# Patient Record
Sex: MALE | Race: BLACK OR AFRICAN AMERICAN | Employment: UNEMPLOYED | URBAN - METROPOLITAN AREA
[De-identification: names, ages, dates, MRNs, and addresses within clinical notes are randomized per-mention and may not be internally consistent; named-entity substitution may affect disease eponyms.]

---

## 2021-07-26 ENCOUNTER — APPOINTMENT (OUTPATIENT)
Dept: GENERAL RADIOLOGY | Age: 51
End: 2021-07-26
Attending: EMERGENCY MEDICINE
Payer: MEDICARE

## 2021-07-26 ENCOUNTER — HOSPITAL ENCOUNTER (EMERGENCY)
Age: 51
Discharge: HOME | End: 2021-07-26
Attending: EMERGENCY MEDICINE | Admitting: EMERGENCY MEDICINE
Payer: MEDICARE

## 2021-07-26 VITALS
OXYGEN SATURATION: 100 % | SYSTOLIC BLOOD PRESSURE: 153 MMHG | DIASTOLIC BLOOD PRESSURE: 96 MMHG | HEART RATE: 82 BPM | RESPIRATION RATE: 20 BRPM | TEMPERATURE: 97.8 F

## 2021-07-26 DIAGNOSIS — G35 MULTIPLE SCLEROSIS (HCC): Primary | ICD-10-CM

## 2021-07-26 DIAGNOSIS — D50.9 IRON DEFICIENCY ANEMIA, UNSPECIFIED IRON DEFICIENCY ANEMIA TYPE: ICD-10-CM

## 2021-07-26 LAB
ALBUMIN SERPL-MCNC: 3.5 G/DL (ref 3.5–5)
ALBUMIN/GLOB SERPL: 1.1 {RATIO} (ref 1.1–2.2)
ALP SERPL-CCNC: 99 U/L (ref 45–117)
ALT SERPL-CCNC: 37 U/L (ref 12–78)
ANION GAP SERPL CALC-SCNC: 4 MMOL/L (ref 5–15)
AST SERPL-CCNC: 17 U/L (ref 15–37)
ATRIAL RATE: 101 BPM
BASOPHILS # BLD: 0.1 K/UL (ref 0–0.1)
BASOPHILS NFR BLD: 1 % (ref 0–1)
BILIRUB SERPL-MCNC: 0.2 MG/DL (ref 0.2–1)
BUN SERPL-MCNC: 8 MG/DL (ref 6–20)
BUN/CREAT SERPL: 13 (ref 12–20)
CALCIUM SERPL-MCNC: 9.4 MG/DL (ref 8.5–10.1)
CALCULATED P AXIS, ECG09: 75 DEGREES
CALCULATED R AXIS, ECG10: 41 DEGREES
CALCULATED T AXIS, ECG11: 34 DEGREES
CHLORIDE SERPL-SCNC: 109 MMOL/L (ref 97–108)
CO2 SERPL-SCNC: 28 MMOL/L (ref 21–32)
COMMENT, HOLDF: NORMAL
CREAT SERPL-MCNC: 0.62 MG/DL (ref 0.7–1.3)
DIAGNOSIS, 93000: NORMAL
DIFFERENTIAL METHOD BLD: ABNORMAL
EOSINOPHIL # BLD: 0.1 K/UL (ref 0–0.4)
EOSINOPHIL NFR BLD: 1 % (ref 0–7)
ERYTHROCYTE [DISTWIDTH] IN BLOOD BY AUTOMATED COUNT: 20.8 % (ref 11.5–14.5)
GLOBULIN SER CALC-MCNC: 3.2 G/DL (ref 2–4)
GLUCOSE SERPL-MCNC: 165 MG/DL (ref 65–100)
HCT VFR BLD AUTO: 30.3 % (ref 36.6–50.3)
HGB BLD-MCNC: 8.5 G/DL (ref 12.1–17)
IMM GRANULOCYTES # BLD AUTO: 0.1 K/UL (ref 0–0.04)
IMM GRANULOCYTES NFR BLD AUTO: 1 % (ref 0–0.5)
LYMPHOCYTES # BLD: 2.1 K/UL (ref 0.8–3.5)
LYMPHOCYTES NFR BLD: 16 % (ref 12–49)
MCH RBC QN AUTO: 19.1 PG (ref 26–34)
MCHC RBC AUTO-ENTMCNC: 28.1 G/DL (ref 30–36.5)
MCV RBC AUTO: 68.2 FL (ref 80–99)
MONOCYTES # BLD: 1.2 K/UL (ref 0–1)
MONOCYTES NFR BLD: 9 % (ref 5–13)
NEUTS SEG # BLD: 9.3 K/UL (ref 1.8–8)
NEUTS SEG NFR BLD: 72 % (ref 32–75)
NRBC # BLD: 0.04 K/UL (ref 0–0.01)
NRBC BLD-RTO: 0.3 PER 100 WBC
P-R INTERVAL, ECG05: 130 MS
PLATELET # BLD AUTO: 283 K/UL (ref 150–400)
PMV BLD AUTO: ABNORMAL FL (ref 8.9–12.9)
POTASSIUM SERPL-SCNC: 3.5 MMOL/L (ref 3.5–5.1)
PROT SERPL-MCNC: 6.7 G/DL (ref 6.4–8.2)
Q-T INTERVAL, ECG07: 344 MS
QRS DURATION, ECG06: 86 MS
QTC CALCULATION (BEZET), ECG08: 446 MS
RBC # BLD AUTO: 4.44 M/UL (ref 4.1–5.7)
RBC MORPH BLD: ABNORMAL
SAMPLES BEING HELD,HOLD: NORMAL
SODIUM SERPL-SCNC: 141 MMOL/L (ref 136–145)
TROPONIN I SERPL-MCNC: <0.05 NG/ML
VENTRICULAR RATE, ECG03: 101 BPM
WBC # BLD AUTO: 12.9 K/UL (ref 4.1–11.1)

## 2021-07-26 PROCEDURE — 36415 COLL VENOUS BLD VENIPUNCTURE: CPT

## 2021-07-26 PROCEDURE — 84484 ASSAY OF TROPONIN QUANT: CPT

## 2021-07-26 PROCEDURE — 93005 ELECTROCARDIOGRAM TRACING: CPT

## 2021-07-26 PROCEDURE — 85025 COMPLETE CBC W/AUTO DIFF WBC: CPT

## 2021-07-26 PROCEDURE — 71045 X-RAY EXAM CHEST 1 VIEW: CPT

## 2021-07-26 PROCEDURE — 99284 EMERGENCY DEPT VISIT MOD MDM: CPT

## 2021-07-26 PROCEDURE — 80053 COMPREHEN METABOLIC PANEL: CPT

## 2021-07-26 RX ORDER — LANOLIN ALCOHOL/MO/W.PET/CERES
325 CREAM (GRAM) TOPICAL
Qty: 30 TABLET | Refills: 0 | Status: SHIPPED | OUTPATIENT
Start: 2021-07-26 | End: 2021-08-25

## 2021-07-26 NOTE — DISCHARGE INSTRUCTIONS
Please establish care with [facility] neurology for your Multiple Sclerosis. Start taking iron pills for your anemia. Thank you.

## 2021-07-29 NOTE — ED PROVIDER NOTES
Pt is a 50 yo male with hx of DM, HTN, myasthena gravis, polymyositis, primary hypersomnolence disorder. Pt reports there is a lot of stuff going on with him, and he just wants to make sure he will be okay. Reports he does not currently see neurology for his MS. Previously saw [facility] neurology and [facility]. Takes daily prednisone since 2001 for MS (unable to tell me who prescribes or refills monthly scipt) and says 'I heard it is not good for you'. Pt says for months he has had intermittent weakness in extremities, no new symptoms today. Says he was admitted at [facility] and was discharged 3 days ago 'too early'. New symptom today is the polyp that he was diagnosed with 3 days ago via colonoscopy. Pt ambulated to room with steady gait. Tells me that he has not established care with PCP 'because that will take months'. Has not made appt with neurology. Past Medical History:   Diagnosis Date    Diabetes ([facility]     Hypertension     Myasthenia gravis     Polymyositis ([facility]     Primary hypersomnolence disorder        Past Surgical History:   Procedure Laterality Date    SHOULDER SURG PROC UNLISTED           No family history on file.     Social History     Socioeconomic History    Marital status:      Spouse name: Not on file    Number of children: Not on file    Years of education: Not on file    Highest education level: Not on file   Occupational History    Not on file   Tobacco Use    Smoking status: Never Smoker   Substance and Sexual Activity    Alcohol use: No    Drug use: No    Sexual activity: Not on file   Other Topics Concern    Not on file   Social History Narrative    Not on file     Social Determinants of Health     Financial Resource Strain:     Difficulty of Paying Living Expenses:    Food Insecurity:     Worried About Running Out of Food in the Last Year:     [address] in the Last Year:    Transportation Needs:     Lack of Transportation (Medical):    [facility] Lack of Transportation (Non-Medical):    Physical Activity:     Days of Exercise per Week:     Minutes of Exercise per Session:    Stress:     Feeling of Stress :    Social Connections:     Frequency of Communication with Friends and Family:     Frequency of Social Gatherings with Friends and Family:     Attends [employer] Services:     Active Member of Clubs or Organizations:     Attends Club or Organization Meetings:     Marital Status:    Intimate Partner Violence:     Fear of Current or Ex-Partner:     Emotionally Abused:     Physically Abused:     Sexually Abused: ALLERGIES: Beef containing products, Pork derived (porcine), Shellfish containing products, and Sulfa (sulfonamide antibiotics)    Review of Systems   Constitutional: Negative for chills and fever. HENT: Negative for drooling and nosebleeds. Eyes: Negative for pain and itching. Respiratory: Negative for choking and stridor. Cardiovascular: Negative for leg swelling. Gastrointestinal: Negative for abdominal pain and rectal pain. Endocrine: Negative for heat intolerance and polyphagia. Genitourinary: Negative for enuresis and genital sores. Musculoskeletal: Negative for arthralgias and joint swelling. Skin: Negative for color change. Allergic/Immunologic: Negative for immunocompromised state. Neurological: Positive for weakness. Negative for tremors and speech difficulty. Hematological: Negative for adenopathy. Psychiatric/Behavioral: Negative for dysphoric mood and sleep disturbance. Vitals:    07/26/21 1830 07/26/21 1845 07/26/21 1900 07/26/21 1931   BP: (!) 137/99 (!) 153/98 (!) 153/96    Pulse:   82 82   Resp:       Temp:   97.8 °F (36.6 °C)    SpO2: 94% 99% 100%             Physical Exam  Vitals and nursing note reviewed. Constitutional:       General: He is not in acute distress. Appearance: He is well-developed. He is not ill-appearing, toxic-appearing or diaphoretic.    HENT:      Head: Normocephalic and atraumatic. Nose: Nose normal.   Eyes:      Conjunctiva/sclera: Conjunctivae normal.   Cardiovascular:      Rate and Rhythm: Normal rate and regular rhythm. Heart sounds: Normal heart sounds. Pulmonary:      Effort: Pulmonary effort is normal. No respiratory distress. Breath sounds: Normal breath sounds. Abdominal:      General: There is no distension. Palpations: Abdomen is soft. Musculoskeletal:         General: No deformity. Normal range of motion. Cervical back: Normal range of motion and neck supple. Skin:     General: Skin is warm and dry. Neurological:      Mental Status: He is alert and oriented to person, place, and time. Cranial Nerves: No cranial nerve deficit. Sensory: No sensory deficit. Motor: No weakness. Coordination: Coordination normal.      Gait: Gait normal.   Psychiatric:         Behavior: Behavior normal.          MDM  Number of Diagnoses or Management Options  Iron deficiency anemia, unspecified iron deficiency anemia type  Multiple sclerosis ([facility]  Diagnosis management comments: Pt with no acute symptoms. Needs to get plugged in with PCP and neurology since pt would like to switch from [facility] to [facility]. Amount and/or Complexity of Data Reviewed  Decide to obtain previous medical records or to obtain history from someone other than the patient: yes      ED Course as of Jul 29 1724   Mon Jul 26, 2021   1929 Pt reporting chronic symptoms. Discharged from [facility] 3 days ago but did not feel well and wanted to make sure 'he was going to be ok'. Previously seen by [facility] neurology, then [facility] neurolog[facility]. Would like to transition to [facility] neurology. Remains on prednisone since 2001 for MS. Referral for neuro provided. Pt reports recent diagnosis of iron def anemia. Will prescribe iron pills. Stable for dc.      [AL]      ED Course User Index  [AL] Vasile Starks MD       Procedures    Patient's results have been reviewed with them. Patient and/or family have verbally conveyed their understanding and agreement of the patient's signs, symptoms, diagnosis, treatment and prognosis and additionally agree to follow up as recommended or return to the Emergency Room should their condition change prior to follow-up. Discharge instructions have also been provided to the patient with some educational information regarding their diagnosis as well a list of reasons why they would want to return to the ER prior to their follow-up appointment should their condition change.

## 2021-08-12 ENCOUNTER — OFFICE VISIT (OUTPATIENT)
Dept: PRIMARY CARE CLINIC | Age: 51
End: 2021-08-12
Payer: MEDICARE

## 2021-08-12 VITALS
RESPIRATION RATE: 16 BRPM | SYSTOLIC BLOOD PRESSURE: 133 MMHG | OXYGEN SATURATION: 99 % | BODY MASS INDEX: 24.49 KG/M2 | TEMPERATURE: 98.4 F | HEART RATE: 99 BPM | DIASTOLIC BLOOD PRESSURE: 87 MMHG | WEIGHT: 161.6 LBS | HEIGHT: 68 IN

## 2021-08-12 DIAGNOSIS — M87.052 AVASCULAR NECROSIS OF BONE OF LEFT HIP (HCC): ICD-10-CM

## 2021-08-12 DIAGNOSIS — R53.83 FATIGUE, UNSPECIFIED TYPE: ICD-10-CM

## 2021-08-12 DIAGNOSIS — G70.00 MYASTHENIA GRAVIS (HCC): ICD-10-CM

## 2021-08-12 DIAGNOSIS — S93.699A: ICD-10-CM

## 2021-08-12 DIAGNOSIS — D64.9 ANEMIA, UNSPECIFIED TYPE: ICD-10-CM

## 2021-08-12 DIAGNOSIS — K63.5 INTESTINAL POLYP: ICD-10-CM

## 2021-08-12 DIAGNOSIS — Z79.52 LONG TERM (CURRENT) USE OF SYSTEMIC STEROIDS: ICD-10-CM

## 2021-08-12 DIAGNOSIS — I10 ESSENTIAL HYPERTENSION: ICD-10-CM

## 2021-08-12 DIAGNOSIS — E55.9 VITAMIN D DEFICIENCY: ICD-10-CM

## 2021-08-12 DIAGNOSIS — I48.92 ATRIAL FLUTTER, UNSPECIFIED TYPE (HCC): ICD-10-CM

## 2021-08-12 DIAGNOSIS — M33.20 POLYMYOSITIS (HCC): Primary | ICD-10-CM

## 2021-08-12 DIAGNOSIS — E11.9 DM TYPE 2, GOAL HBA1C < 7% (HCC): ICD-10-CM

## 2021-08-12 DIAGNOSIS — N40.0 BENIGN PROSTATIC HYPERPLASIA, UNSPECIFIED WHETHER LOWER URINARY TRACT SYMPTOMS PRESENT: ICD-10-CM

## 2021-08-12 DIAGNOSIS — G89.4 CHRONIC PAIN SYNDROME: ICD-10-CM

## 2021-08-12 DIAGNOSIS — G47.10 HYPERSOMNOLENCE: ICD-10-CM

## 2021-08-12 PROCEDURE — G8510 SCR DEP NEG, NO PLAN REQD: HCPCS | Performed by: INTERNAL MEDICINE

## 2021-08-12 PROCEDURE — 3046F HEMOGLOBIN A1C LEVEL >9.0%: CPT | Performed by: INTERNAL MEDICINE

## 2021-08-12 PROCEDURE — G8427 DOCREV CUR MEDS BY ELIG CLIN: HCPCS | Performed by: INTERNAL MEDICINE

## 2021-08-12 PROCEDURE — 99205 OFFICE O/P NEW HI 60 MIN: CPT | Performed by: INTERNAL MEDICINE

## 2021-08-12 PROCEDURE — 2022F DILAT RTA XM EVC RTNOPTHY: CPT | Performed by: INTERNAL MEDICINE

## 2021-08-12 PROCEDURE — G8420 CALC BMI NORM PARAMETERS: HCPCS | Performed by: INTERNAL MEDICINE

## 2021-08-12 PROCEDURE — 3017F COLORECTAL CA SCREEN DOC REV: CPT | Performed by: INTERNAL MEDICINE

## 2021-08-12 RX ORDER — LISINOPRIL 10 MG/1
20 TABLET ORAL DAILY
COMMUNITY
Start: 2021-07-09 | End: 2022-01-01

## 2021-08-12 RX ORDER — LOPERAMIDE HYDROCHLORIDE 2 MG/1
CAPSULE ORAL AS NEEDED
COMMUNITY

## 2021-08-12 NOTE — PROGRESS NOTES
Vito Keene is a 49 y.o.  male and presents with     Chief Complaint   Patient presents with   [facility] Follow Up    Establish Care    Diabetes    Hypertension     Pt is here to establish care. Pt has h/o polymositis, myasthenia, DM, HTN , chronic pain. Pt used to live in [facility] and was going to [facility]. Pt will be moving to [place] in December  Pt  Was seeing cardiology, Neurology, GI , pain management  Used to be on percocet. Pt was admitted to [facility] recently and had EGD and colonscopy. Pt had pain in his shoulders. Pt was told he had polyp . He had pain in his abdomen. Was getting weaker and his rt arm gave out. Pt had bloody dark  Stools. EGD was fine  Polyp pathology is pending. Sister has lupus, brother has hemophilia  Pt is supposed to get CT abdomen ordered by GI at [facility]. Patient reports that he has avascular necrosis of the head of the femur on the left side. He has been on prednisone for a long time. He reported that his polymyositis is worse than myasthenia. He reported that he has reached a plateau with [facility] with regard to managing his myositis. We have never been able to cut down on the dose of the steroid. He reports that surprisingly his bone density in the past was okay. Patient reports that myositis would cause him a lot of pain and he had to take Percocet.   He was seen pain management in the past.  He saw cardiology at [facility] and was placed on blood thinner for atrial flutter      Past Medical History:   Diagnosis Date    Diabetes ([facility]     Hypertension     Myasthenia gravis     Polymyositis ([facility]     Polymyositis ([facility] 2001    Primary hypersomnolence disorder      Past Surgical History:   Procedure Laterality Date    HX ENDOSCOPY      [place] SHOULDER SURG PROC UNLISTED       Current Outpatient Medications   Medication Sig    ferrous sulfate (Iron, Ferrous Sulfate,) 325 mg (65 mg iron) tablet Take 1 Tablet by mouth Daily (before breakfast) for 30 days.    insulin lispro (HUMALOG) 100 unit/mL injection by SubCUTAneous route. Sliding Scale  4 times a day    CALCIUM PO Take  by mouth daily.  Acetylcarnitine 500 mg cap Take  by mouth daily.  insulin glargine (LANTUS) 100 unit/mL injection 26 Units by SubCUTAneous route daily.  predniSONE (DELTASONE) 20 mg tablet Take 25 mg by mouth daily (with breakfast).  OTHER,NON-FORMULARY, DMAE 351mg Daily    calcitRIOL (ROCALTROL) 0.5 mcg capsule Take 0.5 mcg by mouth daily.  albuterol (PROVENTIL, VENTOLIN) 90 mcg/Actuation inhaler Take 1-2 Puffs by inhalation every four (4) hours as needed for Wheezing.  esomeprazole (NEXIUM) 40 mg capsule Take 80 mg by mouth daily.  folic acid (FOLVITE) 1 mg tablet Take 1 mg by mouth daily.  lisinopriL (PRINIVIL, ZESTRIL) 10 mg tablet TAKE 1 TABLET BY MOUTH EVERY DAY    apixaban (Eliquis) 5 mg tablet Eliquis 5 mg tablet    loperamide (IMODIUM) 2 mg capsule loperamide 2 mg capsule    pyridostigmine (MESTINON) 60 mg tablet Take 60 mg by mouth two (2) times a day. (Patient not taking: Reported on 8/12/2021)     No current facility-administered medications for this visit. Health Maintenance   Topic Date Due    Hepatitis C Screening  Never done    COVID-19 Vaccine (1) Never done    DTaP/Tdap/Td series (1 - Tdap) Never done    Lipid Screen  Never done    Colorectal Cancer Screening Combo  Never done    Shingrix Vaccine Age 50> (1 of 2) Never done    Medicare Yearly Exam  07/26/2021    Flu Vaccine (1) 09/01/2021    Pneumococcal 0-64 years  Aged Out       There is no immunization history on file for this patient. No LMP for male patient. Allergies and Intolerances:    Allergies   Allergen Reactions    Beef Containing Products Other (comments)    Pork Derived (Porcine) Other (comments)    Shellfish Containing Products Swelling    Sulfa (Sulfonamide Antibiotics) Unknown (comments)       Family History:   Family History   Family history unknown: Yes       Social History:   He  reports that he has never smoked. He has never used smokeless tobacco.  He  reports no history of alcohol use. Review of Systems:   General: negative for - chills, fatigue, fever, weight change  Psych: negative for - anxiety, depression, irritability or mood swings  ENT: negative for - headaches, hearing change, nasal congestion, oral lesions, sneezing or sore throat  Heme/ Lymph: negative for - bleeding problems, bruising, pallor or swollen lymph nodes  Endo: negative for - hot flashes, polydipsia/polyuria or temperature intolerance  Resp: negative for - cough, shortness of breath or wheezing  CV: negative for - chest pain, edema or palpitations  GI: negative for - abdominal pain, change in bowel habits, constipation, diarrhea or nausea/vomiting  : negative for - dysuria, hematuria, incontinence, pelvic pain or vulvar/vaginal symptoms  MSK: negative for - joint pain, joint swelling or muscle pain  Neuro: negative for - confusion, headaches, seizures or weakness  Derm: negative for - dry skin, hair changes, rash or skin lesion changes          Physical:   Vitals:   Vitals:    08/12/21 1108 08/12/21 1118   BP: (!) 150/89 133/87   Pulse: 99    Resp: 16    Temp: 98.4 °F (36.9 °C)    TempSrc: Temporal    SpO2: 99%    Weight: 161 lb 9.6 oz (73.3 kg)    Height: 5' 8\" (1.727 m)            Exam:   HEENT- atraumatic,normocephalic, awake, oriented, well nourished  Neck - supple,no enlarged lymph nodes, no JVD, no thyromegaly  Chest- CTA, no rhonchi, no crackles  Heart- , no murmurs / gallop/rub, irregular  Abdomen- soft,BS+,NT, no hepatosplenomegaly  Ext - no c/c/edema   Neuro- no focal deficits. Power 5/5 all extremities  Skin - warm,dry, no obvious rashes.           Review of Data:   LABS:   Lab Results   Component Value Date/Time    WBC 12.9 (H) 07/26/2021 12:56 PM    HGB 8.5 (L) 07/26/2021 12:56 PM    HCT 30.3 (L) 07/26/2021 12:56 PM    PLATELET 543 50/49/3789 12:56 PM     Lab Results   Component Value Date/Time    Sodium 141 07/26/2021 12:56 PM    Potassium 3.5 07/26/2021 12:56 PM    Chloride 109 (H) 07/26/2021 12:56 PM    CO2 28 07/26/2021 12:56 PM    Glucose 165 (H) 07/26/2021 12:56 PM    BUN 8 07/26/2021 12:56 PM    Creatinine 0.62 (L) 07/26/2021 12:56 PM     No results found for: CHOL, CHOLX, CHLST, CHOLV, HDL, HDLP, LDL, LDLC, DLDLP, TGLX, TRIGL, TRIGP  No components found for: GPT        Impression / Plan:        ICD-10-CM ICD-9-CM    1. Polymyositis ([facility])  M33.20 710.4 REFERRAL TO RHEUMATOLOGY      [facility]   2. Myasthenia gravis ([facility]  G70.00 358.00 REFERRAL TO NEUROLOGY      [facility] 3RD GENERATION   3. DM type 2, goal HbA1c < 7% ([facility])  E11.9 250.00 HEMOGLOBIN A1C WITH EAG      LIPID PANEL      METABOLIC PANEL, COMPREHENSIVE      HEMOGLOBIN A1C WITH EAG      MICROALBUMIN, UR, RAND W/ MICROALB/CREAT RATIO   4. Anemia, unspecified type  D64.9 285.9    5. Intestinal polyp  K63.5 211.3    6. Hypersomnolence  G47.10 780.54    7. Essential hypertension  I10 401.9    8. Benign prostatic hyperplasia, unspecified whether lower urinary tract symptoms present  N40.0 600.00    9. Rupture of plantar fascia, unspecified laterality, initial encounter  S93.699A 845.19    10. Atrial flutter, unspecified type ([facility]  I48.92 427.32 REFERRAL TO CARDIOLOGY   11. Vitamin D deficiency  E55.9 268.9 VITAMIN D, 25 HYDROXY   12. Avascular necrosis of bone of left hip ([facility])  M87.052 733.42 XR HIP LT W OR WO PELV 2-3 VWS      DEXA BONE DENSITY STUDY AXIAL   13. Long term (current) use of systemic steroids  Z79.52 V58.65 DEXA BONE DENSITY STUDY AXIAL   14. Chronic pain syndrome  G89.4 338.4 REFERRAL TO PAIN MANAGEMENT   15. Fatigue, unspecified type  R53.83 780.79 [facility] 3RD GENERATION     Very complex patient with multiple comorbidities. Asked patient to follow-up with GI related to rectal bleeding, anemia and polyp in the colon. Supposed to get CT abdomen.       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 Will await rheumatology opinion. Explained to patient risk benefits of the medications. Advised patient to stop meds if having any side effects. Pt verbalized understanding of the instructions. I have discussed the diagnosis with the patient and the intended plan as seen in the above orders. The patient has received an after-visit summary and questions were answered concerning future plans. I have discussed medication side effects and warnings with the patient as well. I have reviewed the plan of care with the patient, accepted their input and they are in agreement with the treatment goals. Reviewed plan of care. Patient has provided input and agrees with goals. Follow-up and Dispositions    · Return in about 1 month (around 9/12/2021).          Lisa Chapman MD

## 2021-08-12 NOTE — PROGRESS NOTES
Identified pt with two pt identifiers(name and ). Chief Complaint   Patient presents with   [facility] Follow Up    Establish Care        3 most recent PHQ Screens 2021   Little interest or pleasure in doing things Not at all   Feeling down, depressed, irritable, or hopeless Not at all   Total Score PHQ 2 0        Vitals:    21 1108 21 1118   BP: (!) 150/89 133/87   Pulse: 99    Resp: 16    Temp: 98.4 °F (36.9 °C)    TempSrc: Temporal    SpO2: 99%    Weight: 161 lb 9.6 oz (73.3 kg)    Height: 5' 8\" (1.727 m)    PainSc:   6    PainLoc: Generalized        Health Maintenance Due   Topic    Hepatitis C Screening     COVID-19 Vaccine (1)    DTaP/Tdap/Td series (1 - Tdap)    Lipid Screen     Colorectal Cancer Screening Combo     Shingrix Vaccine Age 50> (1 of 2)    Medicare Yearly Exam        1. Have you been to the ER, urgent care clinic since your last visit? Hospitalized since your last visit? No    2. Have you seen or consulted any other health care providers outside of the [address] [place] [address] since your last visit? Include any pap smears or colon screening.  No

## 2021-08-20 ENCOUNTER — TELEPHONE (OUTPATIENT)
Dept: PRIMARY CARE CLINIC | Age: 51
End: 2021-08-20

## 2021-08-20 DIAGNOSIS — M33.20 POLYMYOSITIS (HCC): Primary | ICD-10-CM

## 2021-08-20 NOTE — TELEPHONE ENCOUNTER
----- Message from Konstantin Rubio sent at 8/20/2021  1:24 PM EDT -----  Regarding: Dr. Agrawal/Telephone  Contact: [phone]  Referral    Caller (first and last name if not the patient or from practice): Pt.       Caller's relationship to patient (if not from a practice): N/a. Name of caller (if calling from a practice): N/a. Name of practice: [facility]. Specialist's title, first, and last name: Rheumatologist.      Office Phone Number: [phone]      Fax number: N/a. Date and time of appointment: N/a needs referral first.       Reason for appointment: MS.       Details to clarify the request:  N/a.       Konstantin Rubio

## 2021-08-23 DIAGNOSIS — M33.20 POLYMYOSITIS (HCC): Primary | ICD-10-CM

## 2021-08-23 NOTE — TELEPHONE ENCOUNTER
Patient seen on 8/12/21 can you place new referral for [facility] Rheumatologist clinic

## 2022-01-01 ENCOUNTER — APPOINTMENT (OUTPATIENT)
Dept: GENERAL RADIOLOGY | Age: 52
DRG: 329 | End: 2022-01-01
Attending: COLON & RECTAL SURGERY
Payer: MEDICARE

## 2022-01-01 ENCOUNTER — TELEPHONE (OUTPATIENT)
Dept: SURGERY | Age: 52
End: 2022-01-01

## 2022-01-01 ENCOUNTER — APPOINTMENT (OUTPATIENT)
Dept: CT IMAGING | Age: 52
DRG: 329 | End: 2022-01-01
Attending: COLON & RECTAL SURGERY
Payer: MEDICARE

## 2022-01-01 ENCOUNTER — PATIENT OUTREACH (OUTPATIENT)
Dept: CASE MANAGEMENT | Age: 52
End: 2022-01-01

## 2022-01-01 ENCOUNTER — HOSPITAL ENCOUNTER (OUTPATIENT)
Dept: PREADMISSION TESTING | Age: 52
Discharge: HOME | End: 2022-03-15

## 2022-01-01 ENCOUNTER — APPOINTMENT (OUTPATIENT)
Dept: MRI IMAGING | Age: 52
DRG: 329 | End: 2022-01-01
Attending: PSYCHIATRY & NEUROLOGY
Payer: MEDICARE

## 2022-01-01 ENCOUNTER — ANESTHESIA EVENT (OUTPATIENT)
Dept: SURGERY | Age: 52
DRG: 230 | End: 2022-01-01
Payer: MEDICAID

## 2022-01-01 ENCOUNTER — HOSPITAL ENCOUNTER (OUTPATIENT)
Dept: PREADMISSION TESTING | Age: 52
Discharge: HOME | End: 2022-02-21
Payer: MEDICARE

## 2022-01-01 ENCOUNTER — HOSPITAL ENCOUNTER (INPATIENT)
Age: 52
LOS: 19 days | DRG: 230 | End: 2022-09-14
Attending: COLON & RECTAL SURGERY | Admitting: COLON & RECTAL SURGERY
Payer: MEDICAID

## 2022-01-01 ENCOUNTER — HOSPITAL ENCOUNTER (OUTPATIENT)
Dept: PREADMISSION TESTING | Age: 52
Discharge: HOME | End: 2022-08-10
Payer: MEDICARE

## 2022-01-01 ENCOUNTER — APPOINTMENT (OUTPATIENT)
Dept: NON INVASIVE DIAGNOSTICS | Age: 52
DRG: 329 | End: 2022-01-01
Attending: PSYCHIATRY & NEUROLOGY
Payer: MEDICARE

## 2022-01-01 ENCOUNTER — APPOINTMENT (OUTPATIENT)
Dept: GENERAL RADIOLOGY | Age: 52
End: 2022-01-01
Attending: STUDENT IN AN ORGANIZED HEALTH CARE EDUCATION/TRAINING PROGRAM
Payer: MEDICARE

## 2022-01-01 ENCOUNTER — OFFICE VISIT (OUTPATIENT)
Dept: SURGERY | Age: 52
End: 2022-01-01
Payer: MEDICARE

## 2022-01-01 ENCOUNTER — ANESTHESIA (OUTPATIENT)
Dept: SURGERY | Age: 52
DRG: 230 | End: 2022-01-01
Payer: MEDICAID

## 2022-01-01 ENCOUNTER — APPOINTMENT (OUTPATIENT)
Dept: CT IMAGING | Age: 52
DRG: 230 | End: 2022-01-01
Attending: COLON & RECTAL SURGERY
Payer: MEDICAID

## 2022-01-01 ENCOUNTER — ANESTHESIA EVENT (OUTPATIENT)
Dept: SURGERY | Age: 52
DRG: 329 | End: 2022-01-01
Payer: MEDICARE

## 2022-01-01 ENCOUNTER — HOSPITAL ENCOUNTER (INPATIENT)
Age: 52
LOS: 71 days | Discharge: NURSING, REHAB OR LONG-TERM CARE | DRG: 329 | End: 2022-06-25
Attending: COLON & RECTAL SURGERY | Admitting: COLON & RECTAL SURGERY
Payer: MEDICARE

## 2022-01-01 ENCOUNTER — HOSPITAL ENCOUNTER (OUTPATIENT)
Dept: PREADMISSION TESTING | Age: 52
Discharge: HOME | End: 2022-04-05

## 2022-01-01 ENCOUNTER — ANESTHESIA EVENT (OUTPATIENT)
Dept: NON INVASIVE DIAGNOSTICS | Age: 52
DRG: 329 | End: 2022-01-01
Payer: MEDICARE

## 2022-01-01 ENCOUNTER — ANESTHESIA (OUTPATIENT)
Dept: SURGERY | Age: 52
DRG: 329 | End: 2022-01-01
Payer: MEDICARE

## 2022-01-01 ENCOUNTER — APPOINTMENT (OUTPATIENT)
Dept: MRI IMAGING | Age: 52
DRG: 329 | End: 2022-01-01
Attending: COLON & RECTAL SURGERY
Payer: MEDICARE

## 2022-01-01 ENCOUNTER — APPOINTMENT (OUTPATIENT)
Dept: GENERAL RADIOLOGY | Age: 52
DRG: 329 | End: 2022-01-01
Attending: ORTHOPAEDIC SURGERY
Payer: MEDICARE

## 2022-01-01 ENCOUNTER — ANESTHESIA (OUTPATIENT)
Dept: NON INVASIVE DIAGNOSTICS | Age: 52
DRG: 329 | End: 2022-01-01
Payer: MEDICARE

## 2022-01-01 ENCOUNTER — HOSPITAL ENCOUNTER (EMERGENCY)
Age: 52
Discharge: HOME | End: 2022-01-04
Attending: EMERGENCY MEDICINE | Admitting: EMERGENCY MEDICINE
Payer: MEDICARE

## 2022-01-01 ENCOUNTER — APPOINTMENT (OUTPATIENT)
Dept: NON INVASIVE DIAGNOSTICS | Age: 52
DRG: 329 | End: 2022-01-01
Attending: INTERNAL MEDICINE
Payer: MEDICARE

## 2022-01-01 ENCOUNTER — HOSPITAL ENCOUNTER (OUTPATIENT)
Dept: PREADMISSION TESTING | Age: 52
Discharge: HOME | End: 2022-04-08
Payer: MEDICARE

## 2022-01-01 VITALS
TEMPERATURE: 98.6 F | HEART RATE: 87 BPM | DIASTOLIC BLOOD PRESSURE: 95 MMHG | BODY MASS INDEX: 23.34 KG/M2 | WEIGHT: 154 LBS | HEIGHT: 68 IN | OXYGEN SATURATION: 99 % | RESPIRATION RATE: 18 BRPM | SYSTOLIC BLOOD PRESSURE: 147 MMHG

## 2022-01-01 VITALS
TEMPERATURE: 98 F | WEIGHT: 148 LBS | SYSTOLIC BLOOD PRESSURE: 161 MMHG | RESPIRATION RATE: 18 BRPM | BODY MASS INDEX: 23.23 KG/M2 | HEART RATE: 100 BPM | HEIGHT: 67 IN | OXYGEN SATURATION: 95 % | DIASTOLIC BLOOD PRESSURE: 87 MMHG

## 2022-01-01 VITALS
OXYGEN SATURATION: 100 % | WEIGHT: 132.72 LBS | DIASTOLIC BLOOD PRESSURE: 66 MMHG | HEART RATE: 89 BPM | SYSTOLIC BLOOD PRESSURE: 112 MMHG | TEMPERATURE: 98.5 F | RESPIRATION RATE: 18 BRPM | BODY MASS INDEX: 20.83 KG/M2 | HEIGHT: 67 IN

## 2022-01-01 VITALS
DIASTOLIC BLOOD PRESSURE: 106 MMHG | TEMPERATURE: 96.7 F | RESPIRATION RATE: 16 BRPM | OXYGEN SATURATION: 100 % | SYSTOLIC BLOOD PRESSURE: 164 MMHG | HEART RATE: 78 BPM

## 2022-01-01 VITALS
OXYGEN SATURATION: 97 % | WEIGHT: 162.48 LBS | BODY MASS INDEX: 25.5 KG/M2 | DIASTOLIC BLOOD PRESSURE: 98 MMHG | SYSTOLIC BLOOD PRESSURE: 158 MMHG | RESPIRATION RATE: 18 BRPM | HEIGHT: 67 IN | TEMPERATURE: 98.5 F | HEART RATE: 86 BPM

## 2022-01-01 VITALS
DIASTOLIC BLOOD PRESSURE: 78 MMHG | HEIGHT: 68 IN | TEMPERATURE: 98.6 F | HEART RATE: 95 BPM | WEIGHT: 171.08 LBS | SYSTOLIC BLOOD PRESSURE: 101 MMHG | BODY MASS INDEX: 25.93 KG/M2 | RESPIRATION RATE: 16 BRPM | OXYGEN SATURATION: 96 %

## 2022-01-01 VITALS
OXYGEN SATURATION: 98 % | DIASTOLIC BLOOD PRESSURE: 88 MMHG | BODY MASS INDEX: 23.19 KG/M2 | TEMPERATURE: 98.4 F | HEIGHT: 68 IN | WEIGHT: 153 LBS | SYSTOLIC BLOOD PRESSURE: 132 MMHG | HEART RATE: 101 BPM

## 2022-01-01 VITALS
DIASTOLIC BLOOD PRESSURE: 86 MMHG | OXYGEN SATURATION: 98 % | WEIGHT: 150.2 LBS | RESPIRATION RATE: 18 BRPM | HEART RATE: 85 BPM | SYSTOLIC BLOOD PRESSURE: 140 MMHG | BODY MASS INDEX: 22.76 KG/M2 | TEMPERATURE: 98.2 F | HEIGHT: 68 IN

## 2022-01-01 VITALS
DIASTOLIC BLOOD PRESSURE: 91 MMHG | OXYGEN SATURATION: 100 % | SYSTOLIC BLOOD PRESSURE: 125 MMHG | RESPIRATION RATE: 16 BRPM | HEART RATE: 107 BPM | WEIGHT: 157.19 LBS | HEIGHT: 68 IN | TEMPERATURE: 98.6 F | BODY MASS INDEX: 23.82 KG/M2

## 2022-01-01 DIAGNOSIS — Z20.822 COUGH WITH EXPOSURE TO COVID-19 VIRUS: ICD-10-CM

## 2022-01-01 DIAGNOSIS — D12.2 ADENOMATOUS POLYP OF ASCENDING COLON: Primary | ICD-10-CM

## 2022-01-01 DIAGNOSIS — J18.9 PNEUMONIA OF BOTH LOWER LOBES DUE TO INFECTIOUS ORGANISM: Primary | ICD-10-CM

## 2022-01-01 DIAGNOSIS — R05.8 COUGH WITH EXPOSURE TO COVID-19 VIRUS: ICD-10-CM

## 2022-01-01 DIAGNOSIS — Z93.2 ILEOSTOMY PRESENT (HCC): ICD-10-CM

## 2022-01-01 LAB
ABO + RH BLD: NORMAL
ACHR AB SER-SCNC: <0.03 NMOL/L (ref 0–0.24)
ACHR BLOCK AB SER-ACNC: 14 % (ref 0–25)
ACHR MOD AB/ACHR TOTAL SFR SER: NORMAL %
ALBUMIN SERPL-MCNC: 1.8 G/DL (ref 3.5–5)
ALBUMIN SERPL-MCNC: 1.9 G/DL (ref 3.5–5)
ALBUMIN SERPL-MCNC: 2 G/DL (ref 3.5–5)
ALBUMIN SERPL-MCNC: 2 G/DL (ref 3.5–5)
ALBUMIN SERPL-MCNC: 2.1 G/DL (ref 3.5–5)
ALBUMIN SERPL-MCNC: 2.2 G/DL (ref 3.5–5)
ALBUMIN SERPL-MCNC: 2.3 G/DL (ref 3.5–5)
ALBUMIN SERPL-MCNC: 2.4 G/DL (ref 3.5–5)
ALBUMIN SERPL-MCNC: 2.6 G/DL (ref 3.5–5)
ALBUMIN SERPL-MCNC: 2.7 G/DL (ref 3.5–5)
ALBUMIN SERPL-MCNC: 2.8 G/DL (ref 3.5–5)
ALBUMIN SERPL-MCNC: 3 G/DL (ref 3.5–5)
ALBUMIN SERPL-MCNC: 3.1 G/DL (ref 3.5–5)
ALBUMIN SERPL-MCNC: 3.1 G/DL (ref 3.5–5)
ALBUMIN SERPL-MCNC: 3.2 G/DL (ref 3.5–5)
ALBUMIN SERPL-MCNC: 3.3 G/DL (ref 3.5–5)
ALBUMIN SERPL-MCNC: 3.3 G/DL (ref 3.5–5)
ALBUMIN SERPL-MCNC: 3.4 G/DL (ref 3.5–5)
ALBUMIN SERPL-MCNC: 3.4 G/DL (ref 3.5–5)
ALBUMIN SERPL-MCNC: 3.5 G/DL (ref 3.5–5)
ALBUMIN/GLOB SERPL: 0.6 {RATIO} (ref 1.1–2.2)
ALBUMIN/GLOB SERPL: 0.7 {RATIO} (ref 1.1–2.2)
ALBUMIN/GLOB SERPL: 0.8 {RATIO} (ref 1.1–2.2)
ALBUMIN/GLOB SERPL: 0.9 {RATIO} (ref 1.1–2.2)
ALBUMIN/GLOB SERPL: 1 {RATIO} (ref 1.1–2.2)
ALBUMIN/GLOB SERPL: 1.1 {RATIO} (ref 1.1–2.2)
ALBUMIN/GLOB SERPL: 1.2 {RATIO} (ref 1.1–2.2)
ALBUMIN/GLOB SERPL: 1.3 {RATIO} (ref 1.1–2.2)
ALDOLASE SERPL-CCNC: 2.2 U/L (ref 3.3–10.3)
ALP SERPL-CCNC: 105 U/L (ref 45–117)
ALP SERPL-CCNC: 155 U/L (ref 45–117)
ALP SERPL-CCNC: 46 U/L (ref 45–117)
ALP SERPL-CCNC: 51 U/L (ref 45–117)
ALP SERPL-CCNC: 55 U/L (ref 45–117)
ALP SERPL-CCNC: 64 U/L (ref 45–117)
ALP SERPL-CCNC: 65 U/L (ref 45–117)
ALP SERPL-CCNC: 67 U/L (ref 45–117)
ALP SERPL-CCNC: 69 U/L (ref 45–117)
ALP SERPL-CCNC: 70 U/L (ref 45–117)
ALP SERPL-CCNC: 70 U/L (ref 45–117)
ALP SERPL-CCNC: 71 U/L (ref 45–117)
ALP SERPL-CCNC: 72 U/L (ref 45–117)
ALP SERPL-CCNC: 76 U/L (ref 45–117)
ALP SERPL-CCNC: 78 U/L (ref 45–117)
ALP SERPL-CCNC: 78 U/L (ref 45–117)
ALP SERPL-CCNC: 79 U/L (ref 45–117)
ALP SERPL-CCNC: 82 U/L (ref 45–117)
ALP SERPL-CCNC: 84 U/L (ref 45–117)
ALP SERPL-CCNC: 88 U/L (ref 45–117)
ALT SERPL-CCNC: 10 U/L (ref 12–78)
ALT SERPL-CCNC: 11 U/L (ref 12–78)
ALT SERPL-CCNC: 15 U/L (ref 12–78)
ALT SERPL-CCNC: 16 U/L (ref 12–78)
ALT SERPL-CCNC: 16 U/L (ref 12–78)
ALT SERPL-CCNC: 19 U/L (ref 12–78)
ALT SERPL-CCNC: 20 U/L (ref 12–78)
ALT SERPL-CCNC: 20 U/L (ref 12–78)
ALT SERPL-CCNC: 21 U/L (ref 12–78)
ALT SERPL-CCNC: 21 U/L (ref 12–78)
ALT SERPL-CCNC: 22 U/L (ref 12–78)
ALT SERPL-CCNC: 23 U/L (ref 12–78)
ALT SERPL-CCNC: 23 U/L (ref 12–78)
ALT SERPL-CCNC: 25 U/L (ref 12–78)
ALT SERPL-CCNC: 30 U/L (ref 12–78)
ALT SERPL-CCNC: 40 U/L (ref 12–78)
ALT SERPL-CCNC: 6 U/L (ref 12–78)
ALT SERPL-CCNC: 7 U/L (ref 12–78)
ALT SERPL-CCNC: 7 U/L (ref 12–78)
ALT SERPL-CCNC: 9 U/L (ref 12–78)
ALT SERPL-CCNC: 9 U/L (ref 12–78)
ALT SERPL-CCNC: <6 U/L (ref 12–78)
ANION GAP SERPL CALC-SCNC: 10 MMOL/L (ref 5–15)
ANION GAP SERPL CALC-SCNC: 10 MMOL/L (ref 5–15)
ANION GAP SERPL CALC-SCNC: 11 MMOL/L (ref 5–15)
ANION GAP SERPL CALC-SCNC: 12 MMOL/L (ref 5–15)
ANION GAP SERPL CALC-SCNC: 13 MMOL/L (ref 5–15)
ANION GAP SERPL CALC-SCNC: 2 MMOL/L (ref 5–15)
ANION GAP SERPL CALC-SCNC: 2 MMOL/L (ref 5–15)
ANION GAP SERPL CALC-SCNC: 3 MMOL/L (ref 5–15)
ANION GAP SERPL CALC-SCNC: 3 MMOL/L (ref 5–15)
ANION GAP SERPL CALC-SCNC: 4 MMOL/L (ref 5–15)
ANION GAP SERPL CALC-SCNC: 5 MMOL/L (ref 5–15)
ANION GAP SERPL CALC-SCNC: 6 MMOL/L (ref 5–15)
ANION GAP SERPL CALC-SCNC: 7 MMOL/L (ref 5–15)
ANION GAP SERPL CALC-SCNC: 8 MMOL/L (ref 5–15)
ANION GAP SERPL CALC-SCNC: 9 MMOL/L (ref 5–15)
APTT PPP: 35.2 SEC (ref 21.2–34.1)
ARTERIAL PATENCY WRIST A: YES
AST SERPL W P-5'-P-CCNC: 10 U/L (ref 15–37)
AST SERPL W P-5'-P-CCNC: 11 U/L (ref 15–37)
AST SERPL W P-5'-P-CCNC: 11 U/L (ref 15–37)
AST SERPL W P-5'-P-CCNC: 12 U/L (ref 15–37)
AST SERPL W P-5'-P-CCNC: 13 U/L (ref 15–37)
AST SERPL W P-5'-P-CCNC: 14 U/L (ref 15–37)
AST SERPL W P-5'-P-CCNC: 15 U/L (ref 15–37)
AST SERPL W P-5'-P-CCNC: 16 U/L (ref 15–37)
AST SERPL W P-5'-P-CCNC: 17 U/L (ref 15–37)
AST SERPL W P-5'-P-CCNC: 18 U/L (ref 15–37)
AST SERPL W P-5'-P-CCNC: 18 U/L (ref 15–37)
AST SERPL W P-5'-P-CCNC: 19 U/L (ref 15–37)
AST SERPL W P-5'-P-CCNC: 20 U/L (ref 15–37)
AST SERPL W P-5'-P-CCNC: 21 U/L (ref 15–37)
AST SERPL W P-5'-P-CCNC: 21 U/L (ref 15–37)
AST SERPL W P-5'-P-CCNC: 24 U/L (ref 15–37)
AST SERPL W P-5'-P-CCNC: 36 U/L (ref 15–37)
AST SERPL W P-5'-P-CCNC: 4 U/L (ref 15–37)
AST SERPL W P-5'-P-CCNC: 7 U/L (ref 15–37)
AST SERPL W P-5'-P-CCNC: 7 U/L (ref 15–37)
AST SERPL W P-5'-P-CCNC: 8 U/L (ref 15–37)
AST SERPL W P-5'-P-CCNC: 9 U/L (ref 15–37)
ATRIAL RATE: 101 BPM
ATRIAL RATE: 103 BPM
ATRIAL RATE: 121 BPM
ATRIAL RATE: 135 BPM
ATRIAL RATE: 144 BPM
ATRIAL RATE: 174 BPM
ATRIAL RATE: 174 BPM
ATRIAL RATE: 187 BPM
ATRIAL RATE: 192 BPM
ATRIAL RATE: 197 BPM
ATRIAL RATE: 197 BPM
ATRIAL RATE: 74 BPM
ATRIAL RATE: 84 BPM
ATRIAL RATE: 89 BPM
ATRIAL RATE: 90 BPM
ATRIAL RATE: 98 BPM
BACTERIA SPEC CULT: NORMAL
BASE DEFICIT BLDA-SCNC: 19.9 MMOL/L
BASOPHILS # BLD: 0 K/UL (ref 0–0.1)
BASOPHILS # BLD: 0.1 K/UL (ref 0–0.1)
BASOPHILS NFR BLD: 0 % (ref 0–1)
BDY SITE: ABNORMAL
BILIRUB SERPL-MCNC: 0.2 MG/DL (ref 0.2–1)
BILIRUB SERPL-MCNC: 0.3 MG/DL (ref 0.2–1)
BILIRUB SERPL-MCNC: 0.4 MG/DL (ref 0.2–1)
BILIRUB SERPL-MCNC: 0.5 MG/DL (ref 0.2–1)
BILIRUB SERPL-MCNC: 0.6 MG/DL (ref 0.2–1)
BILIRUB SERPL-MCNC: 0.6 MG/DL (ref 0.2–1)
BILIRUB SERPL-MCNC: 0.7 MG/DL (ref 0.2–1)
BLD PROD TYP BPU: NORMAL
BLOOD GROUP ANTIBODIES SERPL: NEGATIVE
BODY TEMPERATURE: 98.6
BPU ID: NORMAL
BUN SERPL-MCNC: 10 MG/DL (ref 6–20)
BUN SERPL-MCNC: 10 MG/DL (ref 6–20)
BUN SERPL-MCNC: 11 MG/DL (ref 6–20)
BUN SERPL-MCNC: 11 MG/DL (ref 6–20)
BUN SERPL-MCNC: 12 MG/DL (ref 6–20)
BUN SERPL-MCNC: 14 MG/DL (ref 6–20)
BUN SERPL-MCNC: 15 MG/DL (ref 6–20)
BUN SERPL-MCNC: 16 MG/DL (ref 6–20)
BUN SERPL-MCNC: 17 MG/DL (ref 6–20)
BUN SERPL-MCNC: 18 MG/DL (ref 6–20)
BUN SERPL-MCNC: 24 MG/DL (ref 6–20)
BUN SERPL-MCNC: 3 MG/DL (ref 6–20)
BUN SERPL-MCNC: 3 MG/DL (ref 6–20)
BUN SERPL-MCNC: 31 MG/DL (ref 6–20)
BUN SERPL-MCNC: 4 MG/DL (ref 6–20)
BUN SERPL-MCNC: 46 MG/DL (ref 6–20)
BUN SERPL-MCNC: 5 MG/DL (ref 6–20)
BUN SERPL-MCNC: 5 MG/DL (ref 6–20)
BUN SERPL-MCNC: 59 MG/DL (ref 6–20)
BUN SERPL-MCNC: 6 MG/DL (ref 6–20)
BUN SERPL-MCNC: 7 MG/DL (ref 6–20)
BUN SERPL-MCNC: 8 MG/DL (ref 6–20)
BUN SERPL-MCNC: 9 MG/DL (ref 6–20)
BUN SERPL-MCNC: 9 MG/DL (ref 6–20)
BUN/CREAT SERPL: 10 (ref 12–20)
BUN/CREAT SERPL: 10 (ref 12–20)
BUN/CREAT SERPL: 12 (ref 12–20)
BUN/CREAT SERPL: 13 (ref 12–20)
BUN/CREAT SERPL: 14 (ref 12–20)
BUN/CREAT SERPL: 14 (ref 12–20)
BUN/CREAT SERPL: 15 (ref 12–20)
BUN/CREAT SERPL: 16 (ref 12–20)
BUN/CREAT SERPL: 17 (ref 12–20)
BUN/CREAT SERPL: 17 (ref 12–20)
BUN/CREAT SERPL: 18 (ref 12–20)
BUN/CREAT SERPL: 19 (ref 12–20)
BUN/CREAT SERPL: 19 (ref 12–20)
BUN/CREAT SERPL: 20 (ref 12–20)
BUN/CREAT SERPL: 22 (ref 12–20)
BUN/CREAT SERPL: 22 (ref 12–20)
BUN/CREAT SERPL: 23 (ref 12–20)
BUN/CREAT SERPL: 25 (ref 12–20)
BUN/CREAT SERPL: 27 (ref 12–20)
BUN/CREAT SERPL: 29 (ref 12–20)
BUN/CREAT SERPL: 36 (ref 12–20)
BUN/CREAT SERPL: 38 (ref 12–20)
BUN/CREAT SERPL: 38 (ref 12–20)
BUN/CREAT SERPL: 39 (ref 12–20)
BUN/CREAT SERPL: 41 (ref 12–20)
BUN/CREAT SERPL: 44 (ref 12–20)
BUN/CREAT SERPL: 46 (ref 12–20)
BUN/CREAT SERPL: 49 (ref 12–20)
BUN/CREAT SERPL: 52 (ref 12–20)
BUN/CREAT SERPL: 8 (ref 12–20)
BUN/CREAT SERPL: 8 (ref 12–20)
BUN/CREAT SERPL: 9 (ref 12–20)
BUN/CREAT SERPL: 9 (ref 12–20)
CA-I BLD-MCNC: 7.1 MG/DL (ref 8.5–10.1)
CA-I BLD-MCNC: 7.5 MG/DL (ref 8.5–10.1)
CA-I BLD-MCNC: 7.6 MG/DL (ref 8.5–10.1)
CA-I BLD-MCNC: 7.6 MG/DL (ref 8.5–10.1)
CA-I BLD-MCNC: 7.7 MG/DL (ref 8.5–10.1)
CA-I BLD-MCNC: 7.8 MG/DL (ref 8.5–10.1)
CA-I BLD-MCNC: 7.9 MG/DL (ref 8.5–10.1)
CA-I BLD-MCNC: 8 MG/DL (ref 8.5–10.1)
CA-I BLD-MCNC: 8 MG/DL (ref 8.5–10.1)
CA-I BLD-MCNC: 8.2 MG/DL (ref 8.5–10.1)
CA-I BLD-MCNC: 8.2 MG/DL (ref 8.5–10.1)
CA-I BLD-MCNC: 8.3 MG/DL (ref 8.5–10.1)
CA-I BLD-MCNC: 8.4 MG/DL (ref 8.5–10.1)
CA-I BLD-MCNC: 8.5 MG/DL (ref 8.5–10.1)
CA-I BLD-MCNC: 8.5 MG/DL (ref 8.5–10.1)
CA-I BLD-MCNC: 8.6 MG/DL (ref 8.5–10.1)
CA-I BLD-MCNC: 8.7 MG/DL (ref 8.5–10.1)
CA-I BLD-MCNC: 8.8 MG/DL (ref 8.5–10.1)
CA-I BLD-MCNC: 9 MG/DL (ref 8.5–10.1)
CA-I BLD-MCNC: 9.1 MG/DL (ref 8.5–10.1)
CA-I BLD-MCNC: 9.2 MG/DL (ref 8.5–10.1)
CA-I BLD-MCNC: 9.2 MG/DL (ref 8.5–10.1)
CALCULATED P AXIS, ECG09: 45 DEGREES
CALCULATED P AXIS, ECG09: 47 DEGREES
CALCULATED P AXIS, ECG09: 56 DEGREES
CALCULATED P AXIS, ECG09: 58 DEGREES
CALCULATED P AXIS, ECG09: 59 DEGREES
CALCULATED P AXIS, ECG09: 60 DEGREES
CALCULATED P AXIS, ECG09: 68 DEGREES
CALCULATED P AXIS, ECG09: 72 DEGREES
CALCULATED R AXIS, ECG10: -10 DEGREES
CALCULATED R AXIS, ECG10: -12 DEGREES
CALCULATED R AXIS, ECG10: -15 DEGREES
CALCULATED R AXIS, ECG10: -24 DEGREES
CALCULATED R AXIS, ECG10: -3 DEGREES
CALCULATED R AXIS, ECG10: -4 DEGREES
CALCULATED R AXIS, ECG10: -9 DEGREES
CALCULATED R AXIS, ECG10: 0 DEGREES
CALCULATED R AXIS, ECG10: 12 DEGREES
CALCULATED R AXIS, ECG10: 18 DEGREES
CALCULATED R AXIS, ECG10: 20 DEGREES
CALCULATED R AXIS, ECG10: 24 DEGREES
CALCULATED R AXIS, ECG10: 3 DEGREES
CALCULATED R AXIS, ECG10: 47 DEGREES
CALCULATED R AXIS, ECG10: 7 DEGREES
CALCULATED R AXIS, ECG10: 7 DEGREES
CALCULATED T AXIS, ECG11: -143 DEGREES
CALCULATED T AXIS, ECG11: -145 DEGREES
CALCULATED T AXIS, ECG11: -156 DEGREES
CALCULATED T AXIS, ECG11: -166 DEGREES
CALCULATED T AXIS, ECG11: -173 DEGREES
CALCULATED T AXIS, ECG11: -4 DEGREES
CALCULATED T AXIS, ECG11: -55 DEGREES
CALCULATED T AXIS, ECG11: 128 DEGREES
CALCULATED T AXIS, ECG11: 130 DEGREES
CALCULATED T AXIS, ECG11: 152 DEGREES
CALCULATED T AXIS, ECG11: 154 DEGREES
CALCULATED T AXIS, ECG11: 35 DEGREES
CALCULATED T AXIS, ECG11: 70 DEGREES
CALCULATED T AXIS, ECG11: 75 DEGREES
CALCULATED T AXIS, ECG11: 93 DEGREES
CALCULATED T AXIS, ECG11: 96 DEGREES
CARDIOLIPIN IGG SER IA-ACNC: <9 GPL U/ML (ref 0–14)
CARDIOLIPIN IGM SER IA-ACNC: <9 MPL U/ML (ref 0–12)
CHLORIDE SERPL-SCNC: 103 MMOL/L (ref 97–108)
CHLORIDE SERPL-SCNC: 104 MMOL/L (ref 97–108)
CHLORIDE SERPL-SCNC: 104 MMOL/L (ref 97–108)
CHLORIDE SERPL-SCNC: 105 MMOL/L (ref 97–108)
CHLORIDE SERPL-SCNC: 106 MMOL/L (ref 97–108)
CHLORIDE SERPL-SCNC: 107 MMOL/L (ref 97–108)
CHLORIDE SERPL-SCNC: 108 MMOL/L (ref 97–108)
CHLORIDE SERPL-SCNC: 109 MMOL/L (ref 97–108)
CHLORIDE SERPL-SCNC: 110 MMOL/L (ref 97–108)
CHLORIDE SERPL-SCNC: 110 MMOL/L (ref 97–108)
CHLORIDE SERPL-SCNC: 112 MMOL/L (ref 97–108)
CHLORIDE SERPL-SCNC: 113 MMOL/L (ref 97–108)
CHLORIDE SERPL-SCNC: 113 MMOL/L (ref 97–108)
CHLORIDE SERPL-SCNC: 114 MMOL/L (ref 97–108)
CHLORIDE SERPL-SCNC: 114 MMOL/L (ref 97–108)
CHOLEST SERPL-MCNC: 120 MG/DL
CK SERPL-CCNC: 32 U/L (ref 39–308)
CK SERPL-CCNC: 32 U/L (ref 39–308)
CO2 SERPL-SCNC: 19 MMOL/L (ref 21–32)
CO2 SERPL-SCNC: 20 MMOL/L (ref 21–32)
CO2 SERPL-SCNC: 21 MMOL/L (ref 21–32)
CO2 SERPL-SCNC: 22 MMOL/L (ref 21–32)
CO2 SERPL-SCNC: 23 MMOL/L (ref 21–32)
CO2 SERPL-SCNC: 24 MMOL/L (ref 21–32)
CO2 SERPL-SCNC: 25 MMOL/L (ref 21–32)
CO2 SERPL-SCNC: 26 MMOL/L (ref 21–32)
CO2 SERPL-SCNC: 27 MMOL/L (ref 21–32)
CO2 SERPL-SCNC: 28 MMOL/L (ref 21–32)
CO2 SERPL-SCNC: 29 MMOL/L (ref 21–32)
CO2 SERPL-SCNC: 30 MMOL/L (ref 21–32)
CO2 SERPL-SCNC: 32 MMOL/L (ref 21–32)
CO2 SERPL-SCNC: 32 MMOL/L (ref 21–32)
COHGB MFR BLD: 0.2 % (ref 1–2)
CREAT SERPL-MCNC: 0.21 MG/DL (ref 0.7–1.3)
CREAT SERPL-MCNC: 0.29 MG/DL (ref 0.7–1.3)
CREAT SERPL-MCNC: 0.3 MG/DL (ref 0.7–1.3)
CREAT SERPL-MCNC: 0.32 MG/DL (ref 0.7–1.3)
CREAT SERPL-MCNC: 0.33 MG/DL (ref 0.7–1.3)
CREAT SERPL-MCNC: 0.33 MG/DL (ref 0.7–1.3)
CREAT SERPL-MCNC: 0.34 MG/DL (ref 0.7–1.3)
CREAT SERPL-MCNC: 0.35 MG/DL (ref 0.7–1.3)
CREAT SERPL-MCNC: 0.36 MG/DL (ref 0.7–1.3)
CREAT SERPL-MCNC: 0.39 MG/DL (ref 0.7–1.3)
CREAT SERPL-MCNC: 0.43 MG/DL (ref 0.7–1.3)
CREAT SERPL-MCNC: 0.43 MG/DL (ref 0.7–1.3)
CREAT SERPL-MCNC: 0.44 MG/DL (ref 0.7–1.3)
CREAT SERPL-MCNC: 0.46 MG/DL (ref 0.7–1.3)
CREAT SERPL-MCNC: 0.48 MG/DL (ref 0.7–1.3)
CREAT SERPL-MCNC: 0.49 MG/DL (ref 0.7–1.3)
CREAT SERPL-MCNC: 0.51 MG/DL (ref 0.7–1.3)
CREAT SERPL-MCNC: 0.53 MG/DL (ref 0.7–1.3)
CREAT SERPL-MCNC: 0.53 MG/DL (ref 0.7–1.3)
CREAT SERPL-MCNC: 0.54 MG/DL (ref 0.7–1.3)
CREAT SERPL-MCNC: 0.55 MG/DL (ref 0.7–1.3)
CREAT SERPL-MCNC: 0.57 MG/DL (ref 0.7–1.3)
CREAT SERPL-MCNC: 0.59 MG/DL (ref 0.7–1.3)
CREAT SERPL-MCNC: 0.6 MG/DL (ref 0.7–1.3)
CREAT SERPL-MCNC: 0.66 MG/DL (ref 0.7–1.3)
CREAT SERPL-MCNC: 0.68 MG/DL (ref 0.7–1.3)
CREAT SERPL-MCNC: 0.94 MG/DL (ref 0.7–1.3)
CREAT SERPL-MCNC: 1.44 MG/DL (ref 0.7–1.3)
CROSSMATCH RESULT,%XM: NORMAL
CRP SERPL-MCNC: 0.72 MG/DL (ref 0–0.6)
DIAGNOSIS, 93000: NORMAL
DIFFERENTIAL METHOD BLD: ABNORMAL
DSDNA AB SER-ACNC: <1 IU/ML (ref 0–9)
ECHO AO DESC DIAM: 2 CM
ECHO AO ROOT DIAM: 3.1 CM
ECHO AO ROOT DIAM: 3.3 CM
ECHO AO ROOT INDEX: 1.77 CM/M2
ECHO AV AREA PEAK VELOCITY: 2.7 CM2
ECHO AV PEAK GRADIENT: 7 MMHG
ECHO AV PEAK GRADIENT: 8 MMHG
ECHO AV PEAK VELOCITY: 1.3 M/S
ECHO AV PEAK VELOCITY: 1.4 M/S
ECHO AV VELOCITY RATIO: 0.77
ECHO AV VELOCITY RATIO: 0.79
ECHO EST RA PRESSURE: 3 MMHG
ECHO EST RA PRESSURE: 3 MMHG
ECHO LA AREA 4C: 27.2 CM2
ECHO LA DIAMETER INDEX: 2 CM/M2
ECHO LA DIAMETER: 3.5 CM
ECHO LA DIAMETER: 4.1 CM
ECHO LA MAJOR AXIS: 6.3 CM
ECHO LA TO AORTIC ROOT RATIO: 1.13
ECHO LA TO AORTIC ROOT RATIO: 1.24
ECHO LV E' LATERAL VELOCITY: 13 CM/S
ECHO LV E' LATERAL VELOCITY: 15 CM/S
ECHO LV E' SEPTAL VELOCITY: 7 CM/S
ECHO LV E' SEPTAL VELOCITY: 8 CM/S
ECHO LV EJECTION FRACTION BIPLANE: 55 % (ref 55–100)
ECHO LV EJECTION FRACTION BIPLANE: 57 % (ref 55–100)
ECHO LV FRACTIONAL SHORTENING: 27 % (ref 28–44)
ECHO LV FRACTIONAL SHORTENING: 30 % (ref 28–44)
ECHO LV INTERNAL DIMENSION DIASTOLE INDEX: 2.69 CM/M2
ECHO LV INTERNAL DIMENSION DIASTOLIC: 4.5 CM (ref 4.2–5.9)
ECHO LV INTERNAL DIMENSION DIASTOLIC: 4.7 CM (ref 4.2–5.9)
ECHO LV INTERNAL DIMENSION SYSTOLIC INDEX: 1.89 CM/M2
ECHO LV INTERNAL DIMENSION SYSTOLIC: 3.3 CM
ECHO LV INTERNAL DIMENSION SYSTOLIC: 3.3 CM
ECHO LV IVSD: 1.2 CM (ref 0.6–1)
ECHO LV IVSD: 1.4 CM (ref 0.6–1)
ECHO LV MASS 2D: 142.7 G (ref 88–224)
ECHO LV MASS 2D: 210.2 G (ref 88–224)
ECHO LV MASS INDEX 2D: 81.5 G/M2 (ref 49–115)
ECHO LV POSTERIOR WALL DIASTOLIC: 0.6 CM (ref 0.6–1)
ECHO LV POSTERIOR WALL DIASTOLIC: 1.1 CM (ref 0.6–1)
ECHO LV RELATIVE WALL THICKNESS RATIO: 0.26
ECHO LV RELATIVE WALL THICKNESS RATIO: 0.49
ECHO LVOT AREA: 3.5 CM2
ECHO LVOT DIAM: 2.1 CM
ECHO LVOT PEAK GRADIENT: 4 MMHG
ECHO LVOT PEAK GRADIENT: 4 MMHG
ECHO LVOT PEAK VELOCITY: 1 M/S
ECHO LVOT PEAK VELOCITY: 1.1 M/S
ECHO MV A VELOCITY: 0.37 M/S
ECHO MV A VELOCITY: 0.44 M/S
ECHO MV E DECELERATION TIME (DT): 187 MS
ECHO MV E DECELERATION TIME (DT): 303 MS
ECHO MV E VELOCITY: 0.72 M/S
ECHO MV E VELOCITY: 0.78 M/S
ECHO MV E/A RATIO: 1.77
ECHO MV E/A RATIO: 1.95
ECHO MV E/E' LATERAL: 5.2
ECHO MV E/E' LATERAL: 5.54
ECHO MV E/E' RATIO (AVERAGED): 7.27
ECHO MV E/E' RATIO (AVERAGED): 8.17
ECHO MV E/E' SEPTAL: 11.14
ECHO MV E/E' SEPTAL: 9
ECHO MV MAX VELOCITY: 0.8 M/S
ECHO MV MEAN GRADIENT: 1 MMHG
ECHO MV MEAN VELOCITY: 0.4 M/S
ECHO MV PEAK GRADIENT: 2 MMHG
ECHO MV REGURGITANT PEAK GRADIENT: 25 MMHG
ECHO MV REGURGITANT PEAK VELOCITY: 2.5 M/S
ECHO MV VTI: 19.8 CM
ECHO PULMONARY ARTERY END DIASTOLIC PRESSURE: 3 MMHG
ECHO PV MAX VELOCITY: 0.9 M/S
ECHO PV PEAK GRADIENT: 3 MMHG
ECHO PV REGURGITANT MAX VELOCITY: 0.9 M/S
ECHO PVEIN A DURATION: 49 MS
ECHO PVEIN A VELOCITY: 0.2 M/S
ECHO RIGHT VENTRICULAR SYSTOLIC PRESSURE (RVSP): 12 MMHG
ECHO RIGHT VENTRICULAR SYSTOLIC PRESSURE (RVSP): 33 MMHG
ECHO RV INTERNAL DIMENSION: 2.8 CM
ECHO RV TAPSE: 1.7 CM (ref 1.7–?)
ECHO TV REGURGITANT MAX VELOCITY: 1.51 M/S
ECHO TV REGURGITANT MAX VELOCITY: 2.73 M/S
ECHO TV REGURGITANT PEAK GRADIENT: 30 MMHG
ECHO TV REGURGITANT PEAK GRADIENT: 9 MMHG
ENA JO1 AB SER-ACNC: <0.2 AI (ref 0–0.9)
EOSINOPHIL # BLD: 0 K/UL (ref 0–0.4)
EOSINOPHIL # BLD: 0.1 K/UL (ref 0–0.4)
EOSINOPHIL # BLD: 0.2 K/UL (ref 0–0.4)
EOSINOPHIL NFR BLD: 0 % (ref 0–7)
EOSINOPHIL NFR BLD: 1 % (ref 0–7)
EOSINOPHIL NFR BLD: 2 % (ref 0–7)
ERYTHROCYTE [DISTWIDTH] IN BLOOD BY AUTOMATED COUNT: 14 % (ref 11.5–14.5)
ERYTHROCYTE [DISTWIDTH] IN BLOOD BY AUTOMATED COUNT: 14.1 % (ref 11.5–14.5)
ERYTHROCYTE [DISTWIDTH] IN BLOOD BY AUTOMATED COUNT: 14.1 % (ref 11.5–14.5)
ERYTHROCYTE [DISTWIDTH] IN BLOOD BY AUTOMATED COUNT: 14.2 % (ref 11.5–14.5)
ERYTHROCYTE [DISTWIDTH] IN BLOOD BY AUTOMATED COUNT: 14.4 % (ref 11.5–14.5)
ERYTHROCYTE [DISTWIDTH] IN BLOOD BY AUTOMATED COUNT: 14.4 % (ref 11.5–14.5)
ERYTHROCYTE [DISTWIDTH] IN BLOOD BY AUTOMATED COUNT: 14.5 % (ref 11.5–14.5)
ERYTHROCYTE [DISTWIDTH] IN BLOOD BY AUTOMATED COUNT: 14.6 % (ref 11.5–14.5)
ERYTHROCYTE [DISTWIDTH] IN BLOOD BY AUTOMATED COUNT: 14.7 % (ref 11.5–14.5)
ERYTHROCYTE [DISTWIDTH] IN BLOOD BY AUTOMATED COUNT: 14.9 % (ref 11.5–14.5)
ERYTHROCYTE [DISTWIDTH] IN BLOOD BY AUTOMATED COUNT: 15 % (ref 11.5–14.5)
ERYTHROCYTE [DISTWIDTH] IN BLOOD BY AUTOMATED COUNT: 15.1 % (ref 11.5–14.5)
ERYTHROCYTE [DISTWIDTH] IN BLOOD BY AUTOMATED COUNT: 15.4 % (ref 11.5–14.5)
ERYTHROCYTE [DISTWIDTH] IN BLOOD BY AUTOMATED COUNT: 15.5 % (ref 11.5–14.5)
ERYTHROCYTE [DISTWIDTH] IN BLOOD BY AUTOMATED COUNT: 16.4 % (ref 11.5–14.5)
ERYTHROCYTE [DISTWIDTH] IN BLOOD BY AUTOMATED COUNT: 17.2 % (ref 11.5–14.5)
ERYTHROCYTE [DISTWIDTH] IN BLOOD BY AUTOMATED COUNT: 17.3 % (ref 11.5–14.5)
ERYTHROCYTE [DISTWIDTH] IN BLOOD BY AUTOMATED COUNT: 18.2 % (ref 11.5–14.5)
ERYTHROCYTE [DISTWIDTH] IN BLOOD BY AUTOMATED COUNT: 18.3 % (ref 11.5–14.5)
ERYTHROCYTE [DISTWIDTH] IN BLOOD BY AUTOMATED COUNT: 18.4 % (ref 11.5–14.5)
ERYTHROCYTE [DISTWIDTH] IN BLOOD BY AUTOMATED COUNT: 18.4 % (ref 11.5–14.5)
ERYTHROCYTE [DISTWIDTH] IN BLOOD BY AUTOMATED COUNT: 18.6 % (ref 11.5–14.5)
ERYTHROCYTE [DISTWIDTH] IN BLOOD BY AUTOMATED COUNT: 19 % (ref 11.5–14.5)
ERYTHROCYTE [DISTWIDTH] IN BLOOD BY AUTOMATED COUNT: 19.9 % (ref 11.5–14.5)
ERYTHROCYTE [DISTWIDTH] IN BLOOD BY AUTOMATED COUNT: 19.9 % (ref 11.5–14.5)
ERYTHROCYTE [DISTWIDTH] IN BLOOD BY AUTOMATED COUNT: 20 % (ref 11.5–14.5)
ERYTHROCYTE [DISTWIDTH] IN BLOOD BY AUTOMATED COUNT: 20.1 % (ref 11.5–14.5)
ERYTHROCYTE [DISTWIDTH] IN BLOOD BY AUTOMATED COUNT: 20.2 % (ref 11.5–14.5)
ERYTHROCYTE [DISTWIDTH] IN BLOOD BY AUTOMATED COUNT: 20.2 % (ref 11.5–14.5)
ERYTHROCYTE [DISTWIDTH] IN BLOOD BY AUTOMATED COUNT: 20.8 % (ref 11.5–14.5)
ERYTHROCYTE [DISTWIDTH] IN BLOOD BY AUTOMATED COUNT: 21.2 % (ref 11.5–14.5)
ERYTHROCYTE [DISTWIDTH] IN BLOOD BY AUTOMATED COUNT: 21.9 % (ref 11.5–14.5)
ERYTHROCYTE [SEDIMENTATION RATE] IN BLOOD: 29 MM/HR (ref 0–20)
EST. AVERAGE GLUCOSE BLD GHB EST-MCNC: 166 MG/DL
FIO2 ON VENT: 100 %
FLUAV AG NPH QL IA: NEGATIVE
FLUAV RNA SPEC QL NAA+PROBE: NOT DETECTED
FLUBV AG NOSE QL IA: NEGATIVE
FLUBV RNA SPEC QL NAA+PROBE: NOT DETECTED
GLOBULIN SER CALC-MCNC: 1.8 G/DL (ref 2–4)
GLOBULIN SER CALC-MCNC: 1.9 G/DL (ref 2–4)
GLOBULIN SER CALC-MCNC: 2.5 G/DL (ref 2–4)
GLOBULIN SER CALC-MCNC: 2.6 G/DL (ref 2–4)
GLOBULIN SER CALC-MCNC: 2.6 G/DL (ref 2–4)
GLOBULIN SER CALC-MCNC: 2.8 G/DL (ref 2–4)
GLOBULIN SER CALC-MCNC: 2.9 G/DL (ref 2–4)
GLOBULIN SER CALC-MCNC: 3 G/DL (ref 2–4)
GLOBULIN SER CALC-MCNC: 3.1 G/DL (ref 2–4)
GLOBULIN SER CALC-MCNC: 3.1 G/DL (ref 2–4)
GLOBULIN SER CALC-MCNC: 3.2 G/DL (ref 2–4)
GLOBULIN SER CALC-MCNC: 3.2 G/DL (ref 2–4)
GLOBULIN SER CALC-MCNC: 3.3 G/DL (ref 2–4)
GLOBULIN SER CALC-MCNC: 3.4 G/DL (ref 2–4)
GLOBULIN SER CALC-MCNC: 3.5 G/DL (ref 2–4)
GLUCOSE BLD STRIP.AUTO-MCNC: 100 MG/DL (ref 65–100)
GLUCOSE BLD STRIP.AUTO-MCNC: 100 MG/DL (ref 65–100)
GLUCOSE BLD STRIP.AUTO-MCNC: 101 MG/DL (ref 65–100)
GLUCOSE BLD STRIP.AUTO-MCNC: 101 MG/DL (ref 65–117)
GLUCOSE BLD STRIP.AUTO-MCNC: 102 MG/DL (ref 65–117)
GLUCOSE BLD STRIP.AUTO-MCNC: 103 MG/DL (ref 65–117)
GLUCOSE BLD STRIP.AUTO-MCNC: 103 MG/DL (ref 65–117)
GLUCOSE BLD STRIP.AUTO-MCNC: 104 MG/DL (ref 65–100)
GLUCOSE BLD STRIP.AUTO-MCNC: 104 MG/DL (ref 65–117)
GLUCOSE BLD STRIP.AUTO-MCNC: 105 MG/DL (ref 65–100)
GLUCOSE BLD STRIP.AUTO-MCNC: 105 MG/DL (ref 65–100)
GLUCOSE BLD STRIP.AUTO-MCNC: 105 MG/DL (ref 65–117)
GLUCOSE BLD STRIP.AUTO-MCNC: 106 MG/DL (ref 65–117)
GLUCOSE BLD STRIP.AUTO-MCNC: 107 MG/DL (ref 65–117)
GLUCOSE BLD STRIP.AUTO-MCNC: 108 MG/DL (ref 65–117)
GLUCOSE BLD STRIP.AUTO-MCNC: 109 MG/DL (ref 65–117)
GLUCOSE BLD STRIP.AUTO-MCNC: 110 MG/DL (ref 65–117)
GLUCOSE BLD STRIP.AUTO-MCNC: 110 MG/DL (ref 65–117)
GLUCOSE BLD STRIP.AUTO-MCNC: 111 MG/DL (ref 65–117)
GLUCOSE BLD STRIP.AUTO-MCNC: 112 MG/DL (ref 65–117)
GLUCOSE BLD STRIP.AUTO-MCNC: 113 MG/DL (ref 65–100)
GLUCOSE BLD STRIP.AUTO-MCNC: 115 MG/DL (ref 65–117)
GLUCOSE BLD STRIP.AUTO-MCNC: 116 MG/DL (ref 65–117)
GLUCOSE BLD STRIP.AUTO-MCNC: 116 MG/DL (ref 65–117)
GLUCOSE BLD STRIP.AUTO-MCNC: 117 MG/DL (ref 65–117)
GLUCOSE BLD STRIP.AUTO-MCNC: 117 MG/DL (ref 65–117)
GLUCOSE BLD STRIP.AUTO-MCNC: 118 MG/DL (ref 65–100)
GLUCOSE BLD STRIP.AUTO-MCNC: 119 MG/DL (ref 65–100)
GLUCOSE BLD STRIP.AUTO-MCNC: 119 MG/DL (ref 65–100)
GLUCOSE BLD STRIP.AUTO-MCNC: 119 MG/DL (ref 65–117)
GLUCOSE BLD STRIP.AUTO-MCNC: 120 MG/DL (ref 65–117)
GLUCOSE BLD STRIP.AUTO-MCNC: 121 MG/DL (ref 65–117)
GLUCOSE BLD STRIP.AUTO-MCNC: 121 MG/DL (ref 65–117)
GLUCOSE BLD STRIP.AUTO-MCNC: 122 MG/DL (ref 65–100)
GLUCOSE BLD STRIP.AUTO-MCNC: 122 MG/DL (ref 65–100)
GLUCOSE BLD STRIP.AUTO-MCNC: 122 MG/DL (ref 65–117)
GLUCOSE BLD STRIP.AUTO-MCNC: 123 MG/DL (ref 65–117)
GLUCOSE BLD STRIP.AUTO-MCNC: 123 MG/DL (ref 65–117)
GLUCOSE BLD STRIP.AUTO-MCNC: 124 MG/DL (ref 65–100)
GLUCOSE BLD STRIP.AUTO-MCNC: 124 MG/DL (ref 65–117)
GLUCOSE BLD STRIP.AUTO-MCNC: 125 MG/DL (ref 65–117)
GLUCOSE BLD STRIP.AUTO-MCNC: 126 MG/DL (ref 65–100)
GLUCOSE BLD STRIP.AUTO-MCNC: 126 MG/DL (ref 65–100)
GLUCOSE BLD STRIP.AUTO-MCNC: 126 MG/DL (ref 65–117)
GLUCOSE BLD STRIP.AUTO-MCNC: 127 MG/DL (ref 65–100)
GLUCOSE BLD STRIP.AUTO-MCNC: 127 MG/DL (ref 65–117)
GLUCOSE BLD STRIP.AUTO-MCNC: 128 MG/DL (ref 65–117)
GLUCOSE BLD STRIP.AUTO-MCNC: 129 MG/DL (ref 65–100)
GLUCOSE BLD STRIP.AUTO-MCNC: 129 MG/DL (ref 65–100)
GLUCOSE BLD STRIP.AUTO-MCNC: 129 MG/DL (ref 65–117)
GLUCOSE BLD STRIP.AUTO-MCNC: 129 MG/DL (ref 65–117)
GLUCOSE BLD STRIP.AUTO-MCNC: 130 MG/DL (ref 65–117)
GLUCOSE BLD STRIP.AUTO-MCNC: 131 MG/DL (ref 65–117)
GLUCOSE BLD STRIP.AUTO-MCNC: 132 MG/DL (ref 65–117)
GLUCOSE BLD STRIP.AUTO-MCNC: 133 MG/DL (ref 65–117)
GLUCOSE BLD STRIP.AUTO-MCNC: 134 MG/DL (ref 65–100)
GLUCOSE BLD STRIP.AUTO-MCNC: 134 MG/DL (ref 65–117)
GLUCOSE BLD STRIP.AUTO-MCNC: 134 MG/DL (ref 65–117)
GLUCOSE BLD STRIP.AUTO-MCNC: 135 MG/DL (ref 65–117)
GLUCOSE BLD STRIP.AUTO-MCNC: 136 MG/DL (ref 65–117)
GLUCOSE BLD STRIP.AUTO-MCNC: 137 MG/DL (ref 65–117)
GLUCOSE BLD STRIP.AUTO-MCNC: 137 MG/DL (ref 65–117)
GLUCOSE BLD STRIP.AUTO-MCNC: 138 MG/DL (ref 65–100)
GLUCOSE BLD STRIP.AUTO-MCNC: 138 MG/DL (ref 65–100)
GLUCOSE BLD STRIP.AUTO-MCNC: 139 MG/DL (ref 65–100)
GLUCOSE BLD STRIP.AUTO-MCNC: 139 MG/DL (ref 65–117)
GLUCOSE BLD STRIP.AUTO-MCNC: 139 MG/DL (ref 65–117)
GLUCOSE BLD STRIP.AUTO-MCNC: 140 MG/DL (ref 65–117)
GLUCOSE BLD STRIP.AUTO-MCNC: 140 MG/DL (ref 65–117)
GLUCOSE BLD STRIP.AUTO-MCNC: 141 MG/DL (ref 65–100)
GLUCOSE BLD STRIP.AUTO-MCNC: 141 MG/DL (ref 65–117)
GLUCOSE BLD STRIP.AUTO-MCNC: 142 MG/DL (ref 65–117)
GLUCOSE BLD STRIP.AUTO-MCNC: 143 MG/DL (ref 65–117)
GLUCOSE BLD STRIP.AUTO-MCNC: 143 MG/DL (ref 65–117)
GLUCOSE BLD STRIP.AUTO-MCNC: 144 MG/DL (ref 65–100)
GLUCOSE BLD STRIP.AUTO-MCNC: 145 MG/DL (ref 65–117)
GLUCOSE BLD STRIP.AUTO-MCNC: 146 MG/DL (ref 65–100)
GLUCOSE BLD STRIP.AUTO-MCNC: 146 MG/DL (ref 65–117)
GLUCOSE BLD STRIP.AUTO-MCNC: 147 MG/DL (ref 65–100)
GLUCOSE BLD STRIP.AUTO-MCNC: 148 MG/DL (ref 65–117)
GLUCOSE BLD STRIP.AUTO-MCNC: 148 MG/DL (ref 65–117)
GLUCOSE BLD STRIP.AUTO-MCNC: 149 MG/DL (ref 65–117)
GLUCOSE BLD STRIP.AUTO-MCNC: 149 MG/DL (ref 65–117)
GLUCOSE BLD STRIP.AUTO-MCNC: 150 MG/DL (ref 65–100)
GLUCOSE BLD STRIP.AUTO-MCNC: 150 MG/DL (ref 65–117)
GLUCOSE BLD STRIP.AUTO-MCNC: 151 MG/DL (ref 65–117)
GLUCOSE BLD STRIP.AUTO-MCNC: 153 MG/DL (ref 65–100)
GLUCOSE BLD STRIP.AUTO-MCNC: 153 MG/DL (ref 65–117)
GLUCOSE BLD STRIP.AUTO-MCNC: 154 MG/DL (ref 65–117)
GLUCOSE BLD STRIP.AUTO-MCNC: 155 MG/DL (ref 65–100)
GLUCOSE BLD STRIP.AUTO-MCNC: 155 MG/DL (ref 65–117)
GLUCOSE BLD STRIP.AUTO-MCNC: 156 MG/DL (ref 65–117)
GLUCOSE BLD STRIP.AUTO-MCNC: 156 MG/DL (ref 65–117)
GLUCOSE BLD STRIP.AUTO-MCNC: 157 MG/DL (ref 65–117)
GLUCOSE BLD STRIP.AUTO-MCNC: 158 MG/DL (ref 65–117)
GLUCOSE BLD STRIP.AUTO-MCNC: 159 MG/DL (ref 65–117)
GLUCOSE BLD STRIP.AUTO-MCNC: 160 MG/DL (ref 65–117)
GLUCOSE BLD STRIP.AUTO-MCNC: 161 MG/DL (ref 65–117)
GLUCOSE BLD STRIP.AUTO-MCNC: 161 MG/DL (ref 65–117)
GLUCOSE BLD STRIP.AUTO-MCNC: 162 MG/DL (ref 65–117)
GLUCOSE BLD STRIP.AUTO-MCNC: 163 MG/DL (ref 65–100)
GLUCOSE BLD STRIP.AUTO-MCNC: 163 MG/DL (ref 65–117)
GLUCOSE BLD STRIP.AUTO-MCNC: 164 MG/DL (ref 65–117)
GLUCOSE BLD STRIP.AUTO-MCNC: 166 MG/DL (ref 65–100)
GLUCOSE BLD STRIP.AUTO-MCNC: 166 MG/DL (ref 65–117)
GLUCOSE BLD STRIP.AUTO-MCNC: 168 MG/DL (ref 65–100)
GLUCOSE BLD STRIP.AUTO-MCNC: 168 MG/DL (ref 65–117)
GLUCOSE BLD STRIP.AUTO-MCNC: 169 MG/DL (ref 65–100)
GLUCOSE BLD STRIP.AUTO-MCNC: 169 MG/DL (ref 65–117)
GLUCOSE BLD STRIP.AUTO-MCNC: 170 MG/DL (ref 65–117)
GLUCOSE BLD STRIP.AUTO-MCNC: 171 MG/DL (ref 65–117)
GLUCOSE BLD STRIP.AUTO-MCNC: 173 MG/DL (ref 65–100)
GLUCOSE BLD STRIP.AUTO-MCNC: 174 MG/DL (ref 65–117)
GLUCOSE BLD STRIP.AUTO-MCNC: 175 MG/DL (ref 65–117)
GLUCOSE BLD STRIP.AUTO-MCNC: 177 MG/DL (ref 65–117)
GLUCOSE BLD STRIP.AUTO-MCNC: 178 MG/DL (ref 65–117)
GLUCOSE BLD STRIP.AUTO-MCNC: 179 MG/DL (ref 65–117)
GLUCOSE BLD STRIP.AUTO-MCNC: 179 MG/DL (ref 65–117)
GLUCOSE BLD STRIP.AUTO-MCNC: 180 MG/DL (ref 65–100)
GLUCOSE BLD STRIP.AUTO-MCNC: 180 MG/DL (ref 65–100)
GLUCOSE BLD STRIP.AUTO-MCNC: 180 MG/DL (ref 65–117)
GLUCOSE BLD STRIP.AUTO-MCNC: 181 MG/DL (ref 65–117)
GLUCOSE BLD STRIP.AUTO-MCNC: 182 MG/DL (ref 65–117)
GLUCOSE BLD STRIP.AUTO-MCNC: 182 MG/DL (ref 65–117)
GLUCOSE BLD STRIP.AUTO-MCNC: 183 MG/DL (ref 65–117)
GLUCOSE BLD STRIP.AUTO-MCNC: 184 MG/DL (ref 65–100)
GLUCOSE BLD STRIP.AUTO-MCNC: 184 MG/DL (ref 65–117)
GLUCOSE BLD STRIP.AUTO-MCNC: 184 MG/DL (ref 65–117)
GLUCOSE BLD STRIP.AUTO-MCNC: 187 MG/DL (ref 65–117)
GLUCOSE BLD STRIP.AUTO-MCNC: 187 MG/DL (ref 65–117)
GLUCOSE BLD STRIP.AUTO-MCNC: 188 MG/DL (ref 65–100)
GLUCOSE BLD STRIP.AUTO-MCNC: 188 MG/DL (ref 65–117)
GLUCOSE BLD STRIP.AUTO-MCNC: 189 MG/DL (ref 65–117)
GLUCOSE BLD STRIP.AUTO-MCNC: 190 MG/DL (ref 65–117)
GLUCOSE BLD STRIP.AUTO-MCNC: 191 MG/DL (ref 65–117)
GLUCOSE BLD STRIP.AUTO-MCNC: 191 MG/DL (ref 65–117)
GLUCOSE BLD STRIP.AUTO-MCNC: 192 MG/DL (ref 65–117)
GLUCOSE BLD STRIP.AUTO-MCNC: 192 MG/DL (ref 65–117)
GLUCOSE BLD STRIP.AUTO-MCNC: 194 MG/DL (ref 65–117)
GLUCOSE BLD STRIP.AUTO-MCNC: 194 MG/DL (ref 65–117)
GLUCOSE BLD STRIP.AUTO-MCNC: 195 MG/DL (ref 65–117)
GLUCOSE BLD STRIP.AUTO-MCNC: 195 MG/DL (ref 65–117)
GLUCOSE BLD STRIP.AUTO-MCNC: 196 MG/DL (ref 65–117)
GLUCOSE BLD STRIP.AUTO-MCNC: 197 MG/DL (ref 65–117)
GLUCOSE BLD STRIP.AUTO-MCNC: 199 MG/DL (ref 65–117)
GLUCOSE BLD STRIP.AUTO-MCNC: 199 MG/DL (ref 65–117)
GLUCOSE BLD STRIP.AUTO-MCNC: 200 MG/DL (ref 65–117)
GLUCOSE BLD STRIP.AUTO-MCNC: 202 MG/DL (ref 65–117)
GLUCOSE BLD STRIP.AUTO-MCNC: 203 MG/DL (ref 65–117)
GLUCOSE BLD STRIP.AUTO-MCNC: 204 MG/DL (ref 65–117)
GLUCOSE BLD STRIP.AUTO-MCNC: 205 MG/DL (ref 65–117)
GLUCOSE BLD STRIP.AUTO-MCNC: 208 MG/DL (ref 65–117)
GLUCOSE BLD STRIP.AUTO-MCNC: 209 MG/DL (ref 65–117)
GLUCOSE BLD STRIP.AUTO-MCNC: 210 MG/DL (ref 65–117)
GLUCOSE BLD STRIP.AUTO-MCNC: 211 MG/DL (ref 65–117)
GLUCOSE BLD STRIP.AUTO-MCNC: 212 MG/DL (ref 65–117)
GLUCOSE BLD STRIP.AUTO-MCNC: 213 MG/DL (ref 65–117)
GLUCOSE BLD STRIP.AUTO-MCNC: 214 MG/DL (ref 65–100)
GLUCOSE BLD STRIP.AUTO-MCNC: 214 MG/DL (ref 65–117)
GLUCOSE BLD STRIP.AUTO-MCNC: 216 MG/DL (ref 65–117)
GLUCOSE BLD STRIP.AUTO-MCNC: 217 MG/DL (ref 65–117)
GLUCOSE BLD STRIP.AUTO-MCNC: 218 MG/DL (ref 65–117)
GLUCOSE BLD STRIP.AUTO-MCNC: 219 MG/DL (ref 65–117)
GLUCOSE BLD STRIP.AUTO-MCNC: 219 MG/DL (ref 65–117)
GLUCOSE BLD STRIP.AUTO-MCNC: 224 MG/DL (ref 65–117)
GLUCOSE BLD STRIP.AUTO-MCNC: 224 MG/DL (ref 65–117)
GLUCOSE BLD STRIP.AUTO-MCNC: 225 MG/DL (ref 65–100)
GLUCOSE BLD STRIP.AUTO-MCNC: 225 MG/DL (ref 65–117)
GLUCOSE BLD STRIP.AUTO-MCNC: 226 MG/DL (ref 65–117)
GLUCOSE BLD STRIP.AUTO-MCNC: 226 MG/DL (ref 65–117)
GLUCOSE BLD STRIP.AUTO-MCNC: 228 MG/DL (ref 65–117)
GLUCOSE BLD STRIP.AUTO-MCNC: 229 MG/DL (ref 65–117)
GLUCOSE BLD STRIP.AUTO-MCNC: 229 MG/DL (ref 65–117)
GLUCOSE BLD STRIP.AUTO-MCNC: 230 MG/DL (ref 65–117)
GLUCOSE BLD STRIP.AUTO-MCNC: 231 MG/DL (ref 65–117)
GLUCOSE BLD STRIP.AUTO-MCNC: 231 MG/DL (ref 65–117)
GLUCOSE BLD STRIP.AUTO-MCNC: 232 MG/DL (ref 65–117)
GLUCOSE BLD STRIP.AUTO-MCNC: 232 MG/DL (ref 65–117)
GLUCOSE BLD STRIP.AUTO-MCNC: 233 MG/DL (ref 65–117)
GLUCOSE BLD STRIP.AUTO-MCNC: 234 MG/DL (ref 65–117)
GLUCOSE BLD STRIP.AUTO-MCNC: 234 MG/DL (ref 65–117)
GLUCOSE BLD STRIP.AUTO-MCNC: 237 MG/DL (ref 65–117)
GLUCOSE BLD STRIP.AUTO-MCNC: 239 MG/DL (ref 65–117)
GLUCOSE BLD STRIP.AUTO-MCNC: 241 MG/DL (ref 65–117)
GLUCOSE BLD STRIP.AUTO-MCNC: 241 MG/DL (ref 65–117)
GLUCOSE BLD STRIP.AUTO-MCNC: 242 MG/DL (ref 65–117)
GLUCOSE BLD STRIP.AUTO-MCNC: 243 MG/DL (ref 65–117)
GLUCOSE BLD STRIP.AUTO-MCNC: 245 MG/DL (ref 65–117)
GLUCOSE BLD STRIP.AUTO-MCNC: 247 MG/DL (ref 65–117)
GLUCOSE BLD STRIP.AUTO-MCNC: 247 MG/DL (ref 65–117)
GLUCOSE BLD STRIP.AUTO-MCNC: 248 MG/DL (ref 65–117)
GLUCOSE BLD STRIP.AUTO-MCNC: 249 MG/DL (ref 65–117)
GLUCOSE BLD STRIP.AUTO-MCNC: 249 MG/DL (ref 65–117)
GLUCOSE BLD STRIP.AUTO-MCNC: 252 MG/DL (ref 65–117)
GLUCOSE BLD STRIP.AUTO-MCNC: 255 MG/DL (ref 65–117)
GLUCOSE BLD STRIP.AUTO-MCNC: 260 MG/DL (ref 65–117)
GLUCOSE BLD STRIP.AUTO-MCNC: 261 MG/DL (ref 65–117)
GLUCOSE BLD STRIP.AUTO-MCNC: 263 MG/DL (ref 65–100)
GLUCOSE BLD STRIP.AUTO-MCNC: 264 MG/DL (ref 65–117)
GLUCOSE BLD STRIP.AUTO-MCNC: 265 MG/DL (ref 65–117)
GLUCOSE BLD STRIP.AUTO-MCNC: 266 MG/DL (ref 65–117)
GLUCOSE BLD STRIP.AUTO-MCNC: 269 MG/DL (ref 65–117)
GLUCOSE BLD STRIP.AUTO-MCNC: 270 MG/DL (ref 65–117)
GLUCOSE BLD STRIP.AUTO-MCNC: 271 MG/DL (ref 65–117)
GLUCOSE BLD STRIP.AUTO-MCNC: 272 MG/DL (ref 65–117)
GLUCOSE BLD STRIP.AUTO-MCNC: 274 MG/DL (ref 65–117)
GLUCOSE BLD STRIP.AUTO-MCNC: 275 MG/DL (ref 65–117)
GLUCOSE BLD STRIP.AUTO-MCNC: 275 MG/DL (ref 65–117)
GLUCOSE BLD STRIP.AUTO-MCNC: 277 MG/DL (ref 65–117)
GLUCOSE BLD STRIP.AUTO-MCNC: 278 MG/DL (ref 65–117)
GLUCOSE BLD STRIP.AUTO-MCNC: 278 MG/DL (ref 65–117)
GLUCOSE BLD STRIP.AUTO-MCNC: 279 MG/DL (ref 65–117)
GLUCOSE BLD STRIP.AUTO-MCNC: 280 MG/DL (ref 65–117)
GLUCOSE BLD STRIP.AUTO-MCNC: 280 MG/DL (ref 65–117)
GLUCOSE BLD STRIP.AUTO-MCNC: 282 MG/DL (ref 65–117)
GLUCOSE BLD STRIP.AUTO-MCNC: 283 MG/DL (ref 65–117)
GLUCOSE BLD STRIP.AUTO-MCNC: 284 MG/DL (ref 65–117)
GLUCOSE BLD STRIP.AUTO-MCNC: 287 MG/DL (ref 65–117)
GLUCOSE BLD STRIP.AUTO-MCNC: 287 MG/DL (ref 65–117)
GLUCOSE BLD STRIP.AUTO-MCNC: 288 MG/DL (ref 65–117)
GLUCOSE BLD STRIP.AUTO-MCNC: 290 MG/DL (ref 65–117)
GLUCOSE BLD STRIP.AUTO-MCNC: 291 MG/DL (ref 65–117)
GLUCOSE BLD STRIP.AUTO-MCNC: 293 MG/DL (ref 65–117)
GLUCOSE BLD STRIP.AUTO-MCNC: 294 MG/DL (ref 65–117)
GLUCOSE BLD STRIP.AUTO-MCNC: 296 MG/DL (ref 65–117)
GLUCOSE BLD STRIP.AUTO-MCNC: 299 MG/DL (ref 65–117)
GLUCOSE BLD STRIP.AUTO-MCNC: 299 MG/DL (ref 65–117)
GLUCOSE BLD STRIP.AUTO-MCNC: 301 MG/DL (ref 65–117)
GLUCOSE BLD STRIP.AUTO-MCNC: 306 MG/DL (ref 65–117)
GLUCOSE BLD STRIP.AUTO-MCNC: 306 MG/DL (ref 65–117)
GLUCOSE BLD STRIP.AUTO-MCNC: 309 MG/DL (ref 65–117)
GLUCOSE BLD STRIP.AUTO-MCNC: 311 MG/DL (ref 65–117)
GLUCOSE BLD STRIP.AUTO-MCNC: 313 MG/DL (ref 65–117)
GLUCOSE BLD STRIP.AUTO-MCNC: 315 MG/DL (ref 65–117)
GLUCOSE BLD STRIP.AUTO-MCNC: 317 MG/DL (ref 65–117)
GLUCOSE BLD STRIP.AUTO-MCNC: 317 MG/DL (ref 65–117)
GLUCOSE BLD STRIP.AUTO-MCNC: 318 MG/DL (ref 65–117)
GLUCOSE BLD STRIP.AUTO-MCNC: 320 MG/DL (ref 65–117)
GLUCOSE BLD STRIP.AUTO-MCNC: 325 MG/DL (ref 65–117)
GLUCOSE BLD STRIP.AUTO-MCNC: 333 MG/DL (ref 65–117)
GLUCOSE BLD STRIP.AUTO-MCNC: 336 MG/DL (ref 65–117)
GLUCOSE BLD STRIP.AUTO-MCNC: 341 MG/DL (ref 65–117)
GLUCOSE BLD STRIP.AUTO-MCNC: 352 MG/DL (ref 65–117)
GLUCOSE BLD STRIP.AUTO-MCNC: 354 MG/DL (ref 65–117)
GLUCOSE BLD STRIP.AUTO-MCNC: 375 MG/DL (ref 65–117)
GLUCOSE BLD STRIP.AUTO-MCNC: 389 MG/DL (ref 65–117)
GLUCOSE BLD STRIP.AUTO-MCNC: 419 MG/DL (ref 65–117)
GLUCOSE BLD STRIP.AUTO-MCNC: 440 MG/DL (ref 65–117)
GLUCOSE BLD STRIP.AUTO-MCNC: 448 MG/DL (ref 65–117)
GLUCOSE BLD STRIP.AUTO-MCNC: 450 MG/DL (ref 65–117)
GLUCOSE BLD STRIP.AUTO-MCNC: 57 MG/DL (ref 65–117)
GLUCOSE BLD STRIP.AUTO-MCNC: 63 MG/DL (ref 65–117)
GLUCOSE BLD STRIP.AUTO-MCNC: 64 MG/DL (ref 65–100)
GLUCOSE BLD STRIP.AUTO-MCNC: 69 MG/DL (ref 65–117)
GLUCOSE BLD STRIP.AUTO-MCNC: 71 MG/DL (ref 65–100)
GLUCOSE BLD STRIP.AUTO-MCNC: 71 MG/DL (ref 65–117)
GLUCOSE BLD STRIP.AUTO-MCNC: 73 MG/DL (ref 65–100)
GLUCOSE BLD STRIP.AUTO-MCNC: 73 MG/DL (ref 65–117)
GLUCOSE BLD STRIP.AUTO-MCNC: 74 MG/DL (ref 65–100)
GLUCOSE BLD STRIP.AUTO-MCNC: 74 MG/DL (ref 65–117)
GLUCOSE BLD STRIP.AUTO-MCNC: 74 MG/DL (ref 65–117)
GLUCOSE BLD STRIP.AUTO-MCNC: 76 MG/DL (ref 65–100)
GLUCOSE BLD STRIP.AUTO-MCNC: 76 MG/DL (ref 65–117)
GLUCOSE BLD STRIP.AUTO-MCNC: 77 MG/DL (ref 65–117)
GLUCOSE BLD STRIP.AUTO-MCNC: 77 MG/DL (ref 65–117)
GLUCOSE BLD STRIP.AUTO-MCNC: 78 MG/DL (ref 65–117)
GLUCOSE BLD STRIP.AUTO-MCNC: 81 MG/DL (ref 65–117)
GLUCOSE BLD STRIP.AUTO-MCNC: 81 MG/DL (ref 65–117)
GLUCOSE BLD STRIP.AUTO-MCNC: 82 MG/DL (ref 65–100)
GLUCOSE BLD STRIP.AUTO-MCNC: 82 MG/DL (ref 65–117)
GLUCOSE BLD STRIP.AUTO-MCNC: 83 MG/DL (ref 65–100)
GLUCOSE BLD STRIP.AUTO-MCNC: 83 MG/DL (ref 65–100)
GLUCOSE BLD STRIP.AUTO-MCNC: 83 MG/DL (ref 65–117)
GLUCOSE BLD STRIP.AUTO-MCNC: 86 MG/DL (ref 65–100)
GLUCOSE BLD STRIP.AUTO-MCNC: 86 MG/DL (ref 65–100)
GLUCOSE BLD STRIP.AUTO-MCNC: 86 MG/DL (ref 65–117)
GLUCOSE BLD STRIP.AUTO-MCNC: 87 MG/DL (ref 65–100)
GLUCOSE BLD STRIP.AUTO-MCNC: 87 MG/DL (ref 65–117)
GLUCOSE BLD STRIP.AUTO-MCNC: 88 MG/DL (ref 65–117)
GLUCOSE BLD STRIP.AUTO-MCNC: 89 MG/DL (ref 65–100)
GLUCOSE BLD STRIP.AUTO-MCNC: 89 MG/DL (ref 65–100)
GLUCOSE BLD STRIP.AUTO-MCNC: 89 MG/DL (ref 65–117)
GLUCOSE BLD STRIP.AUTO-MCNC: 90 MG/DL (ref 65–100)
GLUCOSE BLD STRIP.AUTO-MCNC: 90 MG/DL (ref 65–117)
GLUCOSE BLD STRIP.AUTO-MCNC: 90 MG/DL (ref 65–117)
GLUCOSE BLD STRIP.AUTO-MCNC: 91 MG/DL (ref 65–100)
GLUCOSE BLD STRIP.AUTO-MCNC: 92 MG/DL (ref 65–100)
GLUCOSE BLD STRIP.AUTO-MCNC: 92 MG/DL (ref 65–100)
GLUCOSE BLD STRIP.AUTO-MCNC: 92 MG/DL (ref 65–117)
GLUCOSE BLD STRIP.AUTO-MCNC: 92 MG/DL (ref 65–117)
GLUCOSE BLD STRIP.AUTO-MCNC: 93 MG/DL (ref 65–117)
GLUCOSE BLD STRIP.AUTO-MCNC: 94 MG/DL (ref 65–100)
GLUCOSE BLD STRIP.AUTO-MCNC: 96 MG/DL (ref 65–100)
GLUCOSE BLD STRIP.AUTO-MCNC: 96 MG/DL (ref 65–100)
GLUCOSE BLD STRIP.AUTO-MCNC: 96 MG/DL (ref 65–117)
GLUCOSE BLD STRIP.AUTO-MCNC: 97 MG/DL (ref 65–100)
GLUCOSE BLD STRIP.AUTO-MCNC: 97 MG/DL (ref 65–117)
GLUCOSE BLD STRIP.AUTO-MCNC: 98 MG/DL (ref 65–100)
GLUCOSE BLD STRIP.AUTO-MCNC: 98 MG/DL (ref 65–117)
GLUCOSE BLD STRIP.AUTO-MCNC: 99 MG/DL (ref 65–100)
GLUCOSE BLD STRIP.AUTO-MCNC: 99 MG/DL (ref 65–100)
GLUCOSE SERPL-MCNC: 101 MG/DL (ref 65–100)
GLUCOSE SERPL-MCNC: 103 MG/DL (ref 65–100)
GLUCOSE SERPL-MCNC: 107 MG/DL (ref 65–100)
GLUCOSE SERPL-MCNC: 112 MG/DL (ref 65–100)
GLUCOSE SERPL-MCNC: 115 MG/DL (ref 65–100)
GLUCOSE SERPL-MCNC: 118 MG/DL (ref 65–100)
GLUCOSE SERPL-MCNC: 118 MG/DL (ref 65–100)
GLUCOSE SERPL-MCNC: 120 MG/DL (ref 65–100)
GLUCOSE SERPL-MCNC: 127 MG/DL (ref 65–100)
GLUCOSE SERPL-MCNC: 131 MG/DL (ref 65–100)
GLUCOSE SERPL-MCNC: 132 MG/DL (ref 65–100)
GLUCOSE SERPL-MCNC: 134 MG/DL (ref 65–100)
GLUCOSE SERPL-MCNC: 137 MG/DL (ref 65–100)
GLUCOSE SERPL-MCNC: 138 MG/DL (ref 65–100)
GLUCOSE SERPL-MCNC: 139 MG/DL (ref 65–100)
GLUCOSE SERPL-MCNC: 141 MG/DL (ref 65–100)
GLUCOSE SERPL-MCNC: 148 MG/DL (ref 65–100)
GLUCOSE SERPL-MCNC: 149 MG/DL (ref 65–100)
GLUCOSE SERPL-MCNC: 154 MG/DL (ref 65–100)
GLUCOSE SERPL-MCNC: 163 MG/DL (ref 65–100)
GLUCOSE SERPL-MCNC: 166 MG/DL (ref 65–100)
GLUCOSE SERPL-MCNC: 171 MG/DL (ref 65–100)
GLUCOSE SERPL-MCNC: 184 MG/DL (ref 65–100)
GLUCOSE SERPL-MCNC: 242 MG/DL (ref 65–100)
GLUCOSE SERPL-MCNC: 289 MG/DL (ref 65–100)
GLUCOSE SERPL-MCNC: 293 MG/DL (ref 65–100)
GLUCOSE SERPL-MCNC: 61 MG/DL (ref 65–100)
GLUCOSE SERPL-MCNC: 67 MG/DL (ref 65–100)
GLUCOSE SERPL-MCNC: 69 MG/DL (ref 65–100)
GLUCOSE SERPL-MCNC: 73 MG/DL (ref 65–100)
GLUCOSE SERPL-MCNC: 81 MG/DL (ref 65–100)
GLUCOSE SERPL-MCNC: 86 MG/DL (ref 65–100)
GLUCOSE SERPL-MCNC: 91 MG/DL (ref 65–100)
GLUCOSE SERPL-MCNC: 92 MG/DL (ref 65–100)
GLUCOSE SERPL-MCNC: 95 MG/DL (ref 65–100)
HAV IGM SER QL: NONREACTIVE
HBA1C MFR BLD: 7.4 % (ref 4–5.6)
HBV CORE IGM SER QL: NONREACTIVE
HBV SURFACE AG SER QL: <0.1 INDEX
HBV SURFACE AG SER QL: NEGATIVE
HCO3 BLDA-SCNC: 14 MMOL/L (ref 22–26)
HCT VFR BLD AUTO: 20.7 % (ref 36.6–50.3)
HCT VFR BLD AUTO: 20.9 % (ref 36.6–50.3)
HCT VFR BLD AUTO: 21.4 % (ref 36.6–50.3)
HCT VFR BLD AUTO: 21.9 % (ref 36.6–50.3)
HCT VFR BLD AUTO: 23.8 % (ref 36.6–50.3)
HCT VFR BLD AUTO: 24.4 % (ref 36.6–50.3)
HCT VFR BLD AUTO: 24.8 % (ref 36.6–50.3)
HCT VFR BLD AUTO: 24.9 % (ref 36.6–50.3)
HCT VFR BLD AUTO: 26.8 % (ref 36.6–50.3)
HCT VFR BLD AUTO: 27.5 % (ref 36.6–50.3)
HCT VFR BLD AUTO: 27.6 % (ref 36.6–50.3)
HCT VFR BLD AUTO: 28.2 % (ref 36.6–50.3)
HCT VFR BLD AUTO: 28.8 % (ref 36.6–50.3)
HCT VFR BLD AUTO: 28.9 % (ref 36.6–50.3)
HCT VFR BLD AUTO: 29.3 % (ref 36.6–50.3)
HCT VFR BLD AUTO: 29.6 % (ref 36.6–50.3)
HCT VFR BLD AUTO: 30 % (ref 36.6–50.3)
HCT VFR BLD AUTO: 30.2 % (ref 36.6–50.3)
HCT VFR BLD AUTO: 30.4 % (ref 36.6–50.3)
HCT VFR BLD AUTO: 31.4 % (ref 36.6–50.3)
HCT VFR BLD AUTO: 31.4 % (ref 36.6–50.3)
HCT VFR BLD AUTO: 31.8 % (ref 36.6–50.3)
HCT VFR BLD AUTO: 32.3 % (ref 36.6–50.3)
HCT VFR BLD AUTO: 32.8 % (ref 36.6–50.3)
HCT VFR BLD AUTO: 32.9 % (ref 36.6–50.3)
HCT VFR BLD AUTO: 33.5 % (ref 36.6–50.3)
HCT VFR BLD AUTO: 33.7 % (ref 36.6–50.3)
HCT VFR BLD AUTO: 33.7 % (ref 36.6–50.3)
HCT VFR BLD AUTO: 33.8 % (ref 36.6–50.3)
HCT VFR BLD AUTO: 33.8 % (ref 36.6–50.3)
HCT VFR BLD AUTO: 34.8 % (ref 36.6–50.3)
HCT VFR BLD AUTO: 34.8 % (ref 36.6–50.3)
HCT VFR BLD AUTO: 35 % (ref 36.6–50.3)
HCT VFR BLD AUTO: 35.9 % (ref 36.6–50.3)
HCT VFR BLD AUTO: 36.7 % (ref 36.6–50.3)
HCT VFR BLD AUTO: 36.8 % (ref 36.6–50.3)
HCT VFR BLD AUTO: 36.9 % (ref 36.6–50.3)
HCT VFR BLD AUTO: 38.7 % (ref 36.6–50.3)
HCT VFR BLD AUTO: 38.8 % (ref 36.6–50.3)
HCT VFR BLD AUTO: 42.2 % (ref 36.6–50.3)
HCT VFR BLD AUTO: 44.9 % (ref 36.6–50.3)
HCT VFR BLD AUTO: 46 % (ref 36.6–50.3)
HCT VFR BLD AUTO: 46.3 % (ref 36.6–50.3)
HCV AB SER IA-ACNC: 0.07 INDEX
HCV AB SERPL QL IA: NONREACTIVE
HDLC SERPL-MCNC: 64 MG/DL
HDLC SERPL: 1.9 {RATIO} (ref 0–5)
HGB BLD-MCNC: 10.1 G/DL (ref 12.1–17)
HGB BLD-MCNC: 10.2 G/DL (ref 12.1–17)
HGB BLD-MCNC: 10.2 G/DL (ref 12.1–17)
HGB BLD-MCNC: 10.3 G/DL (ref 12.1–17)
HGB BLD-MCNC: 10.4 G/DL (ref 12.1–17)
HGB BLD-MCNC: 10.5 G/DL (ref 12.1–17)
HGB BLD-MCNC: 11 G/DL (ref 12.1–17)
HGB BLD-MCNC: 11 G/DL (ref 12.1–17)
HGB BLD-MCNC: 11.2 G/DL (ref 12.1–17)
HGB BLD-MCNC: 11.2 G/DL (ref 12.1–17)
HGB BLD-MCNC: 11.4 G/DL (ref 12.1–17)
HGB BLD-MCNC: 11.7 G/DL (ref 12.1–17)
HGB BLD-MCNC: 11.8 G/DL (ref 12.1–17)
HGB BLD-MCNC: 12 G/DL (ref 12.1–17)
HGB BLD-MCNC: 13.6 G/DL (ref 12.1–17)
HGB BLD-MCNC: 14.1 G/DL (ref 12.1–17)
HGB BLD-MCNC: 14.5 G/DL (ref 12.1–17)
HGB BLD-MCNC: 6.3 G/DL (ref 12.1–17)
HGB BLD-MCNC: 6.6 G/DL (ref 12.1–17)
HGB BLD-MCNC: 6.8 G/DL (ref 12.1–17)
HGB BLD-MCNC: 7 G/DL (ref 12.1–17)
HGB BLD-MCNC: 7.2 G/DL (ref 12.1–17)
HGB BLD-MCNC: 7.8 G/DL (ref 12.1–17)
HGB BLD-MCNC: 7.9 G/DL (ref 12.1–17)
HGB BLD-MCNC: 8.2 G/DL (ref 12.1–17)
HGB BLD-MCNC: 8.3 G/DL (ref 12.1–17)
HGB BLD-MCNC: 8.6 G/DL (ref 12.1–17)
HGB BLD-MCNC: 8.7 G/DL (ref 12.1–17)
HGB BLD-MCNC: 8.9 G/DL (ref 12.1–17)
HGB BLD-MCNC: 8.9 G/DL (ref 12.1–17)
HGB BLD-MCNC: 9.2 G/DL (ref 12.1–17)
HGB BLD-MCNC: 9.3 G/DL (ref 12.1–17)
HGB BLD-MCNC: 9.4 G/DL (ref 12.1–17)
HGB BLD-MCNC: 9.5 G/DL (ref 12.1–17)
HGB BLD-MCNC: 9.6 G/DL (ref 12.1–17)
HGB BLD-MCNC: 9.6 G/DL (ref 12.1–17)
HGB BLD-MCNC: 9.7 G/DL (ref 12.1–17)
IMM GRANULOCYTES # BLD AUTO: 0 K/UL
IMM GRANULOCYTES # BLD AUTO: 0 K/UL (ref 0–0.04)
IMM GRANULOCYTES # BLD AUTO: 0.1 K/UL (ref 0–0.04)
IMM GRANULOCYTES # BLD AUTO: 0.2 K/UL (ref 0–0.04)
IMM GRANULOCYTES NFR BLD AUTO: 0 %
IMM GRANULOCYTES NFR BLD AUTO: 0 % (ref 0–0.5)
IMM GRANULOCYTES NFR BLD AUTO: 1 % (ref 0–0.5)
IMM GRANULOCYTES NFR BLD AUTO: 2 % (ref 0–0.5)
INR PPP: 1.3 (ref 0.9–1.1)
INTERPRETATION, 117893: NORMAL
LDLC SERPL CALC-MCNC: 40 MG/DL (ref 0–100)
LEFT CCA DIST DIAS: 31.3 CM/S
LEFT CCA DIST SYS: 84.8 CM/S
LEFT CCA PROX DIAS: 30.1 CM/S
LEFT CCA PROX SYS: 135 CM/S
LEFT ECA DIAS: 10 CM/S
LEFT ECA SYS: 58.3 CM/S
LEFT ICA DIST DIAS: 61.9 CM/S
LEFT ICA DIST SYS: 133 CM/S
LEFT ICA MID DIAS: 64.5 CM/S
LEFT ICA MID SYS: 156 CM/S
LEFT ICA PROX DIAS: 31.5 CM/S
LEFT ICA PROX SYS: 77.3 CM/S
LEFT ICA/CCA SYS: 0.91
LEFT VERTEBRAL DIAS: 18.8 CM/S
LEFT VERTEBRAL SYS: 50.6 CM/S
LIPID PROFILE,FLP: NORMAL
LYMPHOCYTES # BLD: 0.5 K/UL (ref 0.8–3.5)
LYMPHOCYTES # BLD: 0.6 K/UL (ref 0.8–3.5)
LYMPHOCYTES # BLD: 0.6 K/UL (ref 0.8–3.5)
LYMPHOCYTES # BLD: 0.7 K/UL (ref 0.8–3.5)
LYMPHOCYTES # BLD: 1 K/UL (ref 0.8–3.5)
LYMPHOCYTES # BLD: 1.1 K/UL (ref 0.8–3.5)
LYMPHOCYTES # BLD: 1.3 K/UL (ref 0.8–3.5)
LYMPHOCYTES # BLD: 1.4 K/UL (ref 0.8–3.5)
LYMPHOCYTES # BLD: 1.4 K/UL (ref 0.8–3.5)
LYMPHOCYTES # BLD: 1.5 K/UL (ref 0.8–3.5)
LYMPHOCYTES # BLD: 1.5 K/UL (ref 0.8–3.5)
LYMPHOCYTES # BLD: 1.7 K/UL (ref 0.8–3.5)
LYMPHOCYTES # BLD: 1.8 K/UL (ref 0.8–3.5)
LYMPHOCYTES # BLD: 1.9 K/UL (ref 0.8–3.5)
LYMPHOCYTES # BLD: 2 K/UL (ref 0.8–3.5)
LYMPHOCYTES # BLD: 2.2 K/UL (ref 0.8–3.5)
LYMPHOCYTES # BLD: 2.2 K/UL (ref 0.8–3.5)
LYMPHOCYTES # BLD: 2.5 K/UL (ref 0.8–3.5)
LYMPHOCYTES # BLD: 2.6 K/UL (ref 0.8–3.5)
LYMPHOCYTES # BLD: 2.6 K/UL (ref 0.8–3.5)
LYMPHOCYTES # BLD: 2.7 K/UL (ref 0.8–3.5)
LYMPHOCYTES # BLD: 2.8 K/UL (ref 0.8–3.5)
LYMPHOCYTES NFR BLD: 10 % (ref 12–49)
LYMPHOCYTES NFR BLD: 11 % (ref 12–49)
LYMPHOCYTES NFR BLD: 14 % (ref 12–49)
LYMPHOCYTES NFR BLD: 14 % (ref 12–49)
LYMPHOCYTES NFR BLD: 16 % (ref 12–49)
LYMPHOCYTES NFR BLD: 17 % (ref 12–49)
LYMPHOCYTES NFR BLD: 18 % (ref 12–49)
LYMPHOCYTES NFR BLD: 18 % (ref 12–49)
LYMPHOCYTES NFR BLD: 19 % (ref 12–49)
LYMPHOCYTES NFR BLD: 20 % (ref 12–49)
LYMPHOCYTES NFR BLD: 20 % (ref 12–49)
LYMPHOCYTES NFR BLD: 23 % (ref 12–49)
LYMPHOCYTES NFR BLD: 24 % (ref 12–49)
LYMPHOCYTES NFR BLD: 25 % (ref 12–49)
LYMPHOCYTES NFR BLD: 26 % (ref 12–49)
LYMPHOCYTES NFR BLD: 3 % (ref 12–49)
LYMPHOCYTES NFR BLD: 4 % (ref 12–49)
LYMPHOCYTES NFR BLD: 4 % (ref 12–49)
LYMPHOCYTES NFR BLD: 5 % (ref 12–49)
LYMPHOCYTES NFR BLD: 6 % (ref 12–49)
LYMPHOCYTES NFR BLD: 6 % (ref 12–49)
LYMPHOCYTES NFR BLD: 7 % (ref 12–49)
LYMPHOCYTES NFR BLD: 8 % (ref 12–49)
LYMPHOCYTES NFR BLD: 9 % (ref 12–49)
Lab: NORMAL
M TB IFN-G BLD-IMP: NEGATIVE
MAGNESIUM SERPL-MCNC: 1.7 MG/DL (ref 1.6–2.4)
MAGNESIUM SERPL-MCNC: 1.7 MG/DL (ref 1.6–2.4)
MAGNESIUM SERPL-MCNC: 1.9 MG/DL (ref 1.6–2.4)
MAGNESIUM SERPL-MCNC: 2 MG/DL (ref 1.6–2.4)
MAGNESIUM SERPL-MCNC: 2 MG/DL (ref 1.6–2.4)
MAGNESIUM SERPL-MCNC: NORMAL MG/DL (ref 1.6–2.4)
MCH RBC QN AUTO: 21.8 PG (ref 26–34)
MCH RBC QN AUTO: 21.8 PG (ref 26–34)
MCH RBC QN AUTO: 21.9 PG (ref 26–34)
MCH RBC QN AUTO: 22.3 PG (ref 26–34)
MCH RBC QN AUTO: 22.4 PG (ref 26–34)
MCH RBC QN AUTO: 22.6 PG (ref 26–34)
MCH RBC QN AUTO: 22.6 PG (ref 26–34)
MCH RBC QN AUTO: 22.8 PG (ref 26–34)
MCH RBC QN AUTO: 23.1 PG (ref 26–34)
MCH RBC QN AUTO: 23.4 PG (ref 26–34)
MCH RBC QN AUTO: 23.6 PG (ref 26–34)
MCH RBC QN AUTO: 23.7 PG (ref 26–34)
MCH RBC QN AUTO: 23.7 PG (ref 26–34)
MCH RBC QN AUTO: 23.8 PG (ref 26–34)
MCH RBC QN AUTO: 23.8 PG (ref 26–34)
MCH RBC QN AUTO: 24 PG (ref 26–34)
MCH RBC QN AUTO: 24.1 PG (ref 26–34)
MCH RBC QN AUTO: 24.2 PG (ref 26–34)
MCH RBC QN AUTO: 24.3 PG (ref 26–34)
MCH RBC QN AUTO: 24.3 PG (ref 26–34)
MCH RBC QN AUTO: 24.4 PG (ref 26–34)
MCH RBC QN AUTO: 24.4 PG (ref 26–34)
MCH RBC QN AUTO: 24.5 PG (ref 26–34)
MCH RBC QN AUTO: 24.6 PG (ref 26–34)
MCH RBC QN AUTO: 24.7 PG (ref 26–34)
MCH RBC QN AUTO: 24.8 PG (ref 26–34)
MCH RBC QN AUTO: 24.9 PG (ref 26–34)
MCH RBC QN AUTO: 25.1 PG (ref 26–34)
MCHC RBC AUTO-ENTMCNC: 28.4 G/DL (ref 30–36.5)
MCHC RBC AUTO-ENTMCNC: 29.4 G/DL (ref 30–36.5)
MCHC RBC AUTO-ENTMCNC: 29.4 G/DL (ref 30–36.5)
MCHC RBC AUTO-ENTMCNC: 29.6 G/DL (ref 30–36.5)
MCHC RBC AUTO-ENTMCNC: 30.1 G/DL (ref 30–36.5)
MCHC RBC AUTO-ENTMCNC: 30.2 G/DL (ref 30–36.5)
MCHC RBC AUTO-ENTMCNC: 30.3 G/DL (ref 30–36.5)
MCHC RBC AUTO-ENTMCNC: 30.3 G/DL (ref 30–36.5)
MCHC RBC AUTO-ENTMCNC: 30.4 G/DL (ref 30–36.5)
MCHC RBC AUTO-ENTMCNC: 30.4 G/DL (ref 30–36.5)
MCHC RBC AUTO-ENTMCNC: 30.5 G/DL (ref 30–36.5)
MCHC RBC AUTO-ENTMCNC: 30.6 G/DL (ref 30–36.5)
MCHC RBC AUTO-ENTMCNC: 30.8 G/DL (ref 30–36.5)
MCHC RBC AUTO-ENTMCNC: 30.9 G/DL (ref 30–36.5)
MCHC RBC AUTO-ENTMCNC: 31 G/DL (ref 30–36.5)
MCHC RBC AUTO-ENTMCNC: 31.1 G/DL (ref 30–36.5)
MCHC RBC AUTO-ENTMCNC: 31.3 G/DL (ref 30–36.5)
MCHC RBC AUTO-ENTMCNC: 31.4 G/DL (ref 30–36.5)
MCHC RBC AUTO-ENTMCNC: 31.5 G/DL (ref 30–36.5)
MCHC RBC AUTO-ENTMCNC: 31.6 G/DL (ref 30–36.5)
MCHC RBC AUTO-ENTMCNC: 31.9 G/DL (ref 30–36.5)
MCHC RBC AUTO-ENTMCNC: 31.9 G/DL (ref 30–36.5)
MCHC RBC AUTO-ENTMCNC: 32 G/DL (ref 30–36.5)
MCHC RBC AUTO-ENTMCNC: 32 G/DL (ref 30–36.5)
MCHC RBC AUTO-ENTMCNC: 32.5 G/DL (ref 30–36.5)
MCHC RBC AUTO-ENTMCNC: 32.9 G/DL (ref 30–36.5)
MCV RBC AUTO: 73.7 FL (ref 80–99)
MCV RBC AUTO: 74 FL (ref 80–99)
MCV RBC AUTO: 74 FL (ref 80–99)
MCV RBC AUTO: 74.1 FL (ref 80–99)
MCV RBC AUTO: 74.5 FL (ref 80–99)
MCV RBC AUTO: 74.8 FL (ref 80–99)
MCV RBC AUTO: 75 FL (ref 80–99)
MCV RBC AUTO: 75.1 FL (ref 80–99)
MCV RBC AUTO: 75.4 FL (ref 80–99)
MCV RBC AUTO: 75.4 FL (ref 80–99)
MCV RBC AUTO: 75.6 FL (ref 80–99)
MCV RBC AUTO: 75.8 FL (ref 80–99)
MCV RBC AUTO: 76.1 FL (ref 80–99)
MCV RBC AUTO: 76.2 FL (ref 80–99)
MCV RBC AUTO: 76.3 FL (ref 80–99)
MCV RBC AUTO: 76.4 FL (ref 80–99)
MCV RBC AUTO: 76.5 FL (ref 80–99)
MCV RBC AUTO: 76.6 FL (ref 80–99)
MCV RBC AUTO: 76.7 FL (ref 80–99)
MCV RBC AUTO: 76.7 FL (ref 80–99)
MCV RBC AUTO: 76.8 FL (ref 80–99)
MCV RBC AUTO: 76.9 FL (ref 80–99)
MCV RBC AUTO: 77.8 FL (ref 80–99)
MCV RBC AUTO: 78.5 FL (ref 80–99)
MCV RBC AUTO: 78.7 FL (ref 80–99)
MCV RBC AUTO: 79 FL (ref 80–99)
MCV RBC AUTO: 79.1 FL (ref 80–99)
MCV RBC AUTO: 79.1 FL (ref 80–99)
MCV RBC AUTO: 79.2 FL (ref 80–99)
MCV RBC AUTO: 79.4 FL (ref 80–99)
MCV RBC AUTO: 79.6 FL (ref 80–99)
MCV RBC AUTO: 79.9 FL (ref 80–99)
MCV RBC AUTO: 80.2 FL (ref 80–99)
MCV RBC AUTO: 80.3 FL (ref 80–99)
MCV RBC AUTO: 80.4 FL (ref 80–99)
MCV RBC AUTO: 80.4 FL (ref 80–99)
MCV RBC AUTO: 80.5 FL (ref 80–99)
MCV RBC AUTO: 80.6 FL (ref 80–99)
METHGB MFR BLD: 0.3 % (ref 0–1.4)
MGP REFLEX INFO, MGPRT: NORMAL
MONOCYTES # BLD: 0.3 K/UL (ref 0–1)
MONOCYTES # BLD: 0.4 K/UL (ref 0–1)
MONOCYTES # BLD: 0.4 K/UL (ref 0–1)
MONOCYTES # BLD: 0.5 K/UL (ref 0–1)
MONOCYTES # BLD: 0.5 K/UL (ref 0–1)
MONOCYTES # BLD: 0.7 K/UL (ref 0–1)
MONOCYTES # BLD: 0.8 K/UL (ref 0–1)
MONOCYTES # BLD: 0.8 K/UL (ref 0–1)
MONOCYTES # BLD: 0.9 K/UL (ref 0–1)
MONOCYTES # BLD: 1 K/UL (ref 0–1)
MONOCYTES # BLD: 1.1 K/UL (ref 0–1)
MONOCYTES # BLD: 1.2 K/UL (ref 0–1)
MONOCYTES # BLD: 1.3 K/UL (ref 0–1)
MONOCYTES # BLD: 1.4 K/UL (ref 0–1)
MONOCYTES # BLD: 1.5 K/UL (ref 0–1)
MONOCYTES # BLD: 1.5 K/UL (ref 0–1)
MONOCYTES # BLD: 1.6 K/UL (ref 0–1)
MONOCYTES # BLD: 1.6 K/UL (ref 0–1)
MONOCYTES # BLD: 1.7 K/UL (ref 0–1)
MONOCYTES # BLD: 1.7 K/UL (ref 0–1)
MONOCYTES # BLD: 1.8 K/UL (ref 0–1)
MONOCYTES # BLD: 1.8 K/UL (ref 0–1)
MONOCYTES # BLD: 2.1 K/UL (ref 0–1)
MONOCYTES # BLD: 2.2 K/UL (ref 0–1)
MONOCYTES NFR BLD: 10 % (ref 5–13)
MONOCYTES NFR BLD: 11 % (ref 5–13)
MONOCYTES NFR BLD: 12 % (ref 5–13)
MONOCYTES NFR BLD: 13 % (ref 5–13)
MONOCYTES NFR BLD: 14 % (ref 5–13)
MONOCYTES NFR BLD: 2 % (ref 5–13)
MONOCYTES NFR BLD: 3 % (ref 5–13)
MONOCYTES NFR BLD: 4 % (ref 5–13)
MONOCYTES NFR BLD: 4 % (ref 5–13)
MONOCYTES NFR BLD: 5 % (ref 5–13)
MONOCYTES NFR BLD: 6 % (ref 5–13)
MONOCYTES NFR BLD: 8 % (ref 5–13)
MONOCYTES NFR BLD: 8 % (ref 5–13)
MONOCYTES NFR BLD: 9 % (ref 5–13)
MRSA DNA SPEC QL NAA+PROBE: NOT DETECTED
MYOGLOBIN SERPL-MCNC: 47 NG/ML (ref 16–116)
MYOGLOBIN SERPL-MCNC: 48 NG/ML (ref 16–116)
NEUTS SEG # BLD: 10.2 K/UL (ref 1.8–8)
NEUTS SEG # BLD: 10.3 K/UL (ref 1.8–8)
NEUTS SEG # BLD: 10.4 K/UL (ref 1.8–8)
NEUTS SEG # BLD: 10.7 K/UL (ref 1.8–8)
NEUTS SEG # BLD: 11.1 K/UL (ref 1.8–8)
NEUTS SEG # BLD: 11.8 K/UL (ref 1.8–8)
NEUTS SEG # BLD: 11.9 K/UL (ref 1.8–8)
NEUTS SEG # BLD: 12 K/UL (ref 1.8–8)
NEUTS SEG # BLD: 12.1 K/UL (ref 1.8–8)
NEUTS SEG # BLD: 12.3 K/UL (ref 1.8–8)
NEUTS SEG # BLD: 12.4 K/UL (ref 1.8–8)
NEUTS SEG # BLD: 12.5 K/UL (ref 1.8–8)
NEUTS SEG # BLD: 13.2 K/UL (ref 1.8–8)
NEUTS SEG # BLD: 13.4 K/UL (ref 1.8–8)
NEUTS SEG # BLD: 13.4 K/UL (ref 1.8–8)
NEUTS SEG # BLD: 13.6 K/UL (ref 1.8–8)
NEUTS SEG # BLD: 14.2 K/UL (ref 1.8–8)
NEUTS SEG # BLD: 16.9 K/UL (ref 1.8–8)
NEUTS SEG # BLD: 4.3 K/UL (ref 1.8–8)
NEUTS SEG # BLD: 4.7 K/UL (ref 1.8–8)
NEUTS SEG # BLD: 5 K/UL (ref 1.8–8)
NEUTS SEG # BLD: 6.5 K/UL (ref 1.8–8)
NEUTS SEG # BLD: 6.5 K/UL (ref 1.8–8)
NEUTS SEG # BLD: 6.7 K/UL (ref 1.8–8)
NEUTS SEG # BLD: 6.8 K/UL (ref 1.8–8)
NEUTS SEG # BLD: 6.8 K/UL (ref 1.8–8)
NEUTS SEG # BLD: 7.1 K/UL (ref 1.8–8)
NEUTS SEG # BLD: 7.2 K/UL (ref 1.8–8)
NEUTS SEG # BLD: 7.5 K/UL (ref 1.8–8)
NEUTS SEG # BLD: 7.9 K/UL (ref 1.8–8)
NEUTS SEG # BLD: 8.3 K/UL (ref 1.8–8)
NEUTS SEG # BLD: 8.7 K/UL (ref 1.8–8)
NEUTS SEG # BLD: 8.8 K/UL (ref 1.8–8)
NEUTS SEG # BLD: 9.5 K/UL (ref 1.8–8)
NEUTS SEG # BLD: 9.9 K/UL (ref 1.8–8)
NEUTS SEG NFR BLD: 61 % (ref 32–75)
NEUTS SEG NFR BLD: 61 % (ref 32–75)
NEUTS SEG NFR BLD: 62 % (ref 32–75)
NEUTS SEG NFR BLD: 63 % (ref 32–75)
NEUTS SEG NFR BLD: 64 % (ref 32–75)
NEUTS SEG NFR BLD: 67 % (ref 32–75)
NEUTS SEG NFR BLD: 69 % (ref 32–75)
NEUTS SEG NFR BLD: 70 % (ref 32–75)
NEUTS SEG NFR BLD: 70 % (ref 32–75)
NEUTS SEG NFR BLD: 73 % (ref 32–75)
NEUTS SEG NFR BLD: 73 % (ref 32–75)
NEUTS SEG NFR BLD: 75 % (ref 32–75)
NEUTS SEG NFR BLD: 75 % (ref 32–75)
NEUTS SEG NFR BLD: 76 % (ref 32–75)
NEUTS SEG NFR BLD: 77 % (ref 32–75)
NEUTS SEG NFR BLD: 77 % (ref 32–75)
NEUTS SEG NFR BLD: 78 % (ref 32–75)
NEUTS SEG NFR BLD: 79 % (ref 32–75)
NEUTS SEG NFR BLD: 80 % (ref 32–75)
NEUTS SEG NFR BLD: 81 % (ref 32–75)
NEUTS SEG NFR BLD: 81 % (ref 32–75)
NEUTS SEG NFR BLD: 83 % (ref 32–75)
NEUTS SEG NFR BLD: 85 % (ref 32–75)
NEUTS SEG NFR BLD: 89 % (ref 32–75)
NEUTS SEG NFR BLD: 90 % (ref 32–75)
NEUTS SEG NFR BLD: 93 % (ref 32–75)
NRBC # BLD: 0 K/UL (ref 0–0.01)
NRBC # BLD: 0.02 K/UL (ref 0–0.01)
NRBC # BLD: 0.03 K/UL (ref 0–0.01)
NRBC # BLD: 0.04 K/UL (ref 0–0.01)
NRBC # BLD: 0.04 K/UL (ref 0–0.01)
NRBC # BLD: 0.06 K/UL (ref 0–0.01)
NRBC # BLD: 0.07 K/UL (ref 0–0.01)
NRBC # BLD: 0.15 K/UL (ref 0–0.01)
NRBC BLD-RTO: 0 PER 100 WBC
NRBC BLD-RTO: 0.1 PER 100 WBC
NRBC BLD-RTO: 0.2 PER 100 WBC
NRBC BLD-RTO: 0.3 PER 100 WBC
NRBC BLD-RTO: 0.3 PER 100 WBC
NRBC BLD-RTO: 0.4 PER 100 WBC
NRBC BLD-RTO: 0.5 PER 100 WBC
NRBC BLD-RTO: 0.9 PER 100 WBC
OXYHGB MFR BLD: 51.1 % (ref 95–99)
P-R INTERVAL, ECG05: 122 MS
P-R INTERVAL, ECG05: 134 MS
P-R INTERVAL, ECG05: 136 MS
P-R INTERVAL, ECG05: 138 MS
P-R INTERVAL, ECG05: 148 MS
P-R INTERVAL, ECG05: 148 MS
P-R INTERVAL, ECG05: 152 MS
PCO2 BLDA: 86 MMHG (ref 35–45)
PERFORMED BY, TECHID: ABNORMAL
PERFORMED BY, TECHID: NORMAL
PH BLDA: 6.84 [PH] (ref 7.35–7.45)
PHOSPHATE SERPL-MCNC: 1.5 MG/DL (ref 2.6–4.7)
PHOSPHATE SERPL-MCNC: 1.9 MG/DL (ref 2.6–4.7)
PHOSPHATE SERPL-MCNC: 2.7 MG/DL (ref 2.6–4.7)
PLATELET # BLD AUTO: 137 K/UL (ref 150–400)
PLATELET # BLD AUTO: 171 K/UL (ref 150–400)
PLATELET # BLD AUTO: 173 K/UL (ref 150–400)
PLATELET # BLD AUTO: 174 K/UL (ref 150–400)
PLATELET # BLD AUTO: 176 K/UL (ref 150–400)
PLATELET # BLD AUTO: 181 K/UL (ref 150–400)
PLATELET # BLD AUTO: 187 K/UL (ref 150–400)
PLATELET # BLD AUTO: 208 K/UL (ref 150–400)
PLATELET # BLD AUTO: 211 K/UL (ref 150–400)
PLATELET # BLD AUTO: 211 K/UL (ref 150–400)
PLATELET # BLD AUTO: 215 K/UL (ref 150–400)
PLATELET # BLD AUTO: 215 K/UL (ref 150–400)
PLATELET # BLD AUTO: 217 K/UL (ref 150–400)
PLATELET # BLD AUTO: 230 K/UL (ref 150–400)
PLATELET # BLD AUTO: 231 K/UL (ref 150–400)
PLATELET # BLD AUTO: 231 K/UL (ref 150–400)
PLATELET # BLD AUTO: 240 K/UL (ref 150–400)
PLATELET # BLD AUTO: 240 K/UL (ref 150–400)
PLATELET # BLD AUTO: 242 K/UL (ref 150–400)
PLATELET # BLD AUTO: 247 K/UL (ref 150–400)
PLATELET # BLD AUTO: 248 K/UL (ref 150–400)
PLATELET # BLD AUTO: 258 K/UL (ref 150–400)
PLATELET # BLD AUTO: 260 K/UL (ref 150–400)
PLATELET # BLD AUTO: 264 K/UL (ref 150–400)
PLATELET # BLD AUTO: 279 K/UL (ref 150–400)
PLATELET # BLD AUTO: 288 K/UL (ref 150–400)
PLATELET # BLD AUTO: 290 K/UL (ref 150–400)
PLATELET # BLD AUTO: 292 K/UL (ref 150–400)
PLATELET # BLD AUTO: 295 K/UL (ref 150–400)
PLATELET # BLD AUTO: 299 K/UL (ref 150–400)
PLATELET # BLD AUTO: 307 K/UL (ref 150–400)
PLATELET # BLD AUTO: 309 K/UL (ref 150–400)
PLATELET # BLD AUTO: 310 K/UL (ref 150–400)
PLATELET # BLD AUTO: 320 K/UL (ref 150–400)
PLATELET # BLD AUTO: 322 K/UL (ref 150–400)
PLATELET # BLD AUTO: 344 K/UL (ref 150–400)
PLATELET # BLD AUTO: 355 K/UL (ref 150–400)
PLATELET # BLD AUTO: 355 K/UL (ref 150–400)
PLATELET # BLD AUTO: 359 K/UL (ref 150–400)
PLATELET # BLD AUTO: 408 K/UL (ref 150–400)
PLATELET # BLD AUTO: 443 K/UL (ref 150–400)
PLATELET # BLD AUTO: 450 K/UL (ref 150–400)
PLATELET # BLD AUTO: 483 K/UL (ref 150–400)
PMV BLD AUTO: 10 FL (ref 8.9–12.9)
PMV BLD AUTO: 10 FL (ref 8.9–12.9)
PMV BLD AUTO: 10.2 FL (ref 8.9–12.9)
PMV BLD AUTO: 10.4 FL (ref 8.9–12.9)
PMV BLD AUTO: 10.5 FL (ref 8.9–12.9)
PMV BLD AUTO: 10.7 FL (ref 8.9–12.9)
PMV BLD AUTO: 10.7 FL (ref 8.9–12.9)
PMV BLD AUTO: 10.8 FL (ref 8.9–12.9)
PMV BLD AUTO: 11 FL (ref 8.9–12.9)
PMV BLD AUTO: 11.1 FL (ref 8.9–12.9)
PMV BLD AUTO: 11.3 FL (ref 8.9–12.9)
PMV BLD AUTO: 11.3 FL (ref 8.9–12.9)
PMV BLD AUTO: 11.6 FL (ref 8.9–12.9)
PMV BLD AUTO: 11.6 FL (ref 8.9–12.9)
PMV BLD AUTO: 11.7 FL (ref 8.9–12.9)
PMV BLD AUTO: 11.7 FL (ref 8.9–12.9)
PMV BLD AUTO: 11.8 FL (ref 8.9–12.9)
PMV BLD AUTO: 11.9 FL (ref 8.9–12.9)
PMV BLD AUTO: 11.9 FL (ref 8.9–12.9)
PMV BLD AUTO: 12.1 FL (ref 8.9–12.9)
PMV BLD AUTO: 12.1 FL (ref 8.9–12.9)
PMV BLD AUTO: 12.3 FL (ref 8.9–12.9)
PMV BLD AUTO: 12.3 FL (ref 8.9–12.9)
PMV BLD AUTO: 12.5 FL (ref 8.9–12.9)
PMV BLD AUTO: 12.5 FL (ref 8.9–12.9)
PMV BLD AUTO: 12.6 FL (ref 8.9–12.9)
PMV BLD AUTO: 12.6 FL (ref 8.9–12.9)
PMV BLD AUTO: 13 FL (ref 8.9–12.9)
PMV BLD AUTO: 13 FL (ref 8.9–12.9)
PMV BLD AUTO: 13.8 FL (ref 8.9–12.9)
PO2 BLDA: 54 MMHG (ref 80–100)
POTASSIUM SERPL-SCNC: 2.8 MMOL/L (ref 3.5–5.1)
POTASSIUM SERPL-SCNC: 3 MMOL/L (ref 3.5–5.1)
POTASSIUM SERPL-SCNC: 3.1 MMOL/L (ref 3.5–5.1)
POTASSIUM SERPL-SCNC: 3.1 MMOL/L (ref 3.5–5.1)
POTASSIUM SERPL-SCNC: 3.2 MMOL/L (ref 3.5–5.1)
POTASSIUM SERPL-SCNC: 3.3 MMOL/L (ref 3.5–5.1)
POTASSIUM SERPL-SCNC: 3.4 MMOL/L (ref 3.5–5.1)
POTASSIUM SERPL-SCNC: 3.5 MMOL/L (ref 3.5–5.1)
POTASSIUM SERPL-SCNC: 3.6 MMOL/L (ref 3.5–5.1)
POTASSIUM SERPL-SCNC: 3.7 MMOL/L (ref 3.5–5.1)
POTASSIUM SERPL-SCNC: 3.8 MMOL/L (ref 3.5–5.1)
POTASSIUM SERPL-SCNC: 3.8 MMOL/L (ref 3.5–5.1)
POTASSIUM SERPL-SCNC: 3.9 MMOL/L (ref 3.5–5.1)
POTASSIUM SERPL-SCNC: 4 MMOL/L (ref 3.5–5.1)
POTASSIUM SERPL-SCNC: 4.1 MMOL/L (ref 3.5–5.1)
POTASSIUM SERPL-SCNC: 4.2 MMOL/L (ref 3.5–5.1)
POTASSIUM SERPL-SCNC: 4.2 MMOL/L (ref 3.5–5.1)
POTASSIUM SERPL-SCNC: 4.3 MMOL/L (ref 3.5–5.1)
POTASSIUM SERPL-SCNC: 4.3 MMOL/L (ref 3.5–5.1)
POTASSIUM SERPL-SCNC: 4.5 MMOL/L (ref 3.5–5.1)
POTASSIUM SERPL-SCNC: 4.6 MMOL/L (ref 3.5–5.1)
POTASSIUM SERPL-SCNC: 5.1 MMOL/L (ref 3.5–5.1)
POTASSIUM SERPL-SCNC: 5.1 MMOL/L (ref 3.5–5.1)
POTASSIUM SERPL-SCNC: 5.4 MMOL/L (ref 3.5–5.1)
POTASSIUM SERPL-SCNC: ABNORMAL MMOL/L (ref 3.5–5.1)
PROT SERPL-MCNC: 3.8 G/DL (ref 6.4–8.2)
PROT SERPL-MCNC: 3.8 G/DL (ref 6.4–8.2)
PROT SERPL-MCNC: 4.5 G/DL (ref 6.4–8.2)
PROT SERPL-MCNC: 4.6 G/DL (ref 6.4–8.2)
PROT SERPL-MCNC: 4.8 G/DL (ref 6.4–8.2)
PROT SERPL-MCNC: 5 G/DL (ref 6.4–8.2)
PROT SERPL-MCNC: 5.1 G/DL (ref 6.4–8.2)
PROT SERPL-MCNC: 5.2 G/DL (ref 6.4–8.2)
PROT SERPL-MCNC: 5.4 G/DL (ref 6.4–8.2)
PROT SERPL-MCNC: 5.4 G/DL (ref 6.4–8.2)
PROT SERPL-MCNC: 5.5 G/DL (ref 6.4–8.2)
PROT SERPL-MCNC: 5.5 G/DL (ref 6.4–8.2)
PROT SERPL-MCNC: 5.6 G/DL (ref 6.4–8.2)
PROT SERPL-MCNC: 5.7 G/DL (ref 6.4–8.2)
PROT SERPL-MCNC: 5.7 G/DL (ref 6.4–8.2)
PROT SERPL-MCNC: 5.8 G/DL (ref 6.4–8.2)
PROT SERPL-MCNC: 5.9 G/DL (ref 6.4–8.2)
PROT SERPL-MCNC: 5.9 G/DL (ref 6.4–8.2)
PROT SERPL-MCNC: 6 G/DL (ref 6.4–8.2)
PROT SERPL-MCNC: 6.1 G/DL (ref 6.4–8.2)
PROT SERPL-MCNC: 6.2 G/DL (ref 6.4–8.2)
PROT SERPL-MCNC: 6.2 G/DL (ref 6.4–8.2)
PROT SERPL-MCNC: 6.3 G/DL (ref 6.4–8.2)
PROT SERPL-MCNC: 6.6 G/DL (ref 6.4–8.2)
PROT SERPL-MCNC: 6.6 G/DL (ref 6.4–8.2)
PROT SERPL-MCNC: 6.8 G/DL (ref 6.4–8.2)
PROTHROMBIN TIME: 16.4 SEC (ref 11.9–14.6)
Q-T INTERVAL, ECG07: 226 MS
Q-T INTERVAL, ECG07: 238 MS
Q-T INTERVAL, ECG07: 246 MS
Q-T INTERVAL, ECG07: 256 MS
Q-T INTERVAL, ECG07: 282 MS
Q-T INTERVAL, ECG07: 290 MS
Q-T INTERVAL, ECG07: 294 MS
Q-T INTERVAL, ECG07: 306 MS
Q-T INTERVAL, ECG07: 312 MS
Q-T INTERVAL, ECG07: 352 MS
Q-T INTERVAL, ECG07: 354 MS
Q-T INTERVAL, ECG07: 362 MS
Q-T INTERVAL, ECG07: 366 MS
Q-T INTERVAL, ECG07: 376 MS
Q-T INTERVAL, ECG07: 410 MS
Q-T INTERVAL, ECG07: 422 MS
QRS DURATION, ECG06: 104 MS
QRS DURATION, ECG06: 76 MS
QRS DURATION, ECG06: 76 MS
QRS DURATION, ECG06: 80 MS
QRS DURATION, ECG06: 82 MS
QRS DURATION, ECG06: 82 MS
QRS DURATION, ECG06: 84 MS
QRS DURATION, ECG06: 84 MS
QRS DURATION, ECG06: 86 MS
QRS DURATION, ECG06: 92 MS
QRS DURATION, ECG06: 94 MS
QRS DURATION, ECG06: 96 MS
QTC CALCULATION (BEZET), ECG08: 409 MS
QTC CALCULATION (BEZET), ECG08: 425 MS
QTC CALCULATION (BEZET), ECG08: 434 MS
QTC CALCULATION (BEZET), ECG08: 436 MS
QTC CALCULATION (BEZET), ECG08: 440 MS
QTC CALCULATION (BEZET), ECG08: 443 MS
QTC CALCULATION (BEZET), ECG08: 455 MS
QTC CALCULATION (BEZET), ECG08: 455 MS
QTC CALCULATION (BEZET), ECG08: 459 MS
QTC CALCULATION (BEZET), ECG08: 459 MS
QTC CALCULATION (BEZET), ECG08: 461 MS
QTC CALCULATION (BEZET), ECG08: 467 MS
QTC CALCULATION (BEZET), ECG08: 468 MS
QTC CALCULATION (BEZET), ECG08: 468 MS
QTC CALCULATION (BEZET), ECG08: 495 MS
QTC CALCULATION (BEZET), ECG08: 498 MS
QUANTIFERON CRITERIA, QFI1T: NORMAL
QUANTIFERON INCUBATION, QF1T: NORMAL
QUANTIFERON MITOGEN VALUE: 5.6 IU/ML
QUANTIFERON NIL VALUE: 0 IU/ML
QUANTIFERON TB1 AG: 0 IU/ML
QUANTIFERON TB2 AG: 0 IU/ML
RBC # BLD AUTO: 2.78 M/UL (ref 4.1–5.7)
RBC # BLD AUTO: 2.81 M/UL (ref 4.1–5.7)
RBC # BLD AUTO: 2.82 M/UL (ref 4.1–5.7)
RBC # BLD AUTO: 2.89 M/UL (ref 4.1–5.7)
RBC # BLD AUTO: 3.18 M/UL (ref 4.1–5.7)
RBC # BLD AUTO: 3.25 M/UL (ref 4.1–5.7)
RBC # BLD AUTO: 3.25 M/UL (ref 4.1–5.7)
RBC # BLD AUTO: 3.32 M/UL (ref 4.1–5.7)
RBC # BLD AUTO: 3.52 M/UL (ref 4.1–5.7)
RBC # BLD AUTO: 3.6 M/UL (ref 4.1–5.7)
RBC # BLD AUTO: 3.66 M/UL (ref 4.1–5.7)
RBC # BLD AUTO: 3.73 M/UL (ref 4.1–5.7)
RBC # BLD AUTO: 3.81 M/UL (ref 4.1–5.7)
RBC # BLD AUTO: 3.82 M/UL (ref 4.1–5.7)
RBC # BLD AUTO: 3.85 M/UL (ref 4.1–5.7)
RBC # BLD AUTO: 3.9 M/UL (ref 4.1–5.7)
RBC # BLD AUTO: 3.93 M/UL (ref 4.1–5.7)
RBC # BLD AUTO: 3.94 M/UL (ref 4.1–5.7)
RBC # BLD AUTO: 3.99 M/UL (ref 4.1–5.7)
RBC # BLD AUTO: 4.03 M/UL (ref 4.1–5.7)
RBC # BLD AUTO: 4.07 M/UL (ref 4.1–5.7)
RBC # BLD AUTO: 4.09 M/UL (ref 4.1–5.7)
RBC # BLD AUTO: 4.11 M/UL (ref 4.1–5.7)
RBC # BLD AUTO: 4.15 M/UL (ref 4.1–5.7)
RBC # BLD AUTO: 4.16 M/UL (ref 4.1–5.7)
RBC # BLD AUTO: 4.18 M/UL (ref 4.1–5.7)
RBC # BLD AUTO: 4.19 M/UL (ref 4.1–5.7)
RBC # BLD AUTO: 4.23 M/UL (ref 4.1–5.7)
RBC # BLD AUTO: 4.24 M/UL (ref 4.1–5.7)
RBC # BLD AUTO: 4.27 M/UL (ref 4.1–5.7)
RBC # BLD AUTO: 4.54 M/UL (ref 4.1–5.7)
RBC # BLD AUTO: 4.55 M/UL (ref 4.1–5.7)
RBC # BLD AUTO: 4.57 M/UL (ref 4.1–5.7)
RBC # BLD AUTO: 4.7 M/UL (ref 4.1–5.7)
RBC # BLD AUTO: 4.72 M/UL (ref 4.1–5.7)
RBC # BLD AUTO: 4.73 M/UL (ref 4.1–5.7)
RBC # BLD AUTO: 4.8 M/UL (ref 4.1–5.7)
RBC # BLD AUTO: 4.83 M/UL (ref 4.1–5.7)
RBC # BLD AUTO: 5.25 M/UL (ref 4.1–5.7)
RBC # BLD AUTO: 5.5 M/UL (ref 4.1–5.7)
RBC # BLD AUTO: 5.64 M/UL (ref 4.1–5.7)
RBC # BLD AUTO: 5.76 M/UL (ref 4.1–5.7)
RBC # BLD AUTO: 5.86 M/UL (ref 4.1–5.7)
RBC MORPH BLD: ABNORMAL
RBC MORPH BLD: ABNORMAL
REFERENCE LAB,REFLB: NORMAL
RIGHT CCA DIST DIAS: 20.5 CM/S
RIGHT CCA DIST SYS: 89.9 CM/S
RIGHT CCA PROX DIAS: 27 CM/S
RIGHT CCA PROX SYS: 135 CM/S
RIGHT ECA DIAS: 9.55 CM/S
RIGHT ECA SYS: 58.8 CM/S
RIGHT ICA DIST DIAS: 65.8 CM/S
RIGHT ICA DIST SYS: 133 CM/S
RIGHT ICA MID DIAS: 84.5 CM/S
RIGHT ICA MID SYS: 145 CM/S
RIGHT ICA PROX DIAS: 23.3 CM/S
RIGHT ICA PROX SYS: 72.5 CM/S
RIGHT ICA/CCA SYS: 0.81
RIGHT VERTEBRAL DIAS: 27 CM/S
RIGHT VERTEBRAL SYS: 66.6 CM/S
SAO2 % BLD: 51 % (ref 95–99)
SAO2% DEVICE SAO2% SENSOR NAME: ABNORMAL
SARS-COV-2, COV2: NORMAL
SARS-COV-2, COV2: NOT DETECTED
SARS-COV-2, XPLCVT: DETECTED
SCREEN APTT: 36.4 SEC (ref 0–51.9)
SCREEN DRVVT: 36.8 SEC (ref 0–47)
SERVICE CMNT-IMP: ABNORMAL
SODIUM SERPL-SCNC: 133 MMOL/L (ref 136–145)
SODIUM SERPL-SCNC: 135 MMOL/L (ref 136–145)
SODIUM SERPL-SCNC: 136 MMOL/L (ref 136–145)
SODIUM SERPL-SCNC: 137 MMOL/L (ref 136–145)
SODIUM SERPL-SCNC: 138 MMOL/L (ref 136–145)
SODIUM SERPL-SCNC: 139 MMOL/L (ref 136–145)
SODIUM SERPL-SCNC: 140 MMOL/L (ref 136–145)
SODIUM SERPL-SCNC: 141 MMOL/L (ref 136–145)
SODIUM SERPL-SCNC: 142 MMOL/L (ref 136–145)
SODIUM SERPL-SCNC: 143 MMOL/L (ref 136–145)
SODIUM SERPL-SCNC: 147 MMOL/L (ref 136–145)
SOURCE, COVRS: ABNORMAL
SP1: NORMAL
SP2: NORMAL
SP3: NORMAL
SPECIAL REQUESTS,SREQ: NORMAL
SPECIMEN EXP DATE BLD: NORMAL
SPECIMEN SITE: ABNORMAL
STATUS OF UNIT,%ST: NORMAL
STRIA MUS AB TITR SER IF: NORMAL {TITER}
TEST DESCRIPTION:,ATST: NORMAL
THERAPEUTIC RANGE,PTTT: ABNORMAL SEC (ref 82–109)
TRANSFUSION STATUS PATIENT QL: NORMAL
TRIGL SERPL-MCNC: 80 MG/DL (ref ?–150)
TROPONIN-HIGH SENSITIVITY: 15 NG/L (ref 0–76)
TROPONIN-HIGH SENSITIVITY: 6 NG/L (ref 0–76)
TSH RECEP AB SER-ACNC: <1.1 IU/L (ref 0–1.75)
TSH SERPL DL<=0.05 MIU/L-ACNC: 0.12 UIU/ML (ref 0.36–3.74)
TSH SERPL DL<=0.05 MIU/L-ACNC: 0.36 UIU/ML (ref 0.36–3.74)
TSH SERPL DL<=0.05 MIU/L-ACNC: 0.45 UIU/ML (ref 0.36–3.74)
UNIT DIVISION, %UDIV: 0
VAS LEFT SUBCLAVIAN PROX EDV: 0 CM/S
VAS LEFT SUBCLAVIAN PROX PSV: 67.3 CM/S
VAS RIGHT SUBCLAVIAN PROX EDV: 0 CM/S
VAS RIGHT SUBCLAVIAN PROX PSV: 75.9 CM/S
VENTRICULAR RATE, ECG03: 101 BPM
VENTRICULAR RATE, ECG03: 103 BPM
VENTRICULAR RATE, ECG03: 121 BPM
VENTRICULAR RATE, ECG03: 135 BPM
VENTRICULAR RATE, ECG03: 136 BPM
VENTRICULAR RATE, ECG03: 166 BPM
VENTRICULAR RATE, ECG03: 171 BPM
VENTRICULAR RATE, ECG03: 190 BPM
VENTRICULAR RATE, ECG03: 192 BPM
VENTRICULAR RATE, ECG03: 195 BPM
VENTRICULAR RATE, ECG03: 197 BPM
VENTRICULAR RATE, ECG03: 74 BPM
VENTRICULAR RATE, ECG03: 84 BPM
VENTRICULAR RATE, ECG03: 89 BPM
VENTRICULAR RATE, ECG03: 90 BPM
VENTRICULAR RATE, ECG03: 98 BPM
VLDLC SERPL CALC-MCNC: 16 MG/DL
WBC # BLD AUTO: 10.2 K/UL (ref 4.1–11.1)
WBC # BLD AUTO: 10.2 K/UL (ref 4.1–11.1)
WBC # BLD AUTO: 10.4 K/UL (ref 4.1–11.1)
WBC # BLD AUTO: 10.7 K/UL (ref 4.1–11.1)
WBC # BLD AUTO: 10.8 K/UL (ref 4.1–11.1)
WBC # BLD AUTO: 10.9 K/UL (ref 4.1–11.1)
WBC # BLD AUTO: 11 K/UL (ref 4.1–11.1)
WBC # BLD AUTO: 11.2 K/UL (ref 4.1–11.1)
WBC # BLD AUTO: 11.2 K/UL (ref 4.1–11.1)
WBC # BLD AUTO: 11.3 K/UL (ref 4.1–11.1)
WBC # BLD AUTO: 11.3 K/UL (ref 4.1–11.1)
WBC # BLD AUTO: 12 K/UL (ref 4.1–11.1)
WBC # BLD AUTO: 12.2 K/UL (ref 4.1–11.1)
WBC # BLD AUTO: 12.4 K/UL (ref 4.1–11.1)
WBC # BLD AUTO: 13 K/UL (ref 4.1–11.1)
WBC # BLD AUTO: 13.1 K/UL (ref 4.1–11.1)
WBC # BLD AUTO: 13.2 K/UL (ref 4.1–11.1)
WBC # BLD AUTO: 13.2 K/UL (ref 4.1–11.1)
WBC # BLD AUTO: 13.5 K/UL (ref 4.1–11.1)
WBC # BLD AUTO: 13.7 K/UL (ref 4.1–11.1)
WBC # BLD AUTO: 13.8 K/UL (ref 4.1–11.1)
WBC # BLD AUTO: 13.8 K/UL (ref 4.1–11.1)
WBC # BLD AUTO: 14.3 K/UL (ref 4.1–11.1)
WBC # BLD AUTO: 14.3 K/UL (ref 4.1–11.1)
WBC # BLD AUTO: 14.8 K/UL (ref 4.1–11.1)
WBC # BLD AUTO: 14.8 K/UL (ref 4.1–11.1)
WBC # BLD AUTO: 14.9 K/UL (ref 4.1–11.1)
WBC # BLD AUTO: 15 K/UL (ref 4.1–11.1)
WBC # BLD AUTO: 15.8 K/UL (ref 4.1–11.1)
WBC # BLD AUTO: 16 K/UL (ref 4.1–11.1)
WBC # BLD AUTO: 16.4 K/UL (ref 4.1–11.1)
WBC # BLD AUTO: 16.5 K/UL (ref 4.1–11.1)
WBC # BLD AUTO: 16.9 K/UL (ref 4.1–11.1)
WBC # BLD AUTO: 17.3 K/UL (ref 4.1–11.1)
WBC # BLD AUTO: 20.2 K/UL (ref 4.1–11.1)
WBC # BLD AUTO: 6.1 K/UL (ref 4.1–11.1)
WBC # BLD AUTO: 7.1 K/UL (ref 4.1–11.1)
WBC # BLD AUTO: 7.1 K/UL (ref 4.1–11.1)
WBC # BLD AUTO: 9.8 K/UL (ref 4.1–11.1)
WBC # BLD AUTO: 9.8 K/UL (ref 4.1–11.1)
WBC # BLD AUTO: 9.9 K/UL (ref 4.1–11.1)

## 2022-01-01 PROCEDURE — 74011250636 HC RX REV CODE- 250/636: Performed by: COLON & RECTAL SURGERY

## 2022-01-01 PROCEDURE — 74011000258 HC RX REV CODE- 258: Performed by: COLON & RECTAL SURGERY

## 2022-01-01 PROCEDURE — 77030031139 HC SUT VCRL2 J&J -A: Performed by: COLON & RECTAL SURGERY

## 2022-01-01 PROCEDURE — 36415 COLL VENOUS BLD VENIPUNCTURE: CPT

## 2022-01-01 PROCEDURE — 74011250637 HC RX REV CODE- 250/637: Performed by: INTERNAL MEDICINE

## 2022-01-01 PROCEDURE — 94762 N-INVAS EAR/PLS OXIMTRY CONT: CPT

## 2022-01-01 PROCEDURE — 76210000016 HC OR PH I REC 1 TO 1.5 HR: Performed by: COLON & RECTAL SURGERY

## 2022-01-01 PROCEDURE — 65270000029 HC RM PRIVATE

## 2022-01-01 PROCEDURE — 74011636637 HC RX REV CODE- 636/637: Performed by: COLON & RECTAL SURGERY

## 2022-01-01 PROCEDURE — 74011250637 HC RX REV CODE- 250/637: Performed by: COLON & RECTAL SURGERY

## 2022-01-01 PROCEDURE — 93010 ELECTROCARDIOGRAM REPORT: CPT | Performed by: INTERNAL MEDICINE

## 2022-01-01 PROCEDURE — 86140 C-REACTIVE PROTEIN: CPT

## 2022-01-01 PROCEDURE — 74011000250 HC RX REV CODE- 250: Performed by: NURSE ANESTHETIST, CERTIFIED REGISTERED

## 2022-01-01 PROCEDURE — 85730 THROMBOPLASTIN TIME PARTIAL: CPT

## 2022-01-01 PROCEDURE — 97110 THERAPEUTIC EXERCISES: CPT

## 2022-01-01 PROCEDURE — 77030026102 HC DEV TISS ENSEAL G2 J&J -F: Performed by: COLON & RECTAL SURGERY

## 2022-01-01 PROCEDURE — 74176 CT ABD & PELVIS W/O CONTRAST: CPT

## 2022-01-01 PROCEDURE — 85025 COMPLETE CBC W/AUTO DIFF WBC: CPT

## 2022-01-01 PROCEDURE — 86480 TB TEST CELL IMMUN MEASURE: CPT

## 2022-01-01 PROCEDURE — 82962 GLUCOSE BLOOD TEST: CPT

## 2022-01-01 PROCEDURE — 80053 COMPREHEN METABOLIC PANEL: CPT

## 2022-01-01 PROCEDURE — 97530 THERAPEUTIC ACTIVITIES: CPT

## 2022-01-01 PROCEDURE — 74011000250 HC RX REV CODE- 250: Performed by: COLON & RECTAL SURGERY

## 2022-01-01 PROCEDURE — 85610 PROTHROMBIN TIME: CPT

## 2022-01-01 PROCEDURE — 83735 ASSAY OF MAGNESIUM: CPT

## 2022-01-01 PROCEDURE — 05HA33Z INSERTION OF INFUSION DEVICE INTO LEFT BRACHIAL VEIN, PERCUTANEOUS APPROACH: ICD-10-PCS | Performed by: COLON & RECTAL SURGERY

## 2022-01-01 PROCEDURE — 86900 BLOOD TYPING SEROLOGIC ABO: CPT

## 2022-01-01 PROCEDURE — 99024 POSTOP FOLLOW-UP VISIT: CPT | Performed by: COLON & RECTAL SURGERY

## 2022-01-01 PROCEDURE — 74011250636 HC RX REV CODE- 250/636: Performed by: SURGERY

## 2022-01-01 PROCEDURE — 36573 INSJ PICC RS&I 5 YR+: CPT | Performed by: COLON & RECTAL SURGERY

## 2022-01-01 PROCEDURE — 80048 BASIC METABOLIC PNL TOTAL CA: CPT

## 2022-01-01 PROCEDURE — 76210000006 HC OR PH I REC 0.5 TO 1 HR: Performed by: COLON & RECTAL SURGERY

## 2022-01-01 PROCEDURE — 77030002966 HC SUT PDS J&J -A: Performed by: COLON & RECTAL SURGERY

## 2022-01-01 PROCEDURE — 74011250637 HC RX REV CODE- 250/637: Performed by: PSYCHIATRY & NEUROLOGY

## 2022-01-01 PROCEDURE — 97116 GAIT TRAINING THERAPY: CPT

## 2022-01-01 PROCEDURE — 74011250637 HC RX REV CODE- 250/637: Performed by: HOSPITALIST

## 2022-01-01 PROCEDURE — 99204 OFFICE O/P NEW MOD 45 MIN: CPT | Performed by: COLON & RECTAL SURGERY

## 2022-01-01 PROCEDURE — 74011250636 HC RX REV CODE- 250/636: Performed by: INTERNAL MEDICINE

## 2022-01-01 PROCEDURE — 65270000032 HC RM SEMIPRIVATE

## 2022-01-01 PROCEDURE — 74011250636 HC RX REV CODE- 250/636: Performed by: NURSE ANESTHETIST, CERTIFIED REGISTERED

## 2022-01-01 PROCEDURE — 2709999900 HC NON-CHARGEABLE SUPPLY: Performed by: COLON & RECTAL SURGERY

## 2022-01-01 PROCEDURE — 83874 ASSAY OF MYOGLOBIN: CPT

## 2022-01-01 PROCEDURE — 77030011808 HC STPLR ENDOSCOPIC J&J -D: Performed by: COLON & RECTAL SURGERY

## 2022-01-01 PROCEDURE — 85027 COMPLETE CBC AUTOMATED: CPT

## 2022-01-01 PROCEDURE — 74011000250 HC RX REV CODE- 250: Performed by: INTERNAL MEDICINE

## 2022-01-01 PROCEDURE — 86038 ANTINUCLEAR ANTIBODIES: CPT

## 2022-01-01 PROCEDURE — 74011636637 HC RX REV CODE- 636/637: Performed by: SPECIALIST

## 2022-01-01 PROCEDURE — 74011250636 HC RX REV CODE- 250/636: Performed by: ANESTHESIOLOGY

## 2022-01-01 PROCEDURE — 51798 US URINE CAPACITY MEASURE: CPT

## 2022-01-01 PROCEDURE — 77030036731 HC STPLR ENDOSC J&J -F: Performed by: COLON & RECTAL SURGERY

## 2022-01-01 PROCEDURE — U0005 INFEC AGEN DETEC AMPLI PROBE: HCPCS

## 2022-01-01 PROCEDURE — 84443 ASSAY THYROID STIM HORMONE: CPT

## 2022-01-01 PROCEDURE — 74011000636 HC RX REV CODE- 636: Performed by: COLON & RECTAL SURGERY

## 2022-01-01 PROCEDURE — 93005 ELECTROCARDIOGRAM TRACING: CPT

## 2022-01-01 PROCEDURE — 74011250637 HC RX REV CODE- 250/637: Performed by: SURGERY

## 2022-01-01 PROCEDURE — 93312 ECHO TRANSESOPHAGEAL: CPT

## 2022-01-01 PROCEDURE — 65610000006 HC RM INTENSIVE CARE

## 2022-01-01 PROCEDURE — 97605 NEG PRS WND THER DME<=50SQCM: CPT

## 2022-01-01 PROCEDURE — 87641 MR-STAPH DNA AMP PROBE: CPT

## 2022-01-01 PROCEDURE — 77030002933 HC SUT MCRYL J&J -A: Performed by: COLON & RECTAL SURGERY

## 2022-01-01 PROCEDURE — 74011000250 HC RX REV CODE- 250: Performed by: SURGERY

## 2022-01-01 PROCEDURE — 99024 POSTOP FOLLOW-UP VISIT: CPT | Performed by: SURGERY

## 2022-01-01 PROCEDURE — 85652 RBC SED RATE AUTOMATED: CPT

## 2022-01-01 PROCEDURE — 74018 RADEX ABDOMEN 1 VIEW: CPT

## 2022-01-01 PROCEDURE — 85613 RUSSELL VIPER VENOM DILUTED: CPT

## 2022-01-01 PROCEDURE — 99214 OFFICE O/P EST MOD 30 MIN: CPT | Performed by: COLON & RECTAL SURGERY

## 2022-01-01 PROCEDURE — G8420 CALC BMI NORM PARAMETERS: HCPCS | Performed by: COLON & RECTAL SURGERY

## 2022-01-01 PROCEDURE — 86147 CARDIOLIPIN ANTIBODY EA IG: CPT

## 2022-01-01 PROCEDURE — G8427 DOCREV CUR MEDS BY ELIG CLIN: HCPCS | Performed by: COLON & RECTAL SURGERY

## 2022-01-01 PROCEDURE — 97535 SELF CARE MNGMENT TRAINING: CPT

## 2022-01-01 PROCEDURE — 3E1M38Z IRRIGATION OF PERITONEAL CAVITY USING IRRIGATING SUBSTANCE, PERCUTANEOUS APPROACH: ICD-10-PCS | Performed by: COLON & RECTAL SURGERY

## 2022-01-01 PROCEDURE — 70551 MRI BRAIN STEM W/O DYE: CPT

## 2022-01-01 PROCEDURE — 88307 TISSUE EXAM BY PATHOLOGIST: CPT

## 2022-01-01 PROCEDURE — 36430 TRANSFUSION BLD/BLD COMPNT: CPT

## 2022-01-01 PROCEDURE — 74011000250 HC RX REV CODE- 250: Performed by: ANESTHESIOLOGY

## 2022-01-01 PROCEDURE — 74011000258 HC RX REV CODE- 258: Performed by: NURSE ANESTHETIST, CERTIFIED REGISTERED

## 2022-01-01 PROCEDURE — 0D1B4Z4 BYPASS ILEUM TO CUTANEOUS, PERCUTANEOUS ENDOSCOPIC APPROACH: ICD-10-PCS | Performed by: COLON & RECTAL SURGERY

## 2022-01-01 PROCEDURE — 83520 IMMUNOASSAY QUANT NOS NONAB: CPT

## 2022-01-01 PROCEDURE — 77030008606 HC TRCR ENDOSC KII AMR -B: Performed by: COLON & RECTAL SURGERY

## 2022-01-01 PROCEDURE — 95816 EEG AWAKE AND DROWSY: CPT | Performed by: PSYCHIATRY & NEUROLOGY

## 2022-01-01 PROCEDURE — 84484 ASSAY OF TROPONIN QUANT: CPT

## 2022-01-01 PROCEDURE — 93880 EXTRACRANIAL BILAT STUDY: CPT

## 2022-01-01 PROCEDURE — 76060000034 HC ANESTHESIA 1.5 TO 2 HR: Performed by: COLON & RECTAL SURGERY

## 2022-01-01 PROCEDURE — 44310 ILEOSTOMY/JEJUNOSTOMY: CPT | Performed by: COLON & RECTAL SURGERY

## 2022-01-01 PROCEDURE — 74011250636 HC RX REV CODE- 250/636: Performed by: REGISTERED NURSE

## 2022-01-01 PROCEDURE — 77030003029 HC SUT VCRL J&J -B: Performed by: COLON & RECTAL SURGERY

## 2022-01-01 PROCEDURE — 74011000258 HC RX REV CODE- 258: Performed by: INTERNAL MEDICINE

## 2022-01-01 PROCEDURE — 93306 TTE W/DOPPLER COMPLETE: CPT

## 2022-01-01 PROCEDURE — 0DBH0ZZ EXCISION OF CECUM, OPEN APPROACH: ICD-10-PCS | Performed by: COLON & RECTAL SURGERY

## 2022-01-01 PROCEDURE — 77030012407 HC DRN WND BARD -B: Performed by: COLON & RECTAL SURGERY

## 2022-01-01 PROCEDURE — P9016 RBC LEUKOCYTES REDUCED: HCPCS

## 2022-01-01 PROCEDURE — 36600 WITHDRAWAL OF ARTERIAL BLOOD: CPT

## 2022-01-01 PROCEDURE — 83036 HEMOGLOBIN GLYCOSYLATED A1C: CPT

## 2022-01-01 PROCEDURE — 77030040503 HC DRN WND PENRS MDII -A: Performed by: COLON & RECTAL SURGERY

## 2022-01-01 PROCEDURE — 84100 ASSAY OF PHOSPHORUS: CPT

## 2022-01-01 PROCEDURE — 77030011264 HC ELECTRD BLD EXT COVD -A: Performed by: COLON & RECTAL SURGERY

## 2022-01-01 PROCEDURE — 87086 URINE CULTURE/COLONY COUNT: CPT

## 2022-01-01 PROCEDURE — 73502 X-RAY EXAM HIP UNI 2-3 VIEWS: CPT

## 2022-01-01 PROCEDURE — 77030016151 HC PROTCTR LNS DFOG COVD -B: Performed by: COLON & RECTAL SURGERY

## 2022-01-01 PROCEDURE — 74011000250 HC RX REV CODE- 250: Performed by: STUDENT IN AN ORGANIZED HEALTH CARE EDUCATION/TRAINING PROGRAM

## 2022-01-01 PROCEDURE — 86923 COMPATIBILITY TEST ELECTRIC: CPT

## 2022-01-01 PROCEDURE — 82085 ASSAY OF ALDOLASE: CPT

## 2022-01-01 PROCEDURE — 77030012406 HC DRN WND PENRS BARD -A: Performed by: COLON & RECTAL SURGERY

## 2022-01-01 PROCEDURE — 74011000250 HC RX REV CODE- 250: Performed by: HOSPITALIST

## 2022-01-01 PROCEDURE — 99232 SBSQ HOSP IP/OBS MODERATE 35: CPT | Performed by: SURGERY

## 2022-01-01 PROCEDURE — 0D1B0ZM BYPASS ILEUM TO DESCENDING COLON, OPEN APPROACH: ICD-10-PCS | Performed by: COLON & RECTAL SURGERY

## 2022-01-01 PROCEDURE — G8754 DIAS BP LESS 90: HCPCS | Performed by: COLON & RECTAL SURGERY

## 2022-01-01 PROCEDURE — 0BH17EZ INSERTION OF ENDOTRACHEAL AIRWAY INTO TRACHEA, VIA NATURAL OR ARTIFICIAL OPENING: ICD-10-PCS | Performed by: INTERNAL MEDICINE

## 2022-01-01 PROCEDURE — 86235 NUCLEAR ANTIGEN ANTIBODY: CPT

## 2022-01-01 PROCEDURE — 82550 ASSAY OF CK (CPK): CPT

## 2022-01-01 PROCEDURE — 77030002996 HC SUT SLK J&J -A: Performed by: COLON & RECTAL SURGERY

## 2022-01-01 PROCEDURE — 77030012058: Performed by: COLON & RECTAL SURGERY

## 2022-01-01 PROCEDURE — 74011250636 HC RX REV CODE- 250/636: Performed by: HOSPITALIST

## 2022-01-01 PROCEDURE — 87804 INFLUENZA ASSAY W/OPTIC: CPT

## 2022-01-01 PROCEDURE — 44160 REMOVAL OF COLON: CPT | Performed by: COLON & RECTAL SURGERY

## 2022-01-01 PROCEDURE — 74011250636 HC RX REV CODE- 250/636

## 2022-01-01 PROCEDURE — 0DN80ZZ RELEASE SMALL INTESTINE, OPEN APPROACH: ICD-10-PCS | Performed by: COLON & RECTAL SURGERY

## 2022-01-01 PROCEDURE — G8510 SCR DEP NEG, NO PLAN REQD: HCPCS | Performed by: COLON & RECTAL SURGERY

## 2022-01-01 PROCEDURE — G8753 SYS BP > OR = 140: HCPCS | Performed by: COLON & RECTAL SURGERY

## 2022-01-01 PROCEDURE — P9045 ALBUMIN (HUMAN), 5%, 250 ML: HCPCS | Performed by: NURSE ANESTHETIST, CERTIFIED REGISTERED

## 2022-01-01 PROCEDURE — 44205 LAP COLECTOMY PART W/ILEUM: CPT | Performed by: COLON & RECTAL SURGERY

## 2022-01-01 PROCEDURE — 83516 IMMUNOASSAY NONANTIBODY: CPT

## 2022-01-01 PROCEDURE — 77030040361 HC SLV COMPR DVT MDII -B: Performed by: COLON & RECTAL SURGERY

## 2022-01-01 PROCEDURE — 97161 PT EVAL LOW COMPLEX 20 MIN: CPT

## 2022-01-01 PROCEDURE — 88304 TISSUE EXAM BY PATHOLOGIST: CPT

## 2022-01-01 PROCEDURE — 77030002916 HC SUT ETHLN J&J -A: Performed by: COLON & RECTAL SURGERY

## 2022-01-01 PROCEDURE — 77030018813 HC SCIS LAPSCP EPIX DISP AMR -B: Performed by: COLON & RECTAL SURGERY

## 2022-01-01 PROCEDURE — 77030009978 HC RELD STPLR TCR J&J -B: Performed by: COLON & RECTAL SURGERY

## 2022-01-01 PROCEDURE — 80061 LIPID PANEL: CPT

## 2022-01-01 PROCEDURE — 77030018706 HC CORD MPLR COVD -A: Performed by: COLON & RECTAL SURGERY

## 2022-01-01 PROCEDURE — 86160 COMPLEMENT ANTIGEN: CPT

## 2022-01-01 PROCEDURE — 0DBB0ZZ EXCISION OF ILEUM, OPEN APPROACH: ICD-10-PCS | Performed by: COLON & RECTAL SURGERY

## 2022-01-01 PROCEDURE — 88309 TISSUE EXAM BY PATHOLOGIST: CPT

## 2022-01-01 PROCEDURE — 80074 ACUTE HEPATITIS PANEL: CPT

## 2022-01-01 PROCEDURE — 74011000250 HC RX REV CODE- 250: Performed by: REGISTERED NURSE

## 2022-01-01 PROCEDURE — 77030016154: Performed by: COLON & RECTAL SURGERY

## 2022-01-01 PROCEDURE — 86225 DNA ANTIBODY NATIVE: CPT

## 2022-01-01 PROCEDURE — 82803 BLOOD GASES ANY COMBINATION: CPT

## 2022-01-01 PROCEDURE — 76210000017 HC OR PH I REC 1.5 TO 2 HR: Performed by: COLON & RECTAL SURGERY

## 2022-01-01 PROCEDURE — 77030009979 HC RELD STPLR TCR J&J -C: Performed by: COLON & RECTAL SURGERY

## 2022-01-01 PROCEDURE — 76060000035 HC ANESTHESIA 2 TO 2.5 HR: Performed by: COLON & RECTAL SURGERY

## 2022-01-01 PROCEDURE — 94640 AIRWAY INHALATION TREATMENT: CPT

## 2022-01-01 PROCEDURE — 83519 RIA NONANTIBODY: CPT

## 2022-01-01 PROCEDURE — 77030008462 HC STPLR SKN PROX J&J -A: Performed by: COLON & RECTAL SURGERY

## 2022-01-01 PROCEDURE — 5A12012 PERFORMANCE OF CARDIAC OUTPUT, SINGLE, MANUAL: ICD-10-PCS | Performed by: INTERNAL MEDICINE

## 2022-01-01 PROCEDURE — 77030013567 HC DRN WND RESERV BARD -A: Performed by: COLON & RECTAL SURGERY

## 2022-01-01 PROCEDURE — 97162 PT EVAL MOD COMPLEX 30 MIN: CPT

## 2022-01-01 PROCEDURE — 77030008756 HC TU IRR SUC STRY -B: Performed by: COLON & RECTAL SURGERY

## 2022-01-01 PROCEDURE — 77030010507 HC ADH SKN DERMBND J&J -B: Performed by: COLON & RECTAL SURGERY

## 2022-01-01 PROCEDURE — 30243N1 TRANSFUSION OF NONAUTOLOGOUS RED BLOOD CELLS INTO CENTRAL VEIN, PERCUTANEOUS APPROACH: ICD-10-PCS | Performed by: COLON & RECTAL SURGERY

## 2022-01-01 PROCEDURE — 76060000036 HC ANESTHESIA 2.5 TO 3 HR: Performed by: COLON & RECTAL SURGERY

## 2022-01-01 PROCEDURE — 77010033678 HC OXYGEN DAILY

## 2022-01-01 PROCEDURE — 44626 REPAIR BOWEL OPENING: CPT | Performed by: COLON & RECTAL SURGERY

## 2022-01-01 PROCEDURE — 3017F COLORECTAL CA SCREEN DOC REV: CPT | Performed by: COLON & RECTAL SURGERY

## 2022-01-01 PROCEDURE — 76010000172 HC OR TIME 2.5 TO 3 HR INTENSV-TIER 1: Performed by: COLON & RECTAL SURGERY

## 2022-01-01 PROCEDURE — 77030019895 HC PCKNG STRP IODO -A: Performed by: COLON & RECTAL SURGERY

## 2022-01-01 PROCEDURE — 77030012799 HC TRCR GELPRT BLN AMR -B: Performed by: COLON & RECTAL SURGERY

## 2022-01-01 PROCEDURE — 76010000153 HC OR TIME 1.5 TO 2 HR: Performed by: COLON & RECTAL SURGERY

## 2022-01-01 PROCEDURE — 97165 OT EVAL LOW COMPLEX 30 MIN: CPT

## 2022-01-01 PROCEDURE — 76010000132 HC OR TIME 2.5 TO 3 HR: Performed by: COLON & RECTAL SURGERY

## 2022-01-01 PROCEDURE — 77030018842 HC SOL IRR SOD CL 9% BAXT -A: Performed by: COLON & RECTAL SURGERY

## 2022-01-01 PROCEDURE — 36410 VNPNXR 3YR/> PHY/QHP DX/THER: CPT

## 2022-01-01 PROCEDURE — G9711 PT HX TOT COL OR COLON CA: HCPCS | Performed by: COLON & RECTAL SURGERY

## 2022-01-01 PROCEDURE — 76010000131 HC OR TIME 2 TO 2.5 HR: Performed by: COLON & RECTAL SURGERY

## 2022-01-01 PROCEDURE — 77030005513 HC CATH URETH FOL11 MDII -B: Performed by: COLON & RECTAL SURGERY

## 2022-01-01 PROCEDURE — 71045 X-RAY EXAM CHEST 1 VIEW: CPT

## 2022-01-01 PROCEDURE — G8752 SYS BP LESS 140: HCPCS | Performed by: COLON & RECTAL SURGERY

## 2022-01-01 PROCEDURE — 87636 SARSCOV2 & INF A&B AMP PRB: CPT

## 2022-01-01 PROCEDURE — 4A00X4Z MEASUREMENT OF CENTRAL NERVOUS ELECTRICAL ACTIVITY, EXTERNAL APPROACH: ICD-10-PCS | Performed by: INTERNAL MEDICINE

## 2022-01-01 PROCEDURE — 99281 EMR DPT VST MAYX REQ PHY/QHP: CPT

## 2022-01-01 RX ORDER — HYDROMORPHONE HYDROCHLORIDE 1 MG/ML
2 INJECTION, SOLUTION INTRAMUSCULAR; INTRAVENOUS; SUBCUTANEOUS
Status: DISPENSED | OUTPATIENT
Start: 2022-01-01 | End: 2022-01-01

## 2022-01-01 RX ORDER — FENTANYL CITRATE 50 UG/ML
50 INJECTION, SOLUTION INTRAMUSCULAR; INTRAVENOUS
Status: DISCONTINUED | OUTPATIENT
Start: 2022-01-01 | End: 2022-01-01 | Stop reason: HOSPADM

## 2022-01-01 RX ORDER — INSULIN LISPRO 100 [IU]/ML
12 INJECTION, SOLUTION INTRAVENOUS; SUBCUTANEOUS ONCE
Status: COMPLETED | OUTPATIENT
Start: 2022-01-01 | End: 2022-01-01

## 2022-01-01 RX ORDER — DEXTROSE, SODIUM CHLORIDE, AND POTASSIUM CHLORIDE 5; .45; .15 G/100ML; G/100ML; G/100ML
25 INJECTION INTRAVENOUS CONTINUOUS
Status: DISCONTINUED | OUTPATIENT
Start: 2022-01-01 | End: 2022-01-01

## 2022-01-01 RX ORDER — ACETAMINOPHEN 325 MG/1
650 TABLET ORAL
Status: DISCONTINUED | OUTPATIENT
Start: 2022-01-01 | End: 2022-01-01 | Stop reason: HOSPADM

## 2022-01-01 RX ORDER — HYDROMORPHONE HYDROCHLORIDE 1 MG/ML
1 INJECTION, SOLUTION INTRAMUSCULAR; INTRAVENOUS; SUBCUTANEOUS
Status: DISPENSED | OUTPATIENT
Start: 2022-01-01 | End: 2022-01-01

## 2022-01-01 RX ORDER — METOPROLOL TARTRATE 5 MG/5ML
2.5 INJECTION INTRAVENOUS ONCE
Status: COMPLETED | OUTPATIENT
Start: 2022-01-01 | End: 2022-01-01

## 2022-01-01 RX ORDER — DIPHENHYDRAMINE HYDROCHLORIDE 50 MG/ML
25 INJECTION, SOLUTION INTRAMUSCULAR; INTRAVENOUS
Status: DISCONTINUED | OUTPATIENT
Start: 2022-01-01 | End: 2022-01-01 | Stop reason: HOSPADM

## 2022-01-01 RX ORDER — SODIUM CHLORIDE 9 MG/ML
100 INJECTION, SOLUTION INTRAVENOUS CONTINUOUS
Status: DISCONTINUED | OUTPATIENT
Start: 2022-01-01 | End: 2022-01-01

## 2022-01-01 RX ORDER — SODIUM CHLORIDE 0.9 % (FLUSH) 0.9 %
5-40 SYRINGE (ML) INJECTION AS NEEDED
Status: DISCONTINUED | OUTPATIENT
Start: 2022-01-01 | End: 2022-01-01 | Stop reason: HOSPADM

## 2022-01-01 RX ORDER — METOPROLOL TARTRATE 5 MG/5ML
5 INJECTION INTRAVENOUS ONCE
Status: COMPLETED | OUTPATIENT
Start: 2022-01-01 | End: 2022-01-01

## 2022-01-01 RX ORDER — METRONIDAZOLE 500 MG/100ML
INJECTION, SOLUTION INTRAVENOUS AS NEEDED
Status: DISCONTINUED | OUTPATIENT
Start: 2022-01-01 | End: 2022-01-01 | Stop reason: HOSPADM

## 2022-01-01 RX ORDER — VERAPAMIL HYDROCHLORIDE 40 MG/1
40 TABLET ORAL 2 TIMES DAILY
COMMUNITY

## 2022-01-01 RX ORDER — DEXAMETHASONE SODIUM PHOSPHATE 4 MG/ML
INJECTION, SOLUTION INTRA-ARTICULAR; INTRALESIONAL; INTRAMUSCULAR; INTRAVENOUS; SOFT TISSUE AS NEEDED
Status: DISCONTINUED | OUTPATIENT
Start: 2022-01-01 | End: 2022-01-01 | Stop reason: HOSPADM

## 2022-01-01 RX ORDER — INSULIN LISPRO 100 [IU]/ML
INJECTION, SOLUTION INTRAVENOUS; SUBCUTANEOUS
Status: DISCONTINUED | OUTPATIENT
Start: 2022-01-01 | End: 2022-01-01

## 2022-01-01 RX ORDER — DOXYCYCLINE HYCLATE 100 MG
100 TABLET ORAL 2 TIMES DAILY
Qty: 10 TABLET | Refills: 0 | Status: SHIPPED | OUTPATIENT
Start: 2022-01-01 | End: 2022-01-01

## 2022-01-01 RX ORDER — ONDANSETRON 2 MG/ML
4 INJECTION INTRAMUSCULAR; INTRAVENOUS AS NEEDED
Status: DISCONTINUED | OUTPATIENT
Start: 2022-01-01 | End: 2022-01-01 | Stop reason: SDUPTHER

## 2022-01-01 RX ORDER — SODIUM CHLORIDE 0.9 % (FLUSH) 0.9 %
5-40 SYRINGE (ML) INJECTION EVERY 8 HOURS
Status: DISCONTINUED | OUTPATIENT
Start: 2022-01-01 | End: 2022-01-01 | Stop reason: HOSPADM

## 2022-01-01 RX ORDER — METOPROLOL TARTRATE 5 MG/5ML
5 INJECTION INTRAVENOUS
Status: DISCONTINUED | OUTPATIENT
Start: 2022-01-01 | End: 2022-01-01 | Stop reason: HOSPADM

## 2022-01-01 RX ORDER — PREDNISONE 5 MG/1
15 TABLET ORAL
Status: DISCONTINUED | OUTPATIENT
Start: 2022-01-01 | End: 2022-01-01 | Stop reason: HOSPADM

## 2022-01-01 RX ORDER — DIPHENHYDRAMINE HCL 25 MG
50 CAPSULE ORAL ONCE
Status: ACTIVE | OUTPATIENT
Start: 2022-01-01 | End: 2022-01-01

## 2022-01-01 RX ORDER — POTASSIUM CHLORIDE 7.45 MG/ML
10 INJECTION INTRAVENOUS
Status: COMPLETED | OUTPATIENT
Start: 2022-01-01 | End: 2022-01-01

## 2022-01-01 RX ORDER — ALBUMIN HUMAN 50 G/1000ML
SOLUTION INTRAVENOUS AS NEEDED
Status: DISCONTINUED | OUTPATIENT
Start: 2022-01-01 | End: 2022-01-01 | Stop reason: HOSPADM

## 2022-01-01 RX ORDER — HYDROMORPHONE HYDROCHLORIDE 1 MG/ML
2 INJECTION, SOLUTION INTRAMUSCULAR; INTRAVENOUS; SUBCUTANEOUS
Status: DISCONTINUED | OUTPATIENT
Start: 2022-01-01 | End: 2022-01-01

## 2022-01-01 RX ORDER — NORETHINDRONE AND ETHINYL ESTRADIOL 0.5-0.035
5 KIT ORAL AS NEEDED
Status: DISCONTINUED | OUTPATIENT
Start: 2022-01-01 | End: 2022-01-01 | Stop reason: HOSPADM

## 2022-01-01 RX ORDER — DIGOXIN 0.25 MG/ML
250 INJECTION INTRAMUSCULAR; INTRAVENOUS
Status: DISCONTINUED | OUTPATIENT
Start: 2022-01-01 | End: 2022-01-01

## 2022-01-01 RX ORDER — ADENOSINE 3 MG/ML
INJECTION, SOLUTION INTRAVENOUS
Status: DISCONTINUED
Start: 2022-01-01 | End: 2022-01-01

## 2022-01-01 RX ORDER — HYDROMORPHONE HYDROCHLORIDE 1 MG/ML
0.5 INJECTION, SOLUTION INTRAMUSCULAR; INTRAVENOUS; SUBCUTANEOUS
Status: DISCONTINUED | OUTPATIENT
Start: 2022-01-01 | End: 2022-01-01

## 2022-01-01 RX ORDER — DEXMEDETOMIDINE HYDROCHLORIDE 100 UG/ML
INJECTION, SOLUTION INTRAVENOUS AS NEEDED
Status: DISCONTINUED | OUTPATIENT
Start: 2022-01-01 | End: 2022-01-01 | Stop reason: HOSPADM

## 2022-01-01 RX ORDER — SODIUM CHLORIDE 9 MG/ML
250 INJECTION, SOLUTION INTRAVENOUS AS NEEDED
Status: DISCONTINUED | OUTPATIENT
Start: 2022-01-01 | End: 2022-01-01 | Stop reason: HOSPADM

## 2022-01-01 RX ORDER — METOPROLOL TARTRATE 5 MG/5ML
2.5 INJECTION INTRAVENOUS
Status: DISCONTINUED | OUTPATIENT
Start: 2022-01-01 | End: 2022-01-01 | Stop reason: HOSPADM

## 2022-01-01 RX ORDER — FENTANYL CITRATE 50 UG/ML
50 INJECTION, SOLUTION INTRAMUSCULAR; INTRAVENOUS AS NEEDED
Status: DISCONTINUED | OUTPATIENT
Start: 2022-01-01 | End: 2022-01-01 | Stop reason: HOSPADM

## 2022-01-01 RX ORDER — METRONIDAZOLE 500 MG/100ML
500 INJECTION, SOLUTION INTRAVENOUS ONCE
Status: COMPLETED | OUTPATIENT
Start: 2022-01-01 | End: 2022-01-01

## 2022-01-01 RX ORDER — METOCLOPRAMIDE HYDROCHLORIDE 5 MG/ML
5 INJECTION INTRAMUSCULAR; INTRAVENOUS EVERY 6 HOURS
Status: DISCONTINUED | OUTPATIENT
Start: 2022-01-01 | End: 2022-01-01

## 2022-01-01 RX ORDER — SODIUM CHLORIDE 0.9 % (FLUSH) 0.9 %
5-40 SYRINGE (ML) INJECTION AS NEEDED
Status: DISCONTINUED | OUTPATIENT
Start: 2022-01-01 | End: 2022-01-01

## 2022-01-01 RX ORDER — ROCURONIUM BROMIDE 10 MG/ML
INJECTION, SOLUTION INTRAVENOUS AS NEEDED
Status: DISCONTINUED | OUTPATIENT
Start: 2022-01-01 | End: 2022-01-01 | Stop reason: HOSPADM

## 2022-01-01 RX ORDER — FLUCONAZOLE 100 MG/1
200 TABLET ORAL
Status: DISCONTINUED | OUTPATIENT
Start: 2022-01-01 | End: 2022-01-01 | Stop reason: HOSPADM

## 2022-01-01 RX ORDER — LABETALOL HCL 20 MG/4 ML
10 SYRINGE (ML) INTRAVENOUS
Status: DISCONTINUED | OUTPATIENT
Start: 2022-01-01 | End: 2022-01-01 | Stop reason: HOSPADM

## 2022-01-01 RX ORDER — ADENOSINE 3 MG/ML
12 INJECTION, SOLUTION INTRAVENOUS ONCE
Status: DISCONTINUED | OUTPATIENT
Start: 2022-01-01 | End: 2022-01-01

## 2022-01-01 RX ORDER — DEXTROSE MONOHYDRATE 100 MG/ML
0-250 INJECTION, SOLUTION INTRAVENOUS AS NEEDED
Status: DISCONTINUED | OUTPATIENT
Start: 2022-01-01 | End: 2022-01-01 | Stop reason: HOSPADM

## 2022-01-01 RX ORDER — POLYETHYLENE GLYCOL 3350, SODIUM SULFATE ANHYDROUS, SODIUM BICARBONATE, SODIUM CHLORIDE, POTASSIUM CHLORIDE 236; 22.74; 6.74; 5.86; 2.97 G/4L; G/4L; G/4L; G/4L; G/4L
POWDER, FOR SOLUTION ORAL
Qty: 1 EACH | Refills: 0 | Status: SHIPPED | OUTPATIENT
Start: 2022-01-01 | End: 2022-01-01 | Stop reason: ALTCHOICE

## 2022-01-01 RX ORDER — AMIODARONE HYDROCHLORIDE 200 MG/1
400 TABLET ORAL DAILY
Status: DISCONTINUED | OUTPATIENT
Start: 2022-01-01 | End: 2022-01-01

## 2022-01-01 RX ORDER — SODIUM CHLORIDE, SODIUM LACTATE, POTASSIUM CHLORIDE, CALCIUM CHLORIDE 600; 310; 30; 20 MG/100ML; MG/100ML; MG/100ML; MG/100ML
20 INJECTION, SOLUTION INTRAVENOUS CONTINUOUS
Status: DISCONTINUED | OUTPATIENT
Start: 2022-01-01 | End: 2022-01-01

## 2022-01-01 RX ORDER — AMIODARONE HYDROCHLORIDE 200 MG/1
200 TABLET ORAL DAILY
Status: DISCONTINUED | OUTPATIENT
Start: 2022-01-01 | End: 2022-01-01 | Stop reason: HOSPADM

## 2022-01-01 RX ORDER — DIPHENHYDRAMINE HYDROCHLORIDE 50 MG/ML
25 INJECTION, SOLUTION INTRAMUSCULAR; INTRAVENOUS ONCE
Status: DISCONTINUED | OUTPATIENT
Start: 2022-01-01 | End: 2022-01-01

## 2022-01-01 RX ORDER — ONDANSETRON 2 MG/ML
4 INJECTION INTRAMUSCULAR; INTRAVENOUS AS NEEDED
Status: DISCONTINUED | OUTPATIENT
Start: 2022-01-01 | End: 2022-01-01 | Stop reason: HOSPADM

## 2022-01-01 RX ORDER — FENTANYL CITRATE 50 UG/ML
INJECTION, SOLUTION INTRAMUSCULAR; INTRAVENOUS AS NEEDED
Status: DISCONTINUED | OUTPATIENT
Start: 2022-01-01 | End: 2022-01-01 | Stop reason: HOSPADM

## 2022-01-01 RX ORDER — METOPROLOL TARTRATE 5 MG/5ML
INJECTION INTRAVENOUS AS NEEDED
Status: DISCONTINUED | OUTPATIENT
Start: 2022-01-01 | End: 2022-01-01 | Stop reason: HOSPADM

## 2022-01-01 RX ORDER — DOCUSATE SODIUM 100 MG/1
100 CAPSULE, LIQUID FILLED ORAL 2 TIMES DAILY
Status: DISCONTINUED | OUTPATIENT
Start: 2022-01-01 | End: 2022-01-01

## 2022-01-01 RX ORDER — SODIUM CHLORIDE 9 MG/ML
150 INJECTION, SOLUTION INTRAVENOUS CONTINUOUS
Status: DISCONTINUED | OUTPATIENT
Start: 2022-01-01 | End: 2022-01-01

## 2022-01-01 RX ORDER — ONDANSETRON 2 MG/ML
4 INJECTION INTRAMUSCULAR; INTRAVENOUS
Status: DISCONTINUED | OUTPATIENT
Start: 2022-01-01 | End: 2022-01-01 | Stop reason: HOSPADM

## 2022-01-01 RX ORDER — FENTANYL CITRATE 50 UG/ML
25 INJECTION, SOLUTION INTRAMUSCULAR; INTRAVENOUS
Status: DISCONTINUED | OUTPATIENT
Start: 2022-01-01 | End: 2022-01-01 | Stop reason: HOSPADM

## 2022-01-01 RX ORDER — OXYCODONE HYDROCHLORIDE 10 MG/1
TABLET ORAL
Status: ON HOLD | COMMUNITY
End: 2022-01-01

## 2022-01-01 RX ORDER — METOPROLOL TARTRATE 50 MG/1
50 TABLET ORAL EVERY 12 HOURS
Status: DISCONTINUED | OUTPATIENT
Start: 2022-01-01 | End: 2022-01-01

## 2022-01-01 RX ORDER — HYDROCODONE BITARTRATE AND ACETAMINOPHEN 5; 325 MG/1; MG/1
1 TABLET ORAL AS NEEDED
Status: DISCONTINUED | OUTPATIENT
Start: 2022-01-01 | End: 2022-01-01 | Stop reason: HOSPADM

## 2022-01-01 RX ORDER — LIDOCAINE HYDROCHLORIDE 20 MG/ML
INJECTION, SOLUTION EPIDURAL; INFILTRATION; INTRACAUDAL; PERINEURAL AS NEEDED
Status: DISCONTINUED | OUTPATIENT
Start: 2022-01-01 | End: 2022-01-01 | Stop reason: HOSPADM

## 2022-01-01 RX ORDER — POTASSIUM CHLORIDE 750 MG/1
40 TABLET, FILM COATED, EXTENDED RELEASE ORAL
Status: COMPLETED | OUTPATIENT
Start: 2022-01-01 | End: 2022-01-01

## 2022-01-01 RX ORDER — BUPIVACAINE HYDROCHLORIDE 2.5 MG/ML
INJECTION, SOLUTION EPIDURAL; INFILTRATION; INTRACAUDAL AS NEEDED
Status: DISCONTINUED | OUTPATIENT
Start: 2022-01-01 | End: 2022-01-01 | Stop reason: HOSPADM

## 2022-01-01 RX ORDER — METRONIDAZOLE 500 MG/100ML
500 INJECTION, SOLUTION INTRAVENOUS ONCE
Status: DISCONTINUED | OUTPATIENT
Start: 2022-01-01 | End: 2022-01-01 | Stop reason: SDUPTHER

## 2022-01-01 RX ORDER — OXYCODONE AND ACETAMINOPHEN 10; 325 MG/1; MG/1
1 TABLET ORAL
Status: DISCONTINUED | OUTPATIENT
Start: 2022-01-01 | End: 2022-01-01

## 2022-01-01 RX ORDER — HYDROMORPHONE HYDROCHLORIDE 1 MG/ML
1 INJECTION, SOLUTION INTRAMUSCULAR; INTRAVENOUS; SUBCUTANEOUS
Status: DISCONTINUED | OUTPATIENT
Start: 2022-01-01 | End: 2022-01-01

## 2022-01-01 RX ORDER — GLYCOPYRROLATE 0.2 MG/ML
INJECTION INTRAMUSCULAR; INTRAVENOUS AS NEEDED
Status: DISCONTINUED | OUTPATIENT
Start: 2022-01-01 | End: 2022-01-01 | Stop reason: HOSPADM

## 2022-01-01 RX ORDER — KETOROLAC TROMETHAMINE 30 MG/ML
30 INJECTION, SOLUTION INTRAMUSCULAR; INTRAVENOUS
Status: COMPLETED | OUTPATIENT
Start: 2022-01-01 | End: 2022-01-01

## 2022-01-01 RX ORDER — LISINOPRIL 20 MG/1
20 TABLET ORAL DAILY
Status: DISCONTINUED | OUTPATIENT
Start: 2022-01-01 | End: 2022-01-01 | Stop reason: HOSPADM

## 2022-01-01 RX ORDER — AMIODARONE HYDROCHLORIDE 150 MG/3ML
150 INJECTION, SOLUTION INTRAVENOUS ONCE
Status: DISCONTINUED | OUTPATIENT
Start: 2022-01-01 | End: 2022-01-01 | Stop reason: RX

## 2022-01-01 RX ORDER — DEXTROSE MONOHYDRATE 100 MG/ML
250 INJECTION, SOLUTION INTRAVENOUS ONCE
Status: DISPENSED | OUTPATIENT
Start: 2022-01-01 | End: 2022-01-01

## 2022-01-01 RX ORDER — OXYCODONE HYDROCHLORIDE 10 MG/1
10 TABLET ORAL
Status: DISCONTINUED | OUTPATIENT
Start: 2022-01-01 | End: 2022-01-01 | Stop reason: HOSPADM

## 2022-01-01 RX ORDER — KETOROLAC TROMETHAMINE 30 MG/ML
30 INJECTION, SOLUTION INTRAMUSCULAR; INTRAVENOUS
Status: DISPENSED | OUTPATIENT
Start: 2022-01-01 | End: 2022-01-01

## 2022-01-01 RX ORDER — HYDROMORPHONE HYDROCHLORIDE 2 MG/ML
1.5 INJECTION, SOLUTION INTRAMUSCULAR; INTRAVENOUS; SUBCUTANEOUS
Status: DISCONTINUED | OUTPATIENT
Start: 2022-01-01 | End: 2022-01-01

## 2022-01-01 RX ORDER — POTASSIUM CHLORIDE 1.5 G/1.77G
40 POWDER, FOR SOLUTION ORAL ONCE
Status: COMPLETED | OUTPATIENT
Start: 2022-01-01 | End: 2022-01-01

## 2022-01-01 RX ORDER — ONDANSETRON 4 MG/1
4 TABLET, ORALLY DISINTEGRATING ORAL
Status: DISCONTINUED | OUTPATIENT
Start: 2022-01-01 | End: 2022-01-01

## 2022-01-01 RX ORDER — NYSTATIN 100000 [USP'U]/ML
500000 SUSPENSION ORAL 4 TIMES DAILY
Status: DISCONTINUED | OUTPATIENT
Start: 2022-01-01 | End: 2022-01-01

## 2022-01-01 RX ORDER — SODIUM CHLORIDE 0.9 % (FLUSH) 0.9 %
5-40 SYRINGE (ML) INJECTION AS NEEDED
Status: CANCELLED | OUTPATIENT
Start: 2022-01-01

## 2022-01-01 RX ORDER — VERAPAMIL HYDROCHLORIDE 80 MG/1
40 TABLET ORAL 2 TIMES DAILY
Status: DISCONTINUED | OUTPATIENT
Start: 2022-01-01 | End: 2022-01-01

## 2022-01-01 RX ORDER — DEXTROSE, SODIUM CHLORIDE, AND POTASSIUM CHLORIDE 5; .45; .15 G/100ML; G/100ML; G/100ML
25 INJECTION INTRAVENOUS CONTINUOUS
Status: DISCONTINUED | OUTPATIENT
Start: 2022-01-01 | End: 2022-01-01 | Stop reason: HOSPADM

## 2022-01-01 RX ORDER — HYDROMORPHONE HYDROCHLORIDE 1 MG/ML
INJECTION, SOLUTION INTRAMUSCULAR; INTRAVENOUS; SUBCUTANEOUS AS NEEDED
Status: DISCONTINUED | OUTPATIENT
Start: 2022-01-01 | End: 2022-01-01 | Stop reason: HOSPADM

## 2022-01-01 RX ORDER — PROMETHAZINE HYDROCHLORIDE 12.5 MG/1
TABLET ORAL
COMMUNITY

## 2022-01-01 RX ORDER — ASCORBIC ACID 500 MG
500 TABLET ORAL DAILY
Status: ON HOLD | COMMUNITY
End: 2022-01-01

## 2022-01-01 RX ORDER — LANOLIN ALCOHOL/MO/W.PET/CERES
325 CREAM (GRAM) TOPICAL 2 TIMES DAILY
COMMUNITY

## 2022-01-01 RX ORDER — PROPOFOL 10 MG/ML
INJECTION, EMULSION INTRAVENOUS AS NEEDED
Status: DISCONTINUED | OUTPATIENT
Start: 2022-01-01 | End: 2022-01-01 | Stop reason: HOSPADM

## 2022-01-01 RX ORDER — MAGNESIUM SULFATE 100 %
4 CRYSTALS MISCELLANEOUS AS NEEDED
Status: DISCONTINUED | OUTPATIENT
Start: 2022-01-01 | End: 2022-01-01 | Stop reason: HOSPADM

## 2022-01-01 RX ORDER — HYDRALAZINE HYDROCHLORIDE 20 MG/ML
10 INJECTION INTRAMUSCULAR; INTRAVENOUS
Status: COMPLETED | OUTPATIENT
Start: 2022-01-01 | End: 2022-01-01

## 2022-01-01 RX ORDER — POLYETHYLENE GLYCOL 3350 17 G/17G
17 POWDER, FOR SOLUTION ORAL DAILY
Status: DISCONTINUED | OUTPATIENT
Start: 2022-01-01 | End: 2022-01-01

## 2022-01-01 RX ORDER — FLUCONAZOLE 100 MG/1
200 TABLET ORAL
Status: COMPLETED | OUTPATIENT
Start: 2022-01-01 | End: 2022-01-01

## 2022-01-01 RX ORDER — LABETALOL HCL 20 MG/4 ML
5 SYRINGE (ML) INTRAVENOUS
Status: DISCONTINUED | OUTPATIENT
Start: 2022-01-01 | End: 2022-01-01 | Stop reason: HOSPADM

## 2022-01-01 RX ORDER — HYDROMORPHONE HYDROCHLORIDE 1 MG/ML
1 INJECTION, SOLUTION INTRAMUSCULAR; INTRAVENOUS; SUBCUTANEOUS
Status: ACTIVE | OUTPATIENT
Start: 2022-01-01 | End: 2022-01-01

## 2022-01-01 RX ORDER — SODIUM CHLORIDE, SODIUM LACTATE, POTASSIUM CHLORIDE, CALCIUM CHLORIDE 600; 310; 30; 20 MG/100ML; MG/100ML; MG/100ML; MG/100ML
INJECTION, SOLUTION INTRAVENOUS
Status: DISCONTINUED | OUTPATIENT
Start: 2022-01-01 | End: 2022-01-01 | Stop reason: HOSPADM

## 2022-01-01 RX ORDER — HYDROMORPHONE HYDROCHLORIDE 2 MG/1
4 TABLET ORAL
Status: DISCONTINUED | OUTPATIENT
Start: 2022-01-01 | End: 2022-01-01

## 2022-01-01 RX ORDER — CLOPIDOGREL BISULFATE 75 MG/1
75 TABLET ORAL DAILY
Status: DISCONTINUED | OUTPATIENT
Start: 2022-01-01 | End: 2022-01-01

## 2022-01-01 RX ORDER — LIDOCAINE HYDROCHLORIDE 10 MG/ML
0.1 INJECTION, SOLUTION EPIDURAL; INFILTRATION; INTRACAUDAL; PERINEURAL AS NEEDED
Status: DISCONTINUED | OUTPATIENT
Start: 2022-01-01 | End: 2022-01-01 | Stop reason: HOSPADM

## 2022-01-01 RX ORDER — CEFAZOLIN SODIUM 1 G/3ML
INJECTION, POWDER, FOR SOLUTION INTRAMUSCULAR; INTRAVENOUS AS NEEDED
Status: DISCONTINUED | OUTPATIENT
Start: 2022-01-01 | End: 2022-01-01 | Stop reason: HOSPADM

## 2022-01-01 RX ORDER — MAGNESIUM SULFATE 1 G/100ML
1 INJECTION INTRAVENOUS ONCE
Status: COMPLETED | OUTPATIENT
Start: 2022-01-01 | End: 2022-01-01

## 2022-01-01 RX ORDER — ADENOSINE 3 MG/ML
6 INJECTION, SOLUTION INTRAVENOUS ONCE
Status: DISCONTINUED | OUTPATIENT
Start: 2022-01-01 | End: 2022-01-01

## 2022-01-01 RX ORDER — ONDANSETRON 2 MG/ML
INJECTION INTRAMUSCULAR; INTRAVENOUS AS NEEDED
Status: DISCONTINUED | OUTPATIENT
Start: 2022-01-01 | End: 2022-01-01 | Stop reason: HOSPADM

## 2022-01-01 RX ORDER — GUAIFENESIN 100 MG/5ML
81 LIQUID (ML) ORAL DAILY
Status: DISCONTINUED | OUTPATIENT
Start: 2022-01-01 | End: 2022-01-01

## 2022-01-01 RX ORDER — MORPHINE SULFATE 2 MG/ML
2 INJECTION, SOLUTION INTRAMUSCULAR; INTRAVENOUS
Status: DISCONTINUED | OUTPATIENT
Start: 2022-01-01 | End: 2022-01-01 | Stop reason: HOSPADM

## 2022-01-01 RX ORDER — POTASSIUM CHLORIDE 20 MEQ/1
40 TABLET, EXTENDED RELEASE ORAL 2 TIMES DAILY
Status: DISCONTINUED | OUTPATIENT
Start: 2022-01-01 | End: 2022-01-01

## 2022-01-01 RX ORDER — SODIUM CHLORIDE 0.9 % (FLUSH) 0.9 %
5-40 SYRINGE (ML) INJECTION EVERY 8 HOURS
Status: DISCONTINUED | OUTPATIENT
Start: 2022-01-01 | End: 2022-01-01

## 2022-01-01 RX ORDER — DIPHENHYDRAMINE HYDROCHLORIDE 50 MG/ML
25 INJECTION, SOLUTION INTRAMUSCULAR; INTRAVENOUS ONCE
Status: COMPLETED | OUTPATIENT
Start: 2022-01-01 | End: 2022-01-01

## 2022-01-01 RX ORDER — MIDAZOLAM HYDROCHLORIDE 1 MG/ML
INJECTION, SOLUTION INTRAMUSCULAR; INTRAVENOUS AS NEEDED
Status: DISCONTINUED | OUTPATIENT
Start: 2022-01-01 | End: 2022-01-01 | Stop reason: HOSPADM

## 2022-01-01 RX ORDER — OMEPRAZOLE 40 MG/1
40 CAPSULE, DELAYED RELEASE ORAL DAILY
Status: ON HOLD | COMMUNITY
End: 2022-01-01

## 2022-01-01 RX ORDER — HYDROMORPHONE HYDROCHLORIDE 1 MG/ML
0.4 INJECTION, SOLUTION INTRAMUSCULAR; INTRAVENOUS; SUBCUTANEOUS
Status: DISCONTINUED | OUTPATIENT
Start: 2022-01-01 | End: 2022-01-01 | Stop reason: HOSPADM

## 2022-01-01 RX ORDER — METOPROLOL SUCCINATE 50 MG/1
50 TABLET, EXTENDED RELEASE ORAL 2 TIMES DAILY
Status: DISCONTINUED | OUTPATIENT
Start: 2022-01-01 | End: 2022-01-01

## 2022-01-01 RX ORDER — POTASSIUM CHLORIDE 7.45 MG/ML
10 INJECTION INTRAVENOUS
Status: DISCONTINUED | OUTPATIENT
Start: 2022-01-01 | End: 2022-01-01

## 2022-01-01 RX ORDER — SUCCINYLCHOLINE CHLORIDE 20 MG/ML
INJECTION INTRAMUSCULAR; INTRAVENOUS AS NEEDED
Status: DISCONTINUED | OUTPATIENT
Start: 2022-01-01 | End: 2022-01-01 | Stop reason: HOSPADM

## 2022-01-01 RX ORDER — LIDOCAINE HYDROCHLORIDE 10 MG/ML
0.1 INJECTION, SOLUTION EPIDURAL; INFILTRATION; INTRACAUDAL; PERINEURAL AS NEEDED
Status: CANCELLED | OUTPATIENT
Start: 2022-01-01

## 2022-01-01 RX ORDER — METOPROLOL TARTRATE 5 MG/5ML
5 INJECTION INTRAVENOUS
Status: COMPLETED | OUTPATIENT
Start: 2022-01-01 | End: 2022-01-01

## 2022-01-01 RX ORDER — HYDROGEN PEROXIDE 3 %
20 SOLUTION, NON-ORAL MISCELLANEOUS
Status: DISCONTINUED | OUTPATIENT
Start: 2022-01-01 | End: 2022-01-01

## 2022-01-01 RX ORDER — POTASSIUM CHLORIDE 750 MG/1
10 TABLET, FILM COATED, EXTENDED RELEASE ORAL 2 TIMES DAILY
Status: DISCONTINUED | OUTPATIENT
Start: 2022-01-01 | End: 2022-01-01 | Stop reason: HOSPADM

## 2022-01-01 RX ORDER — FLUCONAZOLE 200 MG/1
200 TABLET ORAL
COMMUNITY

## 2022-01-01 RX ORDER — ATORVASTATIN CALCIUM 20 MG/1
20 TABLET, FILM COATED ORAL
Status: DISCONTINUED | OUTPATIENT
Start: 2022-01-01 | End: 2022-01-01 | Stop reason: HOSPADM

## 2022-01-01 RX ORDER — NEOSTIGMINE METHYLSULFATE 5 MG/5 ML
SYRINGE (ML) INTRAVENOUS AS NEEDED
Status: DISCONTINUED | OUTPATIENT
Start: 2022-01-01 | End: 2022-01-01 | Stop reason: HOSPADM

## 2022-01-01 RX ORDER — MIDAZOLAM HYDROCHLORIDE 1 MG/ML
0.5 INJECTION, SOLUTION INTRAMUSCULAR; INTRAVENOUS
Status: DISCONTINUED | OUTPATIENT
Start: 2022-01-01 | End: 2022-01-01 | Stop reason: HOSPADM

## 2022-01-01 RX ORDER — LIDOCAINE HYDROCHLORIDE 10 MG/ML
20 INJECTION INFILTRATION; PERINEURAL ONCE
Status: COMPLETED | OUTPATIENT
Start: 2022-01-01 | End: 2022-01-01

## 2022-01-01 RX ORDER — HYDRALAZINE HYDROCHLORIDE 20 MG/ML
INJECTION INTRAMUSCULAR; INTRAVENOUS AS NEEDED
Status: DISCONTINUED | OUTPATIENT
Start: 2022-01-01 | End: 2022-01-01 | Stop reason: HOSPADM

## 2022-01-01 RX ORDER — METOPROLOL TARTRATE 25 MG/1
25 TABLET, FILM COATED ORAL ONCE
Status: COMPLETED | OUTPATIENT
Start: 2022-01-01 | End: 2022-01-01

## 2022-01-01 RX ORDER — HYDROMORPHONE HYDROCHLORIDE 2 MG/ML
2 INJECTION, SOLUTION INTRAMUSCULAR; INTRAVENOUS; SUBCUTANEOUS
Status: DISCONTINUED | OUTPATIENT
Start: 2022-01-01 | End: 2022-01-01 | Stop reason: HOSPADM

## 2022-01-01 RX ORDER — PANTOPRAZOLE SODIUM 40 MG/1
40 TABLET, DELAYED RELEASE ORAL
Status: DISCONTINUED | OUTPATIENT
Start: 2022-01-01 | End: 2022-01-01

## 2022-01-01 RX ORDER — METRONIDAZOLE 500 MG/100ML
500 INJECTION, SOLUTION INTRAVENOUS EVERY 8 HOURS
Status: DISCONTINUED | OUTPATIENT
Start: 2022-01-01 | End: 2022-01-01

## 2022-01-01 RX ORDER — ONDANSETRON 2 MG/ML
4 INJECTION INTRAMUSCULAR; INTRAVENOUS
Status: DISCONTINUED | OUTPATIENT
Start: 2022-01-01 | End: 2022-01-01

## 2022-01-01 RX ORDER — KETOROLAC TROMETHAMINE 30 MG/ML
30 INJECTION, SOLUTION INTRAMUSCULAR; INTRAVENOUS
Status: DISCONTINUED | OUTPATIENT
Start: 2022-01-01 | End: 2022-01-01

## 2022-01-01 RX ORDER — BUPIVACAINE HYDROCHLORIDE 5 MG/ML
INJECTION, SOLUTION EPIDURAL; INTRACAUDAL AS NEEDED
Status: DISCONTINUED | OUTPATIENT
Start: 2022-01-01 | End: 2022-01-01 | Stop reason: HOSPADM

## 2022-01-01 RX ORDER — SAME BUTANEDISULFONATE/BETAINE 400-600 MG
250 POWDER IN PACKET (EA) ORAL 2 TIMES DAILY
Status: ON HOLD | COMMUNITY
End: 2022-01-01

## 2022-01-01 RX ORDER — HYDROMORPHONE HYDROCHLORIDE 2 MG/ML
1.5 INJECTION, SOLUTION INTRAMUSCULAR; INTRAVENOUS; SUBCUTANEOUS
Status: DISPENSED | OUTPATIENT
Start: 2022-01-01 | End: 2022-01-01

## 2022-01-01 RX ORDER — KETOROLAC TROMETHAMINE 30 MG/ML
INJECTION, SOLUTION INTRAMUSCULAR; INTRAVENOUS AS NEEDED
Status: DISCONTINUED | OUTPATIENT
Start: 2022-01-01 | End: 2022-01-01 | Stop reason: HOSPADM

## 2022-01-01 RX ORDER — MORPHINE SULFATE 15 MG/1
15 TABLET ORAL
Status: DISCONTINUED | OUTPATIENT
Start: 2022-01-01 | End: 2022-01-01 | Stop reason: HOSPADM

## 2022-01-01 RX ORDER — HYDROMORPHONE HYDROCHLORIDE 1 MG/ML
1 INJECTION, SOLUTION INTRAMUSCULAR; INTRAVENOUS; SUBCUTANEOUS ONCE
Status: COMPLETED | OUTPATIENT
Start: 2022-01-01 | End: 2022-01-01

## 2022-01-01 RX ORDER — SODIUM CHLORIDE 9 MG/ML
75 INJECTION, SOLUTION INTRAVENOUS CONTINUOUS
Status: DISCONTINUED | OUTPATIENT
Start: 2022-01-01 | End: 2022-01-01 | Stop reason: HOSPADM

## 2022-01-01 RX ORDER — HYDROMORPHONE HYDROCHLORIDE 1 MG/ML
0.5 INJECTION, SOLUTION INTRAMUSCULAR; INTRAVENOUS; SUBCUTANEOUS
Status: DISCONTINUED | OUTPATIENT
Start: 2022-01-01 | End: 2022-01-01 | Stop reason: HOSPADM

## 2022-01-01 RX ORDER — ASPIRIN 325 MG
50000 TABLET, DELAYED RELEASE (ENTERIC COATED) ORAL
Status: ON HOLD | COMMUNITY
End: 2022-01-01

## 2022-01-01 RX ORDER — LISINOPRIL 20 MG/1
20 TABLET ORAL DAILY
COMMUNITY

## 2022-01-01 RX ORDER — VERAPAMIL HYDROCHLORIDE 100 MG/1
200 CAPSULE, EXTENDED RELEASE ORAL
Status: DISCONTINUED | OUTPATIENT
Start: 2022-01-01 | End: 2022-01-01 | Stop reason: HOSPADM

## 2022-01-01 RX ORDER — GUAIFENESIN 100 MG/5ML
81 LIQUID (ML) ORAL DAILY
Status: DISCONTINUED | OUTPATIENT
Start: 2022-01-01 | End: 2022-01-01 | Stop reason: HOSPADM

## 2022-01-01 RX ORDER — MORPHINE SULFATE 15 MG/1
15 TABLET ORAL
Status: DISPENSED | OUTPATIENT
Start: 2022-01-01 | End: 2022-01-01

## 2022-01-01 RX ORDER — INSULIN LISPRO 100 [IU]/ML
INJECTION, SOLUTION INTRAVENOUS; SUBCUTANEOUS
Status: DISCONTINUED | OUTPATIENT
Start: 2022-01-01 | End: 2022-01-01 | Stop reason: HOSPADM

## 2022-01-01 RX ORDER — MAG HYDROX/ALUMINUM HYD/SIMETH 200-200-20
30 SUSPENSION, ORAL (FINAL DOSE FORM) ORAL
Status: DISCONTINUED | OUTPATIENT
Start: 2022-01-01 | End: 2022-01-01

## 2022-01-01 RX ORDER — HYDROMORPHONE HYDROCHLORIDE 1 MG/ML
1 INJECTION, SOLUTION INTRAMUSCULAR; INTRAVENOUS; SUBCUTANEOUS
Status: DISCONTINUED | OUTPATIENT
Start: 2022-01-01 | End: 2022-01-01 | Stop reason: HOSPADM

## 2022-01-01 RX ORDER — ADENOSINE 3 MG/ML
INJECTION, SOLUTION INTRAVENOUS
Status: COMPLETED
Start: 2022-01-01 | End: 2022-01-01

## 2022-01-01 RX ORDER — FONDAPARINUX SODIUM 2.5 MG/.5ML
2.5 INJECTION SUBCUTANEOUS EVERY 24 HOURS
Status: DISCONTINUED | OUTPATIENT
Start: 2022-01-01 | End: 2022-01-01

## 2022-01-01 RX ORDER — METOPROLOL TARTRATE 25 MG/1
25 TABLET, FILM COATED ORAL EVERY 12 HOURS
Status: DISCONTINUED | OUTPATIENT
Start: 2022-01-01 | End: 2022-01-01

## 2022-01-01 RX ORDER — DIPHENHYDRAMINE HYDROCHLORIDE 50 MG/ML
12.5 INJECTION, SOLUTION INTRAMUSCULAR; INTRAVENOUS AS NEEDED
Status: DISCONTINUED | OUTPATIENT
Start: 2022-01-01 | End: 2022-01-01 | Stop reason: HOSPADM

## 2022-01-01 RX ORDER — MORPHINE SULFATE 15 MG/1
15 TABLET ORAL
COMMUNITY

## 2022-01-01 RX ORDER — PROMETHAZINE HYDROCHLORIDE 25 MG/1
25 TABLET ORAL
Status: DISCONTINUED | OUTPATIENT
Start: 2022-01-01 | End: 2022-01-01 | Stop reason: HOSPADM

## 2022-01-01 RX ORDER — SODIUM CHLORIDE, SODIUM LACTATE, POTASSIUM CHLORIDE, CALCIUM CHLORIDE 600; 310; 30; 20 MG/100ML; MG/100ML; MG/100ML; MG/100ML
20 INJECTION, SOLUTION INTRAVENOUS CONTINUOUS
Status: DISCONTINUED | OUTPATIENT
Start: 2022-01-01 | End: 2022-01-01 | Stop reason: SDUPTHER

## 2022-01-01 RX ORDER — POTASSIUM CHLORIDE 7.45 MG/ML
10 INJECTION INTRAVENOUS
Status: ACTIVE | OUTPATIENT
Start: 2022-01-01 | End: 2022-01-01

## 2022-01-01 RX ORDER — MIDAZOLAM HYDROCHLORIDE 1 MG/ML
1 INJECTION, SOLUTION INTRAMUSCULAR; INTRAVENOUS AS NEEDED
Status: DISCONTINUED | OUTPATIENT
Start: 2022-01-01 | End: 2022-01-01 | Stop reason: HOSPADM

## 2022-01-01 RX ORDER — METOPROLOL TARTRATE 5 MG/5ML
INJECTION INTRAVENOUS
Status: DISPENSED
Start: 2022-01-01 | End: 2022-01-01

## 2022-01-01 RX ORDER — ADENOSINE 3 MG/ML
12 INJECTION, SOLUTION INTRAVENOUS ONCE
Status: COMPLETED | OUTPATIENT
Start: 2022-01-01 | End: 2022-01-01

## 2022-01-01 RX ORDER — OXYCODONE AND ACETAMINOPHEN 5; 325 MG/1; MG/1
1 TABLET ORAL AS NEEDED
Status: DISCONTINUED | OUTPATIENT
Start: 2022-01-01 | End: 2022-01-01 | Stop reason: HOSPADM

## 2022-01-01 RX ORDER — HYDROCORTISONE SODIUM SUCCINATE 100 MG/2ML
INJECTION, POWDER, FOR SOLUTION INTRAMUSCULAR; INTRAVENOUS AS NEEDED
Status: DISCONTINUED | OUTPATIENT
Start: 2022-01-01 | End: 2022-01-01 | Stop reason: HOSPADM

## 2022-01-01 RX ORDER — HYDROMORPHONE HYDROCHLORIDE 1 MG/ML
2 INJECTION, SOLUTION INTRAMUSCULAR; INTRAVENOUS; SUBCUTANEOUS
Status: DISCONTINUED | OUTPATIENT
Start: 2022-01-01 | End: 2022-01-01 | Stop reason: SDUPTHER

## 2022-01-01 RX ORDER — GUAIFENESIN 100 MG/5ML
81 LIQUID (ML) ORAL DAILY
COMMUNITY

## 2022-01-01 RX ORDER — ONDANSETRON 4 MG/1
4 TABLET, FILM COATED ORAL
COMMUNITY

## 2022-01-01 RX ORDER — ALBUTEROL SULFATE 90 UG/1
1-2 AEROSOL, METERED RESPIRATORY (INHALATION)
Refills: 0 | Status: DISCONTINUED | OUTPATIENT
Start: 2022-01-01 | End: 2022-01-01 | Stop reason: HOSPADM

## 2022-01-01 RX ORDER — SODIUM CHLORIDE 9 MG/ML
50 INJECTION, SOLUTION INTRAVENOUS CONTINUOUS
Status: DISCONTINUED | OUTPATIENT
Start: 2022-01-01 | End: 2022-01-01

## 2022-01-01 RX ORDER — SODIUM CHLORIDE 9 MG/ML
INJECTION, SOLUTION INTRAVENOUS
Status: DISCONTINUED | OUTPATIENT
Start: 2022-01-01 | End: 2022-01-01 | Stop reason: HOSPADM

## 2022-01-01 RX ORDER — HYDROMORPHONE HYDROCHLORIDE 1 MG/ML
0.5 INJECTION, SOLUTION INTRAMUSCULAR; INTRAVENOUS; SUBCUTANEOUS ONCE
Status: COMPLETED | OUTPATIENT
Start: 2022-01-01 | End: 2022-01-01

## 2022-01-01 RX ORDER — METRONIDAZOLE 500 MG/100ML
500 INJECTION, SOLUTION INTRAVENOUS EVERY 8 HOURS
Status: DISCONTINUED | OUTPATIENT
Start: 2022-01-01 | End: 2022-01-01 | Stop reason: ALTCHOICE

## 2022-01-01 RX ORDER — ESMOLOL HYDROCHLORIDE 10 MG/ML
INJECTION INTRAVENOUS AS NEEDED
Status: DISCONTINUED | OUTPATIENT
Start: 2022-01-01 | End: 2022-01-01 | Stop reason: HOSPADM

## 2022-01-01 RX ORDER — MAGNESIUM SULFATE 100 %
4 CRYSTALS MISCELLANEOUS AS NEEDED
Status: DISCONTINUED | OUTPATIENT
Start: 2022-01-01 | End: 2022-01-01

## 2022-01-01 RX ORDER — BARIUM SULFATE 20 MG/ML
450 SUSPENSION ORAL ONCE
Status: COMPLETED | OUTPATIENT
Start: 2022-01-01 | End: 2022-01-01

## 2022-01-01 RX ORDER — HYDRALAZINE HYDROCHLORIDE 20 MG/ML
10 INJECTION INTRAMUSCULAR; INTRAVENOUS
Status: DISCONTINUED | OUTPATIENT
Start: 2022-01-01 | End: 2022-01-01 | Stop reason: HOSPADM

## 2022-01-01 RX ORDER — MORPHINE SULFATE 30 MG/1
30 TABLET, FILM COATED, EXTENDED RELEASE ORAL EVERY 12 HOURS
Status: DISCONTINUED | OUTPATIENT
Start: 2022-01-01 | End: 2022-01-01 | Stop reason: HOSPADM

## 2022-01-01 RX ORDER — SODIUM CHLORIDE 0.9 % (FLUSH) 0.9 %
5-40 SYRINGE (ML) INJECTION EVERY 8 HOURS
Status: CANCELLED | OUTPATIENT
Start: 2022-01-01

## 2022-01-01 RX ORDER — PROCHLORPERAZINE EDISYLATE 5 MG/ML
5 INJECTION INTRAMUSCULAR; INTRAVENOUS
Status: DISCONTINUED | OUTPATIENT
Start: 2022-01-01 | End: 2022-01-01 | Stop reason: HOSPADM

## 2022-01-01 RX ORDER — VERAPAMIL HYDROCHLORIDE 80 MG/1
80 TABLET ORAL 3 TIMES DAILY
Status: DISCONTINUED | OUTPATIENT
Start: 2022-01-01 | End: 2022-01-01

## 2022-01-01 RX ORDER — HYDROMORPHONE HYDROCHLORIDE 1 MG/ML
0.5 INJECTION, SOLUTION INTRAMUSCULAR; INTRAVENOUS; SUBCUTANEOUS
Status: ACTIVE | OUTPATIENT
Start: 2022-01-01 | End: 2022-01-01

## 2022-01-01 RX ORDER — PREDNISONE 20 MG/1
20 TABLET ORAL
Status: DISCONTINUED | OUTPATIENT
Start: 2022-01-01 | End: 2022-01-01

## 2022-01-01 RX ORDER — OXYCODONE HYDROCHLORIDE 5 MG/1
5 TABLET ORAL
Status: DISCONTINUED | OUTPATIENT
Start: 2022-01-01 | End: 2022-01-01 | Stop reason: HOSPADM

## 2022-01-01 RX ADMIN — METOPROLOL TARTRATE 2.5 MG: 1 INJECTION, SOLUTION INTRAVENOUS at 19:55

## 2022-01-01 RX ADMIN — SODIUM CHLORIDE, POTASSIUM CHLORIDE, SODIUM LACTATE AND CALCIUM CHLORIDE: 600; 310; 30; 20 INJECTION, SOLUTION INTRAVENOUS at 18:53

## 2022-01-01 RX ADMIN — HYDROMORPHONE HYDROCHLORIDE 1 MG: 1 INJECTION, SOLUTION INTRAMUSCULAR; INTRAVENOUS; SUBCUTANEOUS at 20:57

## 2022-01-01 RX ADMIN — HYDROMORPHONE HYDROCHLORIDE 1 MG: 1 INJECTION, SOLUTION INTRAMUSCULAR; INTRAVENOUS; SUBCUTANEOUS at 22:45

## 2022-01-01 RX ADMIN — POTASSIUM CHLORIDE 10 MEQ: 750 TABLET, FILM COATED, EXTENDED RELEASE ORAL at 09:03

## 2022-01-01 RX ADMIN — APIXABAN 5 MG: 5 TABLET, FILM COATED ORAL at 08:47

## 2022-01-01 RX ADMIN — DOCUSATE SODIUM 100 MG: 100 CAPSULE, LIQUID FILLED ORAL at 08:57

## 2022-01-01 RX ADMIN — INSULIN LISPRO 2 UNITS: 100 INJECTION, SOLUTION INTRAVENOUS; SUBCUTANEOUS at 21:29

## 2022-01-01 RX ADMIN — ONDANSETRON 4 MG: 2 INJECTION INTRAMUSCULAR; INTRAVENOUS at 08:40

## 2022-01-01 RX ADMIN — HYDROMORPHONE HYDROCHLORIDE 1 MG: 1 INJECTION, SOLUTION INTRAMUSCULAR; INTRAVENOUS; SUBCUTANEOUS at 23:40

## 2022-01-01 RX ADMIN — HYDROMORPHONE HYDROCHLORIDE 1 MG: 1 INJECTION, SOLUTION INTRAMUSCULAR; INTRAVENOUS; SUBCUTANEOUS at 10:39

## 2022-01-01 RX ADMIN — HYDROMORPHONE HYDROCHLORIDE 1 MG: 1 INJECTION, SOLUTION INTRAMUSCULAR; INTRAVENOUS; SUBCUTANEOUS at 17:19

## 2022-01-01 RX ADMIN — WATER 2 G: 1 INJECTION INTRAMUSCULAR; INTRAVENOUS; SUBCUTANEOUS at 17:47

## 2022-01-01 RX ADMIN — NYSTATIN 500000 UNITS: 100000 SUSPENSION ORAL at 17:00

## 2022-01-01 RX ADMIN — PANTOPRAZOLE SODIUM 40 MG: 40 TABLET, DELAYED RELEASE ORAL at 08:06

## 2022-01-01 RX ADMIN — DIPHENHYDRAMINE HYDROCHLORIDE 25 MG: 50 INJECTION INTRAMUSCULAR; INTRAVENOUS at 00:15

## 2022-01-01 RX ADMIN — PREDNISONE 15 MG: 5 TABLET ORAL at 08:00

## 2022-01-01 RX ADMIN — ONDANSETRON 4 MG: 2 INJECTION INTRAMUSCULAR; INTRAVENOUS at 17:00

## 2022-01-01 RX ADMIN — ROCURONIUM BROMIDE 20 MG: 10 INJECTION, SOLUTION INTRAVENOUS at 18:25

## 2022-01-01 RX ADMIN — HYDROMORPHONE HYDROCHLORIDE 2 MG: 1 INJECTION, SOLUTION INTRAMUSCULAR; INTRAVENOUS; SUBCUTANEOUS at 00:12

## 2022-01-01 RX ADMIN — ONDANSETRON 4 MG: 2 INJECTION INTRAMUSCULAR; INTRAVENOUS at 17:28

## 2022-01-01 RX ADMIN — ONDANSETRON 4 MG: 2 INJECTION INTRAMUSCULAR; INTRAVENOUS at 05:51

## 2022-01-01 RX ADMIN — HYDROMORPHONE HYDROCHLORIDE 1 MG: 1 INJECTION, SOLUTION INTRAMUSCULAR; INTRAVENOUS; SUBCUTANEOUS at 04:00

## 2022-01-01 RX ADMIN — METOPROLOL SUCCINATE 50 MG: 50 TABLET, EXTENDED RELEASE ORAL at 21:46

## 2022-01-01 RX ADMIN — APIXABAN 5 MG: 5 TABLET, FILM COATED ORAL at 09:13

## 2022-01-01 RX ADMIN — NYSTATIN 500000 UNITS: 100000 SUSPENSION ORAL at 12:19

## 2022-01-01 RX ADMIN — HYDROMORPHONE HYDROCHLORIDE 1 MG: 1 INJECTION, SOLUTION INTRAMUSCULAR; INTRAVENOUS; SUBCUTANEOUS at 16:57

## 2022-01-01 RX ADMIN — METRONIDAZOLE 500 MG: 500 INJECTION, SOLUTION INTRAVENOUS at 22:00

## 2022-01-01 RX ADMIN — SODIUM CHLORIDE, POTASSIUM CHLORIDE, SODIUM LACTATE AND CALCIUM CHLORIDE: 600; 310; 30; 20 INJECTION, SOLUTION INTRAVENOUS at 09:35

## 2022-01-01 RX ADMIN — NYSTATIN 500000 UNITS: 100000 SUSPENSION ORAL at 18:11

## 2022-01-01 RX ADMIN — HYDROMORPHONE HYDROCHLORIDE 1 MG: 1 INJECTION, SOLUTION INTRAMUSCULAR; INTRAVENOUS; SUBCUTANEOUS at 03:24

## 2022-01-01 RX ADMIN — SODIUM CHLORIDE, PRESERVATIVE FREE 20 MG: 5 INJECTION INTRAVENOUS at 20:34

## 2022-01-01 RX ADMIN — HYDROMORPHONE HYDROCHLORIDE 2 MG: 2 INJECTION INTRAMUSCULAR; INTRAVENOUS; SUBCUTANEOUS at 07:06

## 2022-01-01 RX ADMIN — HYDROMORPHONE HYDROCHLORIDE 1 MG: 1 INJECTION, SOLUTION INTRAMUSCULAR; INTRAVENOUS; SUBCUTANEOUS at 03:44

## 2022-01-01 RX ADMIN — POTASSIUM CHLORIDE, DEXTROSE MONOHYDRATE AND SODIUM CHLORIDE 25 ML/HR: 150; 5; 450 INJECTION, SOLUTION INTRAVENOUS at 21:13

## 2022-01-01 RX ADMIN — AMIODARONE HYDROCHLORIDE 200 MG: 200 TABLET ORAL at 09:54

## 2022-01-01 RX ADMIN — INSULIN LISPRO 2 UNITS: 100 INJECTION, SOLUTION INTRAVENOUS; SUBCUTANEOUS at 07:03

## 2022-01-01 RX ADMIN — INSULIN LISPRO 6 UNITS: 100 INJECTION, SOLUTION INTRAVENOUS; SUBCUTANEOUS at 08:31

## 2022-01-01 RX ADMIN — SODIUM CHLORIDE, PRESERVATIVE FREE 20 MG: 5 INJECTION INTRAVENOUS at 21:24

## 2022-01-01 RX ADMIN — HYDROMORPHONE HYDROCHLORIDE 1 MG: 1 INJECTION, SOLUTION INTRAMUSCULAR; INTRAVENOUS; SUBCUTANEOUS at 00:57

## 2022-01-01 RX ADMIN — VERAPAMIL HYDROCHLORIDE 200 MG: 100 CAPSULE, EXTENDED RELEASE ORAL at 21:35

## 2022-01-01 RX ADMIN — METRONIDAZOLE 500 MG: 500 INJECTION, SOLUTION INTRAVENOUS at 17:12

## 2022-01-01 RX ADMIN — ONDANSETRON 4 MG: 2 INJECTION INTRAMUSCULAR; INTRAVENOUS at 04:51

## 2022-01-01 RX ADMIN — LISINOPRIL 20 MG: 20 TABLET ORAL at 11:00

## 2022-01-01 RX ADMIN — APIXABAN 5 MG: 5 TABLET, FILM COATED ORAL at 21:35

## 2022-01-01 RX ADMIN — PREDNISONE 15 MG: 5 TABLET ORAL at 08:01

## 2022-01-01 RX ADMIN — PANTOPRAZOLE SODIUM 40 MG: 40 TABLET, DELAYED RELEASE ORAL at 16:59

## 2022-01-01 RX ADMIN — INSULIN LISPRO 6 UNITS: 100 INJECTION, SOLUTION INTRAVENOUS; SUBCUTANEOUS at 21:25

## 2022-01-01 RX ADMIN — INSULIN LISPRO 6 UNITS: 100 INJECTION, SOLUTION INTRAVENOUS; SUBCUTANEOUS at 16:30

## 2022-01-01 RX ADMIN — HYDROMORPHONE HYDROCHLORIDE 1 MG: 1 INJECTION, SOLUTION INTRAMUSCULAR; INTRAVENOUS; SUBCUTANEOUS at 16:29

## 2022-01-01 RX ADMIN — MORPHINE SULFATE 2 MG: 2 INJECTION, SOLUTION INTRAMUSCULAR; INTRAVENOUS at 13:23

## 2022-01-01 RX ADMIN — SODIUM CHLORIDE, PRESERVATIVE FREE 20 MG: 5 INJECTION INTRAVENOUS at 09:35

## 2022-01-01 RX ADMIN — KETOROLAC TROMETHAMINE 30 MG: 30 INJECTION, SOLUTION INTRAMUSCULAR; INTRAVENOUS at 05:17

## 2022-01-01 RX ADMIN — INSULIN LISPRO 6 UNITS: 100 INJECTION, SOLUTION INTRAVENOUS; SUBCUTANEOUS at 17:57

## 2022-01-01 RX ADMIN — MORPHINE SULFATE 15 MG: 15 TABLET ORAL at 11:07

## 2022-01-01 RX ADMIN — ONDANSETRON 4 MG: 2 INJECTION INTRAMUSCULAR; INTRAVENOUS at 14:04

## 2022-01-01 RX ADMIN — VERAPAMIL HYDROCHLORIDE 200 MG: 100 CAPSULE, EXTENDED RELEASE ORAL at 21:23

## 2022-01-01 RX ADMIN — LISINOPRIL 20 MG: 20 TABLET ORAL at 09:07

## 2022-01-01 RX ADMIN — HYDROMORPHONE HYDROCHLORIDE 1 MG: 1 INJECTION, SOLUTION INTRAMUSCULAR; INTRAVENOUS; SUBCUTANEOUS at 11:48

## 2022-01-01 RX ADMIN — HYDROMORPHONE HYDROCHLORIDE 1 MG: 1 INJECTION, SOLUTION INTRAMUSCULAR; INTRAVENOUS; SUBCUTANEOUS at 01:10

## 2022-01-01 RX ADMIN — HYDROMORPHONE HYDROCHLORIDE 1 MG: 1 INJECTION, SOLUTION INTRAMUSCULAR; INTRAVENOUS; SUBCUTANEOUS at 01:40

## 2022-01-01 RX ADMIN — POTASSIUM CHLORIDE 10 MEQ: 750 TABLET, FILM COATED, EXTENDED RELEASE ORAL at 09:43

## 2022-01-01 RX ADMIN — ONDANSETRON 4 MG: 2 INJECTION INTRAMUSCULAR; INTRAVENOUS at 00:29

## 2022-01-01 RX ADMIN — ONDANSETRON 4 MG: 2 INJECTION INTRAMUSCULAR; INTRAVENOUS at 18:26

## 2022-01-01 RX ADMIN — ONDANSETRON 4 MG: 2 INJECTION INTRAMUSCULAR; INTRAVENOUS at 01:08

## 2022-01-01 RX ADMIN — METRONIDAZOLE 500 MG: 500 INJECTION, SOLUTION INTRAVENOUS at 19:46

## 2022-01-01 RX ADMIN — INSULIN LISPRO 4 UNITS: 100 INJECTION, SOLUTION INTRAVENOUS; SUBCUTANEOUS at 16:48

## 2022-01-01 RX ADMIN — ONDANSETRON 4 MG: 2 INJECTION INTRAMUSCULAR; INTRAVENOUS at 08:36

## 2022-01-01 RX ADMIN — PIPERACILLIN AND TAZOBACTAM 3.38 G: 3; .375 INJECTION, POWDER, LYOPHILIZED, FOR SOLUTION INTRAVENOUS at 21:21

## 2022-01-01 RX ADMIN — HYDROMORPHONE HYDROCHLORIDE 1 MG: 1 INJECTION, SOLUTION INTRAMUSCULAR; INTRAVENOUS; SUBCUTANEOUS at 15:00

## 2022-01-01 RX ADMIN — ASPIRIN 81 MG CHEWABLE TABLET 81 MG: 81 TABLET CHEWABLE at 09:15

## 2022-01-01 RX ADMIN — INSULIN LISPRO 2 UNITS: 100 INJECTION, SOLUTION INTRAVENOUS; SUBCUTANEOUS at 08:38

## 2022-01-01 RX ADMIN — CLOPIDOGREL BISULFATE 75 MG: 75 TABLET ORAL at 08:53

## 2022-01-01 RX ADMIN — INSULIN LISPRO 2 UNITS: 100 INJECTION, SOLUTION INTRAVENOUS; SUBCUTANEOUS at 08:40

## 2022-01-01 RX ADMIN — APIXABAN 5 MG: 5 TABLET, FILM COATED ORAL at 22:29

## 2022-01-01 RX ADMIN — ONDANSETRON 4 MG: 2 INJECTION INTRAMUSCULAR; INTRAVENOUS at 06:30

## 2022-01-01 RX ADMIN — METOPROLOL TARTRATE 50 MG: 50 TABLET, FILM COATED ORAL at 20:28

## 2022-01-01 RX ADMIN — ONDANSETRON 4 MG: 2 INJECTION INTRAMUSCULAR; INTRAVENOUS at 08:14

## 2022-01-01 RX ADMIN — CLOPIDOGREL BISULFATE 75 MG: 75 TABLET ORAL at 08:11

## 2022-01-01 RX ADMIN — INSULIN LISPRO 4 UNITS: 100 INJECTION, SOLUTION INTRAVENOUS; SUBCUTANEOUS at 13:27

## 2022-01-01 RX ADMIN — PREDNISONE 20 MG: 20 TABLET ORAL at 10:32

## 2022-01-01 RX ADMIN — SODIUM CHLORIDE, PRESERVATIVE FREE 10 ML: 5 INJECTION INTRAVENOUS at 14:52

## 2022-01-01 RX ADMIN — HYDROMORPHONE HYDROCHLORIDE 1 MG: 1 INJECTION, SOLUTION INTRAMUSCULAR; INTRAVENOUS; SUBCUTANEOUS at 04:30

## 2022-01-01 RX ADMIN — HYDROMORPHONE HYDROCHLORIDE 1 MG: 1 INJECTION, SOLUTION INTRAMUSCULAR; INTRAVENOUS; SUBCUTANEOUS at 18:31

## 2022-01-01 RX ADMIN — ONDANSETRON 4 MG: 2 INJECTION INTRAMUSCULAR; INTRAVENOUS at 04:57

## 2022-01-01 RX ADMIN — ONDANSETRON 4 MG: 2 INJECTION INTRAMUSCULAR; INTRAVENOUS at 17:49

## 2022-01-01 RX ADMIN — PREDNISONE 15 MG: 5 TABLET ORAL at 08:36

## 2022-01-01 RX ADMIN — HYDROMORPHONE HYDROCHLORIDE 2 MG: 1 INJECTION, SOLUTION INTRAMUSCULAR; INTRAVENOUS; SUBCUTANEOUS at 16:14

## 2022-01-01 RX ADMIN — HYDROMORPHONE HYDROCHLORIDE 2 MG: 1 INJECTION, SOLUTION INTRAMUSCULAR; INTRAVENOUS; SUBCUTANEOUS at 12:09

## 2022-01-01 RX ADMIN — LISINOPRIL 20 MG: 20 TABLET ORAL at 09:15

## 2022-01-01 RX ADMIN — HYDROMORPHONE HYDROCHLORIDE 1 MG: 1 INJECTION, SOLUTION INTRAMUSCULAR; INTRAVENOUS; SUBCUTANEOUS at 12:48

## 2022-01-01 RX ADMIN — METRONIDAZOLE 500 MG: 500 INJECTION, SOLUTION INTRAVENOUS at 23:26

## 2022-01-01 RX ADMIN — POTASSIUM CHLORIDE, DEXTROSE MONOHYDRATE AND SODIUM CHLORIDE 75 ML/HR: 150; 5; 450 INJECTION, SOLUTION INTRAVENOUS at 00:11

## 2022-01-01 RX ADMIN — HYDROMORPHONE HYDROCHLORIDE 2 MG: 1 INJECTION, SOLUTION INTRAMUSCULAR; INTRAVENOUS; SUBCUTANEOUS at 17:03

## 2022-01-01 RX ADMIN — SODIUM CHLORIDE, PRESERVATIVE FREE 20 MG: 5 INJECTION INTRAVENOUS at 09:05

## 2022-01-01 RX ADMIN — PIPERACILLIN AND TAZOBACTAM 3.38 G: 3; .375 INJECTION, POWDER, LYOPHILIZED, FOR SOLUTION INTRAVENOUS at 00:48

## 2022-01-01 RX ADMIN — HYDROMORPHONE HYDROCHLORIDE 1 MG: 1 INJECTION, SOLUTION INTRAMUSCULAR; INTRAVENOUS; SUBCUTANEOUS at 20:06

## 2022-01-01 RX ADMIN — INSULIN LISPRO 2 UNITS: 100 INJECTION, SOLUTION INTRAVENOUS; SUBCUTANEOUS at 18:01

## 2022-01-01 RX ADMIN — CEFAZOLIN 2 G: 1 INJECTION, POWDER, FOR SOLUTION INTRAMUSCULAR; INTRAVENOUS at 03:03

## 2022-01-01 RX ADMIN — SODIUM CHLORIDE, PRESERVATIVE FREE 20 MG: 5 INJECTION INTRAVENOUS at 09:46

## 2022-01-01 RX ADMIN — METOCLOPRAMIDE HYDROCHLORIDE 5 MG: 5 INJECTION INTRAMUSCULAR; INTRAVENOUS at 00:31

## 2022-01-01 RX ADMIN — HYDROMORPHONE HYDROCHLORIDE 1 MG: 1 INJECTION, SOLUTION INTRAMUSCULAR; INTRAVENOUS; SUBCUTANEOUS at 05:52

## 2022-01-01 RX ADMIN — HYDROMORPHONE HYDROCHLORIDE 1 MG: 1 INJECTION, SOLUTION INTRAMUSCULAR; INTRAVENOUS; SUBCUTANEOUS at 05:23

## 2022-01-01 RX ADMIN — AMIODARONE HYDROCHLORIDE 400 MG: 200 TABLET ORAL at 09:08

## 2022-01-01 RX ADMIN — CEFAZOLIN 2 G: 1 INJECTION, POWDER, FOR SOLUTION INTRAMUSCULAR; INTRAVENOUS at 19:38

## 2022-01-01 RX ADMIN — PREDNISONE 15 MG: 5 TABLET ORAL at 09:13

## 2022-01-01 RX ADMIN — PANTOPRAZOLE SODIUM 40 MG: 40 TABLET, DELAYED RELEASE ORAL at 17:25

## 2022-01-01 RX ADMIN — ONDANSETRON 4 MG: 2 INJECTION INTRAMUSCULAR; INTRAVENOUS at 19:02

## 2022-01-01 RX ADMIN — SODIUM CHLORIDE, PRESERVATIVE FREE 20 MG: 5 INJECTION INTRAVENOUS at 21:51

## 2022-01-01 RX ADMIN — ONDANSETRON 4 MG: 2 INJECTION INTRAMUSCULAR; INTRAVENOUS at 03:53

## 2022-01-01 RX ADMIN — HYDROMORPHONE HYDROCHLORIDE 1 MG: 1 INJECTION, SOLUTION INTRAMUSCULAR; INTRAVENOUS; SUBCUTANEOUS at 07:30

## 2022-01-01 RX ADMIN — WATER 2 G: 1 INJECTION INTRAMUSCULAR; INTRAVENOUS; SUBCUTANEOUS at 21:40

## 2022-01-01 RX ADMIN — INSULIN LISPRO 2 UNITS: 100 INJECTION, SOLUTION INTRAVENOUS; SUBCUTANEOUS at 16:30

## 2022-01-01 RX ADMIN — SODIUM CHLORIDE 125 ML/HR: 9 INJECTION, SOLUTION INTRAVENOUS at 05:39

## 2022-01-01 RX ADMIN — HYDROMORPHONE HYDROCHLORIDE 1 MG: 1 INJECTION, SOLUTION INTRAMUSCULAR; INTRAVENOUS; SUBCUTANEOUS at 20:27

## 2022-01-01 RX ADMIN — APIXABAN 5 MG: 5 TABLET, FILM COATED ORAL at 09:35

## 2022-01-01 RX ADMIN — PROMETHAZINE HYDROCHLORIDE 25 MG: 25 TABLET ORAL at 16:59

## 2022-01-01 RX ADMIN — APIXABAN 5 MG: 5 TABLET, FILM COATED ORAL at 21:32

## 2022-01-01 RX ADMIN — HYDROMORPHONE HYDROCHLORIDE 1 MG: 1 INJECTION, SOLUTION INTRAMUSCULAR; INTRAVENOUS; SUBCUTANEOUS at 06:46

## 2022-01-01 RX ADMIN — APIXABAN 5 MG: 5 TABLET, FILM COATED ORAL at 22:36

## 2022-01-01 RX ADMIN — VERAPAMIL HYDROCHLORIDE 200 MG: 100 CAPSULE, EXTENDED RELEASE ORAL at 23:32

## 2022-01-01 RX ADMIN — SODIUM CHLORIDE, PRESERVATIVE FREE 20 MG: 5 INJECTION INTRAVENOUS at 08:39

## 2022-01-01 RX ADMIN — POTASSIUM CHLORIDE, DEXTROSE MONOHYDRATE AND SODIUM CHLORIDE 25 ML/HR: 150; 5; 450 INJECTION, SOLUTION INTRAVENOUS at 08:25

## 2022-01-01 RX ADMIN — INSULIN LISPRO 4 UNITS: 100 INJECTION, SOLUTION INTRAVENOUS; SUBCUTANEOUS at 00:13

## 2022-01-01 RX ADMIN — METRONIDAZOLE 500 MG: 500 INJECTION, SOLUTION INTRAVENOUS at 11:45

## 2022-01-01 RX ADMIN — PREDNISONE 20 MG: 20 TABLET ORAL at 08:55

## 2022-01-01 RX ADMIN — KETOROLAC TROMETHAMINE 30 MG: 30 INJECTION, SOLUTION INTRAMUSCULAR; INTRAVENOUS at 00:13

## 2022-01-01 RX ADMIN — POTASSIUM CHLORIDE, DEXTROSE MONOHYDRATE AND SODIUM CHLORIDE 125 ML/HR: 150; 5; 450 INJECTION, SOLUTION INTRAVENOUS at 09:46

## 2022-01-01 RX ADMIN — APIXABAN 5 MG: 5 TABLET, FILM COATED ORAL at 22:34

## 2022-01-01 RX ADMIN — PREDNISONE 20 MG: 20 TABLET ORAL at 09:47

## 2022-01-01 RX ADMIN — BARIUM SULFATE 450 ML: 20 SUSPENSION ORAL at 10:36

## 2022-01-01 RX ADMIN — SODIUM CHLORIDE 50 ML/HR: 9 INJECTION, SOLUTION INTRAVENOUS at 15:57

## 2022-01-01 RX ADMIN — METOCLOPRAMIDE HYDROCHLORIDE 5 MG: 5 INJECTION INTRAMUSCULAR; INTRAVENOUS at 00:07

## 2022-01-01 RX ADMIN — ONDANSETRON 4 MG: 2 INJECTION INTRAMUSCULAR; INTRAVENOUS at 00:02

## 2022-01-01 RX ADMIN — POTASSIUM CHLORIDE 40 MEQ: 1500 TABLET, EXTENDED RELEASE ORAL at 08:01

## 2022-01-01 RX ADMIN — METRONIDAZOLE 500 MG: 500 INJECTION, SOLUTION INTRAVENOUS at 09:11

## 2022-01-01 RX ADMIN — METRONIDAZOLE 500 MG: 500 INJECTION, SOLUTION INTRAVENOUS at 00:10

## 2022-01-01 RX ADMIN — ONDANSETRON 4 MG: 2 INJECTION INTRAMUSCULAR; INTRAVENOUS at 14:52

## 2022-01-01 RX ADMIN — PIPERACILLIN AND TAZOBACTAM 3.38 G: 3; .375 INJECTION, POWDER, LYOPHILIZED, FOR SOLUTION INTRAVENOUS at 12:25

## 2022-01-01 RX ADMIN — METRONIDAZOLE 500 MG: 500 INJECTION, SOLUTION INTRAVENOUS at 16:52

## 2022-01-01 RX ADMIN — METRONIDAZOLE 500 MG: 500 INJECTION, SOLUTION INTRAVENOUS at 09:10

## 2022-01-01 RX ADMIN — HYDROMORPHONE HYDROCHLORIDE 1 MG: 1 INJECTION, SOLUTION INTRAMUSCULAR; INTRAVENOUS; SUBCUTANEOUS at 14:36

## 2022-01-01 RX ADMIN — CLOPIDOGREL BISULFATE 75 MG: 75 TABLET ORAL at 08:12

## 2022-01-01 RX ADMIN — HYDROMORPHONE HYDROCHLORIDE 2 MG: 1 INJECTION, SOLUTION INTRAMUSCULAR; INTRAVENOUS; SUBCUTANEOUS at 00:45

## 2022-01-01 RX ADMIN — CLOPIDOGREL BISULFATE 75 MG: 75 TABLET ORAL at 08:49

## 2022-01-01 RX ADMIN — PROPOFOL 50 MG: 10 INJECTION, EMULSION INTRAVENOUS at 17:50

## 2022-01-01 RX ADMIN — PREDNISONE 20 MG: 20 TABLET ORAL at 08:46

## 2022-01-01 RX ADMIN — INSULIN LISPRO 2 UNITS: 100 INJECTION, SOLUTION INTRAVENOUS; SUBCUTANEOUS at 10:32

## 2022-01-01 RX ADMIN — CLOPIDOGREL BISULFATE 75 MG: 75 TABLET ORAL at 09:45

## 2022-01-01 RX ADMIN — ONDANSETRON 4 MG: 2 INJECTION INTRAMUSCULAR; INTRAVENOUS at 13:42

## 2022-01-01 RX ADMIN — HYDRALAZINE HYDROCHLORIDE 10 MG: 20 INJECTION, SOLUTION INTRAMUSCULAR; INTRAVENOUS at 08:57

## 2022-01-01 RX ADMIN — ONDANSETRON 4 MG: 2 INJECTION INTRAMUSCULAR; INTRAVENOUS at 01:06

## 2022-01-01 RX ADMIN — PHENYLEPHRINE HYDROCHLORIDE 200 MCG: 10 INJECTION INTRAVENOUS at 11:25

## 2022-01-01 RX ADMIN — APIXABAN 5 MG: 5 TABLET, FILM COATED ORAL at 21:22

## 2022-01-01 RX ADMIN — HYDROMORPHONE HYDROCHLORIDE 1 MG: 1 INJECTION, SOLUTION INTRAMUSCULAR; INTRAVENOUS; SUBCUTANEOUS at 05:02

## 2022-01-01 RX ADMIN — VERAPAMIL HYDROCHLORIDE 80 MG: 80 TABLET ORAL at 16:37

## 2022-01-01 RX ADMIN — FLUCONAZOLE 200 MG: 100 TABLET ORAL at 17:25

## 2022-01-01 RX ADMIN — DOCUSATE SODIUM 100 MG: 100 CAPSULE, LIQUID FILLED ORAL at 22:23

## 2022-01-01 RX ADMIN — POTASSIUM CHLORIDE 40 MEQ: 1500 TABLET, EXTENDED RELEASE ORAL at 11:22

## 2022-01-01 RX ADMIN — ONDANSETRON 4 MG: 2 INJECTION INTRAMUSCULAR; INTRAVENOUS at 00:09

## 2022-01-01 RX ADMIN — PREDNISONE 20 MG: 20 TABLET ORAL at 08:57

## 2022-01-01 RX ADMIN — LISINOPRIL 20 MG: 20 TABLET ORAL at 08:39

## 2022-01-01 RX ADMIN — PREDNISONE 20 MG: 20 TABLET ORAL at 08:24

## 2022-01-01 RX ADMIN — PIPERACILLIN AND TAZOBACTAM 3.38 G: 3; .375 INJECTION, POWDER, LYOPHILIZED, FOR SOLUTION INTRAVENOUS at 19:05

## 2022-01-01 RX ADMIN — ONDANSETRON 4 MG: 2 INJECTION INTRAMUSCULAR; INTRAVENOUS at 08:19

## 2022-01-01 RX ADMIN — ONDANSETRON 4 MG: 2 INJECTION INTRAMUSCULAR; INTRAVENOUS at 10:45

## 2022-01-01 RX ADMIN — Medication 4 TABLET: at 18:06

## 2022-01-01 RX ADMIN — METRONIDAZOLE 500 MG: 500 INJECTION, SOLUTION INTRAVENOUS at 03:20

## 2022-01-01 RX ADMIN — METRONIDAZOLE 500 MG: 500 INJECTION, SOLUTION INTRAVENOUS at 18:25

## 2022-01-01 RX ADMIN — WATER 2 G: 1 INJECTION INTRAMUSCULAR; INTRAVENOUS; SUBCUTANEOUS at 21:24

## 2022-01-01 RX ADMIN — HYDROMORPHONE HYDROCHLORIDE 1.5 MG: 2 INJECTION, SOLUTION INTRAMUSCULAR; INTRAVENOUS; SUBCUTANEOUS at 19:26

## 2022-01-01 RX ADMIN — HYDROMORPHONE HYDROCHLORIDE 1.5 MG: 2 INJECTION, SOLUTION INTRAMUSCULAR; INTRAVENOUS; SUBCUTANEOUS at 01:07

## 2022-01-01 RX ADMIN — SODIUM CHLORIDE, PRESERVATIVE FREE 20 MG: 5 INJECTION INTRAVENOUS at 08:28

## 2022-01-01 RX ADMIN — WATER 2 G: 1 INJECTION INTRAMUSCULAR; INTRAVENOUS; SUBCUTANEOUS at 01:40

## 2022-01-01 RX ADMIN — METRONIDAZOLE 500 MG: 500 INJECTION, SOLUTION INTRAVENOUS at 04:30

## 2022-01-01 RX ADMIN — AMIODARONE HYDROCHLORIDE 200 MG: 200 TABLET ORAL at 09:16

## 2022-01-01 RX ADMIN — HYDROMORPHONE HYDROCHLORIDE 1 MG: 1 INJECTION, SOLUTION INTRAMUSCULAR; INTRAVENOUS; SUBCUTANEOUS at 05:41

## 2022-01-01 RX ADMIN — ATORVASTATIN CALCIUM 20 MG: 20 TABLET, FILM COATED ORAL at 21:14

## 2022-01-01 RX ADMIN — ONDANSETRON 4 MG: 2 INJECTION INTRAMUSCULAR; INTRAVENOUS at 01:25

## 2022-01-01 RX ADMIN — APIXABAN 5 MG: 5 TABLET, FILM COATED ORAL at 08:12

## 2022-01-01 RX ADMIN — INSULIN LISPRO 2 UNITS: 100 INJECTION, SOLUTION INTRAVENOUS; SUBCUTANEOUS at 20:44

## 2022-01-01 RX ADMIN — PANTOPRAZOLE SODIUM 40 MG: 40 TABLET, DELAYED RELEASE ORAL at 17:09

## 2022-01-01 RX ADMIN — INSULIN LISPRO 4 UNITS: 100 INJECTION, SOLUTION INTRAVENOUS; SUBCUTANEOUS at 17:43

## 2022-01-01 RX ADMIN — AMIODARONE HYDROCHLORIDE 200 MG: 200 TABLET ORAL at 09:47

## 2022-01-01 RX ADMIN — OXYCODONE HYDROCHLORIDE 10 MG: 10 TABLET ORAL at 03:25

## 2022-01-01 RX ADMIN — POTASSIUM CHLORIDE, DEXTROSE MONOHYDRATE AND SODIUM CHLORIDE 125 ML/HR: 150; 5; 450 INJECTION, SOLUTION INTRAVENOUS at 19:26

## 2022-01-01 RX ADMIN — MIDAZOLAM HYDROCHLORIDE 2 MG: 2 INJECTION, SOLUTION INTRAMUSCULAR; INTRAVENOUS at 09:30

## 2022-01-01 RX ADMIN — SODIUM CHLORIDE 125 ML/HR: 9 INJECTION, SOLUTION INTRAVENOUS at 16:51

## 2022-01-01 RX ADMIN — WATER 2 G: 1 INJECTION INTRAMUSCULAR; INTRAVENOUS; SUBCUTANEOUS at 01:01

## 2022-01-01 RX ADMIN — PREDNISONE 15 MG: 5 TABLET ORAL at 08:10

## 2022-01-01 RX ADMIN — POTASSIUM CHLORIDE 10 MEQ: 7.46 INJECTION, SOLUTION INTRAVENOUS at 13:03

## 2022-01-01 RX ADMIN — WATER 2 G: 1 INJECTION INTRAMUSCULAR; INTRAVENOUS; SUBCUTANEOUS at 06:09

## 2022-01-01 RX ADMIN — APIXABAN 5 MG: 5 TABLET, FILM COATED ORAL at 20:50

## 2022-01-01 RX ADMIN — ONDANSETRON 4 MG: 2 INJECTION INTRAMUSCULAR; INTRAVENOUS at 02:38

## 2022-01-01 RX ADMIN — ONDANSETRON 4 MG: 2 INJECTION INTRAMUSCULAR; INTRAVENOUS at 08:47

## 2022-01-01 RX ADMIN — I.V. FAT EMULSION 500 ML: 20 EMULSION INTRAVENOUS at 23:36

## 2022-01-01 RX ADMIN — PREDNISONE 20 MG: 20 TABLET ORAL at 08:09

## 2022-01-01 RX ADMIN — SODIUM CHLORIDE: 9 INJECTION, SOLUTION INTRAVENOUS at 11:45

## 2022-01-01 RX ADMIN — SODIUM CHLORIDE, PRESERVATIVE FREE 20 MG: 5 INJECTION INTRAVENOUS at 08:56

## 2022-01-01 RX ADMIN — ASPIRIN 81 MG CHEWABLE TABLET 81 MG: 81 TABLET CHEWABLE at 08:15

## 2022-01-01 RX ADMIN — AMIODARONE HYDROCHLORIDE 200 MG: 200 TABLET ORAL at 08:23

## 2022-01-01 RX ADMIN — SODIUM CHLORIDE, PRESERVATIVE FREE 20 MG: 5 INJECTION INTRAVENOUS at 08:15

## 2022-01-01 RX ADMIN — POTASSIUM CHLORIDE, DEXTROSE MONOHYDRATE AND SODIUM CHLORIDE 75 ML/HR: 150; 5; 450 INJECTION, SOLUTION INTRAVENOUS at 06:49

## 2022-01-01 RX ADMIN — METRONIDAZOLE 500 MG: 500 INJECTION, SOLUTION INTRAVENOUS at 13:31

## 2022-01-01 RX ADMIN — HYDROMORPHONE HYDROCHLORIDE 1 MG: 1 INJECTION, SOLUTION INTRAMUSCULAR; INTRAVENOUS; SUBCUTANEOUS at 17:08

## 2022-01-01 RX ADMIN — APIXABAN 5 MG: 5 TABLET, FILM COATED ORAL at 21:15

## 2022-01-01 RX ADMIN — POTASSIUM CHLORIDE, DEXTROSE MONOHYDRATE AND SODIUM CHLORIDE 100 ML/HR: 150; 5; 450 INJECTION, SOLUTION INTRAVENOUS at 21:54

## 2022-01-01 RX ADMIN — PREDNISONE 20 MG: 20 TABLET ORAL at 08:30

## 2022-01-01 RX ADMIN — LISINOPRIL 20 MG: 20 TABLET ORAL at 09:03

## 2022-01-01 RX ADMIN — CEFAZOLIN 2 G: 1 INJECTION, POWDER, FOR SOLUTION INTRAMUSCULAR; INTRAVENOUS at 12:15

## 2022-01-01 RX ADMIN — METRONIDAZOLE 500 MG: 500 INJECTION, SOLUTION INTRAVENOUS at 17:29

## 2022-01-01 RX ADMIN — ONDANSETRON 4 MG: 2 INJECTION INTRAMUSCULAR; INTRAVENOUS at 15:37

## 2022-01-01 RX ADMIN — ONDANSETRON 4 MG: 2 INJECTION INTRAMUSCULAR; INTRAVENOUS at 03:30

## 2022-01-01 RX ADMIN — INSULIN LISPRO 2 UNITS: 100 INJECTION, SOLUTION INTRAVENOUS; SUBCUTANEOUS at 22:13

## 2022-01-01 RX ADMIN — ONDANSETRON 4 MG: 2 INJECTION INTRAMUSCULAR; INTRAVENOUS at 03:08

## 2022-01-01 RX ADMIN — METOPROLOL SUCCINATE 50 MG: 50 TABLET, EXTENDED RELEASE ORAL at 09:07

## 2022-01-01 RX ADMIN — WATER 2 G: 1 INJECTION INTRAMUSCULAR; INTRAVENOUS; SUBCUTANEOUS at 14:49

## 2022-01-01 RX ADMIN — ASPIRIN 81 MG CHEWABLE TABLET 81 MG: 81 TABLET CHEWABLE at 08:49

## 2022-01-01 RX ADMIN — INSULIN LISPRO 4 UNITS: 100 INJECTION, SOLUTION INTRAVENOUS; SUBCUTANEOUS at 15:25

## 2022-01-01 RX ADMIN — HYDROMORPHONE HYDROCHLORIDE 1 MG: 1 INJECTION, SOLUTION INTRAMUSCULAR; INTRAVENOUS; SUBCUTANEOUS at 08:59

## 2022-01-01 RX ADMIN — POTASSIUM CHLORIDE, DEXTROSE MONOHYDRATE AND SODIUM CHLORIDE 25 ML/HR: 150; 5; 450 INJECTION, SOLUTION INTRAVENOUS at 18:31

## 2022-01-01 RX ADMIN — HYDROMORPHONE HYDROCHLORIDE 1 MG: 1 INJECTION, SOLUTION INTRAMUSCULAR; INTRAVENOUS; SUBCUTANEOUS at 08:10

## 2022-01-01 RX ADMIN — LISINOPRIL 20 MG: 20 TABLET ORAL at 08:10

## 2022-01-01 RX ADMIN — METRONIDAZOLE 500 MG: 500 INJECTION, SOLUTION INTRAVENOUS at 11:37

## 2022-01-01 RX ADMIN — AMIODARONE HYDROCHLORIDE 200 MG: 200 TABLET ORAL at 08:51

## 2022-01-01 RX ADMIN — VERAPAMIL HYDROCHLORIDE 80 MG: 80 TABLET ORAL at 21:38

## 2022-01-01 RX ADMIN — METRONIDAZOLE 500 MG: 500 INJECTION, SOLUTION INTRAVENOUS at 15:38

## 2022-01-01 RX ADMIN — OXYCODONE HYDROCHLORIDE AND ACETAMINOPHEN 1 TABLET: 10; 325 TABLET ORAL at 03:26

## 2022-01-01 RX ADMIN — ONDANSETRON 4 MG: 2 INJECTION INTRAMUSCULAR; INTRAVENOUS at 06:02

## 2022-01-01 RX ADMIN — HYDROMORPHONE HYDROCHLORIDE 1 MG: 1 INJECTION, SOLUTION INTRAMUSCULAR; INTRAVENOUS; SUBCUTANEOUS at 14:03

## 2022-01-01 RX ADMIN — PIPERACILLIN AND TAZOBACTAM 3.38 G: 3; .375 INJECTION, POWDER, LYOPHILIZED, FOR SOLUTION INTRAVENOUS at 18:12

## 2022-01-01 RX ADMIN — HYDROMORPHONE HYDROCHLORIDE 0.5 MG: 1 INJECTION, SOLUTION INTRAMUSCULAR; INTRAVENOUS; SUBCUTANEOUS at 18:44

## 2022-01-01 RX ADMIN — HYDROMORPHONE HYDROCHLORIDE 1.5 MG: 2 INJECTION INTRAMUSCULAR; INTRAVENOUS; SUBCUTANEOUS at 14:42

## 2022-01-01 RX ADMIN — POTASSIUM CHLORIDE 10 MEQ: 750 TABLET, FILM COATED, EXTENDED RELEASE ORAL at 08:09

## 2022-01-01 RX ADMIN — APIXABAN 5 MG: 5 TABLET, FILM COATED ORAL at 23:26

## 2022-01-01 RX ADMIN — POTASSIUM CHLORIDE, DEXTROSE MONOHYDRATE AND SODIUM CHLORIDE 75 ML/HR: 150; 5; 450 INJECTION, SOLUTION INTRAVENOUS at 22:04

## 2022-01-01 RX ADMIN — INSULIN LISPRO 2 UNITS: 100 INJECTION, SOLUTION INTRAVENOUS; SUBCUTANEOUS at 11:40

## 2022-01-01 RX ADMIN — TRACE ELEMENTS INJECTION 4: 7.4; .75; 98; 151 INJECTION, SOLUTION INTRAVENOUS at 22:21

## 2022-01-01 RX ADMIN — SODIUM CHLORIDE, PRESERVATIVE FREE 20 MG: 5 INJECTION INTRAVENOUS at 23:26

## 2022-01-01 RX ADMIN — INSULIN LISPRO 2 UNITS: 100 INJECTION, SOLUTION INTRAVENOUS; SUBCUTANEOUS at 21:17

## 2022-01-01 RX ADMIN — PREDNISONE 15 MG: 5 TABLET ORAL at 09:11

## 2022-01-01 RX ADMIN — PREDNISONE 15 MG: 5 TABLET ORAL at 08:05

## 2022-01-01 RX ADMIN — METOPROLOL SUCCINATE 50 MG: 50 TABLET, EXTENDED RELEASE ORAL at 21:47

## 2022-01-01 RX ADMIN — ONDANSETRON 4 MG: 2 INJECTION INTRAMUSCULAR; INTRAVENOUS at 03:11

## 2022-01-01 RX ADMIN — MEROPENEM 1 G: 1 INJECTION INTRAVENOUS at 23:32

## 2022-01-01 RX ADMIN — ASPIRIN 81 MG CHEWABLE TABLET 81 MG: 81 TABLET CHEWABLE at 08:35

## 2022-01-01 RX ADMIN — LISINOPRIL 20 MG: 20 TABLET ORAL at 08:46

## 2022-01-01 RX ADMIN — APIXABAN 5 MG: 5 TABLET, FILM COATED ORAL at 20:15

## 2022-01-01 RX ADMIN — HYDROMORPHONE HYDROCHLORIDE 1 MG: 1 INJECTION, SOLUTION INTRAMUSCULAR; INTRAVENOUS; SUBCUTANEOUS at 16:31

## 2022-01-01 RX ADMIN — KETOROLAC TROMETHAMINE 30 MG: 30 INJECTION, SOLUTION INTRAMUSCULAR; INTRAVENOUS at 05:56

## 2022-01-01 RX ADMIN — INSULIN LISPRO 2 UNITS: 100 INJECTION, SOLUTION INTRAVENOUS; SUBCUTANEOUS at 17:13

## 2022-01-01 RX ADMIN — ONDANSETRON 4 MG: 2 INJECTION INTRAMUSCULAR; INTRAVENOUS at 20:19

## 2022-01-01 RX ADMIN — INSULIN LISPRO 2 UNITS: 100 INJECTION, SOLUTION INTRAVENOUS; SUBCUTANEOUS at 23:12

## 2022-01-01 RX ADMIN — POTASSIUM CHLORIDE, DEXTROSE MONOHYDRATE AND SODIUM CHLORIDE 25 ML/HR: 150; 5; 450 INJECTION, SOLUTION INTRAVENOUS at 18:26

## 2022-01-01 RX ADMIN — ONDANSETRON 4 MG: 2 INJECTION INTRAMUSCULAR; INTRAVENOUS at 05:23

## 2022-01-01 RX ADMIN — PANTOPRAZOLE SODIUM 40 MG: 40 TABLET, DELAYED RELEASE ORAL at 09:15

## 2022-01-01 RX ADMIN — METRONIDAZOLE 500 MG: 500 INJECTION, SOLUTION INTRAVENOUS at 01:01

## 2022-01-01 RX ADMIN — PREDNISONE 20 MG: 20 TABLET ORAL at 09:16

## 2022-01-01 RX ADMIN — HYDROMORPHONE HYDROCHLORIDE 2 MG: 2 INJECTION INTRAMUSCULAR; INTRAVENOUS; SUBCUTANEOUS at 03:54

## 2022-01-01 RX ADMIN — SODIUM CHLORIDE 75 ML/HR: 9 INJECTION, SOLUTION INTRAVENOUS at 07:02

## 2022-01-01 RX ADMIN — POTASSIUM CHLORIDE 10 MEQ: 750 TABLET, FILM COATED, EXTENDED RELEASE ORAL at 08:32

## 2022-01-01 RX ADMIN — DOCUSATE SODIUM 100 MG: 100 CAPSULE, LIQUID FILLED ORAL at 08:44

## 2022-01-01 RX ADMIN — METOCLOPRAMIDE HYDROCHLORIDE 5 MG: 5 INJECTION INTRAMUSCULAR; INTRAVENOUS at 06:24

## 2022-01-01 RX ADMIN — PHENYLEPHRINE HYDROCHLORIDE 50 MCG: 10 INJECTION INTRAVENOUS at 17:45

## 2022-01-01 RX ADMIN — SODIUM CHLORIDE, PRESERVATIVE FREE 20 MG: 5 INJECTION INTRAVENOUS at 09:15

## 2022-01-01 RX ADMIN — MORPHINE SULFATE 30 MG: 30 TABLET, FILM COATED, EXTENDED RELEASE ORAL at 09:13

## 2022-01-01 RX ADMIN — HYDROMORPHONE HYDROCHLORIDE 1 MG: 1 INJECTION, SOLUTION INTRAMUSCULAR; INTRAVENOUS; SUBCUTANEOUS at 10:17

## 2022-01-01 RX ADMIN — INSULIN LISPRO 2 UNITS: 100 INJECTION, SOLUTION INTRAVENOUS; SUBCUTANEOUS at 08:26

## 2022-01-01 RX ADMIN — LISINOPRIL 20 MG: 20 TABLET ORAL at 09:09

## 2022-01-01 RX ADMIN — LIDOCAINE HYDROCHLORIDE 80 MG: 20 INJECTION, SOLUTION EPIDURAL; INFILTRATION; INTRACAUDAL; PERINEURAL at 11:17

## 2022-01-01 RX ADMIN — HYDROMORPHONE HYDROCHLORIDE 1 MG: 1 INJECTION, SOLUTION INTRAMUSCULAR; INTRAVENOUS; SUBCUTANEOUS at 21:32

## 2022-01-01 RX ADMIN — PREDNISONE 15 MG: 5 TABLET ORAL at 08:52

## 2022-01-01 RX ADMIN — ONDANSETRON 4 MG: 2 INJECTION INTRAMUSCULAR; INTRAVENOUS at 22:39

## 2022-01-01 RX ADMIN — ONDANSETRON 4 MG: 2 INJECTION INTRAMUSCULAR; INTRAVENOUS at 00:13

## 2022-01-01 RX ADMIN — OXYCODONE HYDROCHLORIDE AND ACETAMINOPHEN 1 TABLET: 10; 325 TABLET ORAL at 09:13

## 2022-01-01 RX ADMIN — HYDROMORPHONE HYDROCHLORIDE 1 MG: 1 INJECTION, SOLUTION INTRAMUSCULAR; INTRAVENOUS; SUBCUTANEOUS at 10:35

## 2022-01-01 RX ADMIN — WATER 2 G: 1 INJECTION INTRAMUSCULAR; INTRAVENOUS; SUBCUTANEOUS at 07:16

## 2022-01-01 RX ADMIN — LISINOPRIL 20 MG: 20 TABLET ORAL at 09:05

## 2022-01-01 RX ADMIN — ONDANSETRON 4 MG: 2 INJECTION INTRAMUSCULAR; INTRAVENOUS at 00:11

## 2022-01-01 RX ADMIN — METRONIDAZOLE 500 MG: 500 INJECTION, SOLUTION INTRAVENOUS at 18:50

## 2022-01-01 RX ADMIN — SODIUM CHLORIDE 100 ML/HR: 9 INJECTION, SOLUTION INTRAVENOUS at 21:54

## 2022-01-01 RX ADMIN — SODIUM CHLORIDE, PRESERVATIVE FREE 20 MG: 5 INJECTION INTRAVENOUS at 10:32

## 2022-01-01 RX ADMIN — METRONIDAZOLE 500 MG: 500 INJECTION, SOLUTION INTRAVENOUS at 09:01

## 2022-01-01 RX ADMIN — PANTOPRAZOLE SODIUM 40 MG: 40 TABLET, DELAYED RELEASE ORAL at 17:12

## 2022-01-01 RX ADMIN — ONDANSETRON 4 MG: 2 INJECTION INTRAMUSCULAR; INTRAVENOUS at 18:27

## 2022-01-01 RX ADMIN — METRONIDAZOLE 500 MG: 500 INJECTION, SOLUTION INTRAVENOUS at 21:50

## 2022-01-01 RX ADMIN — HYDROMORPHONE HYDROCHLORIDE 1 MG: 1 INJECTION, SOLUTION INTRAMUSCULAR; INTRAVENOUS; SUBCUTANEOUS at 21:25

## 2022-01-01 RX ADMIN — SODIUM CHLORIDE 125 ML/HR: 9 INJECTION, SOLUTION INTRAVENOUS at 04:34

## 2022-01-01 RX ADMIN — INSULIN LISPRO 2 UNITS: 100 INJECTION, SOLUTION INTRAVENOUS; SUBCUTANEOUS at 07:58

## 2022-01-01 RX ADMIN — CEFAZOLIN 2 G: 1 INJECTION, POWDER, FOR SOLUTION INTRAMUSCULAR; INTRAVENOUS at 20:12

## 2022-01-01 RX ADMIN — PREDNISONE 20 MG: 20 TABLET ORAL at 08:45

## 2022-01-01 RX ADMIN — PREDNISONE 20 MG: 20 TABLET ORAL at 08:49

## 2022-01-01 RX ADMIN — HYDROMORPHONE HYDROCHLORIDE 1 MG: 1 INJECTION, SOLUTION INTRAMUSCULAR; INTRAVENOUS; SUBCUTANEOUS at 12:58

## 2022-01-01 RX ADMIN — SODIUM CHLORIDE, PRESERVATIVE FREE 20 MG: 5 INJECTION INTRAVENOUS at 08:32

## 2022-01-01 RX ADMIN — HYDROMORPHONE HYDROCHLORIDE 1 MG: 1 INJECTION, SOLUTION INTRAMUSCULAR; INTRAVENOUS; SUBCUTANEOUS at 12:15

## 2022-01-01 RX ADMIN — ONDANSETRON 4 MG: 2 INJECTION INTRAMUSCULAR; INTRAVENOUS at 02:53

## 2022-01-01 RX ADMIN — HYDROMORPHONE HYDROCHLORIDE 1 MG: 1 INJECTION, SOLUTION INTRAMUSCULAR; INTRAVENOUS; SUBCUTANEOUS at 02:31

## 2022-01-01 RX ADMIN — PIPERACILLIN AND TAZOBACTAM 3.38 G: 3; .375 INJECTION, POWDER, LYOPHILIZED, FOR SOLUTION INTRAVENOUS at 03:32

## 2022-01-01 RX ADMIN — ONDANSETRON 4 MG: 2 INJECTION INTRAMUSCULAR; INTRAVENOUS at 16:19

## 2022-01-01 RX ADMIN — SODIUM CHLORIDE, PRESERVATIVE FREE 20 MG: 5 INJECTION INTRAVENOUS at 09:38

## 2022-01-01 RX ADMIN — AMIODARONE HYDROCHLORIDE 200 MG: 200 TABLET ORAL at 08:09

## 2022-01-01 RX ADMIN — PIPERACILLIN AND TAZOBACTAM 3.38 G: 3; .375 INJECTION, POWDER, LYOPHILIZED, FOR SOLUTION INTRAVENOUS at 21:24

## 2022-01-01 RX ADMIN — PIPERACILLIN AND TAZOBACTAM 3.38 G: 3; .375 INJECTION, POWDER, LYOPHILIZED, FOR SOLUTION INTRAVENOUS at 09:44

## 2022-01-01 RX ADMIN — SODIUM CHLORIDE 125 ML/HR: 9 INJECTION, SOLUTION INTRAVENOUS at 23:20

## 2022-01-01 RX ADMIN — HYDROMORPHONE HYDROCHLORIDE 1 MG: 1 INJECTION, SOLUTION INTRAMUSCULAR; INTRAVENOUS; SUBCUTANEOUS at 17:28

## 2022-01-01 RX ADMIN — ROCURONIUM BROMIDE 30 MG: 10 INJECTION, SOLUTION INTRAVENOUS at 17:34

## 2022-01-01 RX ADMIN — OXYCODONE HYDROCHLORIDE AND ACETAMINOPHEN 1 TABLET: 10; 325 TABLET ORAL at 09:06

## 2022-01-01 RX ADMIN — SODIUM CHLORIDE 125 ML/HR: 9 INJECTION, SOLUTION INTRAVENOUS at 09:10

## 2022-01-01 RX ADMIN — ONDANSETRON 4 MG: 2 INJECTION INTRAMUSCULAR; INTRAVENOUS at 16:39

## 2022-01-01 RX ADMIN — LISINOPRIL 20 MG: 20 TABLET ORAL at 09:35

## 2022-01-01 RX ADMIN — PIPERACILLIN AND TAZOBACTAM 3.38 G: 3; .375 INJECTION, POWDER, LYOPHILIZED, FOR SOLUTION INTRAVENOUS at 16:14

## 2022-01-01 RX ADMIN — DOCUSATE SODIUM 100 MG: 100 CAPSULE, LIQUID FILLED ORAL at 09:09

## 2022-01-01 RX ADMIN — APIXABAN 5 MG: 5 TABLET, FILM COATED ORAL at 08:53

## 2022-01-01 RX ADMIN — ONDANSETRON 4 MG: 2 INJECTION INTRAMUSCULAR; INTRAVENOUS at 08:49

## 2022-01-01 RX ADMIN — CLOPIDOGREL BISULFATE 75 MG: 75 TABLET ORAL at 09:15

## 2022-01-01 RX ADMIN — ONDANSETRON 4 MG: 2 INJECTION INTRAMUSCULAR; INTRAVENOUS at 03:54

## 2022-01-01 RX ADMIN — APIXABAN 5 MG: 5 TABLET, FILM COATED ORAL at 08:46

## 2022-01-01 RX ADMIN — HYDROMORPHONE HYDROCHLORIDE 1 MG: 1 INJECTION, SOLUTION INTRAMUSCULAR; INTRAVENOUS; SUBCUTANEOUS at 00:07

## 2022-01-01 RX ADMIN — OXYCODONE HYDROCHLORIDE AND ACETAMINOPHEN 1 TABLET: 10; 325 TABLET ORAL at 12:30

## 2022-01-01 RX ADMIN — INSULIN LISPRO 6 UNITS: 100 INJECTION, SOLUTION INTRAVENOUS; SUBCUTANEOUS at 12:00

## 2022-01-01 RX ADMIN — INSULIN LISPRO 4 UNITS: 100 INJECTION, SOLUTION INTRAVENOUS; SUBCUTANEOUS at 16:30

## 2022-01-01 RX ADMIN — HYDROMORPHONE HYDROCHLORIDE 1 MG: 1 INJECTION, SOLUTION INTRAMUSCULAR; INTRAVENOUS; SUBCUTANEOUS at 12:27

## 2022-01-01 RX ADMIN — DEXMEDETOMIDINE HYDROCHLORIDE 10 MCG: 100 INJECTION, SOLUTION INTRAVENOUS at 11:50

## 2022-01-01 RX ADMIN — AMIODARONE HYDROCHLORIDE 200 MG: 200 TABLET ORAL at 08:39

## 2022-01-01 RX ADMIN — PREDNISONE 15 MG: 5 TABLET ORAL at 09:08

## 2022-01-01 RX ADMIN — PANTOPRAZOLE SODIUM 40 MG: 40 TABLET, DELAYED RELEASE ORAL at 15:22

## 2022-01-01 RX ADMIN — OXYCODONE HYDROCHLORIDE AND ACETAMINOPHEN 1 TABLET: 10; 325 TABLET ORAL at 15:10

## 2022-01-01 RX ADMIN — AMIODARONE HYDROCHLORIDE 400 MG: 200 TABLET ORAL at 08:01

## 2022-01-01 RX ADMIN — ASPIRIN 81 MG CHEWABLE TABLET 81 MG: 81 TABLET CHEWABLE at 09:22

## 2022-01-01 RX ADMIN — SODIUM CHLORIDE 20 MCG/MIN: 9 INJECTION, SOLUTION INTRAVENOUS at 18:36

## 2022-01-01 RX ADMIN — APIXABAN 5 MG: 5 TABLET, FILM COATED ORAL at 08:09

## 2022-01-01 RX ADMIN — NYSTATIN 500000 UNITS: 100000 SUSPENSION ORAL at 08:49

## 2022-01-01 RX ADMIN — METRONIDAZOLE 500 MG: 500 INJECTION, SOLUTION INTRAVENOUS at 23:46

## 2022-01-01 RX ADMIN — ONDANSETRON 4 MG: 2 INJECTION INTRAMUSCULAR; INTRAVENOUS at 12:16

## 2022-01-01 RX ADMIN — ONDANSETRON 4 MG: 2 INJECTION INTRAMUSCULAR; INTRAVENOUS at 01:11

## 2022-01-01 RX ADMIN — HYDROMORPHONE HYDROCHLORIDE 1 MG: 1 INJECTION, SOLUTION INTRAMUSCULAR; INTRAVENOUS; SUBCUTANEOUS at 18:50

## 2022-01-01 RX ADMIN — ASPIRIN 81 MG CHEWABLE TABLET 81 MG: 81 TABLET CHEWABLE at 08:55

## 2022-01-01 RX ADMIN — METOPROLOL SUCCINATE 50 MG: 50 TABLET, EXTENDED RELEASE ORAL at 21:00

## 2022-01-01 RX ADMIN — HYDROMORPHONE HYDROCHLORIDE 1 MG: 1 INJECTION, SOLUTION INTRAMUSCULAR; INTRAVENOUS; SUBCUTANEOUS at 08:51

## 2022-01-01 RX ADMIN — LISINOPRIL 20 MG: 20 TABLET ORAL at 10:32

## 2022-01-01 RX ADMIN — HYDROMORPHONE HYDROCHLORIDE 1 MG: 1 INJECTION, SOLUTION INTRAMUSCULAR; INTRAVENOUS; SUBCUTANEOUS at 11:40

## 2022-01-01 RX ADMIN — VERAPAMIL HYDROCHLORIDE 200 MG: 100 CAPSULE, EXTENDED RELEASE ORAL at 21:07

## 2022-01-01 RX ADMIN — AMIODARONE HYDROCHLORIDE 200 MG: 200 TABLET ORAL at 09:01

## 2022-01-01 RX ADMIN — ONDANSETRON 4 MG: 2 INJECTION INTRAMUSCULAR; INTRAVENOUS at 09:21

## 2022-01-01 RX ADMIN — VERAPAMIL HYDROCHLORIDE 200 MG: 100 CAPSULE, EXTENDED RELEASE ORAL at 21:18

## 2022-01-01 RX ADMIN — HYDROMORPHONE HYDROCHLORIDE 1.5 MG: 2 INJECTION INTRAMUSCULAR; INTRAVENOUS; SUBCUTANEOUS at 12:13

## 2022-01-01 RX ADMIN — HYDROMORPHONE HYDROCHLORIDE 1 MG: 1 INJECTION, SOLUTION INTRAMUSCULAR; INTRAVENOUS; SUBCUTANEOUS at 14:04

## 2022-01-01 RX ADMIN — INSULIN LISPRO 6 UNITS: 100 INJECTION, SOLUTION INTRAVENOUS; SUBCUTANEOUS at 11:30

## 2022-01-01 RX ADMIN — CEFAZOLIN 2 G: 1 INJECTION, POWDER, FOR SOLUTION INTRAMUSCULAR; INTRAVENOUS at 21:47

## 2022-01-01 RX ADMIN — VERAPAMIL HYDROCHLORIDE 200 MG: 100 CAPSULE, EXTENDED RELEASE ORAL at 21:24

## 2022-01-01 RX ADMIN — DIPHENHYDRAMINE HYDROCHLORIDE 25 MG: 50 INJECTION INTRAMUSCULAR; INTRAVENOUS at 08:53

## 2022-01-01 RX ADMIN — SODIUM CHLORIDE 150 ML/HR: 9 INJECTION, SOLUTION INTRAVENOUS at 20:32

## 2022-01-01 RX ADMIN — POTASSIUM CHLORIDE 10 MEQ: 750 TABLET, FILM COATED, EXTENDED RELEASE ORAL at 20:31

## 2022-01-01 RX ADMIN — LISINOPRIL 20 MG: 20 TABLET ORAL at 09:00

## 2022-01-01 RX ADMIN — INSULIN LISPRO 10 UNITS: 100 INJECTION, SOLUTION INTRAVENOUS; SUBCUTANEOUS at 16:40

## 2022-01-01 RX ADMIN — METRONIDAZOLE 500 MG: 500 INJECTION, SOLUTION INTRAVENOUS at 12:32

## 2022-01-01 RX ADMIN — ASPIRIN 81 MG CHEWABLE TABLET 81 MG: 81 TABLET CHEWABLE at 08:12

## 2022-01-01 RX ADMIN — INSULIN LISPRO 2 UNITS: 100 INJECTION, SOLUTION INTRAVENOUS; SUBCUTANEOUS at 22:38

## 2022-01-01 RX ADMIN — POTASSIUM CHLORIDE, DEXTROSE MONOHYDRATE AND SODIUM CHLORIDE 100 ML/HR: 150; 5; 450 INJECTION, SOLUTION INTRAVENOUS at 09:09

## 2022-01-01 RX ADMIN — CEFAZOLIN 2 G: 1 INJECTION, POWDER, FOR SOLUTION INTRAMUSCULAR; INTRAVENOUS at 12:29

## 2022-01-01 RX ADMIN — METHYLPREDNISOLONE SODIUM SUCCINATE 20 MG: 40 INJECTION, POWDER, FOR SOLUTION INTRAMUSCULAR; INTRAVENOUS at 08:44

## 2022-01-01 RX ADMIN — HYDROMORPHONE HYDROCHLORIDE 1 MG: 1 INJECTION, SOLUTION INTRAMUSCULAR; INTRAVENOUS; SUBCUTANEOUS at 08:11

## 2022-01-01 RX ADMIN — HYDROMORPHONE HYDROCHLORIDE 1 MG: 1 INJECTION, SOLUTION INTRAMUSCULAR; INTRAVENOUS; SUBCUTANEOUS at 01:30

## 2022-01-01 RX ADMIN — OXYCODONE HYDROCHLORIDE 10 MG: 10 TABLET ORAL at 23:50

## 2022-01-01 RX ADMIN — MORPHINE SULFATE 15 MG: 15 TABLET ORAL at 16:34

## 2022-01-01 RX ADMIN — SODIUM CHLORIDE 500 ML: 9 INJECTION, SOLUTION INTRAVENOUS at 01:50

## 2022-01-01 RX ADMIN — HYDROMORPHONE HYDROCHLORIDE 2 MG: 1 INJECTION, SOLUTION INTRAMUSCULAR; INTRAVENOUS; SUBCUTANEOUS at 21:46

## 2022-01-01 RX ADMIN — GLUCAGON HYDROCHLORIDE 1 MG: KIT at 05:59

## 2022-01-01 RX ADMIN — PANTOPRAZOLE SODIUM 40 MG: 40 TABLET, DELAYED RELEASE ORAL at 07:00

## 2022-01-01 RX ADMIN — APIXABAN 5 MG: 5 TABLET, FILM COATED ORAL at 09:11

## 2022-01-01 RX ADMIN — PANTOPRAZOLE SODIUM 40 MG: 40 TABLET, DELAYED RELEASE ORAL at 09:43

## 2022-01-01 RX ADMIN — HYDROMORPHONE HYDROCHLORIDE 1.5 MG: 2 INJECTION INTRAMUSCULAR; INTRAVENOUS; SUBCUTANEOUS at 01:58

## 2022-01-01 RX ADMIN — ASPIRIN 81 MG CHEWABLE TABLET 81 MG: 81 TABLET CHEWABLE at 08:54

## 2022-01-01 RX ADMIN — WATER 2 G: 1 INJECTION INTRAMUSCULAR; INTRAVENOUS; SUBCUTANEOUS at 22:13

## 2022-01-01 RX ADMIN — PREDNISONE 15 MG: 5 TABLET ORAL at 09:23

## 2022-01-01 RX ADMIN — AMIODARONE HYDROCHLORIDE 200 MG: 200 TABLET ORAL at 09:03

## 2022-01-01 RX ADMIN — ONDANSETRON 4 MG: 2 INJECTION INTRAMUSCULAR; INTRAVENOUS at 20:31

## 2022-01-01 RX ADMIN — WATER 2 G: 1 INJECTION INTRAMUSCULAR; INTRAVENOUS; SUBCUTANEOUS at 09:17

## 2022-01-01 RX ADMIN — NYSTATIN 500000 UNITS: 100000 SUSPENSION ORAL at 08:50

## 2022-01-01 RX ADMIN — AMIODARONE HYDROCHLORIDE 200 MG: 200 TABLET ORAL at 08:47

## 2022-01-01 RX ADMIN — ONDANSETRON 4 MG: 2 INJECTION INTRAMUSCULAR; INTRAVENOUS at 17:37

## 2022-01-01 RX ADMIN — WATER 2 G: 1 INJECTION INTRAMUSCULAR; INTRAVENOUS; SUBCUTANEOUS at 18:25

## 2022-01-01 RX ADMIN — PREDNISONE 15 MG: 5 TABLET ORAL at 09:09

## 2022-01-01 RX ADMIN — ONDANSETRON 4 MG: 2 INJECTION INTRAMUSCULAR; INTRAVENOUS at 21:52

## 2022-01-01 RX ADMIN — WATER 2 G: 1 INJECTION INTRAMUSCULAR; INTRAVENOUS; SUBCUTANEOUS at 10:14

## 2022-01-01 RX ADMIN — ALUMINUM HYDROXIDE, MAGNESIUM HYDROXIDE, AND SIMETHICONE 30 ML: 200; 200; 20 SUSPENSION ORAL at 22:17

## 2022-01-01 RX ADMIN — FENTANYL CITRATE 100 MCG: 50 INJECTION, SOLUTION INTRAMUSCULAR; INTRAVENOUS at 11:17

## 2022-01-01 RX ADMIN — HYDROMORPHONE HYDROCHLORIDE 1 MG: 1 INJECTION, SOLUTION INTRAMUSCULAR; INTRAVENOUS; SUBCUTANEOUS at 03:54

## 2022-01-01 RX ADMIN — HYDROMORPHONE HYDROCHLORIDE 1.5 MG: 2 INJECTION, SOLUTION INTRAMUSCULAR; INTRAVENOUS; SUBCUTANEOUS at 23:46

## 2022-01-01 RX ADMIN — VERAPAMIL HYDROCHLORIDE 40 MG: 80 TABLET ORAL at 08:36

## 2022-01-01 RX ADMIN — ONDANSETRON 4 MG: 2 INJECTION INTRAMUSCULAR; INTRAVENOUS at 18:17

## 2022-01-01 RX ADMIN — INSULIN LISPRO 4 UNITS: 100 INJECTION, SOLUTION INTRAVENOUS; SUBCUTANEOUS at 17:12

## 2022-01-01 RX ADMIN — PANTOPRAZOLE SODIUM 40 MG: 40 TABLET, DELAYED RELEASE ORAL at 22:26

## 2022-01-01 RX ADMIN — NYSTATIN 500000 UNITS: 100000 SUSPENSION ORAL at 22:26

## 2022-01-01 RX ADMIN — HYDROMORPHONE HYDROCHLORIDE 1 MG: 1 INJECTION, SOLUTION INTRAMUSCULAR; INTRAVENOUS; SUBCUTANEOUS at 04:57

## 2022-01-01 RX ADMIN — KETOROLAC TROMETHAMINE 30 MG: 30 INJECTION, SOLUTION INTRAMUSCULAR; INTRAVENOUS at 10:57

## 2022-01-01 RX ADMIN — NYSTATIN 500000 UNITS: 100000 SUSPENSION ORAL at 09:19

## 2022-01-01 RX ADMIN — METRONIDAZOLE 500 MG: 500 INJECTION, SOLUTION INTRAVENOUS at 18:12

## 2022-01-01 RX ADMIN — SODIUM CHLORIDE, PRESERVATIVE FREE 20 MG: 5 INJECTION INTRAVENOUS at 22:30

## 2022-01-01 RX ADMIN — ONDANSETRON 4 MG: 2 INJECTION INTRAMUSCULAR; INTRAVENOUS at 23:17

## 2022-01-01 RX ADMIN — MEROPENEM 1 G: 1 INJECTION INTRAVENOUS at 08:53

## 2022-01-01 RX ADMIN — HYDROMORPHONE HYDROCHLORIDE 1 MG: 1 INJECTION, SOLUTION INTRAMUSCULAR; INTRAVENOUS; SUBCUTANEOUS at 13:11

## 2022-01-01 RX ADMIN — FENTANYL CITRATE 50 MCG: 50 INJECTION, SOLUTION INTRAMUSCULAR; INTRAVENOUS at 14:10

## 2022-01-01 RX ADMIN — APIXABAN 5 MG: 5 TABLET, FILM COATED ORAL at 21:26

## 2022-01-01 RX ADMIN — HYDROMORPHONE HYDROCHLORIDE 1 MG: 1 INJECTION, SOLUTION INTRAMUSCULAR; INTRAVENOUS; SUBCUTANEOUS at 10:48

## 2022-01-01 RX ADMIN — HYDROMORPHONE HYDROCHLORIDE 2 MG: 1 INJECTION, SOLUTION INTRAMUSCULAR; INTRAVENOUS; SUBCUTANEOUS at 08:45

## 2022-01-01 RX ADMIN — HYDROMORPHONE HYDROCHLORIDE 1 MG: 1 INJECTION, SOLUTION INTRAMUSCULAR; INTRAVENOUS; SUBCUTANEOUS at 23:32

## 2022-01-01 RX ADMIN — APIXABAN 5 MG: 5 TABLET, FILM COATED ORAL at 09:38

## 2022-01-01 RX ADMIN — VERAPAMIL HYDROCHLORIDE 200 MG: 100 CAPSULE, EXTENDED RELEASE ORAL at 22:18

## 2022-01-01 RX ADMIN — ONDANSETRON 4 MG: 2 INJECTION INTRAMUSCULAR; INTRAVENOUS at 09:14

## 2022-01-01 RX ADMIN — LISINOPRIL 20 MG: 20 TABLET ORAL at 08:43

## 2022-01-01 RX ADMIN — PREDNISONE 20 MG: 20 TABLET ORAL at 10:27

## 2022-01-01 RX ADMIN — WATER 2 G: 1 INJECTION INTRAMUSCULAR; INTRAVENOUS; SUBCUTANEOUS at 05:12

## 2022-01-01 RX ADMIN — VERAPAMIL HYDROCHLORIDE 200 MG: 100 CAPSULE, EXTENDED RELEASE ORAL at 22:54

## 2022-01-01 RX ADMIN — INSULIN LISPRO 4 UNITS: 100 INJECTION, SOLUTION INTRAVENOUS; SUBCUTANEOUS at 11:30

## 2022-01-01 RX ADMIN — METRONIDAZOLE 500 MG: 500 INJECTION, SOLUTION INTRAVENOUS at 00:02

## 2022-01-01 RX ADMIN — ASPIRIN 81 MG CHEWABLE TABLET 81 MG: 81 TABLET CHEWABLE at 08:58

## 2022-01-01 RX ADMIN — GLYCOPYRROLATE 0.4 MG: 0.2 INJECTION INTRAMUSCULAR; INTRAVENOUS at 13:20

## 2022-01-01 RX ADMIN — AMIODARONE HYDROCHLORIDE 200 MG: 200 TABLET ORAL at 09:19

## 2022-01-01 RX ADMIN — METOPROLOL TARTRATE 25 MG: 25 TABLET, FILM COATED ORAL at 20:09

## 2022-01-01 RX ADMIN — WATER 2 G: 1 INJECTION INTRAMUSCULAR; INTRAVENOUS; SUBCUTANEOUS at 05:03

## 2022-01-01 RX ADMIN — METRONIDAZOLE 500 MG: 500 INJECTION, SOLUTION INTRAVENOUS at 04:42

## 2022-01-01 RX ADMIN — ONDANSETRON 4 MG: 2 INJECTION INTRAMUSCULAR; INTRAVENOUS at 00:49

## 2022-01-01 RX ADMIN — ONDANSETRON 4 MG: 2 INJECTION INTRAMUSCULAR; INTRAVENOUS at 21:41

## 2022-01-01 RX ADMIN — VERAPAMIL HYDROCHLORIDE 200 MG: 100 CAPSULE, EXTENDED RELEASE ORAL at 22:07

## 2022-01-01 RX ADMIN — HYDROMORPHONE HYDROCHLORIDE 2 MG: 1 INJECTION, SOLUTION INTRAMUSCULAR; INTRAVENOUS; SUBCUTANEOUS at 12:06

## 2022-01-01 RX ADMIN — AMIODARONE HYDROCHLORIDE 200 MG: 200 TABLET ORAL at 10:23

## 2022-01-01 RX ADMIN — HYDROMORPHONE HYDROCHLORIDE 1 MG: 1 INJECTION, SOLUTION INTRAMUSCULAR; INTRAVENOUS; SUBCUTANEOUS at 00:09

## 2022-01-01 RX ADMIN — HYDROMORPHONE HYDROCHLORIDE 1 MG: 1 INJECTION, SOLUTION INTRAMUSCULAR; INTRAVENOUS; SUBCUTANEOUS at 13:04

## 2022-01-01 RX ADMIN — HYDROMORPHONE HYDROCHLORIDE 1 MG: 1 INJECTION, SOLUTION INTRAMUSCULAR; INTRAVENOUS; SUBCUTANEOUS at 15:27

## 2022-01-01 RX ADMIN — INSULIN LISPRO 2 UNITS: 100 INJECTION, SOLUTION INTRAVENOUS; SUBCUTANEOUS at 21:03

## 2022-01-01 RX ADMIN — HYDROMORPHONE HYDROCHLORIDE 1.5 MG: 2 INJECTION, SOLUTION INTRAMUSCULAR; INTRAVENOUS; SUBCUTANEOUS at 20:26

## 2022-01-01 RX ADMIN — ONDANSETRON 4 MG: 2 INJECTION INTRAMUSCULAR; INTRAVENOUS at 17:07

## 2022-01-01 RX ADMIN — APIXABAN 5 MG: 5 TABLET, FILM COATED ORAL at 09:19

## 2022-01-01 RX ADMIN — ONDANSETRON 4 MG: 2 INJECTION INTRAMUSCULAR; INTRAVENOUS at 05:27

## 2022-01-01 RX ADMIN — HYDROMORPHONE HYDROCHLORIDE 1 MG: 1 INJECTION, SOLUTION INTRAMUSCULAR; INTRAVENOUS; SUBCUTANEOUS at 08:12

## 2022-01-01 RX ADMIN — SODIUM CHLORIDE, PRESERVATIVE FREE 20 MG: 5 INJECTION INTRAVENOUS at 08:12

## 2022-01-01 RX ADMIN — PIPERACILLIN AND TAZOBACTAM 3.38 G: 3; .375 INJECTION, POWDER, LYOPHILIZED, FOR SOLUTION INTRAVENOUS at 21:53

## 2022-01-01 RX ADMIN — WATER 2 G: 1 INJECTION INTRAMUSCULAR; INTRAVENOUS; SUBCUTANEOUS at 17:12

## 2022-01-01 RX ADMIN — HYDROMORPHONE HYDROCHLORIDE 1 MG: 1 INJECTION, SOLUTION INTRAMUSCULAR; INTRAVENOUS; SUBCUTANEOUS at 06:44

## 2022-01-01 RX ADMIN — PIPERACILLIN AND TAZOBACTAM 3.38 G: 3; .375 INJECTION, POWDER, LYOPHILIZED, FOR SOLUTION INTRAVENOUS at 12:39

## 2022-01-01 RX ADMIN — LISINOPRIL 20 MG: 20 TABLET ORAL at 08:57

## 2022-01-01 RX ADMIN — SODIUM CHLORIDE 1000 ML: 9 INJECTION, SOLUTION INTRAVENOUS at 08:30

## 2022-01-01 RX ADMIN — PANTOPRAZOLE SODIUM 40 MG: 40 TABLET, DELAYED RELEASE ORAL at 08:56

## 2022-01-01 RX ADMIN — OXYCODONE HYDROCHLORIDE AND ACETAMINOPHEN 1 TABLET: 10; 325 TABLET ORAL at 12:24

## 2022-01-01 RX ADMIN — HYDROMORPHONE HYDROCHLORIDE 1 MG: 1 INJECTION, SOLUTION INTRAMUSCULAR; INTRAVENOUS; SUBCUTANEOUS at 04:19

## 2022-01-01 RX ADMIN — METRONIDAZOLE 500 MG: 500 INJECTION, SOLUTION INTRAVENOUS at 04:46

## 2022-01-01 RX ADMIN — INSULIN LISPRO 4 UNITS: 100 INJECTION, SOLUTION INTRAVENOUS; SUBCUTANEOUS at 12:25

## 2022-01-01 RX ADMIN — METRONIDAZOLE 500 MG: 500 INJECTION, SOLUTION INTRAVENOUS at 04:48

## 2022-01-01 RX ADMIN — PREDNISONE 20 MG: 20 TABLET ORAL at 08:41

## 2022-01-01 RX ADMIN — ATORVASTATIN CALCIUM 20 MG: 20 TABLET, FILM COATED ORAL at 22:00

## 2022-01-01 RX ADMIN — HYDROMORPHONE HYDROCHLORIDE 1 MG: 1 INJECTION, SOLUTION INTRAMUSCULAR; INTRAVENOUS; SUBCUTANEOUS at 20:34

## 2022-01-01 RX ADMIN — LISINOPRIL 20 MG: 20 TABLET ORAL at 08:49

## 2022-01-01 RX ADMIN — CEFAZOLIN 2 G: 1 INJECTION, POWDER, FOR SOLUTION INTRAMUSCULAR; INTRAVENOUS at 12:06

## 2022-01-01 RX ADMIN — HYDROMORPHONE HYDROCHLORIDE 1 MG: 1 INJECTION, SOLUTION INTRAMUSCULAR; INTRAVENOUS; SUBCUTANEOUS at 07:27

## 2022-01-01 RX ADMIN — APIXABAN 5 MG: 5 TABLET, FILM COATED ORAL at 20:58

## 2022-01-01 RX ADMIN — ONDANSETRON 4 MG: 2 INJECTION INTRAMUSCULAR; INTRAVENOUS at 10:00

## 2022-01-01 RX ADMIN — HYDROMORPHONE HYDROCHLORIDE 1 MG: 1 INJECTION, SOLUTION INTRAMUSCULAR; INTRAVENOUS; SUBCUTANEOUS at 22:18

## 2022-01-01 RX ADMIN — ONDANSETRON 4 MG: 2 INJECTION INTRAMUSCULAR; INTRAVENOUS at 22:29

## 2022-01-01 RX ADMIN — ONDANSETRON 4 MG: 2 INJECTION INTRAMUSCULAR; INTRAVENOUS at 23:23

## 2022-01-01 RX ADMIN — DOCUSATE SODIUM 100 MG: 100 CAPSULE, LIQUID FILLED ORAL at 21:48

## 2022-01-01 RX ADMIN — INSULIN LISPRO 4 UNITS: 100 INJECTION, SOLUTION INTRAVENOUS; SUBCUTANEOUS at 15:54

## 2022-01-01 RX ADMIN — VERAPAMIL HYDROCHLORIDE 200 MG: 100 CAPSULE, EXTENDED RELEASE ORAL at 21:00

## 2022-01-01 RX ADMIN — CLOPIDOGREL BISULFATE 75 MG: 75 TABLET ORAL at 10:44

## 2022-01-01 RX ADMIN — POTASSIUM CHLORIDE, DEXTROSE MONOHYDRATE AND SODIUM CHLORIDE 75 ML/HR: 150; 5; 450 INJECTION, SOLUTION INTRAVENOUS at 15:21

## 2022-01-01 RX ADMIN — SODIUM CHLORIDE, PRESERVATIVE FREE 20 MG: 5 INJECTION INTRAVENOUS at 10:13

## 2022-01-01 RX ADMIN — ONDANSETRON 4 MG: 2 INJECTION INTRAMUSCULAR; INTRAVENOUS at 20:56

## 2022-01-01 RX ADMIN — PROPOFOL 130 MG: 10 INJECTION, EMULSION INTRAVENOUS at 17:08

## 2022-01-01 RX ADMIN — SODIUM CHLORIDE 100 ML/HR: 9 INJECTION, SOLUTION INTRAVENOUS at 10:02

## 2022-01-01 RX ADMIN — INSULIN LISPRO 4 UNITS: 100 INJECTION, SOLUTION INTRAVENOUS; SUBCUTANEOUS at 16:31

## 2022-01-01 RX ADMIN — POTASSIUM CHLORIDE, DEXTROSE MONOHYDRATE AND SODIUM CHLORIDE 25 ML/HR: 150; 5; 450 INJECTION, SOLUTION INTRAVENOUS at 14:10

## 2022-01-01 RX ADMIN — HYDROMORPHONE HYDROCHLORIDE 1 MG: 1 INJECTION, SOLUTION INTRAMUSCULAR; INTRAVENOUS; SUBCUTANEOUS at 18:57

## 2022-01-01 RX ADMIN — AMIODARONE HYDROCHLORIDE 200 MG: 200 TABLET ORAL at 08:15

## 2022-01-01 RX ADMIN — ASPIRIN 81 MG CHEWABLE TABLET 81 MG: 81 TABLET CHEWABLE at 08:38

## 2022-01-01 RX ADMIN — ONDANSETRON 4 MG: 2 INJECTION INTRAMUSCULAR; INTRAVENOUS at 00:08

## 2022-01-01 RX ADMIN — SODIUM CHLORIDE, PRESERVATIVE FREE 20 MG: 5 INJECTION INTRAVENOUS at 22:06

## 2022-01-01 RX ADMIN — ONDANSETRON 4 MG: 2 INJECTION INTRAMUSCULAR; INTRAVENOUS at 07:25

## 2022-01-01 RX ADMIN — HYDROMORPHONE HYDROCHLORIDE 1.5 MG: 2 INJECTION INTRAMUSCULAR; INTRAVENOUS; SUBCUTANEOUS at 03:38

## 2022-01-01 RX ADMIN — PIPERACILLIN AND TAZOBACTAM 3.38 G: 3; .375 INJECTION, POWDER, LYOPHILIZED, FOR SOLUTION INTRAVENOUS at 23:15

## 2022-01-01 RX ADMIN — METOPROLOL TARTRATE 50 MG: 50 TABLET, FILM COATED ORAL at 09:17

## 2022-01-01 RX ADMIN — LISINOPRIL 20 MG: 20 TABLET ORAL at 08:48

## 2022-01-01 RX ADMIN — ONDANSETRON 4 MG: 2 INJECTION INTRAMUSCULAR; INTRAVENOUS at 04:54

## 2022-01-01 RX ADMIN — ONDANSETRON 4 MG: 2 INJECTION INTRAMUSCULAR; INTRAVENOUS at 10:09

## 2022-01-01 RX ADMIN — APIXABAN 5 MG: 5 TABLET, FILM COATED ORAL at 08:31

## 2022-01-01 RX ADMIN — METRONIDAZOLE 500 MG: 500 INJECTION, SOLUTION INTRAVENOUS at 21:53

## 2022-01-01 RX ADMIN — HYDROMORPHONE HYDROCHLORIDE 2 MG: 1 INJECTION, SOLUTION INTRAMUSCULAR; INTRAVENOUS; SUBCUTANEOUS at 09:23

## 2022-01-01 RX ADMIN — NYSTATIN 500000 UNITS: 100000 SUSPENSION ORAL at 21:12

## 2022-01-01 RX ADMIN — HYDROMORPHONE HYDROCHLORIDE 1 MG: 1 INJECTION, SOLUTION INTRAMUSCULAR; INTRAVENOUS; SUBCUTANEOUS at 01:24

## 2022-01-01 RX ADMIN — PREDNISONE 15 MG: 5 TABLET ORAL at 09:24

## 2022-01-01 RX ADMIN — OXYCODONE HYDROCHLORIDE 10 MG: 10 TABLET ORAL at 10:44

## 2022-01-01 RX ADMIN — INSULIN LISPRO 2 UNITS: 100 INJECTION, SOLUTION INTRAVENOUS; SUBCUTANEOUS at 12:19

## 2022-01-01 RX ADMIN — NYSTATIN 500000 UNITS: 100000 SUSPENSION ORAL at 08:43

## 2022-01-01 RX ADMIN — ADENOSINE 12 MG: 3 INJECTION, SOLUTION INTRAVENOUS at 03:12

## 2022-01-01 RX ADMIN — PREDNISONE 20 MG: 20 TABLET ORAL at 08:33

## 2022-01-01 RX ADMIN — MORPHINE SULFATE 30 MG: 30 TABLET, FILM COATED, EXTENDED RELEASE ORAL at 21:14

## 2022-01-01 RX ADMIN — HYDROMORPHONE HYDROCHLORIDE 2 MG: 1 INJECTION, SOLUTION INTRAMUSCULAR; INTRAVENOUS; SUBCUTANEOUS at 08:35

## 2022-01-01 RX ADMIN — INSULIN LISPRO 8 UNITS: 100 INJECTION, SOLUTION INTRAVENOUS; SUBCUTANEOUS at 16:41

## 2022-01-01 RX ADMIN — POTASSIUM CHLORIDE 40 MEQ: 1500 TABLET, EXTENDED RELEASE ORAL at 08:51

## 2022-01-01 RX ADMIN — ONDANSETRON 4 MG: 2 INJECTION INTRAMUSCULAR; INTRAVENOUS at 03:55

## 2022-01-01 RX ADMIN — SODIUM CHLORIDE, POTASSIUM CHLORIDE, SODIUM LACTATE AND CALCIUM CHLORIDE: 600; 310; 30; 20 INJECTION, SOLUTION INTRAVENOUS at 10:31

## 2022-01-01 RX ADMIN — OXYCODONE HYDROCHLORIDE 10 MG: 10 TABLET ORAL at 15:38

## 2022-01-01 RX ADMIN — VERAPAMIL HYDROCHLORIDE 200 MG: 100 CAPSULE, EXTENDED RELEASE ORAL at 21:28

## 2022-01-01 RX ADMIN — INSULIN LISPRO 2 UNITS: 100 INJECTION, SOLUTION INTRAVENOUS; SUBCUTANEOUS at 21:10

## 2022-01-01 RX ADMIN — INSULIN LISPRO 15 UNITS: 100 INJECTION, SOLUTION INTRAVENOUS; SUBCUTANEOUS at 16:44

## 2022-01-01 RX ADMIN — METRONIDAZOLE 500 MG: 500 INJECTION, SOLUTION INTRAVENOUS at 20:19

## 2022-01-01 RX ADMIN — SODIUM CHLORIDE, PRESERVATIVE FREE 20 MG: 5 INJECTION INTRAVENOUS at 20:35

## 2022-01-01 RX ADMIN — HYDROMORPHONE HYDROCHLORIDE 2 MG: 1 INJECTION, SOLUTION INTRAMUSCULAR; INTRAVENOUS; SUBCUTANEOUS at 01:08

## 2022-01-01 RX ADMIN — PANTOPRAZOLE SODIUM 40 MG: 40 TABLET, DELAYED RELEASE ORAL at 06:37

## 2022-01-01 RX ADMIN — ONDANSETRON 4 MG: 2 INJECTION INTRAMUSCULAR; INTRAVENOUS at 08:26

## 2022-01-01 RX ADMIN — AMIODARONE HYDROCHLORIDE 200 MG: 200 TABLET ORAL at 08:45

## 2022-01-01 RX ADMIN — SODIUM CHLORIDE 100 ML/HR: 9 INJECTION, SOLUTION INTRAVENOUS at 07:33

## 2022-01-01 RX ADMIN — SODIUM CHLORIDE 100 ML/HR: 9 INJECTION, SOLUTION INTRAVENOUS at 20:31

## 2022-01-01 RX ADMIN — APIXABAN 5 MG: 5 TABLET, FILM COATED ORAL at 08:15

## 2022-01-01 RX ADMIN — ONDANSETRON 4 MG: 2 INJECTION INTRAMUSCULAR; INTRAVENOUS at 19:49

## 2022-01-01 RX ADMIN — HYDROMORPHONE HYDROCHLORIDE 1 MG: 1 INJECTION, SOLUTION INTRAMUSCULAR; INTRAVENOUS; SUBCUTANEOUS at 12:30

## 2022-01-01 RX ADMIN — VERAPAMIL HYDROCHLORIDE 200 MG: 100 CAPSULE, EXTENDED RELEASE ORAL at 22:13

## 2022-01-01 RX ADMIN — HYDROMORPHONE HYDROCHLORIDE 1 MG: 1 INJECTION, SOLUTION INTRAMUSCULAR; INTRAVENOUS; SUBCUTANEOUS at 02:58

## 2022-01-01 RX ADMIN — ONDANSETRON 4 MG: 4 TABLET, ORALLY DISINTEGRATING ORAL at 17:47

## 2022-01-01 RX ADMIN — HYDROMORPHONE HYDROCHLORIDE 1 MG: 1 INJECTION, SOLUTION INTRAMUSCULAR; INTRAVENOUS; SUBCUTANEOUS at 08:29

## 2022-01-01 RX ADMIN — HYDROMORPHONE HYDROCHLORIDE 2 MG: 1 INJECTION, SOLUTION INTRAMUSCULAR; INTRAVENOUS; SUBCUTANEOUS at 11:58

## 2022-01-01 RX ADMIN — HYDROMORPHONE HYDROCHLORIDE 1 MG: 1 INJECTION, SOLUTION INTRAMUSCULAR; INTRAVENOUS; SUBCUTANEOUS at 16:48

## 2022-01-01 RX ADMIN — INSULIN LISPRO 8 UNITS: 100 INJECTION, SOLUTION INTRAVENOUS; SUBCUTANEOUS at 21:00

## 2022-01-01 RX ADMIN — POTASSIUM CHLORIDE 10 MEQ: 750 TABLET, FILM COATED, EXTENDED RELEASE ORAL at 08:47

## 2022-01-01 RX ADMIN — POTASSIUM CHLORIDE 10 MEQ: 750 TABLET, FILM COATED, EXTENDED RELEASE ORAL at 08:24

## 2022-01-01 RX ADMIN — ASPIRIN 81 MG CHEWABLE TABLET 81 MG: 81 TABLET CHEWABLE at 09:24

## 2022-01-01 RX ADMIN — WATER 2 G: 1 INJECTION INTRAMUSCULAR; INTRAVENOUS; SUBCUTANEOUS at 03:15

## 2022-01-01 RX ADMIN — INSULIN LISPRO 6 UNITS: 100 INJECTION, SOLUTION INTRAVENOUS; SUBCUTANEOUS at 17:34

## 2022-01-01 RX ADMIN — ONDANSETRON 4 MG: 2 INJECTION INTRAMUSCULAR; INTRAVENOUS at 15:05

## 2022-01-01 RX ADMIN — POTASSIUM CHLORIDE, DEXTROSE MONOHYDRATE AND SODIUM CHLORIDE 25 ML/HR: 150; 5; 450 INJECTION, SOLUTION INTRAVENOUS at 01:56

## 2022-01-01 RX ADMIN — ONDANSETRON 4 MG: 2 INJECTION INTRAMUSCULAR; INTRAVENOUS at 03:44

## 2022-01-01 RX ADMIN — SODIUM CHLORIDE 125 ML/HR: 9 INJECTION, SOLUTION INTRAVENOUS at 23:43

## 2022-01-01 RX ADMIN — HYDROMORPHONE HYDROCHLORIDE 2 MG: 1 INJECTION, SOLUTION INTRAMUSCULAR; INTRAVENOUS; SUBCUTANEOUS at 09:17

## 2022-01-01 RX ADMIN — HYDROMORPHONE HYDROCHLORIDE 2 MG: 1 INJECTION, SOLUTION INTRAMUSCULAR; INTRAVENOUS; SUBCUTANEOUS at 17:25

## 2022-01-01 RX ADMIN — ACETAMINOPHEN 650 MG: 325 TABLET ORAL at 18:38

## 2022-01-01 RX ADMIN — AMIODARONE HYDROCHLORIDE 200 MG: 200 TABLET ORAL at 08:58

## 2022-01-01 RX ADMIN — HYDROMORPHONE HYDROCHLORIDE 1 MG: 1 INJECTION, SOLUTION INTRAMUSCULAR; INTRAVENOUS; SUBCUTANEOUS at 09:12

## 2022-01-01 RX ADMIN — ONDANSETRON 4 MG: 2 INJECTION INTRAMUSCULAR; INTRAVENOUS at 01:58

## 2022-01-01 RX ADMIN — HYDROMORPHONE HYDROCHLORIDE 1 MG: 1 INJECTION, SOLUTION INTRAMUSCULAR; INTRAVENOUS; SUBCUTANEOUS at 05:31

## 2022-01-01 RX ADMIN — ONDANSETRON 4 MG: 2 INJECTION INTRAMUSCULAR; INTRAVENOUS at 08:35

## 2022-01-01 RX ADMIN — AMIODARONE HYDROCHLORIDE 200 MG: 200 TABLET ORAL at 10:36

## 2022-01-01 RX ADMIN — POTASSIUM CHLORIDE, DEXTROSE MONOHYDRATE AND SODIUM CHLORIDE 75 ML/HR: 150; 5; 450 INJECTION, SOLUTION INTRAVENOUS at 06:32

## 2022-01-01 RX ADMIN — HYDROMORPHONE HYDROCHLORIDE 1 MG: 1 INJECTION, SOLUTION INTRAMUSCULAR; INTRAVENOUS; SUBCUTANEOUS at 15:10

## 2022-01-01 RX ADMIN — SODIUM CHLORIDE 125 ML/HR: 9 INJECTION, SOLUTION INTRAVENOUS at 03:00

## 2022-01-01 RX ADMIN — HYDROMORPHONE HYDROCHLORIDE 2 MG: 1 INJECTION, SOLUTION INTRAMUSCULAR; INTRAVENOUS; SUBCUTANEOUS at 23:57

## 2022-01-01 RX ADMIN — LISINOPRIL 20 MG: 20 TABLET ORAL at 09:22

## 2022-01-01 RX ADMIN — HYDROMORPHONE HYDROCHLORIDE 0.5 MG: 1 INJECTION, SOLUTION INTRAMUSCULAR; INTRAVENOUS; SUBCUTANEOUS at 19:55

## 2022-01-01 RX ADMIN — HYDROMORPHONE HYDROCHLORIDE 1 MG: 1 INJECTION, SOLUTION INTRAMUSCULAR; INTRAVENOUS; SUBCUTANEOUS at 08:35

## 2022-01-01 RX ADMIN — PREDNISONE 15 MG: 5 TABLET ORAL at 08:17

## 2022-01-01 RX ADMIN — APIXABAN 5 MG: 5 TABLET, FILM COATED ORAL at 10:32

## 2022-01-01 RX ADMIN — HYDROMORPHONE HYDROCHLORIDE 1 MG: 1 INJECTION, SOLUTION INTRAMUSCULAR; INTRAVENOUS; SUBCUTANEOUS at 21:45

## 2022-01-01 RX ADMIN — WATER 2 G: 1 INJECTION INTRAMUSCULAR; INTRAVENOUS; SUBCUTANEOUS at 05:43

## 2022-01-01 RX ADMIN — PANTOPRAZOLE SODIUM 40 MG: 40 TABLET, DELAYED RELEASE ORAL at 08:24

## 2022-01-01 RX ADMIN — HYDROMORPHONE HYDROCHLORIDE 1 MG: 1 INJECTION, SOLUTION INTRAMUSCULAR; INTRAVENOUS; SUBCUTANEOUS at 18:16

## 2022-01-01 RX ADMIN — PROMETHAZINE HYDROCHLORIDE 25 MG: 25 TABLET ORAL at 16:17

## 2022-01-01 RX ADMIN — WATER 2 G: 1 INJECTION INTRAMUSCULAR; INTRAVENOUS; SUBCUTANEOUS at 21:46

## 2022-01-01 RX ADMIN — HYDROMORPHONE HYDROCHLORIDE 2 MG: 1 INJECTION, SOLUTION INTRAMUSCULAR; INTRAVENOUS; SUBCUTANEOUS at 05:40

## 2022-01-01 RX ADMIN — ONDANSETRON 4 MG: 2 INJECTION INTRAMUSCULAR; INTRAVENOUS at 08:28

## 2022-01-01 RX ADMIN — ONDANSETRON 4 MG: 4 TABLET, ORALLY DISINTEGRATING ORAL at 08:16

## 2022-01-01 RX ADMIN — HYDROMORPHONE HYDROCHLORIDE 2 MG: 1 INJECTION, SOLUTION INTRAMUSCULAR; INTRAVENOUS; SUBCUTANEOUS at 15:28

## 2022-01-01 RX ADMIN — AMIODARONE HYDROCHLORIDE 200 MG: 200 TABLET ORAL at 08:28

## 2022-01-01 RX ADMIN — PROPOFOL 200 MG: 10 INJECTION, EMULSION INTRAVENOUS at 09:42

## 2022-01-01 RX ADMIN — LIDOCAINE HYDROCHLORIDE 100 MG: 20 INJECTION, SOLUTION EPIDURAL; INFILTRATION; INTRACAUDAL; PERINEURAL at 10:36

## 2022-01-01 RX ADMIN — HYDROMORPHONE HYDROCHLORIDE 2 MG: 1 INJECTION, SOLUTION INTRAMUSCULAR; INTRAVENOUS; SUBCUTANEOUS at 13:02

## 2022-01-01 RX ADMIN — MORPHINE SULFATE 15 MG: 15 TABLET ORAL at 23:05

## 2022-01-01 RX ADMIN — INSULIN LISPRO 4 UNITS: 100 INJECTION, SOLUTION INTRAVENOUS; SUBCUTANEOUS at 21:41

## 2022-01-01 RX ADMIN — APIXABAN 5 MG: 5 TABLET, FILM COATED ORAL at 08:41

## 2022-01-01 RX ADMIN — PANTOPRAZOLE SODIUM 40 MG: 40 TABLET, DELAYED RELEASE ORAL at 06:47

## 2022-01-01 RX ADMIN — ONDANSETRON 4 MG: 2 INJECTION INTRAMUSCULAR; INTRAVENOUS at 22:23

## 2022-01-01 RX ADMIN — AMIODARONE HYDROCHLORIDE 400 MG: 200 TABLET ORAL at 08:36

## 2022-01-01 RX ADMIN — LISINOPRIL 20 MG: 20 TABLET ORAL at 09:24

## 2022-01-01 RX ADMIN — AMIODARONE HYDROCHLORIDE 200 MG: 200 TABLET ORAL at 09:59

## 2022-01-01 RX ADMIN — CEFAZOLIN 2 G: 1 INJECTION, POWDER, FOR SOLUTION INTRAMUSCULAR; INTRAVENOUS at 04:12

## 2022-01-01 RX ADMIN — PROMETHAZINE HYDROCHLORIDE 25 MG: 25 TABLET ORAL at 23:34

## 2022-01-01 RX ADMIN — ONDANSETRON 4 MG: 2 INJECTION INTRAMUSCULAR; INTRAVENOUS at 07:55

## 2022-01-01 RX ADMIN — ATORVASTATIN CALCIUM 20 MG: 20 TABLET, FILM COATED ORAL at 20:34

## 2022-01-01 RX ADMIN — NYSTATIN 500000 UNITS: 100000 SUSPENSION ORAL at 08:17

## 2022-01-01 RX ADMIN — WATER 2 G: 1 INJECTION INTRAMUSCULAR; INTRAVENOUS; SUBCUTANEOUS at 02:17

## 2022-01-01 RX ADMIN — INSULIN LISPRO 4 UNITS: 100 INJECTION, SOLUTION INTRAVENOUS; SUBCUTANEOUS at 17:51

## 2022-01-01 RX ADMIN — WATER 2 G: 1 INJECTION INTRAMUSCULAR; INTRAVENOUS; SUBCUTANEOUS at 10:51

## 2022-01-01 RX ADMIN — HYDROMORPHONE HYDROCHLORIDE 1 MG: 1 INJECTION, SOLUTION INTRAMUSCULAR; INTRAVENOUS; SUBCUTANEOUS at 22:06

## 2022-01-01 RX ADMIN — WATER 2 G: 1 INJECTION INTRAMUSCULAR; INTRAVENOUS; SUBCUTANEOUS at 21:22

## 2022-01-01 RX ADMIN — HYDROMORPHONE HYDROCHLORIDE 1 MG: 1 INJECTION, SOLUTION INTRAMUSCULAR; INTRAVENOUS; SUBCUTANEOUS at 01:09

## 2022-01-01 RX ADMIN — ONDANSETRON 4 MG: 2 INJECTION INTRAMUSCULAR; INTRAVENOUS at 18:46

## 2022-01-01 RX ADMIN — INSULIN LISPRO 8 UNITS: 100 INJECTION, SOLUTION INTRAVENOUS; SUBCUTANEOUS at 17:48

## 2022-01-01 RX ADMIN — ADENOSINE 12 MG: 3 INJECTION, SOLUTION INTRAVENOUS at 03:10

## 2022-01-01 RX ADMIN — AMIODARONE HYDROCHLORIDE 200 MG: 200 TABLET ORAL at 10:32

## 2022-01-01 RX ADMIN — FLUCONAZOLE 200 MG: 100 TABLET ORAL at 19:48

## 2022-01-01 RX ADMIN — METRONIDAZOLE 500 MG: 500 INJECTION, SOLUTION INTRAVENOUS at 21:40

## 2022-01-01 RX ADMIN — METRONIDAZOLE 500 MG: 500 INJECTION, SOLUTION INTRAVENOUS at 20:30

## 2022-01-01 RX ADMIN — INSULIN LISPRO 2 UNITS: 100 INJECTION, SOLUTION INTRAVENOUS; SUBCUTANEOUS at 07:30

## 2022-01-01 RX ADMIN — Medication 20 MG: at 19:44

## 2022-01-01 RX ADMIN — HYDROMORPHONE HYDROCHLORIDE 1 MG: 1 INJECTION, SOLUTION INTRAMUSCULAR; INTRAVENOUS; SUBCUTANEOUS at 22:34

## 2022-01-01 RX ADMIN — CEFAZOLIN 2 G: 1 INJECTION, POWDER, FOR SOLUTION INTRAMUSCULAR; INTRAVENOUS at 03:55

## 2022-01-01 RX ADMIN — ASPIRIN 81 MG CHEWABLE TABLET 81 MG: 81 TABLET CHEWABLE at 08:36

## 2022-01-01 RX ADMIN — METRONIDAZOLE 500 MG: 500 INJECTION, SOLUTION INTRAVENOUS at 21:48

## 2022-01-01 RX ADMIN — HYDROMORPHONE HYDROCHLORIDE 1.5 MG: 2 INJECTION INTRAMUSCULAR; INTRAVENOUS; SUBCUTANEOUS at 18:00

## 2022-01-01 RX ADMIN — OXYCODONE HYDROCHLORIDE 10 MG: 10 TABLET ORAL at 21:31

## 2022-01-01 RX ADMIN — WATER 2 G: 1 INJECTION INTRAMUSCULAR; INTRAVENOUS; SUBCUTANEOUS at 16:01

## 2022-01-01 RX ADMIN — ONDANSETRON 4 MG: 2 INJECTION INTRAMUSCULAR; INTRAVENOUS at 16:55

## 2022-01-01 RX ADMIN — DOCUSATE SODIUM 100 MG: 100 CAPSULE, LIQUID FILLED ORAL at 19:44

## 2022-01-01 RX ADMIN — WATER 2 G: 1 INJECTION INTRAMUSCULAR; INTRAVENOUS; SUBCUTANEOUS at 05:01

## 2022-01-01 RX ADMIN — SODIUM CHLORIDE 75 ML/HR: 9 INJECTION, SOLUTION INTRAVENOUS at 16:18

## 2022-01-01 RX ADMIN — MORPHINE SULFATE 15 MG: 15 TABLET ORAL at 15:05

## 2022-01-01 RX ADMIN — LISINOPRIL 20 MG: 20 TABLET ORAL at 08:32

## 2022-01-01 RX ADMIN — HYDROMORPHONE HYDROCHLORIDE 1 MG: 1 INJECTION, SOLUTION INTRAMUSCULAR; INTRAVENOUS; SUBCUTANEOUS at 09:47

## 2022-01-01 RX ADMIN — HYDROMORPHONE HYDROCHLORIDE 1 MG: 1 INJECTION, SOLUTION INTRAMUSCULAR; INTRAVENOUS; SUBCUTANEOUS at 00:39

## 2022-01-01 RX ADMIN — POTASSIUM CHLORIDE, DEXTROSE MONOHYDRATE AND SODIUM CHLORIDE 75 ML/HR: 150; 5; 450 INJECTION, SOLUTION INTRAVENOUS at 21:48

## 2022-01-01 RX ADMIN — SODIUM CHLORIDE, PRESERVATIVE FREE 20 MG: 5 INJECTION INTRAVENOUS at 22:33

## 2022-01-01 RX ADMIN — CEFAZOLIN 2 G: 1 INJECTION, POWDER, FOR SOLUTION INTRAMUSCULAR; INTRAVENOUS at 03:20

## 2022-01-01 RX ADMIN — HYDROMORPHONE HYDROCHLORIDE 1 MG: 1 INJECTION, SOLUTION INTRAMUSCULAR; INTRAVENOUS; SUBCUTANEOUS at 18:27

## 2022-01-01 RX ADMIN — ONDANSETRON 4 MG: 2 INJECTION INTRAMUSCULAR; INTRAVENOUS at 16:26

## 2022-01-01 RX ADMIN — METRONIDAZOLE 500 MG: 500 INJECTION, SOLUTION INTRAVENOUS at 12:39

## 2022-01-01 RX ADMIN — ONDANSETRON 4 MG: 2 INJECTION INTRAMUSCULAR; INTRAVENOUS at 16:52

## 2022-01-01 RX ADMIN — METRONIDAZOLE 500 MG: 500 INJECTION, SOLUTION INTRAVENOUS at 03:44

## 2022-01-01 RX ADMIN — ONDANSETRON 4 MG: 2 INJECTION INTRAMUSCULAR; INTRAVENOUS at 14:02

## 2022-01-01 RX ADMIN — METHYLPREDNISOLONE SODIUM SUCCINATE 20 MG: 40 INJECTION, POWDER, FOR SOLUTION INTRAMUSCULAR; INTRAVENOUS at 09:56

## 2022-01-01 RX ADMIN — HYDROMORPHONE HYDROCHLORIDE 1 MG: 1 INJECTION, SOLUTION INTRAMUSCULAR; INTRAVENOUS; SUBCUTANEOUS at 08:27

## 2022-01-01 RX ADMIN — ONDANSETRON 4 MG: 2 INJECTION INTRAMUSCULAR; INTRAVENOUS at 08:55

## 2022-01-01 RX ADMIN — ONDANSETRON 4 MG: 2 INJECTION INTRAMUSCULAR; INTRAVENOUS at 01:33

## 2022-01-01 RX ADMIN — ASPIRIN 81 MG CHEWABLE TABLET 81 MG: 81 TABLET CHEWABLE at 08:39

## 2022-01-01 RX ADMIN — KETOROLAC TROMETHAMINE 30 MG: 30 INJECTION, SOLUTION INTRAMUSCULAR; INTRAVENOUS at 12:20

## 2022-01-01 RX ADMIN — METRONIDAZOLE 500 MG: 500 INJECTION, SOLUTION INTRAVENOUS at 09:25

## 2022-01-01 RX ADMIN — CEFAZOLIN 2 G: 1 INJECTION, POWDER, FOR SOLUTION INTRAMUSCULAR; INTRAVENOUS at 13:01

## 2022-01-01 RX ADMIN — METOPROLOL SUCCINATE 50 MG: 50 TABLET, EXTENDED RELEASE ORAL at 09:11

## 2022-01-01 RX ADMIN — METRONIDAZOLE 500 MG: 500 INJECTION, SOLUTION INTRAVENOUS at 12:21

## 2022-01-01 RX ADMIN — INSULIN LISPRO 4 UNITS: 100 INJECTION, SOLUTION INTRAVENOUS; SUBCUTANEOUS at 17:15

## 2022-01-01 RX ADMIN — SODIUM CHLORIDE 125 ML/HR: 9 INJECTION, SOLUTION INTRAVENOUS at 11:00

## 2022-01-01 RX ADMIN — FENTANYL CITRATE 100 MCG: 50 INJECTION, SOLUTION INTRAMUSCULAR; INTRAVENOUS at 10:31

## 2022-01-01 RX ADMIN — FENTANYL CITRATE 50 MCG: 50 INJECTION, SOLUTION INTRAMUSCULAR; INTRAVENOUS at 09:55

## 2022-01-01 RX ADMIN — HYDROMORPHONE HYDROCHLORIDE 1 MG: 1 INJECTION, SOLUTION INTRAMUSCULAR; INTRAVENOUS; SUBCUTANEOUS at 17:01

## 2022-01-01 RX ADMIN — APIXABAN 5 MG: 5 TABLET, FILM COATED ORAL at 09:03

## 2022-01-01 RX ADMIN — HYDROMORPHONE HYDROCHLORIDE 1 MG: 1 INJECTION, SOLUTION INTRAMUSCULAR; INTRAVENOUS; SUBCUTANEOUS at 17:49

## 2022-01-01 RX ADMIN — DEXAMETHASONE SODIUM PHOSPHATE 4 MG: 4 INJECTION, SOLUTION INTRA-ARTICULAR; INTRALESIONAL; INTRAMUSCULAR; INTRAVENOUS; SOFT TISSUE at 10:00

## 2022-01-01 RX ADMIN — HYDROMORPHONE HYDROCHLORIDE 1 MG: 1 INJECTION, SOLUTION INTRAMUSCULAR; INTRAVENOUS; SUBCUTANEOUS at 02:02

## 2022-01-01 RX ADMIN — ONDANSETRON 4 MG: 2 INJECTION INTRAMUSCULAR; INTRAVENOUS at 21:25

## 2022-01-01 RX ADMIN — INSULIN LISPRO 4 UNITS: 100 INJECTION, SOLUTION INTRAVENOUS; SUBCUTANEOUS at 17:07

## 2022-01-01 RX ADMIN — INSULIN LISPRO 2 UNITS: 100 INJECTION, SOLUTION INTRAVENOUS; SUBCUTANEOUS at 22:26

## 2022-01-01 RX ADMIN — LISINOPRIL 20 MG: 20 TABLET ORAL at 08:24

## 2022-01-01 RX ADMIN — SODIUM CHLORIDE 125 ML/HR: 9 INJECTION, SOLUTION INTRAVENOUS at 19:22

## 2022-01-01 RX ADMIN — APIXABAN 5 MG: 5 TABLET, FILM COATED ORAL at 21:51

## 2022-01-01 RX ADMIN — VERAPAMIL HYDROCHLORIDE 200 MG: 100 CAPSULE, EXTENDED RELEASE ORAL at 20:33

## 2022-01-01 RX ADMIN — PREDNISONE 15 MG: 5 TABLET ORAL at 08:22

## 2022-01-01 RX ADMIN — Medication 3 MG: at 13:20

## 2022-01-01 RX ADMIN — SODIUM CHLORIDE, PRESERVATIVE FREE 20 MG: 5 INJECTION INTRAVENOUS at 08:10

## 2022-01-01 RX ADMIN — LISINOPRIL 20 MG: 20 TABLET ORAL at 08:30

## 2022-01-01 RX ADMIN — ONDANSETRON 4 MG: 2 INJECTION INTRAMUSCULAR; INTRAVENOUS at 18:00

## 2022-01-01 RX ADMIN — SODIUM CHLORIDE, PRESERVATIVE FREE 20 MG: 5 INJECTION INTRAVENOUS at 21:44

## 2022-01-01 RX ADMIN — ASPIRIN 81 MG CHEWABLE TABLET 81 MG: 81 TABLET CHEWABLE at 08:30

## 2022-01-01 RX ADMIN — DEXAMETHASONE SODIUM PHOSPHATE 4 MG: 4 INJECTION, SOLUTION INTRA-ARTICULAR; INTRALESIONAL; INTRAMUSCULAR; INTRAVENOUS; SOFT TISSUE at 18:05

## 2022-01-01 RX ADMIN — ASPIRIN 81 MG CHEWABLE TABLET 81 MG: 81 TABLET CHEWABLE at 08:45

## 2022-01-01 RX ADMIN — HYDROMORPHONE HYDROCHLORIDE 1 MG: 1 INJECTION, SOLUTION INTRAMUSCULAR; INTRAVENOUS; SUBCUTANEOUS at 04:35

## 2022-01-01 RX ADMIN — INSULIN LISPRO 6 UNITS: 100 INJECTION, SOLUTION INTRAVENOUS; SUBCUTANEOUS at 22:06

## 2022-01-01 RX ADMIN — INSULIN LISPRO 6 UNITS: 100 INJECTION, SOLUTION INTRAVENOUS; SUBCUTANEOUS at 21:07

## 2022-01-01 RX ADMIN — MORPHINE SULFATE 15 MG: 15 TABLET ORAL at 16:12

## 2022-01-01 RX ADMIN — PHENYLEPHRINE HYDROCHLORIDE 50 MCG: 10 INJECTION INTRAVENOUS at 17:42

## 2022-01-01 RX ADMIN — HYDROMORPHONE HYDROCHLORIDE 1 MG: 1 INJECTION, SOLUTION INTRAMUSCULAR; INTRAVENOUS; SUBCUTANEOUS at 16:17

## 2022-01-01 RX ADMIN — HYDROMORPHONE HYDROCHLORIDE 2 MG: 1 INJECTION, SOLUTION INTRAMUSCULAR; INTRAVENOUS; SUBCUTANEOUS at 17:49

## 2022-01-01 RX ADMIN — ADENOSINE 6 MG: 3 INJECTION INTRAVENOUS at 03:04

## 2022-01-01 RX ADMIN — INSULIN LISPRO 8 UNITS: 100 INJECTION, SOLUTION INTRAVENOUS; SUBCUTANEOUS at 21:23

## 2022-01-01 RX ADMIN — PIPERACILLIN AND TAZOBACTAM 3.38 G: 3; .375 INJECTION, POWDER, LYOPHILIZED, FOR SOLUTION INTRAVENOUS at 04:03

## 2022-01-01 RX ADMIN — ONDANSETRON 4 MG: 2 INJECTION INTRAMUSCULAR; INTRAVENOUS at 07:30

## 2022-01-01 RX ADMIN — AMIODARONE HYDROCHLORIDE 200 MG: 200 TABLET ORAL at 11:00

## 2022-01-01 RX ADMIN — ONDANSETRON 4 MG: 2 INJECTION INTRAMUSCULAR; INTRAVENOUS at 08:51

## 2022-01-01 RX ADMIN — HYDROMORPHONE HYDROCHLORIDE 1 MG: 1 INJECTION, SOLUTION INTRAMUSCULAR; INTRAVENOUS; SUBCUTANEOUS at 04:06

## 2022-01-01 RX ADMIN — HYDROMORPHONE HYDROCHLORIDE 1 MG: 1 INJECTION, SOLUTION INTRAMUSCULAR; INTRAVENOUS; SUBCUTANEOUS at 04:02

## 2022-01-01 RX ADMIN — METOCLOPRAMIDE HYDROCHLORIDE 5 MG: 5 INJECTION INTRAMUSCULAR; INTRAVENOUS at 14:14

## 2022-01-01 RX ADMIN — METOPROLOL SUCCINATE 50 MG: 50 TABLET, EXTENDED RELEASE ORAL at 09:08

## 2022-01-01 RX ADMIN — ALBUTEROL SULFATE 2 PUFF: 108 INHALANT RESPIRATORY (INHALATION) at 11:35

## 2022-01-01 RX ADMIN — METOPROLOL TARTRATE 25 MG: 25 TABLET, FILM COATED ORAL at 05:32

## 2022-01-01 RX ADMIN — MORPHINE SULFATE 15 MG: 15 TABLET ORAL at 21:16

## 2022-01-01 RX ADMIN — HYDROMORPHONE HYDROCHLORIDE 0.5 MG: 1 INJECTION, SOLUTION INTRAMUSCULAR; INTRAVENOUS; SUBCUTANEOUS at 10:51

## 2022-01-01 RX ADMIN — ONDANSETRON 4 MG: 2 INJECTION INTRAMUSCULAR; INTRAVENOUS at 12:12

## 2022-01-01 RX ADMIN — HYDROMORPHONE HYDROCHLORIDE 1 MG: 1 INJECTION, SOLUTION INTRAMUSCULAR; INTRAVENOUS; SUBCUTANEOUS at 03:48

## 2022-01-01 RX ADMIN — INSULIN LISPRO 6 UNITS: 100 INJECTION, SOLUTION INTRAVENOUS; SUBCUTANEOUS at 22:18

## 2022-01-01 RX ADMIN — NYSTATIN 500000 UNITS: 100000 SUSPENSION ORAL at 17:55

## 2022-01-01 RX ADMIN — SODIUM CHLORIDE, PRESERVATIVE FREE 20 MG: 5 INJECTION INTRAVENOUS at 21:22

## 2022-01-01 RX ADMIN — CEFAZOLIN 2 G: 1 INJECTION, POWDER, FOR SOLUTION INTRAMUSCULAR; INTRAVENOUS at 03:48

## 2022-01-01 RX ADMIN — MORPHINE SULFATE 15 MG: 15 TABLET ORAL at 19:02

## 2022-01-01 RX ADMIN — HYDROMORPHONE HYDROCHLORIDE 1 MG: 1 INJECTION, SOLUTION INTRAMUSCULAR; INTRAVENOUS; SUBCUTANEOUS at 09:44

## 2022-01-01 RX ADMIN — OXYCODONE HYDROCHLORIDE 10 MG: 10 TABLET ORAL at 21:24

## 2022-01-01 RX ADMIN — PIPERACILLIN AND TAZOBACTAM 3.38 G: 3; .375 INJECTION, POWDER, LYOPHILIZED, FOR SOLUTION INTRAVENOUS at 13:21

## 2022-01-01 RX ADMIN — VERAPAMIL HYDROCHLORIDE 200 MG: 100 CAPSULE, EXTENDED RELEASE ORAL at 21:54

## 2022-01-01 RX ADMIN — SODIUM CHLORIDE, PRESERVATIVE FREE 20 MG: 5 INJECTION INTRAVENOUS at 21:41

## 2022-01-01 RX ADMIN — LISINOPRIL 20 MG: 20 TABLET ORAL at 09:51

## 2022-01-01 RX ADMIN — ALUMINUM HYDROXIDE, MAGNESIUM HYDROXIDE, AND SIMETHICONE 30 ML: 200; 200; 20 SUSPENSION ORAL at 21:59

## 2022-01-01 RX ADMIN — MORPHINE SULFATE 30 MG: 30 TABLET, FILM COATED, EXTENDED RELEASE ORAL at 10:30

## 2022-01-01 RX ADMIN — APIXABAN 5 MG: 5 TABLET, FILM COATED ORAL at 08:30

## 2022-01-01 RX ADMIN — HYDROMORPHONE HYDROCHLORIDE 1 MG: 1 INJECTION, SOLUTION INTRAMUSCULAR; INTRAVENOUS; SUBCUTANEOUS at 16:37

## 2022-01-01 RX ADMIN — WATER 2 G: 1 INJECTION INTRAMUSCULAR; INTRAVENOUS; SUBCUTANEOUS at 18:00

## 2022-01-01 RX ADMIN — APIXABAN 5 MG: 5 TABLET, FILM COATED ORAL at 08:48

## 2022-01-01 RX ADMIN — HYDROMORPHONE HYDROCHLORIDE 1.5 MG: 2 INJECTION, SOLUTION INTRAMUSCULAR; INTRAVENOUS; SUBCUTANEOUS at 11:28

## 2022-01-01 RX ADMIN — ONDANSETRON 4 MG: 2 INJECTION INTRAMUSCULAR; INTRAVENOUS at 11:48

## 2022-01-01 RX ADMIN — HYDROMORPHONE HYDROCHLORIDE 2 MG: 1 INJECTION, SOLUTION INTRAMUSCULAR; INTRAVENOUS; SUBCUTANEOUS at 13:19

## 2022-01-01 RX ADMIN — SODIUM CHLORIDE, PRESERVATIVE FREE 20 MG: 5 INJECTION INTRAVENOUS at 21:33

## 2022-01-01 RX ADMIN — SODIUM CHLORIDE 125 ML/HR: 9 INJECTION, SOLUTION INTRAVENOUS at 07:39

## 2022-01-01 RX ADMIN — METOCLOPRAMIDE HYDROCHLORIDE 5 MG: 5 INJECTION INTRAMUSCULAR; INTRAVENOUS at 22:37

## 2022-01-01 RX ADMIN — SODIUM CHLORIDE, POTASSIUM CHLORIDE, SODIUM LACTATE AND CALCIUM CHLORIDE: 600; 310; 30; 20 INJECTION, SOLUTION INTRAVENOUS at 11:15

## 2022-01-01 RX ADMIN — HYDROCORTISONE SODIUM SUCCINATE 100 MG: 100 INJECTION, POWDER, FOR SOLUTION INTRAMUSCULAR; INTRAVENOUS at 10:04

## 2022-01-01 RX ADMIN — SODIUM CHLORIDE, PRESERVATIVE FREE 20 MG: 5 INJECTION INTRAVENOUS at 21:35

## 2022-01-01 RX ADMIN — SUCCINYLCHOLINE CHLORIDE 120 MG: 20 INJECTION, SOLUTION INTRAMUSCULAR; INTRAVENOUS at 10:36

## 2022-01-01 RX ADMIN — HYDROMORPHONE HYDROCHLORIDE 2 MG: 1 INJECTION, SOLUTION INTRAMUSCULAR; INTRAVENOUS; SUBCUTANEOUS at 07:39

## 2022-01-01 RX ADMIN — METHYLPREDNISOLONE SODIUM SUCCINATE 20 MG: 40 INJECTION, POWDER, FOR SOLUTION INTRAMUSCULAR; INTRAVENOUS at 08:30

## 2022-01-01 RX ADMIN — OXYCODONE HYDROCHLORIDE 10 MG: 10 TABLET ORAL at 21:46

## 2022-01-01 RX ADMIN — ONDANSETRON 4 MG: 2 INJECTION INTRAMUSCULAR; INTRAVENOUS at 12:32

## 2022-01-01 RX ADMIN — APIXABAN 5 MG: 5 TABLET, FILM COATED ORAL at 21:08

## 2022-01-01 RX ADMIN — MORPHINE SULFATE 2 MG: 2 INJECTION, SOLUTION INTRAMUSCULAR; INTRAVENOUS at 13:05

## 2022-01-01 RX ADMIN — SODIUM CHLORIDE, PRESERVATIVE FREE 20 MG: 5 INJECTION INTRAVENOUS at 08:30

## 2022-01-01 RX ADMIN — INSULIN LISPRO 4 UNITS: 100 INJECTION, SOLUTION INTRAVENOUS; SUBCUTANEOUS at 16:52

## 2022-01-01 RX ADMIN — PIPERACILLIN AND TAZOBACTAM 3.38 G: 3; .375 INJECTION, POWDER, LYOPHILIZED, FOR SOLUTION INTRAVENOUS at 12:52

## 2022-01-01 RX ADMIN — INSULIN LISPRO 2 UNITS: 100 INJECTION, SOLUTION INTRAVENOUS; SUBCUTANEOUS at 22:07

## 2022-01-01 RX ADMIN — HYDROMORPHONE HYDROCHLORIDE 1.5 MG: 2 INJECTION, SOLUTION INTRAMUSCULAR; INTRAVENOUS; SUBCUTANEOUS at 01:20

## 2022-01-01 RX ADMIN — METOPROLOL SUCCINATE 50 MG: 50 TABLET, EXTENDED RELEASE ORAL at 08:26

## 2022-01-01 RX ADMIN — INSULIN LISPRO 6 UNITS: 100 INJECTION, SOLUTION INTRAVENOUS; SUBCUTANEOUS at 17:16

## 2022-01-01 RX ADMIN — INSULIN LISPRO 2 UNITS: 100 INJECTION, SOLUTION INTRAVENOUS; SUBCUTANEOUS at 12:18

## 2022-01-01 RX ADMIN — ONDANSETRON 4 MG: 2 INJECTION INTRAMUSCULAR; INTRAVENOUS at 22:28

## 2022-01-01 RX ADMIN — HYDROMORPHONE HYDROCHLORIDE 2 MG: 2 INJECTION INTRAMUSCULAR; INTRAVENOUS; SUBCUTANEOUS at 10:45

## 2022-01-01 RX ADMIN — NYSTATIN 500000 UNITS: 100000 SUSPENSION ORAL at 17:04

## 2022-01-01 RX ADMIN — WATER 2 G: 1 INJECTION INTRAMUSCULAR; INTRAVENOUS; SUBCUTANEOUS at 21:15

## 2022-01-01 RX ADMIN — ONDANSETRON 4 MG: 2 INJECTION INTRAMUSCULAR; INTRAVENOUS at 12:30

## 2022-01-01 RX ADMIN — VERAPAMIL HYDROCHLORIDE 200 MG: 100 CAPSULE, EXTENDED RELEASE ORAL at 20:31

## 2022-01-01 RX ADMIN — HYDROMORPHONE HYDROCHLORIDE 1 MG: 1 INJECTION, SOLUTION INTRAMUSCULAR; INTRAVENOUS; SUBCUTANEOUS at 23:37

## 2022-01-01 RX ADMIN — METRONIDAZOLE 500 MG: 500 INJECTION, SOLUTION INTRAVENOUS at 11:28

## 2022-01-01 RX ADMIN — AMIODARONE HYDROCHLORIDE 200 MG: 200 TABLET ORAL at 09:43

## 2022-01-01 RX ADMIN — SODIUM CHLORIDE 125 ML/HR: 9 INJECTION, SOLUTION INTRAVENOUS at 08:21

## 2022-01-01 RX ADMIN — ROCURONIUM BROMIDE 30 MG: 50 INJECTION, SOLUTION INTRAVENOUS at 09:42

## 2022-01-01 RX ADMIN — INSULIN LISPRO 2 UNITS: 100 INJECTION, SOLUTION INTRAVENOUS; SUBCUTANEOUS at 11:54

## 2022-01-01 RX ADMIN — METRONIDAZOLE 500 MG: 500 INJECTION, SOLUTION INTRAVENOUS at 21:34

## 2022-01-01 RX ADMIN — SODIUM CHLORIDE, PRESERVATIVE FREE 20 MG: 5 INJECTION INTRAVENOUS at 21:15

## 2022-01-01 RX ADMIN — ASPIRIN 81 MG CHEWABLE TABLET 81 MG: 81 TABLET CHEWABLE at 08:10

## 2022-01-01 RX ADMIN — ASPIRIN 81 MG CHEWABLE TABLET 81 MG: 81 TABLET CHEWABLE at 09:47

## 2022-01-01 RX ADMIN — NYSTATIN 500000 UNITS: 100000 SUSPENSION ORAL at 13:20

## 2022-01-01 RX ADMIN — SODIUM CHLORIDE, PRESERVATIVE FREE 20 MG: 5 INJECTION INTRAVENOUS at 08:58

## 2022-01-01 RX ADMIN — HYDROMORPHONE HYDROCHLORIDE 1 MG: 1 INJECTION, SOLUTION INTRAMUSCULAR; INTRAVENOUS; SUBCUTANEOUS at 21:41

## 2022-01-01 RX ADMIN — CEFAZOLIN 2 G: 1 INJECTION, POWDER, FOR SOLUTION INTRAMUSCULAR; INTRAVENOUS at 12:33

## 2022-01-01 RX ADMIN — METRONIDAZOLE 500 MG: 500 INJECTION, SOLUTION INTRAVENOUS at 20:28

## 2022-01-01 RX ADMIN — INSULIN LISPRO 2 UNITS: 100 INJECTION, SOLUTION INTRAVENOUS; SUBCUTANEOUS at 13:30

## 2022-01-01 RX ADMIN — METRONIDAZOLE 500 MG: 500 INJECTION, SOLUTION INTRAVENOUS at 12:31

## 2022-01-01 RX ADMIN — METOCLOPRAMIDE HYDROCHLORIDE 5 MG: 5 INJECTION INTRAMUSCULAR; INTRAVENOUS at 18:21

## 2022-01-01 RX ADMIN — POTASSIUM CHLORIDE, DEXTROSE MONOHYDRATE AND SODIUM CHLORIDE 25 ML/HR: 150; 5; 450 INJECTION, SOLUTION INTRAVENOUS at 04:51

## 2022-01-01 RX ADMIN — INSULIN LISPRO 6 UNITS: 100 INJECTION, SOLUTION INTRAVENOUS; SUBCUTANEOUS at 22:52

## 2022-01-01 RX ADMIN — METRONIDAZOLE 500 MG: 500 INJECTION, SOLUTION INTRAVENOUS at 05:19

## 2022-01-01 RX ADMIN — INSULIN LISPRO 8 UNITS: 100 INJECTION, SOLUTION INTRAVENOUS; SUBCUTANEOUS at 22:01

## 2022-01-01 RX ADMIN — FENTANYL CITRATE 50 MCG: 50 INJECTION, SOLUTION INTRAMUSCULAR; INTRAVENOUS at 11:39

## 2022-01-01 RX ADMIN — SODIUM CHLORIDE 75 ML/HR: 9 INJECTION, SOLUTION INTRAVENOUS at 06:01

## 2022-01-01 RX ADMIN — INSULIN LISPRO 8 UNITS: 100 INJECTION, SOLUTION INTRAVENOUS; SUBCUTANEOUS at 16:38

## 2022-01-01 RX ADMIN — ROCURONIUM BROMIDE 20 MG: 50 INJECTION, SOLUTION INTRAVENOUS at 11:43

## 2022-01-01 RX ADMIN — ONDANSETRON 4 MG: 2 INJECTION INTRAMUSCULAR; INTRAVENOUS at 15:43

## 2022-01-01 RX ADMIN — OXYCODONE HYDROCHLORIDE AND ACETAMINOPHEN 1 TABLET: 10; 325 TABLET ORAL at 23:11

## 2022-01-01 RX ADMIN — METRONIDAZOLE 500 MG: 500 INJECTION, SOLUTION INTRAVENOUS at 09:17

## 2022-01-01 RX ADMIN — CLOPIDOGREL BISULFATE 75 MG: 75 TABLET ORAL at 11:00

## 2022-01-01 RX ADMIN — METRONIDAZOLE 500 MG: 500 INJECTION, SOLUTION INTRAVENOUS at 04:04

## 2022-01-01 RX ADMIN — ONDANSETRON 4 MG: 2 INJECTION INTRAMUSCULAR; INTRAVENOUS at 15:11

## 2022-01-01 RX ADMIN — LISINOPRIL 20 MG: 20 TABLET ORAL at 08:28

## 2022-01-01 RX ADMIN — HYDROMORPHONE HYDROCHLORIDE 1 MG: 1 INJECTION, SOLUTION INTRAMUSCULAR; INTRAVENOUS; SUBCUTANEOUS at 16:52

## 2022-01-01 RX ADMIN — HYDROMORPHONE HYDROCHLORIDE 1 MG: 1 INJECTION, SOLUTION INTRAMUSCULAR; INTRAVENOUS; SUBCUTANEOUS at 11:13

## 2022-01-01 RX ADMIN — INSULIN LISPRO 6 UNITS: 100 INJECTION, SOLUTION INTRAVENOUS; SUBCUTANEOUS at 21:19

## 2022-01-01 RX ADMIN — POTASSIUM CHLORIDE, DEXTROSE MONOHYDRATE AND SODIUM CHLORIDE 125 ML/HR: 150; 5; 450 INJECTION, SOLUTION INTRAVENOUS at 01:50

## 2022-01-01 RX ADMIN — HYDROMORPHONE HYDROCHLORIDE 2 MG: 1 INJECTION, SOLUTION INTRAMUSCULAR; INTRAVENOUS; SUBCUTANEOUS at 02:03

## 2022-01-01 RX ADMIN — HYDRALAZINE HYDROCHLORIDE 10 MG: 20 INJECTION, SOLUTION INTRAMUSCULAR; INTRAVENOUS at 19:31

## 2022-01-01 RX ADMIN — AMIODARONE HYDROCHLORIDE 200 MG: 200 TABLET ORAL at 09:22

## 2022-01-01 RX ADMIN — HYDROMORPHONE HYDROCHLORIDE 1 MG: 1 INJECTION, SOLUTION INTRAMUSCULAR; INTRAVENOUS; SUBCUTANEOUS at 08:02

## 2022-01-01 RX ADMIN — POTASSIUM CHLORIDE, DEXTROSE MONOHYDRATE AND SODIUM CHLORIDE 25 ML/HR: 150; 5; 450 INJECTION, SOLUTION INTRAVENOUS at 08:38

## 2022-01-01 RX ADMIN — KETOROLAC TROMETHAMINE 30 MG: 30 INJECTION, SOLUTION INTRAMUSCULAR; INTRAVENOUS at 00:34

## 2022-01-01 RX ADMIN — ONDANSETRON 4 MG: 2 INJECTION INTRAMUSCULAR; INTRAVENOUS at 23:43

## 2022-01-01 RX ADMIN — ONDANSETRON 4 MG: 2 INJECTION INTRAMUSCULAR; INTRAVENOUS at 05:15

## 2022-01-01 RX ADMIN — HYDROMORPHONE HYDROCHLORIDE 1 MG: 1 INJECTION, SOLUTION INTRAMUSCULAR; INTRAVENOUS; SUBCUTANEOUS at 02:50

## 2022-01-01 RX ADMIN — HYDROMORPHONE HYDROCHLORIDE 1 MG: 1 INJECTION, SOLUTION INTRAMUSCULAR; INTRAVENOUS; SUBCUTANEOUS at 15:01

## 2022-01-01 RX ADMIN — SODIUM CHLORIDE, PRESERVATIVE FREE 20 MG: 5 INJECTION INTRAVENOUS at 21:42

## 2022-01-01 RX ADMIN — SODIUM CHLORIDE, PRESERVATIVE FREE 20 MG: 5 INJECTION INTRAVENOUS at 21:53

## 2022-01-01 RX ADMIN — INSULIN LISPRO 4 UNITS: 100 INJECTION, SOLUTION INTRAVENOUS; SUBCUTANEOUS at 08:40

## 2022-01-01 RX ADMIN — ROCURONIUM BROMIDE 10 MG: 50 INJECTION, SOLUTION INTRAVENOUS at 11:17

## 2022-01-01 RX ADMIN — HYDROMORPHONE HYDROCHLORIDE 2 MG: 1 INJECTION, SOLUTION INTRAMUSCULAR; INTRAVENOUS; SUBCUTANEOUS at 21:00

## 2022-01-01 RX ADMIN — METRONIDAZOLE 500 MG: 500 INJECTION, SOLUTION INTRAVENOUS at 12:19

## 2022-01-01 RX ADMIN — ONDANSETRON 4 MG: 2 INJECTION INTRAMUSCULAR; INTRAVENOUS at 19:44

## 2022-01-01 RX ADMIN — OXYCODONE HYDROCHLORIDE AND ACETAMINOPHEN 1 TABLET: 10; 325 TABLET ORAL at 19:30

## 2022-01-01 RX ADMIN — METRONIDAZOLE 500 MG: 500 INJECTION, SOLUTION INTRAVENOUS at 02:17

## 2022-01-01 RX ADMIN — FENTANYL CITRATE 50 MCG: 50 INJECTION, SOLUTION INTRAMUSCULAR; INTRAVENOUS at 10:06

## 2022-01-01 RX ADMIN — METOPROLOL SUCCINATE 50 MG: 50 TABLET, EXTENDED RELEASE ORAL at 08:52

## 2022-01-01 RX ADMIN — VERAPAMIL HYDROCHLORIDE 200 MG: 100 CAPSULE, EXTENDED RELEASE ORAL at 21:15

## 2022-01-01 RX ADMIN — SODIUM CHLORIDE 100 ML/HR: 9 INJECTION, SOLUTION INTRAVENOUS at 03:19

## 2022-01-01 RX ADMIN — METHYLPREDNISOLONE SODIUM SUCCINATE 20 MG: 40 INJECTION, POWDER, FOR SOLUTION INTRAMUSCULAR; INTRAVENOUS at 09:09

## 2022-01-01 RX ADMIN — LISINOPRIL 20 MG: 20 TABLET ORAL at 08:47

## 2022-01-01 RX ADMIN — VERAPAMIL HYDROCHLORIDE 200 MG: 100 CAPSULE, EXTENDED RELEASE ORAL at 22:09

## 2022-01-01 RX ADMIN — ONDANSETRON 4 MG: 2 INJECTION INTRAMUSCULAR; INTRAVENOUS at 12:40

## 2022-01-01 RX ADMIN — ROCURONIUM BROMIDE 20 MG: 50 INJECTION, SOLUTION INTRAVENOUS at 10:09

## 2022-01-01 RX ADMIN — ONDANSETRON 4 MG: 2 INJECTION INTRAMUSCULAR; INTRAVENOUS at 12:18

## 2022-01-01 RX ADMIN — APIXABAN 5 MG: 5 TABLET, FILM COATED ORAL at 10:44

## 2022-01-01 RX ADMIN — PANTOPRAZOLE SODIUM 40 MG: 40 TABLET, DELAYED RELEASE ORAL at 15:25

## 2022-01-01 RX ADMIN — DOCUSATE SODIUM 100 MG: 100 CAPSULE, LIQUID FILLED ORAL at 20:16

## 2022-01-01 RX ADMIN — ONDANSETRON 4 MG: 2 INJECTION INTRAMUSCULAR; INTRAVENOUS at 09:47

## 2022-01-01 RX ADMIN — HYDROMORPHONE HYDROCHLORIDE 1 MG: 1 INJECTION, SOLUTION INTRAMUSCULAR; INTRAVENOUS; SUBCUTANEOUS at 05:14

## 2022-01-01 RX ADMIN — OXYCODONE HYDROCHLORIDE 10 MG: 10 TABLET ORAL at 09:46

## 2022-01-01 RX ADMIN — HYDROMORPHONE HYDROCHLORIDE 1 MG: 1 INJECTION, SOLUTION INTRAMUSCULAR; INTRAVENOUS; SUBCUTANEOUS at 17:04

## 2022-01-01 RX ADMIN — PANTOPRAZOLE SODIUM 40 MG: 40 TABLET, DELAYED RELEASE ORAL at 06:39

## 2022-01-01 RX ADMIN — ASCORBIC ACID, VITAMIN A PALMITATE, CHOLECALCIFEROL, THIAMINE HYDROCHLORIDE, RIBOFLAVIN-5 PHOSPHATE SODIUM, PYRIDOXINE HYDROCHLORIDE, NIACINAMIDE, DEXPANTHENOL, ALPHA-TOCOPHEROL ACETATE, VITAMIN K1, FOLIC ACID, BIOTIN, CYANOCOBALAMIN: 200; 3300; 200; 6; 3.6; 6; 40; 15; 10; 150; 600; 60; 5 INJECTION, SOLUTION INTRAVENOUS at 22:48

## 2022-01-01 RX ADMIN — INSULIN LISPRO 2 UNITS: 100 INJECTION, SOLUTION INTRAVENOUS; SUBCUTANEOUS at 09:46

## 2022-01-01 RX ADMIN — Medication 20 MG: at 21:54

## 2022-01-01 RX ADMIN — MORPHINE SULFATE 15 MG: 15 TABLET ORAL at 16:33

## 2022-01-01 RX ADMIN — WATER 2 G: 1 INJECTION INTRAMUSCULAR; INTRAVENOUS; SUBCUTANEOUS at 09:54

## 2022-01-01 RX ADMIN — PANTOPRAZOLE SODIUM 40 MG: 40 TABLET, DELAYED RELEASE ORAL at 08:38

## 2022-01-01 RX ADMIN — LISINOPRIL 20 MG: 20 TABLET ORAL at 10:36

## 2022-01-01 RX ADMIN — AMIODARONE HYDROCHLORIDE 200 MG: 200 TABLET ORAL at 09:00

## 2022-01-01 RX ADMIN — SODIUM CHLORIDE, PRESERVATIVE FREE 20 MG: 5 INJECTION INTRAVENOUS at 20:50

## 2022-01-01 RX ADMIN — PREDNISONE 20 MG: 20 TABLET ORAL at 09:07

## 2022-01-01 RX ADMIN — PREDNISONE 20 MG: 20 TABLET ORAL at 08:15

## 2022-01-01 RX ADMIN — HYDROMORPHONE HYDROCHLORIDE 1 MG: 1 INJECTION, SOLUTION INTRAMUSCULAR; INTRAVENOUS; SUBCUTANEOUS at 16:26

## 2022-01-01 RX ADMIN — HYDROMORPHONE HYDROCHLORIDE 1 MG: 1 INJECTION, SOLUTION INTRAMUSCULAR; INTRAVENOUS; SUBCUTANEOUS at 01:21

## 2022-01-01 RX ADMIN — DEXMEDETOMIDINE HYDROCHLORIDE 10 MCG: 100 INJECTION, SOLUTION INTRAVENOUS at 11:45

## 2022-01-01 RX ADMIN — HYDROMORPHONE HYDROCHLORIDE 1.5 MG: 2 INJECTION, SOLUTION INTRAMUSCULAR; INTRAVENOUS; SUBCUTANEOUS at 02:38

## 2022-01-01 RX ADMIN — FLUCONAZOLE 200 MG: 100 TABLET ORAL at 18:16

## 2022-01-01 RX ADMIN — SODIUM CHLORIDE, PRESERVATIVE FREE 20 MG: 5 INJECTION INTRAVENOUS at 09:16

## 2022-01-01 RX ADMIN — HYDROMORPHONE HYDROCHLORIDE 1 MG: 1 INJECTION, SOLUTION INTRAMUSCULAR; INTRAVENOUS; SUBCUTANEOUS at 21:46

## 2022-01-01 RX ADMIN — OXYCODONE HYDROCHLORIDE 10 MG: 10 TABLET ORAL at 12:37

## 2022-01-01 RX ADMIN — METOPROLOL SUCCINATE 50 MG: 50 TABLET, EXTENDED RELEASE ORAL at 20:25

## 2022-01-01 RX ADMIN — NYSTATIN 500000 UNITS: 100000 SUSPENSION ORAL at 09:00

## 2022-01-01 RX ADMIN — ONDANSETRON 4 MG: 2 INJECTION INTRAMUSCULAR; INTRAVENOUS at 08:30

## 2022-01-01 RX ADMIN — ATORVASTATIN CALCIUM 20 MG: 20 TABLET, FILM COATED ORAL at 22:21

## 2022-01-01 RX ADMIN — LISINOPRIL 20 MG: 20 TABLET ORAL at 09:38

## 2022-01-01 RX ADMIN — HYDROMORPHONE HYDROCHLORIDE 1 MG: 1 INJECTION, SOLUTION INTRAMUSCULAR; INTRAVENOUS; SUBCUTANEOUS at 08:55

## 2022-01-01 RX ADMIN — HYDROMORPHONE HYDROCHLORIDE 1 MG: 1 INJECTION, SOLUTION INTRAMUSCULAR; INTRAVENOUS; SUBCUTANEOUS at 11:35

## 2022-01-01 RX ADMIN — PROMETHAZINE HYDROCHLORIDE 25 MG: 25 TABLET ORAL at 21:47

## 2022-01-01 RX ADMIN — SODIUM CHLORIDE 125 ML/HR: 9 INJECTION, SOLUTION INTRAVENOUS at 03:44

## 2022-01-01 RX ADMIN — SODIUM CHLORIDE 50 ML/HR: 9 INJECTION, SOLUTION INTRAVENOUS at 21:02

## 2022-01-01 RX ADMIN — DEXTROSE MONOHYDRATE 250 ML: 100 INJECTION, SOLUTION INTRAVENOUS at 12:12

## 2022-01-01 RX ADMIN — HYDROMORPHONE HYDROCHLORIDE 1 MG: 1 INJECTION, SOLUTION INTRAMUSCULAR; INTRAVENOUS; SUBCUTANEOUS at 09:21

## 2022-01-01 RX ADMIN — ASPIRIN 81 MG CHEWABLE TABLET 81 MG: 81 TABLET CHEWABLE at 08:28

## 2022-01-01 RX ADMIN — INSULIN LISPRO 8 UNITS: 100 INJECTION, SOLUTION INTRAVENOUS; SUBCUTANEOUS at 17:07

## 2022-01-01 RX ADMIN — ONDANSETRON 4 MG: 2 INJECTION INTRAMUSCULAR; INTRAVENOUS at 09:51

## 2022-01-01 RX ADMIN — ONDANSETRON 4 MG: 2 INJECTION INTRAMUSCULAR; INTRAVENOUS at 03:09

## 2022-01-01 RX ADMIN — HYDROMORPHONE HYDROCHLORIDE 2 MG: 1 INJECTION, SOLUTION INTRAMUSCULAR; INTRAVENOUS; SUBCUTANEOUS at 00:02

## 2022-01-01 RX ADMIN — APIXABAN 5 MG: 5 TABLET, FILM COATED ORAL at 21:53

## 2022-01-01 RX ADMIN — HYDROMORPHONE HYDROCHLORIDE 2 MG: 1 INJECTION, SOLUTION INTRAMUSCULAR; INTRAVENOUS; SUBCUTANEOUS at 23:47

## 2022-01-01 RX ADMIN — HYDROMORPHONE HYDROCHLORIDE 1 MG: 1 INJECTION, SOLUTION INTRAMUSCULAR; INTRAVENOUS; SUBCUTANEOUS at 08:41

## 2022-01-01 RX ADMIN — KETOROLAC TROMETHAMINE 30 MG: 30 INJECTION, SOLUTION INTRAMUSCULAR; INTRAVENOUS at 17:47

## 2022-01-01 RX ADMIN — SODIUM CHLORIDE, PRESERVATIVE FREE 20 MG: 5 INJECTION INTRAVENOUS at 22:16

## 2022-01-01 RX ADMIN — DEXMEDETOMIDINE HYDROCHLORIDE 10 MCG: 100 INJECTION, SOLUTION INTRAVENOUS at 09:31

## 2022-01-01 RX ADMIN — AMIODARONE HYDROCHLORIDE 200 MG: 200 TABLET ORAL at 09:09

## 2022-01-01 RX ADMIN — ONDANSETRON 4 MG: 2 INJECTION INTRAMUSCULAR; INTRAVENOUS at 01:22

## 2022-01-01 RX ADMIN — METOPROLOL TARTRATE 5 MG: 1 INJECTION, SOLUTION INTRAVENOUS at 09:24

## 2022-01-01 RX ADMIN — METOPROLOL SUCCINATE 50 MG: 50 TABLET, EXTENDED RELEASE ORAL at 08:01

## 2022-01-01 RX ADMIN — HYDROMORPHONE HYDROCHLORIDE 2 MG: 1 INJECTION, SOLUTION INTRAMUSCULAR; INTRAVENOUS; SUBCUTANEOUS at 19:51

## 2022-01-01 RX ADMIN — VERAPAMIL HYDROCHLORIDE 200 MG: 100 CAPSULE, EXTENDED RELEASE ORAL at 21:31

## 2022-01-01 RX ADMIN — HYDROMORPHONE HYDROCHLORIDE 1 MG: 1 INJECTION, SOLUTION INTRAMUSCULAR; INTRAVENOUS; SUBCUTANEOUS at 06:12

## 2022-01-01 RX ADMIN — INSULIN LISPRO 6 UNITS: 100 INJECTION, SOLUTION INTRAVENOUS; SUBCUTANEOUS at 17:21

## 2022-01-01 RX ADMIN — HYDROMORPHONE HYDROCHLORIDE 2 MG: 1 INJECTION, SOLUTION INTRAMUSCULAR; INTRAVENOUS; SUBCUTANEOUS at 15:21

## 2022-01-01 RX ADMIN — ONDANSETRON 4 MG: 2 INJECTION INTRAMUSCULAR; INTRAVENOUS at 03:41

## 2022-01-01 RX ADMIN — HYDROMORPHONE HYDROCHLORIDE 1.5 MG: 2 INJECTION, SOLUTION INTRAMUSCULAR; INTRAVENOUS; SUBCUTANEOUS at 20:22

## 2022-01-01 RX ADMIN — PIPERACILLIN AND TAZOBACTAM 3.38 G: 3; .375 INJECTION, POWDER, LYOPHILIZED, FOR SOLUTION INTRAVENOUS at 05:15

## 2022-01-01 RX ADMIN — PREDNISONE 15 MG: 5 TABLET ORAL at 09:35

## 2022-01-01 RX ADMIN — HYDROMORPHONE HYDROCHLORIDE 2 MG: 1 INJECTION, SOLUTION INTRAMUSCULAR; INTRAVENOUS; SUBCUTANEOUS at 06:01

## 2022-01-01 RX ADMIN — WATER 2 G: 1 INJECTION INTRAMUSCULAR; INTRAVENOUS; SUBCUTANEOUS at 00:44

## 2022-01-01 RX ADMIN — VERAPAMIL HYDROCHLORIDE 200 MG: 100 CAPSULE, EXTENDED RELEASE ORAL at 22:34

## 2022-01-01 RX ADMIN — KETOROLAC TROMETHAMINE 30 MG: 30 INJECTION, SOLUTION INTRAMUSCULAR; INTRAVENOUS at 05:48

## 2022-01-01 RX ADMIN — SODIUM CHLORIDE, PRESERVATIVE FREE 20 MG: 5 INJECTION INTRAVENOUS at 08:31

## 2022-01-01 RX ADMIN — PIPERACILLIN AND TAZOBACTAM 3.38 G: 3; .375 INJECTION, POWDER, LYOPHILIZED, FOR SOLUTION INTRAVENOUS at 09:46

## 2022-01-01 RX ADMIN — METOPROLOL TARTRATE 5 MG: 5 INJECTION, SOLUTION INTRAVENOUS at 10:11

## 2022-01-01 RX ADMIN — PANTOPRAZOLE SODIUM 40 MG: 40 TABLET, DELAYED RELEASE ORAL at 18:13

## 2022-01-01 RX ADMIN — PROMETHAZINE HYDROCHLORIDE 25 MG: 25 TABLET ORAL at 04:12

## 2022-01-01 RX ADMIN — HYDROMORPHONE HYDROCHLORIDE 2 MG: 1 INJECTION, SOLUTION INTRAMUSCULAR; INTRAVENOUS; SUBCUTANEOUS at 03:46

## 2022-01-01 RX ADMIN — PREDNISONE 20 MG: 20 TABLET ORAL at 08:26

## 2022-01-01 RX ADMIN — METRONIDAZOLE 500 MG: 500 INJECTION, SOLUTION INTRAVENOUS at 03:15

## 2022-01-01 RX ADMIN — APIXABAN 5 MG: 5 TABLET, FILM COATED ORAL at 08:11

## 2022-01-01 RX ADMIN — HYDROMORPHONE HYDROCHLORIDE 1 MG: 1 INJECTION, SOLUTION INTRAMUSCULAR; INTRAVENOUS; SUBCUTANEOUS at 08:40

## 2022-01-01 RX ADMIN — HYDROMORPHONE HYDROCHLORIDE 1 MG: 1 INJECTION, SOLUTION INTRAMUSCULAR; INTRAVENOUS; SUBCUTANEOUS at 02:06

## 2022-01-01 RX ADMIN — APIXABAN 5 MG: 5 TABLET, FILM COATED ORAL at 20:10

## 2022-01-01 RX ADMIN — ONDANSETRON 4 MG: 2 INJECTION INTRAMUSCULAR; INTRAVENOUS at 08:02

## 2022-01-01 RX ADMIN — WATER 2 G: 1 INJECTION INTRAMUSCULAR; INTRAVENOUS; SUBCUTANEOUS at 06:29

## 2022-01-01 RX ADMIN — AMIODARONE HYDROCHLORIDE 200 MG: 200 TABLET ORAL at 08:41

## 2022-01-01 RX ADMIN — ONDANSETRON 4 MG: 2 INJECTION INTRAMUSCULAR; INTRAVENOUS at 06:26

## 2022-01-01 RX ADMIN — APIXABAN 5 MG: 5 TABLET, FILM COATED ORAL at 22:03

## 2022-01-01 RX ADMIN — INSULIN LISPRO 2 UNITS: 100 INJECTION, SOLUTION INTRAVENOUS; SUBCUTANEOUS at 21:55

## 2022-01-01 RX ADMIN — ONDANSETRON 4 MG: 2 INJECTION INTRAMUSCULAR; INTRAVENOUS at 21:02

## 2022-01-01 RX ADMIN — ONDANSETRON 4 MG: 2 INJECTION INTRAMUSCULAR; INTRAVENOUS at 07:22

## 2022-01-01 RX ADMIN — POTASSIUM CHLORIDE, DEXTROSE MONOHYDRATE AND SODIUM CHLORIDE 75 ML/HR: 150; 5; 450 INJECTION, SOLUTION INTRAVENOUS at 10:56

## 2022-01-01 RX ADMIN — VERAPAMIL HYDROCHLORIDE 200 MG: 100 CAPSULE, EXTENDED RELEASE ORAL at 22:03

## 2022-01-01 RX ADMIN — ASPIRIN 81 MG CHEWABLE TABLET 81 MG: 81 TABLET CHEWABLE at 08:46

## 2022-01-01 RX ADMIN — OXYCODONE HYDROCHLORIDE AND ACETAMINOPHEN 1 TABLET: 10; 325 TABLET ORAL at 16:44

## 2022-01-01 RX ADMIN — NYSTATIN 500000 UNITS: 100000 SUSPENSION ORAL at 12:15

## 2022-01-01 RX ADMIN — CLOPIDOGREL BISULFATE 75 MG: 75 TABLET ORAL at 08:41

## 2022-01-01 RX ADMIN — INSULIN LISPRO 2 UNITS: 100 INJECTION, SOLUTION INTRAVENOUS; SUBCUTANEOUS at 12:46

## 2022-01-01 RX ADMIN — POTASSIUM CHLORIDE, DEXTROSE MONOHYDRATE AND SODIUM CHLORIDE 75 ML/HR: 150; 5; 450 INJECTION, SOLUTION INTRAVENOUS at 12:55

## 2022-01-01 RX ADMIN — INSULIN LISPRO 4 UNITS: 100 INJECTION, SOLUTION INTRAVENOUS; SUBCUTANEOUS at 08:54

## 2022-01-01 RX ADMIN — PHENYLEPHRINE HYDROCHLORIDE 100 MCG: 10 INJECTION INTRAVENOUS at 18:17

## 2022-01-01 RX ADMIN — ONDANSETRON 4 MG: 2 INJECTION INTRAMUSCULAR; INTRAVENOUS at 01:09

## 2022-01-01 RX ADMIN — INSULIN LISPRO 4 UNITS: 100 INJECTION, SOLUTION INTRAVENOUS; SUBCUTANEOUS at 12:36

## 2022-01-01 RX ADMIN — HYDROMORPHONE HYDROCHLORIDE 1 MG: 1 INJECTION, SOLUTION INTRAMUSCULAR; INTRAVENOUS; SUBCUTANEOUS at 17:15

## 2022-01-01 RX ADMIN — LISINOPRIL 20 MG: 20 TABLET ORAL at 08:26

## 2022-01-01 RX ADMIN — PANTOPRAZOLE SODIUM 40 MG: 40 TABLET, DELAYED RELEASE ORAL at 08:09

## 2022-01-01 RX ADMIN — POTASSIUM CHLORIDE, DEXTROSE MONOHYDRATE AND SODIUM CHLORIDE 125 ML/HR: 150; 5; 450 INJECTION, SOLUTION INTRAVENOUS at 04:49

## 2022-01-01 RX ADMIN — LISINOPRIL 20 MG: 20 TABLET ORAL at 08:45

## 2022-01-01 RX ADMIN — ASPIRIN 81 MG CHEWABLE TABLET 81 MG: 81 TABLET CHEWABLE at 09:35

## 2022-01-01 RX ADMIN — AMIODARONE HYDROCHLORIDE 200 MG: 200 TABLET ORAL at 09:39

## 2022-01-01 RX ADMIN — POTASSIUM CHLORIDE 10 MEQ: 750 TABLET, FILM COATED, EXTENDED RELEASE ORAL at 09:12

## 2022-01-01 RX ADMIN — OXYCODONE 5 MG: 5 TABLET ORAL at 08:38

## 2022-01-01 RX ADMIN — PREDNISONE 15 MG: 5 TABLET ORAL at 09:22

## 2022-01-01 RX ADMIN — VERAPAMIL HYDROCHLORIDE 200 MG: 100 CAPSULE, EXTENDED RELEASE ORAL at 00:08

## 2022-01-01 RX ADMIN — HYDROMORPHONE HYDROCHLORIDE 1 MG: 1 INJECTION, SOLUTION INTRAMUSCULAR; INTRAVENOUS; SUBCUTANEOUS at 10:55

## 2022-01-01 RX ADMIN — METHYLPREDNISOLONE SODIUM SUCCINATE 20 MG: 40 INJECTION, POWDER, FOR SOLUTION INTRAMUSCULAR; INTRAVENOUS at 10:17

## 2022-01-01 RX ADMIN — ONDANSETRON 4 MG: 2 INJECTION INTRAMUSCULAR; INTRAVENOUS at 09:27

## 2022-01-01 RX ADMIN — HYDROMORPHONE HYDROCHLORIDE 1 MG: 1 INJECTION, SOLUTION INTRAMUSCULAR; INTRAVENOUS; SUBCUTANEOUS at 02:17

## 2022-01-01 RX ADMIN — INSULIN LISPRO 2 UNITS: 100 INJECTION, SOLUTION INTRAVENOUS; SUBCUTANEOUS at 22:39

## 2022-01-01 RX ADMIN — ASPIRIN 81 MG CHEWABLE TABLET 81 MG: 81 TABLET CHEWABLE at 08:53

## 2022-01-01 RX ADMIN — PANTOPRAZOLE SODIUM 40 MG: 40 TABLET, DELAYED RELEASE ORAL at 15:38

## 2022-01-01 RX ADMIN — APIXABAN 5 MG: 5 TABLET, FILM COATED ORAL at 20:53

## 2022-01-01 RX ADMIN — POTASSIUM CHLORIDE, DEXTROSE MONOHYDRATE AND SODIUM CHLORIDE 125 ML/HR: 150; 5; 450 INJECTION, SOLUTION INTRAVENOUS at 05:26

## 2022-01-01 RX ADMIN — INSULIN LISPRO 2 UNITS: 100 INJECTION, SOLUTION INTRAVENOUS; SUBCUTANEOUS at 21:15

## 2022-01-01 RX ADMIN — SODIUM CHLORIDE 50 ML/HR: 9 INJECTION, SOLUTION INTRAVENOUS at 03:26

## 2022-01-01 RX ADMIN — HYDROMORPHONE HYDROCHLORIDE 1 MG: 1 INJECTION, SOLUTION INTRAMUSCULAR; INTRAVENOUS; SUBCUTANEOUS at 18:15

## 2022-01-01 RX ADMIN — CEFAZOLIN 2 G: 1 INJECTION, POWDER, FOR SOLUTION INTRAMUSCULAR; INTRAVENOUS at 20:55

## 2022-01-01 RX ADMIN — VERAPAMIL HYDROCHLORIDE 200 MG: 100 CAPSULE, EXTENDED RELEASE ORAL at 21:33

## 2022-01-01 RX ADMIN — ROCURONIUM BROMIDE 40 MG: 10 INJECTION, SOLUTION INTRAVENOUS at 17:08

## 2022-01-01 RX ADMIN — METRONIDAZOLE 500 MG: 500 INJECTION, SOLUTION INTRAVENOUS at 13:04

## 2022-01-01 RX ADMIN — SODIUM CHLORIDE, PRESERVATIVE FREE 20 MG: 5 INJECTION INTRAVENOUS at 08:00

## 2022-01-01 RX ADMIN — TRACE ELEMENTS INJECTION 4: 7.4; .75; 98; 151 INJECTION, SOLUTION INTRAVENOUS at 22:27

## 2022-01-01 RX ADMIN — HYDROMORPHONE HYDROCHLORIDE 1 MG: 1 INJECTION, SOLUTION INTRAMUSCULAR; INTRAVENOUS; SUBCUTANEOUS at 20:30

## 2022-01-01 RX ADMIN — HYDROMORPHONE HYDROCHLORIDE 1 MG: 1 INJECTION, SOLUTION INTRAMUSCULAR; INTRAVENOUS; SUBCUTANEOUS at 04:51

## 2022-01-01 RX ADMIN — ONDANSETRON 4 MG: 2 INJECTION INTRAMUSCULAR; INTRAVENOUS at 20:42

## 2022-01-01 RX ADMIN — HYDROMORPHONE HYDROCHLORIDE 1 MG: 1 INJECTION, SOLUTION INTRAMUSCULAR; INTRAVENOUS; SUBCUTANEOUS at 07:21

## 2022-01-01 RX ADMIN — INSULIN LISPRO 4 UNITS: 100 INJECTION, SOLUTION INTRAVENOUS; SUBCUTANEOUS at 12:37

## 2022-01-01 RX ADMIN — INSULIN LISPRO 4 UNITS: 100 INJECTION, SOLUTION INTRAVENOUS; SUBCUTANEOUS at 17:08

## 2022-01-01 RX ADMIN — PIPERACILLIN AND TAZOBACTAM 3.38 G: 3; .375 INJECTION, POWDER, LYOPHILIZED, FOR SOLUTION INTRAVENOUS at 05:02

## 2022-01-01 RX ADMIN — INSULIN LISPRO 4 UNITS: 100 INJECTION, SOLUTION INTRAVENOUS; SUBCUTANEOUS at 18:20

## 2022-01-01 RX ADMIN — VERAPAMIL HYDROCHLORIDE 40 MG: 80 TABLET ORAL at 22:45

## 2022-01-01 RX ADMIN — APIXABAN 5 MG: 5 TABLET, FILM COATED ORAL at 08:51

## 2022-01-01 RX ADMIN — ONDANSETRON 4 MG: 2 INJECTION INTRAMUSCULAR; INTRAVENOUS at 13:48

## 2022-01-01 RX ADMIN — WATER 2 G: 1 INJECTION INTRAMUSCULAR; INTRAVENOUS; SUBCUTANEOUS at 14:57

## 2022-01-01 RX ADMIN — PREDNISONE 20 MG: 20 TABLET ORAL at 10:44

## 2022-01-01 RX ADMIN — WATER 2 G: 1 INJECTION INTRAMUSCULAR; INTRAVENOUS; SUBCUTANEOUS at 17:28

## 2022-01-01 RX ADMIN — ASCORBIC ACID, VITAMIN A PALMITATE, CHOLECALCIFEROL, THIAMINE HYDROCHLORIDE, RIBOFLAVIN-5 PHOSPHATE SODIUM, PYRIDOXINE HYDROCHLORIDE, NIACINAMIDE, DEXPANTHENOL, ALPHA-TOCOPHEROL ACETATE, VITAMIN K1, FOLIC ACID, BIOTIN, CYANOCOBALAMIN: 200; 3300; 200; 6; 3.6; 6; 40; 15; 10; 150; 600; 60; 5 INJECTION, SOLUTION INTRAVENOUS at 21:50

## 2022-01-01 RX ADMIN — HYDROMORPHONE HYDROCHLORIDE 1 MG: 1 INJECTION, SOLUTION INTRAMUSCULAR; INTRAVENOUS; SUBCUTANEOUS at 14:34

## 2022-01-01 RX ADMIN — HYDROMORPHONE HYDROCHLORIDE 1 MG: 1 INJECTION, SOLUTION INTRAMUSCULAR; INTRAVENOUS; SUBCUTANEOUS at 18:46

## 2022-01-01 RX ADMIN — PREDNISONE 20 MG: 20 TABLET ORAL at 08:53

## 2022-01-01 RX ADMIN — WATER 2 G: 1 INJECTION INTRAMUSCULAR; INTRAVENOUS; SUBCUTANEOUS at 09:00

## 2022-01-01 RX ADMIN — HYDROMORPHONE HYDROCHLORIDE 1 MG: 1 INJECTION, SOLUTION INTRAMUSCULAR; INTRAVENOUS; SUBCUTANEOUS at 21:54

## 2022-01-01 RX ADMIN — SODIUM CHLORIDE, PRESERVATIVE FREE 20 MG: 5 INJECTION INTRAVENOUS at 22:03

## 2022-01-01 RX ADMIN — DOCUSATE SODIUM 100 MG: 100 CAPSULE, LIQUID FILLED ORAL at 09:03

## 2022-01-01 RX ADMIN — CLOPIDOGREL BISULFATE 75 MG: 75 TABLET ORAL at 08:28

## 2022-01-01 RX ADMIN — LISINOPRIL 20 MG: 20 TABLET ORAL at 08:51

## 2022-01-01 RX ADMIN — HYDROMORPHONE HYDROCHLORIDE 1 MG: 1 INJECTION, SOLUTION INTRAMUSCULAR; INTRAVENOUS; SUBCUTANEOUS at 04:32

## 2022-01-01 RX ADMIN — VERAPAMIL HYDROCHLORIDE 200 MG: 100 CAPSULE, EXTENDED RELEASE ORAL at 21:51

## 2022-01-01 RX ADMIN — HYDROMORPHONE HYDROCHLORIDE 1 MG: 1 INJECTION, SOLUTION INTRAMUSCULAR; INTRAVENOUS; SUBCUTANEOUS at 03:11

## 2022-01-01 RX ADMIN — SODIUM CHLORIDE 125 ML/HR: 9 INJECTION, SOLUTION INTRAVENOUS at 22:24

## 2022-01-01 RX ADMIN — ONDANSETRON 4 MG: 2 INJECTION INTRAMUSCULAR; INTRAVENOUS at 16:04

## 2022-01-01 RX ADMIN — METRONIDAZOLE 500 MG: 500 INJECTION, SOLUTION INTRAVENOUS at 21:31

## 2022-01-01 RX ADMIN — HYDROMORPHONE HYDROCHLORIDE 1 MG: 1 INJECTION, SOLUTION INTRAMUSCULAR; INTRAVENOUS; SUBCUTANEOUS at 17:59

## 2022-01-01 RX ADMIN — NYSTATIN 500000 UNITS: 100000 SUSPENSION ORAL at 21:15

## 2022-01-01 RX ADMIN — HYDROMORPHONE HYDROCHLORIDE 1 MG: 1 INJECTION, SOLUTION INTRAMUSCULAR; INTRAVENOUS; SUBCUTANEOUS at 06:26

## 2022-01-01 RX ADMIN — VERAPAMIL HYDROCHLORIDE 200 MG: 100 CAPSULE, EXTENDED RELEASE ORAL at 21:53

## 2022-01-01 RX ADMIN — OXYCODONE HYDROCHLORIDE AND ACETAMINOPHEN 1 TABLET: 10; 325 TABLET ORAL at 08:23

## 2022-01-01 RX ADMIN — INSULIN LISPRO 4 UNITS: 100 INJECTION, SOLUTION INTRAVENOUS; SUBCUTANEOUS at 17:34

## 2022-01-01 RX ADMIN — HYDROMORPHONE HYDROCHLORIDE 1 MG: 1 INJECTION, SOLUTION INTRAMUSCULAR; INTRAVENOUS; SUBCUTANEOUS at 10:20

## 2022-01-01 RX ADMIN — WATER 2 G: 1 INJECTION INTRAMUSCULAR; INTRAVENOUS; SUBCUTANEOUS at 09:04

## 2022-01-01 RX ADMIN — AMIODARONE HYDROCHLORIDE 150 MG: 1.5 INJECTION, SOLUTION INTRAVENOUS at 08:40

## 2022-01-01 RX ADMIN — PHENYLEPHRINE HYDROCHLORIDE 50 MCG: 10 INJECTION INTRAVENOUS at 17:56

## 2022-01-01 RX ADMIN — HYDROMORPHONE HYDROCHLORIDE 1.5 MG: 2 INJECTION, SOLUTION INTRAMUSCULAR; INTRAVENOUS; SUBCUTANEOUS at 15:52

## 2022-01-01 RX ADMIN — ONDANSETRON 4 MG: 2 INJECTION INTRAMUSCULAR; INTRAVENOUS at 00:03

## 2022-01-01 RX ADMIN — WATER 2 G: 1 INJECTION INTRAMUSCULAR; INTRAVENOUS; SUBCUTANEOUS at 18:48

## 2022-01-01 RX ADMIN — ONDANSETRON 4 MG: 2 INJECTION INTRAMUSCULAR; INTRAVENOUS at 01:31

## 2022-01-01 RX ADMIN — ASPIRIN 81 MG CHEWABLE TABLET 81 MG: 81 TABLET CHEWABLE at 09:11

## 2022-01-01 RX ADMIN — ONDANSETRON 4 MG: 2 INJECTION INTRAMUSCULAR; INTRAVENOUS at 18:10

## 2022-01-01 RX ADMIN — INSULIN LISPRO 4 UNITS: 100 INJECTION, SOLUTION INTRAVENOUS; SUBCUTANEOUS at 11:55

## 2022-01-01 RX ADMIN — SODIUM CHLORIDE, PRESERVATIVE FREE 20 MG: 5 INJECTION INTRAVENOUS at 21:27

## 2022-01-01 RX ADMIN — WATER 2 G: 1 INJECTION INTRAMUSCULAR; INTRAVENOUS; SUBCUTANEOUS at 20:35

## 2022-01-01 RX ADMIN — PIPERACILLIN AND TAZOBACTAM 3.38 G: 3; .375 INJECTION, POWDER, LYOPHILIZED, FOR SOLUTION INTRAVENOUS at 02:37

## 2022-01-01 RX ADMIN — ONDANSETRON 4 MG: 2 INJECTION INTRAMUSCULAR; INTRAVENOUS at 14:50

## 2022-01-01 RX ADMIN — WATER 2 G: 1 INJECTION INTRAMUSCULAR; INTRAVENOUS; SUBCUTANEOUS at 02:19

## 2022-01-01 RX ADMIN — NYSTATIN 500000 UNITS: 100000 SUSPENSION ORAL at 08:52

## 2022-01-01 RX ADMIN — ONDANSETRON 4 MG: 2 INJECTION INTRAMUSCULAR; INTRAVENOUS at 23:09

## 2022-01-01 RX ADMIN — KETOROLAC TROMETHAMINE 30 MG: 30 INJECTION, SOLUTION INTRAMUSCULAR; INTRAVENOUS at 16:05

## 2022-01-01 RX ADMIN — HYDROMORPHONE HYDROCHLORIDE 1 MG: 1 INJECTION, SOLUTION INTRAMUSCULAR; INTRAVENOUS; SUBCUTANEOUS at 00:49

## 2022-01-01 RX ADMIN — HYDROMORPHONE HYDROCHLORIDE 1 MG: 1 INJECTION, SOLUTION INTRAMUSCULAR; INTRAVENOUS; SUBCUTANEOUS at 19:40

## 2022-01-01 RX ADMIN — ONDANSETRON 4 MG: 2 INJECTION INTRAMUSCULAR; INTRAVENOUS at 00:34

## 2022-01-01 RX ADMIN — HYDROMORPHONE HYDROCHLORIDE 1 MG: 1 INJECTION, SOLUTION INTRAMUSCULAR; INTRAVENOUS; SUBCUTANEOUS at 09:14

## 2022-01-01 RX ADMIN — POTASSIUM CHLORIDE 40 MEQ: 1.5 POWDER, FOR SOLUTION ORAL at 22:08

## 2022-01-01 RX ADMIN — MORPHINE SULFATE 30 MG: 30 TABLET, FILM COATED, EXTENDED RELEASE ORAL at 20:35

## 2022-01-01 RX ADMIN — AMIODARONE HYDROCHLORIDE 200 MG: 200 TABLET ORAL at 09:38

## 2022-01-01 RX ADMIN — SUCCINYLCHOLINE CHLORIDE 120 MG: 20 INJECTION, SOLUTION INTRAMUSCULAR; INTRAVENOUS at 11:18

## 2022-01-01 RX ADMIN — PIPERACILLIN AND TAZOBACTAM 3.38 G: 3; .375 INJECTION, POWDER, LYOPHILIZED, FOR SOLUTION INTRAVENOUS at 18:46

## 2022-01-01 RX ADMIN — APIXABAN 5 MG: 5 TABLET, FILM COATED ORAL at 21:47

## 2022-01-01 RX ADMIN — HYDROMORPHONE HYDROCHLORIDE 1 MG: 1 INJECTION, SOLUTION INTRAMUSCULAR; INTRAVENOUS; SUBCUTANEOUS at 08:44

## 2022-01-01 RX ADMIN — SODIUM CHLORIDE 100 ML/HR: 9 INJECTION, SOLUTION INTRAVENOUS at 17:20

## 2022-01-01 RX ADMIN — CLOPIDOGREL BISULFATE 75 MG: 75 TABLET ORAL at 09:23

## 2022-01-01 RX ADMIN — LISINOPRIL 20 MG: 20 TABLET ORAL at 09:11

## 2022-01-01 RX ADMIN — WATER 2 G: 1 INJECTION INTRAMUSCULAR; INTRAVENOUS; SUBCUTANEOUS at 02:16

## 2022-01-01 RX ADMIN — WATER 2 G: 1 INJECTION INTRAMUSCULAR; INTRAVENOUS; SUBCUTANEOUS at 14:04

## 2022-01-01 RX ADMIN — PIPERACILLIN AND TAZOBACTAM 3.38 G: 3; .375 INJECTION, POWDER, LYOPHILIZED, FOR SOLUTION INTRAVENOUS at 04:19

## 2022-01-01 RX ADMIN — VERAPAMIL HYDROCHLORIDE 200 MG: 100 CAPSULE, EXTENDED RELEASE ORAL at 21:48

## 2022-01-01 RX ADMIN — ONDANSETRON 4 MG: 2 INJECTION INTRAMUSCULAR; INTRAVENOUS at 14:36

## 2022-01-01 RX ADMIN — HYDROMORPHONE HYDROCHLORIDE 1.5 MG: 2 INJECTION, SOLUTION INTRAMUSCULAR; INTRAVENOUS; SUBCUTANEOUS at 12:33

## 2022-01-01 RX ADMIN — ASPIRIN 81 MG CHEWABLE TABLET 81 MG: 81 TABLET CHEWABLE at 08:41

## 2022-01-01 RX ADMIN — HYDROMORPHONE HYDROCHLORIDE 1 MG: 1 INJECTION, SOLUTION INTRAMUSCULAR; INTRAVENOUS; SUBCUTANEOUS at 15:41

## 2022-01-01 RX ADMIN — PREDNISONE 20 MG: 20 TABLET ORAL at 09:46

## 2022-01-01 RX ADMIN — INSULIN LISPRO 2 UNITS: 100 INJECTION, SOLUTION INTRAVENOUS; SUBCUTANEOUS at 12:28

## 2022-01-01 RX ADMIN — POTASSIUM CHLORIDE, DEXTROSE MONOHYDRATE AND SODIUM CHLORIDE 25 ML/HR: 150; 5; 450 INJECTION, SOLUTION INTRAVENOUS at 19:40

## 2022-01-01 RX ADMIN — INSULIN LISPRO 4 UNITS: 100 INJECTION, SOLUTION INTRAVENOUS; SUBCUTANEOUS at 21:35

## 2022-01-01 RX ADMIN — POTASSIUM CHLORIDE 40 MEQ: 1500 TABLET, EXTENDED RELEASE ORAL at 21:02

## 2022-01-01 RX ADMIN — OXYCODONE HYDROCHLORIDE 10 MG: 10 TABLET ORAL at 08:15

## 2022-01-01 RX ADMIN — HYDROMORPHONE HYDROCHLORIDE 1 MG: 1 INJECTION, SOLUTION INTRAMUSCULAR; INTRAVENOUS; SUBCUTANEOUS at 18:28

## 2022-01-01 RX ADMIN — VERAPAMIL HYDROCHLORIDE 200 MG: 100 CAPSULE, EXTENDED RELEASE ORAL at 21:36

## 2022-01-01 RX ADMIN — CEFAZOLIN 2 G: 1 INJECTION, POWDER, FOR SOLUTION INTRAMUSCULAR; INTRAVENOUS at 20:28

## 2022-01-01 RX ADMIN — KETOROLAC TROMETHAMINE 30 MG: 30 INJECTION, SOLUTION INTRAMUSCULAR; INTRAVENOUS at 09:40

## 2022-01-01 RX ADMIN — APIXABAN 5 MG: 5 TABLET, FILM COATED ORAL at 21:16

## 2022-01-01 RX ADMIN — VERAPAMIL HYDROCHLORIDE 200 MG: 100 CAPSULE, EXTENDED RELEASE ORAL at 21:42

## 2022-01-01 RX ADMIN — POTASSIUM CHLORIDE 10 MEQ: 7.46 INJECTION, SOLUTION INTRAVENOUS at 10:08

## 2022-01-01 RX ADMIN — HYDROMORPHONE HYDROCHLORIDE 1.5 MG: 2 INJECTION, SOLUTION INTRAMUSCULAR; INTRAVENOUS; SUBCUTANEOUS at 11:26

## 2022-01-01 RX ADMIN — METOPROLOL SUCCINATE 50 MG: 50 TABLET, EXTENDED RELEASE ORAL at 21:04

## 2022-01-01 RX ADMIN — KETOROLAC TROMETHAMINE 30 MG: 30 INJECTION, SOLUTION INTRAMUSCULAR; INTRAVENOUS at 16:21

## 2022-01-01 RX ADMIN — ONDANSETRON 4 MG: 2 INJECTION INTRAMUSCULAR; INTRAVENOUS at 09:54

## 2022-01-01 RX ADMIN — METOPROLOL TARTRATE 25 MG: 25 TABLET, FILM COATED ORAL at 08:22

## 2022-01-01 RX ADMIN — CLOPIDOGREL BISULFATE 75 MG: 75 TABLET ORAL at 10:36

## 2022-01-01 RX ADMIN — LISINOPRIL 20 MG: 20 TABLET ORAL at 09:45

## 2022-01-01 RX ADMIN — ONDANSETRON 4 MG: 2 INJECTION INTRAMUSCULAR; INTRAVENOUS at 00:23

## 2022-01-01 RX ADMIN — POTASSIUM CHLORIDE, DEXTROSE MONOHYDRATE AND SODIUM CHLORIDE 75 ML/HR: 150; 5; 450 INJECTION, SOLUTION INTRAVENOUS at 00:00

## 2022-01-01 RX ADMIN — SODIUM CHLORIDE, PRESERVATIVE FREE 20 MG: 5 INJECTION INTRAVENOUS at 20:53

## 2022-01-01 RX ADMIN — APIXABAN 5 MG: 5 TABLET, FILM COATED ORAL at 20:34

## 2022-01-01 RX ADMIN — INSULIN LISPRO 4 UNITS: 100 INJECTION, SOLUTION INTRAVENOUS; SUBCUTANEOUS at 21:16

## 2022-01-01 RX ADMIN — SODIUM CHLORIDE, PRESERVATIVE FREE 20 MG: 5 INJECTION INTRAVENOUS at 11:01

## 2022-01-01 RX ADMIN — POTASSIUM CHLORIDE 10 MEQ: 750 TABLET, FILM COATED, EXTENDED RELEASE ORAL at 09:16

## 2022-01-01 RX ADMIN — SODIUM CHLORIDE, PRESERVATIVE FREE 20 MG: 5 INJECTION INTRAVENOUS at 09:47

## 2022-01-01 RX ADMIN — HYDROMORPHONE HYDROCHLORIDE 1 MG: 1 INJECTION, SOLUTION INTRAMUSCULAR; INTRAVENOUS; SUBCUTANEOUS at 13:37

## 2022-01-01 RX ADMIN — METRONIDAZOLE 500 MG: 500 INJECTION, SOLUTION INTRAVENOUS at 04:47

## 2022-01-01 RX ADMIN — HYDROMORPHONE HYDROCHLORIDE 2 MG: 1 INJECTION, SOLUTION INTRAMUSCULAR; INTRAVENOUS; SUBCUTANEOUS at 21:03

## 2022-01-01 RX ADMIN — HYDROMORPHONE HYDROCHLORIDE 1 MG: 1 INJECTION, SOLUTION INTRAMUSCULAR; INTRAVENOUS; SUBCUTANEOUS at 19:17

## 2022-01-01 RX ADMIN — OXYCODONE HYDROCHLORIDE AND ACETAMINOPHEN 1 TABLET: 10; 325 TABLET ORAL at 10:23

## 2022-01-01 RX ADMIN — PREDNISONE 20 MG: 20 TABLET ORAL at 08:43

## 2022-01-01 RX ADMIN — SODIUM CHLORIDE, PRESERVATIVE FREE 20 MG: 5 INJECTION INTRAVENOUS at 20:38

## 2022-01-01 RX ADMIN — OXYCODONE HYDROCHLORIDE AND ACETAMINOPHEN 1 TABLET: 10; 325 TABLET ORAL at 02:07

## 2022-01-01 RX ADMIN — HYDROMORPHONE HYDROCHLORIDE 1 MG: 1 INJECTION, SOLUTION INTRAMUSCULAR; INTRAVENOUS; SUBCUTANEOUS at 15:38

## 2022-01-01 RX ADMIN — VERAPAMIL HYDROCHLORIDE 40 MG: 80 TABLET ORAL at 03:24

## 2022-01-01 RX ADMIN — DIPHENHYDRAMINE HYDROCHLORIDE 25 MG: 50 INJECTION INTRAMUSCULAR; INTRAVENOUS at 13:59

## 2022-01-01 RX ADMIN — POTASSIUM CHLORIDE, DEXTROSE MONOHYDRATE AND SODIUM CHLORIDE 125 ML/HR: 150; 5; 450 INJECTION, SOLUTION INTRAVENOUS at 01:17

## 2022-01-01 RX ADMIN — CEFAZOLIN 2 G: 1 INJECTION, POWDER, FOR SOLUTION INTRAMUSCULAR; INTRAVENOUS at 02:59

## 2022-01-01 RX ADMIN — HYDROMORPHONE HYDROCHLORIDE 1 MG: 1 INJECTION, SOLUTION INTRAMUSCULAR; INTRAVENOUS; SUBCUTANEOUS at 03:15

## 2022-01-01 RX ADMIN — ONDANSETRON 4 MG: 2 INJECTION INTRAMUSCULAR; INTRAVENOUS at 20:26

## 2022-01-01 RX ADMIN — APIXABAN 5 MG: 5 TABLET, FILM COATED ORAL at 08:28

## 2022-01-01 RX ADMIN — HYDROMORPHONE HYDROCHLORIDE 2 MG: 1 INJECTION, SOLUTION INTRAMUSCULAR; INTRAVENOUS; SUBCUTANEOUS at 19:48

## 2022-01-01 RX ADMIN — PREDNISONE 15 MG: 5 TABLET ORAL at 09:27

## 2022-01-01 RX ADMIN — POTASSIUM CHLORIDE, DEXTROSE MONOHYDRATE AND SODIUM CHLORIDE 75 ML/HR: 150; 5; 450 INJECTION, SOLUTION INTRAVENOUS at 06:24

## 2022-01-01 RX ADMIN — METOPROLOL SUCCINATE 50 MG: 50 TABLET, EXTENDED RELEASE ORAL at 20:24

## 2022-01-01 RX ADMIN — HYDROMORPHONE HYDROCHLORIDE 1 MG: 1 INJECTION, SOLUTION INTRAMUSCULAR; INTRAVENOUS; SUBCUTANEOUS at 18:01

## 2022-01-01 RX ADMIN — HYDROMORPHONE HYDROCHLORIDE 2 MG: 1 INJECTION, SOLUTION INTRAMUSCULAR; INTRAVENOUS; SUBCUTANEOUS at 07:38

## 2022-01-01 RX ADMIN — INSULIN LISPRO 2 UNITS: 100 INJECTION, SOLUTION INTRAVENOUS; SUBCUTANEOUS at 09:04

## 2022-01-01 RX ADMIN — METRONIDAZOLE 500 MG: 500 INJECTION, SOLUTION INTRAVENOUS at 11:17

## 2022-01-01 RX ADMIN — METOPROLOL TARTRATE 25 MG: 25 TABLET, FILM COATED ORAL at 20:12

## 2022-01-01 RX ADMIN — INSULIN LISPRO 2 UNITS: 100 INJECTION, SOLUTION INTRAVENOUS; SUBCUTANEOUS at 12:32

## 2022-01-01 RX ADMIN — ONDANSETRON 4 MG: 2 INJECTION INTRAMUSCULAR; INTRAVENOUS at 15:22

## 2022-01-01 RX ADMIN — DEXTROSE MONOHYDRATE 150 MG: 50 INJECTION, SOLUTION INTRAVENOUS at 10:34

## 2022-01-01 RX ADMIN — SODIUM CHLORIDE 125 ML/HR: 9 INJECTION, SOLUTION INTRAVENOUS at 11:59

## 2022-01-01 RX ADMIN — AMIODARONE HYDROCHLORIDE 200 MG: 200 TABLET ORAL at 10:12

## 2022-01-01 RX ADMIN — WATER 2 G: 1 INJECTION INTRAMUSCULAR; INTRAVENOUS; SUBCUTANEOUS at 14:01

## 2022-01-01 RX ADMIN — POTASSIUM CHLORIDE, DEXTROSE MONOHYDRATE AND SODIUM CHLORIDE 75 ML/HR: 150; 5; 450 INJECTION, SOLUTION INTRAVENOUS at 10:37

## 2022-01-01 RX ADMIN — PROPOFOL 30 MG: 10 INJECTION, EMULSION INTRAVENOUS at 11:30

## 2022-01-01 RX ADMIN — HYDROMORPHONE HYDROCHLORIDE 1 MG: 1 INJECTION, SOLUTION INTRAMUSCULAR; INTRAVENOUS; SUBCUTANEOUS at 13:34

## 2022-01-01 RX ADMIN — HYDROMORPHONE HYDROCHLORIDE 1 MG: 1 INJECTION, SOLUTION INTRAMUSCULAR; INTRAVENOUS; SUBCUTANEOUS at 02:25

## 2022-01-01 RX ADMIN — LISINOPRIL 20 MG: 20 TABLET ORAL at 08:11

## 2022-01-01 RX ADMIN — LEUCINE, PHENYLALANINE, LYSINE, METHIONINE, ISOLEUCINE, VALINE, HISTIDINE, THREONINE, TRYPTOPHAN, ALANINE, GLYCINE, ARGININE, PROLINE, SERINE, TYROSINE, SODIUM ACETATE, DIBASIC POTASSIUM PHOSPHATE, MAGNESIUM CHLORIDE, SODIUM CHLORIDE, CALCIUM CHLORIDE, DEXTROSE
311; 238; 247; 170; 255; 247; 204; 179; 77; 880; 438; 489; 289; 213; 17; 297; 261; 51; 77; 33; 5 INJECTION INTRAVENOUS at 16:39

## 2022-01-01 RX ADMIN — METRONIDAZOLE 500 MG: 500 INJECTION, SOLUTION INTRAVENOUS at 08:37

## 2022-01-01 RX ADMIN — LIDOCAINE HYDROCHLORIDE 100 MG: 20 INJECTION, SOLUTION EPIDURAL; INFILTRATION; INTRACAUDAL; PERINEURAL at 11:25

## 2022-01-01 RX ADMIN — HYDROMORPHONE HYDROCHLORIDE 1 MG: 1 INJECTION, SOLUTION INTRAMUSCULAR; INTRAVENOUS; SUBCUTANEOUS at 14:50

## 2022-01-01 RX ADMIN — WATER 2 G: 1 INJECTION INTRAMUSCULAR; INTRAVENOUS; SUBCUTANEOUS at 14:12

## 2022-01-01 RX ADMIN — HYDROMORPHONE HYDROCHLORIDE 1 MG: 1 INJECTION, SOLUTION INTRAMUSCULAR; INTRAVENOUS; SUBCUTANEOUS at 23:13

## 2022-01-01 RX ADMIN — METOPROLOL TARTRATE 2.5 MG: 1 INJECTION, SOLUTION INTRAVENOUS at 19:50

## 2022-01-01 RX ADMIN — AMIODARONE HYDROCHLORIDE 200 MG: 200 TABLET ORAL at 08:46

## 2022-01-01 RX ADMIN — OXYCODONE HYDROCHLORIDE AND ACETAMINOPHEN 1 TABLET: 10; 325 TABLET ORAL at 16:34

## 2022-01-01 RX ADMIN — ONDANSETRON 4 MG: 2 INJECTION INTRAMUSCULAR; INTRAVENOUS at 04:19

## 2022-01-01 RX ADMIN — ONDANSETRON 4 MG: 2 INJECTION INTRAMUSCULAR; INTRAVENOUS at 04:30

## 2022-01-01 RX ADMIN — METOPROLOL SUCCINATE 50 MG: 50 TABLET, EXTENDED RELEASE ORAL at 20:18

## 2022-01-01 RX ADMIN — HYDROMORPHONE HYDROCHLORIDE 1 MG: 1 INJECTION, SOLUTION INTRAMUSCULAR; INTRAVENOUS; SUBCUTANEOUS at 03:39

## 2022-01-01 RX ADMIN — MAGNESIUM SULFATE HEPTAHYDRATE 1 G: 1 INJECTION, SOLUTION INTRAVENOUS at 00:49

## 2022-01-01 RX ADMIN — WATER 2 G: 1 INJECTION INTRAMUSCULAR; INTRAVENOUS; SUBCUTANEOUS at 22:18

## 2022-01-01 RX ADMIN — HYDROMORPHONE HYDROCHLORIDE 1.5 MG: 2 INJECTION, SOLUTION INTRAMUSCULAR; INTRAVENOUS; SUBCUTANEOUS at 03:48

## 2022-01-01 RX ADMIN — HYDROMORPHONE HYDROCHLORIDE 1 MG: 1 INJECTION, SOLUTION INTRAMUSCULAR; INTRAVENOUS; SUBCUTANEOUS at 02:21

## 2022-01-01 RX ADMIN — HYDRALAZINE HYDROCHLORIDE 10 MG: 20 INJECTION, SOLUTION INTRAMUSCULAR; INTRAVENOUS at 17:18

## 2022-01-01 RX ADMIN — PIPERACILLIN AND TAZOBACTAM 3.38 G: 3; .375 INJECTION, POWDER, LYOPHILIZED, FOR SOLUTION INTRAVENOUS at 20:53

## 2022-01-01 RX ADMIN — HYDROMORPHONE HYDROCHLORIDE 1 MG: 1 INJECTION, SOLUTION INTRAMUSCULAR; INTRAVENOUS; SUBCUTANEOUS at 05:29

## 2022-01-01 RX ADMIN — AMIODARONE HYDROCHLORIDE 200 MG: 200 TABLET ORAL at 09:28

## 2022-01-01 RX ADMIN — METRONIDAZOLE 500 MG: 500 INJECTION, SOLUTION INTRAVENOUS at 20:12

## 2022-01-01 RX ADMIN — PANTOPRAZOLE SODIUM 40 MG: 40 TABLET, DELAYED RELEASE ORAL at 22:12

## 2022-01-01 RX ADMIN — PIPERACILLIN AND TAZOBACTAM 3.38 G: 3; .375 INJECTION, POWDER, LYOPHILIZED, FOR SOLUTION INTRAVENOUS at 01:31

## 2022-01-01 RX ADMIN — PROCHLORPERAZINE EDISYLATE 5 MG: 5 INJECTION INTRAMUSCULAR; INTRAVENOUS at 04:05

## 2022-01-01 RX ADMIN — AMIODARONE HYDROCHLORIDE 400 MG: 200 TABLET ORAL at 08:17

## 2022-01-01 RX ADMIN — DEXTROSE MONOHYDRATE 0.5 MG/MIN: 50 INJECTION, SOLUTION INTRAVENOUS at 18:24

## 2022-01-01 RX ADMIN — HYDROMORPHONE HYDROCHLORIDE 2 MG: 1 INJECTION, SOLUTION INTRAMUSCULAR; INTRAVENOUS; SUBCUTANEOUS at 20:17

## 2022-01-01 RX ADMIN — KETOROLAC TROMETHAMINE 30 MG: 30 INJECTION, SOLUTION INTRAMUSCULAR at 12:13

## 2022-01-01 RX ADMIN — SODIUM CHLORIDE 125 ML/HR: 9 INJECTION, SOLUTION INTRAVENOUS at 04:04

## 2022-01-01 RX ADMIN — METOPROLOL TARTRATE 5 MG: 5 INJECTION INTRAVENOUS at 21:00

## 2022-01-01 RX ADMIN — METOPROLOL TARTRATE 5 MG: 1 INJECTION, SOLUTION INTRAVENOUS at 04:34

## 2022-01-01 RX ADMIN — HYDROMORPHONE HYDROCHLORIDE 1 MG: 1 INJECTION, SOLUTION INTRAMUSCULAR; INTRAVENOUS; SUBCUTANEOUS at 05:05

## 2022-01-01 RX ADMIN — CEFAZOLIN 2 G: 1 INJECTION, POWDER, FOR SOLUTION INTRAMUSCULAR; INTRAVENOUS at 13:57

## 2022-01-01 RX ADMIN — METOPROLOL SUCCINATE 50 MG: 50 TABLET, EXTENDED RELEASE ORAL at 09:09

## 2022-01-01 RX ADMIN — OXYCODONE HYDROCHLORIDE 10 MG: 10 TABLET ORAL at 14:04

## 2022-01-01 RX ADMIN — CEFAZOLIN 2 G: 1 INJECTION, POWDER, FOR SOLUTION INTRAMUSCULAR; INTRAVENOUS at 03:44

## 2022-01-01 RX ADMIN — HYDROMORPHONE HYDROCHLORIDE 1 MG: 1 INJECTION, SOLUTION INTRAMUSCULAR; INTRAVENOUS; SUBCUTANEOUS at 14:10

## 2022-01-01 RX ADMIN — AMIODARONE HYDROCHLORIDE 150 MG: 1.5 INJECTION, SOLUTION INTRAVENOUS at 00:01

## 2022-01-01 RX ADMIN — DEXTROSE MONOHYDRATE 125 ML: 100 INJECTION, SOLUTION INTRAVENOUS at 15:58

## 2022-01-01 RX ADMIN — PREDNISONE 15 MG: 5 TABLET ORAL at 08:39

## 2022-01-01 RX ADMIN — ONDANSETRON 4 MG: 2 INJECTION INTRAMUSCULAR; INTRAVENOUS at 10:48

## 2022-01-01 RX ADMIN — METRONIDAZOLE 500 MG: 500 INJECTION, SOLUTION INTRAVENOUS at 12:20

## 2022-01-01 RX ADMIN — AMIODARONE HYDROCHLORIDE 200 MG: 200 TABLET ORAL at 08:49

## 2022-01-01 RX ADMIN — INSULIN LISPRO 6 UNITS: 100 INJECTION, SOLUTION INTRAVENOUS; SUBCUTANEOUS at 12:41

## 2022-01-01 RX ADMIN — ONDANSETRON 4 MG: 2 INJECTION INTRAMUSCULAR; INTRAVENOUS at 20:53

## 2022-01-01 RX ADMIN — ACETAMINOPHEN 650 MG: 325 TABLET, FILM COATED ORAL at 20:12

## 2022-01-01 RX ADMIN — HYDROMORPHONE HYDROCHLORIDE 2 MG: 1 INJECTION, SOLUTION INTRAMUSCULAR; INTRAVENOUS; SUBCUTANEOUS at 13:01

## 2022-01-01 RX ADMIN — ONDANSETRON 4 MG: 2 INJECTION INTRAMUSCULAR; INTRAVENOUS at 19:23

## 2022-01-01 RX ADMIN — MORPHINE SULFATE 30 MG: 30 TABLET, FILM COATED, EXTENDED RELEASE ORAL at 09:59

## 2022-01-01 RX ADMIN — MORPHINE SULFATE 30 MG: 30 TABLET, FILM COATED, EXTENDED RELEASE ORAL at 22:16

## 2022-01-01 RX ADMIN — AMIODARONE HYDROCHLORIDE 200 MG: 200 TABLET ORAL at 09:11

## 2022-01-01 RX ADMIN — POTASSIUM CHLORIDE 10 MEQ: 750 TABLET, FILM COATED, EXTENDED RELEASE ORAL at 08:51

## 2022-01-01 RX ADMIN — WATER 2 G: 1 INJECTION INTRAMUSCULAR; INTRAVENOUS; SUBCUTANEOUS at 14:32

## 2022-01-01 RX ADMIN — AMIODARONE HYDROCHLORIDE 400 MG: 200 TABLET ORAL at 20:25

## 2022-01-01 RX ADMIN — POTASSIUM CHLORIDE, DEXTROSE MONOHYDRATE AND SODIUM CHLORIDE 125 ML/HR: 150; 5; 450 INJECTION, SOLUTION INTRAVENOUS at 16:40

## 2022-01-01 RX ADMIN — POTASSIUM CHLORIDE 40 MEQ: 750 TABLET, FILM COATED, EXTENDED RELEASE ORAL at 12:20

## 2022-01-01 RX ADMIN — SODIUM CHLORIDE, PRESERVATIVE FREE 20 MG: 5 INJECTION INTRAVENOUS at 10:36

## 2022-01-01 RX ADMIN — DOCUSATE SODIUM 100 MG: 100 CAPSULE, LIQUID FILLED ORAL at 21:15

## 2022-01-01 RX ADMIN — HYDROMORPHONE HYDROCHLORIDE 1 MG: 1 INJECTION, SOLUTION INTRAMUSCULAR; INTRAVENOUS; SUBCUTANEOUS at 19:23

## 2022-01-01 RX ADMIN — AMIODARONE HYDROCHLORIDE 200 MG: 200 TABLET ORAL at 08:00

## 2022-01-01 RX ADMIN — ONDANSETRON 4 MG: 2 INJECTION INTRAMUSCULAR; INTRAVENOUS at 02:32

## 2022-01-01 RX ADMIN — ONDANSETRON 4 MG: 2 INJECTION INTRAMUSCULAR; INTRAVENOUS at 20:05

## 2022-01-01 RX ADMIN — APIXABAN 5 MG: 5 TABLET, FILM COATED ORAL at 09:24

## 2022-01-01 RX ADMIN — HYDROMORPHONE HYDROCHLORIDE 1 MG: 1 INJECTION, SOLUTION INTRAMUSCULAR; INTRAVENOUS; SUBCUTANEOUS at 00:14

## 2022-01-01 RX ADMIN — PANTOPRAZOLE SODIUM 40 MG: 40 TABLET, DELAYED RELEASE ORAL at 05:51

## 2022-01-01 RX ADMIN — HYDROMORPHONE HYDROCHLORIDE 1 MG: 1 INJECTION, SOLUTION INTRAMUSCULAR; INTRAVENOUS; SUBCUTANEOUS at 04:52

## 2022-01-01 RX ADMIN — SUGAMMADEX 400 MG: 100 INJECTION, SOLUTION INTRAVENOUS at 12:00

## 2022-01-01 RX ADMIN — SODIUM CHLORIDE, PRESERVATIVE FREE 20 MG: 5 INJECTION INTRAVENOUS at 21:04

## 2022-01-01 RX ADMIN — TRACE ELEMENTS INJECTION 4: 7.4; .75; 98; 151 INJECTION, SOLUTION INTRAVENOUS at 22:38

## 2022-01-01 RX ADMIN — APIXABAN 5 MG: 5 TABLET, FILM COATED ORAL at 08:24

## 2022-01-01 RX ADMIN — APIXABAN 5 MG: 5 TABLET, FILM COATED ORAL at 08:49

## 2022-01-01 RX ADMIN — PANTOPRAZOLE SODIUM 40 MG: 40 TABLET, DELAYED RELEASE ORAL at 07:06

## 2022-01-01 RX ADMIN — APIXABAN 5 MG: 5 TABLET, FILM COATED ORAL at 21:25

## 2022-01-01 RX ADMIN — POTASSIUM CHLORIDE 10 MEQ: 750 TABLET, FILM COATED, EXTENDED RELEASE ORAL at 09:07

## 2022-01-01 RX ADMIN — FENTANYL CITRATE 50 MCG: 50 INJECTION, SOLUTION INTRAMUSCULAR; INTRAVENOUS at 17:10

## 2022-01-01 RX ADMIN — INSULIN LISPRO 4 UNITS: 100 INJECTION, SOLUTION INTRAVENOUS; SUBCUTANEOUS at 17:24

## 2022-01-01 RX ADMIN — HYDROMORPHONE HYDROCHLORIDE 1 MG: 1 INJECTION, SOLUTION INTRAMUSCULAR; INTRAVENOUS; SUBCUTANEOUS at 12:28

## 2022-01-01 RX ADMIN — PANTOPRAZOLE SODIUM 40 MG: 40 TABLET, DELAYED RELEASE ORAL at 06:49

## 2022-01-01 RX ADMIN — VERAPAMIL HYDROCHLORIDE 200 MG: 100 CAPSULE, EXTENDED RELEASE ORAL at 20:34

## 2022-01-01 RX ADMIN — PROPOFOL 30 MG: 10 INJECTION, EMULSION INTRAVENOUS at 11:28

## 2022-01-01 RX ADMIN — SODIUM CHLORIDE, PRESERVATIVE FREE 20 MG: 5 INJECTION INTRAVENOUS at 10:40

## 2022-01-01 RX ADMIN — WATER 2 G: 1 INJECTION INTRAMUSCULAR; INTRAVENOUS; SUBCUTANEOUS at 22:06

## 2022-01-01 RX ADMIN — HYDROMORPHONE HYDROCHLORIDE 1 MG: 1 INJECTION, SOLUTION INTRAMUSCULAR; INTRAVENOUS; SUBCUTANEOUS at 23:22

## 2022-01-01 RX ADMIN — INSULIN LISPRO 6 UNITS: 100 INJECTION, SOLUTION INTRAVENOUS; SUBCUTANEOUS at 16:31

## 2022-01-01 RX ADMIN — HYDROMORPHONE HYDROCHLORIDE 1 MG: 1 INJECTION, SOLUTION INTRAMUSCULAR; INTRAVENOUS; SUBCUTANEOUS at 20:50

## 2022-01-01 RX ADMIN — HYDROMORPHONE HYDROCHLORIDE 2 MG: 1 INJECTION, SOLUTION INTRAMUSCULAR; INTRAVENOUS; SUBCUTANEOUS at 09:46

## 2022-01-01 RX ADMIN — INSULIN LISPRO 10 UNITS: 100 INJECTION, SOLUTION INTRAVENOUS; SUBCUTANEOUS at 16:30

## 2022-01-01 RX ADMIN — HYDROMORPHONE HYDROCHLORIDE 4 MG: 2 TABLET ORAL at 11:27

## 2022-01-01 RX ADMIN — HYDROMORPHONE HYDROCHLORIDE 1 MG: 1 INJECTION, SOLUTION INTRAMUSCULAR; INTRAVENOUS; SUBCUTANEOUS at 05:04

## 2022-01-01 RX ADMIN — HYDROMORPHONE HYDROCHLORIDE 1 MG: 1 INJECTION, SOLUTION INTRAMUSCULAR; INTRAVENOUS; SUBCUTANEOUS at 08:56

## 2022-01-01 RX ADMIN — SODIUM CHLORIDE 125 ML/HR: 9 INJECTION, SOLUTION INTRAVENOUS at 16:52

## 2022-01-01 RX ADMIN — INSULIN LISPRO 6 UNITS: 100 INJECTION, SOLUTION INTRAVENOUS; SUBCUTANEOUS at 13:42

## 2022-01-01 RX ADMIN — PANTOPRAZOLE SODIUM 40 MG: 40 TABLET, DELAYED RELEASE ORAL at 08:51

## 2022-01-01 RX ADMIN — ONDANSETRON 4 MG: 2 INJECTION INTRAMUSCULAR; INTRAVENOUS at 11:06

## 2022-01-01 RX ADMIN — AMIODARONE HYDROCHLORIDE 200 MG: 200 TABLET ORAL at 08:38

## 2022-01-01 RX ADMIN — LISINOPRIL 20 MG: 20 TABLET ORAL at 09:47

## 2022-01-01 RX ADMIN — PREDNISONE 20 MG: 20 TABLET ORAL at 08:10

## 2022-01-01 RX ADMIN — CEFAZOLIN 2 G: 1 INJECTION, POWDER, FOR SOLUTION INTRAMUSCULAR; INTRAVENOUS at 21:45

## 2022-01-01 RX ADMIN — INSULIN LISPRO 4 UNITS: 100 INJECTION, SOLUTION INTRAVENOUS; SUBCUTANEOUS at 12:52

## 2022-01-01 RX ADMIN — APIXABAN 5 MG: 5 TABLET, FILM COATED ORAL at 21:28

## 2022-01-01 RX ADMIN — INSULIN LISPRO 4 UNITS: 100 INJECTION, SOLUTION INTRAVENOUS; SUBCUTANEOUS at 16:26

## 2022-01-01 RX ADMIN — PANTOPRAZOLE SODIUM 40 MG: 40 TABLET, DELAYED RELEASE ORAL at 09:08

## 2022-01-01 RX ADMIN — INSULIN LISPRO 6 UNITS: 100 INJECTION, SOLUTION INTRAVENOUS; SUBCUTANEOUS at 11:59

## 2022-01-01 RX ADMIN — TRACE ELEMENTS INJECTION 4: 7.4; .75; 98; 151 INJECTION, SOLUTION INTRAVENOUS at 01:01

## 2022-01-01 RX ADMIN — VERAPAMIL HYDROCHLORIDE 200 MG: 100 CAPSULE, EXTENDED RELEASE ORAL at 21:03

## 2022-01-01 RX ADMIN — LISINOPRIL 20 MG: 20 TABLET ORAL at 09:19

## 2022-01-01 RX ADMIN — NYSTATIN 500000 UNITS: 100000 SUSPENSION ORAL at 12:32

## 2022-01-01 RX ADMIN — ONDANSETRON 4 MG: 2 INJECTION INTRAMUSCULAR; INTRAVENOUS at 08:56

## 2022-01-01 RX ADMIN — ONDANSETRON 4 MG: 2 INJECTION INTRAMUSCULAR; INTRAVENOUS at 12:51

## 2022-01-01 RX ADMIN — INSULIN LISPRO 2 UNITS: 100 INJECTION, SOLUTION INTRAVENOUS; SUBCUTANEOUS at 17:47

## 2022-01-01 RX ADMIN — APIXABAN 5 MG: 5 TABLET, FILM COATED ORAL at 09:09

## 2022-01-01 RX ADMIN — APIXABAN 5 MG: 5 TABLET, FILM COATED ORAL at 21:03

## 2022-01-01 RX ADMIN — METRONIDAZOLE 500 MG: 500 INJECTION, SOLUTION INTRAVENOUS at 17:09

## 2022-01-01 RX ADMIN — ONDANSETRON 4 MG: 2 INJECTION INTRAMUSCULAR; INTRAVENOUS at 18:05

## 2022-01-01 RX ADMIN — MORPHINE SULFATE 15 MG: 15 TABLET ORAL at 08:00

## 2022-01-01 RX ADMIN — METRONIDAZOLE 500 MG: 500 INJECTION, SOLUTION INTRAVENOUS at 03:03

## 2022-01-01 RX ADMIN — DEXTROSE MONOHYDRATE 1 MG/MIN: 50 INJECTION, SOLUTION INTRAVENOUS at 11:18

## 2022-01-01 RX ADMIN — PREDNISONE 15 MG: 5 TABLET ORAL at 08:25

## 2022-01-01 RX ADMIN — KETOROLAC TROMETHAMINE 30 MG: 30 INJECTION, SOLUTION INTRAMUSCULAR; INTRAVENOUS at 11:52

## 2022-01-01 RX ADMIN — WATER 2 G: 1 INJECTION INTRAMUSCULAR; INTRAVENOUS; SUBCUTANEOUS at 23:46

## 2022-01-01 RX ADMIN — HYDROMORPHONE HYDROCHLORIDE 2 MG: 1 INJECTION, SOLUTION INTRAMUSCULAR; INTRAVENOUS; SUBCUTANEOUS at 04:03

## 2022-01-01 RX ADMIN — HYDROMORPHONE HYDROCHLORIDE 1 MG: 1 INJECTION, SOLUTION INTRAMUSCULAR; INTRAVENOUS; SUBCUTANEOUS at 14:30

## 2022-01-01 RX ADMIN — VERAPAMIL HYDROCHLORIDE 200 MG: 100 CAPSULE, EXTENDED RELEASE ORAL at 21:45

## 2022-01-01 RX ADMIN — SODIUM CHLORIDE 125 ML/HR: 9 INJECTION, SOLUTION INTRAVENOUS at 04:22

## 2022-01-01 RX ADMIN — INSULIN LISPRO 8 UNITS: 100 INJECTION, SOLUTION INTRAVENOUS; SUBCUTANEOUS at 17:31

## 2022-01-01 RX ADMIN — HYDROMORPHONE HYDROCHLORIDE 2 MG: 1 INJECTION, SOLUTION INTRAMUSCULAR; INTRAVENOUS; SUBCUTANEOUS at 06:14

## 2022-01-01 RX ADMIN — HYDROMORPHONE HYDROCHLORIDE 2 MG: 1 INJECTION, SOLUTION INTRAMUSCULAR; INTRAVENOUS; SUBCUTANEOUS at 20:53

## 2022-01-01 RX ADMIN — WATER 2 G: 1 INJECTION INTRAMUSCULAR; INTRAVENOUS; SUBCUTANEOUS at 17:10

## 2022-01-01 RX ADMIN — PIPERACILLIN AND TAZOBACTAM 3.38 G: 3; .375 INJECTION, POWDER, LYOPHILIZED, FOR SOLUTION INTRAVENOUS at 16:37

## 2022-01-01 RX ADMIN — SODIUM CHLORIDE 125 ML/HR: 9 INJECTION, SOLUTION INTRAVENOUS at 00:12

## 2022-01-01 RX ADMIN — HYDROMORPHONE HYDROCHLORIDE 1 MG: 1 INJECTION, SOLUTION INTRAMUSCULAR; INTRAVENOUS; SUBCUTANEOUS at 17:09

## 2022-01-01 RX ADMIN — ONDANSETRON 4 MG: 2 INJECTION INTRAMUSCULAR; INTRAVENOUS at 05:50

## 2022-01-01 RX ADMIN — SODIUM CHLORIDE 125 ML/HR: 9 INJECTION, SOLUTION INTRAVENOUS at 11:15

## 2022-01-01 RX ADMIN — POTASSIUM CHLORIDE, DEXTROSE MONOHYDRATE AND SODIUM CHLORIDE 125 ML/HR: 150; 5; 450 INJECTION, SOLUTION INTRAVENOUS at 22:32

## 2022-01-01 RX ADMIN — ASCORBIC ACID, VITAMIN A PALMITATE, CHOLECALCIFEROL, THIAMINE HYDROCHLORIDE, RIBOFLAVIN-5 PHOSPHATE SODIUM, PYRIDOXINE HYDROCHLORIDE, NIACINAMIDE, DEXPANTHENOL, ALPHA-TOCOPHEROL ACETATE, VITAMIN K1, FOLIC ACID, BIOTIN, CYANOCOBALAMIN: 200; 3300; 200; 6; 3.6; 6; 40; 15; 10; 150; 600; 60; 5 INJECTION, SOLUTION INTRAVENOUS at 22:06

## 2022-01-01 RX ADMIN — ONDANSETRON 4 MG: 2 INJECTION INTRAMUSCULAR; INTRAVENOUS at 19:34

## 2022-01-01 RX ADMIN — NYSTATIN 500000 UNITS: 100000 SUSPENSION ORAL at 22:13

## 2022-01-01 RX ADMIN — APIXABAN 5 MG: 5 TABLET, FILM COATED ORAL at 09:43

## 2022-01-01 RX ADMIN — HYDROMORPHONE HYDROCHLORIDE 1 MG: 1 INJECTION, SOLUTION INTRAMUSCULAR; INTRAVENOUS; SUBCUTANEOUS at 23:09

## 2022-01-01 RX ADMIN — OXYCODONE HYDROCHLORIDE AND ACETAMINOPHEN 1 TABLET: 10; 325 TABLET ORAL at 21:35

## 2022-01-01 RX ADMIN — ONDANSETRON 4 MG: 2 INJECTION INTRAMUSCULAR; INTRAVENOUS at 21:15

## 2022-01-01 RX ADMIN — HYDROMORPHONE HYDROCHLORIDE 1 MG: 1 INJECTION, SOLUTION INTRAMUSCULAR; INTRAVENOUS; SUBCUTANEOUS at 17:07

## 2022-01-01 RX ADMIN — ATORVASTATIN CALCIUM 20 MG: 20 TABLET, FILM COATED ORAL at 21:33

## 2022-01-01 RX ADMIN — VERAPAMIL HYDROCHLORIDE 200 MG: 100 CAPSULE, EXTENDED RELEASE ORAL at 02:15

## 2022-01-01 RX ADMIN — PROPOFOL 20 MG: 10 INJECTION, EMULSION INTRAVENOUS at 17:47

## 2022-01-01 RX ADMIN — POTASSIUM CHLORIDE 40 MEQ: 1500 TABLET, EXTENDED RELEASE ORAL at 08:50

## 2022-01-01 RX ADMIN — METHYLPREDNISOLONE SODIUM SUCCINATE 20 MG: 40 INJECTION, POWDER, FOR SOLUTION INTRAMUSCULAR; INTRAVENOUS at 09:50

## 2022-01-01 RX ADMIN — AMIODARONE HYDROCHLORIDE 200 MG: 200 TABLET ORAL at 09:15

## 2022-01-01 RX ADMIN — APIXABAN 5 MG: 5 TABLET, FILM COATED ORAL at 22:39

## 2022-01-01 RX ADMIN — ALBUMIN (HUMAN) 12.5 G: 12.5 INJECTION, SOLUTION INTRAVENOUS at 12:08

## 2022-01-01 RX ADMIN — ONDANSETRON 4 MG: 2 INJECTION INTRAMUSCULAR; INTRAVENOUS at 03:45

## 2022-01-01 RX ADMIN — HYDROMORPHONE HYDROCHLORIDE 1 MG: 1 INJECTION, SOLUTION INTRAMUSCULAR; INTRAVENOUS; SUBCUTANEOUS at 15:59

## 2022-01-01 RX ADMIN — HYDROMORPHONE HYDROCHLORIDE 0.4 MG: 1 INJECTION, SOLUTION INTRAMUSCULAR; INTRAVENOUS; SUBCUTANEOUS at 14:00

## 2022-01-01 RX ADMIN — CEFAZOLIN 2 G: 1 INJECTION, POWDER, FOR SOLUTION INTRAMUSCULAR; INTRAVENOUS at 21:10

## 2022-01-01 RX ADMIN — ONDANSETRON 4 MG: 2 INJECTION INTRAMUSCULAR; INTRAVENOUS at 12:24

## 2022-01-01 RX ADMIN — HYDROMORPHONE HYDROCHLORIDE 2 MG: 1 INJECTION, SOLUTION INTRAMUSCULAR; INTRAVENOUS; SUBCUTANEOUS at 20:25

## 2022-01-01 RX ADMIN — INSULIN LISPRO 6 UNITS: 100 INJECTION, SOLUTION INTRAVENOUS; SUBCUTANEOUS at 12:25

## 2022-01-01 RX ADMIN — HYDROMORPHONE HYDROCHLORIDE 1 MG: 1 INJECTION, SOLUTION INTRAMUSCULAR; INTRAVENOUS; SUBCUTANEOUS at 19:44

## 2022-01-01 RX ADMIN — HYDROMORPHONE HYDROCHLORIDE 1 MG: 1 INJECTION, SOLUTION INTRAMUSCULAR; INTRAVENOUS; SUBCUTANEOUS at 13:48

## 2022-01-01 RX ADMIN — CEFAZOLIN 2 G: 1 INJECTION, POWDER, FOR SOLUTION INTRAMUSCULAR; INTRAVENOUS at 12:07

## 2022-01-01 RX ADMIN — HYDROMORPHONE HYDROCHLORIDE 1 MG: 1 INJECTION, SOLUTION INTRAMUSCULAR; INTRAVENOUS; SUBCUTANEOUS at 11:36

## 2022-01-01 RX ADMIN — PREDNISONE 20 MG: 20 TABLET ORAL at 08:28

## 2022-01-01 RX ADMIN — TRACE ELEMENTS INJECTION 4: 7.4; .75; 98; 151 INJECTION, SOLUTION INTRAVENOUS at 23:50

## 2022-01-01 RX ADMIN — MORPHINE SULFATE 30 MG: 30 TABLET, FILM COATED, EXTENDED RELEASE ORAL at 20:36

## 2022-01-01 RX ADMIN — INSULIN LISPRO 6 UNITS: 100 INJECTION, SOLUTION INTRAVENOUS; SUBCUTANEOUS at 16:20

## 2022-01-01 RX ADMIN — PIPERACILLIN AND TAZOBACTAM 3.38 G: 3; .375 INJECTION, POWDER, LYOPHILIZED, FOR SOLUTION INTRAVENOUS at 17:43

## 2022-01-01 RX ADMIN — CEFAZOLIN 2 G: 1 INJECTION, POWDER, FOR SOLUTION INTRAMUSCULAR; INTRAVENOUS at 20:00

## 2022-01-01 RX ADMIN — LIDOCAINE HYDROCHLORIDE 100 MG: 20 INJECTION, SOLUTION EPIDURAL; INFILTRATION; INTRACAUDAL; PERINEURAL at 09:42

## 2022-01-01 RX ADMIN — HYDROMORPHONE HYDROCHLORIDE 1 MG: 1 INJECTION, SOLUTION INTRAMUSCULAR; INTRAVENOUS; SUBCUTANEOUS at 23:55

## 2022-01-01 RX ADMIN — OXYCODONE HYDROCHLORIDE 10 MG: 10 TABLET ORAL at 16:31

## 2022-01-01 RX ADMIN — FENTANYL CITRATE 50 MCG: 50 INJECTION, SOLUTION INTRAMUSCULAR; INTRAVENOUS at 19:22

## 2022-01-01 RX ADMIN — LISINOPRIL 20 MG: 20 TABLET ORAL at 08:18

## 2022-01-01 RX ADMIN — SODIUM CHLORIDE, PRESERVATIVE FREE 20 MG: 5 INJECTION INTRAVENOUS at 08:49

## 2022-01-01 RX ADMIN — ONDANSETRON 4 MG: 2 INJECTION INTRAMUSCULAR; INTRAVENOUS at 23:51

## 2022-01-01 RX ADMIN — PIPERACILLIN AND TAZOBACTAM 3.38 G: 3; .375 INJECTION, POWDER, LYOPHILIZED, FOR SOLUTION INTRAVENOUS at 17:21

## 2022-01-01 RX ADMIN — PIPERACILLIN AND TAZOBACTAM 3.38 G: 3; .375 INJECTION, POWDER, LYOPHILIZED, FOR SOLUTION INTRAVENOUS at 08:46

## 2022-01-01 RX ADMIN — SODIUM CHLORIDE 125 ML/HR: 9 INJECTION, SOLUTION INTRAVENOUS at 12:06

## 2022-01-01 RX ADMIN — METRONIDAZOLE 500 MG: 500 INJECTION, SOLUTION INTRAVENOUS at 16:36

## 2022-01-01 RX ADMIN — CEFAZOLIN 2 G: 1 INJECTION, POWDER, FOR SOLUTION INTRAMUSCULAR; INTRAVENOUS at 04:19

## 2022-01-01 RX ADMIN — INSULIN LISPRO 4 UNITS: 100 INJECTION, SOLUTION INTRAVENOUS; SUBCUTANEOUS at 21:53

## 2022-01-01 RX ADMIN — POTASSIUM CHLORIDE, DEXTROSE MONOHYDRATE AND SODIUM CHLORIDE 75 ML/HR: 150; 5; 450 INJECTION, SOLUTION INTRAVENOUS at 00:48

## 2022-01-01 RX ADMIN — HYDROMORPHONE HYDROCHLORIDE 1 MG: 1 INJECTION, SOLUTION INTRAMUSCULAR; INTRAVENOUS; SUBCUTANEOUS at 07:33

## 2022-01-01 RX ADMIN — AMIODARONE HYDROCHLORIDE 200 MG: 200 TABLET ORAL at 09:45

## 2022-01-01 RX ADMIN — METRONIDAZOLE 500 MG: 500 INJECTION, SOLUTION INTRAVENOUS at 12:06

## 2022-01-01 RX ADMIN — ONDANSETRON 4 MG: 2 INJECTION INTRAMUSCULAR; INTRAVENOUS at 12:35

## 2022-01-01 RX ADMIN — APIXABAN 5 MG: 5 TABLET, FILM COATED ORAL at 08:57

## 2022-01-01 RX ADMIN — SODIUM CHLORIDE 125 ML/HR: 9 INJECTION, SOLUTION INTRAVENOUS at 20:38

## 2022-01-01 RX ADMIN — ONDANSETRON 4 MG: 2 INJECTION INTRAMUSCULAR; INTRAVENOUS at 04:06

## 2022-01-01 RX ADMIN — SODIUM CHLORIDE, PRESERVATIVE FREE 20 MG: 5 INJECTION INTRAVENOUS at 21:40

## 2022-01-01 RX ADMIN — HYDROMORPHONE HYDROCHLORIDE 1 MG: 1 INJECTION, SOLUTION INTRAMUSCULAR; INTRAVENOUS; SUBCUTANEOUS at 03:08

## 2022-01-01 RX ADMIN — ONDANSETRON 4 MG: 2 INJECTION INTRAMUSCULAR; INTRAVENOUS at 05:07

## 2022-01-01 RX ADMIN — PROMETHAZINE HYDROCHLORIDE 25 MG: 25 TABLET ORAL at 15:21

## 2022-01-01 RX ADMIN — HYDROMORPHONE HYDROCHLORIDE 2 MG: 1 INJECTION, SOLUTION INTRAMUSCULAR; INTRAVENOUS; SUBCUTANEOUS at 02:58

## 2022-01-01 RX ADMIN — OXYCODONE HYDROCHLORIDE 10 MG: 10 TABLET ORAL at 16:34

## 2022-01-01 RX ADMIN — HYDROMORPHONE HYDROCHLORIDE 1 MG: 1 INJECTION, SOLUTION INTRAMUSCULAR; INTRAVENOUS; SUBCUTANEOUS at 15:19

## 2022-01-01 RX ADMIN — MORPHINE SULFATE 15 MG: 15 TABLET ORAL at 00:11

## 2022-01-01 RX ADMIN — PANTOPRAZOLE SODIUM 40 MG: 40 TABLET, DELAYED RELEASE ORAL at 08:32

## 2022-01-01 RX ADMIN — PIPERACILLIN AND TAZOBACTAM 3.38 G: 3; .375 INJECTION, POWDER, LYOPHILIZED, FOR SOLUTION INTRAVENOUS at 21:10

## 2022-01-01 RX ADMIN — CEFAZOLIN 1 G: 1 INJECTION, POWDER, FOR SOLUTION INTRAMUSCULAR; INTRAVENOUS at 20:25

## 2022-01-01 RX ADMIN — POTASSIUM CHLORIDE 10 MEQ: 7.46 INJECTION, SOLUTION INTRAVENOUS at 11:43

## 2022-01-01 RX ADMIN — METRONIDAZOLE 500 MG: 500 INJECTION, SOLUTION INTRAVENOUS at 20:35

## 2022-01-01 RX ADMIN — HYDROMORPHONE HYDROCHLORIDE 2 MG: 2 INJECTION INTRAMUSCULAR; INTRAVENOUS; SUBCUTANEOUS at 07:00

## 2022-01-01 RX ADMIN — ONDANSETRON 4 MG: 2 INJECTION INTRAMUSCULAR; INTRAVENOUS at 21:53

## 2022-01-01 RX ADMIN — HYDROMORPHONE HYDROCHLORIDE 1 MG: 1 INJECTION, SOLUTION INTRAMUSCULAR; INTRAVENOUS; SUBCUTANEOUS at 18:12

## 2022-01-01 RX ADMIN — ONDANSETRON 4 MG: 2 INJECTION INTRAMUSCULAR; INTRAVENOUS at 16:38

## 2022-01-01 RX ADMIN — HYDROMORPHONE HYDROCHLORIDE 1 MG: 1 INJECTION, SOLUTION INTRAMUSCULAR; INTRAVENOUS; SUBCUTANEOUS at 01:56

## 2022-01-01 RX ADMIN — HYDROMORPHONE HYDROCHLORIDE 1.5 MG: 2 INJECTION, SOLUTION INTRAMUSCULAR; INTRAVENOUS; SUBCUTANEOUS at 22:13

## 2022-01-01 RX ADMIN — SODIUM CHLORIDE 125 ML/HR: 9 INJECTION, SOLUTION INTRAVENOUS at 17:58

## 2022-01-01 RX ADMIN — INSULIN LISPRO 2 UNITS: 100 INJECTION, SOLUTION INTRAVENOUS; SUBCUTANEOUS at 22:54

## 2022-01-01 RX ADMIN — LISINOPRIL 20 MG: 20 TABLET ORAL at 10:23

## 2022-01-01 RX ADMIN — METRONIDAZOLE 500 MG: 500 INJECTION, SOLUTION INTRAVENOUS at 03:58

## 2022-01-01 RX ADMIN — PIPERACILLIN AND TAZOBACTAM 3.38 G: 3; .375 INJECTION, POWDER, LYOPHILIZED, FOR SOLUTION INTRAVENOUS at 00:18

## 2022-01-01 RX ADMIN — SODIUM CHLORIDE 125 ML/HR: 9 INJECTION, SOLUTION INTRAVENOUS at 13:02

## 2022-01-01 RX ADMIN — SODIUM CHLORIDE, PRESERVATIVE FREE 20 MG: 5 INJECTION INTRAVENOUS at 21:50

## 2022-01-01 RX ADMIN — HYDROMORPHONE HYDROCHLORIDE 1 MG: 1 INJECTION, SOLUTION INTRAMUSCULAR; INTRAVENOUS; SUBCUTANEOUS at 18:44

## 2022-01-01 RX ADMIN — LISINOPRIL 20 MG: 20 TABLET ORAL at 09:28

## 2022-01-01 RX ADMIN — ONDANSETRON 4 MG: 2 INJECTION INTRAMUSCULAR; INTRAVENOUS at 14:32

## 2022-01-01 RX ADMIN — METRONIDAZOLE 500 MG: 500 INJECTION, SOLUTION INTRAVENOUS at 16:13

## 2022-01-01 RX ADMIN — PREDNISONE 15 MG: 5 TABLET ORAL at 08:50

## 2022-01-01 RX ADMIN — INSULIN LISPRO 2 UNITS: 100 INJECTION, SOLUTION INTRAVENOUS; SUBCUTANEOUS at 22:01

## 2022-01-01 RX ADMIN — WATER 2 G: 1 INJECTION INTRAMUSCULAR; INTRAVENOUS; SUBCUTANEOUS at 05:07

## 2022-01-01 RX ADMIN — PROPOFOL 100 MG: 10 INJECTION, EMULSION INTRAVENOUS at 11:17

## 2022-01-01 RX ADMIN — METRONIDAZOLE 500 MG: 500 INJECTION, SOLUTION INTRAVENOUS at 11:56

## 2022-01-01 RX ADMIN — INSULIN LISPRO 2 UNITS: 100 INJECTION, SOLUTION INTRAVENOUS; SUBCUTANEOUS at 16:19

## 2022-01-01 RX ADMIN — AMIODARONE HYDROCHLORIDE 200 MG: 200 TABLET ORAL at 09:05

## 2022-01-01 RX ADMIN — HYDROMORPHONE HYDROCHLORIDE 1.5 MG: 2 INJECTION, SOLUTION INTRAMUSCULAR; INTRAVENOUS; SUBCUTANEOUS at 10:15

## 2022-01-01 RX ADMIN — VERAPAMIL HYDROCHLORIDE 80 MG: 80 TABLET ORAL at 09:54

## 2022-01-01 RX ADMIN — HYDROMORPHONE HYDROCHLORIDE 2 MG: 1 INJECTION, SOLUTION INTRAMUSCULAR; INTRAVENOUS; SUBCUTANEOUS at 14:21

## 2022-01-01 RX ADMIN — AMIODARONE HYDROCHLORIDE 200 MG: 200 TABLET ORAL at 09:04

## 2022-01-01 RX ADMIN — APIXABAN 5 MG: 5 TABLET, FILM COATED ORAL at 20:31

## 2022-01-01 RX ADMIN — HYDROMORPHONE HYDROCHLORIDE 1 MG: 1 INJECTION, SOLUTION INTRAMUSCULAR; INTRAVENOUS; SUBCUTANEOUS at 02:51

## 2022-01-01 RX ADMIN — SODIUM CHLORIDE, PRESERVATIVE FREE 20 MG: 5 INJECTION INTRAVENOUS at 20:51

## 2022-01-01 RX ADMIN — HYDROMORPHONE HYDROCHLORIDE 1 MG: 1 INJECTION, SOLUTION INTRAMUSCULAR; INTRAVENOUS; SUBCUTANEOUS at 03:58

## 2022-01-01 RX ADMIN — SODIUM CHLORIDE, PRESERVATIVE FREE 20 MG: 5 INJECTION INTRAVENOUS at 08:26

## 2022-01-01 RX ADMIN — VERAPAMIL HYDROCHLORIDE 200 MG: 100 CAPSULE, EXTENDED RELEASE ORAL at 21:46

## 2022-01-01 RX ADMIN — ONDANSETRON 4 MG: 2 INJECTION INTRAMUSCULAR; INTRAVENOUS at 17:08

## 2022-01-01 RX ADMIN — METOPROLOL TARTRATE 5 MG: 5 INJECTION INTRAVENOUS at 19:57

## 2022-01-01 RX ADMIN — PHENYLEPHRINE HYDROCHLORIDE 100 MCG: 10 INJECTION INTRAVENOUS at 18:14

## 2022-01-01 RX ADMIN — POTASSIUM CHLORIDE, DEXTROSE MONOHYDRATE AND SODIUM CHLORIDE 25 ML/HR: 150; 5; 450 INJECTION, SOLUTION INTRAVENOUS at 17:02

## 2022-01-01 RX ADMIN — DOCUSATE SODIUM 100 MG: 100 CAPSULE, LIQUID FILLED ORAL at 09:50

## 2022-01-01 RX ADMIN — CLOPIDOGREL BISULFATE 75 MG: 75 TABLET ORAL at 09:24

## 2022-01-01 RX ADMIN — CEFAZOLIN 2 G: 1 INJECTION, POWDER, FOR SOLUTION INTRAMUSCULAR; INTRAVENOUS at 20:09

## 2022-01-01 RX ADMIN — INSULIN LISPRO 2 UNITS: 100 INJECTION, SOLUTION INTRAVENOUS; SUBCUTANEOUS at 17:46

## 2022-01-01 RX ADMIN — POTASSIUM CHLORIDE 10 MEQ: 750 TABLET, FILM COATED, EXTENDED RELEASE ORAL at 21:03

## 2022-01-01 RX ADMIN — ONDANSETRON 4 MG: 2 INJECTION INTRAMUSCULAR; INTRAVENOUS at 03:21

## 2022-01-01 RX ADMIN — HYDROMORPHONE HYDROCHLORIDE 1 MG: 1 INJECTION, SOLUTION INTRAMUSCULAR; INTRAVENOUS; SUBCUTANEOUS at 00:59

## 2022-01-01 RX ADMIN — ONDANSETRON 4 MG: 2 INJECTION INTRAMUSCULAR; INTRAVENOUS at 19:51

## 2022-01-01 RX ADMIN — POTASSIUM CHLORIDE, DEXTROSE MONOHYDRATE AND SODIUM CHLORIDE 25 ML/HR: 150; 5; 450 INJECTION, SOLUTION INTRAVENOUS at 17:48

## 2022-01-01 RX ADMIN — APIXABAN 5 MG: 5 TABLET, FILM COATED ORAL at 21:42

## 2022-01-01 RX ADMIN — HYDROMORPHONE HYDROCHLORIDE 1.5 MG: 2 INJECTION, SOLUTION INTRAMUSCULAR; INTRAVENOUS; SUBCUTANEOUS at 15:43

## 2022-01-01 RX ADMIN — METOPROLOL SUCCINATE 50 MG: 50 TABLET, EXTENDED RELEASE ORAL at 08:44

## 2022-01-01 RX ADMIN — HYDROMORPHONE HYDROCHLORIDE 1 MG: 1 INJECTION, SOLUTION INTRAMUSCULAR; INTRAVENOUS; SUBCUTANEOUS at 21:07

## 2022-01-01 RX ADMIN — PANTOPRAZOLE SODIUM 40 MG: 40 TABLET, DELAYED RELEASE ORAL at 22:00

## 2022-01-01 RX ADMIN — HYDROMORPHONE HYDROCHLORIDE 2 MG: 1 INJECTION, SOLUTION INTRAMUSCULAR; INTRAVENOUS; SUBCUTANEOUS at 12:35

## 2022-01-01 RX ADMIN — HYDROMORPHONE HYDROCHLORIDE 1 MG: 1 INJECTION, SOLUTION INTRAMUSCULAR; INTRAVENOUS; SUBCUTANEOUS at 22:23

## 2022-01-01 RX ADMIN — CEFAZOLIN 2 G: 1 INJECTION, POWDER, FOR SOLUTION INTRAMUSCULAR; INTRAVENOUS at 19:46

## 2022-01-01 RX ADMIN — PROMETHAZINE HYDROCHLORIDE 25 MG: 25 TABLET ORAL at 08:38

## 2022-01-01 RX ADMIN — CEFAZOLIN 2 G: 1 INJECTION, POWDER, FOR SOLUTION INTRAMUSCULAR; INTRAVENOUS at 19:57

## 2022-01-01 RX ADMIN — APIXABAN 5 MG: 5 TABLET, FILM COATED ORAL at 22:28

## 2022-01-01 RX ADMIN — ONDANSETRON 4 MG: 2 INJECTION INTRAMUSCULAR; INTRAVENOUS at 02:58

## 2022-01-01 RX ADMIN — OXYCODONE HYDROCHLORIDE AND ACETAMINOPHEN 1 TABLET: 10; 325 TABLET ORAL at 04:13

## 2022-01-01 RX ADMIN — METRONIDAZOLE 500 MG: 500 INJECTION, SOLUTION INTRAVENOUS at 20:44

## 2022-01-01 RX ADMIN — CLOPIDOGREL BISULFATE 75 MG: 75 TABLET ORAL at 08:26

## 2022-01-01 RX ADMIN — VERAPAMIL HYDROCHLORIDE 200 MG: 100 CAPSULE, EXTENDED RELEASE ORAL at 21:01

## 2022-01-01 RX ADMIN — HYDROMORPHONE HYDROCHLORIDE 1 MG: 1 INJECTION, SOLUTION INTRAMUSCULAR; INTRAVENOUS; SUBCUTANEOUS at 06:24

## 2022-01-01 RX ADMIN — INSULIN LISPRO 2 UNITS: 100 INJECTION, SOLUTION INTRAVENOUS; SUBCUTANEOUS at 12:08

## 2022-01-01 RX ADMIN — POTASSIUM CHLORIDE 10 MEQ: 750 TABLET, FILM COATED, EXTENDED RELEASE ORAL at 21:16

## 2022-01-01 RX ADMIN — HYDROMORPHONE HYDROCHLORIDE 1 MG: 1 INJECTION, SOLUTION INTRAMUSCULAR; INTRAVENOUS; SUBCUTANEOUS at 00:29

## 2022-01-01 RX ADMIN — NYSTATIN 500000 UNITS: 100000 SUSPENSION ORAL at 08:38

## 2022-01-01 RX ADMIN — ATORVASTATIN CALCIUM 20 MG: 20 TABLET, FILM COATED ORAL at 20:35

## 2022-01-01 RX ADMIN — HYDROMORPHONE HYDROCHLORIDE 1.5 MG: 2 INJECTION INTRAMUSCULAR; INTRAVENOUS; SUBCUTANEOUS at 18:50

## 2022-01-01 RX ADMIN — INSULIN LISPRO 4 UNITS: 100 INJECTION, SOLUTION INTRAVENOUS; SUBCUTANEOUS at 21:45

## 2022-01-01 RX ADMIN — SODIUM CHLORIDE, PRESERVATIVE FREE 20 MG: 5 INJECTION INTRAVENOUS at 08:14

## 2022-01-01 RX ADMIN — HYDROMORPHONE HYDROCHLORIDE 1 MG: 1 INJECTION, SOLUTION INTRAMUSCULAR; INTRAVENOUS; SUBCUTANEOUS at 02:32

## 2022-01-01 RX ADMIN — MORPHINE SULFATE 30 MG: 30 TABLET, FILM COATED, EXTENDED RELEASE ORAL at 22:22

## 2022-01-01 RX ADMIN — ONDANSETRON 4 MG: 2 INJECTION INTRAMUSCULAR; INTRAVENOUS at 13:53

## 2022-01-01 RX ADMIN — APIXABAN 5 MG: 5 TABLET, FILM COATED ORAL at 08:38

## 2022-01-01 RX ADMIN — VERAPAMIL HYDROCHLORIDE 200 MG: 100 CAPSULE, EXTENDED RELEASE ORAL at 21:21

## 2022-01-01 RX ADMIN — POTASSIUM CHLORIDE, DEXTROSE MONOHYDRATE AND SODIUM CHLORIDE 75 ML/HR: 150; 5; 450 INJECTION, SOLUTION INTRAVENOUS at 04:06

## 2022-01-01 RX ADMIN — METHYLPREDNISOLONE SODIUM SUCCINATE 20 MG: 40 INJECTION, POWDER, FOR SOLUTION INTRAMUSCULAR; INTRAVENOUS at 08:57

## 2022-01-01 RX ADMIN — HYDROMORPHONE HYDROCHLORIDE 1 MG: 1 INJECTION, SOLUTION INTRAMUSCULAR; INTRAVENOUS; SUBCUTANEOUS at 14:05

## 2022-01-01 RX ADMIN — LIDOCAINE HYDROCHLORIDE 60 MG: 20 INJECTION, SOLUTION EPIDURAL; INFILTRATION; INTRACAUDAL; PERINEURAL at 17:08

## 2022-01-01 RX ADMIN — HYDROMORPHONE HYDROCHLORIDE 2 MG: 1 INJECTION, SOLUTION INTRAMUSCULAR; INTRAVENOUS; SUBCUTANEOUS at 03:20

## 2022-01-01 RX ADMIN — LISINOPRIL 20 MG: 20 TABLET ORAL at 09:16

## 2022-01-01 RX ADMIN — ONDANSETRON 4 MG: 2 INJECTION INTRAMUSCULAR; INTRAVENOUS at 09:35

## 2022-01-01 RX ADMIN — INSULIN LISPRO 10 UNITS: 100 INJECTION, SOLUTION INTRAVENOUS; SUBCUTANEOUS at 13:05

## 2022-01-01 RX ADMIN — AMIODARONE HYDROCHLORIDE 200 MG: 200 TABLET ORAL at 08:31

## 2022-01-01 RX ADMIN — HYDROMORPHONE HYDROCHLORIDE 1 MG: 1 INJECTION, SOLUTION INTRAMUSCULAR; INTRAVENOUS; SUBCUTANEOUS at 07:44

## 2022-01-01 RX ADMIN — OXYCODONE HYDROCHLORIDE AND ACETAMINOPHEN 1 TABLET: 10; 325 TABLET ORAL at 00:47

## 2022-01-01 RX ADMIN — ONDANSETRON 4 MG: 2 INJECTION INTRAMUSCULAR; INTRAVENOUS at 03:58

## 2022-01-01 RX ADMIN — POTASSIUM CHLORIDE 10 MEQ: 750 TABLET, FILM COATED, EXTENDED RELEASE ORAL at 21:19

## 2022-01-01 RX ADMIN — VERAPAMIL HYDROCHLORIDE 200 MG: 100 CAPSULE, EXTENDED RELEASE ORAL at 20:50

## 2022-01-01 RX ADMIN — ASPIRIN 81 MG CHEWABLE TABLET 81 MG: 81 TABLET CHEWABLE at 08:00

## 2022-01-01 RX ADMIN — PREDNISONE 20 MG: 20 TABLET ORAL at 09:05

## 2022-01-01 RX ADMIN — SODIUM CHLORIDE, PRESERVATIVE FREE 20 MG: 5 INJECTION INTRAVENOUS at 21:08

## 2022-01-01 RX ADMIN — SODIUM CHLORIDE, PRESERVATIVE FREE 20 MG: 5 INJECTION INTRAVENOUS at 08:41

## 2022-01-01 RX ADMIN — AMIODARONE HYDROCHLORIDE 200 MG: 200 TABLET ORAL at 12:25

## 2022-01-01 RX ADMIN — HYDROMORPHONE HYDROCHLORIDE 1 MG: 1 INJECTION, SOLUTION INTRAMUSCULAR; INTRAVENOUS; SUBCUTANEOUS at 12:40

## 2022-01-01 RX ADMIN — ONDANSETRON 4 MG: 2 INJECTION INTRAMUSCULAR; INTRAVENOUS at 13:19

## 2022-01-01 RX ADMIN — HYDROMORPHONE HYDROCHLORIDE 2 MG: 1 INJECTION, SOLUTION INTRAMUSCULAR; INTRAVENOUS; SUBCUTANEOUS at 23:33

## 2022-01-01 RX ADMIN — HYDROMORPHONE HYDROCHLORIDE 1 MG: 1 INJECTION, SOLUTION INTRAMUSCULAR; INTRAVENOUS; SUBCUTANEOUS at 22:35

## 2022-01-01 RX ADMIN — PIPERACILLIN AND TAZOBACTAM 3.38 G: 3; .375 INJECTION, POWDER, LYOPHILIZED, FOR SOLUTION INTRAVENOUS at 04:48

## 2022-01-01 RX ADMIN — POTASSIUM CHLORIDE 10 MEQ: 750 TABLET, FILM COATED, EXTENDED RELEASE ORAL at 21:42

## 2022-01-01 RX ADMIN — INSULIN LISPRO 2 UNITS: 100 INJECTION, SOLUTION INTRAVENOUS; SUBCUTANEOUS at 16:17

## 2022-01-01 RX ADMIN — PREDNISONE 20 MG: 20 TABLET ORAL at 08:17

## 2022-01-01 RX ADMIN — ONDANSETRON 4 MG: 2 INJECTION INTRAMUSCULAR; INTRAVENOUS at 02:02

## 2022-01-01 RX ADMIN — PIPERACILLIN AND TAZOBACTAM 3.38 G: 3; .375 INJECTION, POWDER, LYOPHILIZED, FOR SOLUTION INTRAVENOUS at 12:37

## 2022-01-01 RX ADMIN — VERAPAMIL HYDROCHLORIDE 200 MG: 100 CAPSULE, EXTENDED RELEASE ORAL at 00:01

## 2022-01-01 RX ADMIN — HYDROMORPHONE HYDROCHLORIDE 1 MG: 1 INJECTION, SOLUTION INTRAMUSCULAR; INTRAVENOUS; SUBCUTANEOUS at 09:35

## 2022-01-01 RX ADMIN — METOPROLOL SUCCINATE 50 MG: 50 TABLET, EXTENDED RELEASE ORAL at 21:10

## 2022-01-01 RX ADMIN — HYDROMORPHONE HYDROCHLORIDE 1 MG: 1 INJECTION, SOLUTION INTRAMUSCULAR; INTRAVENOUS; SUBCUTANEOUS at 22:28

## 2022-01-01 RX ADMIN — HYDROMORPHONE HYDROCHLORIDE 1 MG: 1 INJECTION, SOLUTION INTRAMUSCULAR; INTRAVENOUS; SUBCUTANEOUS at 16:59

## 2022-01-01 RX ADMIN — ASPIRIN 81 MG CHEWABLE TABLET 81 MG: 81 TABLET CHEWABLE at 10:32

## 2022-01-01 RX ADMIN — HYDROMORPHONE HYDROCHLORIDE 1 MG: 1 INJECTION, SOLUTION INTRAMUSCULAR; INTRAVENOUS; SUBCUTANEOUS at 00:53

## 2022-01-01 RX ADMIN — PANTOPRAZOLE SODIUM 40 MG: 40 TABLET, DELAYED RELEASE ORAL at 16:57

## 2022-01-01 RX ADMIN — LISINOPRIL 20 MG: 20 TABLET ORAL at 09:01

## 2022-01-01 RX ADMIN — APIXABAN 5 MG: 5 TABLET, FILM COATED ORAL at 20:35

## 2022-01-01 RX ADMIN — SODIUM CHLORIDE 75 ML/HR: 9 INJECTION, SOLUTION INTRAVENOUS at 15:23

## 2022-01-01 RX ADMIN — KETOROLAC TROMETHAMINE 30 MG: 30 INJECTION, SOLUTION INTRAMUSCULAR; INTRAVENOUS at 23:18

## 2022-01-01 RX ADMIN — HYDROMORPHONE HYDROCHLORIDE 1.5 MG: 2 INJECTION INTRAMUSCULAR; INTRAVENOUS; SUBCUTANEOUS at 21:52

## 2022-01-01 RX ADMIN — PANTOPRAZOLE SODIUM 40 MG: 40 TABLET, DELAYED RELEASE ORAL at 09:09

## 2022-01-01 RX ADMIN — SODIUM CHLORIDE, PRESERVATIVE FREE 20 MG: 5 INJECTION INTRAVENOUS at 09:22

## 2022-01-01 RX ADMIN — HYDROMORPHONE HYDROCHLORIDE 1 MG: 1 INJECTION, SOLUTION INTRAMUSCULAR; INTRAVENOUS; SUBCUTANEOUS at 21:05

## 2022-01-01 RX ADMIN — INSULIN LISPRO 4 UNITS: 100 INJECTION, SOLUTION INTRAVENOUS; SUBCUTANEOUS at 13:17

## 2022-01-01 RX ADMIN — HYDROMORPHONE HYDROCHLORIDE 1 MG: 1 INJECTION, SOLUTION INTRAMUSCULAR; INTRAVENOUS; SUBCUTANEOUS at 10:28

## 2022-01-01 RX ADMIN — NYSTATIN 500000 UNITS: 100000 SUSPENSION ORAL at 21:01

## 2022-01-01 RX ADMIN — INSULIN LISPRO 6 UNITS: 100 INJECTION, SOLUTION INTRAVENOUS; SUBCUTANEOUS at 21:44

## 2022-01-01 RX ADMIN — PROPOFOL 100 MG: 10 INJECTION, EMULSION INTRAVENOUS at 10:36

## 2022-01-01 RX ADMIN — ASPIRIN 81 MG CHEWABLE TABLET 81 MG: 81 TABLET CHEWABLE at 08:22

## 2022-01-01 RX ADMIN — INSULIN LISPRO 4 UNITS: 100 INJECTION, SOLUTION INTRAVENOUS; SUBCUTANEOUS at 22:09

## 2022-01-01 RX ADMIN — ONDANSETRON 4 MG: 2 INJECTION INTRAMUSCULAR; INTRAVENOUS at 06:44

## 2022-01-01 RX ADMIN — ASPIRIN 81 MG CHEWABLE TABLET 81 MG: 81 TABLET CHEWABLE at 11:00

## 2022-01-01 RX ADMIN — METRONIDAZOLE 500 MG: 500 INJECTION, SOLUTION INTRAVENOUS at 05:10

## 2022-01-01 RX ADMIN — SODIUM CHLORIDE 50 ML/HR: 9 INJECTION, SOLUTION INTRAVENOUS at 12:55

## 2022-01-01 RX ADMIN — ONDANSETRON 4 MG: 2 INJECTION INTRAMUSCULAR; INTRAVENOUS at 08:25

## 2022-01-01 RX ADMIN — HYDROMORPHONE HYDROCHLORIDE 2 MG: 1 INJECTION, SOLUTION INTRAMUSCULAR; INTRAVENOUS; SUBCUTANEOUS at 21:29

## 2022-01-01 RX ADMIN — ALUMINUM HYDROXIDE, MAGNESIUM HYDROXIDE, AND SIMETHICONE 30 ML: 200; 200; 20 SUSPENSION ORAL at 03:10

## 2022-01-01 RX ADMIN — METRONIDAZOLE 500 MG: 500 INJECTION, SOLUTION INTRAVENOUS at 01:40

## 2022-01-01 RX ADMIN — APIXABAN 5 MG: 5 TABLET, FILM COATED ORAL at 08:39

## 2022-01-01 RX ADMIN — METOCLOPRAMIDE HYDROCHLORIDE 5 MG: 5 INJECTION INTRAMUSCULAR; INTRAVENOUS at 11:33

## 2022-01-01 RX ADMIN — OXYCODONE HYDROCHLORIDE 10 MG: 10 TABLET ORAL at 17:06

## 2022-01-01 RX ADMIN — INSULIN LISPRO 4 UNITS: 100 INJECTION, SOLUTION INTRAVENOUS; SUBCUTANEOUS at 17:20

## 2022-01-01 RX ADMIN — OXYCODONE HYDROCHLORIDE AND ACETAMINOPHEN 1 TABLET: 10; 325 TABLET ORAL at 17:49

## 2022-01-01 RX ADMIN — HYDROMORPHONE HYDROCHLORIDE 1 MG: 1 INJECTION, SOLUTION INTRAMUSCULAR; INTRAVENOUS; SUBCUTANEOUS at 00:03

## 2022-01-01 RX ADMIN — AMIODARONE HYDROCHLORIDE 400 MG: 200 TABLET ORAL at 09:07

## 2022-01-01 RX ADMIN — METOCLOPRAMIDE HYDROCHLORIDE 5 MG: 5 INJECTION INTRAMUSCULAR; INTRAVENOUS at 06:19

## 2022-01-01 RX ADMIN — INSULIN LISPRO 12 UNITS: 100 INJECTION, SOLUTION INTRAVENOUS; SUBCUTANEOUS at 17:49

## 2022-01-01 RX ADMIN — SODIUM CHLORIDE 125 ML/HR: 9 INJECTION, SOLUTION INTRAVENOUS at 12:14

## 2022-01-01 RX ADMIN — INSULIN LISPRO 6 UNITS: 100 INJECTION, SOLUTION INTRAVENOUS; SUBCUTANEOUS at 17:45

## 2022-01-01 RX ADMIN — INSULIN LISPRO 2 UNITS: 100 INJECTION, SOLUTION INTRAVENOUS; SUBCUTANEOUS at 11:30

## 2022-01-01 RX ADMIN — METOPROLOL SUCCINATE 50 MG: 50 TABLET, EXTENDED RELEASE ORAL at 08:36

## 2022-01-01 RX ADMIN — HYDROMORPHONE HYDROCHLORIDE 2 MG: 1 INJECTION, SOLUTION INTRAMUSCULAR; INTRAVENOUS; SUBCUTANEOUS at 02:16

## 2022-01-01 RX ADMIN — DOCUSATE SODIUM 100 MG: 100 CAPSULE, LIQUID FILLED ORAL at 22:36

## 2022-01-01 RX ADMIN — HYDROMORPHONE HYDROCHLORIDE 1 MG: 1 INJECTION, SOLUTION INTRAMUSCULAR; INTRAVENOUS; SUBCUTANEOUS at 14:02

## 2022-01-01 RX ADMIN — SODIUM CHLORIDE, PRESERVATIVE FREE 20 MG: 5 INJECTION INTRAVENOUS at 22:20

## 2022-01-01 RX ADMIN — ONDANSETRON 4 MG: 2 INJECTION INTRAMUSCULAR; INTRAVENOUS at 18:12

## 2022-01-01 RX ADMIN — HYDROMORPHONE HYDROCHLORIDE 2 MG: 2 INJECTION INTRAMUSCULAR; INTRAVENOUS; SUBCUTANEOUS at 20:19

## 2022-01-01 RX ADMIN — SODIUM CHLORIDE, PRESERVATIVE FREE 20 MG: 5 INJECTION INTRAVENOUS at 08:47

## 2022-01-01 RX ADMIN — CLOPIDOGREL BISULFATE 75 MG: 75 TABLET ORAL at 09:47

## 2022-01-01 RX ADMIN — METRONIDAZOLE 500 MG: 500 INJECTION, SOLUTION INTRAVENOUS at 04:44

## 2022-01-01 RX ADMIN — BARIUM SULFATE 450 ML: 20 SUSPENSION ORAL at 10:37

## 2022-01-01 RX ADMIN — APIXABAN 5 MG: 5 TABLET, FILM COATED ORAL at 22:23

## 2022-01-01 RX ADMIN — HYDROMORPHONE HYDROCHLORIDE 1 MG: 1 INJECTION, SOLUTION INTRAMUSCULAR; INTRAVENOUS; SUBCUTANEOUS at 15:24

## 2022-01-01 RX ADMIN — INSULIN LISPRO 2 UNITS: 100 INJECTION, SOLUTION INTRAVENOUS; SUBCUTANEOUS at 14:10

## 2022-01-01 RX ADMIN — NYSTATIN 500000 UNITS: 100000 SUSPENSION ORAL at 21:10

## 2022-01-01 RX ADMIN — PROMETHAZINE HYDROCHLORIDE 25 MG: 25 TABLET ORAL at 16:15

## 2022-01-01 RX ADMIN — PREDNISONE 20 MG: 20 TABLET ORAL at 08:48

## 2022-01-01 RX ADMIN — HYDROMORPHONE HYDROCHLORIDE 1 MG: 1 INJECTION, SOLUTION INTRAMUSCULAR; INTRAVENOUS; SUBCUTANEOUS at 18:29

## 2022-01-01 RX ADMIN — SODIUM CHLORIDE, PRESERVATIVE FREE 20 MG: 5 INJECTION INTRAVENOUS at 21:38

## 2022-01-01 RX ADMIN — DOCUSATE SODIUM 100 MG: 100 CAPSULE, LIQUID FILLED ORAL at 22:26

## 2022-01-01 RX ADMIN — KETOROLAC TROMETHAMINE 30 MG: 30 INJECTION, SOLUTION INTRAMUSCULAR; INTRAVENOUS at 02:25

## 2022-01-01 RX ADMIN — AMIODARONE HYDROCHLORIDE 200 MG: 200 TABLET ORAL at 08:55

## 2022-01-01 RX ADMIN — INSULIN LISPRO 8 UNITS: 100 INJECTION, SOLUTION INTRAVENOUS; SUBCUTANEOUS at 16:36

## 2022-01-01 RX ADMIN — VERAPAMIL HYDROCHLORIDE 200 MG: 100 CAPSULE, EXTENDED RELEASE ORAL at 21:43

## 2022-01-01 RX ADMIN — HYDROMORPHONE HYDROCHLORIDE 1.5 MG: 2 INJECTION, SOLUTION INTRAMUSCULAR; INTRAVENOUS; SUBCUTANEOUS at 11:06

## 2022-01-01 RX ADMIN — LISINOPRIL 20 MG: 20 TABLET ORAL at 08:00

## 2022-01-01 RX ADMIN — MORPHINE SULFATE 30 MG: 30 TABLET, FILM COATED, EXTENDED RELEASE ORAL at 08:15

## 2022-01-01 RX ADMIN — ONDANSETRON 4 MG: 2 INJECTION INTRAMUSCULAR; INTRAVENOUS at 14:15

## 2022-01-01 RX ADMIN — INSULIN LISPRO 10 UNITS: 100 INJECTION, SOLUTION INTRAVENOUS; SUBCUTANEOUS at 17:32

## 2022-01-01 RX ADMIN — VERAPAMIL HYDROCHLORIDE 200 MG: 100 CAPSULE, EXTENDED RELEASE ORAL at 21:29

## 2022-01-01 RX ADMIN — SODIUM CHLORIDE, PRESERVATIVE FREE 20 MG: 5 INJECTION INTRAVENOUS at 08:34

## 2022-01-01 RX ADMIN — HYDROMORPHONE HYDROCHLORIDE 1 MG: 1 INJECTION, SOLUTION INTRAMUSCULAR; INTRAVENOUS; SUBCUTANEOUS at 08:24

## 2022-01-01 RX ADMIN — WATER 2 G: 1 INJECTION INTRAMUSCULAR; INTRAVENOUS; SUBCUTANEOUS at 21:14

## 2022-01-01 RX ADMIN — HYDROMORPHONE HYDROCHLORIDE 0.5 MG: 1 INJECTION, SOLUTION INTRAMUSCULAR; INTRAVENOUS; SUBCUTANEOUS at 19:48

## 2022-01-01 RX ADMIN — POTASSIUM CHLORIDE, DEXTROSE MONOHYDRATE AND SODIUM CHLORIDE 125 ML/HR: 150; 5; 450 INJECTION, SOLUTION INTRAVENOUS at 10:36

## 2022-01-01 RX ADMIN — HYDROMORPHONE HYDROCHLORIDE 1 MG: 1 INJECTION, SOLUTION INTRAMUSCULAR; INTRAVENOUS; SUBCUTANEOUS at 01:33

## 2022-01-01 RX ADMIN — ONDANSETRON 4 MG: 2 INJECTION INTRAMUSCULAR; INTRAVENOUS at 23:06

## 2022-01-01 RX ADMIN — ASCORBIC ACID, VITAMIN A PALMITATE, CHOLECALCIFEROL, THIAMINE HYDROCHLORIDE, RIBOFLAVIN-5 PHOSPHATE SODIUM, PYRIDOXINE HYDROCHLORIDE, NIACINAMIDE, DEXPANTHENOL, ALPHA-TOCOPHEROL ACETATE, VITAMIN K1, FOLIC ACID, BIOTIN, CYANOCOBALAMIN: 200; 3300; 200; 6; 3.6; 6; 40; 15; 10; 150; 600; 60; 5 INJECTION, SOLUTION INTRAVENOUS at 06:00

## 2022-01-01 RX ADMIN — PROMETHAZINE HYDROCHLORIDE 25 MG: 25 TABLET ORAL at 14:17

## 2022-01-01 RX ADMIN — HYDROMORPHONE HYDROCHLORIDE 1 MG: 1 INJECTION, SOLUTION INTRAMUSCULAR; INTRAVENOUS; SUBCUTANEOUS at 08:52

## 2022-01-01 RX ADMIN — POTASSIUM CHLORIDE 10 MEQ: 750 TABLET, FILM COATED, EXTENDED RELEASE ORAL at 21:00

## 2022-01-01 RX ADMIN — KETOROLAC TROMETHAMINE 30 MG: 30 INJECTION, SOLUTION INTRAMUSCULAR; INTRAVENOUS at 17:58

## 2022-01-01 RX ADMIN — VASOPRESSIN 5 UNITS: 20 INJECTION INTRAVENOUS at 10:43

## 2022-01-01 RX ADMIN — OXYCODONE HYDROCHLORIDE AND ACETAMINOPHEN 1 TABLET: 10; 325 TABLET ORAL at 21:25

## 2022-01-01 RX ADMIN — WATER 2 G: 1 INJECTION INTRAMUSCULAR; INTRAVENOUS; SUBCUTANEOUS at 15:31

## 2022-01-01 RX ADMIN — ONDANSETRON 4 MG: 2 INJECTION INTRAMUSCULAR; INTRAVENOUS at 04:20

## 2022-01-01 RX ADMIN — SODIUM CHLORIDE 125 ML/HR: 9 INJECTION, SOLUTION INTRAVENOUS at 17:19

## 2022-01-01 RX ADMIN — MORPHINE SULFATE 15 MG: 15 TABLET ORAL at 01:17

## 2022-01-01 RX ADMIN — AMIODARONE HYDROCHLORIDE 400 MG: 200 TABLET ORAL at 08:26

## 2022-01-01 RX ADMIN — PANTOPRAZOLE SODIUM 40 MG: 40 TABLET, DELAYED RELEASE ORAL at 07:14

## 2022-01-01 RX ADMIN — INSULIN LISPRO 20 UNITS: 100 INJECTION, SOLUTION INTRAVENOUS; SUBCUTANEOUS at 17:43

## 2022-01-01 RX ADMIN — ONDANSETRON 4 MG: 2 INJECTION INTRAMUSCULAR; INTRAVENOUS at 13:04

## 2022-01-01 RX ADMIN — ONDANSETRON 4 MG: 2 INJECTION INTRAMUSCULAR; INTRAVENOUS at 16:31

## 2022-01-01 RX ADMIN — PANTOPRAZOLE SODIUM 40 MG: 40 TABLET, DELAYED RELEASE ORAL at 09:07

## 2022-01-01 RX ADMIN — OXYCODONE HYDROCHLORIDE AND ACETAMINOPHEN 1 TABLET: 10; 325 TABLET ORAL at 03:43

## 2022-01-01 RX ADMIN — VERAPAMIL HYDROCHLORIDE 200 MG: 100 CAPSULE, EXTENDED RELEASE ORAL at 22:16

## 2022-01-01 RX ADMIN — HYDROMORPHONE HYDROCHLORIDE 2 MG: 1 INJECTION, SOLUTION INTRAMUSCULAR; INTRAVENOUS; SUBCUTANEOUS at 09:11

## 2022-01-01 RX ADMIN — APIXABAN 5 MG: 5 TABLET, FILM COATED ORAL at 09:45

## 2022-01-01 RX ADMIN — VERAPAMIL HYDROCHLORIDE 200 MG: 100 CAPSULE, EXTENDED RELEASE ORAL at 21:32

## 2022-01-01 RX ADMIN — HYDROMORPHONE HYDROCHLORIDE 1.5 MG: 2 INJECTION, SOLUTION INTRAMUSCULAR; INTRAVENOUS; SUBCUTANEOUS at 07:22

## 2022-01-01 RX ADMIN — HYDROMORPHONE HYDROCHLORIDE 2 MG: 2 INJECTION INTRAMUSCULAR; INTRAVENOUS; SUBCUTANEOUS at 23:33

## 2022-01-01 RX ADMIN — ONDANSETRON 4 MG: 2 INJECTION INTRAMUSCULAR; INTRAVENOUS at 14:05

## 2022-01-01 RX ADMIN — HYDROMORPHONE HYDROCHLORIDE 1 MG: 1 INJECTION, SOLUTION INTRAMUSCULAR; INTRAVENOUS; SUBCUTANEOUS at 15:11

## 2022-01-01 RX ADMIN — ONDANSETRON 4 MG: 2 INJECTION INTRAMUSCULAR; INTRAVENOUS at 22:30

## 2022-01-01 RX ADMIN — ONDANSETRON 4 MG: 2 INJECTION INTRAMUSCULAR; INTRAVENOUS at 05:29

## 2022-01-01 RX ADMIN — METRONIDAZOLE 500 MG: 500 INJECTION, SOLUTION INTRAVENOUS at 21:26

## 2022-01-01 RX ADMIN — AMIODARONE HYDROCHLORIDE 200 MG: 200 TABLET ORAL at 08:12

## 2022-01-01 RX ADMIN — HYDROMORPHONE HYDROCHLORIDE 1 MG: 1 INJECTION, SOLUTION INTRAMUSCULAR; INTRAVENOUS; SUBCUTANEOUS at 11:08

## 2022-01-01 RX ADMIN — AMIODARONE HYDROCHLORIDE 400 MG: 200 TABLET ORAL at 08:55

## 2022-01-01 RX ADMIN — SODIUM CHLORIDE 125 ML/HR: 9 INJECTION, SOLUTION INTRAVENOUS at 04:26

## 2022-01-01 RX ADMIN — HYDROMORPHONE HYDROCHLORIDE 2 MG: 1 INJECTION, SOLUTION INTRAMUSCULAR; INTRAVENOUS; SUBCUTANEOUS at 19:39

## 2022-01-01 RX ADMIN — SODIUM CHLORIDE, PRESERVATIVE FREE 20 MG: 5 INJECTION INTRAVENOUS at 21:00

## 2022-01-01 RX ADMIN — HYDROMORPHONE HYDROCHLORIDE 1 MG: 1 INJECTION, SOLUTION INTRAMUSCULAR; INTRAVENOUS; SUBCUTANEOUS at 00:41

## 2022-01-01 RX ADMIN — POLYETHYLENE GLYCOL 3350 17 G: 17 POWDER, FOR SOLUTION ORAL at 08:24

## 2022-01-01 RX ADMIN — ASPIRIN 81 MG CHEWABLE TABLET 81 MG: 81 TABLET CHEWABLE at 08:47

## 2022-01-01 RX ADMIN — HYDROMORPHONE HYDROCHLORIDE 1 MG: 1 INJECTION, SOLUTION INTRAMUSCULAR; INTRAVENOUS; SUBCUTANEOUS at 18:08

## 2022-01-01 RX ADMIN — METRONIDAZOLE 500 MG: 500 INJECTION, SOLUTION INTRAVENOUS at 23:33

## 2022-01-01 RX ADMIN — NYSTATIN 500000 UNITS: 100000 SUSPENSION ORAL at 21:02

## 2022-01-01 RX ADMIN — LISINOPRIL 20 MG: 20 TABLET ORAL at 08:09

## 2022-01-01 RX ADMIN — POTASSIUM CHLORIDE 10 MEQ: 7.46 INJECTION, SOLUTION INTRAVENOUS at 14:26

## 2022-01-01 RX ADMIN — ONDANSETRON 4 MG: 2 INJECTION INTRAMUSCULAR; INTRAVENOUS at 07:21

## 2022-01-01 RX ADMIN — SODIUM CHLORIDE, PRESERVATIVE FREE 20 MG: 5 INJECTION INTRAVENOUS at 08:44

## 2022-01-01 RX ADMIN — HYDROMORPHONE HYDROCHLORIDE 1 MG: 1 INJECTION, SOLUTION INTRAMUSCULAR; INTRAVENOUS; SUBCUTANEOUS at 04:20

## 2022-01-01 RX ADMIN — INSULIN LISPRO 2 UNITS: 100 INJECTION, SOLUTION INTRAVENOUS; SUBCUTANEOUS at 12:05

## 2022-01-01 RX ADMIN — INSULIN LISPRO 8 UNITS: 100 INJECTION, SOLUTION INTRAVENOUS; SUBCUTANEOUS at 12:23

## 2022-01-01 RX ADMIN — ONDANSETRON 4 MG: 2 INJECTION INTRAMUSCULAR; INTRAVENOUS at 13:11

## 2022-01-01 RX ADMIN — WATER 2 G: 1 INJECTION INTRAMUSCULAR; INTRAVENOUS; SUBCUTANEOUS at 15:49

## 2022-01-01 RX ADMIN — AMIODARONE HYDROCHLORIDE 200 MG: 200 TABLET ORAL at 09:13

## 2022-01-01 RX ADMIN — DEXMEDETOMIDINE HYDROCHLORIDE 10 MCG: 100 INJECTION, SOLUTION INTRAVENOUS at 09:40

## 2022-01-01 RX ADMIN — HYDROMORPHONE HYDROCHLORIDE 1 MG: 1 INJECTION, SOLUTION INTRAMUSCULAR; INTRAVENOUS; SUBCUTANEOUS at 14:37

## 2022-01-01 RX ADMIN — METRONIDAZOLE 500 MG: 500 INJECTION, SOLUTION INTRAVENOUS at 12:02

## 2022-01-01 RX ADMIN — DOCUSATE SODIUM 100 MG: 100 CAPSULE, LIQUID FILLED ORAL at 20:28

## 2022-01-01 RX ADMIN — METOPROLOL TARTRATE 2.5 MG: 1 INJECTION, SOLUTION INTRAVENOUS at 00:49

## 2022-01-01 RX ADMIN — INSULIN LISPRO 4 UNITS: 100 INJECTION, SOLUTION INTRAVENOUS; SUBCUTANEOUS at 12:16

## 2022-01-01 RX ADMIN — ASPIRIN 81 MG CHEWABLE TABLET 81 MG: 81 TABLET CHEWABLE at 09:13

## 2022-01-01 RX ADMIN — METOPROLOL TARTRATE 5 MG: 5 INJECTION INTRAVENOUS at 03:00

## 2022-01-01 RX ADMIN — ONDANSETRON 4 MG: 2 INJECTION INTRAMUSCULAR; INTRAVENOUS at 08:01

## 2022-01-01 RX ADMIN — ATORVASTATIN CALCIUM 20 MG: 20 TABLET, FILM COATED ORAL at 22:16

## 2022-01-01 RX ADMIN — CLOPIDOGREL BISULFATE 75 MG: 75 TABLET ORAL at 08:39

## 2022-01-01 RX ADMIN — ONDANSETRON 4 MG: 2 INJECTION INTRAMUSCULAR; INTRAVENOUS at 16:47

## 2022-01-01 RX ADMIN — PIPERACILLIN AND TAZOBACTAM 3.38 G: 3; .375 INJECTION, POWDER, LYOPHILIZED, FOR SOLUTION INTRAVENOUS at 12:41

## 2022-01-01 RX ADMIN — SODIUM CHLORIDE 50 ML/HR: 9 INJECTION, SOLUTION INTRAVENOUS at 01:04

## 2022-01-01 RX ADMIN — INSULIN LISPRO 4 UNITS: 100 INJECTION, SOLUTION INTRAVENOUS; SUBCUTANEOUS at 07:30

## 2022-01-01 RX ADMIN — LISINOPRIL 20 MG: 20 TABLET ORAL at 09:12

## 2022-01-01 RX ADMIN — APIXABAN 5 MG: 5 TABLET, FILM COATED ORAL at 09:15

## 2022-01-01 RX ADMIN — APIXABAN 5 MG: 5 TABLET, FILM COATED ORAL at 19:44

## 2022-01-01 RX ADMIN — APIXABAN 5 MG: 5 TABLET, FILM COATED ORAL at 09:28

## 2022-01-01 RX ADMIN — CLOPIDOGREL BISULFATE 75 MG: 75 TABLET ORAL at 08:57

## 2022-01-01 RX ADMIN — SODIUM CHLORIDE, PRESERVATIVE FREE 20 MG: 5 INJECTION INTRAVENOUS at 08:53

## 2022-01-01 RX ADMIN — HYDROMORPHONE HYDROCHLORIDE 1.5 MG: 2 INJECTION INTRAMUSCULAR; INTRAVENOUS; SUBCUTANEOUS at 08:08

## 2022-01-01 RX ADMIN — SODIUM CHLORIDE 125 ML/HR: 9 INJECTION, SOLUTION INTRAVENOUS at 00:20

## 2022-01-01 RX ADMIN — VERAPAMIL HYDROCHLORIDE 200 MG: 100 CAPSULE, EXTENDED RELEASE ORAL at 22:38

## 2022-01-01 RX ADMIN — METRONIDAZOLE 500 MG: 500 INJECTION, SOLUTION INTRAVENOUS at 19:49

## 2022-01-01 RX ADMIN — OXYCODONE HYDROCHLORIDE AND ACETAMINOPHEN 1 TABLET: 10; 325 TABLET ORAL at 20:29

## 2022-01-01 RX ADMIN — ONDANSETRON 4 MG: 2 INJECTION INTRAMUSCULAR; INTRAVENOUS at 03:26

## 2022-01-01 RX ADMIN — INSULIN LISPRO 4 UNITS: 100 INJECTION, SOLUTION INTRAVENOUS; SUBCUTANEOUS at 17:55

## 2022-01-01 RX ADMIN — HYDROMORPHONE HYDROCHLORIDE 1 MG: 1 INJECTION, SOLUTION INTRAMUSCULAR; INTRAVENOUS; SUBCUTANEOUS at 09:43

## 2022-01-01 RX ADMIN — HYDROMORPHONE HYDROCHLORIDE 1 MG: 1 INJECTION, SOLUTION INTRAMUSCULAR; INTRAVENOUS; SUBCUTANEOUS at 08:36

## 2022-01-01 RX ADMIN — METRONIDAZOLE 500 MG: 500 INJECTION, SOLUTION INTRAVENOUS at 02:21

## 2022-01-01 RX ADMIN — DEXTROSE MONOHYDRATE 0.5 MG/MIN: 50 INJECTION, SOLUTION INTRAVENOUS at 05:59

## 2022-01-01 RX ADMIN — ONDANSETRON 4 MG: 2 INJECTION INTRAMUSCULAR; INTRAVENOUS at 09:10

## 2022-01-01 RX ADMIN — SODIUM CHLORIDE, PRESERVATIVE FREE 20 MG: 5 INJECTION INTRAVENOUS at 20:37

## 2022-01-01 RX ADMIN — HYDROMORPHONE HYDROCHLORIDE 1 MG: 1 INJECTION, SOLUTION INTRAMUSCULAR; INTRAVENOUS; SUBCUTANEOUS at 18:11

## 2022-01-01 RX ADMIN — APIXABAN 5 MG: 5 TABLET, FILM COATED ORAL at 22:16

## 2022-01-01 RX ADMIN — INSULIN LISPRO 8 UNITS: 100 INJECTION, SOLUTION INTRAVENOUS; SUBCUTANEOUS at 17:33

## 2022-01-01 RX ADMIN — ONDANSETRON 4 MG: 2 INJECTION INTRAMUSCULAR; INTRAVENOUS at 21:17

## 2022-01-01 RX ADMIN — WATER 2 G: 1 INJECTION INTRAMUSCULAR; INTRAVENOUS; SUBCUTANEOUS at 21:31

## 2022-01-01 RX ADMIN — VERAPAMIL HYDROCHLORIDE 200 MG: 100 CAPSULE, EXTENDED RELEASE ORAL at 22:30

## 2022-01-01 RX ADMIN — CEFAZOLIN 2 G: 1 INJECTION, POWDER, FOR SOLUTION INTRAMUSCULAR; INTRAVENOUS at 11:59

## 2022-01-01 RX ADMIN — PROMETHAZINE HYDROCHLORIDE 25 MG: 25 TABLET ORAL at 23:45

## 2022-01-01 RX ADMIN — METRONIDAZOLE 500 MG: 500 INJECTION, SOLUTION INTRAVENOUS at 08:57

## 2022-01-01 RX ADMIN — VERAPAMIL HYDROCHLORIDE 200 MG: 100 CAPSULE, EXTENDED RELEASE ORAL at 22:42

## 2022-01-01 RX ADMIN — DOCUSATE SODIUM 100 MG: 100 CAPSULE, LIQUID FILLED ORAL at 08:29

## 2022-01-01 RX ADMIN — ONDANSETRON 4 MG: 2 INJECTION INTRAMUSCULAR; INTRAVENOUS at 11:36

## 2022-01-01 RX ADMIN — NYSTATIN 500000 UNITS: 100000 SUSPENSION ORAL at 13:59

## 2022-01-01 RX ADMIN — HYDROMORPHONE HYDROCHLORIDE 1 MG: 1 INJECTION, SOLUTION INTRAMUSCULAR; INTRAVENOUS; SUBCUTANEOUS at 05:40

## 2022-01-01 RX ADMIN — ONDANSETRON 4 MG: 2 INJECTION INTRAMUSCULAR; INTRAVENOUS at 18:57

## 2022-01-01 RX ADMIN — AMIODARONE HYDROCHLORIDE 400 MG: 200 TABLET ORAL at 09:11

## 2022-01-01 RX ADMIN — INSULIN LISPRO 8 UNITS: 100 INJECTION, SOLUTION INTRAVENOUS; SUBCUTANEOUS at 17:08

## 2022-01-01 RX ADMIN — HYDROMORPHONE HYDROCHLORIDE 1 MG: 1 INJECTION, SOLUTION INTRAMUSCULAR; INTRAVENOUS; SUBCUTANEOUS at 22:36

## 2022-01-01 RX ADMIN — METRONIDAZOLE 500 MG: 500 INJECTION, SOLUTION INTRAVENOUS at 04:03

## 2022-01-01 RX ADMIN — HYDROMORPHONE HYDROCHLORIDE 1 MG: 1 INJECTION, SOLUTION INTRAMUSCULAR; INTRAVENOUS; SUBCUTANEOUS at 09:46

## 2022-01-01 RX ADMIN — ONDANSETRON 4 MG: 2 INJECTION INTRAMUSCULAR; INTRAVENOUS at 21:38

## 2022-01-01 RX ADMIN — ONDANSETRON 4 MG: 2 INJECTION INTRAMUSCULAR; INTRAVENOUS at 15:09

## 2022-01-01 RX ADMIN — HYDROMORPHONE HYDROCHLORIDE 1 MG: 1 INJECTION, SOLUTION INTRAMUSCULAR; INTRAVENOUS; SUBCUTANEOUS at 07:15

## 2022-01-01 RX ADMIN — METHYLPREDNISOLONE SODIUM SUCCINATE 20 MG: 40 INJECTION, POWDER, FOR SOLUTION INTRAMUSCULAR; INTRAVENOUS at 08:39

## 2022-01-01 RX ADMIN — POTASSIUM CHLORIDE 10 MEQ: 7.46 INJECTION, SOLUTION INTRAVENOUS at 13:45

## 2022-01-01 RX ADMIN — CLOPIDOGREL BISULFATE 75 MG: 75 TABLET ORAL at 09:38

## 2022-01-01 RX ADMIN — CEFAZOLIN SODIUM 2 G: 1 INJECTION, POWDER, FOR SOLUTION INTRAMUSCULAR; INTRAVENOUS at 11:27

## 2022-01-01 RX ADMIN — PANTOPRAZOLE SODIUM 40 MG: 40 TABLET, DELAYED RELEASE ORAL at 16:12

## 2022-01-01 RX ADMIN — SODIUM CHLORIDE, PRESERVATIVE FREE 20 MG: 5 INJECTION INTRAVENOUS at 10:51

## 2022-01-01 RX ADMIN — ONDANSETRON 4 MG: 2 INJECTION INTRAMUSCULAR; INTRAVENOUS at 00:35

## 2022-01-01 RX ADMIN — PHENYLEPHRINE HYDROCHLORIDE 50 MCG: 10 INJECTION INTRAVENOUS at 17:55

## 2022-01-01 RX ADMIN — NYSTATIN 500000 UNITS: 100000 SUSPENSION ORAL at 08:26

## 2022-01-01 RX ADMIN — SUGAMMADEX 200 MG: 100 INJECTION, SOLUTION INTRAVENOUS at 11:34

## 2022-01-01 RX ADMIN — HYDROMORPHONE HYDROCHLORIDE 1 MG: 1 INJECTION, SOLUTION INTRAMUSCULAR; INTRAVENOUS; SUBCUTANEOUS at 06:18

## 2022-01-01 RX ADMIN — WATER 2 G: 1 INJECTION INTRAMUSCULAR; INTRAVENOUS; SUBCUTANEOUS at 21:48

## 2022-01-01 RX ADMIN — APIXABAN 5 MG: 5 TABLET, FILM COATED ORAL at 08:50

## 2022-01-01 RX ADMIN — METRONIDAZOLE 500 MG: 500 INJECTION, SOLUTION INTRAVENOUS at 02:06

## 2022-01-01 RX ADMIN — HYDROMORPHONE HYDROCHLORIDE 1.5 MG: 2 INJECTION, SOLUTION INTRAMUSCULAR; INTRAVENOUS; SUBCUTANEOUS at 04:57

## 2022-01-01 RX ADMIN — POTASSIUM CHLORIDE, DEXTROSE MONOHYDRATE AND SODIUM CHLORIDE 75 ML/HR: 150; 5; 450 INJECTION, SOLUTION INTRAVENOUS at 19:48

## 2022-01-01 RX ADMIN — ONDANSETRON 4 MG: 2 INJECTION INTRAMUSCULAR; INTRAVENOUS at 23:18

## 2022-01-01 RX ADMIN — ONDANSETRON 4 MG: 2 INJECTION INTRAMUSCULAR; INTRAVENOUS at 00:53

## 2022-01-01 RX ADMIN — INSULIN LISPRO 6 UNITS: 100 INJECTION, SOLUTION INTRAVENOUS; SUBCUTANEOUS at 18:25

## 2022-01-01 RX ADMIN — ONDANSETRON 4 MG: 2 INJECTION INTRAMUSCULAR; INTRAVENOUS at 15:59

## 2022-01-01 RX ADMIN — METRONIDAZOLE 500 MG: 500 INJECTION, SOLUTION INTRAVENOUS at 11:54

## 2022-01-01 RX ADMIN — NYSTATIN 500000 UNITS: 100000 SUSPENSION ORAL at 08:37

## 2022-01-01 RX ADMIN — POTASSIUM CHLORIDE, DEXTROSE MONOHYDRATE AND SODIUM CHLORIDE 25 ML/HR: 150; 5; 450 INJECTION, SOLUTION INTRAVENOUS at 04:41

## 2022-01-01 RX ADMIN — ONDANSETRON 4 MG: 2 INJECTION INTRAMUSCULAR; INTRAVENOUS at 08:09

## 2022-01-01 RX ADMIN — AMIODARONE HYDROCHLORIDE 150 MG: 1.5 INJECTION, SOLUTION INTRAVENOUS at 04:27

## 2022-01-01 RX ADMIN — METOPROLOL SUCCINATE 50 MG: 50 TABLET, EXTENDED RELEASE ORAL at 21:11

## 2022-01-01 RX ADMIN — CLOPIDOGREL BISULFATE 75 MG: 75 TABLET ORAL at 08:30

## 2022-01-01 RX ADMIN — ONDANSETRON 4 MG: 2 INJECTION INTRAMUSCULAR; INTRAVENOUS at 22:45

## 2022-01-01 RX ADMIN — INSULIN LISPRO 4 UNITS: 100 INJECTION, SOLUTION INTRAVENOUS; SUBCUTANEOUS at 13:54

## 2022-01-01 RX ADMIN — WATER 2 G: 1 INJECTION INTRAMUSCULAR; INTRAVENOUS; SUBCUTANEOUS at 01:23

## 2022-01-01 RX ADMIN — ONDANSETRON 4 MG: 2 INJECTION INTRAMUSCULAR; INTRAVENOUS at 14:44

## 2022-01-01 RX ADMIN — WATER 2 G: 1 INJECTION INTRAMUSCULAR; INTRAVENOUS; SUBCUTANEOUS at 05:32

## 2022-01-01 RX ADMIN — METOPROLOL SUCCINATE 50 MG: 50 TABLET, EXTENDED RELEASE ORAL at 08:56

## 2022-01-01 RX ADMIN — PROMETHAZINE HYDROCHLORIDE 25 MG: 25 TABLET ORAL at 03:50

## 2022-01-01 RX ADMIN — APIXABAN 5 MG: 5 TABLET, FILM COATED ORAL at 08:26

## 2022-01-01 RX ADMIN — NYSTATIN 500000 UNITS: 100000 SUSPENSION ORAL at 21:04

## 2022-01-01 RX ADMIN — HYDROMORPHONE HYDROCHLORIDE 1.5 MG: 2 INJECTION, SOLUTION INTRAMUSCULAR; INTRAVENOUS; SUBCUTANEOUS at 14:12

## 2022-01-01 RX ADMIN — INSULIN LISPRO 4 UNITS: 100 INJECTION, SOLUTION INTRAVENOUS; SUBCUTANEOUS at 21:44

## 2022-01-01 RX ADMIN — SODIUM CHLORIDE, PRESERVATIVE FREE 20 MG: 5 INJECTION INTRAVENOUS at 09:11

## 2022-01-01 RX ADMIN — AMIODARONE HYDROCHLORIDE 400 MG: 200 TABLET ORAL at 09:09

## 2022-01-01 RX ADMIN — PIPERACILLIN AND TAZOBACTAM 3.38 G: 3; .375 INJECTION, POWDER, LYOPHILIZED, FOR SOLUTION INTRAVENOUS at 04:45

## 2022-01-01 RX ADMIN — LISINOPRIL 20 MG: 20 TABLET ORAL at 08:15

## 2022-01-01 RX ADMIN — KETOROLAC TROMETHAMINE 30 MG: 30 INJECTION, SOLUTION INTRAMUSCULAR; INTRAVENOUS at 04:30

## 2022-01-01 RX ADMIN — METOPROLOL TARTRATE 25 MG: 25 TABLET, FILM COATED ORAL at 09:23

## 2022-01-01 RX ADMIN — LISINOPRIL 20 MG: 20 TABLET ORAL at 10:12

## 2022-01-01 RX ADMIN — WATER 2 G: 1 INJECTION INTRAMUSCULAR; INTRAVENOUS; SUBCUTANEOUS at 16:36

## 2022-01-01 RX ADMIN — METRONIDAZOLE 500 MG: 500 INJECTION, SOLUTION INTRAVENOUS at 00:35

## 2022-01-01 RX ADMIN — WATER 2 G: 1 INJECTION INTRAMUSCULAR; INTRAVENOUS; SUBCUTANEOUS at 05:47

## 2022-01-01 RX ADMIN — WATER 2 G: 1 INJECTION INTRAMUSCULAR; INTRAVENOUS; SUBCUTANEOUS at 18:12

## 2022-01-01 RX ADMIN — HYDROMORPHONE HYDROCHLORIDE 2 MG: 1 INJECTION, SOLUTION INTRAMUSCULAR; INTRAVENOUS; SUBCUTANEOUS at 11:13

## 2022-01-01 RX ADMIN — VERAPAMIL HYDROCHLORIDE 40 MG: 80 TABLET ORAL at 08:38

## 2022-01-01 RX ADMIN — ROCURONIUM BROMIDE 20 MG: 50 INJECTION, SOLUTION INTRAVENOUS at 11:27

## 2022-01-01 RX ADMIN — METOPROLOL SUCCINATE 50 MG: 50 TABLET, EXTENDED RELEASE ORAL at 20:01

## 2022-01-01 RX ADMIN — APIXABAN 5 MG: 5 TABLET, FILM COATED ORAL at 22:06

## 2022-01-01 RX ADMIN — ONDANSETRON 4 MG: 2 INJECTION INTRAMUSCULAR; INTRAVENOUS at 02:26

## 2022-01-01 RX ADMIN — PREDNISONE 20 MG: 20 TABLET ORAL at 09:03

## 2022-01-01 RX ADMIN — VERAPAMIL HYDROCHLORIDE 200 MG: 100 CAPSULE, EXTENDED RELEASE ORAL at 21:09

## 2022-01-01 RX ADMIN — HYDROMORPHONE HYDROCHLORIDE 2 MG: 1 INJECTION, SOLUTION INTRAMUSCULAR; INTRAVENOUS; SUBCUTANEOUS at 13:23

## 2022-01-01 RX ADMIN — HYDROMORPHONE HYDROCHLORIDE 1 MG: 1 INJECTION, SOLUTION INTRAMUSCULAR; INTRAVENOUS; SUBCUTANEOUS at 14:19

## 2022-01-01 RX ADMIN — SODIUM CHLORIDE 125 ML/HR: 9 INJECTION, SOLUTION INTRAVENOUS at 21:08

## 2022-01-01 RX ADMIN — SODIUM CHLORIDE, POTASSIUM CHLORIDE, SODIUM LACTATE AND CALCIUM CHLORIDE 20 ML/HR: 600; 310; 30; 20 INJECTION, SOLUTION INTRAVENOUS at 08:24

## 2022-01-01 RX ADMIN — ONDANSETRON 4 MG: 2 INJECTION INTRAMUSCULAR; INTRAVENOUS at 01:17

## 2022-01-01 RX ADMIN — SOYBEAN OIL 250 ML: 20 INJECTION, SOLUTION INTRAVENOUS at 04:28

## 2022-01-01 RX ADMIN — WATER 2 G: 1 INJECTION INTRAMUSCULAR; INTRAVENOUS; SUBCUTANEOUS at 01:12

## 2022-01-01 RX ADMIN — DOCUSATE SODIUM 100 MG: 100 CAPSULE, LIQUID FILLED ORAL at 21:53

## 2022-01-01 RX ADMIN — INSULIN LISPRO 2 UNITS: 100 INJECTION, SOLUTION INTRAVENOUS; SUBCUTANEOUS at 16:59

## 2022-01-01 RX ADMIN — HYDROMORPHONE HYDROCHLORIDE 1 MG: 1 INJECTION, SOLUTION INTRAMUSCULAR; INTRAVENOUS; SUBCUTANEOUS at 06:30

## 2022-01-01 RX ADMIN — HYDROMORPHONE HYDROCHLORIDE 1 MG: 1 INJECTION, SOLUTION INTRAMUSCULAR; INTRAVENOUS; SUBCUTANEOUS at 06:04

## 2022-01-01 RX ADMIN — HYDROMORPHONE HYDROCHLORIDE 1 MG: 1 INJECTION, SOLUTION INTRAMUSCULAR; INTRAVENOUS; SUBCUTANEOUS at 12:14

## 2022-01-01 RX ADMIN — INSULIN LISPRO 6 UNITS: 100 INJECTION, SOLUTION INTRAVENOUS; SUBCUTANEOUS at 22:16

## 2022-01-01 RX ADMIN — APIXABAN 5 MG: 5 TABLET, FILM COATED ORAL at 22:21

## 2022-01-01 RX ADMIN — PANTOPRAZOLE SODIUM 40 MG: 40 TABLET, DELAYED RELEASE ORAL at 18:06

## 2022-01-01 RX ADMIN — SODIUM CHLORIDE 125 ML/HR: 9 INJECTION, SOLUTION INTRAVENOUS at 08:40

## 2022-01-01 RX ADMIN — METRONIDAZOLE 500 MG: 500 INJECTION, SOLUTION INTRAVENOUS at 04:40

## 2022-01-01 RX ADMIN — AMIODARONE HYDROCHLORIDE 150 MG: 1.5 INJECTION, SOLUTION INTRAVENOUS at 05:47

## 2022-01-01 RX ADMIN — OXYCODONE HYDROCHLORIDE AND ACETAMINOPHEN 1 TABLET: 10; 325 TABLET ORAL at 08:32

## 2022-01-01 RX ADMIN — METRONIDAZOLE 500 MG: 500 INJECTION, SOLUTION INTRAVENOUS at 17:22

## 2022-01-01 RX ADMIN — AMIODARONE HYDROCHLORIDE 0.5 MG/MIN: 50 INJECTION, SOLUTION INTRAVENOUS at 06:27

## 2022-01-01 RX ADMIN — HYDROMORPHONE HYDROCHLORIDE 1 MG: 1 INJECTION, SOLUTION INTRAMUSCULAR; INTRAVENOUS; SUBCUTANEOUS at 19:51

## 2022-01-01 RX ADMIN — ONDANSETRON 4 MG: 2 INJECTION INTRAMUSCULAR; INTRAVENOUS at 19:00

## 2022-01-01 RX ADMIN — METHYLPREDNISOLONE SODIUM SUCCINATE 20 MG: 40 INJECTION, POWDER, FOR SOLUTION INTRAMUSCULAR; INTRAVENOUS at 08:24

## 2022-01-01 RX ADMIN — HYDROMORPHONE HYDROCHLORIDE 2 MG: 1 INJECTION, SOLUTION INTRAMUSCULAR; INTRAVENOUS; SUBCUTANEOUS at 04:12

## 2022-01-01 RX ADMIN — SODIUM CHLORIDE 125 ML/HR: 9 INJECTION, SOLUTION INTRAVENOUS at 18:12

## 2022-01-01 RX ADMIN — FLUCONAZOLE 200 MG: 100 TABLET ORAL at 17:34

## 2022-01-01 RX ADMIN — ONDANSETRON 4 MG: 2 INJECTION INTRAMUSCULAR; INTRAVENOUS at 00:07

## 2022-01-01 RX ADMIN — TRACE ELEMENTS INJECTION 4: 7.4; .75; 98; 151 INJECTION, SOLUTION INTRAVENOUS at 21:54

## 2022-01-01 RX ADMIN — ONDANSETRON 4 MG: 2 INJECTION INTRAMUSCULAR; INTRAVENOUS at 12:13

## 2022-01-01 RX ADMIN — METOCLOPRAMIDE HYDROCHLORIDE 5 MG: 5 INJECTION INTRAMUSCULAR; INTRAVENOUS at 06:05

## 2022-01-01 RX ADMIN — INSULIN LISPRO 4 UNITS: 100 INJECTION, SOLUTION INTRAVENOUS; SUBCUTANEOUS at 12:51

## 2022-01-01 RX ADMIN — ONDANSETRON 4 MG: 2 INJECTION INTRAMUSCULAR; INTRAVENOUS at 23:05

## 2022-01-01 RX ADMIN — NYSTATIN 500000 UNITS: 100000 SUSPENSION ORAL at 22:00

## 2022-01-01 RX ADMIN — INSULIN LISPRO 2 UNITS: 100 INJECTION, SOLUTION INTRAVENOUS; SUBCUTANEOUS at 12:16

## 2022-01-01 RX ADMIN — POLYETHYLENE GLYCOL 3350 17 G: 17 POWDER, FOR SOLUTION ORAL at 21:41

## 2022-01-01 RX ADMIN — ONDANSETRON 4 MG: 2 INJECTION INTRAMUSCULAR; INTRAVENOUS at 13:13

## 2022-01-01 RX ADMIN — HYDROMORPHONE HYDROCHLORIDE 1 MG: 1 INJECTION, SOLUTION INTRAMUSCULAR; INTRAVENOUS; SUBCUTANEOUS at 21:47

## 2022-01-01 RX ADMIN — APIXABAN 5 MG: 5 TABLET, FILM COATED ORAL at 20:22

## 2022-01-01 RX ADMIN — ONDANSETRON 4 MG: 2 INJECTION INTRAMUSCULAR; INTRAVENOUS at 20:17

## 2022-01-01 RX ADMIN — SODIUM CHLORIDE, PRESERVATIVE FREE 20 MG: 5 INJECTION INTRAVENOUS at 08:43

## 2022-01-01 RX ADMIN — KETOROLAC TROMETHAMINE 30 MG: 30 INJECTION, SOLUTION INTRAMUSCULAR at 13:33

## 2022-01-01 RX ADMIN — AMIODARONE HYDROCHLORIDE 200 MG: 200 TABLET ORAL at 08:57

## 2022-01-01 RX ADMIN — CEFAZOLIN 2 G: 1 INJECTION, POWDER, FOR SOLUTION INTRAMUSCULAR; INTRAVENOUS at 03:24

## 2022-01-01 RX ADMIN — HYDROMORPHONE HYDROCHLORIDE 1 MG: 1 INJECTION, SOLUTION INTRAMUSCULAR; INTRAVENOUS; SUBCUTANEOUS at 12:38

## 2022-01-01 RX ADMIN — DOCUSATE SODIUM 100 MG: 100 CAPSULE, LIQUID FILLED ORAL at 09:19

## 2022-01-01 RX ADMIN — HYDROMORPHONE HYDROCHLORIDE 1 MG: 1 INJECTION, SOLUTION INTRAMUSCULAR; INTRAVENOUS; SUBCUTANEOUS at 12:24

## 2022-01-01 RX ADMIN — ONDANSETRON 4 MG: 2 INJECTION INTRAMUSCULAR; INTRAVENOUS at 05:02

## 2022-01-01 RX ADMIN — HYDROMORPHONE HYDROCHLORIDE 2 MG: 1 INJECTION, SOLUTION INTRAMUSCULAR; INTRAVENOUS; SUBCUTANEOUS at 22:29

## 2022-01-01 RX ADMIN — VERAPAMIL HYDROCHLORIDE 200 MG: 100 CAPSULE, EXTENDED RELEASE ORAL at 21:10

## 2022-01-01 RX ADMIN — AMIODARONE HYDROCHLORIDE 200 MG: 200 TABLET ORAL at 08:30

## 2022-01-01 RX ADMIN — APIXABAN 5 MG: 5 TABLET, FILM COATED ORAL at 21:19

## 2022-01-01 RX ADMIN — PANTOPRAZOLE SODIUM 40 MG: 40 TABLET, DELAYED RELEASE ORAL at 08:50

## 2022-01-01 RX ADMIN — VERAPAMIL HYDROCHLORIDE 80 MG: 80 TABLET ORAL at 10:23

## 2022-01-01 RX ADMIN — HYDROMORPHONE HYDROCHLORIDE 1 MG: 1 INJECTION, SOLUTION INTRAMUSCULAR; INTRAVENOUS; SUBCUTANEOUS at 03:26

## 2022-01-01 RX ADMIN — PANTOPRAZOLE SODIUM 40 MG: 40 TABLET, DELAYED RELEASE ORAL at 16:55

## 2022-01-01 RX ADMIN — CEFAZOLIN SODIUM 2 G: 1 INJECTION, POWDER, FOR SOLUTION INTRAMUSCULAR; INTRAVENOUS at 17:18

## 2022-01-01 RX ADMIN — FENTANYL CITRATE 50 MCG: 50 INJECTION, SOLUTION INTRAMUSCULAR; INTRAVENOUS at 09:42

## 2022-01-01 RX ADMIN — HYDROMORPHONE HYDROCHLORIDE 1 MG: 1 INJECTION, SOLUTION INTRAMUSCULAR; INTRAVENOUS; SUBCUTANEOUS at 08:30

## 2022-01-01 RX ADMIN — FENTANYL CITRATE 50 MCG: 50 INJECTION, SOLUTION INTRAMUSCULAR; INTRAVENOUS at 17:15

## 2022-01-01 RX ADMIN — LISINOPRIL 20 MG: 20 TABLET ORAL at 09:43

## 2022-01-01 RX ADMIN — SODIUM CHLORIDE, PRESERVATIVE FREE 20 MG: 5 INJECTION INTRAVENOUS at 20:55

## 2022-01-01 RX ADMIN — ONDANSETRON 4 MG: 2 INJECTION INTRAMUSCULAR; INTRAVENOUS at 14:37

## 2022-01-01 RX ADMIN — HYDROMORPHONE HYDROCHLORIDE 1 MG: 1 INJECTION, SOLUTION INTRAMUSCULAR; INTRAVENOUS; SUBCUTANEOUS at 00:36

## 2022-01-01 RX ADMIN — HYALURONIDASE 150 UNITS: 150 INJECTION SUBCUTANEOUS at 17:45

## 2022-01-01 RX ADMIN — KETOROLAC TROMETHAMINE 30 MG: 30 INJECTION, SOLUTION INTRAMUSCULAR; INTRAVENOUS at 18:11

## 2022-01-01 RX ADMIN — CEFAZOLIN 2 G: 1 INJECTION, POWDER, FOR SOLUTION INTRAMUSCULAR; INTRAVENOUS at 12:32

## 2022-01-01 RX ADMIN — WATER 2 G: 1 INJECTION INTRAMUSCULAR; INTRAVENOUS; SUBCUTANEOUS at 06:06

## 2022-01-01 RX ADMIN — WATER 2 G: 1 INJECTION INTRAMUSCULAR; INTRAVENOUS; SUBCUTANEOUS at 02:06

## 2022-01-01 RX ADMIN — KETOROLAC TROMETHAMINE 30 MG: 30 INJECTION, SOLUTION INTRAMUSCULAR; INTRAVENOUS at 02:47

## 2022-01-01 RX ADMIN — AMIODARONE HYDROCHLORIDE 200 MG: 200 TABLET ORAL at 08:50

## 2022-01-01 RX ADMIN — PREDNISONE 20 MG: 20 TABLET ORAL at 08:51

## 2022-01-01 RX ADMIN — HYDROMORPHONE HYDROCHLORIDE 1 MG: 1 INJECTION, SOLUTION INTRAMUSCULAR; INTRAVENOUS; SUBCUTANEOUS at 21:53

## 2022-01-01 RX ADMIN — METHYLPREDNISOLONE SODIUM SUCCINATE 20 MG: 40 INJECTION, POWDER, FOR SOLUTION INTRAMUSCULAR; INTRAVENOUS at 09:18

## 2022-01-01 RX ADMIN — INSULIN LISPRO 6 UNITS: 100 INJECTION, SOLUTION INTRAVENOUS; SUBCUTANEOUS at 17:30

## 2022-01-01 RX ADMIN — MIDAZOLAM HYDROCHLORIDE 2 MG: 2 INJECTION, SOLUTION INTRAMUSCULAR; INTRAVENOUS at 17:04

## 2022-01-01 RX ADMIN — VERAPAMIL HYDROCHLORIDE 200 MG: 100 CAPSULE, EXTENDED RELEASE ORAL at 22:08

## 2022-01-01 RX ADMIN — WATER 2 G: 1 INJECTION INTRAMUSCULAR; INTRAVENOUS; SUBCUTANEOUS at 05:19

## 2022-01-01 RX ADMIN — APIXABAN 5 MG: 5 TABLET, FILM COATED ORAL at 09:07

## 2022-01-01 RX ADMIN — LIDOCAINE HYDROCHLORIDE 20 ML: 10 INJECTION, SOLUTION INFILTRATION; PERINEURAL at 12:17

## 2022-01-01 RX ADMIN — HYDROMORPHONE HYDROCHLORIDE 1 MG: 1 INJECTION, SOLUTION INTRAMUSCULAR; INTRAVENOUS; SUBCUTANEOUS at 20:53

## 2022-01-01 RX ADMIN — CEFAZOLIN 2 G: 1 INJECTION, POWDER, FOR SOLUTION INTRAMUSCULAR; INTRAVENOUS at 04:04

## 2022-01-01 RX ADMIN — SODIUM CHLORIDE, PRESERVATIVE FREE 20 MG: 5 INJECTION INTRAVENOUS at 10:16

## 2022-01-01 RX ADMIN — NYSTATIN 500000 UNITS: 100000 SUSPENSION ORAL at 12:16

## 2022-01-01 RX ADMIN — CLOPIDOGREL BISULFATE 75 MG: 75 TABLET ORAL at 10:32

## 2022-01-01 RX ADMIN — SODIUM CHLORIDE, PRESERVATIVE FREE 20 MG: 5 INJECTION INTRAVENOUS at 08:45

## 2022-01-01 RX ADMIN — HYDROMORPHONE HYDROCHLORIDE 1 MG: 1 INJECTION, SOLUTION INTRAMUSCULAR; INTRAVENOUS; SUBCUTANEOUS at 16:58

## 2022-01-01 RX ADMIN — HYDROMORPHONE HYDROCHLORIDE 2 MG: 1 INJECTION, SOLUTION INTRAMUSCULAR; INTRAVENOUS; SUBCUTANEOUS at 21:14

## 2022-01-01 RX ADMIN — HYDROMORPHONE HYDROCHLORIDE 2 MG: 1 INJECTION, SOLUTION INTRAMUSCULAR; INTRAVENOUS; SUBCUTANEOUS at 07:03

## 2022-01-01 RX ADMIN — PROPOFOL 100 MG: 10 INJECTION, EMULSION INTRAVENOUS at 11:25

## 2022-01-01 RX ADMIN — METHYLPREDNISOLONE SODIUM SUCCINATE 20 MG: 40 INJECTION, POWDER, FOR SOLUTION INTRAMUSCULAR; INTRAVENOUS at 09:39

## 2022-01-01 RX ADMIN — INSULIN LISPRO 6 UNITS: 100 INJECTION, SOLUTION INTRAVENOUS; SUBCUTANEOUS at 11:48

## 2022-01-01 RX ADMIN — HYDROMORPHONE HYDROCHLORIDE 1 MG: 1 INJECTION, SOLUTION INTRAMUSCULAR; INTRAVENOUS; SUBCUTANEOUS at 08:31

## 2022-01-01 RX ADMIN — PANTOPRAZOLE SODIUM 40 MG: 40 TABLET, DELAYED RELEASE ORAL at 15:30

## 2022-01-01 RX ADMIN — HYDROMORPHONE HYDROCHLORIDE 2 MG: 1 INJECTION, SOLUTION INTRAMUSCULAR; INTRAVENOUS; SUBCUTANEOUS at 04:34

## 2022-01-01 RX ADMIN — PIPERACILLIN AND TAZOBACTAM 3.38 G: 3; .375 INJECTION, POWDER, LYOPHILIZED, FOR SOLUTION INTRAVENOUS at 13:11

## 2022-01-01 RX ADMIN — HYDROMORPHONE HYDROCHLORIDE 1 MG: 1 INJECTION, SOLUTION INTRAMUSCULAR; INTRAVENOUS; SUBCUTANEOUS at 08:48

## 2022-01-01 RX ADMIN — Medication 8 G: at 09:13

## 2022-01-01 RX ADMIN — ONDANSETRON 4 MG: 2 INJECTION INTRAMUSCULAR; INTRAVENOUS at 20:35

## 2022-01-01 RX ADMIN — ONDANSETRON 4 MG: 2 INJECTION INTRAMUSCULAR; INTRAVENOUS at 07:33

## 2022-01-01 RX ADMIN — HYDROMORPHONE HYDROCHLORIDE 2 MG: 1 INJECTION, SOLUTION INTRAMUSCULAR; INTRAVENOUS; SUBCUTANEOUS at 12:33

## 2022-01-01 RX ADMIN — ONDANSETRON 4 MG: 2 INJECTION INTRAMUSCULAR; INTRAVENOUS at 08:31

## 2022-01-01 RX ADMIN — HYDROMORPHONE HYDROCHLORIDE 2 MG: 1 INJECTION, SOLUTION INTRAMUSCULAR; INTRAVENOUS; SUBCUTANEOUS at 23:29

## 2022-01-01 RX ADMIN — ONDANSETRON 4 MG: 2 INJECTION INTRAMUSCULAR; INTRAVENOUS at 11:13

## 2022-01-01 RX ADMIN — PANTOPRAZOLE SODIUM 40 MG: 40 TABLET, DELAYED RELEASE ORAL at 16:40

## 2022-01-01 RX ADMIN — HYDROMORPHONE HYDROCHLORIDE 2 MG: 1 INJECTION, SOLUTION INTRAMUSCULAR; INTRAVENOUS; SUBCUTANEOUS at 18:10

## 2022-01-01 RX ADMIN — HYDROMORPHONE HYDROCHLORIDE 1 MG: 1 INJECTION, SOLUTION INTRAMUSCULAR; INTRAVENOUS; SUBCUTANEOUS at 18:19

## 2022-01-01 RX ADMIN — AMIODARONE HYDROCHLORIDE 200 MG: 200 TABLET ORAL at 09:24

## 2022-01-01 RX ADMIN — DOCUSATE SODIUM 100 MG: 100 CAPSULE, LIQUID FILLED ORAL at 21:26

## 2022-01-01 RX ADMIN — WATER 2 G: 1 INJECTION INTRAMUSCULAR; INTRAVENOUS; SUBCUTANEOUS at 02:23

## 2022-01-01 RX ADMIN — OXYCODONE HYDROCHLORIDE AND ACETAMINOPHEN 1 TABLET: 10; 325 TABLET ORAL at 21:29

## 2022-01-01 RX ADMIN — ONDANSETRON 4 MG: 2 INJECTION INTRAMUSCULAR; INTRAVENOUS at 18:49

## 2022-01-01 RX ADMIN — SODIUM CHLORIDE, PRESERVATIVE FREE 20 MG: 5 INJECTION INTRAVENOUS at 22:08

## 2022-01-01 RX ADMIN — ASPIRIN 81 MG CHEWABLE TABLET 81 MG: 81 TABLET CHEWABLE at 10:44

## 2022-01-01 RX ADMIN — METRONIDAZOLE 500 MG: 500 INJECTION, SOLUTION INTRAVENOUS at 17:10

## 2022-01-01 RX ADMIN — AMIODARONE HYDROCHLORIDE 400 MG: 200 TABLET ORAL at 08:50

## 2022-01-01 RX ADMIN — NYSTATIN 500000 UNITS: 100000 SUSPENSION ORAL at 21:28

## 2022-01-01 RX ADMIN — HYDROMORPHONE HYDROCHLORIDE 1 MG: 1 INJECTION, SOLUTION INTRAMUSCULAR; INTRAVENOUS; SUBCUTANEOUS at 13:45

## 2022-01-01 RX ADMIN — HYDROMORPHONE HYDROCHLORIDE 1 MG: 1 INJECTION, SOLUTION INTRAMUSCULAR; INTRAVENOUS; SUBCUTANEOUS at 01:25

## 2022-01-01 RX ADMIN — SODIUM CHLORIDE 125 ML/HR: 9 INJECTION, SOLUTION INTRAVENOUS at 11:17

## 2022-01-01 RX ADMIN — INSULIN LISPRO 6 UNITS: 100 INJECTION, SOLUTION INTRAVENOUS; SUBCUTANEOUS at 21:04

## 2022-01-01 RX ADMIN — NYSTATIN 500000 UNITS: 100000 SUSPENSION ORAL at 09:15

## 2022-01-01 RX ADMIN — NYSTATIN 500000 UNITS: 100000 SUSPENSION ORAL at 17:54

## 2022-01-01 RX ADMIN — INSULIN LISPRO 2 UNITS: 100 INJECTION, SOLUTION INTRAVENOUS; SUBCUTANEOUS at 12:53

## 2022-01-01 RX ADMIN — VERAPAMIL HYDROCHLORIDE 200 MG: 100 CAPSULE, EXTENDED RELEASE ORAL at 21:16

## 2022-01-01 RX ADMIN — METHYLPREDNISOLONE SODIUM SUCCINATE 20 MG: 40 INJECTION, POWDER, FOR SOLUTION INTRAMUSCULAR; INTRAVENOUS at 08:52

## 2022-01-01 RX ADMIN — VERAPAMIL HYDROCHLORIDE 200 MG: 100 CAPSULE, EXTENDED RELEASE ORAL at 21:30

## 2022-01-01 RX ADMIN — POTASSIUM CHLORIDE 10 MEQ: 750 TABLET, FILM COATED, EXTENDED RELEASE ORAL at 21:29

## 2022-01-01 RX ADMIN — ONDANSETRON 4 MG: 2 INJECTION INTRAMUSCULAR; INTRAVENOUS at 22:06

## 2022-01-01 RX ADMIN — HYDROMORPHONE HYDROCHLORIDE 1 MG: 1 INJECTION, SOLUTION INTRAMUSCULAR; INTRAVENOUS; SUBCUTANEOUS at 21:44

## 2022-01-01 RX ADMIN — Medication 20 MG: at 00:13

## 2022-01-01 RX ADMIN — SODIUM CHLORIDE 50 ML/HR: 9 INJECTION, SOLUTION INTRAVENOUS at 15:00

## 2022-01-01 RX ADMIN — CEFAZOLIN 2 G: 1 INJECTION, POWDER, FOR SOLUTION INTRAMUSCULAR; INTRAVENOUS at 06:06

## 2022-01-01 RX ADMIN — HYDROMORPHONE HYDROCHLORIDE 1 MG: 1 INJECTION, SOLUTION INTRAMUSCULAR; INTRAVENOUS; SUBCUTANEOUS at 17:17

## 2022-01-01 RX ADMIN — PREDNISONE 15 MG: 5 TABLET ORAL at 08:55

## 2022-01-01 RX ADMIN — INSULIN LISPRO 4 UNITS: 100 INJECTION, SOLUTION INTRAVENOUS; SUBCUTANEOUS at 12:48

## 2022-01-01 RX ADMIN — ONDANSETRON 4 MG: 2 INJECTION INTRAMUSCULAR; INTRAVENOUS at 01:24

## 2022-01-01 RX ADMIN — PREDNISONE 15 MG: 5 TABLET ORAL at 08:47

## 2022-01-01 RX ADMIN — INSULIN LISPRO 2 UNITS: 100 INJECTION, SOLUTION INTRAVENOUS; SUBCUTANEOUS at 08:48

## 2022-01-01 RX ADMIN — HYDROMORPHONE HYDROCHLORIDE 1 MG: 1 INJECTION, SOLUTION INTRAMUSCULAR; INTRAVENOUS; SUBCUTANEOUS at 17:44

## 2022-01-01 RX ADMIN — POTASSIUM CHLORIDE 10 MEQ: 750 TABLET, FILM COATED, EXTENDED RELEASE ORAL at 23:25

## 2022-01-01 RX ADMIN — HYDROMORPHONE HYDROCHLORIDE 1 MG: 1 INJECTION, SOLUTION INTRAMUSCULAR; INTRAVENOUS; SUBCUTANEOUS at 23:14

## 2022-01-01 RX ADMIN — MIDAZOLAM HYDROCHLORIDE 2 MG: 2 INJECTION, SOLUTION INTRAMUSCULAR; INTRAVENOUS at 10:31

## 2022-01-01 RX ADMIN — INSULIN LISPRO 2 UNITS: 100 INJECTION, SOLUTION INTRAVENOUS; SUBCUTANEOUS at 22:00

## 2022-01-01 RX ADMIN — HYDROMORPHONE HYDROCHLORIDE 1 MG: 1 INJECTION, SOLUTION INTRAMUSCULAR; INTRAVENOUS; SUBCUTANEOUS at 02:27

## 2022-01-01 RX ADMIN — METOCLOPRAMIDE HYDROCHLORIDE 5 MG: 5 INJECTION INTRAMUSCULAR; INTRAVENOUS at 18:12

## 2022-01-01 RX ADMIN — ASPIRIN 81 MG CHEWABLE TABLET 81 MG: 81 TABLET CHEWABLE at 09:52

## 2022-01-01 RX ADMIN — INSULIN LISPRO 4 UNITS: 100 INJECTION, SOLUTION INTRAVENOUS; SUBCUTANEOUS at 17:13

## 2022-01-01 RX ADMIN — PANTOPRAZOLE SODIUM 40 MG: 40 TABLET, DELAYED RELEASE ORAL at 21:46

## 2022-01-01 RX ADMIN — SODIUM CHLORIDE, PRESERVATIVE FREE 20 MG: 5 INJECTION INTRAVENOUS at 20:21

## 2022-01-01 RX ADMIN — METRONIDAZOLE 500 MG: 500 INJECTION, SOLUTION INTRAVENOUS at 20:09

## 2022-01-01 RX ADMIN — INSULIN LISPRO 2 UNITS: 100 INJECTION, SOLUTION INTRAVENOUS; SUBCUTANEOUS at 21:09

## 2022-01-01 RX ADMIN — AMIODARONE HYDROCHLORIDE 200 MG: 200 TABLET ORAL at 10:44

## 2022-01-01 RX ADMIN — KETOROLAC TROMETHAMINE 30 MG: 30 INJECTION, SOLUTION INTRAMUSCULAR; INTRAVENOUS at 11:33

## 2022-01-01 RX ADMIN — ONDANSETRON 4 MG: 2 INJECTION INTRAMUSCULAR; INTRAVENOUS at 21:47

## 2022-01-01 RX ADMIN — METRONIDAZOLE 500 MG: 500 INJECTION, SOLUTION INTRAVENOUS at 09:54

## 2022-01-01 RX ADMIN — OXYCODONE HYDROCHLORIDE AND ACETAMINOPHEN 1 TABLET: 10; 325 TABLET ORAL at 21:28

## 2022-01-01 RX ADMIN — HYDROMORPHONE HYDROCHLORIDE 1 MG: 1 INJECTION, SOLUTION INTRAMUSCULAR; INTRAVENOUS; SUBCUTANEOUS at 00:52

## 2022-01-01 RX ADMIN — DIATRIZOATE MEGLUMINE AND DIATRIZOATE SODIUM 30 ML: 660; 100 LIQUID ORAL; RECTAL at 13:59

## 2022-01-01 RX ADMIN — PREDNISONE 20 MG: 20 TABLET ORAL at 08:50

## 2022-01-01 RX ADMIN — NYSTATIN 500000 UNITS: 100000 SUSPENSION ORAL at 13:00

## 2022-01-01 RX ADMIN — APIXABAN 5 MG: 5 TABLET, FILM COATED ORAL at 08:55

## 2022-01-01 RX ADMIN — HYDROMORPHONE HYDROCHLORIDE 1 MG: 1 INJECTION, SOLUTION INTRAMUSCULAR; INTRAVENOUS; SUBCUTANEOUS at 17:36

## 2022-01-01 RX ADMIN — PREDNISONE 15 MG: 5 TABLET ORAL at 08:44

## 2022-01-01 RX ADMIN — PIPERACILLIN AND TAZOBACTAM 3.38 G: 3; .375 INJECTION, POWDER, LYOPHILIZED, FOR SOLUTION INTRAVENOUS at 03:14

## 2022-01-01 RX ADMIN — HYDROMORPHONE HYDROCHLORIDE 1 MG: 1 INJECTION, SOLUTION INTRAMUSCULAR; INTRAVENOUS; SUBCUTANEOUS at 22:30

## 2022-01-01 RX ADMIN — METRONIDAZOLE 500 MG: 500 INJECTION, SOLUTION INTRAVENOUS at 03:19

## 2022-01-01 RX ADMIN — OXYCODONE HYDROCHLORIDE AND ACETAMINOPHEN 1 TABLET: 10; 325 TABLET ORAL at 20:31

## 2022-01-01 RX ADMIN — PREDNISONE 20 MG: 20 TABLET ORAL at 10:36

## 2022-01-01 RX ADMIN — HYDROMORPHONE HYDROCHLORIDE 1 MG: 1 INJECTION, SOLUTION INTRAMUSCULAR; INTRAVENOUS; SUBCUTANEOUS at 11:44

## 2022-01-01 RX ADMIN — PREDNISONE 20 MG: 20 TABLET ORAL at 08:12

## 2022-01-01 RX ADMIN — LISINOPRIL 20 MG: 20 TABLET ORAL at 08:55

## 2022-01-01 RX ADMIN — HYDROMORPHONE HYDROCHLORIDE 2 MG: 1 INJECTION, SOLUTION INTRAMUSCULAR; INTRAVENOUS; SUBCUTANEOUS at 03:08

## 2022-01-01 RX ADMIN — ONDANSETRON 4 MG: 2 INJECTION INTRAMUSCULAR; INTRAVENOUS at 20:50

## 2022-01-01 RX ADMIN — HYDROMORPHONE HYDROCHLORIDE 1 MG: 1 INJECTION, SOLUTION INTRAMUSCULAR; INTRAVENOUS; SUBCUTANEOUS at 02:08

## 2022-01-01 RX ADMIN — PREDNISONE 15 MG: 5 TABLET ORAL at 11:00

## 2022-01-01 RX ADMIN — METOPROLOL TARTRATE 50 MG: 50 TABLET, FILM COATED ORAL at 09:13

## 2022-01-01 RX ADMIN — SODIUM CHLORIDE, POTASSIUM CHLORIDE, SODIUM LACTATE AND CALCIUM CHLORIDE: 600; 310; 30; 20 INJECTION, SOLUTION INTRAVENOUS at 16:50

## 2022-01-01 RX ADMIN — VERAPAMIL HYDROCHLORIDE 200 MG: 100 CAPSULE, EXTENDED RELEASE ORAL at 21:26

## 2022-01-01 RX ADMIN — DEXAMETHASONE SODIUM PHOSPHATE 4 MG: 4 INJECTION, SOLUTION INTRA-ARTICULAR; INTRALESIONAL; INTRAMUSCULAR; INTRAVENOUS; SOFT TISSUE at 11:27

## 2022-01-01 RX ADMIN — SODIUM CHLORIDE 125 ML/HR: 9 INJECTION, SOLUTION INTRAVENOUS at 00:03

## 2022-01-01 RX ADMIN — ONDANSETRON 4 MG: 2 INJECTION INTRAMUSCULAR; INTRAVENOUS at 15:39

## 2022-01-01 RX ADMIN — NYSTATIN 500000 UNITS: 100000 SUSPENSION ORAL at 08:01

## 2022-01-01 RX ADMIN — KETOROLAC TROMETHAMINE 30 MG: 30 INJECTION, SOLUTION INTRAMUSCULAR at 06:07

## 2022-01-01 RX ADMIN — CLOPIDOGREL BISULFATE 75 MG: 75 TABLET ORAL at 08:31

## 2022-01-01 RX ADMIN — LISINOPRIL 20 MG: 20 TABLET ORAL at 08:12

## 2022-01-01 RX ADMIN — HYDROMORPHONE HYDROCHLORIDE 1 MG: 1 INJECTION, SOLUTION INTRAMUSCULAR; INTRAVENOUS; SUBCUTANEOUS at 15:25

## 2022-01-01 RX ADMIN — WATER 2 G: 1 INJECTION INTRAMUSCULAR; INTRAVENOUS; SUBCUTANEOUS at 09:10

## 2022-01-01 RX ADMIN — PREDNISONE 15 MG: 5 TABLET ORAL at 08:38

## 2022-01-01 RX ADMIN — CLOPIDOGREL BISULFATE 75 MG: 75 TABLET ORAL at 09:35

## 2022-01-01 RX ADMIN — ONDANSETRON 4 MG: 2 INJECTION INTRAMUSCULAR; INTRAVENOUS at 01:20

## 2022-01-01 RX ADMIN — SODIUM CHLORIDE 125 ML/HR: 9 INJECTION, SOLUTION INTRAVENOUS at 21:36

## 2022-01-01 RX ADMIN — HYDROMORPHONE HYDROCHLORIDE 1 MG: 1 INJECTION, SOLUTION INTRAMUSCULAR; INTRAVENOUS; SUBCUTANEOUS at 18:20

## 2022-01-01 RX ADMIN — HYDROMORPHONE HYDROCHLORIDE 1 MG: 1 INJECTION, SOLUTION INTRAMUSCULAR; INTRAVENOUS; SUBCUTANEOUS at 10:47

## 2022-01-01 RX ADMIN — VERAPAMIL HYDROCHLORIDE 200 MG: 100 CAPSULE, EXTENDED RELEASE ORAL at 22:22

## 2022-01-01 RX ADMIN — METRONIDAZOLE 500 MG: 500 INJECTION, SOLUTION INTRAVENOUS at 01:12

## 2022-01-01 RX ADMIN — ONDANSETRON 4 MG: 2 INJECTION INTRAMUSCULAR; INTRAVENOUS at 07:45

## 2022-01-01 RX ADMIN — HYDROMORPHONE HYDROCHLORIDE 1 MG: 1 INJECTION, SOLUTION INTRAMUSCULAR; INTRAVENOUS; SUBCUTANEOUS at 05:27

## 2022-01-01 RX ADMIN — NYSTATIN 500000 UNITS: 100000 SUSPENSION ORAL at 09:11

## 2022-01-01 RX ADMIN — HYDROMORPHONE HYDROCHLORIDE 1 MG: 1 INJECTION, SOLUTION INTRAMUSCULAR; INTRAVENOUS; SUBCUTANEOUS at 22:46

## 2022-01-01 RX ADMIN — ONDANSETRON 4 MG: 2 INJECTION INTRAMUSCULAR; INTRAVENOUS at 12:33

## 2022-01-01 RX ADMIN — HYDROMORPHONE HYDROCHLORIDE 1 MG: 1 INJECTION, SOLUTION INTRAMUSCULAR; INTRAVENOUS; SUBCUTANEOUS at 00:30

## 2022-01-01 RX ADMIN — METOPROLOL SUCCINATE 50 MG: 50 TABLET, EXTENDED RELEASE ORAL at 08:50

## 2022-01-01 RX ADMIN — HYDROMORPHONE HYDROCHLORIDE 2 MG: 1 INJECTION, SOLUTION INTRAMUSCULAR; INTRAVENOUS; SUBCUTANEOUS at 14:56

## 2022-01-01 RX ADMIN — SODIUM CHLORIDE, PRESERVATIVE FREE 20 MG: 5 INJECTION INTRAVENOUS at 09:13

## 2022-01-01 RX ADMIN — HYDROMORPHONE HYDROCHLORIDE 1 MG: 1 INJECTION, SOLUTION INTRAMUSCULAR; INTRAVENOUS; SUBCUTANEOUS at 20:42

## 2022-01-01 RX ADMIN — AMIODARONE HYDROCHLORIDE 200 MG: 200 TABLET ORAL at 09:07

## 2022-01-01 RX ADMIN — HYDROMORPHONE HYDROCHLORIDE 1 MG: 1 INJECTION, SOLUTION INTRAMUSCULAR; INTRAVENOUS; SUBCUTANEOUS at 08:22

## 2022-01-01 RX ADMIN — PIPERACILLIN AND TAZOBACTAM 3.38 G: 3; .375 INJECTION, POWDER, LYOPHILIZED, FOR SOLUTION INTRAVENOUS at 21:35

## 2022-01-01 RX ADMIN — METOPROLOL SUCCINATE 50 MG: 50 TABLET, EXTENDED RELEASE ORAL at 21:01

## 2022-01-01 RX ADMIN — SODIUM CHLORIDE, PRESERVATIVE FREE 20 MG: 5 INJECTION INTRAVENOUS at 22:18

## 2022-01-01 RX ADMIN — SODIUM CHLORIDE, PRESERVATIVE FREE 20 MG: 5 INJECTION INTRAVENOUS at 22:07

## 2022-01-01 RX ADMIN — HYDROMORPHONE HYDROCHLORIDE 1 MG: 1 INJECTION, SOLUTION INTRAMUSCULAR; INTRAVENOUS; SUBCUTANEOUS at 16:39

## 2022-01-01 RX ADMIN — SODIUM CHLORIDE, PRESERVATIVE FREE 20 MG: 5 INJECTION INTRAVENOUS at 21:43

## 2022-01-01 RX ADMIN — HYDROMORPHONE HYDROCHLORIDE 1.5 MG: 2 INJECTION, SOLUTION INTRAMUSCULAR; INTRAVENOUS; SUBCUTANEOUS at 05:26

## 2022-01-01 RX ADMIN — HYDROMORPHONE HYDROCHLORIDE 1 MG: 1 INJECTION, SOLUTION INTRAMUSCULAR; INTRAVENOUS; SUBCUTANEOUS at 11:51

## 2022-01-01 RX ADMIN — APIXABAN 5 MG: 5 TABLET, FILM COATED ORAL at 09:23

## 2022-01-01 RX ADMIN — PREDNISONE 20 MG: 20 TABLET ORAL at 10:23

## 2022-01-01 RX ADMIN — Medication 20 MG: at 00:59

## 2022-01-01 RX ADMIN — HYDROMORPHONE HYDROCHLORIDE 1 MG: 1 INJECTION, SOLUTION INTRAMUSCULAR; INTRAVENOUS; SUBCUTANEOUS at 20:20

## 2022-01-01 RX ADMIN — ESMOLOL HYDROCHLORIDE 20 MG: 10 INJECTION, SOLUTION INTRAVENOUS at 11:43

## 2022-01-01 RX ADMIN — PIPERACILLIN AND TAZOBACTAM 3.38 G: 3; .375 INJECTION, POWDER, LYOPHILIZED, FOR SOLUTION INTRAVENOUS at 16:33

## 2022-01-01 RX ADMIN — KETOROLAC TROMETHAMINE 30 MG: 30 INJECTION, SOLUTION INTRAMUSCULAR; INTRAVENOUS at 14:45

## 2022-01-01 RX ADMIN — INSULIN LISPRO 2 UNITS: 100 INJECTION, SOLUTION INTRAVENOUS; SUBCUTANEOUS at 11:44

## 2022-01-01 RX ADMIN — NYSTATIN 500000 UNITS: 100000 SUSPENSION ORAL at 12:00

## 2022-01-01 RX ADMIN — HYDROMORPHONE HYDROCHLORIDE 1 MG: 1 INJECTION, SOLUTION INTRAMUSCULAR; INTRAVENOUS; SUBCUTANEOUS at 22:27

## 2022-01-01 RX ADMIN — KETOROLAC TROMETHAMINE 30 MG: 30 INJECTION, SOLUTION INTRAMUSCULAR; INTRAVENOUS at 18:01

## 2022-01-01 RX ADMIN — SODIUM CHLORIDE 125 ML/HR: 9 INJECTION, SOLUTION INTRAVENOUS at 05:55

## 2022-01-01 RX ADMIN — METOCLOPRAMIDE HYDROCHLORIDE 5 MG: 5 INJECTION INTRAMUSCULAR; INTRAVENOUS at 12:00

## 2022-01-01 RX ADMIN — ONDANSETRON 4 MG: 2 INJECTION INTRAMUSCULAR; INTRAVENOUS at 04:32

## 2022-01-01 RX ADMIN — METRONIDAZOLE 500 MG: 500 INJECTION, SOLUTION INTRAVENOUS at 03:25

## 2022-01-01 RX ADMIN — HYDROMORPHONE HYDROCHLORIDE 1 MG: 1 INJECTION, SOLUTION INTRAMUSCULAR; INTRAVENOUS; SUBCUTANEOUS at 11:30

## 2022-01-01 RX ADMIN — HYDROMORPHONE HYDROCHLORIDE 2 MG: 1 INJECTION, SOLUTION INTRAMUSCULAR; INTRAVENOUS; SUBCUTANEOUS at 04:04

## 2022-01-01 RX ADMIN — TRACE ELEMENTS INJECTION 4: 7.4; .75; 98; 151 INJECTION, SOLUTION INTRAVENOUS at 21:29

## 2022-01-01 RX ADMIN — NYSTATIN 500000 UNITS: 100000 SUSPENSION ORAL at 13:23

## 2022-01-01 RX ADMIN — PIPERACILLIN AND TAZOBACTAM 3.38 G: 3; .375 INJECTION, POWDER, LYOPHILIZED, FOR SOLUTION INTRAVENOUS at 10:32

## 2022-01-01 RX ADMIN — HYDROMORPHONE HYDROCHLORIDE 1 MG: 1 INJECTION, SOLUTION INTRAMUSCULAR; INTRAVENOUS; SUBCUTANEOUS at 23:51

## 2022-01-01 RX ADMIN — ONDANSETRON 4 MG: 2 INJECTION INTRAMUSCULAR; INTRAVENOUS at 16:34

## 2022-01-01 RX ADMIN — METRONIDAZOLE 500 MG: 500 INJECTION, SOLUTION INTRAVENOUS at 16:41

## 2022-01-01 RX ADMIN — ASPIRIN 81 MG CHEWABLE TABLET 81 MG: 81 TABLET CHEWABLE at 09:38

## 2022-01-01 RX ADMIN — WATER 2 G: 1 INJECTION INTRAMUSCULAR; INTRAVENOUS; SUBCUTANEOUS at 13:02

## 2022-01-01 RX ADMIN — HYDROMORPHONE HYDROCHLORIDE 1 MG: 1 INJECTION, SOLUTION INTRAMUSCULAR; INTRAVENOUS; SUBCUTANEOUS at 07:40

## 2022-01-01 RX ADMIN — APIXABAN 5 MG: 5 TABLET, FILM COATED ORAL at 08:25

## 2022-01-01 RX ADMIN — POTASSIUM CHLORIDE 40 MEQ: 1.5 FOR SOLUTION ORAL at 18:03

## 2022-01-01 RX ADMIN — DOCUSATE SODIUM 100 MG: 100 CAPSULE, LIQUID FILLED ORAL at 09:39

## 2022-01-01 RX ADMIN — POTASSIUM CHLORIDE 10 MEQ: 750 TABLET, FILM COATED, EXTENDED RELEASE ORAL at 21:35

## 2022-01-01 RX ADMIN — OXYCODONE HYDROCHLORIDE AND ACETAMINOPHEN 1 TABLET: 10; 325 TABLET ORAL at 10:32

## 2022-01-01 RX ADMIN — INSULIN LISPRO 2 UNITS: 100 INJECTION, SOLUTION INTRAVENOUS; SUBCUTANEOUS at 21:27

## 2022-01-01 RX ADMIN — DOCUSATE SODIUM 100 MG: 100 CAPSULE, LIQUID FILLED ORAL at 08:39

## 2022-01-01 RX ADMIN — OXYCODONE 5 MG: 5 TABLET ORAL at 12:32

## 2022-01-01 RX ADMIN — SODIUM CHLORIDE 100 ML/HR: 9 INJECTION, SOLUTION INTRAVENOUS at 00:35

## 2022-01-01 RX ADMIN — ONDANSETRON 4 MG: 2 INJECTION INTRAMUSCULAR; INTRAVENOUS at 10:12

## 2022-01-01 RX ADMIN — METRONIDAZOLE 500 MG: 500 INJECTION, SOLUTION INTRAVENOUS at 15:01

## 2022-01-01 RX ADMIN — METRONIDAZOLE 500 MG: 500 INJECTION, SOLUTION INTRAVENOUS at 03:49

## 2022-01-01 RX ADMIN — PIPERACILLIN AND TAZOBACTAM 3.38 G: 3; .375 INJECTION, POWDER, LYOPHILIZED, FOR SOLUTION INTRAVENOUS at 08:41

## 2022-01-01 RX ADMIN — DOCUSATE SODIUM 100 MG: 100 CAPSULE, LIQUID FILLED ORAL at 12:26

## 2022-01-01 RX ADMIN — AMIODARONE HYDROCHLORIDE 400 MG: 200 TABLET ORAL at 08:51

## 2022-01-01 RX ADMIN — ONDANSETRON 4 MG: 2 INJECTION INTRAMUSCULAR; INTRAVENOUS at 08:52

## 2022-01-01 RX ADMIN — POTASSIUM CHLORIDE, DEXTROSE MONOHYDRATE AND SODIUM CHLORIDE 25 ML/HR: 150; 5; 450 INJECTION, SOLUTION INTRAVENOUS at 02:38

## 2022-01-01 RX ADMIN — HYDROMORPHONE HYDROCHLORIDE 1 MG: 1 INJECTION, SOLUTION INTRAMUSCULAR; INTRAVENOUS; SUBCUTANEOUS at 17:11

## 2022-01-01 RX ADMIN — HYDROMORPHONE HYDROCHLORIDE 1 MG: 1 INJECTION, SOLUTION INTRAMUSCULAR; INTRAVENOUS; SUBCUTANEOUS at 03:40

## 2022-01-01 RX ADMIN — INSULIN LISPRO 6 UNITS: 100 INJECTION, SOLUTION INTRAVENOUS; SUBCUTANEOUS at 18:50

## 2022-01-01 RX ADMIN — Medication 20 MG: at 21:15

## 2022-01-01 RX ADMIN — APIXABAN 5 MG: 5 TABLET, FILM COATED ORAL at 11:00

## 2022-01-01 RX ADMIN — HYDROMORPHONE HYDROCHLORIDE 2 MG: 1 INJECTION, SOLUTION INTRAMUSCULAR; INTRAVENOUS; SUBCUTANEOUS at 01:09

## 2022-01-01 RX ADMIN — POTASSIUM CHLORIDE, DEXTROSE MONOHYDRATE AND SODIUM CHLORIDE 75 ML/HR: 150; 5; 450 INJECTION, SOLUTION INTRAVENOUS at 16:52

## 2022-01-01 RX ADMIN — METOPROLOL SUCCINATE 50 MG: 50 TABLET, EXTENDED RELEASE ORAL at 08:06

## 2022-01-01 RX ADMIN — SODIUM CHLORIDE, POTASSIUM CHLORIDE, SODIUM LACTATE AND CALCIUM CHLORIDE: 600; 310; 30; 20 INJECTION, SOLUTION INTRAVENOUS at 11:16

## 2022-01-01 RX ADMIN — SODIUM CHLORIDE 100 ML/HR: 9 INJECTION, SOLUTION INTRAVENOUS at 21:44

## 2022-01-01 RX ADMIN — METRONIDAZOLE 500 MG: 500 INJECTION, SOLUTION INTRAVENOUS at 11:21

## 2022-01-01 RX ADMIN — ONDANSETRON 4 MG: 2 INJECTION INTRAMUSCULAR; INTRAVENOUS at 15:01

## 2022-01-01 RX ADMIN — HYDROMORPHONE HYDROCHLORIDE 1 MG: 1 INJECTION, SOLUTION INTRAMUSCULAR; INTRAVENOUS; SUBCUTANEOUS at 13:13

## 2022-01-01 RX ADMIN — HYDROMORPHONE HYDROCHLORIDE 1 MG: 1 INJECTION, SOLUTION INTRAMUSCULAR; INTRAVENOUS; SUBCUTANEOUS at 07:50

## 2022-01-01 RX ADMIN — PANTOPRAZOLE SODIUM 40 MG: 40 TABLET, DELAYED RELEASE ORAL at 15:56

## 2022-01-01 RX ADMIN — HYDROMORPHONE HYDROCHLORIDE 2 MG: 1 INJECTION, SOLUTION INTRAMUSCULAR; INTRAVENOUS; SUBCUTANEOUS at 16:11

## 2022-01-01 RX ADMIN — OXYCODONE HYDROCHLORIDE AND ACETAMINOPHEN 1 TABLET: 10; 325 TABLET ORAL at 21:16

## 2022-01-01 RX ADMIN — APIXABAN 5 MG: 5 TABLET, FILM COATED ORAL at 08:00

## 2022-01-01 RX ADMIN — PANTOPRAZOLE SODIUM 40 MG: 40 TABLET, DELAYED RELEASE ORAL at 17:03

## 2022-01-01 RX ADMIN — HYDROMORPHONE HYDROCHLORIDE 1 MG: 1 INJECTION, SOLUTION INTRAMUSCULAR; INTRAVENOUS; SUBCUTANEOUS at 09:06

## 2022-01-01 RX ADMIN — PROMETHAZINE HYDROCHLORIDE 25 MG: 25 TABLET ORAL at 03:34

## 2022-01-01 RX ADMIN — APIXABAN 5 MG: 5 TABLET, FILM COATED ORAL at 08:45

## 2022-01-01 RX ADMIN — ONDANSETRON 4 MG: 2 INJECTION INTRAMUSCULAR; INTRAVENOUS at 02:51

## 2022-01-01 RX ADMIN — METOPROLOL TARTRATE 5 MG: 5 INJECTION INTRAVENOUS at 22:26

## 2022-01-01 RX ADMIN — PREDNISONE 20 MG: 20 TABLET ORAL at 09:15

## 2022-01-01 RX ADMIN — PANTOPRAZOLE SODIUM 40 MG: 40 TABLET, DELAYED RELEASE ORAL at 09:03

## 2022-01-01 RX ADMIN — HYDROMORPHONE HYDROCHLORIDE 1 MG: 1 INJECTION, SOLUTION INTRAMUSCULAR; INTRAVENOUS; SUBCUTANEOUS at 01:58

## 2022-01-01 RX ADMIN — PREDNISONE 15 MG: 5 TABLET ORAL at 08:15

## 2022-01-01 RX ADMIN — HYDROMORPHONE HYDROCHLORIDE 1 MG: 1 INJECTION, SOLUTION INTRAMUSCULAR; INTRAVENOUS; SUBCUTANEOUS at 14:13

## 2022-01-01 RX ADMIN — SODIUM CHLORIDE, POTASSIUM CHLORIDE, SODIUM LACTATE AND CALCIUM CHLORIDE: 600; 310; 30; 20 INJECTION, SOLUTION INTRAVENOUS at 11:11

## 2022-01-01 RX ADMIN — FENTANYL CITRATE 50 MCG: 50 INJECTION, SOLUTION INTRAMUSCULAR; INTRAVENOUS at 19:32

## 2022-01-01 RX ADMIN — OXYCODONE HYDROCHLORIDE 10 MG: 10 TABLET ORAL at 04:42

## 2022-01-01 RX ADMIN — HYDROMORPHONE HYDROCHLORIDE 1 MG: 1 INJECTION, SOLUTION INTRAMUSCULAR; INTRAVENOUS; SUBCUTANEOUS at 00:22

## 2022-01-01 RX ADMIN — LISINOPRIL 20 MG: 20 TABLET ORAL at 08:25

## 2022-01-01 RX ADMIN — PIPERACILLIN AND TAZOBACTAM 3.38 G: 3; .375 INJECTION, POWDER, LYOPHILIZED, FOR SOLUTION INTRAVENOUS at 02:58

## 2022-01-01 RX ADMIN — POTASSIUM CHLORIDE 10 MEQ: 750 TABLET, FILM COATED, EXTENDED RELEASE ORAL at 08:50

## 2022-01-01 RX ADMIN — APIXABAN 5 MG: 5 TABLET, FILM COATED ORAL at 21:46

## 2022-01-01 RX ADMIN — APIXABAN 5 MG: 5 TABLET, FILM COATED ORAL at 09:01

## 2022-01-01 RX ADMIN — LISINOPRIL 20 MG: 20 TABLET ORAL at 08:38

## 2022-01-01 RX ADMIN — WATER 2 G: 1 INJECTION INTRAMUSCULAR; INTRAVENOUS; SUBCUTANEOUS at 13:30

## 2022-01-01 RX ADMIN — HYDROMORPHONE HYDROCHLORIDE 2 MG: 1 INJECTION, SOLUTION INTRAMUSCULAR; INTRAVENOUS; SUBCUTANEOUS at 05:03

## 2022-01-01 RX ADMIN — METRONIDAZOLE 500 MG: 500 INJECTION, SOLUTION INTRAVENOUS at 11:40

## 2022-01-01 RX ADMIN — PIPERACILLIN AND TAZOBACTAM 3.38 G: 3; .375 INJECTION, POWDER, LYOPHILIZED, FOR SOLUTION INTRAVENOUS at 01:23

## 2022-01-01 RX ADMIN — INSULIN LISPRO 2 UNITS: 100 INJECTION, SOLUTION INTRAVENOUS; SUBCUTANEOUS at 17:20

## 2022-01-01 RX ADMIN — HYDROMORPHONE HYDROCHLORIDE 2 MG: 1 INJECTION, SOLUTION INTRAMUSCULAR; INTRAVENOUS; SUBCUTANEOUS at 21:52

## 2022-01-01 RX ADMIN — KETOROLAC TROMETHAMINE 30 MG: 30 INJECTION, SOLUTION INTRAMUSCULAR at 21:52

## 2022-01-01 RX ADMIN — METRONIDAZOLE 500 MG: 500 INJECTION, SOLUTION INTRAVENOUS at 10:00

## 2022-01-01 RX ADMIN — ONDANSETRON 4 MG: 2 INJECTION INTRAMUSCULAR; INTRAVENOUS at 16:12

## 2022-01-01 RX ADMIN — WATER 2 G: 1 INJECTION INTRAMUSCULAR; INTRAVENOUS; SUBCUTANEOUS at 06:00

## 2022-01-01 RX ADMIN — LISINOPRIL 20 MG: 20 TABLET ORAL at 08:35

## 2022-01-01 RX ADMIN — SODIUM CHLORIDE, PRESERVATIVE FREE 20 MG: 5 INJECTION INTRAVENOUS at 08:37

## 2022-01-01 RX ADMIN — HYDROMORPHONE HYDROCHLORIDE 1 MG: 1 INJECTION, SOLUTION INTRAMUSCULAR; INTRAVENOUS; SUBCUTANEOUS at 17:35

## 2022-01-01 RX ADMIN — PREDNISONE 20 MG: 20 TABLET ORAL at 09:45

## 2022-01-01 RX ADMIN — PANTOPRAZOLE SODIUM 40 MG: 40 TABLET, DELAYED RELEASE ORAL at 20:50

## 2022-01-01 RX ADMIN — ONDANSETRON 4 MG: 2 INJECTION INTRAMUSCULAR; INTRAVENOUS at 12:01

## 2022-01-01 RX ADMIN — METRONIDAZOLE 500 MG: 500 INJECTION, SOLUTION INTRAVENOUS at 13:53

## 2022-01-01 RX ADMIN — HYDROMORPHONE HYDROCHLORIDE 1 MG: 1 INJECTION, SOLUTION INTRAMUSCULAR; INTRAVENOUS; SUBCUTANEOUS at 03:53

## 2022-01-01 RX ADMIN — ONDANSETRON 4 MG: 2 INJECTION INTRAMUSCULAR; INTRAVENOUS at 10:55

## 2022-01-01 RX ADMIN — PROCHLORPERAZINE EDISYLATE 5 MG: 5 INJECTION INTRAMUSCULAR; INTRAVENOUS at 20:40

## 2022-01-01 RX ADMIN — INSULIN LISPRO 4 UNITS: 100 INJECTION, SOLUTION INTRAVENOUS; SUBCUTANEOUS at 21:15

## 2022-01-01 RX ADMIN — HYDROMORPHONE HYDROCHLORIDE 2 MG: 1 INJECTION, SOLUTION INTRAMUSCULAR; INTRAVENOUS; SUBCUTANEOUS at 07:14

## 2022-01-01 RX ADMIN — PREDNISONE 20 MG: 20 TABLET ORAL at 08:38

## 2022-01-01 RX ADMIN — MORPHINE SULFATE 15 MG: 15 TABLET ORAL at 03:30

## 2022-01-01 RX ADMIN — SUGAMMADEX 200 MG: 100 INJECTION, SOLUTION INTRAVENOUS at 19:13

## 2022-01-01 RX ADMIN — ONDANSETRON 4 MG: 2 INJECTION INTRAMUSCULAR; INTRAVENOUS at 13:34

## 2022-01-01 RX ADMIN — SODIUM CHLORIDE: 9 INJECTION, SOLUTION INTRAVENOUS at 12:48

## 2022-01-01 RX ADMIN — MORPHINE SULFATE 15 MG: 15 TABLET ORAL at 12:50

## 2022-01-01 RX ADMIN — NYSTATIN 500000 UNITS: 100000 SUSPENSION ORAL at 09:07

## 2022-01-01 RX ADMIN — WATER 2 G: 1 INJECTION INTRAMUSCULAR; INTRAVENOUS; SUBCUTANEOUS at 13:38

## 2022-01-01 RX ADMIN — POTASSIUM CHLORIDE 10 MEQ: 750 TABLET, FILM COATED, EXTENDED RELEASE ORAL at 22:16

## 2022-01-01 RX ADMIN — ONDANSETRON 4 MG: 2 INJECTION INTRAMUSCULAR; INTRAVENOUS at 21:10

## 2022-01-01 RX ADMIN — HYDROMORPHONE HYDROCHLORIDE 2 MG: 1 INJECTION, SOLUTION INTRAMUSCULAR; INTRAVENOUS; SUBCUTANEOUS at 09:10

## 2022-01-01 RX ADMIN — SODIUM CHLORIDE, PRESERVATIVE FREE 20 MG: 5 INJECTION INTRAVENOUS at 20:16

## 2022-01-01 RX ADMIN — METOPROLOL TARTRATE 50 MG: 50 TABLET, FILM COATED ORAL at 22:33

## 2022-01-01 RX ADMIN — POLYETHYLENE GLYCOL 3350 17 G: 17 POWDER, FOR SOLUTION ORAL at 10:10

## 2022-01-01 RX ADMIN — APIXABAN 5 MG: 5 TABLET, FILM COATED ORAL at 09:05

## 2022-01-01 RX ADMIN — HYDROMORPHONE HYDROCHLORIDE 1 MG: 1 INJECTION, SOLUTION INTRAMUSCULAR; INTRAVENOUS; SUBCUTANEOUS at 12:36

## 2022-01-01 RX ADMIN — SODIUM CHLORIDE 100 ML/HR: 9 INJECTION, SOLUTION INTRAVENOUS at 06:57

## 2022-01-01 RX ADMIN — ROCURONIUM BROMIDE 20 MG: 50 INJECTION, SOLUTION INTRAVENOUS at 11:56

## 2022-01-01 RX ADMIN — ONDANSETRON 4 MG: 2 INJECTION INTRAMUSCULAR; INTRAVENOUS at 15:24

## 2022-01-01 RX ADMIN — INSULIN LISPRO 6 UNITS: 100 INJECTION, SOLUTION INTRAVENOUS; SUBCUTANEOUS at 21:35

## 2022-01-01 RX ADMIN — WATER 2 G: 1 INJECTION INTRAMUSCULAR; INTRAVENOUS; SUBCUTANEOUS at 17:22

## 2022-01-01 RX ADMIN — ONDANSETRON 4 MG: 2 INJECTION INTRAMUSCULAR; INTRAVENOUS at 10:33

## 2022-01-01 RX ADMIN — PREDNISONE 15 MG: 5 TABLET ORAL at 09:38

## 2022-01-01 RX ADMIN — HYDROMORPHONE HYDROCHLORIDE 1 MG: 1 INJECTION, SOLUTION INTRAMUSCULAR; INTRAVENOUS; SUBCUTANEOUS at 01:31

## 2022-01-01 RX ADMIN — HYDROMORPHONE HYDROCHLORIDE 1 MG: 1 INJECTION, SOLUTION INTRAMUSCULAR; INTRAVENOUS; SUBCUTANEOUS at 09:15

## 2022-01-01 RX ADMIN — METRONIDAZOLE 500 MG: 500 INJECTION, SOLUTION INTRAVENOUS at 21:46

## 2022-01-01 RX ADMIN — MORPHINE SULFATE 15 MG: 15 TABLET ORAL at 04:04

## 2022-01-01 RX ADMIN — HYDROMORPHONE HYDROCHLORIDE 1 MG: 1 INJECTION, SOLUTION INTRAMUSCULAR; INTRAVENOUS; SUBCUTANEOUS at 12:51

## 2022-01-01 RX ADMIN — ONDANSETRON 4 MG: 2 INJECTION INTRAMUSCULAR; INTRAVENOUS at 01:30

## 2022-01-01 RX ADMIN — ONDANSETRON 4 MG: 2 INJECTION INTRAMUSCULAR; INTRAVENOUS at 18:44

## 2022-01-01 RX ADMIN — SODIUM CHLORIDE, PRESERVATIVE FREE 20 MG: 5 INJECTION INTRAVENOUS at 09:24

## 2022-01-01 RX ADMIN — ASPIRIN 81 MG CHEWABLE TABLET 81 MG: 81 TABLET CHEWABLE at 08:26

## 2022-01-01 RX ADMIN — SODIUM CHLORIDE 125 ML/HR: 9 INJECTION, SOLUTION INTRAVENOUS at 14:22

## 2022-01-01 RX ADMIN — ONDANSETRON 4 MG: 2 INJECTION INTRAMUSCULAR; INTRAVENOUS at 14:40

## 2022-01-01 RX ADMIN — ONDANSETRON 4 MG: 2 INJECTION INTRAMUSCULAR; INTRAVENOUS at 03:38

## 2022-01-01 RX ADMIN — INSULIN LISPRO 8 UNITS: 100 INJECTION, SOLUTION INTRAVENOUS; SUBCUTANEOUS at 17:04

## 2022-01-01 RX ADMIN — METRONIDAZOLE 500 MG: 500 INJECTION, SOLUTION INTRAVENOUS at 02:23

## 2022-01-01 RX ADMIN — METRONIDAZOLE 500 MG: 500 INJECTION, SOLUTION INTRAVENOUS at 02:15

## 2022-01-01 RX ADMIN — POTASSIUM CHLORIDE 10 MEQ: 750 TABLET, FILM COATED, EXTENDED RELEASE ORAL at 22:39

## 2022-01-01 RX ADMIN — PIPERACILLIN AND TAZOBACTAM 3.38 G: 3; .375 INJECTION, POWDER, LYOPHILIZED, FOR SOLUTION INTRAVENOUS at 08:54

## 2022-01-01 RX ADMIN — ONDANSETRON 4 MG: 2 INJECTION INTRAMUSCULAR; INTRAVENOUS at 17:35

## 2022-01-01 RX ADMIN — PIPERACILLIN AND TAZOBACTAM 3.38 G: 3; .375 INJECTION, POWDER, LYOPHILIZED, FOR SOLUTION INTRAVENOUS at 20:02

## 2022-01-01 RX ADMIN — APIXABAN 5 MG: 5 TABLET, FILM COATED ORAL at 21:45

## 2022-01-01 RX ADMIN — APIXABAN 5 MG: 5 TABLET, FILM COATED ORAL at 22:18

## 2022-01-01 RX ADMIN — INSULIN LISPRO 6 UNITS: 100 INJECTION, SOLUTION INTRAVENOUS; SUBCUTANEOUS at 16:46

## 2022-01-01 RX ADMIN — ONDANSETRON 4 MG: 2 INJECTION INTRAMUSCULAR; INTRAVENOUS at 23:59

## 2022-01-01 RX ADMIN — WATER 2 G: 1 INJECTION INTRAMUSCULAR; INTRAVENOUS; SUBCUTANEOUS at 21:49

## 2022-01-01 RX ADMIN — AMIODARONE HYDROCHLORIDE 1 MG/MIN: 50 INJECTION, SOLUTION INTRAVENOUS at 00:01

## 2022-01-01 RX ADMIN — DOCUSATE SODIUM 100 MG: 100 CAPSULE, LIQUID FILLED ORAL at 09:01

## 2022-01-01 RX ADMIN — PROMETHAZINE HYDROCHLORIDE 25 MG: 25 TABLET ORAL at 02:07

## 2022-01-01 RX ADMIN — ONDANSETRON 4 MG: 2 INJECTION INTRAMUSCULAR; INTRAVENOUS at 20:21

## 2022-01-01 RX ADMIN — METRONIDAZOLE 500 MG: 500 INJECTION, SOLUTION INTRAVENOUS at 04:19

## 2022-01-01 RX ADMIN — OXYCODONE HYDROCHLORIDE AND ACETAMINOPHEN 1 TABLET: 10; 325 TABLET ORAL at 08:18

## 2022-01-01 RX ADMIN — HYDROMORPHONE HYDROCHLORIDE 1 MG: 1 INJECTION, SOLUTION INTRAMUSCULAR; INTRAVENOUS; SUBCUTANEOUS at 21:15

## 2022-01-01 RX ADMIN — WATER 2 G: 1 INJECTION INTRAMUSCULAR; INTRAVENOUS; SUBCUTANEOUS at 21:52

## 2022-01-01 RX ADMIN — ONDANSETRON 4 MG: 2 INJECTION INTRAMUSCULAR; INTRAVENOUS at 21:49

## 2022-01-01 RX ADMIN — METRONIDAZOLE 500 MG: 500 INJECTION, SOLUTION INTRAVENOUS at 23:20

## 2022-01-01 RX ADMIN — NYSTATIN 500000 UNITS: 100000 SUSPENSION ORAL at 17:14

## 2022-01-01 RX ADMIN — HYDROMORPHONE HYDROCHLORIDE 1 MG: 1 INJECTION, SOLUTION INTRAMUSCULAR; INTRAVENOUS; SUBCUTANEOUS at 04:03

## 2022-01-01 RX ADMIN — ONDANSETRON 4 MG: 2 INJECTION INTRAMUSCULAR; INTRAVENOUS at 16:29

## 2022-01-01 RX ADMIN — INSULIN LISPRO 6 UNITS: 100 INJECTION, SOLUTION INTRAVENOUS; SUBCUTANEOUS at 16:34

## 2022-01-01 RX ADMIN — LISINOPRIL 20 MG: 20 TABLET ORAL at 10:44

## 2022-01-01 RX ADMIN — CEFAZOLIN 2 G: 1 INJECTION, POWDER, FOR SOLUTION INTRAMUSCULAR; INTRAVENOUS at 12:23

## 2022-01-01 RX ADMIN — SODIUM CHLORIDE, PRESERVATIVE FREE 20 MG: 5 INJECTION INTRAVENOUS at 09:28

## 2022-01-01 RX ADMIN — ONDANSETRON 4 MG: 2 INJECTION INTRAMUSCULAR; INTRAVENOUS at 18:14

## 2022-01-01 RX ADMIN — SODIUM CHLORIDE, PRESERVATIVE FREE 20 MG: 5 INJECTION INTRAVENOUS at 21:23

## 2022-01-01 RX ADMIN — ONDANSETRON 4 MG: 2 INJECTION INTRAMUSCULAR; INTRAVENOUS at 22:36

## 2022-01-01 RX ADMIN — HYDROMORPHONE HYDROCHLORIDE 1.5 MG: 2 INJECTION, SOLUTION INTRAMUSCULAR; INTRAVENOUS; SUBCUTANEOUS at 16:53

## 2022-01-01 RX ADMIN — HYDROMORPHONE HYDROCHLORIDE 1.5 MG: 2 INJECTION INTRAMUSCULAR; INTRAVENOUS; SUBCUTANEOUS at 10:29

## 2022-01-01 RX ADMIN — PHENYLEPHRINE HYDROCHLORIDE 200 MCG: 10 INJECTION INTRAVENOUS at 11:32

## 2022-01-01 RX ADMIN — SODIUM CHLORIDE 125 ML/HR: 9 INJECTION, SOLUTION INTRAVENOUS at 07:21

## 2022-01-01 RX ADMIN — HYDROMORPHONE HYDROCHLORIDE 1 MG: 1 INJECTION, SOLUTION INTRAMUSCULAR; INTRAVENOUS; SUBCUTANEOUS at 01:15

## 2022-01-01 RX ADMIN — ALBUTEROL SULFATE 2 PUFF: 108 INHALANT RESPIRATORY (INHALATION) at 03:50

## 2022-01-01 RX ADMIN — HYDROMORPHONE HYDROCHLORIDE 1 MG: 1 INJECTION, SOLUTION INTRAMUSCULAR; INTRAVENOUS; SUBCUTANEOUS at 17:34

## 2022-01-01 RX ADMIN — FENTANYL CITRATE 25 MCG: 50 INJECTION INTRAMUSCULAR; INTRAVENOUS at 19:44

## 2022-01-01 RX ADMIN — ONDANSETRON 4 MG: 2 INJECTION INTRAMUSCULAR; INTRAVENOUS at 05:21

## 2022-01-01 RX ADMIN — HYDROMORPHONE HYDROCHLORIDE 1 MG: 1 INJECTION, SOLUTION INTRAMUSCULAR; INTRAVENOUS; SUBCUTANEOUS at 21:30

## 2022-01-01 RX ADMIN — HYDROMORPHONE HYDROCHLORIDE 1 MG: 1 INJECTION, SOLUTION INTRAMUSCULAR; INTRAVENOUS; SUBCUTANEOUS at 19:49

## 2022-01-01 RX ADMIN — WATER 2 G: 1 INJECTION INTRAMUSCULAR; INTRAVENOUS; SUBCUTANEOUS at 13:57

## 2022-01-01 RX ADMIN — ONDANSETRON 4 MG: 2 INJECTION INTRAMUSCULAR; INTRAVENOUS at 16:53

## 2022-01-01 RX ADMIN — HYDROMORPHONE HYDROCHLORIDE 1 MG: 1 INJECTION, SOLUTION INTRAMUSCULAR; INTRAVENOUS; SUBCUTANEOUS at 08:38

## 2022-01-01 RX ADMIN — INSULIN LISPRO 6 UNITS: 100 INJECTION, SOLUTION INTRAVENOUS; SUBCUTANEOUS at 12:52

## 2022-01-01 RX ADMIN — HYDROMORPHONE HYDROCHLORIDE 2 MG: 1 INJECTION, SOLUTION INTRAMUSCULAR; INTRAVENOUS; SUBCUTANEOUS at 03:55

## 2022-01-01 RX ADMIN — HYDROMORPHONE HYDROCHLORIDE 1 MG: 1 INJECTION, SOLUTION INTRAMUSCULAR; INTRAVENOUS; SUBCUTANEOUS at 20:55

## 2022-01-01 RX ADMIN — FLUCONAZOLE 200 MG: 100 TABLET ORAL at 17:13

## 2022-01-01 RX ADMIN — ONDANSETRON 4 MG: 2 INJECTION INTRAMUSCULAR; INTRAVENOUS at 21:24

## 2022-01-01 RX ADMIN — APIXABAN 5 MG: 5 TABLET, FILM COATED ORAL at 09:47

## 2022-01-01 RX ADMIN — ONDANSETRON 4 MG: 2 INJECTION INTRAMUSCULAR; INTRAVENOUS at 17:03

## 2022-01-01 RX ADMIN — WATER 2 G: 1 INJECTION INTRAMUSCULAR; INTRAVENOUS; SUBCUTANEOUS at 05:16

## 2022-01-01 RX ADMIN — HYDROMORPHONE HYDROCHLORIDE 2 MG: 2 INJECTION INTRAMUSCULAR; INTRAVENOUS; SUBCUTANEOUS at 10:09

## 2022-01-01 RX ADMIN — VERAPAMIL HYDROCHLORIDE 200 MG: 100 CAPSULE, EXTENDED RELEASE ORAL at 20:35

## 2022-01-01 RX ADMIN — INSULIN LISPRO 2 UNITS: 100 INJECTION, SOLUTION INTRAVENOUS; SUBCUTANEOUS at 21:12

## 2022-01-01 RX ADMIN — HYDROMORPHONE HYDROCHLORIDE 1 MG: 1 INJECTION, SOLUTION INTRAMUSCULAR; INTRAVENOUS; SUBCUTANEOUS at 21:38

## 2022-01-01 RX ADMIN — INSULIN LISPRO 2 UNITS: 100 INJECTION, SOLUTION INTRAVENOUS; SUBCUTANEOUS at 11:43

## 2022-01-01 RX ADMIN — HYDROMORPHONE HYDROCHLORIDE 1 MG: 1 INJECTION, SOLUTION INTRAMUSCULAR; INTRAVENOUS; SUBCUTANEOUS at 08:09

## 2022-01-01 RX ADMIN — ONDANSETRON 4 MG: 2 INJECTION INTRAMUSCULAR; INTRAVENOUS at 10:37

## 2022-01-01 RX ADMIN — METOPROLOL SUCCINATE 50 MG: 50 TABLET, EXTENDED RELEASE ORAL at 08:17

## 2022-01-01 RX ADMIN — PREDNISONE 15 MG: 5 TABLET ORAL at 08:35

## 2022-01-01 RX ADMIN — METOPROLOL SUCCINATE 50 MG: 50 TABLET, EXTENDED RELEASE ORAL at 21:28

## 2022-01-01 RX ADMIN — CEFAZOLIN 2 G: 1 INJECTION, POWDER, FOR SOLUTION INTRAMUSCULAR; INTRAVENOUS at 11:54

## 2022-01-01 RX ADMIN — PREDNISONE 20 MG: 20 TABLET ORAL at 08:31

## 2022-01-01 RX ADMIN — WATER 2 G: 1 INJECTION INTRAMUSCULAR; INTRAVENOUS; SUBCUTANEOUS at 05:10

## 2022-01-01 RX ADMIN — KETOROLAC TROMETHAMINE 30 MG: 30 INJECTION, SOLUTION INTRAMUSCULAR; INTRAVENOUS at 22:58

## 2022-01-01 RX ADMIN — PHENYLEPHRINE HYDROCHLORIDE 200 MCG: 10 INJECTION INTRAVENOUS at 18:29

## 2022-01-01 RX ADMIN — PIPERACILLIN AND TAZOBACTAM 3.38 G: 3; .375 INJECTION, POWDER, LYOPHILIZED, FOR SOLUTION INTRAVENOUS at 05:03

## 2022-01-01 RX ADMIN — AMIODARONE HYDROCHLORIDE 200 MG: 200 TABLET ORAL at 08:32

## 2022-01-01 RX ADMIN — NYSTATIN 500000 UNITS: 100000 SUSPENSION ORAL at 21:24

## 2022-01-01 RX ADMIN — ONDANSETRON 4 MG: 2 INJECTION INTRAMUSCULAR; INTRAVENOUS at 18:24

## 2022-01-01 RX ADMIN — INSULIN LISPRO 6 UNITS: 100 INJECTION, SOLUTION INTRAVENOUS; SUBCUTANEOUS at 17:44

## 2022-01-01 RX ADMIN — HYDROMORPHONE HYDROCHLORIDE 1.5 MG: 2 INJECTION INTRAMUSCULAR; INTRAVENOUS; SUBCUTANEOUS at 16:19

## 2022-01-01 RX ADMIN — APIXABAN 5 MG: 5 TABLET, FILM COATED ORAL at 08:44

## 2022-01-01 RX ADMIN — POTASSIUM CHLORIDE 40 MEQ: 1500 TABLET, EXTENDED RELEASE ORAL at 20:18

## 2022-01-01 RX ADMIN — CLOPIDOGREL BISULFATE 75 MG: 75 TABLET ORAL at 09:11

## 2022-01-01 RX ADMIN — APIXABAN 5 MG: 5 TABLET, FILM COATED ORAL at 22:08

## 2022-01-01 RX ADMIN — CEFAZOLIN 2 G: 1 INJECTION, POWDER, FOR SOLUTION INTRAMUSCULAR; INTRAVENOUS at 11:44

## 2022-01-01 RX ADMIN — INSULIN LISPRO 4 UNITS: 100 INJECTION, SOLUTION INTRAVENOUS; SUBCUTANEOUS at 21:24

## 2022-01-01 RX ADMIN — FONDAPARINUX SODIUM 2.5 MG: 2.5 INJECTION, SOLUTION SUBCUTANEOUS at 13:02

## 2022-01-01 RX ADMIN — PANTOPRAZOLE SODIUM 40 MG: 40 TABLET, DELAYED RELEASE ORAL at 08:36

## 2022-01-01 RX ADMIN — HYDROMORPHONE HYDROCHLORIDE 2 MG: 1 INJECTION, SOLUTION INTRAMUSCULAR; INTRAVENOUS; SUBCUTANEOUS at 18:03

## 2022-01-01 RX ADMIN — INSULIN LISPRO 8 UNITS: 100 INJECTION, SOLUTION INTRAVENOUS; SUBCUTANEOUS at 16:57

## 2022-01-01 RX ADMIN — HYDROMORPHONE HYDROCHLORIDE 1 MG: 1 INJECTION, SOLUTION INTRAMUSCULAR; INTRAVENOUS; SUBCUTANEOUS at 23:04

## 2022-01-01 RX ADMIN — NYSTATIN 500000 UNITS: 100000 SUSPENSION ORAL at 17:09

## 2022-01-01 RX ADMIN — HYDROMORPHONE HYDROCHLORIDE 2 MG: 1 INJECTION, SOLUTION INTRAMUSCULAR; INTRAVENOUS; SUBCUTANEOUS at 09:27

## 2022-01-01 RX ADMIN — HYDROMORPHONE HYDROCHLORIDE 1 MG: 1 INJECTION, SOLUTION INTRAMUSCULAR; INTRAVENOUS; SUBCUTANEOUS at 04:31

## 2022-01-01 RX ADMIN — HYDROMORPHONE HYDROCHLORIDE 1 MG: 1 INJECTION, SOLUTION INTRAMUSCULAR; INTRAVENOUS; SUBCUTANEOUS at 04:49

## 2022-01-01 RX ADMIN — HYDROMORPHONE HYDROCHLORIDE 1.5 MG: 2 INJECTION INTRAMUSCULAR; INTRAVENOUS; SUBCUTANEOUS at 08:25

## 2022-01-01 RX ADMIN — INSULIN LISPRO 10 UNITS: 100 INJECTION, SOLUTION INTRAVENOUS; SUBCUTANEOUS at 09:47

## 2022-01-01 RX ADMIN — AMIODARONE HYDROCHLORIDE 200 MG: 200 TABLET ORAL at 08:48

## 2022-01-01 RX ADMIN — SODIUM CHLORIDE, PRESERVATIVE FREE 20 MG: 5 INJECTION INTRAVENOUS at 23:53

## 2022-01-01 RX ADMIN — METOPROLOL SUCCINATE 50 MG: 50 TABLET, EXTENDED RELEASE ORAL at 20:53

## 2022-01-01 RX ADMIN — ONDANSETRON 4 MG: 2 INJECTION INTRAMUSCULAR; INTRAVENOUS at 08:17

## 2022-01-01 RX ADMIN — METHYLPREDNISOLONE SODIUM SUCCINATE 20 MG: 40 INJECTION, POWDER, FOR SOLUTION INTRAMUSCULAR; INTRAVENOUS at 09:04

## 2022-01-01 RX ADMIN — LISINOPRIL 20 MG: 20 TABLET ORAL at 09:04

## 2022-01-01 RX ADMIN — ONDANSETRON 4 MG: 2 INJECTION INTRAMUSCULAR; INTRAVENOUS at 22:13

## 2022-01-01 RX ADMIN — LISINOPRIL 20 MG: 20 TABLET ORAL at 09:39

## 2022-01-01 RX ADMIN — HYDROMORPHONE HYDROCHLORIDE 1 MG: 1 INJECTION, SOLUTION INTRAMUSCULAR; INTRAVENOUS; SUBCUTANEOUS at 13:47

## 2022-01-01 RX ADMIN — ONDANSETRON 4 MG: 2 INJECTION INTRAMUSCULAR; INTRAVENOUS at 08:12

## 2022-01-01 RX ADMIN — ALBUTEROL SULFATE 2 PUFF: 108 INHALANT RESPIRATORY (INHALATION) at 01:03

## 2022-01-01 RX ADMIN — LABETALOL HYDROCHLORIDE 10 MG: 5 INJECTION INTRAVENOUS at 14:23

## 2022-01-01 RX ADMIN — AMIODARONE HYDROCHLORIDE 400 MG: 200 TABLET ORAL at 08:44

## 2022-01-01 RX ADMIN — SODIUM CHLORIDE, PRESERVATIVE FREE 20 MG: 5 INJECTION INTRAVENOUS at 20:58

## 2022-01-01 RX ADMIN — AMIODARONE HYDROCHLORIDE 200 MG: 200 TABLET ORAL at 08:11

## 2022-01-01 RX ADMIN — POTASSIUM CHLORIDE, DEXTROSE MONOHYDRATE AND SODIUM CHLORIDE 75 ML/HR: 150; 5; 450 INJECTION, SOLUTION INTRAVENOUS at 09:12

## 2022-01-01 RX ADMIN — SODIUM CHLORIDE 125 ML/HR: 9 INJECTION, SOLUTION INTRAVENOUS at 13:51

## 2022-01-01 RX ADMIN — PANTOPRAZOLE SODIUM 40 MG: 40 TABLET, DELAYED RELEASE ORAL at 17:14

## 2022-01-01 RX ADMIN — HYDROMORPHONE HYDROCHLORIDE 1 MG: 1 INJECTION, SOLUTION INTRAMUSCULAR; INTRAVENOUS; SUBCUTANEOUS at 09:51

## 2022-01-01 RX ADMIN — POTASSIUM CHLORIDE 40 MEQ: 1500 TABLET, EXTENDED RELEASE ORAL at 20:24

## 2022-01-01 RX ADMIN — POTASSIUM CHLORIDE 10 MEQ: 7.46 INJECTION, SOLUTION INTRAVENOUS at 12:35

## 2022-01-01 RX ADMIN — SODIUM CHLORIDE, PRESERVATIVE FREE 1 G: 5 INJECTION INTRAVENOUS at 17:00

## 2022-01-01 RX ADMIN — APIXABAN 5 MG: 5 TABLET, FILM COATED ORAL at 08:35

## 2022-01-01 RX ADMIN — ONDANSETRON 4 MG: 2 INJECTION INTRAMUSCULAR; INTRAVENOUS at 03:27

## 2022-01-01 RX ADMIN — HYDROMORPHONE HYDROCHLORIDE 1 MG: 1 INJECTION, SOLUTION INTRAMUSCULAR; INTRAVENOUS; SUBCUTANEOUS at 18:17

## 2022-01-01 RX ADMIN — SODIUM CHLORIDE 125 ML/HR: 9 INJECTION, SOLUTION INTRAVENOUS at 08:24

## 2022-01-01 RX ADMIN — VERAPAMIL HYDROCHLORIDE 200 MG: 100 CAPSULE, EXTENDED RELEASE ORAL at 21:19

## 2022-01-01 RX ADMIN — HYDROMORPHONE HYDROCHLORIDE 2 MG: 1 INJECTION, SOLUTION INTRAMUSCULAR; INTRAVENOUS; SUBCUTANEOUS at 13:45

## 2022-01-01 RX ADMIN — MORPHINE SULFATE 15 MG: 15 TABLET ORAL at 12:18

## 2022-01-01 RX ADMIN — TRACE ELEMENTS INJECTION 4: 7.4; .75; 98; 151 INJECTION, SOLUTION INTRAVENOUS at 22:42

## 2022-01-01 RX ADMIN — SODIUM CHLORIDE, PRESERVATIVE FREE 20 MG: 5 INJECTION INTRAVENOUS at 22:28

## 2022-01-01 RX ADMIN — SODIUM CHLORIDE 125 ML/HR: 9 INJECTION, SOLUTION INTRAVENOUS at 20:58

## 2022-01-01 RX ADMIN — CLOPIDOGREL BISULFATE 75 MG: 75 TABLET ORAL at 08:45

## 2022-01-01 RX ADMIN — KETOROLAC TROMETHAMINE 30 MG: 30 INJECTION, SOLUTION INTRAMUSCULAR; INTRAVENOUS at 00:30

## 2022-01-01 RX ADMIN — VERAPAMIL HYDROCHLORIDE 200 MG: 100 CAPSULE, EXTENDED RELEASE ORAL at 22:27

## 2022-01-01 RX ADMIN — HYDROMORPHONE HYDROCHLORIDE 1 MG: 1 INJECTION, SOLUTION INTRAMUSCULAR; INTRAVENOUS; SUBCUTANEOUS at 02:26

## 2022-01-01 RX ADMIN — ONDANSETRON 4 MG: 2 INJECTION INTRAMUSCULAR; INTRAVENOUS at 15:26

## 2022-01-01 RX ADMIN — PANTOPRAZOLE SODIUM 40 MG: 40 TABLET, DELAYED RELEASE ORAL at 07:04

## 2022-01-01 RX ADMIN — MORPHINE SULFATE 30 MG: 30 TABLET, FILM COATED, EXTENDED RELEASE ORAL at 20:14

## 2022-01-01 RX ADMIN — CEFAZOLIN 2 G: 1 INJECTION, POWDER, FOR SOLUTION INTRAMUSCULAR; INTRAVENOUS at 03:50

## 2022-01-01 RX ADMIN — OXYCODONE HYDROCHLORIDE AND ACETAMINOPHEN 1 TABLET: 10; 325 TABLET ORAL at 12:00

## 2022-01-01 RX ADMIN — VERAPAMIL HYDROCHLORIDE 200 MG: 100 CAPSULE, EXTENDED RELEASE ORAL at 21:14

## 2022-01-01 RX ADMIN — APIXABAN 5 MG: 5 TABLET, FILM COATED ORAL at 21:01

## 2022-01-01 RX ADMIN — APIXABAN 5 MG: 5 TABLET, FILM COATED ORAL at 21:29

## 2022-01-01 RX ADMIN — SODIUM CHLORIDE, PRESERVATIVE FREE 20 MG: 5 INJECTION INTRAVENOUS at 21:46

## 2022-01-01 RX ADMIN — HYDROMORPHONE HYDROCHLORIDE 2 MG: 1 INJECTION, SOLUTION INTRAMUSCULAR; INTRAVENOUS; SUBCUTANEOUS at 14:55

## 2022-01-01 RX ADMIN — AMIODARONE HYDROCHLORIDE 200 MG: 200 TABLET ORAL at 08:53

## 2022-01-01 RX ADMIN — POTASSIUM CHLORIDE, DEXTROSE MONOHYDRATE AND SODIUM CHLORIDE 100 ML/HR: 150; 5; 450 INJECTION, SOLUTION INTRAVENOUS at 10:39

## 2022-01-01 RX ADMIN — PIPERACILLIN AND TAZOBACTAM 3.38 G: 3; .375 INJECTION, POWDER, LYOPHILIZED, FOR SOLUTION INTRAVENOUS at 10:56

## 2022-01-01 RX ADMIN — HYDROMORPHONE HYDROCHLORIDE 1 MG: 1 INJECTION, SOLUTION INTRAMUSCULAR; INTRAVENOUS; SUBCUTANEOUS at 21:51

## 2022-01-01 RX ADMIN — PREDNISONE 20 MG: 20 TABLET ORAL at 09:43

## 2022-01-01 RX ADMIN — OXYCODONE HYDROCHLORIDE 10 MG: 10 TABLET ORAL at 08:55

## 2022-01-01 RX ADMIN — HYDROMORPHONE HYDROCHLORIDE 1 MG: 1 INJECTION, SOLUTION INTRAMUSCULAR; INTRAVENOUS; SUBCUTANEOUS at 15:39

## 2022-01-01 RX ADMIN — PROCHLORPERAZINE EDISYLATE 5 MG: 5 INJECTION INTRAMUSCULAR; INTRAVENOUS at 20:14

## 2022-01-01 RX ADMIN — PANTOPRAZOLE SODIUM 40 MG: 40 TABLET, DELAYED RELEASE ORAL at 06:50

## 2022-01-01 RX ADMIN — ONDANSETRON 4 MG: 2 INJECTION INTRAMUSCULAR; INTRAVENOUS at 10:39

## 2022-01-01 RX ADMIN — ONDANSETRON 4 MG: 2 INJECTION INTRAMUSCULAR; INTRAVENOUS at 05:26

## 2022-01-01 RX ADMIN — MORPHINE SULFATE 15 MG: 15 TABLET ORAL at 05:21

## 2022-01-01 RX ADMIN — NYSTATIN 500000 UNITS: 100000 SUSPENSION ORAL at 21:40

## 2022-01-01 RX ADMIN — HYDROMORPHONE HYDROCHLORIDE 1 MG: 1 INJECTION, SOLUTION INTRAMUSCULAR; INTRAVENOUS; SUBCUTANEOUS at 10:34

## 2022-01-01 RX ADMIN — HYDROMORPHONE HYDROCHLORIDE 1 MG: 1 INJECTION, SOLUTION INTRAMUSCULAR; INTRAVENOUS; SUBCUTANEOUS at 22:29

## 2022-01-01 RX ADMIN — APIXABAN 5 MG: 5 TABLET, FILM COATED ORAL at 09:16

## 2022-01-01 RX ADMIN — LISINOPRIL 20 MG: 20 TABLET ORAL at 08:53

## 2022-01-01 RX ADMIN — CLOPIDOGREL BISULFATE 75 MG: 75 TABLET ORAL at 08:46

## 2022-01-01 RX ADMIN — INSULIN LISPRO 6 UNITS: 100 INJECTION, SOLUTION INTRAVENOUS; SUBCUTANEOUS at 22:34

## 2022-01-01 RX ADMIN — ONDANSETRON 4 MG: 2 INJECTION INTRAMUSCULAR; INTRAVENOUS at 23:58

## 2022-01-01 RX ADMIN — SODIUM CHLORIDE 500 ML: 9 INJECTION, SOLUTION INTRAVENOUS at 14:13

## 2022-01-01 RX ADMIN — HYDROMORPHONE HYDROCHLORIDE 2 MG: 1 INJECTION, SOLUTION INTRAMUSCULAR; INTRAVENOUS; SUBCUTANEOUS at 19:38

## 2022-01-01 RX ADMIN — ONDANSETRON 4 MG: 2 INJECTION INTRAMUSCULAR; INTRAVENOUS at 10:47

## 2022-01-01 RX ADMIN — CEFAZOLIN SODIUM 2 G: 1 INJECTION, POWDER, FOR SOLUTION INTRAMUSCULAR; INTRAVENOUS at 09:55

## 2022-01-01 RX ADMIN — METRONIDAZOLE 500 MG: 500 INJECTION, SOLUTION INTRAVENOUS at 14:13

## 2022-01-01 RX ADMIN — PANTOPRAZOLE SODIUM 40 MG: 40 TABLET, DELAYED RELEASE ORAL at 08:47

## 2022-01-01 RX ADMIN — INSULIN LISPRO 6 UNITS: 100 INJECTION, SOLUTION INTRAVENOUS; SUBCUTANEOUS at 17:15

## 2022-01-01 RX ADMIN — METOPROLOL TARTRATE 5 MG: 1 INJECTION, SOLUTION INTRAVENOUS at 10:06

## 2022-01-01 RX ADMIN — NYSTATIN 500000 UNITS: 100000 SUSPENSION ORAL at 21:31

## 2022-01-01 RX ADMIN — WATER 2 G: 1 INJECTION INTRAMUSCULAR; INTRAVENOUS; SUBCUTANEOUS at 09:05

## 2022-01-01 RX ADMIN — DOCUSATE SODIUM 100 MG: 100 CAPSULE, LIQUID FILLED ORAL at 22:11

## 2022-01-01 RX ADMIN — PROMETHAZINE HYDROCHLORIDE 25 MG: 25 TABLET ORAL at 19:38

## 2022-01-01 RX ADMIN — INSULIN LISPRO 4 UNITS: 100 INJECTION, SOLUTION INTRAVENOUS; SUBCUTANEOUS at 12:54

## 2022-01-01 RX ADMIN — PIPERACILLIN AND TAZOBACTAM 3.38 G: 3; .375 INJECTION, POWDER, LYOPHILIZED, FOR SOLUTION INTRAVENOUS at 22:18

## 2022-01-01 RX ADMIN — ONDANSETRON 4 MG: 2 INJECTION INTRAMUSCULAR; INTRAVENOUS at 02:12

## 2022-01-01 RX ADMIN — PIPERACILLIN AND TAZOBACTAM 3.38 G: 3; .375 INJECTION, POWDER, LYOPHILIZED, FOR SOLUTION INTRAVENOUS at 10:44

## 2022-01-01 RX ADMIN — ONDANSETRON 4 MG: 2 INJECTION INTRAMUSCULAR; INTRAVENOUS at 04:52

## 2022-01-01 RX ADMIN — INSULIN LISPRO 2 UNITS: 100 INJECTION, SOLUTION INTRAVENOUS; SUBCUTANEOUS at 18:11

## 2022-01-01 RX ADMIN — TRACE ELEMENTS INJECTION 4: 7.4; .75; 98; 151 INJECTION, SOLUTION INTRAVENOUS at 04:29

## 2022-01-01 RX ADMIN — ONDANSETRON 4 MG: 2 INJECTION INTRAMUSCULAR; INTRAVENOUS at 07:43

## 2022-01-01 RX ADMIN — PANTOPRAZOLE SODIUM 40 MG: 40 TABLET, DELAYED RELEASE ORAL at 07:52

## 2022-01-01 RX ADMIN — ONDANSETRON 4 MG: 2 INJECTION INTRAMUSCULAR; INTRAVENOUS at 18:08

## 2022-01-01 RX ADMIN — ONDANSETRON 4 MG: 2 INJECTION INTRAMUSCULAR; INTRAVENOUS at 18:16

## 2022-01-01 RX ADMIN — ROCURONIUM BROMIDE 10 MG: 50 INJECTION, SOLUTION INTRAVENOUS at 10:54

## 2022-01-01 RX ADMIN — METRONIDAZOLE 500 MG: 500 INJECTION, SOLUTION INTRAVENOUS at 04:59

## 2022-01-01 RX ADMIN — LISINOPRIL 20 MG: 20 TABLET ORAL at 08:50

## 2022-01-01 RX ADMIN — HYDROMORPHONE HYDROCHLORIDE 1 MG: 1 INJECTION, SOLUTION INTRAMUSCULAR; INTRAVENOUS; SUBCUTANEOUS at 11:17

## 2022-01-01 RX ADMIN — FENTANYL CITRATE 25 MCG: 50 INJECTION INTRAMUSCULAR; INTRAVENOUS at 19:52

## 2022-01-01 RX ADMIN — METHYLPREDNISOLONE SODIUM SUCCINATE 20 MG: 40 INJECTION, POWDER, FOR SOLUTION INTRAMUSCULAR; INTRAVENOUS at 09:14

## 2022-01-01 RX ADMIN — POTASSIUM CHLORIDE, DEXTROSE MONOHYDRATE AND SODIUM CHLORIDE 75 ML/HR: 150; 5; 450 INJECTION, SOLUTION INTRAVENOUS at 16:02

## 2022-01-01 RX ADMIN — METRONIDAZOLE 500 MG: 500 INJECTION, SOLUTION INTRAVENOUS at 11:43

## 2022-01-01 RX ADMIN — ONDANSETRON 4 MG: 2 INJECTION INTRAMUSCULAR; INTRAVENOUS at 21:46

## 2022-01-01 RX ADMIN — ONDANSETRON 4 MG: 2 INJECTION INTRAMUSCULAR; INTRAVENOUS at 11:26

## 2022-01-01 RX ADMIN — HYDROMORPHONE HYDROCHLORIDE 2 MG: 1 INJECTION, SOLUTION INTRAMUSCULAR; INTRAVENOUS; SUBCUTANEOUS at 03:34

## 2022-01-01 RX ADMIN — LISINOPRIL 20 MG: 20 TABLET ORAL at 08:41

## 2022-01-01 RX ADMIN — POTASSIUM CHLORIDE 10 MEQ: 7.46 INJECTION, SOLUTION INTRAVENOUS at 11:19

## 2022-01-01 RX ADMIN — OXYCODONE 5 MG: 5 TABLET ORAL at 17:58

## 2022-01-01 RX ADMIN — PANTOPRAZOLE SODIUM 40 MG: 40 TABLET, DELAYED RELEASE ORAL at 07:05

## 2022-01-01 RX ADMIN — HYDROMORPHONE HYDROCHLORIDE 1 MG: 1 INJECTION, SOLUTION INTRAMUSCULAR; INTRAVENOUS; SUBCUTANEOUS at 01:08

## 2022-01-01 RX ADMIN — METRONIDAZOLE 500 MG: 500 INJECTION, SOLUTION INTRAVENOUS at 17:28

## 2022-01-01 RX ADMIN — ONDANSETRON 4 MG: 2 INJECTION INTRAMUSCULAR; INTRAVENOUS at 05:39

## 2022-01-01 RX ADMIN — ONDANSETRON 4 MG: 2 INJECTION INTRAMUSCULAR; INTRAVENOUS at 15:19

## 2022-01-04 NOTE — ED PROVIDER NOTES
Patient is a 49year old male with PMH of diabetes, HTN, MG, polymyositis who presents to ED c/o concern of COVID-19. Patient c/o fever, chills, bodyaches, fatigue, cough, shortness of breath which started 4 days prior. Patient reports he has taken tylenol without any improvement in symptoms. He is unvaccinated against COVID-19. He denies chest pain, palptiations, abdominal pain, N/V/D, urinary symptoms. Past Medical History:   Diagnosis Date    Diabetes ([facility]     Hypertension     Myasthenia gravis     [facility]     Polymyositis ([facility] 2001    Primary hypersomnolence disorder        Past Surgical History:   Procedure Laterality Date    HX ENDOSCOPY      UT SHOULDER SURG PROC UNLISTED           Family History:   Family history unknown: Yes       Social History     Socioeconomic History    Marital status:      Spouse name: Not on file    Number of children: Not on file    Years of education: Not on file    Highest education level: Not on file   Occupational History    Not on file   Tobacco Use    Smoking status: Never Smoker    Smokeless tobacco: Never Used   Vaping Use    Vaping Use: Never used   Substance and Sexual Activity    Alcohol use: No    Drug use: No    Sexual activity: Yes     Birth control/protection: Condom   Other Topics Concern    Not on file   Social History Narrative    Not on file     Social Determinants of Health     Financial Resource Strain:     Difficulty of Paying Living Expenses: Not on file   Food Insecurity:     Worried About Running Out of Food in the Last Year: Not on file    Ameena of Food in the Last Year: Not on file   Transportation Needs:     Lack of Transportation (Medical): Not on file    Lack of Transportation (Non-Medical):  Not on file   Physical Activity:     Days of Exercise per Week: Not on file    Minutes of Exercise per Session: Not on file   Stress:     Feeling of Stress : Not on file   Social Connections:     Frequency of Communication with Friends and Family: Not on file    Frequency of Social Gatherings with Friends and Family: Not on file    Attends Jain Services: Not on file    Active Member of Clubs or Organizations: Not on file    Attends Club or Organization Meetings: Not on file    Marital Status: Not on file   Intimate Partner Violence:     Fear of Current or Ex-Partner: Not on file    Emotionally Abused: Not on file    Physically Abused: Not on file    Sexually Abused: Not on file   Housing Stability:     Unable to Pay for Housing in the Last Year: Not on file    Number of [place] in the Last Year: Not on file    Unstable Housing in the Last Year: Not on file         ALLERGIES: Beef containing products, Pork derived (porcine), Shellfish containing products, and Sulfa (sulfonamide antibiotics)    Review of Systems   Constitutional: Positive for chills, fatigue and fever. Negative for activity change and appetite change. HENT: Negative for congestion and sore throat. Eyes: Negative for pain and visual disturbance. Respiratory: Positive for cough and shortness of breath. Cardiovascular: Negative for chest pain, palpitations and leg swelling. Gastrointestinal: Negative for abdominal distention, abdominal pain, constipation, diarrhea, nausea and vomiting. Genitourinary: Negative for decreased urine volume, dysuria, flank pain, frequency and urgency. Musculoskeletal: Positive for myalgias. Negative for back pain and neck pain. Skin: Negative for rash and wound. Allergic/Immunologic: Negative for immunocompromised state. Neurological: Negative for dizziness, syncope, weakness, light-headedness, numbness and headaches. Psychiatric/Behavioral: Negative for confusion. All other systems reviewed and are negative.       Vitals:    01/04/22 1220   BP: (!) 164/106   Pulse: 78   Resp: 16   Temp: (!) 96.7 °F (35.9 °C)   SpO2: 100%            Physical Exam  Vitals and nursing note reviewed. Constitutional:       General: He is not in acute distress. Appearance: He is well-developed. He is ill-appearing. He is not toxic-appearing. HENT:      Head: Normocephalic and atraumatic. Nose: Nose normal.      Mouth/Throat:      Mouth: Mucous membranes are moist.   Eyes:      General: Lids are normal.      Extraocular Movements: Extraocular movements intact. Conjunctiva/sclera: Conjunctivae normal.   Cardiovascular:      Rate and Rhythm: Normal rate and regular rhythm. Pulses: Normal pulses. Heart sounds: Normal heart sounds, S1 normal and S2 normal.   Pulmonary:      Effort: No accessory muscle usage. Breath sounds: Normal breath sounds. Comments: Scattered rhonchi and rales noted   Abdominal:      Palpations: Abdomen is soft. Musculoskeletal:         General: Normal range of motion. Cervical back: Normal range of motion and neck supple. Skin:     General: Skin is warm and dry. Capillary Refill: Capillary refill takes less than 2 seconds. Neurological:      General: No focal deficit present. Mental Status: He is alert and oriented to person, place, and time. Mental status is at baseline. Psychiatric:         Attention and Perception: Attention normal.         Mood and Affect: Mood and affect normal.         Speech: Speech normal.         Behavior: Behavior is cooperative. Thought Content: Thought content normal.         Cognition and Memory: Cognition normal.         Judgment: Judgment normal.          MDM  Number of Diagnoses or Management Options  Cough with exposure to COVID-19 virus  Fever, unspecified fever cause  Diagnosis management comments: Patient with cough, shortness of breath, fever, chills, bodyaches. Ddx includes pneumonia,  COVID-19, bronchitis, influenza. VSS. O2 saturation 100% on RA. O2 saturation 98% with ambulation. Given CXR findings - will treat with doxycycline given COVID-19 results still pending.  Influenza negative. Advised additional symptomatic care and f/u with PCP. Return to ER warnings discussed in detail with patient. Amount and/or Complexity of Data Reviewed  Clinical lab tests: ordered  Tests in the radiology section of CPT®: reviewed  Discuss the patient with other providers: yes (Dr Sheldon Medina, ED attending )      ED Course as of 01/04/22 1422   Tue Jan 04, 2022   1422 CXR: IMPRESSION  Bilateral airspace infiltrates suspicious for pneumonia in  appropriate clinical setting.  [KG]      ED Course User Index  [KG] Bud Khan       Procedures

## 2022-01-04 NOTE — DISCHARGE INSTRUCTIONS
Take antibiotics as prescribed. Alternate tylenol and ibuprofen as needed for fever/bodyaches/chills. Take tessalon perles as needed for cough. Use albuterol inhaler as needed for shortness of breath/wheezing/cough. Recommend using pulse oximeter in order to monitor oxygen saturation. If your oxygen saturation goes below 90% at rest, please return to ER.

## 2022-01-05 NOTE — PROGRESS NOTES
Patient contacted regarding COVID-19 diagnosis. Discussed COVID-19 related testing which was available at this time. Test results were positive. Patient informed of results, if available? yes. Ambulatory Care Manager contacted the patient by telephone to perform post discharge assessment. Call within 2 business days of discharge: Yes Verified name and  with patient as identifiers. Provided introduction to self, and explanation of the CTN/ACM role, and reason for call due to risk factors for infection and/or exposure to COVID-19. Symptoms reviewed with patient who verbalized the following symptoms: fever, fatigue, cough, shortness of breath, nausea, diarrhea, cold, clammy and pale skin and low body temperature      Due to no new or worsening symptoms encounter was not routed to provider for escalation. Discussed follow-up appointments. If no appointment was previously scheduled, appointment scheduling offered:  no. [address] follow up appointment(s): No future appointments. Non-[facility] follow up appointment(s): no    Interventions to address risk factors: Obtained and reviewed discharge summary and/or continuity of care documents     Advance Care Planning:   Does patient have an Advance Directive: not on file. Educated patient about risk for severe COVID-19 due to risk factors according to CDC guidelines. ACM reviewed discharge instructions, medical action plan and red flag symptoms with the patient who verbalized understanding. Discussed COVID vaccination status: no. Education provided on COVID-19 vaccination as appropriate. Discussed exposure protocols and quarantine with CDC Guidelines. Patient was given an opportunity to verbalize any questions and concerns and agrees to contact ACM or health care provider for questions related to their healthcare. Reviewed and educated patient on any new and changed medications related to discharge diagnosis     Was patient discharged with a pulse oximeter?  Yes, SaO2 is 99% though pt does c/o SOB with minimal exertion. Discussed and confirmed pulse oximeter discharge instructions and when to notify provider or seek emergency care. Pt states he is weak, tired, cold with low body temperatures and having diarrhea. He said he is drinking plenty of fluids including pedialyte and chicken broth and water. He did increase his Prednisone as directed by [facility] on  for 3 days, though he is back to his maintenance dose. He said he does not have enough prednisone to continue at higher dose. He has not yet started his antibiotic because he has no means to pick it up, he is feeling too poorly to travel and has no friends or family in the area as he just recently moved her 1 month ago. Called his [facility] pharmacy and they are not delivering today. [address] and they are delivering though cannot guarantee today vs tomorrow. Called pt back and he said he will like have Karis giles to him, asked if he needed any of his regular medications (including prednisone) and he said no. [address] and gave them pt's name//phone # & current address ([place] [phone]). ACM provided contact information. Plan for follow-up call in 3-5 days based on severity of symptoms and risk factors.

## 2022-01-05 NOTE — PROGRESS NOTES
I attempted to reach patient concerning lab result. I left message for call back concerning patient's lab result. I have also instructed patient to check for results online at their [employer] account. Pt has been instructed to call at provided number with any questions.

## 2022-01-12 NOTE — PROGRESS NOTES
Patient resolved from [address] Transitions episode on 01/12/22. Discussed COVID-19 related testing which was available at this time. Test results were positive. Patient informed of results, if available? yes     Patient/family has been provided the following resources and education related to COVID-19:                         Signs, symptoms and red flags related to COVID-19            CDC exposure and quarantine guidelines            [facility] exposure contact - [phone]            Contact for their local Department of Health                 Patient currently reports that the following symptoms have improved:  fever, fatigue, pain or aching joints, diarrhea and headache. Pt's doctors and specialists are currently at [facility] & he said he is interested in finding doctors in the [facility] system. Directed him to the [facility] website & instructed him how to use the \"find a doctor\" link. He was apprecative of this information. No further outreach scheduled with this CTN/ACM/LPN/HC/ MA. Episode of Care resolved. Patient has this CTN/ACM/LPN/HC/MA contact information if future needs arise.

## 2022-01-21 PROBLEM — I48.92 ATRIAL FLUTTER (HCC): Status: ACTIVE | Noted: 2019-06-29

## 2022-01-21 PROBLEM — N40.0 ENLARGED PROSTATE: Status: ACTIVE | Noted: 2020-09-01

## 2022-01-21 PROBLEM — M62.81 MUSCLE WEAKNESS (GENERALIZED): Status: ACTIVE | Noted: 2022-01-01

## 2022-02-09 NOTE — PROGRESS NOTES
Chief Complaint   Patient presents with   [facility] Patient     Colon Polyp     Visit Vitals  BP (!) 140/86 (BP 1 Location: Left arm, BP Patient Position: Sitting)   Pulse 85   Temp 98.2 °F (36.8 °C) (Temporal)   Resp 18   Ht 5' 8\" (1.727 m)   Wt 150 lb 3.2 oz (68.1 kg)   SpO2 98%   BMI 22.84 kg/m²     Rechecked patients BP. He states he has not taken his BP medication today. Shi Delvalle is aware.

## 2022-02-16 NOTE — TELEPHONE ENCOUNTER
Spoke with  he states that he has called 's office a few times and has not heard anything back. I called [phone] and spoke with Brant. I explained to her that  would like a letter stating that he has gone over the risk of anesthesia with  since he has Myasthenia gravis. I gave her the fax number and phone number to give our office a call incase  would like to speak with .

## 2022-02-17 NOTE — TELEPHONE ENCOUNTER
Letitia called back and I discussed with her the concerns on  that Denzel Perez has before surgery. She states that she is going to pass on the message to [email or web address]a can call our office back or .

## 2022-02-17 NOTE — TELEPHONE ENCOUNTER
Spoke with Radha Bruner at [facility] who is sending a message to 's nurse to give me a call back regarding SalomónFrancis.

## 2022-02-21 NOTE — PERIOP NOTES
Dr. Shira Winn made aware of pt's history, EKG, lab work ordered, office notes requested. States ok to proceed as planned. BMP ordered.

## 2022-02-21 NOTE — PERIOP NOTES
Left message for Dr. Sujit Whitman office (PCP) for a note requesting pt stop Eliquis. Left message for Dr. Kingston Brumfield office (rhuematology) to return call regarding office note.

## 2022-02-21 NOTE — TELEPHONE ENCOUNTER
Spoke with  he states Davina Mcconnell has not called. He will need to stop the Eliquis 2 days prior to the procedure but he will not be able to do the surgery unless he has a note from PCP. I called and made  aware and he states that he will send another message in My Chart to Davina Mcconnell.

## 2022-02-21 NOTE — TELEPHONE ENCOUNTER
Gardenia Abbasi called regarding Pt's abnormal labs. She says she was told Dr. Bhatri Harrison was in [facility]. She says she saw Ky Sullivan notes about trying to get documentation from doctors offices prior to surgery. I told her Evelyn Loki was at [facility] and she stated she would call him herself to notify him of abnormal labs.

## 2022-02-23 NOTE — TELEPHONE ENCOUNTER
Patient called today. He have some concern about his surgery. He is aware that [facility] is not able to give us documentation. And he was not able to get a medical clearance. Patient want to discuss is there any other option.  Give him a call back at [phone]

## 2022-02-23 NOTE — TELEPHONE ENCOUNTER
Called patient and informed him that per  he needed to be seen by his Neurologist. He started yelling stating \" You have no idea what you're talking about that  is calling to speak with . This office is a joke and he is going to tianna us for not pulling strings to get him into a neurologist.\" I informed him that I have reached out to  and Marquetta Kanner but per  he was not going to create a note clearing him. Also that Marquetta Kanner never called our office back. He once again started screaming in the phone \" that I was a joke and needed to find a new job that he was finished with us and bye bye\" I then stated to have a great day and goodbye so I hung up. He then called back asking to speak with . Radha Null was on the phone with  at that time who said he would not clear him until he was seen in the office.  then spoke with  and explained to him that he had to see the Neurologist.      called back and states that he has an appointment for 03/14/2022. Radha Null said as long as they clear him they can try and schedule him for 03/18/2022.

## 2022-02-23 NOTE — TELEPHONE ENCOUNTER
Spoke with Barbara Almeida and made him aware that we still do not have a clearance letter. He informed me to let  know and that we cannot proceed with the surgery. I called  and informed him he started fussing at me saying that I needed \"to pull some strings and get him a neurologist\". I informed him that I can see where he sees a Neurologist at [facility] and has he called them to make a follow up. He states that he is going to find someone else to do the surgery because we cant and he said goodbye and hung up.

## 2022-03-14 NOTE — TELEPHONE ENCOUNTER
Called and spoke with [facility] switch board. I informed them that  saw the patient today and is scheduled for surgery on Friday and we're curious to know if he was cleared or not. They're sending the Doctor a message now to call our office to let us know what is going on.

## 2022-03-14 NOTE — TELEPHONE ENCOUNTER
Spoke with  he states that he believes  is clear for surgery on Friday. He thinks that we should proceed as if he does have Gravis which is up to the anaesthesiologist on how they proceed. He states that he is going to finish his note today or tomorrow and fax it to our office. With clearance and how to proceed.

## 2022-03-15 NOTE — TELEPHONE ENCOUNTER
Called and Spoke with  he states that he called PAT to reschedule the appointment for Thursday but they informed him that it needed to be checked with  first.     I asked  and informed him that he didn't have to be Covid tested since he had Covid within the last 90 days. Noble Gutierrez stated that was fine. I made  aware and he states that he is going to call PAT back.

## 2022-03-15 NOTE — TELEPHONE ENCOUNTER
Patient called today. He will not be able to go to Pre testing tomorrow on . [phone]. Patient has no transportation. He will take his car to the shop today, hopefully it  will be back tomorrow on 03.17.2022 and he will be able to do pre testing. Patient do not want to cancel his procedure on 03.18.2022. Call patient back at [phone].

## 2022-03-16 NOTE — PERIOP NOTES
0930 Noted in electronic medical record that patient needs to reschedule preadmission visit due to transportation issue, patient called and he stated he does need to reschedule his preadmission testing mirtha't and his surgery date due to transportation. Patient gave permission for Dr. Brittney Celis to be called for neurology clearance note. Patient stated he was instructed to hold his eliquis by Dr. Richi Kauffman for 3 days prior to surgery and that his primary doctor, Dr. Jann Tillman is the prescribing doctor, patient gave permission for her to be called to be notified of holding eliquis instruction by Dr. Richi Kauffman. 3502 Dr. Richi Kauffman called and made aware of above note, stated he will have his office call patient with new surgery date and stated patient will need to hold eliquis for 2 days prior to surgery instead of 3.  1130 's office called, requested for neurology clearance note to faxed to PAT dept as soon as possible. 1135 Dr. Carlotta Sun office called, made aware pt was instructed by Dr. Richi Kauffman to hold his eliquis for 2 days prior to surgery, requested a note with eliquis recommendations to be faxed to PAT dept as soon as possible. 1140 Patient called and made aware of  instruction to hold his eliquis for 2 days prior to surgery as noted above, patient verbalized understanding and stated he has received a call with his new tentative surgery date as 4/15/2022. Patient stated he is not sure if he needs bowel prep prior to surgery. 1150 Dr. Shashank Lock office called, left a message for Belle Lim LPN re: above notes and requested for her to verify if bowel prep is needed and to notify patient with instructions.

## 2022-03-16 NOTE — TELEPHONE ENCOUNTER
informed me that PAT states that the patient cannot make his appointment. I called  we changed his surgery to April 15, 2022. Will check on it to make sure he gets transportation in time. I also informed him to stay on top of his Neurologist because we still haven't gotten his clearance letter. He states he will work on that and wait for me to call him.

## 2022-03-17 NOTE — PERIOP NOTES
[address] called from Dr. Hadley Guevara office , stated Dr. Reza Mackenzie gave ok for patient to hold his eliquis on April 13, 14, 15 and 16 for surgery with Dr. Desmond Barkley and will note to PAT as soon as possible.

## 2022-03-17 NOTE — TELEPHONE ENCOUNTER
Called and spoke with Tito informed him that his surgery has been rescheduled in the system so now he can call PAT to make an appointment so he can let his transportation know.

## 2022-03-22 PROBLEM — D12.2 ADENOMATOUS POLYP OF ASCENDING COLON: Status: ACTIVE | Noted: 2022-01-01

## 2022-03-22 NOTE — PROGRESS NOTES
OFFICE VISIT NOTE    Carmen Tovar is a 49 y.o. male who presents to the office today for:    Chief Complaint   Patient presents with    New Patient     Colon Polyp       The patient is a 61-year-old -American gentleman who comes in today for evaluation of a large cecal polyp which was not endoscopically resectable. His colonoscopy was performed at [facility] and he has been seen at [facility] by colorectal surgery however he wished to have a second opinion and therefore comes in today. He denies any unintentional weight loss or blood in stool. Denies any abdominal pain. He does have a very extensive past medical history including a history of hypertension, atrial flutter, polymyositis, myasthenia gravis, insulin-dependent diabetes mellitus, GERD. He is on steroids for his polymyositis and is on Eliquis for anticoagulation presumably for his atrial flutter.       Past Medical History:   Diagnosis Date    Arrhythmia     atrial flutter    Asthma     Diabetes ([address]     Hypertension     Ill-defined condition     Spinal meningitis    Myasthenia gravis     Polymy[address]itis ([facility] [address] 2001    muscle disorder    Primary hypersomnolence disorder        Past Surgical History:   Procedure Laterality Date    HX COLONOSCOPY  2021    HX ENDOSCOPY      HX OTHER SURGICAL      lung abscess removal    [place] SHOULDER SURG PROC UNLISTED      Left Shoulder       Family History   Problem Relation Age of Onset    Heart Disease Mother     Diabetes Father     Stroke Father        Social History     Socioeconomic History    Marital status:      Spouse name: Not on file    Number of children: Not on file    Years of education: Not on file    Highest education level: Not on file   Occupational History    Not on file   Tobacco Use    Smoking status: Never Smoker    Smokeless tobacco: Never Used   Vaping Use  Vaping Use: Never used   Substance and Sexual Activity    Alcohol use: No    Drug use: No    Sexual activity: Yes     Birth control/protection: Condom   Other Topics Concern    Not on file   Social History Narrative    Not on file     Social Determinants of Health     Financial Resource Strain:     Difficulty of Paying Living Expenses: Not on file   Food Insecurity:     Worried About Running Out of Food in the Last Year: Not on file    Ameena of Food in the Last Year: Not on file   Transportation Needs:     Lack of Transportation (Medical): Not on file    Lack of Transportation (Non-Medical): Not on file   Physical Activity:     Days of Exercise per Week: Not on file    Minutes of Exercise per Session: Not on file   Stress:     Feeling of Stress : Not on file   Social Connections:     Frequency of Communication with Friends and Family: Not on file    Frequency of Social Gatherings with Friends and Family: Not on file    Attends Restorationist Services: Not on file    Active Member of [address] [place] [address] or Organizations: Not on file    Attends Club or Organization Meetings: Not on file    Marital Status: Not on file   Intimate Partner Violence:     Fear of Current or Ex-Partner: Not on file    Emotionally Abused: Not on file    Physically Abused: Not on file    Sexually Abused: Not on file   Housing Stability:     Unable to Pay for Housing in the Last Year: Not on file    Number of [place] in the Last Year: Not on file    Unstable Housing in the Last Year: Not on file       Allergies   Allergen Reactions    Beef Containing Products Other (comments)    Diltiazem Other (comments)     Reaction Type: Allergy; Reaction(s): Pressure in chest    Iodinated Contrast Media Other (comments)     Reaction Type: Allergy; Severity: Severe; Reaction(s): hives,throat swelling    Pork Derived (Porcine) Hives    Shellfish Containing Products Swelling     Other reaction(s): Other  Reaction Type:  Allergy; Reaction(s): Hives,throat swelling    Sulfa (Sulfonamide Antibiotics) Unknown (comments)    Sulfur Unknown (comments)     Reaction Type: Allergy       Current Outpatient Medications   Medication Sig    apixaban (Eliquis) 5 mg tablet Take 5 mg by mouth daily.  loperamide (IMODIUM) 2 mg capsule as needed.  insulin lispro (HUMALOG) 100 unit/mL injection by SubCUTAneous route. Sliding Scale  4 times a day    CALCIUM PO Take  by mouth daily. With vitamin D. Unsure of dose    Acetylcarnitine 500 mg cap Take  by mouth daily.  insulin glargine (LANTUS) 100 unit/mL injection 20 Units by SubCUTAneous route daily.  predniSONE (DELTASONE) 20 mg tablet Take 20 mg by mouth daily (with breakfast).  OTHER,NON-FORMULARY, DMAE 351mg Daily    albuterol (PROVENTIL, VENTOLIN) 90 mcg/Actuation inhaler Take 1-2 Puffs by inhalation every four (4) hours as needed for Wheezing.  esomeprazole (NEXIUM) 40 mg capsule Take 80 mg by mouth daily.  folic acid (FOLVITE) 1 mg tablet Take 1 mg by mouth daily.  PEG 3350-Electrolytes (GO-LYTELY) 236-22.74-6.74 -5.86 gram suspension Use as directed prior to surgery to clean bowel  Indications: emptying of the bowel    lisinopriL (PRINIVIL, ZESTRIL) 20 mg tablet Take 20 mg by mouth daily.  rituximab (RITUXAN IV) by IntraVENous route every 6 months. No current facility-administered medications for this visit. Review of Systems   All other systems reviewed and are negative. BP (!) 140/86 (BP 1 Location: Left arm, BP Patient Position: Sitting)   Pulse 85   Temp 98.2 °F (36.8 °C) (Temporal)   Resp 18   Ht 5' 8\" (1.727 m)   Wt 150 lb 3.2 oz (68.1 kg)   SpO2 98%   BMI 22.84 kg/m²     Physical Exam  Constitutional:       General: He is not in acute distress. Appearance: Normal appearance. He is normal weight. He is not ill-appearing. HENT:      Head: Normocephalic and atraumatic. Cardiovascular:      Rate and Rhythm: Normal rate and regular rhythm. Heart sounds: Normal heart sounds. Pulmonary:      Effort: Pulmonary effort is normal.      Breath sounds: Normal breath sounds. Abdominal:      General: There is no distension. Palpations: Abdomen is soft. There is no mass. Tenderness: There is no abdominal tenderness. Hernia: No hernia is present. Musculoskeletal:      Cervical back: Normal range of motion and neck supple. Skin:     General: Skin is warm and dry. Neurological:      General: No focal deficit present. Mental Status: He is alert and oriented to person, place, and time. Psychiatric:         Mood and Affect: Mood normal.         Thought Content: Thought content normal.         Judgment: Judgment normal.         Problem List Items Addressed This Visit        Digestive    Adenomatous polyp of ascending colon - Primary          Assessment and Plan:  I had extensive discussion with the patient regarding surgical management of his colonic polyp. I recommended a laparoscopic right colectomy. I did tell him given the fact that he is on steroids for his polymyositis his risk of complications is high higher. Discussed the high risk of anastomotic leak as well as wound complications. In addition given the fact he has a history of myasthenia gravis his anesthetic complication risk is higher as well and I told him that I like him to see his neurologist prior to surgery to get clearance and guidance regarding his anesthesia. Also advised him that he should make sure that he maintains good glycemic control coming up to surgery in regards to his diabetes. I reviewed the risk and benefits and the risk of infection, bleeding, wound complications, possible conversion to an open procedure, anastomotic leak, injury to other intra-abdominal organs and structures. He wishes to proceed with surgery and surgery be scheduled in the upcoming month.             Naida Long MD

## 2022-04-08 NOTE — PROGRESS NOTES
Patient instructed to hold Eliquis per cardiologist 2 days prior to surgery, day of surgery and 2 days after surgery. Last dose should be taken Tuesday April 12,2022. Verbalizes understanding.

## 2022-04-14 NOTE — TELEPHONE ENCOUNTER
Mr. Moira Rios called and said that he have surgery scheduled for tomorrow. He said that there's a piece of equipment that's needed for his recovery. He said that he's called the insurance company and need to speak with the nurse to get the ball rolling. Please give Mr. Moira Rios a call [phone]. He said that this is only a temporary number.  Thanks

## 2022-04-14 NOTE — ROUTINE PROCESS
Dr. Jennifer Lovelace with anesthesia notified regarding patient's PMH and neurologist's note with recommendation for surgery-- verbalized understanding and ok to proceed with planned procedure.

## 2022-04-14 NOTE — TELEPHONE ENCOUNTER
Patient would like for us to order a hospital bed for him to get after surgery. I informed him that his insurance may not cover it without an office note stating exactly why he needs this. He states that his insurance stated they would cover it so he would like for me to send the order in.

## 2022-04-14 NOTE — TELEPHONE ENCOUNTER
Spoke with Kalia Cohen he states that  will have to speak with the  after surgery about ordering the hospital bed. I called  and made him aware. He said no problem. I also made sure that he has been on clear liquids and that he is going to start his prep. He states that he is about to start the prep shortly.

## 2022-04-15 PROBLEM — C18.9 COLON CANCER (HCC): Status: ACTIVE | Noted: 2022-01-01

## 2022-04-15 PROBLEM — K63.5 CECAL POLYP: Status: ACTIVE | Noted: 2022-01-01

## 2022-04-15 NOTE — ANESTHESIA POSTPROCEDURE EVALUATION
Procedure(s):  LAPAROSCOPIC ILEOCECECTOMY.     general    Anesthesia Post Evaluation      Multimodal analgesia: multimodal analgesia used between 6 hours prior to anesthesia start to PACU discharge  Patient location during evaluation: PACU  Patient participation: complete - patient participated  Level of consciousness: awake  Pain score: 0  Pain management: adequate  Airway patency: patent  Anesthetic complications: no  Cardiovascular status: acceptable  Respiratory status: acceptable  Hydration status: acceptable  Post anesthesia nausea and vomiting:  controlled  Final Post Anesthesia Temperature Assessment:  Normothermia (36.0-37.5 degrees C)      INITIAL Post-op Vital signs:   Vitals Value Taken Time   /99 04/15/22 1230   Temp 36.3 °C (97.4 °F) 04/15/22 1154   Pulse 82 04/15/22 1230   Resp 16 04/15/22 1230   SpO2 98 % 04/15/22 1230

## 2022-04-15 NOTE — ANESTHESIA PREPROCEDURE EVALUATION
Relevant Problems   RESPIRATORY SYSTEM   (+) Asthma      NEUROLOGY   (+) Migraine      CARDIOVASCULAR   (+) Atrial flutter (HCC)   (+) Hypertension      GASTROINTESTINAL   (+) Gastroesophageal reflux disease      ENDOCRINE   (+) Diabetes mellitus (HCC)      PERSONAL HX & FAMILY HX OF CANCER   (+) Colon cancer (HCC)       Anesthetic History   No history of anesthetic complications            Review of Systems / Medical History  Patient summary reviewed, nursing notes reviewed and pertinent labs reviewed    Pulmonary            Asthma        Neuro/Psych         Headaches     Cardiovascular    Hypertension        Dysrhythmias : atrial flutter        Comments: Last took Eliquis two days ago 4/13/2022 2/21/2022 ECG:    Sinus rhythm with occasional Premature ventricular complexes and Premature   atrial complexes   Nonspecific T wave abnormality   Abnormal ECG   No previous ECGs available   Confirmed by [facility] LAURENCELurayAleksandr ([id number]) on 2/21/2022 2:52:00 PM   GI/Hepatic/Renal     GERD           Endo/Other    Diabetes: type 2, using insulin         Other Findings   Comments: Procedure Information    Case: [id number] Date/Time: 04/15/22 1100  Procedure: LAPAROSCOPIC RIGHT COLECTOMY (Right )  Anesthesia type: General  Diagnosis: Adenomatous polyp of colon, unspecified part of colon (D12.6)  Pre-op diagnosis: Adenomatous polyp of colon, unspecified part of colon (D12.6)  Location: [facility] MAIN OR 02 / [facility] MAIN OR  Surgeons: Kiko Madrid MD              Medical History  Diabetes ([facility] [address]  Hypertension  Myasthenia gravis  Primary hypersomnolence disorder  Polymyositis (HCC)  Asthma  Arrhythmia  Adverse effect of anesthesia  Ill-defined condition         Physical Exam    Airway  Mallampati: II  TM Distance: 4 - 6 cm  Neck ROM: normal range of motion   Mouth opening: Normal     Cardiovascular    Rhythm: regular  Rate: normal         Dental         Pulmonary  Breath sounds clear to auscultation               Abdominal  GI exam deferred       Other Findings   Comments: Results for Dex Blankenship (MRN [id number]) as of 4/15/2022 08:43    4/8/2022 11:10  WBC: 11.0  NRBC: 0.0  RBC: 5.76 (H)  HGB: 14.1  HCT: 46.3  MCV: 80.4  MCH: 24.5 (L)  MCHC: 30.5  RDW: 15.4 (H)  PLATELET: 601  MPV: 91.4    Results for Dex Blankenship (MRN [id number]) as of 4/15/2022 08:43    2/21/2022 13:20  Sodium: 142  Potassium: 3.1 (L)  Chloride: 108  CO2: 32  Anion gap: 2 (L)  Glucose: 103 (H)  BUN: 11  Creatinine: 0.55 (L)  BUN/Creatinine ratio: 20  Calcium: 9.2  GFR est non-AA: >60  GFR est AA: >60      Component 4/8/22 1110  Crossmatch Expiration 04/18/2022,2359   ABO/Rh(D) A Positive   Antibody screen Negative              Anesthetic Plan    ASA: 4  Anesthesia type: general          Induction: Intravenous  Anesthetic plan and risks discussed with: Patient

## 2022-04-15 NOTE — BRIEF OP NOTE
Brief Postoperative Note    Patient: Earnestine Katz  YOB: 1970  MRN: [id number]    Date of Procedure: 4/15/2022     Pre-Op Diagnosis: Adenomatous polyp of colon, unspecified part of colon [D12.6]    Post-Op Diagnosis: Adenomatous polyp of cecum    Procedure(s):  LAPAROSCOPIC ILEOCECECTOMY    Surgeon(s):  Arline Blancas MD    Surgical Assistant: Chetan Traylor    Anesthesia: General     Estimated Blood Loss (mL): 14WO    Complications: None    Specimens:   ID Type Source Tests Collected by Time Destination   1 : Cecum Preservative Cecum  Arline Blancas MD 4/15/2022 1030 Pathology   1 : Urine Culture Urine Byrd Specimen CULTURE, URINE Arline Blancas MD 4/15/2022 1053 Microbiology        Implants: * No implants in log *    Drains: * No LDAs found *    Findings: large polyp in cecum    Electronically Signed by Dennise Lemus MD on 4/15/2022 at 11:39 AM

## 2022-04-15 NOTE — H&P
History and Physical    Subjective:     Sarah Friend is a 49 y.o.  male who presents for surgical management of a large cecal polyp not amenable to endoscopic resection. He had his colonoscopy performed at [facility] and biopsies were all negative for invasive malignancy. I saw him for this back in February at that time we discussed surgery and I recommend a laparoscopic right colectomy. He does have a number of medical issues including myasthenia gravis, hypertension, atrial flutter, insulin-dependent diabetes mellitus, GERD. 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 He has not been able to taper down off of his steroids and is on 20 mg daily. He has been seen by his neurologist for preoperative clearance with his history of myasthenia gravis. Today the patient denies any abdominal pain. He denies any blood in the stool. He denies any unexpected weight loss. .. Past Medical History:   Diagnosis Date    Adverse effect of anesthesia     Arrhythmia     atrial flutter    Asthma     Diabetes ([facility] [address]     Hypertension     Ill-defined condition     Spinal meningitis and avascular necrosis     Myasthenia gravis     Polymyositis ([facility] [address] 2001    muscle disorder    Primary hypersomnolence disorder       Past Surgical History:   Procedure Laterality Date    HX COLONOSCOPY  2021    HX ENDOSCOPY      HX OTHER SURGICAL      lung abscess removal    [place] SHOULDER SURG PROC UNLISTED      Left Shoulder     Family History   Problem Relation Age of Onset    Heart Disease Mother     Diabetes Father     Stroke Father       Social History     Tobacco Use    Smoking status: Never Smoker    Smokeless tobacco: Never Used   Substance Use Topics    Alcohol use: No       Prior to Admission medications    Medication Sig Start Date End Date Taking? Authorizing Provider   omeprazole (PRILOSEC) 40 mg capsule Take 40 mg by mouth daily.    Yes Provider, Historical   PEG 3350-Electrolytes (GO-LYTELY) 236-22.74-6.74 -5.86 gram suspension Use as directed prior to surgery to clean bowel  Indications: emptying of the bowel 3/16/22  Yes Roxanna Cruzr, MD   lisinopriL (PRINIVIL, ZESTRIL) 20 mg tablet Take 20 mg by mouth daily. Yes Provider, Historical   CALCIUM PO Take  by mouth daily. With vitamin D. Unsure of dose   Yes Provider, Historical   Acetylcarnitine 500 mg cap Take  by mouth daily. Yes Provider, Historical   predniSONE (DELTASONE) 20 mg tablet Take 20 mg by mouth daily (with breakfast). Yes Provider, Historical   oxyCODONE IR (ROXICODONE) 10 mg tab immediate release tablet oxycodone 10 mg tablet  Patient not taking: Reported on 4/15/2022    Provider, Historical   rituximab (RITUXAN IV) by IntraVENous route every 6 months. Provider, Historical   apixaban (Eliquis) 5 mg tablet Take 5 mg by mouth daily. Provider, Historical   loperamide (IMODIUM) 2 mg capsule as needed. Patient not taking: Reported on 4/15/2022    Provider, Historical   insulin lispro (HUMALOG) 100 unit/mL injection by SubCUTAneous route. Sliding Scale  4 times a day    Provider, Historical   insulin glargine (LANTUS) 100 unit/mL injection 20 Units by SubCUTAneous route daily. Provider, Historical   OTHER,NON-FORMULARY, DMAE 351mg Daily    Provider, Historical   albuterol (PROVENTIL, VENTOLIN) 90 mcg/Actuation inhaler Take 1-2 Puffs by inhalation every four (4) hours as needed for Wheezing. 4/28/11   Salvador Wadsworth MD   esomeprazole (NEXIUM) 40 mg capsule Take 80 mg by mouth daily. Patient not taking: Reported on 4/15/2022    Tal Christine MD   folic acid (FOLVITE) 1 mg tablet Take 1 mg by mouth daily. Tal Christine MD     Allergies   Allergen Reactions    Beef Containing Products Anaphylaxis and Other (comments)    Diltiazem Other (comments)     Reaction Type: Allergy; Reaction(s): Pressure in chest    Iodinated Contrast Media Other (comments)     Reaction Type: Allergy;  Severity: Severe; Reaction(s): hives,throat swelling    Pork Derived (Porcine) Hives    Shellfish Containing Products Swelling     Other reaction(s): Other  Reaction Type: Allergy; Reaction(s): Hives,throat swelling    Sulfa (Sulfonamide Antibiotics) Unknown (comments)     Spinal meningitis    Sulfur Unknown (comments)     Reaction Type: Allergy  Spinal meningitis        Review of Systems:  A comprehensive review of systems was negative except for that written in the History of Present Illness. Objective:     Patient Vitals for the past 12 hrs:   Temp Pulse Resp BP SpO2   04/15/22 0915 -- 92 18 (!) 155/97 --   04/15/22 0815 -- -- -- (!) 150/103 --   04/15/22 0806 98.2 °F (36.8 °C) 85 18 (!) 156/103 100 %     Physical Exam:      Physical Activity:     Days of Exercise per Week: Not on file    Minutes of Exercise per Session: Not on file     Physical Exam  Constitutional:       General: He is not in acute distress. Appearance: Normal appearance. He is not ill-appearing. HENT:      Head: Normocephalic and atraumatic. Eyes:      Comments: Bilateral ptosis   Cardiovascular:      Rate and Rhythm: Normal rate. Heart sounds: Normal heart sounds. Pulmonary:      Effort: Pulmonary effort is normal.      Breath sounds: Normal breath sounds. Abdominal:      General: There is no distension. Palpations: There is no mass. Tenderness: There is no abdominal tenderness. Hernia: No hernia is present. Musculoskeletal:         General: Normal range of motion. Cervical back: Normal range of motion and neck supple. Skin:     General: Skin is warm and dry. Neurological:      General: No focal deficit present. Mental Status: He is alert. Mental status is at baseline. Psychiatric:         Mood and Affect: Mood normal.         Thought Content:  Thought content normal.         Judgment: Judgment normal.         Data Review:   Recent Results (from the past 24 hour(s))   GLUCOSE, POC    Collection Time: 04/15/22  8:10 AM   Result Value Ref Range    Glucose (POC) 162 (H) 65 - 117 mg/dL    Performed by Skyler Wilder          Assessment:     Active Problems:    Colon cancer ([address] (4/15/2022)        Plan:   I had already reviewed surgery with Mr. Juarez Zheng and recommended a laparoscopic right colectomy for his cecal polyp. I once again reviewed the procedure with him this morning and reviewed the risk and benefits. I reviewed the risk of infection, bleeding, wound complications, injury to other intra-abdominal organs and structures, anastomotic leak. I did tell him that his risk of leak is higher given the fact he is on steroids chronically. He wishes to proceed and surgery scheduled for this morning.   Consent is on the chart

## 2022-04-15 NOTE — PROGRESS NOTES
Reason for Admission:  Adenomatous polyp of colon, unspecified part of colon, Colon cancer ([facility] [address] Cecal polyp                    RUR Score:  5%                  Plan for utilizing home health:   None currently       PCP: First and Last name:  Shine Hart MD     Name of Practice:    Are you a current patient: Yes/No: yes   Approximate date of last visit:    Can you participate in a virtual visit with your PCP:                     Current Advanced Directive/Advance Care Plan: Full Code      Healthcare Decision Maker:   Click here to complete [address] including selection of the Healthcare Decision Maker Relationship (ie \"Primary\")             Primary Decision Maker: [id number] [facility] [phone]                  Transition of Care Plan:                    Spoke with patient at bedside. Lives at address on file. 8 steps to enter, 2 other people live with him. Does not use any DME, states he has used hospital bed in past. Said he would need a hospital bed at discharge and has spoke with his insurance company and said they would cover it. Will need the doctor to write the following for patient to qualify for a hospital bed; The patient requires positioning of the body in ways not feasible with an ordinary bed in order to alleviate pain and need for frequent changes in body position. Patient uses family/friends for transport.     DC plan home self care

## 2022-04-16 NOTE — PROGRESS NOTES
Progress Note    Patient: Candace Hoyos MRN: [id number]  SSN: [id number]    YOB: 1970  Age: 49 y.o. Sex: male      Admit Date: 4/15/2022    LOS: 1 day     Subjective:     Patient is POD#1 s/p laparoscopic ileocecectomy for large cecal polyp. Overnight, patient had episode of SVT and was transferred to the ICU. He received adenosine and an amiodarone bolus and converted back to sinus rhythm. Pain well controlled with current pain medications. Nausea well controlled. Voiding adequately. Admits to some shortness of breath secondary to pain with deep inspiration.     Objective:     Vitals:    04/16/22 0700 04/16/22 0701 04/16/22 0800 04/16/22 1100   BP:   (!) 171/122 (!) 141/95   Pulse:   86 95   Resp:   14 12   Temp: 98.3 °F (36.8 °C)      SpO2:  100% 100% 99%   Weight:            Intake and Output:  Current Shift: 04/16 0701 - 04/16 1900  In: -   Out: 300 [Urine:300]  Last three shifts: 04/14 1901 - 04/16 0700  In: 1420 [I.V.:1420]  Out: 1810 [Urine:1800]    Physical Exam:   General: alert, oriented, in no acute distress  Neck: supple, no masses, no JVD  Cardiovascular: regular rate and rhythm  Pulmonary: unlabored breathing, equal chest rise bilaterally  Abdomen: soft, appropriately tender, non distended, incisions clean, dry, and intact, ecchymosis surrounding periumbilical incision  Extremities: no swelling, pulses equal and palpable in all extremities    Lab/Data Review:  Recent Results (from the past 24 hour(s))   GLUCOSE, POC    Collection Time: 04/15/22 12:09 PM   Result Value Ref Range    Glucose (POC) 192 (H) 65 - 117 mg/dL    Performed by Jonathon Metcalf, POC    Collection Time: 04/15/22  4:13 PM   Result Value Ref Range    Glucose (POC) 131 (H) 65 - 117 mg/dL    Performed by Osmani GR    GLUCOSE, POC    Collection Time: 04/15/22  8:46 PM   Result Value Ref Range    Glucose (POC) 122 (H) 65 - 117 mg/dL    Performed by Becca AYERS    CBC WITH AUTOMATED DIFF    Collection Time: 04/16/22  3:06 AM   Result Value Ref Range    WBC 16.5 (H) 4.1 - 11.1 K/uL    RBC 5.86 (H) 4.10 - 5.70 M/uL    HGB 14.5 12.1 - 17.0 g/dL    HCT 46.0 36.6 - 50.3 %    MCV 78.5 (L) 80.0 - 99.0 FL    MCH 24.7 (L) 26.0 - 34.0 PG    MCHC 31.5 30.0 - 36.5 g/dL    RDW 14.6 (H) 11.5 - 14.5 %    PLATELET 527 544 - 924 K/uL    MPV 12.3 8.9 - 12.9 FL    NRBC 0.0 0.0  WBC    ABSOLUTE NRBC 0.00 0.00 - 0.01 K/uL    NEUTROPHILS 73 32 - 75 %    LYMPHOCYTES 16 12 - 49 %    MONOCYTES 10 5 - 13 %    EOSINOPHILS 0 0 - 7 %    BASOPHILS 0 0 - 1 %    IMMATURE GRANULOCYTES 1 (H) 0 - 0.5 %    ABS. NEUTROPHILS 12.1 (H) 1.8 - 8.0 K/UL    ABS. LYMPHOCYTES 2.6 0.8 - 3.5 K/UL    ABS. MONOCYTES 1.7 (H) 0.0 - 1.0 K/UL    ABS. EOSINOPHILS 0.0 0.0 - 0.4 K/UL    ABS. BASOPHILS 0.0 0.0 - 0.1 K/UL    ABS. IMM. GRANS. 0.1 (H) 0.00 - 0.04 K/UL    DF AUTOMATED     METABOLIC PANEL, COMPREHENSIVE    Collection Time: 04/16/22  3:06 AM   Result Value Ref Range    Sodium 139 136 - 145 mmol/L    Potassium 3.5 3.5 - 5.1 mmol/L    Chloride 108 97 - 108 mmol/L    CO2 22 21 - 32 mmol/L    Anion gap 9 5 - 15 mmol/L    Glucose 103 (H) 65 - 100 mg/dL    BUN 8 6 - 20 mg/dL    Creatinine 0.66 (L) 0.70 - 1.30 mg/dL    BUN/Creatinine ratio 12 12 - 20      GFR est AA >60 >60 ml/min/1.73m2    GFR est non-AA >60 >60 ml/min/1.73m2    Calcium 8.6 8.5 - 10.1 mg/dL    Bilirubin, total 0.7 0.2 - 1.0 mg/dL    AST (SGOT) 24 15 - 37 U/L    ALT (SGPT) 30 12 - 78 U/L    Alk.  phosphatase 78 45 - 117 U/L    Protein, total 6.8 6.4 - 8.2 g/dL    Albumin 3.5 3.5 - 5.0 g/dL    Globulin 3.3 2.0 - 4.0 g/dL    A-G Ratio 1.1 1.1 - 2.2     MAGNESIUM    Collection Time: 04/16/22  3:06 AM   Result Value Ref Range    Magnesium 2.0 1.6 - 2.4 mg/dL   TROPONIN-HIGH SENSITIVITY    Collection Time: 04/16/22  3:06 AM   Result Value Ref Range    Troponin-High Sensitivity 15 0 - 76 ng/L   EKG, 12 LEAD, INITIAL    Collection Time: 04/16/22  4:29 AM   Result Value Ref Range    Ventricular Rate 195 BPM    Atrial Rate 197 BPM    QRS Duration 82 ms    Q-T Interval 246 ms    QTC Calculation (Bezet) 443 ms    Calculated R Axis 12 degrees    Calculated T Axis -166 degrees    Diagnosis       Supraventricular tachycardia  Left ventricular hypertrophy with repolarization abnormality  Abnormal ECG  When compared with ECG of 16-APR-2022 04:28, (Unconfirmed)  Sinus rhythm has replaced Atrial fibrillation  Confirmed by Zainab Barros MD, [facility] ([id number]) on 4/16/2022 11:37:25 AM     GLUCOSE, POC    Collection Time: 04/16/22  7:49 AM   Result Value Ref Range    Glucose (POC) 155 (H) 65 - 117 mg/dL    Performed by Herlinda Render    GLUCOSE, POC    Collection Time: 04/16/22 11:37 AM   Result Value Ref Range    Glucose (POC) 174 (H) 65 - 117 mg/dL    Performed by Herlinda Render           Assessment:     Active Problems:    Cecal polyp (4/15/2022)    SVT  MG, polymyositis, steroid dependence  Hypertension    POD#1 s/p laparoscopic ileocecectomy    Plan:     Diet: NPO with sips of water and ice chips  DVT/GI prophylaxis- unable to order Lovenox/heparin due to porcine allergy, discussed with pharmacy, will place patient on fondaparinux for DVT prophylaxis  Continue post-operative prophylactic antibiotics  Replace potassium  Continue IVF  Ambulate  Incentive spirometry  Pain and nausea control  Hospitalist group and cardiology following, appreciate recommendations and management  Repeat labs in AM  Dispo: home pending return of bowel function    Signed By: Juan Bloom DO     April 16, 2022

## 2022-04-16 NOTE — PROGRESS NOTES
Telemetry called unit at 0240 to report patient reading of [facility] SITE in the 160's\" This nurse to bedside. Telemetry called once again at 0240 to report \"afib in 160's\"     Vital signs taken, RT called for EKG. Dr. Desmond Barkley called and gave orders for EKG, mag, BMP, and consult hospitalist.    Code tele then called via telemetry room at [phone]. EKG read 'S. Dr. Deloris Gill at bedside at [phone]. Adenosine pushed via nursing supervisor at [facility], [id number], and [address]. Patient left unit for transport to ICU 0320. Report given to oncoming nurse.

## 2022-04-16 NOTE — PROGRESS NOTES
Problem: Falls - Risk of  Goal: *Absence of Falls  Description: Document Calvin Castaneda Fall Risk and appropriate interventions in the flowsheet.   Outcome: Progressing Towards Goal  Note: Fall Risk Interventions:  Mobility Interventions: Patient to call before getting OOB         Medication Interventions: Bed/chair exit alarm                   Problem: Patient Education: Go to Patient Education Activity  Goal: Patient/Family Education  Outcome: Progressing Towards Goal     Problem: Patient Education: Go to Patient Education Activity  Goal: Patient/Family Education  Outcome: Progressing Towards Goal

## 2022-04-16 NOTE — PROGRESS NOTES
Hospitalist Progress Note               Daily Progress Note: 4/16/2022      Subjective:   Hospital course to date: Patient is a 81-year-old male with a history of paroxysmal atrial fibrillation/flutter, diabetes, asthma, polymyositis, steroid dependent, myasthenia gravis and hypertension who was admitted to the surgical service electively for resection of a large cecal polyp not amenable to endoscopic resection. Patient underwent surgical resection on 4/15. Overnight patient was noted to have SVT on telemetry. He was given adenosine 6 mg and 12 mg without improvement. Cardiology recommended amiodarone which was started and subsequently went back into sinus rhythm. He is currently awake and alert, complains of pain related to his incision but otherwise doing well.   He is not on any oxygen    Problem List:  Problem List as of 4/16/2022 Date Reviewed: 4/15/2022          Codes Class Noted - Resolved    Colon cancer ([facility] ICD-10-CM: C18.9  ICD-9-CM: 153.9  4/15/2022 - Present        Cecal polyp ICD-10-CM: K63.5  ICD-9-CM: 211.3  4/15/2022 - Present        Adenomatous polyp of ascending colon ICD-10-CM: D12.2  ICD-9-CM: 211.3  3/22/2022 - Present        Muscle weakness (generalized) ICD-10-CM: M62.81  ICD-9-CM: 728.87  1/1/2022 - Present        Enlarged prostate ICD-10-CM: N40.0  ICD-9-CM: 600.00  9/1/2020 - Present        Atrial flutter ([facility] ICD-10-CM: I48.92  ICD-9-CM: 427.32  6/29/2019 - Present        Diabetes mellitus ([facility] ICD-10-CM: E11.9  ICD-9-CM: 250.00  1/20/2016 - Present        Polymyositis ([facility] ICD-10-CM: M33.20  ICD-9-CM: 710.4  5/16/2012 - Present        Allergic rhinitis ICD-10-CM: J30.9  ICD-9-CM: 477.9  4/6/2012 - Present        Migraine ICD-10-CM: H72.200  ICD-9-CM: 346.90  4/6/2012 - Present        Gastroesophageal reflux disease ICD-10-CM: K21.9  ICD-9-CM: 530.81  9/28/2011 - Present        Hypertension ICD-10-CM: I10  ICD-9-CM: 401.9  6/1/2010 - Present        Erectile dysfunction ICD-10-CM: N52.9  ICD-9-CM: 607.84  3/4/2009 - Present        Herniation of intervertebral disc ICD-10-CM: JAS5432  ICD-9-CM: 722.2  3/4/2009 - Present    Overview Signed 1/21/2022 11:56 AM by Miranda Addison of this note might be different from the original.  Leg[employer] Annotated Display: Herniation of intervertebral disc  Comments:  03/04/2009 11:04 - Shekhar Campbell MD, RUDY CEJA  MRI 1/09 L5-S1             Asthma ICD-10-CM: J45.909  ICD-9-CM: 493.90  5/31/2007 - Present        Myasthenia gravis ([facility] ICD-10-CM: G70.00  ICD-9-CM: 358.00  5/31/2007 - Present              Medications reviewed  Current Facility-Administered Medications   Medication Dose Route Frequency    lactated Ringers infusion  20 mL/hr IntraVENous CONTINUOUS    0.9% sodium chloride infusion  125 mL/hr IntraVENous CONTINUOUS    ceFAZolin (ANCEF) 2 g in sterile water (preservative free) 20 mL IV syringe  2 g IntraVENous Q8H    metroNIDAZOLE (FLAGYL) IVPB premix 500 mg  500 mg IntraVENous Q8H    HYDROmorphone (DILAUDID) injection 1 mg  1 mg IntraVENous Q4H PRN    HYDROmorphone (DILAUDID) syringe 0.5 mg  0.5 mg IntraVENous Q4H PRN    ondansetron (ZOFRAN) injection 4 mg  4 mg IntraVENous Q6H PRN    lisinopriL (PRINIVIL, ZESTRIL) tablet 20 mg  20 mg Oral DAILY    predniSONE (DELTASONE) tablet 20 mg  20 mg Oral DAILY WITH BREAKFAST    famotidine (PF) (PEPCID) 20 mg in 0.9% sodium chloride 10 mL injection  20 mg IntraVENous Q12H    insulin lispro (HUMALOG) injection   SubCUTAneous AC&HS    glucose chewable tablet 16 g  4 Tablet Oral PRN    glucagon (GLUCAGEN) injection 1 mg  1 mg IntraMUSCular PRN    dextrose 10% infusion 0-250 mL  0-250 mL IntraVENous PRN    labetaloL (NORMODYNE;TRANDATE) injection 10 mg  10 mg IntraVENous Q6H PRN       Review of Systems:   A comprehensive review of systems was negative except for that written in the HPI.     Objective:   Physical Exam:     Visit Vitals  BP (!) 136/105 (BP 1 Location: Left upper arm, BP Patient Position: At rest)   Pulse 82   Temp 98.3 °F (36.8 °C)   Resp 10   Wt 66.8 kg (147 lb 4.3 oz)   SpO2 99%   BMI 22.78 kg/m²    O2 Flow Rate (L/min): 6 l/min O2 Device: None (Room air)    Temp (24hrs), Av.3 °F (36.8 °C), Min:97.4 °F (36.3 °C), Max:99.2 °F (37.3 °C)    701 - 1900  In: -   Out: 177 [KIVAR:065]   1901 - 700  In: 0706 [I.V.:1420]  Out: 1889 [Urine:1800]    General:   Awake and alert   Lungs:   Clear to auscultation bilaterally. Chest wall:  No tenderness or deformity. Heart:  Regular rate and rhythm, S1, S2 normal, no murmur, click, rub or gallop. Abdomen:   Soft, non-tender. Bowel sounds normal. No masses,  No organomegaly. Extremities: Extremities normal, atraumatic, no cyanosis or edema. Pulses: 2+ and symmetric all extremities. Skin: Skin color, texture, turgor normal. No rashes or lesions   Neurologic: CNII-XII intact. No gross focal deficits         Data Review:       Recent Days:  Recent Labs     22  0306   WBC 16.5*   HGB 14.5   HCT 46.0        Recent Labs     22  0306      K 3.5      CO2 22   *   BUN 8   CREA 0.66*   CA 8.6   MG 2.0   ALB 3.5   TBILI 0.7   ALT 30     No results for input(s): PH, PCO2, PO2, HCO3, FIO2 in the last 72 hours.     24 Hour Results:  Recent Results (from the past 24 hour(s))   GLUCOSE, POC    Collection Time: 04/15/22  8:10 AM   Result Value Ref Range    Glucose (POC) 162 (H) 65 - 117 mg/dL    Performed by Adi Marin, POC    Collection Time: 04/15/22 12:09 PM   Result Value Ref Range    Glucose (POC) 192 (H) 65 - 117 mg/dL    Performed by Delgado Celaya, POC    Collection Time: 04/15/22  4:13 PM   Result Value Ref Range    Glucose (POC) 131 (H) 65 - 117 mg/dL    Performed by [facility], POC    Collection Time: 04/15/22  8:46 PM   Result Value Ref Range    Glucose (POC) 122 (H) 65 - 117 mg/dL    Performed by Xi Garcia ANDRIA    CBC WITH AUTOMATED DIFF    Collection Time: 04/16/22  3:06 AM   Result Value Ref Range    WBC 16.5 (H) 4.1 - 11.1 K/uL    RBC 5.86 (H) 4.10 - 5.70 M/uL    HGB 14.5 12.1 - 17.0 g/dL    HCT 46.0 36.6 - 50.3 %    MCV 78.5 (L) 80.0 - 99.0 FL    MCH 24.7 (L) 26.0 - 34.0 PG    MCHC 31.5 30.0 - 36.5 g/dL    RDW 14.6 (H) 11.5 - 14.5 %    PLATELET 188 204 - 467 K/uL    MPV 12.3 8.9 - 12.9 FL    NRBC 0.0 0.0  WBC    ABSOLUTE NRBC 0.00 0.00 - 0.01 K/uL    NEUTROPHILS 73 32 - 75 %    LYMPHOCYTES 16 12 - 49 %    MONOCYTES 10 5 - 13 %    EOSINOPHILS 0 0 - 7 %    BASOPHILS 0 0 - 1 %    IMMATURE GRANULOCYTES 1 (H) 0 - 0.5 %    ABS. NEUTROPHILS 12.1 (H) 1.8 - 8.0 K/UL    ABS. LYMPHOCYTES 2.6 0.8 - 3.5 K/UL    ABS. MONOCYTES 1.7 (H) 0.0 - 1.0 K/UL    ABS. EOSINOPHILS 0.0 0.0 - 0.4 K/UL    ABS. BASOPHILS 0.0 0.0 - 0.1 K/UL    ABS. IMM. GRANS. 0.1 (H) 0.00 - 0.04 K/UL    DF AUTOMATED     METABOLIC PANEL, COMPREHENSIVE    Collection Time: 04/16/22  3:06 AM   Result Value Ref Range    Sodium 139 136 - 145 mmol/L    Potassium 3.5 3.5 - 5.1 mmol/L    Chloride 108 97 - 108 mmol/L    CO2 22 21 - 32 mmol/L    Anion gap 9 5 - 15 mmol/L    Glucose 103 (H) 65 - 100 mg/dL    BUN 8 6 - 20 mg/dL    Creatinine 0.66 (L) 0.70 - 1.30 mg/dL    BUN/Creatinine ratio 12 12 - 20      GFR est AA >60 >60 ml/min/1.73m2    GFR est non-AA >60 >60 ml/min/1.73m2    Calcium 8.6 8.5 - 10.1 mg/dL    Bilirubin, total 0.7 0.2 - 1.0 mg/dL    AST (SGOT) 24 15 - 37 U/L    ALT (SGPT) 30 12 - 78 U/L    Alk.  phosphatase 78 45 - 117 U/L    Protein, total 6.8 6.4 - 8.2 g/dL    Albumin 3.5 3.5 - 5.0 g/dL    Globulin 3.3 2.0 - 4.0 g/dL    A-G Ratio 1.1 1.1 - 2.2     MAGNESIUM    Collection Time: 04/16/22  3:06 AM   Result Value Ref Range    Magnesium 2.0 1.6 - 2.4 mg/dL   TROPONIN-HIGH SENSITIVITY    Collection Time: 04/16/22  3:06 AM   Result Value Ref Range    Troponin-High Sensitivity 15 0 - 76 ng/L       No orders to display        Assessment:  Paroxysmal supraventricular tachycardia, now back in sinus rhythm    History of paroxysmal atrial fibrillation/flutter    Diabetes mellitus type 2, insulin requiring    Polymyositis, steroid dependent    Myasthenia gravis    Gastroesophageal reflux disease    Hypertension    Status post laparoscopic ileocecal resection for large cecal polyp      Plan:  Okay for transfer to cardiac telemetry from medical standpoint  Continue labetalol as needed for hypertension    Care Plan discussed with: Patient/Family      Total time spent with patient: 30 minutes.     Esha Desir MD

## 2022-04-16 NOTE — PROGRESS NOTES
Noted tachycardia on telemetry and SVT on EKG. Attempted adenosine 6mf x1 and 12mg x2, without successfully cardiovert. Consulted Dr. Michelle Rhoades, cardiology, who recommended amiodarone bolus. If still unsuccessful, can do electrical cardioversion.      Anne Kemp MD  Internal Medicine  3:21 AM   04/16/22

## 2022-04-17 NOTE — PROGRESS NOTES
Hospitalist Progress Note               Daily Progress Note: 4/17/2022      Subjective:   Hospital course to date: Patient is a 61-year-old male with a history of paroxysmal atrial fibrillation/flutter, diabetes, asthma, polymyositis, steroid dependent, myasthenia gravis and hypertension who was admitted to the surgical service electively for resection of a large cecal polyp not amenable to endoscopic resection. Patient underwent surgical resection on 4/15. Overnight patient was noted to have SVT on telemetry. He was given adenosine 6 mg and 12 mg without improvement. Cardiology recommended amiodarone which was started and subsequently went back into sinus rhythm.    -------    Patient seen today for follow-up. He remains in ICU at this time. There has been no recurrent SVT. He is feeling well overall.     Problem List:  Problem List as of 4/17/2022 Date Reviewed: 4/15/2022          Codes Class Noted - Resolved    Colon cancer ([email or web address] ICD-10-CM: C18.9  ICD-9-CM: 153.9  4/15/2022 - Present        Cecal polyp ICD-10-CM: K63.5  ICD-9-CM: 211.3  4/15/2022 - Present        Adenomatous polyp of ascending colon ICD-10-CM: D12.2  ICD-9-CM: 211.3  3/22/2022 - Present        Muscle weakness (generalized) ICD-10-CM: M62.81  ICD-9-CM: 728.87  1/1/2022 - Present        Enlarged prostate ICD-10-CM: N40.0  ICD-9-CM: 600.00  9/1/2020 - Present        Atrial flutter ([facility] [[email or web address] 75.) ICD-10-CM: I48.92  ICD-9-CM: 427.32  6/29/2019 - Present        Diabetes mellitus ([facility] [[email or web address] 75.) ICD-10-CM: E11.9  ICD-9-CM: 250.00  1/20/2016 - Present        Polymyositis ([facility] [[email or web address] 75.) ICD-10-CM: M33.20  ICD-9-CM: 710.4  5/16/2012 - Present        Allergic rhinitis ICD-10-CM: J30.9  ICD-9-CM: 477.9  4/6/2012 - Present        Migraine ICD-10-CM: N37.726  ICD-9-CM: 346.90  4/6/2012 - Present        Gastroesophageal reflux disease ICD-10-CM: K21.9  ICD-9-CM: 530.81  9/28/2011 - Present        Hypertension ICD-10-CM: I10  ICD-9-CM: 401.9  6/1/2010 - Present Erectile dysfunction ICD-10-CM: N52.9  ICD-9-CM: 607.84  3/4/2009 - Present        Herniation of intervertebral disc ICD-10-CM: OKS0677  ICD-9-CM: 722.2  3/4/2009 - Present    Overview Signed 1/21/2022 11:56 AM by Silas Curiel of this note might be different from the original.  [employer] Annotated Display: Herniation of intervertebral disc  Comments:  03/04/2009 11:04 - Ava Mcwilliams MD, RUDY CEJA  MRI 1/09 L5-S1             Asthma ICD-10-CM: J45.909  ICD-9-CM: 493.90  5/31/2007 - Present        Myasthenia gravis ([address] ICD-10-CM: G70.00  ICD-9-CM: 358.00  5/31/2007 - Present              Medications reviewed  Current Facility-Administered Medications   Medication Dose Route Frequency    oxyCODONE-acetaminophen (PERCOCET 10)  mg per tablet 1 Tablet  1 Tablet Oral Q6H PRN    ondansetron (ZOFRAN ODT) tablet 4 mg  4 mg Oral Q6H PRN    fondaparinux (ARIXTRA) injection 2.5 mg  2.5 mg SubCUTAneous Q24H    lactated Ringers infusion  20 mL/hr IntraVENous CONTINUOUS    0.9% sodium chloride infusion  125 mL/hr IntraVENous CONTINUOUS    ceFAZolin (ANCEF) 2 g in sterile water (preservative free) 20 mL IV syringe  2 g IntraVENous Q8H    metroNIDAZOLE (FLAGYL) IVPB premix 500 mg  500 mg IntraVENous Q8H    HYDROmorphone (DILAUDID) injection 1 mg  1 mg IntraVENous Q4H PRN    HYDROmorphone (DILAUDID) syringe 0.5 mg  0.5 mg IntraVENous Q4H PRN    ondansetron (ZOFRAN) injection 4 mg  4 mg IntraVENous Q6H PRN    lisinopriL (PRINIVIL, ZESTRIL) tablet 20 mg  20 mg Oral DAILY    predniSONE (DELTASONE) tablet 20 mg  20 mg Oral DAILY WITH BREAKFAST    famotidine (PF) (PEPCID) 20 mg in 0.9% sodium chloride 10 mL injection  20 mg IntraVENous Q12H    insulin lispro (HUMALOG) injection   SubCUTAneous AC&HS    glucose chewable tablet 16 g  4 Tablet Oral PRN    glucagon (GLUCAGEN) injection 1 mg  1 mg IntraMUSCular PRN    dextrose 10% infusion 0-250 mL  0-250 mL IntraVENous PRN    labetaloL (NORMODYNE;TRANDATE) injection 10 mg  10 mg IntraVENous Q6H PRN       Review of Systems:   A comprehensive review of systems was negative except for that written in the HPI. Objective:   Physical Exam:     Visit Vitals  BP (!) 157/95 (BP 1 Location: Right upper arm, BP Patient Position: At rest)   Pulse 86   Temp 99.5 °F (37.5 °C)   Resp 12   Wt 66.8 kg (147 lb 4.3 oz)   SpO2 100%   BMI 22.78 kg/m²    O2 Flow Rate (L/min): 6 l/min O2 Device: None (Room air)    Temp (24hrs), Av.9 °F (37.2 °C), Min:98.2 °F (36.8 °C), Max:99.5 °F (37.5 °C)    No intake/output data recorded. 04/15 1901 -  0700  In: 2320 [P.O.:700; I.V.:1620]  Out: 2150 [Urine:2150]    General:   Awake and alert   Lungs:   Clear to auscultation bilaterally. Chest wall:  No tenderness or deformity. Heart:  Regular rate and rhythm, S1, S2 normal, no murmur, click, rub or gallop. Abdomen:   Soft, non-tender. Bowel sounds normal. No masses,  No organomegaly. Extremities: Extremities normal, atraumatic, no cyanosis or edema. Pulses: 2+ and symmetric all extremities. Skin: Skin color, texture, turgor normal. No rashes or lesions   Neurologic: CNII-XII intact. No gross focal deficits         Data Review:       Recent Days:  Recent Labs     22  0400 22  0306   WBC 12.0* 16.5*   HGB 10.4* 14.5   HCT 33.7* 46.0    248     Recent Labs     22  0400 22  0306    139   K 3.2* 3.5   * 108   CO2 23 22   GLU 73 103*   BUN 10 8   CREA 0.54* 0.66*   CA 7.8* 8.6   MG 1.9 2.0   PHOS 1.9*  --    ALB 3.2* 3.5   TBILI 0.7 0.7   ALT 22 30     No results for input(s): PH, PCO2, PO2, HCO3, FIO2 in the last 72 hours.     24 Hour Results:  Recent Results (from the past 24 hour(s))   GLUCOSE, POC    Collection Time: 22 11:37 AM   Result Value Ref Range    Glucose (POC) 174 (H) 65 - 117 mg/dL    Performed by Kayla Nino    GLUCOSE, POC    Collection Time: 22  3:41 PM   Result Value Ref Range    Glucose (POC) 98 65 - 117 mg/dL    Performed by Omayra Quiroga    GLUCOSE, POC    Collection Time: 04/16/22  9:23 PM   Result Value Ref Range    Glucose (POC) 97 65 - 117 mg/dL    Performed by Te Urrutia RN (Tvlr)    CBC W/O DIFF    Collection Time: 04/17/22  4:00 AM   Result Value Ref Range    WBC 12.0 (H) 4.1 - 11.1 K/uL    RBC 4.18 4.10 - 5.70 M/uL    HGB 10.4 (L) 12.1 - 17.0 g/dL    HCT 33.7 (L) 36.6 - 50.3 %    MCV 80.6 80.0 - 99.0 FL    MCH 24.9 (L) 26.0 - 34.0 PG    MCHC 30.9 30.0 - 36.5 g/dL    RDW 14.5 11.5 - 14.5 %    PLATELET 133 216 - 649 K/uL    MPV 12.6 8.9 - 12.9 FL    NRBC 0.0 0.0  WBC    ABSOLUTE NRBC 0.00 0.00 - 6.54 K/uL   METABOLIC PANEL, COMPREHENSIVE    Collection Time: 04/17/22  4:00 AM   Result Value Ref Range    Sodium 141 136 - 145 mmol/L    Potassium 3.2 (L) 3.5 - 5.1 mmol/L    Chloride 109 (H) 97 - 108 mmol/L    CO2 23 21 - 32 mmol/L    Anion gap 9 5 - 15 mmol/L    Glucose 73 65 - 100 mg/dL    BUN 10 6 - 20 mg/dL    Creatinine 0.54 (L) 0.70 - 1.30 mg/dL    BUN/Creatinine ratio 19 12 - 20      GFR est AA >60 >60 ml/min/1.73m2    GFR est non-AA >60 >60 ml/min/1.73m2    Calcium 7.8 (L) 8.5 - 10.1 mg/dL    Bilirubin, total 0.7 0.2 - 1.0 mg/dL    AST (SGOT) 18 15 - 37 U/L    ALT (SGPT) 22 12 - 78 U/L    Alk. phosphatase 67 45 - 117 U/L    Protein, total 6.1 (L) 6.4 - 8.2 g/dL    Albumin 3.2 (L) 3.5 - 5.0 g/dL    Globulin 2.9 2.0 - 4.0 g/dL    A-G Ratio 1.1 1.1 - 2.2     MAGNESIUM    Collection Time: 04/17/22  4:00 AM   Result Value Ref Range    Magnesium 1.9 1.6 - 2.4 mg/dL   PHOSPHORUS    Collection Time: 04/17/22  4:00 AM   Result Value Ref Range    Phosphorus 1.9 (L) 2.6 - 4.7 mg/dL       No orders to display        Assessment:  Paroxysmal supraventricular tachycardia, now back in sinus rhythm    History of paroxysmal atrial fibrillation/flutter    Diabetes mellitus type 2, insulin requiring.   Well-controlled    Polymyositis, steroid dependent    Myasthenia gravis    Gastroesophageal reflux disease    Hypertension    Status post laparoscopic ileocecal resection for large cecal polyp      Plan:  Okay for transfer to cardiac telemetry from medical standpoint      Addendum: After I had left the ICU patient's nurse called me, indicating he was back in a narrow complex tachycardia with a rate of 190. I suspect this is atrial flutter. He responded to a bolus of amiodarone previously so we will try this again. We will consult Dr. Donna Messer, discussed with him. Obtain twelve-lead EKG      Care Plan discussed with: Patient/Family      Total time spent with patient: 30 minutes.     Nely Ayers MD

## 2022-04-17 NOTE — PROGRESS NOTES
Progress Note    Patient: Link Hatchet MRN: [id number]  SSN: [id number]    YOB: 1970  Age: 49 y.o. Sex: male      Admit Date: 4/15/2022    LOS: 2 days     Subjective:     Patient is POD#2 s/p laparoscopic ileocecectomy for large cecal polyp. Patient states he is feeling well overall. Continues to have possible atrial flutter with Hrs in the 6670-4386. Amiodarone bolus x 2 and amio gtt ordered and cardiology consulted. Pain well controlled with current pain medications. Nausea well controlled. Voiding adequately. Denies flatus or bowel movements. Objective:     Vitals:    04/17/22 0400 04/17/22 0500 04/17/22 0600 04/17/22 0900   BP: 122/67 133/85 (!) 157/95 (!) 158/137   Pulse: 84 90 86 (!) 184   Resp: 14 15 12 14   Temp:       SpO2: 100% 99% 100% 100%   Weight:            Intake and Output:  Current Shift: 04/17 0701 - 04/17 1900  In: -   Out: 800 [Urine:800]  Last three shifts: 04/15 1901 - 04/17 0700  In: 9194 [P.O.:700;  I.V.:1620]  Out: 2150 [Urine:2150]    Physical Exam:   General: alert, oriented, in no acute distress  Neck: supple, no masses, no JVD  Cardiovascular: regular rate and rhythm  Pulmonary: unlabored breathing, equal chest rise bilaterally  Abdomen: soft, appropriately tender, non distended, incisions clean, dry, and intact, ecchymosis surrounding periumbilical incision  Extremities: no swelling, pulses equal and palpable in all extremities    Lab/Data Review:  Recent Results (from the past 24 hour(s))   GLUCOSE, POC    Collection Time: 04/16/22 11:37 AM   Result Value Ref Range    Glucose (POC) 174 (H) 65 - 117 mg/dL    Performed by Lory Anders    GLUCOSE, POC    Collection Time: 04/16/22  3:41 PM   Result Value Ref Range    Glucose (POC) 98 65 - 117 mg/dL    Performed by Jake Garcia, POC    Collection Time: 04/16/22  9:23 PM   Result Value Ref Range    Glucose (POC) 97 65 - 117 mg/dL    Performed by Eli Schneider RN (Tvlr)    CBC W/O DIFF    Collection Time: 04/17/22  4:00 AM   Result Value Ref Range    WBC 12.0 (H) 4.1 - 11.1 K/uL    RBC 4.18 4.10 - 5.70 M/uL    HGB 10.4 (L) 12.1 - 17.0 g/dL    HCT 33.7 (L) 36.6 - 50.3 %    MCV 80.6 80.0 - 99.0 FL    MCH 24.9 (L) 26.0 - 34.0 PG    MCHC 30.9 30.0 - 36.5 g/dL    RDW 14.5 11.5 - 14.5 %    PLATELET 936 642 - 818 K/uL    MPV 12.6 8.9 - 12.9 FL    NRBC 0.0 0.0  WBC    ABSOLUTE NRBC 0.00 0.00 - 4.18 K/uL   METABOLIC PANEL, COMPREHENSIVE    Collection Time: 04/17/22  4:00 AM   Result Value Ref Range    Sodium 141 136 - 145 mmol/L    Potassium 3.2 (L) 3.5 - 5.1 mmol/L    Chloride 109 (H) 97 - 108 mmol/L    CO2 23 21 - 32 mmol/L    Anion gap 9 5 - 15 mmol/L    Glucose 73 65 - 100 mg/dL    BUN 10 6 - 20 mg/dL    Creatinine 0.54 (L) 0.70 - 1.30 mg/dL    BUN/Creatinine ratio 19 12 - 20      GFR est AA >60 >60 ml/min/1.73m2    GFR est non-AA >60 >60 ml/min/1.73m2    Calcium 7.8 (L) 8.5 - 10.1 mg/dL    Bilirubin, total 0.7 0.2 - 1.0 mg/dL    AST (SGOT) 18 15 - 37 U/L    ALT (SGPT) 22 12 - 78 U/L    Alk. phosphatase 67 45 - 117 U/L    Protein, total 6.1 (L) 6.4 - 8.2 g/dL    Albumin 3.2 (L) 3.5 - 5.0 g/dL    Globulin 2.9 2.0 - 4.0 g/dL    A-G Ratio 1.1 1.1 - 2.2     MAGNESIUM    Collection Time: 04/17/22  4:00 AM   Result Value Ref Range    Magnesium 1.9 1.6 - 2.4 mg/dL   PHOSPHORUS    Collection Time: 04/17/22  4:00 AM   Result Value Ref Range    Phosphorus 1.9 (L) 2.6 - 4.7 mg/dL          Assessment:     Active Problems:    Cecal polyp (4/15/2022)    SVT  MG, polymyositis, steroid dependence  Hypertension    POD#1 s/p laparoscopic ileocecectomy    Plan:     Diet: may have sips of clear liquids today  DVT/GI prophylaxis- received fondaparinux yesterday due to porcine allergy. Fondaparinux held today for acute drop in Hgb, recheck Hgb at noon.   Continue post-operative prophylactic antibiotics  Replace potassium  Continue IVF  Ambulate  Incentive spirometry  Pain and nausea control  Hospitalist group and cardiology following, appreciate recommendations and management  Repeat labs in AM  Dispo: home pending return of bowel function and cardiac stability    Signed By: Homer Bloom DO     April 17, 2022

## 2022-04-17 NOTE — CONSULTS
4/17/2022, 12:17 PM        Chris Waggoner at [facility]  Phone: [phone]      Cardiology Consultation    4/17/2022, 12:17 PM      Dearsolitario Hoyos  49 y.o. male  1970      Admit Date:  4/15/2022    Primary Cardiologist:  Dr. Booth First  Reason for consult: SVT  Requesting provider: Dr. Chasidy Snell:      SVT. The patient's paroxysmal SVT refractory to adenosine appears to be his paroxysmal atrial fibrillation/flutter with RVR for which patient earlier was prescribed verapamil long-acting along with Eliquis by [facility]. Patient unfortunately taking his verapamil on a PRN basis. The patient's current episode of A. fib flutter with RVR is driven by postop hyperadrenergic state. Plan:      1. Continue IV amiodarone for keeping patient in sinus rhythm. Patient IV amiodarone later can be changed to maintenance dose of amiodarone 200 mg p.o. daily. His Eliquis can be resumed when cleared by the surgery. 2. I will recommend obtaining an echocardiogram if not done in past 6 months. Subjective   This is a 21-year-old man with a history of paroxysmal atrial fibrillation/flutter, diabetes mellitus, asthma, polymyositis with steroid dependency, myasthenia gravis and hypertension who recently was admitted to surgical service for elective resection of a large cecal polyp not amenable for endoscopic resection. Patient postop developed SVT refractory to adenosine 6 mg and 12 mg IV bolus and finally converted to sinus rhythm with amiodarone 150 mg IV bolus. Patient's SVT in ICU now has reoccurred for which patient has been rebolused with amiodarone followed by IV amiodarone drip. Patient currently is maintaining sinus rhythm. Cardiology is consulted for further management of his SVT versus A. fib flutter with RVR.         Past medical, Family & Social History   The following medical history was reviewed    Past Medical History:   Past Medical History: Diagnosis Date    Adverse effect of anesthesia     Arrhythmia     atrial flutter    Asthma     Diabetes [facility]     Hypertension     Ill-defined condition     Spinal meningitis and avascular necrosis     Myasthenia gravis     Polymyositis ([facility] 2001    muscle disorder    Primary hypersomnolence disorder       Past Surgical History:   Procedure Laterality Date    HX COLONOSCOPY  2021    HX ENDOSCOPY      HX OTHER SURGICAL      lung abscess removal    [place] SHOULDER SURG PROC UNLISTED      Left Shoulder       Social History:  Social History     Socioeconomic History    Marital status:      Spouse name: Not on file    Number of children: Not on file    Years of education: Not on file    Highest education level: Not on file   Occupational History    Not on file   Tobacco Use    Smoking status: Never Smoker    Smokeless tobacco: Never Used   Vaping Use    Vaping Use: Never used   Substance and Sexual Activity    Alcohol use: No    Drug use: No    Sexual activity: Yes     Birth control/protection: Condom   Other Topics Concern    Not on file   Social History Narrative    Not on file     Social Determinants of Health     Financial Resource Strain:     Difficulty of Paying Living Expenses: Not on file   Food Insecurity:     Worried About Running Out of Food in the Last Year: Not on file    Ameena of Food in the Last Year: Not on file   Transportation Needs:     Lack of Transportation (Medical): Not on file    Lack of Transportation (Non-Medical):  Not on file   Physical Activity:     Days of Exercise per Week: Not on file    Minutes of Exercise per Session: Not on file   Stress:     Feeling of Stress : Not on file   Social Connections:     Frequency of Communication with Friends and Family: Not on file    Frequency of Social Gatherings with Friends and Family: Not on file    Attends Gnosticism Services: Not on file    Active Member of Clubs or Organizations: Not on file    Attends Club or Organization Meetings: Not on file    Marital Status: Not on file   Intimate Partner Violence:     Fear of Current or Ex-Partner: Not on file    Emotionally Abused: Not on file    Physically Abused: Not on file    Sexually Abused: Not on file   Housing Stability:     Unable to Pay for Housing in the Last Year: Not on file    Number of [place] in the Last Year: Not on file    Unstable Housing in the Last Year: Not on file       Family History:   Family History   Problem Relation Age of Onset    Heart Disease Mother     Diabetes Father     Stroke Father        Medications & Allergies     Allergies: Allergies   Allergen Reactions    Beef Containing Products Anaphylaxis and Other (comments)    Diltiazem Other (comments)     Reaction Type: Allergy; Reaction(s): Pressure in chest    Iodinated Contrast Media Other (comments)     Reaction Type: Allergy; Severity: Severe; Reaction(s): hives,throat swelling    Pork Derived (Porcine) Hives    Shellfish Containing Products Swelling     Other reaction(s): Other  Reaction Type: Allergy; Reaction(s): Hives,throat swelling    Sulfa (Sulfonamide Antibiotics) Unknown (comments)     Spinal meningitis    Sulfur Unknown (comments)     Reaction Type: Allergy  Spinal meningitis       Home Medications:  Prior to Admission medications    Medication Sig Start Date End Date Taking? Authorizing Provider   omeprazole (PRILOSEC) 40 mg capsule Take 40 mg by mouth daily. Yes Provider, Historical   PEG 3350-Electrolytes (GO-LYTELY) 236-22.74-6.74 -5.86 gram suspension Use as directed prior to surgery to clean bowel  Indications: emptying of the bowel 3/16/22  Yes Chuck Villegas MD   lisinopriL (PRINIVIL, ZESTRIL) 20 mg tablet Take 20 mg by mouth daily. Yes Provider, Historical   CALCIUM PO Take  by mouth daily. With vitamin D. Unsure of dose   Yes Provider, Historical   Acetylcarnitine 500 mg cap Take  by mouth daily.    Yes Provider, Historical   predniSONE (DELTASONE) 20 mg tablet Take 20 mg by mouth daily (with breakfast). Yes Provider, Historical   oxyCODONE IR (ROXICODONE) 10 mg tab immediate release tablet oxycodone 10 mg tablet  Patient not taking: Reported on 4/15/2022    Provider, Historical   rituximab (RITUXAN IV) by IntraVENous route every 6 months. Provider, Historical   apixaban (Eliquis) 5 mg tablet Take 5 mg by mouth daily. Provider, Historical   loperamide (IMODIUM) 2 mg capsule as needed. Patient not taking: Reported on 4/15/2022    Provider, Historical   insulin lispro (HUMALOG) 100 unit/mL injection by SubCUTAneous route. Sliding Scale  4 times a day    Provider, Historical   insulin glargine (LANTUS) 100 unit/mL injection 20 Units by SubCUTAneous route daily. Provider, Historical   OTHER,NON-FORMULARY, DMAE 351mg Daily    Provider, Historical   albuterol (PROVENTIL, VENTOLIN) 90 mcg/Actuation inhaler Take 1-2 Puffs by inhalation every four (4) hours as needed for Wheezing. 4/28/11   Julieta Francisco MD   esomeprazole (NEXIUM) 40 mg capsule Take 80 mg by mouth daily. Patient not taking: Reported on 4/15/2022    Tal Christine MD   folic acid (FOLVITE) 1 mg tablet Take 1 mg by mouth daily. Tal Christine MD         REVIEW OF SYSTEMS    Review of Systems   Constitutional: Negative for chills, fever and weight loss. HENT: Negative for congestion and sore throat. Eyes: Negative for blurred vision and double vision. Respiratory: Negative for cough, shortness of breath and wheezing. Cardiovascular: Positive for palpitations. Negative for claudication and PND. Gastrointestinal: Positive for constipation. Negative for abdominal pain, blood in stool, nausea and vomiting. Genitourinary: Negative for dysuria and hematuria. Musculoskeletal: Negative for joint pain and myalgias. Skin: Negative for itching and rash. Neurological: Negative for dizziness, sensory change, focal weakness, loss of consciousness and headaches. Endo/Heme/Allergies: Negative for polydipsia. Does not bruise/bleed easily. Psychiatric/Behavioral: Negative for depression and hallucinations. Objective       Vitals, I/O's, Weight     24 hour vital signs  BP  Min: 105/77  Max: 158/137  Temp  Av.1 °F (37.3 °C)  Min: 98.2 °F (36.8 °C)  Max: 99.5 °F (37.5 °C)  Capillary Refill could not be evaluated. This SmartLink does not work with rows of the type: Custom List  Resp  Av.7  Min: 11  Max: 24  SpO2  Av.4 %  Min: 90 %  Max: 709 %  Systolic (99IXO), JRW:289 , Min:105 , TARAH:917   Diastolic (46YVO), YRQ:49, Min:67, Max:137    Body mass index is 22.78 kg/m². Intake/Output Summary (Last 24 hours) at 2022 1217  Last data filed at 2022 1034  Gross per 24 hour   Intake 2320 ml   Output 2100 ml   Net 220 ml     Patient Vitals for the past 120 hrs:   Weight   22 0320 66.8 kg (147 lb 4.3 oz)           Admit weight: Weight: 66.8 kg (147 lb 4.3 oz)      Exam       Constitutional Generally well without symptoms, No weight loss, no fever, chills or nausea or vominting and Well developed, well nourished in no apparent distress   HEENT normal atraumatic, no neck masses, normal thyroid   Cardiovascular regular heart rate, no murmurs, no JVD, S1 and S2 normal, no gallops notedboth carotids normal upstroke without bruits, radial pulses normal, pedal pulses normal both DP's and PT's,  LE edema Trace   Pulmonary clear to auscultation bilaterally   Abnominal soft, non-tender, without masses or organomegaly, normal bowel sounds, no masses palpated   Genitourinary Deferred   Muskuloskeletal No obvious joint deformities.   No clubbing   Neuro Alert and oriented   Psychiatric Demonstrates good judgement and insight into his or her medical condition   Extremities warm, well perfused   Skin Warm and dry         Labs     Lab Results   Component Value Date/Time    WBC 12.0 (H) 2022 04:00 AM    HGB 10.4 (L) 2022 04:00 AM    HCT 33.7 (L) 04/17/2022 04:00 AM    PLATELET 259 49/69/1992 04:00 AM    MCV 80.6 04/17/2022 04:00 AM     Lab Results   Component Value Date/Time    Sodium 141 04/17/2022 04:00 AM    Potassium 3.2 (L) 04/17/2022 04:00 AM    Chloride 109 (H) 04/17/2022 04:00 AM    CO2 23 04/17/2022 04:00 AM    Anion gap 9 04/17/2022 04:00 AM    Glucose 73 04/17/2022 04:00 AM    BUN 10 04/17/2022 04:00 AM    Creatinine 0.54 (L) 04/17/2022 04:00 AM    BUN/Creatinine ratio 19 04/17/2022 04:00 AM    GFR est AA >60 04/17/2022 04:00 AM    GFR est non-AA >60 04/17/2022 04:00 AM    Calcium 7.8 (L) 04/17/2022 04:00 AM      Last Lipid:  No results found for: CHOL, CHOLX, CHLST, CHOLV, HDL, HDLP, LDL, LDLC, DLDLP, TGLX, TRIGL, TRIGP, CHHD, CHHDX  Lab Results   Component Value Date/Time    CK 2629 (H) 06/17/2011 12:06 PM    CK - MB 93.3 (H) 06/17/2011 12:06 PM    CK-MB Index 3.5 (H) 06/17/2011 12:06 PM    Troponin-I, Qt. <0.05 07/26/2021 12:56 PM      No results found for: TSH, TSH2, TSH3, TSHP, TSHELE, TSHEXT, TT3, T3U, T3UP, FRT3, FT3, FT4, FT4P, T4, T4P, FT4T, TT7, TSHEXT       Imaging & Acillary Studies     EKG 4/17/2022:    Supraventricular tachycardia   ST & T wave abnormality, consider inferolateral ischemia   Abnormal ECG   No previous ECGs available   Confirmed by Nino Mares MD, [facility]      No orders to display       Echo: Pending      Total time spent:  65 minutes    Dr. Jeannine Gunderson.  Dolores Headley MD.PhD. [facility]

## 2022-04-18 NOTE — PROGRESS NOTES
Progress Note    Patient: Nataliya Mujica MRN: [id number]  SSN: [id number]    YOB: 1970  Age: 49 y.o. Sex: male      Admit Date: 4/15/2022    LOS: 3 days     Subjective:   Postoperative 3 status post laparoscopic ileocecectomy  Patient developed paroxysmal A. fib/flutter postoperatively was transferred to the ICU, started on IV amiodarone, currently on oral amiodarone heart rate controlled  Reports incisional pain  Passing flatus, denies nausea vomiting    Objective:     Vitals:    04/18/22 0800 04/18/22 0834 04/18/22 1100 04/18/22 1200   BP: 124/74  116/86 125/82   Pulse: 75  75 68   Resp: 22  14 15   Temp:   98.4 °F (36.9 °C)    SpO2: 100% 100% 100% 100%   Weight:            Intake and Output:  Current Shift: 04/18 0701 - 04/18 1900  In: -   Out: 900 [Urine:900]  Last three shifts: 04/16 1901 - 04/18 0700  In: 6313 [P.O.:1100; I.V.:5213]  Out: 4475 [Urine:4475]    Review of Systems:  ROS     Physical Exam:   Abdomen soft, probably tender, nondistended, midline incision clean and intact    Lab/Data Review:  Recent Results (from the past 24 hour(s))   CBC W/O DIFF    Collection Time: 04/17/22  4:15 PM   Result Value Ref Range    WBC 10.7 4.1 - 11.1 K/uL    RBC 4.23 4. 10 - 5.70 M/uL    HGB 10.5 (L) 12.1 - 17.0 g/dL    HCT 33.8 (L) 36.6 - 50.3 %    MCV 79.9 (L) 80.0 - 99.0 FL    MCH 24.8 (L) 26.0 - 34.0 PG    MCHC 31.1 30.0 - 36.5 g/dL    RDW 14.2 11.5 - 14.5 %    PLATELET 630 061 - 452 K/uL    MPV 11.8 8.9 - 12.9 FL    NRBC 0.0 0.0  WBC    ABSOLUTE NRBC 0.00 0.00 - 0.01 K/uL   GLUCOSE, POC    Collection Time: 04/17/22  4:36 PM   Result Value Ref Range    Glucose (POC) 128 (H) 65 - 117 mg/dL    Performed by Anya Valenzuela    GLUCOSE, POC    Collection Time: 04/17/22  9:43 PM   Result Value Ref Range    Glucose (POC) 83 65 - 117 mg/dL    Performed by Rubén Thacker RN (Tv)    METABOLIC PANEL, BASIC    Collection Time: 04/18/22  4:00 AM   Result Value Ref Range    Sodium 136 136 - 145 mmol/L Potassium Hemolyzed, recollect requested 3.5 - 5.1 mmol/L    Chloride 107 97 - 108 mmol/L    CO2 25 21 - 32 mmol/L    Anion gap 4 (L) 5 - 15 mmol/L    Glucose 61 (L) 65 - 100 mg/dL    BUN 4 (L) 6 - 20 mg/dL    Creatinine 0.53 (L) 0.70 - 1.30 mg/dL    BUN/Creatinine ratio 8 (L) 12 - 20      GFR est AA >60 >60 ml/min/1.73m2    GFR est non-AA >60 >60 ml/min/1.73m2    Calcium 7.7 (L) 8.5 - 10.1 mg/dL   CBC W/O DIFF    Collection Time: 04/18/22  4:00 AM   Result Value Ref Range    WBC 11.3 (H) 4.1 - 11.1 K/uL    RBC 4.09 (L) 4.10 - 5.70 M/uL    HGB 10.1 (L) 12.1 - 17.0 g/dL    HCT 32.8 (L) 36.6 - 50.3 %    MCV 80.2 80.0 - 99.0 FL    MCH 24.7 (L) 26.0 - 34.0 PG    MCHC 30.8 30.0 - 36.5 g/dL    RDW 14.2 11.5 - 14.5 %    PLATELET 251 201 - 726 K/uL    MPV 12.6 8.9 - 12.9 FL    NRBC 0.0 0.0  WBC    ABSOLUTE NRBC 0.00 0.00 - 0.01 K/uL   MAGNESIUM    Collection Time: 04/18/22  4:00 AM   Result Value Ref Range    Magnesium Hemolyzed, recollect requested 1.6 - 2.4 mg/dL   PHOSPHORUS    Collection Time: 04/18/22  4:00 AM   Result Value Ref Range    Phosphorus 1.5 (L) 2.6 - 4.7 mg/dL   GLUCOSE, POC    Collection Time: 04/18/22  7:05 AM   Result Value Ref Range    Glucose (POC) 74 65 - 117 mg/dL    Performed by Jagruti Andujar    ECHO ADULT COMPLETE    Collection Time: 04/18/22 10:35 AM   Result Value Ref Range    LA Major [place] 6.3 cm    LA Area 4C 27.2 cm2    LA Diameter 4.1 cm    AV Peak Velocity 1.3 m/s    AV Peak Gradient 7 mmHg    AV Area by Peak Velocity 2.7 cm2    Aortic Root 3.3 cm    Descending Aorta 2.0 cm    IVSd 1.4 (A) 0.6 - 1.0 cm    LVIDd 4.5 4.2 - 5.9 cm    LVIDs 3.3 cm    LVOT Diameter 2.1 cm    LVOT Peak Velocity 1.0 m/s    LVOT Peak Gradient 4 mmHg    LVPWd 1.1 (A) 0.6 - 1.0 cm    LV E' Lateral Velocity 15 cm/s    LV E' Septal Velocity 7 cm/s    LVOT Area 3.5 cm2    MR Peak Velocity 2.5 m/s    MR Peak Gradient 25 mmHg    MV Max Velocity 0.8 m/s    MV Peak Gradient 2 mmHg    MV E Wave Deceleration Time 187.0 ms    MV A Velocity 0.44 m/s    MV E Velocity 0.78 m/s    MV Mean Gradient 1 mmHg    MV VTI 19.8 cm    MV Mean Velocity 0.4 m/s    Est. RA Pressure 3 mmHg    TR Max Velocity 1.51 m/s    TR Peak Gradient 9 mmHg    TAPSE 1.7 1.7 cm    Fractional Shortening 2D 27 28 - 44 %    LV RWT Ratio 0.49     LV Mass 2D 210.2 88 - 224 g    MV E/A 1.77     E/E' Ratio (Averaged) 8.17     E/E' Lateral 5.20     E/E' Septal 11.14     LA/AO Root Ratio 1.24     AV Velocity Ratio 0.77     RVSP 12 mmHg    EF BP 55 55 - 100 %   GLUCOSE, POC    Collection Time: 04/18/22 11:21 AM   Result Value Ref Range    Glucose (POC) 109 65 - 117 mg/dL    Performed by Eleni SANCHEZ           Assessment:     Active Problems:    Colon cancer ([facility] [address] (4/15/2022)      Cecal polyp (4/15/2022)        Plan:   Overall doing well  Transfer to floor  Start clear liquid diet  Out of bed, encourage ambulation  If hemoglobin stable start Eliquis more    Signed By: Rishi Angel MD     April 18, 2022

## 2022-04-18 NOTE — PROGRESS NOTES
Progress Note      4/18/2022 12:51 PM  NAME: Trina Bonilla   MRN:  [id number]   Admit Diagnosis: Adenomatous polyp of colon, unspecified part of colon [D12.6]  [facility] [C18.9]  Cecal polyp [K63.5]        Assessment/Plan:     1. Paroxysmal A. fib/flutter with RVR post large cecal polyp resection. Patient currently in sinus rhythm post IV amiodarone therapy. I agree with switching patient to oral maintenance dose of amiodarone. Eliquis for anticoagulation can be resumed when surgically safe. 2. Polymyositis  3. History of myasthenia gravis  4. Diabetes mellitus  5. Hypertension         []       High complexity decision making was performed in this patient at high risk for decompensation with multiple organ involvement. Subjective:     Trina Bonilla denies chest pain, dyspnea. Discussed with RN events overnight. Review of Systems:    Review of Systems   Constitutional: Negative for chills, fever and weight loss. HENT: Negative for congestion and sore throat. Eyes: Negative for blurred vision and double vision. Respiratory: Negative for cough, shortness of breath and wheezing. Cardiovascular: Negative for claudication and PND. Gastrointestinal: Negative for abdominal pain, blood in stool, nausea and vomiting. Genitourinary: Negative for dysuria and hematuria. Musculoskeletal: Negative for joint pain and myalgias. Skin: Negative for itching and rash. Neurological: Negative for dizziness, sensory change, focal weakness, loss of consciousness and headaches. Endo/Heme/Allergies: Negative for polydipsia. Does not bruise/bleed easily. Psychiatric/Behavioral: Negative for depression and hallucinations. Objective:      Physical Exam:    Last 24hrs VS reviewed since prior progress note.  Most recent are:    Visit Vitals  /82 (BP 1 Location: Right lower arm, BP Patient Position: At rest)   Pulse 68   Temp 98.4 °F (36.9 °C)   Resp 15   Wt 66.8 kg (147 lb 4.3 oz) SpO2 100%   BMI 22.78 kg/m²       Intake/Output Summary (Last 24 hours) at 4/18/2022 1251  Last data filed at 4/18/2022 1100  Gross per 24 hour   Intake 3993 ml   Output 3575 ml   Net 418 ml        Physical Exam:  Constitutional: Well developed well-nourished patient who appeared in no acute distress  HEENT: Normocephalic and atraumatic. Extraocular movement intact. Pupil reactive to light bilaterally  Neck: Supple without thyromegaly  Lungs: Scattered rhonchi diffusely  Heart:  Regular rhythm, normal S1-S2.  Jugular venous distention absent. Carotid bruits absent. PMI in fifth intercostal space on left midclavicular line. Extremities  Trace edema bilaterally  Abdomen: Soft nontender nondistended hypoactive bowel sounds  Skin: Dry and warm    []         Post-cath site without hematoma, bruit, tenderness, or thrill. Distal pulses intact. PMH/ reviewed - no change compared to H&P    Data Review    Telemetry: normal sinus rhythm     EKG:   []  No new EKG for review    No orders to display        Recent Results (from the past 24 hour(s))   CBC W/O DIFF    Collection Time: 04/17/22  4:15 PM   Result Value Ref Range    WBC 10.7 4.1 - 11.1 K/uL    RBC 4.23 4. 10 - 5.70 M/uL    HGB 10.5 (L) 12.1 - 17.0 g/dL    HCT 33.8 (L) 36.6 - 50.3 %    MCV 79.9 (L) 80.0 - 99.0 FL    MCH 24.8 (L) 26.0 - 34.0 PG    MCHC 31.1 30.0 - 36.5 g/dL    RDW 14.2 11.5 - 14.5 %    PLATELET 393 032 - 982 K/uL    MPV 11.8 8.9 - 12.9 FL    NRBC 0.0 0.0  WBC    ABSOLUTE NRBC 0.00 0.00 - 0.01 K/uL   GLUCOSE, POC    Collection Time: 04/17/22  4:36 PM   Result Value Ref Range    Glucose (POC) 128 (H) 65 - 117 mg/dL    Performed by Jimmie Nelson    GLUCOSE, POC    Collection Time: 04/17/22  9:43 PM   Result Value Ref Range    Glucose (POC) 83 65 - 117 mg/dL    Performed by Sanjana Schulz RN (Tvlr)    METABOLIC PANEL, BASIC    Collection Time: 04/18/22  4:00 AM   Result Value Ref Range    Sodium 136 136 - 145 mmol/L    Potassium Hemolyzed, recollect requested 3.5 - 5.1 mmol/L    Chloride 107 97 - 108 mmol/L    CO2 25 21 - 32 mmol/L    Anion gap 4 (L) 5 - 15 mmol/L    Glucose 61 (L) 65 - 100 mg/dL    BUN 4 (L) 6 - 20 mg/dL    Creatinine 0.53 (L) 0.70 - 1.30 mg/dL    BUN/Creatinine ratio 8 (L) 12 - 20      GFR est AA >60 >60 ml/min/1.73m2    GFR est non-AA >60 >60 ml/min/1.73m2    Calcium 7.7 (L) 8.5 - 10.1 mg/dL   CBC W/O DIFF    Collection Time: 04/18/22  4:00 AM   Result Value Ref Range    WBC 11.3 (H) 4.1 - 11.1 K/uL    RBC 4.09 (L) 4.10 - 5.70 M/uL    HGB 10.1 (L) 12.1 - 17.0 g/dL    HCT 32.8 (L) 36.6 - 50.3 %    MCV 80.2 80.0 - 99.0 FL    MCH 24.7 (L) 26.0 - 34.0 PG    MCHC 30.8 30.0 - 36.5 g/dL    RDW 14.2 11.5 - 14.5 %    PLATELET 012 642 - 511 K/uL    MPV 12.6 8.9 - 12.9 FL    NRBC 0.0 0.0  WBC    ABSOLUTE NRBC 0.00 0.00 - 0.01 K/uL   MAGNESIUM    Collection Time: 04/18/22  4:00 AM   Result Value Ref Range    Magnesium Hemolyzed, recollect requested 1.6 - 2.4 mg/dL   PHOSPHORUS    Collection Time: 04/18/22  4:00 AM   Result Value Ref Range    Phosphorus 1.5 (L) 2.6 - 4.7 mg/dL   GLUCOSE, POC    Collection Time: 04/18/22  7:05 AM   Result Value Ref Range    Glucose (POC) 74 65 - 117 mg/dL    Performed by Michael Cummings    GLUCOSE, POC    Collection Time: 04/18/22 11:21 AM   Result Value Ref Range    Glucose (POC) 109 65 - 117 mg/dL    Performed by Michael Cummings    ECHO ADULT COMPLETE    Collection Time: 04/18/22 12:51 PM   Result Value Ref Range    LA Major [place] 6.3 cm    LA Area 4C 27.2 cm2    LA Diameter 4.1 cm    AV Peak Velocity 1.3 m/s    AV Peak Gradient 7 mmHg    AV Area by Peak Velocity 2.7 cm2    Aortic Root 3.3 cm    Descending Aorta 2.0 cm    IVSd 1.4 (A) 0.6 - 1.0 cm    LVIDd 4.5 4.2 - 5.9 cm    LVIDs 3.3 cm    LVOT Diameter 2.1 cm    LVOT Peak Velocity 1.0 m/s    LVOT Peak Gradient 4 mmHg    LVPWd 1.1 (A) 0.6 - 1.0 cm    LV E' Lateral Velocity 15 cm/s    LV E' Septal Velocity 7 cm/s    LVOT Area 3.5 cm2    MR Peak Velocity 2.5 m/s    MR Peak Gradient 24 mmHg    MV Max Velocity 0.8 m/s    MV Peak Gradient 2 mmHg    MV E Wave Deceleration Time 187.0 ms    MV A Velocity 0.44 m/s    MV E Velocity 0.78 m/s    MV Mean Gradient 1 mmHg    MV VTI 19.8 cm    MV Mean Velocity 0.4 m/s    MV Area by PHT 2.4 cm2    Est. RA Pressure 3 mmHg    TR Max Velocity 1.51 m/s    TR Peak Gradient 9 mmHg    TAPSE 1.7 1.7 cm          Lab Data:    Recent Labs     04/18/22 0400 04/17/22  1615   WBC 11.3* 10.7   HGB 10.1* 10.5*   HCT 32.8* 33.8*    211     No results for input(s): INR, PTP, APTT, INREXT in the last 72 hours. Recent Labs     04/18/22 0400 04/17/22 0400 04/16/22  0306    141 139   K Hemolyzed, recollect requested 3.2* 3.5    109* 108   CO2 25 23 22   BUN 4* 10 8   CREA 0.53* 0.54* 0.66*   GLU 61* 73 103*   CA 7.7* 7.8* 8.6   MG Hemolyzed, recollect requested 1.9 2.0     No results for input(s): CPK, CKNDX, TROIQ in the last 72 hours. No lab exists for component: CPKMB  No results found for: CHOL, CHOLX, CHLST, CHOLV, HDL, HDLP, LDL, LDLC, DLDLP, TGLX, TRIGL, TRIGP, CHHD, CHHDX    Recent Labs     04/17/22 0400 04/16/22  0306   AP 67 78   TP 6.1* 6.8   ALB 3.2* 3.5   GLOB 2.9 3.3     No results for input(s): PH, PCO2, PO2 in the last 72 hours.     Medications Personally Reviewed:    Current Facility-Administered Medications   Medication Dose Route Frequency    verapamiL (CALAN) tablet 80 mg  80 mg Oral TID    amiodarone (CORDARONE) tablet 200 mg  200 mg Oral DAILY    oxyCODONE-acetaminophen (PERCOCET 10)  mg per tablet 1 Tablet  1 Tablet Oral Q6H PRN    ondansetron (ZOFRAN ODT) tablet 4 mg  4 mg Oral Q6H PRN    lactated Ringers infusion  20 mL/hr IntraVENous CONTINUOUS    0.9% sodium chloride infusion  125 mL/hr IntraVENous CONTINUOUS    ceFAZolin (ANCEF) 2 g in sterile water (preservative free) 20 mL IV syringe  2 g IntraVENous Q8H    metroNIDAZOLE (FLAGYL) IVPB premix 500 mg  500 mg IntraVENous Q8H    HYDROmorphone (DILAUDID) injection 1 mg  1 mg IntraVENous Q4H PRN    HYDROmorphone (DILAUDID) syringe 0.5 mg  0.5 mg IntraVENous Q4H PRN    ondansetron (ZOFRAN) injection 4 mg  4 mg IntraVENous Q6H PRN    lisinopriL (PRINIVIL, ZESTRIL) tablet 20 mg  20 mg Oral DAILY    predniSONE (DELTASONE) tablet 20 mg  20 mg Oral DAILY WITH BREAKFAST    famotidine (PF) (PEPCID) 20 mg in 0.9% sodium chloride 10 mL injection  20 mg IntraVENous Q12H    insulin lispro (HUMALOG) injection   SubCUTAneous AC&HS    glucose chewable tablet 16 g  4 Tablet Oral PRN    glucagon (GLUCAGEN) injection 1 mg  1 mg IntraMUSCular PRN    dextrose 10% infusion 0-250 mL  0-250 mL IntraVENous PRN    labetaloL (NORMODYNE;TRANDATE) injection 10 mg  10 mg IntraVENous Q6H PRN         Prior to Admission medications    Medication Sig Start Date End Date Taking? Authorizing Provider   omeprazole (PRILOSEC) 40 mg capsule Take 40 mg by mouth daily. Yes Provider, Historical   PEG 3350-Electrolytes (GO-LYTELY) 236-22.74-6.74 -5.86 gram suspension Use as directed prior to surgery to clean bowel  Indications: emptying of the bowel 3/16/22  Yes [place] MD Cheo   lisinopriL (PRINIVIL, ZESTRIL) 20 mg tablet Take 20 mg by mouth daily. Yes Provider, Historical   CALCIUM PO Take  by mouth daily. With vitamin D. Unsure of dose   Yes Provider, Historical   Acetylcarnitine 500 mg cap Take  by mouth daily. Yes Provider, Historical   predniSONE (DELTASONE) 20 mg tablet Take 20 mg by mouth daily (with breakfast). Yes Provider, Historical   oxyCODONE IR (ROXICODONE) 10 mg tab immediate release tablet oxycodone 10 mg tablet  Patient not taking: Reported on 4/15/2022    Provider, Historical   rituximab (RITUXAN IV) by IntraVENous route every 6 months. Provider, Historical   apixaban (Eliquis) 5 mg tablet Take 5 mg by mouth daily. Provider, Historical   loperamide (IMODIUM) 2 mg capsule as needed.   Patient not taking: Reported on 4/15/2022    Provider, Historical   insulin lispro (HUMALOG) 100 unit/mL injection by SubCUTAneous route. Sliding Scale  4 times a day    Provider, Historical   insulin glargine (LANTUS) 100 unit/mL injection 20 Units by SubCUTAneous route daily. Provider, Historical   OTHER,NON-FORMULARY, DMAE 351mg Daily    Provider, Historical   albuterol (PROVENTIL, VENTOLIN) 90 mcg/Actuation inhaler Take 1-2 Puffs by inhalation every four (4) hours as needed for Wheezing. 4/28/11   Kingsley Essex, MD   esomeprazole (NEXIUM) 40 mg capsule Take 80 mg by mouth daily. Patient not taking: Reported on 4/15/2022    Other, MD Tal   folic acid (FOLVITE) 1 mg tablet Take 1 mg by mouth daily.     Nanci, MD Bekah Parada MD

## 2022-04-18 NOTE — PROGRESS NOTES
Hospitalist Progress Note               Daily Progress Note: 4/18/2022      Subjective:   Hospital course to date: Patient is a 52-year-old male with a history of paroxysmal atrial fibrillation/flutter, diabetes, asthma, polymyositis, steroid dependent, myasthenia gravis and hypertension who was admitted to the surgical service electively for resection of a large cecal polyp not amenable to endoscopic resection. Patient underwent surgical resection on 4/15. Overnight patient was noted to have SVT on telemetry. He was given adenosine 6 mg and 12 mg without improvement. Cardiology recommended amiodarone which was started and subsequently went back into sinus rhythm.    -------    Patient seen today for follow-up. No further episodes of SVT/narrow complex tachycardia.   Patient apparently had been prescribed verapamil for control of this which he was not receiving here in the hospital.  It also sounds like he was only using as needed in the outpatient setting           Problem List:  Problem List as of 4/18/2022 Date Reviewed: 4/15/2022          Codes Class Noted - Resolved    Colon cancer ([facility] ICD-10-CM: C18.9  ICD-9-CM: 153.9  4/15/2022 - Present        Cecal polyp ICD-10-CM: K63.5  ICD-9-CM: 211.3  4/15/2022 - Present        Adenomatous polyp of ascending colon ICD-10-CM: D12.2  ICD-9-CM: 211.3  3/22/2022 - Present        Muscle weakness (generalized) ICD-10-CM: M62.81  ICD-9-CM: 728.87  1/1/2022 - Present        Enlarged prostate ICD-10-CM: N40.0  ICD-9-CM: 600.00  9/1/2020 - Present        Atrial flutter ([facility].) ICD-10-CM: I48.92  ICD-9-CM: 427.32  6/29/2019 - Present        Diabetes mellitus ([facility].) ICD-10-CM: E11.9  ICD-9-CM: 250.00  1/20/2016 - Present        Polymyositis ([facility].) ICD-10-CM: M33.20  ICD-9-CM: 710.4  5/16/2012 - Present        Allergic rhinitis ICD-10-CM: J30.9  ICD-9-CM: 477.9  4/6/2012 - Present        Migraine ICD-10-CM: S55.954  ICD-9-CM: 346.90  4/6/2012 - Present Gastroesophageal reflux disease ICD-10-CM: K21.9  ICD-9-CM: 530.81  9/28/2011 - Present        Hypertension ICD-10-CM: I10  ICD-9-CM: 401.9  6/1/2010 - Present        Erectile dysfunction ICD-10-CM: N52.9  ICD-9-CM: 607.84  3/4/2009 - Present        Herniation of intervertebral disc ICD-10-CM: [id number]  ICD-9-CM: 722.2  3/4/2009 - Present    Overview Signed 1/21/2022 11:56 AM by Silas Curiel of this note might be different from the original.  Legacy Annotated Display: Herniation of intervertebral disc  Comments:  03/04/2009 11:04 - Ava Mcwilliams MD, RUDY CEJA  MRI 1/09 L5-S1             Asthma ICD-10-CM: J45.909  ICD-9-CM: 493.90  5/31/2007 - Present        Myasthenia gravis ([facility] ICD-10-CM: G70.00  ICD-9-CM: 358.00  5/31/2007 - Present              Medications reviewed  Current Facility-Administered Medications   Medication Dose Route Frequency    amiodarone (CORDARONE) 375 mg in dextrose 5% 250 mL infusion  0.5-1 mg/min IntraVENous TITRATE    oxyCODONE-acetaminophen (PERCOCET 10)  mg per tablet 1 Tablet  1 Tablet Oral Q6H PRN    ondansetron (ZOFRAN ODT) tablet 4 mg  4 mg Oral Q6H PRN    lactated Ringers infusion  20 mL/hr IntraVENous CONTINUOUS    0.9% sodium chloride infusion  125 mL/hr IntraVENous CONTINUOUS    ceFAZolin (ANCEF) 2 g in sterile water (preservative free) 20 mL IV syringe  2 g IntraVENous Q8H    metroNIDAZOLE (FLAGYL) IVPB premix 500 mg  500 mg IntraVENous Q8H    HYDROmorphone (DILAUDID) injection 1 mg  1 mg IntraVENous Q4H PRN    HYDROmorphone (DILAUDID) syringe 0.5 mg  0.5 mg IntraVENous Q4H PRN    ondansetron (ZOFRAN) injection 4 mg  4 mg IntraVENous Q6H PRN    lisinopriL (PRINIVIL, ZESTRIL) tablet 20 mg  20 mg Oral DAILY    predniSONE (DELTASONE) tablet 20 mg  20 mg Oral DAILY WITH BREAKFAST    famotidine (PF) (PEPCID) 20 mg in 0.9% sodium chloride 10 mL injection  20 mg IntraVENous Q12H    insulin lispro (HUMALOG) injection   SubCUTAneous AC&HS    glucose chewable tablet 16 g  4 Tablet Oral PRN    glucagon (GLUCAGEN) injection 1 mg  1 mg IntraMUSCular PRN    dextrose 10% infusion 0-250 mL  0-250 mL IntraVENous PRN    labetaloL (NORMODYNE;TRANDATE) injection 10 mg  10 mg IntraVENous Q6H PRN       Review of Systems:   A comprehensive review of systems was negative except for that written in the HPI. Objective:   Physical Exam:     Visit Vitals  BP (!) 133/90 (BP 1 Location: Right upper arm, BP Patient Position: At rest)   Pulse 90   Temp 98.3 °F (36.8 °C)   Resp 19   Wt 66.8 kg (147 lb 4.3 oz)   SpO2 100%   BMI 22.78 kg/m²    O2 Flow Rate (L/min): 6 l/min O2 Device: None (Room air)    Temp (24hrs), Av °F (37.2 °C), Min:98.3 °F (36.8 °C), Max:99.5 °F (37.5 °C)    No intake/output data recorded.  1901 -  0700  In: 6313 [P.O.:1100; I.V.:5213]  Out: 4475 [Urine:4475]    General:   Awake and alert   Lungs:   Clear to auscultation bilaterally. Chest wall:  No tenderness or deformity. Heart:  Regular rate and rhythm, S1, S2 normal, no murmur, click, rub or gallop. Abdomen:   Soft, non-tender. Bowel sounds normal. No masses,  No organomegaly. Extremities: Extremities normal, atraumatic, no cyanosis or edema. Pulses: 2+ and symmetric all extremities. Skin: Skin color, texture, turgor normal. No rashes or lesions   Neurologic: CNII-XII intact.   No gross focal deficits         Data Review:       Recent Days:  Recent Labs     22  0400 22  1615 22  0400   WBC 11.3* 10.7 12.0*   HGB 10.1* 10.5* 10.4*   HCT 32.8* 33.8* 33.7*    211 208     Recent Labs     22  0400 22  0400 22  0306    141 139   K Hemolyzed, recollect requested 3.2* 3.5    109* 108   CO2 25 23 22   GLU 61* 73 103*   BUN 4* 10 8   CREA 0.53* 0.54* 0.66*   CA 7.7* 7.8* 8.6   MG Hemolyzed, recollect requested 1.9 2.0   PHOS 1.5* 1.9*  --    ALB  --  3.2* 3.5   TBILI  --  0.7 0.7   ALT  --  22 30     No results for input(s): PH, PCO2, PO2, HCO3, FIO2 in the last 72 hours. 24 Hour Results:  Recent Results (from the past 24 hour(s))   GLUCOSE, POC    Collection Time: 04/17/22 11:16 AM   Result Value Ref Range    Glucose (POC) 106 65 - 117 mg/dL    Performed by Rupal Chavez    CBC W/O DIFF    Collection Time: 04/17/22  4:15 PM   Result Value Ref Range    WBC 10.7 4.1 - 11.1 K/uL    RBC 4.23 4. 10 - 5.70 M/uL    HGB 10.5 (L) 12.1 - 17.0 g/dL    HCT 33.8 (L) 36.6 - 50.3 %    MCV 79.9 (L) 80.0 - 99.0 FL    MCH 24.8 (L) 26.0 - 34.0 PG    MCHC 31.1 30.0 - 36.5 g/dL    RDW 14.2 11.5 - 14.5 %    PLATELET 679 092 - 308 K/uL    MPV 11.8 8.9 - 12.9 FL    NRBC 0.0 0.0  WBC    ABSOLUTE NRBC 0.00 0.00 - 0.01 K/uL   GLUCOSE, POC    Collection Time: 04/17/22  4:36 PM   Result Value Ref Range    Glucose (POC) 128 (H) 65 - 117 mg/dL    Performed by Coni Marques    GLUCOSE, POC    Collection Time: 04/17/22  9:43 PM   Result Value Ref Range    Glucose (POC) 83 65 - 117 mg/dL    Performed by Kaleigh Whitehead RN (Tvlr)    METABOLIC PANEL, BASIC    Collection Time: 04/18/22  4:00 AM   Result Value Ref Range    Sodium 136 136 - 145 mmol/L    Potassium Hemolyzed, recollect requested 3.5 - 5.1 mmol/L    Chloride 107 97 - 108 mmol/L    CO2 25 21 - 32 mmol/L    Anion gap 4 (L) 5 - 15 mmol/L    Glucose 61 (L) 65 - 100 mg/dL    BUN 4 (L) 6 - 20 mg/dL    Creatinine 0.53 (L) 0.70 - 1.30 mg/dL    BUN/Creatinine ratio 8 (L) 12 - 20      GFR est AA >60 >60 ml/min/1.73m2    GFR est non-AA >60 >60 ml/min/1.73m2    Calcium 7.7 (L) 8.5 - 10.1 mg/dL   CBC W/O DIFF    Collection Time: 04/18/22  4:00 AM   Result Value Ref Range    WBC 11.3 (H) 4.1 - 11.1 K/uL    RBC 4.09 (L) 4.10 - 5.70 M/uL    HGB 10.1 (L) 12.1 - 17.0 g/dL    HCT 32.8 (L) 36.6 - 50.3 %    MCV 80.2 80.0 - 99.0 FL    MCH 24.7 (L) 26.0 - 34.0 PG    MCHC 30.8 30.0 - 36.5 g/dL    RDW 14.2 11.5 - 14.5 %    PLATELET 086 781 - 472 K/uL    MPV 12.6 8.9 - 12.9 FL    NRBC 0.0 0.0  WBC    ABSOLUTE NRBC 0.00 0.00 - 0.01 K/uL   MAGNESIUM    Collection Time: 04/18/22  4:00 AM   Result Value Ref Range    Magnesium Hemolyzed, recollect requested 1.6 - 2.4 mg/dL   PHOSPHORUS    Collection Time: 04/18/22  4:00 AM   Result Value Ref Range    Phosphorus 1.5 (L) 2.6 - 4.7 mg/dL   GLUCOSE, POC    Collection Time: 04/18/22  7:05 AM   Result Value Ref Range    Glucose (POC) 74 65 - 117 mg/dL    Performed by Homer Manzano        No orders to display        Assessment:  Paroxysmal supraventricular tachycardia, now back in sinus rhythm    History of paroxysmal atrial fibrillation/flutter    Diabetes mellitus type 2, insulin requiring. Well-controlled    Polymyositis, steroid dependent    Myasthenia gravis    Gastroesophageal reflux disease    Hypertension    Status post laparoscopic ileocecal resection for large cecal polyp      Plan:  Verapamil 80 mg 3 times daily  Transition to oral amiodarone 200 mg daily    Okay for transfer to cardiac telemetry from [place] [id number] discussed with: Patient/Family      Total time spent with patient: 30 minutes.     Obed Ramsey MD

## 2022-04-18 NOTE — PROGRESS NOTES
16: 00PM SW met w/patient at bedside to inform and provide contact information to [facility].  Patient voiced his frustration re: having a co-pay of $13.48. Further stated he switched insurance plans. Writer informed patient this maybe the requirement of new insurance plan/company. Advised patient to contact DME supplier directly to address this. Patient agreed to do so. NISH Benjamin            15:35PM Outbound call to [facility] @ [phone]. Spoke to Kirsty. \"  Informed of the following:    -patient needs to call Amanda [employer] to put a credit/debit card on file.   Bed will not be delivered until credit/debit card is on file; and first co-payment has been recv'd.   -$13.48/monthly co-pay   -approved by insurance  -once co-payment is recv'd will schedule delivery        NISH Benjamin

## 2022-04-19 NOTE — PROGRESS NOTES
CM met with patient to confirm he contacted [address] [place] [address] for his hospital bed, patient reports that they have approved him and at this time he only needs to call on d/c to have it delivered, the bed does NOT have to be delivered in order for patient to dc. Patient also requested [facility] (SN only), gave no preference for [facility] agency, referrals sent via carlos. Awaiting acceptance. CM continues to follow and monitor for needs.

## 2022-04-19 NOTE — PROGRESS NOTES
Primary Nurse Chitra Kang RN and Wyalon Eason RN performed a dual skin assessment on this patient. Patient skin clean, dry ,and intact. No pressure injury noted since transfer to  unit.

## 2022-04-19 NOTE — PROGRESS NOTES
7p- Bedside shift change report given to KIERSTEN Ochoa (oncoming nurse) by Del Nelson (offgoing nurse). Report included the following information SBAR, Procedure Summary, Intake/Output, MAR, Recent Results and Cardiac Rhythm SR with Pvc's. Shift Assessment completed. 1930- medicated for c/o pain to abdomen. See MAR    2000- pain was decreased \"A Little\" per patient. 2130- Pm meds given. Patient also requesting IV pain medications at this time. Dilaudid will be given per request for pain 8/10 in abdomen. 2240- Patient up to restroom stating he needs to have a bowel movement. Assisted per two staff members up to toilet. Pt was slightly unsteady on his feet, refused non-skid socks insisted on wearing his tennis shoes. While patient was using the restroom, his linen was being changed by the two staff members. When changing the pillow cases a small vial with green liquid fell out and patient stated he had been looking for that. Questioned patient about what it was he stated a mixture of things with CBD added in. Yelena Ceballos it was for his muscle aches and that the Doctor told him he could have it. After a few minutes patient got off the toilet and grabbed the vial and emptied it into the toilet and stated, \"I don't want to get into any trouble with my health. \" Education was done about self medicating while in the hospital and patient verbalized understandings. 4/19/22    0001- Sleeping without difficulties. Call bell and phones within reach. Vitals stable      0250- TRANSFER - OUT REPORT:    Telephone report given to [address] on [address]  being transferred to [facility]) for routine progression of care       Report consisted of patients Situation, Background, Assessment and   Recommendations(SBAR). Information from the following report(s) SBAR, Procedure Summary, Intake/Output, MAR, Recent Results, Med Rec Status and Cardiac Rhythm SR with PVC's was reviewed with the receiving nurse.     Lines: Peripheral IV 09/16/13 Left;Upper Basilic (Active)   Site Assessment Clean, dry, & intact 04/18/22 1900   Phlebitis Assessment 0 04/18/22 1900   Infiltration Assessment 0 04/18/22 1900   Dressing Status Clean, dry, & intact 04/18/22 1900   Dressing Type Transparent;Tape 04/18/22 1900   Hub Color/Line Status Patent; Infusing 04/18/22 1900   Action Taken Open ports on tubing capped 04/18/22 1900   Alcohol Cap Used Yes 04/18/22 1900       Peripheral IV 04/18/22 Distal;Right Basilic (Active)   Site Assessment Clean, dry, & intact 04/18/22 1900   Phlebitis Assessment 0 04/18/22 1900   Infiltration Assessment 0 04/18/22 1900   Dressing Status Clean, dry, & intact 04/18/22 1900   Dressing Type Transparent;Tape 04/18/22 1900   Hub Color/Line Status Patent; Infusing 04/18/22 1900   Action Taken Open ports on tubing capped 04/18/22 1900   Alcohol Cap Used Yes 04/18/22 1900        Opportunity for questions and clarification was provided. Patient transported with:   Telemetry box and Registered Nurse    3408- [address] [facility] bed is not ready the room needs to be cleaned. 56- Patient transferred to room 479 via patient bed. A host of belongings taken with patient; black cell phone and , several items of clothing (hoodies, pants, shoes, black stocking cap), and a black toiletry bag. Receiving nurse in room with patient. Opportunity for questions and clarification was provided.

## 2022-04-19 NOTE — PROGRESS NOTES
Hospitalist Progress Note               Daily Progress Note: 4/19/2022      Subjective:   Hospital course to date: Patient is a 45-year-old male with a history of paroxysmal atrial fibrillation/flutter, diabetes, asthma, polymyositis, steroid dependent, myasthenia gravis and hypertension who was admitted to the surgical service electively for resection of a large cecal polyp not amenable to endoscopic resection. Patient underwent surgical resection on 4/15. Overnight patient was noted to have SVT on telemetry. He was given adenosine 6 mg and 12 mg without improvement.   Cardiology recommended amiodarone which was started and subsequently went back into sinus rhythm.    -------    Patient was upset about iv access         Problem List:  Problem List as of 4/19/2022 Date Reviewed: 4/15/2022          Codes Class Noted - Resolved    Colon cancer ([facility] ICD-10-CM: C18.9  ICD-9-CM: 153.9  4/15/2022 - Present        Cecal polyp ICD-10-CM: K63.5  ICD-9-CM: 211.3  4/15/2022 - Present        Adenomatous polyp of ascending colon ICD-10-CM: D12.2  ICD-9-CM: 211.3  3/22/2022 - Present        Muscle weakness (generalized) ICD-10-CM: M62.81  ICD-9-CM: 728.87  1/1/2022 - Present        Enlarged prostate ICD-10-CM: N40.0  ICD-9-CM: 600.00  9/1/2020 - Present        Atrial flutter ([facility].) ICD-10-CM: I48.92  ICD-9-CM: 427.32  6/29/2019 - Present        Diabetes mellitus ([facility].) ICD-10-CM: E11.9  ICD-9-CM: 250.00  1/20/2016 - Present        Polymyositis ([facility].) ICD-10-CM: M33.20  ICD-9-CM: 710.4  5/16/2012 - Present        Allergic rhinitis ICD-10-CM: J30.9  ICD-9-CM: 477.9  4/6/2012 - Present        Migraine ICD-10-CM: B90.167  ICD-9-CM: 346.90  4/6/2012 - Present        Gastroesophageal reflux disease ICD-10-CM: K21.9  ICD-9-CM: 530.81  9/28/2011 - Present        Hypertension ICD-10-CM: I10  ICD-9-CM: 401.9  6/1/2010 - Present        Erectile dysfunction ICD-10-CM: N52.9  ICD-9-CM: 607.84  3/4/2009 - Present        Herniation of intervertebral disc ICD-10-CM: [id number]  ICD-9-CM: 722.2  3/4/2009 - Present    Overview Signed 1/21/2022 11:56 AM by Nicanor Ortega of this note might be different from the original.  Legacy Annotated Display: Herniation of intervertebral disc  Comments:  03/04/2009 11:04 - Cassie Brown MD, RUDY CEJA  MRI 1/09 L5-S1             Asthma ICD-10-CM: J45.909  ICD-9-CM: 493.90  5/31/2007 - Present        Myasthenia gravis ([facility] ICD-10-CM: G70.00  ICD-9-CM: 358.00  5/31/2007 - Present              Medications reviewed  Current Facility-Administered Medications   Medication Dose Route Frequency    verapamil ER (VERELAN PM) capsule 200 mg  200 mg Oral QHS    potassium chloride (KLOR-CON M10) tablet 10 mEq  10 mEq Oral BID    amiodarone (CORDARONE) tablet 200 mg  200 mg Oral DAILY    oxyCODONE-acetaminophen (PERCOCET 10)  mg per tablet 1 Tablet  1 Tablet Oral Q6H PRN    ondansetron (ZOFRAN ODT) tablet 4 mg  4 mg Oral Q6H PRN    0.9% sodium chloride infusion  125 mL/hr IntraVENous CONTINUOUS    ceFAZolin (ANCEF) 2 g in sterile water (preservative free) 20 mL IV syringe  2 g IntraVENous Q8H    metroNIDAZOLE (FLAGYL) IVPB premix 500 mg  500 mg IntraVENous Q8H    HYDROmorphone (DILAUDID) injection 1 mg  1 mg IntraVENous Q4H PRN    HYDROmorphone (DILAUDID) syringe 0.5 mg  0.5 mg IntraVENous Q4H PRN    ondansetron (ZOFRAN) injection 4 mg  4 mg IntraVENous Q6H PRN    lisinopriL (PRINIVIL, ZESTRIL) tablet 20 mg  20 mg Oral DAILY    predniSONE (DELTASONE) tablet 20 mg  20 mg Oral DAILY WITH BREAKFAST    famotidine (PF) (PEPCID) 20 mg in 0.9% sodium chloride 10 mL injection  20 mg IntraVENous Q12H    insulin lispro (HUMALOG) injection   SubCUTAneous AC&HS    glucose chewable tablet 16 g  4 Tablet Oral PRN    glucagon (GLUCAGEN) injection 1 mg  1 mg IntraMUSCular PRN    dextrose 10% infusion 0-250 mL  0-250 mL IntraVENous PRN    labetaloL (NORMODYNE;TRANDATE) injection 10 mg  10 mg IntraVENous Q6H PRN       Review of Systems:   A comprehensive review of systems was negative except for that written in the HPI. Objective:   Physical Exam:     Visit Vitals  /88   Pulse 80   Temp 98 °F (36.7 °C)   Resp 17   Wt 66.8 kg (147 lb 4.3 oz)   SpO2 100%   BMI 22.78 kg/m²    O2 Flow Rate (L/min): 6 l/min O2 Device: None (Room air)    Temp (24hrs), Av.3 °F (36.8 °C), Min:97.5 °F (36.4 °C), Max:98.9 °F (37.2 °C)    No intake/output data recorded.  1901 -  0700  In: 0381 [P.O.:400; I.V.:3593]  Out: 4025 [Urine:4025]    General:   Awake and alert   Lungs:   Clear to auscultation bilaterally. Chest wall:  No tenderness or deformity. Heart:  Regular rate and rhythm, S1, S2 normal, no murmur, click, rub or gallop. Abdomen:   Soft, non-tender. Bowel sounds normal. No masses,  No organomegaly. Extremities: Extremities normal, atraumatic, no cyanosis or edema. Pulses: 2+ and symmetric all extremities. Skin: Skin color, texture, turgor normal. No rashes or lesions   Neurologic: CNII-XII intact. No gross focal deficits         Data Review:       Recent Days:  Recent Labs     22  0400 22  0400 22  1615   WBC 9.8 11.3* 10.7   HGB 10.2* 10.1* 10.5*   HCT 32.3* 32.8* 33.8*    211 211     Recent Labs     22  0400 22  0400 22  0400    136 141   K 3.1* Hemolyzed, recollect requested 3.2*    107 109*   CO2 26 25 23   GLU 67 61* 73   BUN 4* 4* 10   CREA 0.44* 0.53* 0.54*   CA 8.4* 7.7* 7.8*   MG  --  Hemolyzed, recollect requested 1.9   PHOS  --  1.5* 1.9*   ALB 3.4*  --  3.2*   TBILI 0.5  --  0.7   ALT 20  --  22     No results for input(s): PH, PCO2, PO2, HCO3, FIO2 in the last 72 hours.     24 Hour Results:  Recent Results (from the past 24 hour(s))   GLUCOSE, POC    Collection Time: 22  3:09 PM   Result Value Ref Range    Glucose (POC) 117 65 - 117 mg/dL    Performed by [address] [place] [address] Dr Almanza, POC    Collection Time: 22  9:37 PM Result Value Ref Range    Glucose (POC) 131 (H) 65 - 117 mg/dL    Performed by Karey Gilbert    CBC WITH AUTOMATED DIFF    Collection Time: 04/19/22  4:00 AM   Result Value Ref Range    WBC 9.8 4.1 - 11.1 K/uL    RBC 4.07 (L) 4.10 - 5.70 M/uL    HGB 10.2 (L) 12.1 - 17.0 g/dL    HCT 32.3 (L) 36.6 - 50.3 %    MCV 79.4 (L) 80.0 - 99.0 FL    MCH 25.1 (L) 26.0 - 34.0 PG    MCHC 31.6 30.0 - 36.5 g/dL    RDW 14.1 11.5 - 14.5 %    PLATELET 094 550 - 983 K/uL    MPV 11.9 8.9 - 12.9 FL    NRBC 0.0 0.0  WBC    ABSOLUTE NRBC 0.00 0.00 - 0.01 K/uL    NEUTROPHILS 70 32 - 75 %    LYMPHOCYTES 19 12 - 49 %    MONOCYTES 10 5 - 13 %    EOSINOPHILS 1 0 - 7 %    BASOPHILS 0 0 - 1 %    IMMATURE GRANULOCYTES 0 0 - 0.5 %    ABS. NEUTROPHILS 6.7 1.8 - 8.0 K/UL    ABS. LYMPHOCYTES 1.9 0.8 - 3.5 K/UL    ABS. MONOCYTES 1.0 0.0 - 1.0 K/UL    ABS. EOSINOPHILS 0.1 0.0 - 0.4 K/UL    ABS. BASOPHILS 0.0 0.0 - 0.1 K/UL    ABS. IMM. GRANS. 0.0 0.00 - 0.04 K/UL    DF AUTOMATED     METABOLIC PANEL, COMPREHENSIVE    Collection Time: 04/19/22  4:00 AM   Result Value Ref Range    Sodium 137 136 - 145 mmol/L    Potassium 3.1 (L) 3.5 - 5.1 mmol/L    Chloride 107 97 - 108 mmol/L    CO2 26 21 - 32 mmol/L    Anion gap 4 (L) 5 - 15 mmol/L    Glucose 67 65 - 100 mg/dL    BUN 4 (L) 6 - 20 mg/dL    Creatinine 0.44 (L) 0.70 - 1.30 mg/dL    BUN/Creatinine ratio 9 (L) 12 - 20      GFR est AA >60 >60 ml/min/1.73m2    GFR est non-AA >60 >60 ml/min/1.73m2    Calcium 8.4 (L) 8.5 - 10.1 mg/dL    Bilirubin, total 0.5 0.2 - 1.0 mg/dL    AST (SGOT) 17 15 - 37 U/L    ALT (SGPT) 20 12 - 78 U/L    Alk.  phosphatase 70 45 - 117 U/L    Protein, total 6.0 (L) 6.4 - 8.2 g/dL    Albumin 3.4 (L) 3.5 - 5.0 g/dL    Globulin 2.6 2.0 - 4.0 g/dL    A-G Ratio 1.3 1.1 - 2.2     GLUCOSE, POC    Collection Time: 04/19/22  7:51 AM   Result Value Ref Range    Glucose (POC) 86 65 - 117 mg/dL    Performed by [address], POC    Collection Time: 04/19/22  2:23 PM   Result Value Ref Range    Glucose (POC) 234 (H) 65 - 117 mg/dL    Performed by OTILIO Pelaez orders to display        Assessment:  Acute hypokalemia  Paroxysmal supraventricular tachycardia, now back in sinus rhythm    History of paroxysmal atrial fibrillation/flutter    Diabetes mellitus type 2, insulin requiring. Well-controlled    Polymyositis, steroid dependent    Myasthenia gravis    Gastroesophageal reflux disease    Hypertension    Status post laparoscopic ileocecal resection for large cecal polyp      Plan:  Replete potassium  Switch to long acting verapamil  Continue oral amiodarone 200 mg daily  Continue clear liquid diet  HB stable, will start eliquis          Care Plan discussed with: Patient/Family      Total time spent with patient: 30 minutes.     Molly Kussmaul, MD

## 2022-04-19 NOTE — PROGRESS NOTES
Progress Note    Patient: Suly Brantley MRN: [id number]  SSN: [id number]    YOB: 1970  Age: 49 y.o. Sex: male      Admit Date: 4/15/2022    LOS: 4 days     Subjective:   Postoperative day 4 status post laparoscopic ileocecectomy  Patient seen in bed no complaints  Reports he had 2 bowel movements  Tolerating clear liquid    Objective:     Vitals:    04/19/22 0000 04/19/22 0748 04/19/22 1155 04/19/22 1503   BP: (!) 142/86 (!) 151/96 138/82 135/88   Pulse: 64 70 78 80   Resp: 14 18 18 17   Temp: 98.9 °F (37.2 °C) 98.4 °F (36.9 °C) 97.5 °F (36.4 °C) 98 °F (36.7 °C)   SpO2: 100% 100% 100% 100%   Weight:            Intake and Output:  Current Shift: No intake/output data recorded. Last three shifts: 04/17 1901 - 04/19 0700  In: 8467 [P.O.:400; I.V.:3593]  Out: 0487 [Urine:4025]    Review of Systems:  ROS     Physical Exam:   Abdomen is soft, appropriately tender, nondistended.   Incisions are all clean, mild ecchymosis around periumbilical incision    Lab/Data Review:  Recent Results (from the past 24 hour(s))   GLUCOSE, POC    Collection Time: 04/18/22  3:09 PM   Result Value Ref Range    Glucose (POC) 117 65 - 117 mg/dL    Performed by [address] [place] [address] Dr Almanza, POC    Collection Time: 04/18/22  9:37 PM   Result Value Ref Range    Glucose (POC) 131 (H) 65 - 117 mg/dL    Performed by Andrea Pang    CBC WITH AUTOMATED DIFF    Collection Time: 04/19/22  4:00 AM   Result Value Ref Range    WBC 9.8 4.1 - 11.1 K/uL    RBC 4.07 (L) 4.10 - 5.70 M/uL    HGB 10.2 (L) 12.1 - 17.0 g/dL    HCT 32.3 (L) 36.6 - 50.3 %    MCV 79.4 (L) 80.0 - 99.0 FL    MCH 25.1 (L) 26.0 - 34.0 PG    MCHC 31.6 30.0 - 36.5 g/dL    RDW 14.1 11.5 - 14.5 %    PLATELET 517 821 - 180 K/uL    MPV 11.9 8.9 - 12.9 FL    NRBC 0.0 0.0  WBC    ABSOLUTE NRBC 0.00 0.00 - 0.01 K/uL    NEUTROPHILS 70 32 - 75 %    LYMPHOCYTES 19 12 - 49 %    MONOCYTES 10 5 - 13 %    EOSINOPHILS 1 0 - 7 %    BASOPHILS 0 0 - 1 %    IMMATURE GRANULOCYTES 0 0 - 0.5 %    ABS. NEUTROPHILS 6.7 1.8 - 8.0 K/UL    ABS. LYMPHOCYTES 1.9 0.8 - 3.5 K/UL    ABS. MONOCYTES 1.0 0.0 - 1.0 K/UL    ABS. EOSINOPHILS 0.1 0.0 - 0.4 K/UL    ABS. BASOPHILS 0.0 0.0 - 0.1 K/UL    ABS. IMM. GRANS. 0.0 0.00 - 0.04 K/UL    DF AUTOMATED     METABOLIC PANEL, COMPREHENSIVE    Collection Time: 04/19/22  4:00 AM   Result Value Ref Range    Sodium 137 136 - 145 mmol/L    Potassium 3.1 (L) 3.5 - 5.1 mmol/L    Chloride 107 97 - 108 mmol/L    CO2 26 21 - 32 mmol/L    Anion gap 4 (L) 5 - 15 mmol/L    Glucose 67 65 - 100 mg/dL    BUN 4 (L) 6 - 20 mg/dL    Creatinine 0.44 (L) 0.70 - 1.30 mg/dL    BUN/Creatinine ratio 9 (L) 12 - 20      GFR est AA >60 >60 ml/min/1.73m2    GFR est non-AA >60 >60 ml/min/1.73m2    Calcium 8.4 (L) 8.5 - 10.1 mg/dL    Bilirubin, total 0.5 0.2 - 1.0 mg/dL    AST (SGOT) 17 15 - 37 U/L    ALT (SGPT) 20 12 - 78 U/L    Alk. phosphatase 70 45 - 117 U/L    Protein, total 6.0 (L) 6.4 - 8.2 g/dL    Albumin 3.4 (L) 3.5 - 5.0 g/dL    Globulin 2.6 2.0 - 4.0 g/dL    A-G Ratio 1.3 1.1 - 2.2     GLUCOSE, POC    Collection Time: 04/19/22  7:51 AM   Result Value Ref Range    Glucose (POC) 86 65 - 117 mg/dL    Performed by Mariela Harding    GLUCOSE, POC    Collection Time: 04/19/22  2:23 PM   Result Value Ref Range    Glucose (POC) 234 (H) 65 - 117 mg/dL    Performed by OTILIO SANCHEZ           Assessment:     Active Problems:    Colon cancer ([facility] [address] (4/15/2022)      Cecal polyp (4/15/2022)        Plan:   Doing well  Advance to full liquid diet  Appreciate cardiology input and assistance, patient has been switched to long-acting verapamil.   Out of bed encourage ambulation  Will decrease IV fluids    Signed By: Jimmy Fung MD     April 19, 2022

## 2022-04-19 NOTE — PROGRESS NOTES
Progress Note      4/19/2022 11:15 AM  NAME: Nakia Choi   MRN:  [id number]   Admit Diagnosis: Adenomatous polyp of colon, unspecified part of colon [D12.6]  Colon cancer ([facility] [address] [C18.9]  Cecal polyp [K63.5]      Problem List:   Paroxysmal A. fib/flutter, back in NSR  Polymyositis  History of myasthenia gravis  Type II diabetes  Hypertension  Hypokalemia     Assessment/Plan:   Switch to long-acting verapamil  Add potassium supplementation for K+3.1  Echo reviewed and shows normal EF  Daily EKG's; follow Qtc/QRS         []       High complexity decision making was performed in this patient at high risk for decompensation with multiple organ involvement. Subjective:     Nakia Choi denies chest pain, dyspnea. Discussed with RN events overnight. Review of Systems:   Negative except for as noted above. Objective:      Physical Exam:    Last 24hrs VS reviewed since prior progress note. Most recent are:    Visit Vitals  BP (!) 151/96 (BP Patient Position: At rest;Lying)   Pulse 70   Temp 98.4 °F (36.9 °C)   Resp 18   Wt 66.8 kg (147 lb 4.3 oz)   SpO2 100%   BMI 22.78 kg/m²       Intake/Output Summary (Last 24 hours) at 4/19/2022 1115  Last data filed at 4/19/2022 0400  Gross per 24 hour   Intake --   Output 1450 ml   Net -1450 ml        General Appearance: Alert; no acute distress. Ears/Nose/Mouth/Throat: moist mucous membranes  Neck: Supple. Chest: Lungs clear to auscultation bilaterally. Cardiovascular: Regular rate and rhythm, S1S2 normal  Abdomen: Soft, non-tender, bowel sounds are active. Extremities: No edema bilaterally. Skin: Warm and dry. []         Post-cath site without hematoma, bruit, tenderness, or thrill. Distal pulses intact. PMH/SH reviewed - no change compared to H&P    Telemetry: NSR    EKG: NSR with LVH    04/15/22    ECHO ADULT COMPLETE 04/18/2022 4/18/2022    Interpretation Summary    Left Ventricle: Left ventricle size is normal. Normal wall thickness.  Findings consistent with mild concentric hypertrophy. Normal wall motion. Normal left ventricular systolic function with a visually estimated EF of 55 - 60%. Normal diastolic function.   Left Atrium: Left atrium is moderately dilated.   Pericardium: Trivial pericardial effusion present. No indication of cardiac tamponade. Signed by: Sumaya Carmona MD on 4/18/2022  1:30 PM      []  No new EKG for review    Lab Data Personally Reviewed:    Recent Labs     04/19/22 0400 04/18/22 0400   WBC 9.8 11.3*   HGB 10.2* 10.1*   HCT 32.3* 32.8*    211     No results for input(s): INR, PTP, APTT, INREXT in the last 72 hours. Recent Labs     04/19/22 0400 04/18/22 0400 04/17/22 0400    136 141   K 3.1* Hemolyzed, recollect requested 3.2*    107 109*   CO2 26 25 23   BUN 4* 4* 10   CREA 0.44* 0.53* 0.54*   GLU 67 61* 73   CA 8.4* 7.7* 7.8*   MG  --  Hemolyzed, recollect requested 1.9     No results for input(s): CPK, CKNDX, TROIQ in the last 72 hours. No lab exists for component: CPKMB  No results found for: CHOL, CHOLX, CHLST, CHOLV, HDL, HDLP, LDL, LDLC, DLDLP, TGLX, TRIGL, TRIGP, CHHD, CHHDX    Recent Labs     04/19/22 0400 04/17/22  0400   AP 70 67   TP 6.0* 6.1*   ALB 3.4* 3.2*   GLOB 2.6 2.9     No results for input(s): PH, PCO2, PO2 in the last 72 hours.     Medications Personally Reviewed:    Current Facility-Administered Medications   Medication Dose Route Frequency    potassium chloride 10 mEq in 100 ml IVPB  10 mEq IntraVENous Q1H    verapamiL (CALAN) tablet 80 mg  80 mg Oral TID    amiodarone (CORDARONE) tablet 200 mg  200 mg Oral DAILY    oxyCODONE-acetaminophen (PERCOCET 10)  mg per tablet 1 Tablet  1 Tablet Oral Q6H PRN    ondansetron (ZOFRAN ODT) tablet 4 mg  4 mg Oral Q6H PRN    0.9% sodium chloride infusion  125 mL/hr IntraVENous CONTINUOUS    ceFAZolin (ANCEF) 2 g in sterile water (preservative free) 20 mL IV syringe  2 g IntraVENous Q8H    metroNIDAZOLE (FLAGYL) IVPB premix 500 mg  500 mg IntraVENous Q8H    HYDROmorphone (DILAUDID) injection 1 mg  1 mg IntraVENous Q4H PRN    HYDROmorphone (DILAUDID) syringe 0.5 mg  0.5 mg IntraVENous Q4H PRN    ondansetron (ZOFRAN) injection 4 mg  4 mg IntraVENous Q6H PRN    lisinopriL (PRINIVIL, ZESTRIL) tablet 20 mg  20 mg Oral DAILY    predniSONE (DELTASONE) tablet 20 mg  20 mg Oral DAILY WITH BREAKFAST    famotidine (PF) (PEPCID) 20 mg in 0.9% sodium chloride 10 mL injection  20 mg IntraVENous Q12H    insulin lispro (HUMALOG) injection   SubCUTAneous AC&HS    glucose chewable tablet 16 g  4 Tablet Oral PRN    glucagon (GLUCAGEN) injection 1 mg  1 mg IntraMUSCular PRN    dextrose 10% infusion 0-250 mL  0-250 mL IntraVENous PRN    labetaloL (NORMODYNE;TRANDATE) injection 10 mg  10 mg IntraVENous Q6H PRN         Jorgito Kumar MD

## 2022-04-20 NOTE — PROGRESS NOTES
2 person skin assessment concluded with Darrick Timmons RN. Patient has one documented surgical site wound on the abdomen. Clear surgical glue was used, wound is intact and open to air. No bleeding, drainage, or dehiscence.

## 2022-04-20 NOTE — PROGRESS NOTES
Problem: Falls - Risk of  Goal: *Absence of Falls  Description: Document Tae Santana Fall Risk and appropriate interventions in the flowsheet.   Outcome: Progressing Towards Goal  Note: Fall Risk Interventions:  Mobility Interventions: Patient to call before getting OOB         Medication Interventions: Teach patient to arise slowly         History of Falls Interventions: Bed/chair exit alarm         Problem: Patient Education: Go to Patient Education Activity  Goal: Patient/Family Education  Outcome: Progressing Towards Goal

## 2022-04-20 NOTE — ADT AUTH CERT NOTES
Bowel Surgery: Colectomy, Partial, with or without Ostomy, by Laparoscopy - Care Day 5 (4/19/2022) by Brandy Dubin, RN       Review Entered Review Status   4/20/2022 14:18 Completed      Criteria Review      Care Day: 5 Care Date: 4/19/2022 Level of Care: Telemetry    Guideline Day 2    Level Of Care    (X) Floor    4/20/2022 14:18:27 EDT by Brandy Dubin      tele    Clinical Status    (X) * Procedure completed    (X) * Hemodynamic stability    4/20/2022 14:18:27 EDT by Brandy Dubin      98.4 89 164/93 18 99%RA    Activity    ( ) * Increase ambulation    Routes    (X) IV fluids    4/20/2022 14:18:27 EDT by Brandy [email or web address] [email or web address]    (X) IV medications    4/20/2022 14:18:27 EDT by Brandy Dubin      ancef 2g ivq8 flagyl 500mg ivq8 zofran 4mg ivq6 prnx1    ( ) Advance diet as tolerated    4/20/2022 14:18:27 EDT by Brandy Dubin      clear liquid diet    Medications    (X) Epidural, PCA, or other analgesics    4/20/2022 14:18:27 EDT by Brandy Dubin      dilaudid 1mg ivq4 prnx2 percocet 10-325mg poq6 prnx1    * Milestone   Additional Notes   4/19 LOC IP Tele      Labs   WBC: 9.8   NRBC: 0.0   RBC: 4.07 (L)   HGB: 10.2 (L)   HCT: 32.3 (L)   MCV: 79.4 (L)   MCH: 25.1 (L)   MCHC: 31.6   RDW: 14.1   PLATELET: 077   MPV: 11.9   NEUTROPHILS: 70   LYMPHOCYTES: 19   MONOCYTES: 10   EOSINOPHILS: 1   BASOPHILS: 0   IMMATURE GRANULOCYTES: 0   DF: AUTOMATED   ABSOLUTE NRBC: 0.00   ABS. NEUTROPHILS: 6.7   ABS. IMM. GRANS.: 0.0   ABS. LYMPHOCYTES: 1.9   ABS. MONOCYTES: 1.0   ABS. EOSINOPHILS: 0.1   ABS. BASOPHILS: 0.0   Sodium: 137   Potassium: 3.1 (L)   Chloride: 107   CO2: 26   Anion gap: 4 (L)   Glucose: 67   BUN: 4 (L)   Creatinine: 0.44 (L)   BUN/Creatinine ratio: 9 (L)   Calcium: 8.4 (L)   GFR est non-AA: >60   GFR est AA: >60   Bilirubin, total: 0.5   Protein, total: 6.0 (L)   Albumin: 3.4 (L)   Globulin: 2.6   A-G Ratio: 1.3   ALT: 20   AST: 17   Alk. phosphatase: 70      Cardio   Problem List:    Paroxysmal A. fib/flutter, back in NSR   Polymyositis   History of myasthenia gravis   Type II diabetes   Hypertension   Hypokalemia   Assessment/Plan:        Switch to long-acting verapamil   Add potassium supplementation for K+3.1   Echo reviewed and shows normal EF   Daily EKG's; follow Qtc/QRS      Attending   Plan:   Replete potassium   Switch to long acting verapamil   Continue oral amiodarone 200 mg daily   Continue clear liquid diet   HB stable, will start eliquis      Surgery   Plan:   Doing well   Advance to full liquid diet   Appreciate cardiology input and assistance, patient has been switched to long-acting verapamil. Out of bed encourage ambulation   Will decrease IV fluids      Exam   General: Awake and alert   Lungs:   Clear to auscultation bilaterally. Chest wall: No tenderness or deformity. Heart: Regular rate and rhythm, S1, S2 normal, no murmur, click, rub or gallop. Abdomen:   Soft, non-tender. Bowel sounds normal. No masses,  No organomegaly. Extremities: Extremities normal, atraumatic, no cyanosis or edema. Pulses: 2+ and symmetric all extremities.    Skin: Skin color, texture, turgor normal. No rashes or lesions   Neurologic: CNII-XII intact.  No gross focal deficits        Bowel Surgery: Colectomy, Partial, with or without Ostomy, by Laparoscopy - Care Day 4 (4/18/2022) by Apple Holt RN       Review Entered Review Status   4/18/2022 16:42 Completed      Criteria Review      Care Day: 4 Care Date: 4/18/2022 Level of Care: Inpatient Floor    Guideline Day 2    Level Of Care    (X) Floor    4/18/2022 16:42:50 EDT by Appel Holt      tele    Clinical Status    (X) * Procedure completed    (X) * Hemodynamic stability    4/18/2022 16:42:50 EDT by Apple Holt      98.4 90 161/86 22 99%RA    Activity    ( ) * Increase ambulation    Routes    (X) IV fluids    4/18/2022 16:42:50 EDT by Apple Holt      [email or web address]    (X) IV medications    4/18/2022 16:42:50 EDT by Apple Holt      anc 2g ivq8 flagyl 500mg ivq8 zofran 4mg ivq6 prnx1 amiodarone gtt    ( ) Advance diet as tolerated    4/18/2022 16:42:50 EDT by Apple Holt      clear liquid diet    Medications    (X) Epidural, PCA, or other analgesics    4/18/2022 16:42:50 EDT by Apple Holt      dilaudid 1mg ivq4 prnx3    * Milestone   Additional Notes   4/18 Loc IP tele      Labs   WBC: 11.3 (H)   NRBC: 0.0   RBC: 4.09 (L)   HGB: 10.1 (L)   HCT: 32.8 (L)   MCV: 80.2   MCH: 24.7 (L)   MCHC: 30.8   RDW: 14.2   PLATELET: 835   MPV: 12.6   ABSOLUTE NRBC: 0.00   Sodium: 136   Potassium: Hemolyzed, recollect requested   Chloride: 107   CO2: 25   Anion gap: 4 (L)   Glucose: 61 (L)   BUN: 4 (L)   Creatinine: 0.53 (L)   BUN/Creatinine ratio: 8 (L)   Calcium: 7.7 (L)   Phosphorus: 1.5 (L)   Magnesium: Hemolyzed, recollect requested   GFR est non-AA: >60   GFR est AA: >60      Echo   ·  Left Ventricle: Left ventricle size is normal. Normal wall thickness. Findings consistent with mild concentric hypertrophy. Normal wall motion. Normal left ventricular systolic function with a visually estimated EF of 55 - 60%. Normal diastolic function. ·  Left Atrium: Left atrium is moderately dilated. ·  Pericardium: Trivial pericardial effusion present. No indication of cardiac tamponade.       Attending   No further episodes of SVT/narrow complex tachycardia.  Patient apparently had been prescribed verapamil for control of this which he was not receiving here in the hospital.  It also sounds like he was only using as needed in the outpatient setting   Plan:   Verapamil 80 mg 3 times daily   Transition to oral amiodarone 200 mg daily       Cardio   Assessment/Plan:        1. Paroxysmal A. fib/flutter with RVR post large cecal polyp resection.  Patient currently in sinus rhythm post IV amiodarone therapy.  I agree with switching patient to oral maintenance dose of amiodarone.  Eliquis for anticoagulation can be resumed when surgically safe. 2. Polymyositis   3. History of myasthenia gravis   4. Diabetes mellitus   5. Hypertension      Surgery   Subjective:   Postoperative 3 status post laparoscopic ileocecectomy   Patient developed paroxysmal A. fib/flutter postoperatively was transferred to the ICU, started on IV amiodarone, currently on oral amiodarone heart rate controlled   Reports incisional pain   Passing flatus, denies nausea vomiting   Plan:   Overall doing well   Transfer to floor   Start clear liquid diet   Out of bed, encourage ambulation   If hemoglobin stable start Eliquis more       General: Awake and alert   Lungs:   Clear to auscultation bilaterally. Chest wall: No tenderness or deformity. Heart: Regular rate and rhythm, S1, S2 normal, no murmur, click, rub or gallop. Abdomen:   Soft, non-tender. Bowel sounds normal. No masses,  No organomegaly. Extremities: Extremities normal, atraumatic, no cyanosis or edema. Pulses: 2+ and symmetric all extremities.    Skin: Skin color, texture, turgor normal. No rashes or lesions   Neurologic: CNII-XII intact.  No gross focal deficits

## 2022-04-20 NOTE — PROGRESS NOTES
Hospitalist Progress Note               Daily Progress Note: 4/20/2022      Subjective:   Hospital course to date: Patient is a 81-year-old male with a history of paroxysmal atrial fibrillation/flutter, diabetes, asthma, polymyositis, steroid dependent, myasthenia gravis and hypertension who was admitted to the surgical service electively for resection of a large cecal polyp not amenable to endoscopic resection. Patient underwent surgical resection on 4/15. Overnight patient was noted to have SVT on telemetry. He was given adenosine 6 mg and 12 mg without improvement. Cardiology recommended amiodarone which was started and subsequently went back into sinus rhythm.    -------     Patient reports feeling better. Tolerating full liquid diet. Diet changed to soft diet.           Problem List:  Problem List as of 4/20/2022 Date Reviewed: 4/15/2022          Codes Class Noted - Resolved    Colon cancer ([facility] ICD-10-CM: C18.9  ICD-9-CM: 153.9  4/15/2022 - Present        Cecal polyp ICD-10-CM: K63.5  ICD-9-CM: 211.3  4/15/2022 - Present        Adenomatous polyp of ascending colon ICD-10-CM: D12.2  ICD-9-CM: 211.3  3/22/2022 - Present        Muscle weakness (generalized) ICD-10-CM: M62.81  ICD-9-CM: 728.87  1/1/2022 - Present        Enlarged prostate ICD-10-CM: N40.0  ICD-9-CM: 600.00  9/1/2020 - Present        Atrial flutter ([facility] ICD-10-CM: I48.92  ICD-9-CM: 427.32  6/29/2019 - Present        Diabetes mellitus ([facility] ICD-10-CM: E11.9  ICD-9-CM: 250.00  1/20/2016 - Present        Polymyositis ([facility] ICD-10-CM: M33.20  ICD-9-CM: 710.4  5/16/2012 - Present        Allergic rhinitis ICD-10-CM: J30.9  ICD-9-CM: 477.9  4/6/2012 - Present        Migraine ICD-10-CM: L07.189  ICD-9-CM: 346.90  4/6/2012 - Present        Gastroesophageal reflux disease ICD-10-CM: K21.9  ICD-9-CM: 530.81  9/28/2011 - Present        Hypertension ICD-10-CM: I10  ICD-9-CM: 401.9  6/1/2010 - Present        Erectile dysfunction ICD-10-CM: N52.9  ICD-9-CM: 607.84  3/4/2009 - Present        Herniation of intervertebral disc ICD-10-CM: [id number]  ICD-9-CM: 722.2  3/4/2009 - Present    Overview Signed 1/21/2022 11:56 AM by Timi Albrecht of this note might be different from the original.  Legacy Annotated Display: Herniation of intervertebral disc  Comments:  03/04/2009 11:04 - Neema Cole MD, RUDY CEJA  MRI 1/09 L5-S1             Asthma ICD-10-CM: J45.909  ICD-9-CM: 493.90  5/31/2007 - Present        Myasthenia gravis ([facility] ICD-10-CM: G70.00  ICD-9-CM: 358.00  5/31/2007 - Present              Medications reviewed  Current Facility-Administered Medications   Medication Dose Route Frequency    potassium chloride SR (KLOR-CON 10) tablet 40 mEq  40 mEq Oral NOW    verapamil ER (VERELAN PM) capsule 200 mg  200 mg Oral QHS    potassium chloride SR (KLOR-CON 10) tablet 10 mEq  10 mEq Oral BID    apixaban (ELIQUIS) tablet 5 mg  5 mg Oral BID    amiodarone (CORDARONE) tablet 200 mg  200 mg Oral DAILY    oxyCODONE-acetaminophen (PERCOCET 10)  mg per tablet 1 Tablet  1 Tablet Oral Q6H PRN    ondansetron (ZOFRAN ODT) tablet 4 mg  4 mg Oral Q6H PRN    0.9% sodium chloride infusion  50 mL/hr IntraVENous CONTINUOUS    ceFAZolin (ANCEF) 2 g in sterile water (preservative free) 20 mL IV syringe  2 g IntraVENous Q8H    metroNIDAZOLE (FLAGYL) IVPB premix 500 mg  500 mg IntraVENous Q8H    HYDROmorphone (DILAUDID) injection 1 mg  1 mg IntraVENous Q4H PRN    HYDROmorphone (DILAUDID) syringe 0.5 mg  0.5 mg IntraVENous Q4H PRN    ondansetron (ZOFRAN) injection 4 mg  4 mg IntraVENous Q6H PRN    lisinopriL (PRINIVIL, ZESTRIL) tablet 20 mg  20 mg Oral DAILY    predniSONE (DELTASONE) tablet 20 mg  20 mg Oral DAILY WITH BREAKFAST    famotidine (PF) (PEPCID) 20 mg in 0.9% sodium chloride 10 mL injection  20 mg IntraVENous Q12H    insulin lispro (HUMALOG) injection   SubCUTAneous AC&HS    glucose chewable tablet 16 g  4 Tablet Oral PRN    glucagon (GLUCAGEN) injection 1 mg  1 mg IntraMUSCular PRN    dextrose 10% infusion 0-250 mL  0-250 mL IntraVENous PRN    labetaloL (NORMODYNE;TRANDATE) injection 10 mg  10 mg IntraVENous Q6H PRN       Review of Systems:   A comprehensive review of systems was negative except for that written in the HPI. Objective:   Physical Exam:     Visit Vitals  BP (!) 179/87 (BP 1 Location: Left lower arm, BP Patient Position: At rest)   Pulse 85   Temp 97.7 °F (36.5 °C)   Resp 18   Wt 66.8 kg (147 lb 4.3 oz)   SpO2 100%   BMI 22.78 kg/m²    O2 Flow Rate (L/min): 6 l/min O2 Device: None (Room air)    Temp (24hrs), Av °F (36.7 °C), Min:97.5 °F (36.4 °C), Max:98.4 °F (36.9 °C)    No intake/output data recorded.  1901 -  0700  In: -   Out: 1050 [Urine:1050]    General:   Awake and alert   Lungs:   Clear to auscultation bilaterally. Chest wall:  No tenderness or deformity. Heart:  Regular rate and rhythm, S1, S2 normal, no murmur, click, rub or gallop. Abdomen:   Soft, non-tender. Bowel sounds normal. No masses,  No organomegaly. Surgical wound clean   Extremities: Extremities normal, atraumatic, no cyanosis or edema. Pulses: 2+ and symmetric all extremities. Skin: Skin color, texture, turgor normal. No rashes or lesions   Neurologic: CNII-XII intact. No gross focal deficits         Data Review:       Recent Days:  Recent Labs     22  0849 22  0400 22  0400   WBC 10.9 9.8 11.3*   HGB 10.2* 10.2* 10.1*   HCT 32.9* 32.3* 32.8*    217 211     Recent Labs     22  0849 22  0400 22  0400    137 136   K 3.6 3.1* Hemolyzed, recollect requested    107 107   CO2 25 26 25   * 67 61*   BUN 4* 4* 4*   CREA 0.51* 0.44* 0.53*   CA 8.6 8.4* 7.7*   MG 1.9  --  Hemolyzed, recollect requested   PHOS  --   --  1.5*   ALB  --  3.4*  --    TBILI  --  0.5  --    ALT  --  20  --      No results for input(s): PH, PCO2, PO2, HCO3, FIO2 in the last 72 hours.     24 Hour Results:  Recent Results (from the past 24 hour(s))   EKG, 12 LEAD, SUBSEQUENT    Collection Time: 04/19/22 12:48 PM   Result Value Ref Range    Ventricular Rate 74 BPM    Atrial Rate 74 BPM    P-R Interval 138 ms    QRS Duration 104 ms    Q-T Interval 410 ms    QTC Calculation (Bezet) 455 ms    Calculated P Axis 72 degrees    Calculated R Axis 18 degrees    Calculated T Axis 93 degrees    Diagnosis       Sinus rhythm with Premature atrial complexes  Minimal voltage criteria for LVH, may be normal variant  Nonspecific T wave abnormality  Abnormal ECG  When compared with ECG of 17-APR-2022 08:29,  Premature atrial complexes are now Present  Vent. rate has decreased  BPM  ST no longer depressed in Inferior leads  Confirmed by EHSAN ORTIZ, Eduar Macias ([id number]) on 4/19/2022 9:09:40 PM     GLUCOSE, POC    Collection Time: 04/19/22  2:23 PM   Result Value Ref Range    Glucose (POC) 234 (H) 65 - 117 mg/dL    Performed by OTILIO SANCHEZ    GLUCOSE, POC    Collection Time: 04/19/22  6:49 PM   Result Value Ref Range    Glucose (POC) 177 (H) 65 - 117 mg/dL    Performed by Ramona Dietrich (PCT)    GLUCOSE, POC    Collection Time: 04/20/22  7:33 AM   Result Value Ref Range    Glucose (POC) 78 65 - 117 mg/dL    Performed by Lee Ann Guerin    CBC WITH AUTOMATED DIFF    Collection Time: 04/20/22  8:49 AM   Result Value Ref Range    WBC 10.9 4.1 - 11.1 K/uL    RBC 4.16 4.10 - 5.70 M/uL    HGB 10.2 (L) 12.1 - 17.0 g/dL    HCT 32.9 (L) 36.6 - 50.3 %    MCV 79.1 (L) 80.0 - 99.0 FL    MCH 24.5 (L) 26.0 - 34.0 PG    MCHC 31.0 30.0 - 36.5 g/dL    RDW 14.1 11.5 - 14.5 %    PLATELET 648 649 - 640 K/uL    MPV 12.3 8.9 - 12.9 FL    NRBC 0.0 0.0  WBC    ABSOLUTE NRBC 0.00 0.00 - 0.01 K/uL    NEUTROPHILS 62 32 - 75 %    LYMPHOCYTES 24 12 - 49 %    MONOCYTES 12 5 - 13 %    EOSINOPHILS 2 0 - 7 %    BASOPHILS 0 0 - 1 %    IMMATURE GRANULOCYTES 0 0 - 0.5 %    ABS. NEUTROPHILS 6.8 1.8 - 8.0 K/UL    ABS. LYMPHOCYTES 2.6 0.8 - 3.5 K/UL    ABS. MONOCYTES 1.3 (H) 0.0 - 1.0 K/UL    ABS. EOSINOPHILS 0.2 0.0 - 0.4 K/UL    ABS. BASOPHILS 0.0 0.0 - 0.1 K/UL    ABS. IMM. GRANS. 0.0 0.00 - 0.04 K/UL    DF AUTOMATED     METABOLIC PANEL, BASIC    Collection Time: 04/20/22  8:49 AM   Result Value Ref Range    Sodium 140 136 - 145 mmol/L    Potassium 3.6 3.5 - 5.1 mmol/L    Chloride 108 97 - 108 mmol/L    CO2 25 21 - 32 mmol/L    Anion gap 7 5 - 15 mmol/L    Glucose 107 (H) 65 - 100 mg/dL    BUN 4 (L) 6 - 20 mg/dL    Creatinine 0.51 (L) 0.70 - 1.30 mg/dL    BUN/Creatinine ratio 8 (L) 12 - 20      GFR est AA >60 >60 ml/min/1.73m2    GFR est non-AA >60 >60 ml/min/1.73m2    Calcium 8.6 8.5 - 10.1 mg/dL   MAGNESIUM    Collection Time: 04/20/22  8:49 AM   Result Value Ref Range    Magnesium 1.9 1.6 - 2.4 mg/dL       No orders to display        Assessment:  Acute hypokalemia- on the lower side    Paroxysmal supraventricular tachycardia, now back in sinus rhythm    History of paroxysmal atrial fibrillation/flutter    Diabetes mellitus type 2, insulin requiring. Well-controlled    Polymyositis, steroid dependent    Myasthenia gravis    Gastroesophageal reflux disease    Hypertension    Status post laparoscopic ileocecal resection for large cecal polyp      Plan:  Replete potassium  continue long acting verapamil  Continue oral amiodarone 200 mg daily  Change from full diet to soft diet  HB stable, continue eliquis          Care Plan discussed with: Patient/Family      Total time spent with patient: 30 minutes.     Zeyad Angela MD

## 2022-04-20 NOTE — PROGRESS NOTES
Progress Note    Patient: Lory Camarena MRN: [id number]  SSN: [id number]    YOB: 1970  Age: 49 y.o. Sex: male      Admit Date: 4/15/2022    LOS: 5 days     Subjective:   Postoperative day 5 status post laparoscopic ileocecectomy  Patient seen in bed no complaints  Tolerating oral intake with no nausea or vomiting  Continues to have bowel movements    Objective:     Vitals:    04/19/22 2000 04/19/22 2333 04/20/22 0335 04/20/22 0800   BP:  (!) 164/93 136/86 (!) 179/87   Pulse:  89 70 85   Resp:  18 18 18   Temp:  98.4 °F (36.9 °C) 98 °F (36.7 °C) 97.7 °F (36.5 °C)   SpO2: 99% 100% 100% 100%   Weight:            Intake and Output:  Current Shift: No intake/output data recorded. Last three shifts: 04/18 1901 - 04/20 0700  In: -   Out: 1050 [Urine:1050]    Review of Systems:  ROS     Physical Exam:   Abdomen is soft, nondistended, appropriately tender, laparoscopic incisions clean, mild ecchymosis around periumbilical incision    Lab/Data Review:  Recent Results (from the past 24 hour(s))   EKG, 12 LEAD, SUBSEQUENT    Collection Time: 04/19/22 12:48 PM   Result Value Ref Range    Ventricular Rate 74 BPM    Atrial Rate 74 BPM    P-R Interval 138 ms    QRS Duration 104 ms    Q-T Interval 410 ms    QTC Calculation (Bezet) 455 ms    Calculated P Axis 72 degrees    Calculated R Axis 18 degrees    Calculated T Axis 93 degrees    Diagnosis       Sinus rhythm with Premature atrial complexes  Minimal voltage criteria for LVH, may be normal variant  Nonspecific T wave abnormality  Abnormal ECG  When compared with ECG of 17-APR-2022 08:29,  Premature atrial complexes are now Present  Vent.  rate has decreased  BPM  ST no longer depressed in Inferior leads  Confirmed by Antwon MOSER MD ([id number]) on 4/19/2022 9:09:40 PM     GLUCOSE, POC    Collection Time: 04/19/22  2:23 PM   Result Value Ref Range    Glucose (POC) 234 (H) 65 - 117 mg/dL    Performed by OTILIO SANCHEZ    GLUCOSE, POC    Collection Time: 04/19/22  6:49 PM   Result Value Ref Range    Glucose (POC) 177 (H) 65 - 117 mg/dL    Performed by Nicol Elena (PCT)    GLUCOSE, POC    Collection Time: 04/20/22  7:33 AM   Result Value Ref Range    Glucose (POC) 78 65 - 117 mg/dL    Performed by Yudi Conroy           Assessment:     Active Problems:    Colon cancer ([facility] (4/15/2022)      Cecal polyp (4/15/2022)        Plan:   Doing well postoperative day 5  Will advance to GI soft diet  Patient has restarted Eliquis  Is on amiodarone and verapamil ER for atrial flutter, rate well controlled        Signed By: Carlee Vieyra MD     April 20, 2022

## 2022-04-20 NOTE — PROGRESS NOTES
Patient has been accepted with [facility], patient requesting SN only. CM continues to follow at this time.

## 2022-04-21 NOTE — PROGRESS NOTES
Progress Note    Patient: Zion Purvis MRN: [id number]  SSN: [id number]    YOB: 1970  Age: 49 y.o. Sex: male      Admit Date: 4/15/2022    LOS: 6 days     Subjective:   Postoperative day 6 status post laparoscopic ileocecectomy  Patient seen in bed complains of severe GERD. He states he takes Nexium at home, has been switched to Protonix this a.m. Is tolerating a diet and continues bowel movements    Objective:     Vitals:    04/20/22 1429 04/20/22 2000 04/21/22 0247 04/21/22 0735   BP: 121/69  118/70 (!) 164/94   Pulse: 82  88 94   Resp: 18  16 12   Temp: 98.2 °F (36.8 °C)  98.6 °F (37 °C) 98.4 °F (36.9 °C)   SpO2: 100% 100%  96%   Weight:            Intake and Output:  Current Shift: No intake/output data recorded. Last three shifts: No intake/output data recorded.     Review of Systems:  ROS     Physical Exam:   Abdomen is soft, nondistended, tender palpation epigastrium and right abdomen, incisions clean and intact    Lab/Data Review:  Recent Results (from the past 24 hour(s))   GLUCOSE, POC    Collection Time: 04/20/22 11:11 AM   Result Value Ref Range    Glucose (POC) 125 (H) 65 - 117 mg/dL    Performed by Alisia Sawant, POC    Collection Time: 04/20/22  4:12 PM   Result Value Ref Range    Glucose (POC) 375 (H) 65 - 117 mg/dL    Performed by Liban Powers, POC    Collection Time: 04/20/22 10:06 PM   Result Value Ref Range    Glucose (POC) 134 (H) 65 - 117 mg/dL    Performed by Dejuan Reese    CBC WITH AUTOMATED DIFF    Collection Time: 04/21/22  6:30 AM   Result Value Ref Range    WBC 10.4 4.1 - 11.1 K/uL    RBC 3.99 (L) 4.10 - 5.70 M/uL    HGB 9.7 (L) 12.1 - 17.0 g/dL    HCT 31.4 (L) 36.6 - 50.3 %    MCV 78.7 (L) 80.0 - 99.0 FL    MCH 24.3 (L) 26.0 - 34.0 PG    MCHC 30.9 30.0 - 36.5 g/dL    RDW 14.0 11.5 - 14.5 %    PLATELET 347 712 - 007 K/uL    MPV 11.8 8.9 - 12.9 FL    NRBC 0.0 0.0  WBC    ABSOLUTE NRBC 0.00 0.00 - 0.01 K/uL    NEUTROPHILS 63 32 - 75 % LYMPHOCYTES 24 12 - 49 %    MONOCYTES 10 5 - 13 %    EOSINOPHILS 2 0 - 7 %    BASOPHILS 0 0 - 1 %    IMMATURE GRANULOCYTES 1 (H) 0 - 0.5 %    ABS. NEUTROPHILS 6.5 1.8 - 8.0 K/UL    ABS. LYMPHOCYTES 2.5 0.8 - 3.5 K/UL    ABS. MONOCYTES 1.1 (H) 0.0 - 1.0 K/UL    ABS. EOSINOPHILS 0.2 0.0 - 0.4 K/UL    ABS. BASOPHILS 0.0 0.0 - 0.1 K/UL    ABS. IMM. GRANS. 0.1 (H) 0.00 - 0.04 K/UL    DF AUTOMATED     METABOLIC PANEL, BASIC    Collection Time: 04/21/22  6:30 AM   Result Value Ref Range    Sodium 140 136 - 145 mmol/L    Potassium 3.9 3.5 - 5.1 mmol/L    Chloride 108 97 - 108 mmol/L    CO2 26 21 - 32 mmol/L    Anion gap 6 5 - 15 mmol/L    Glucose 118 (H) 65 - 100 mg/dL    BUN 5 (L) 6 - 20 mg/dL    Creatinine 0.54 (L) 0.70 - 1.30 mg/dL    BUN/Creatinine ratio 9 (L) 12 - 20      GFR est AA >60 >60 ml/min/1.73m2    GFR est non-AA >60 >60 ml/min/1.73m2    Calcium 8.4 (L) 8.5 - 10.1 mg/dL   MAGNESIUM    Collection Time: 04/21/22  6:30 AM   Result Value Ref Range    Magnesium 1.9 1.6 - 2.4 mg/dL   GLUCOSE, POC    Collection Time: 04/21/22  7:42 AM   Result Value Ref Range    Glucose (POC) 111 65 - 117 mg/dL    Performed by [facility]           Assessment:     Active Problems:    Colon cancer ([facility] (4/15/2022)      Cecal polyp (4/15/2022)        Plan:   Patient slightly more tender this a.m. will obtain a CT scan of the abdomen and pelvis r/o anastomotic disruption.  Given chronic steroid use risk of anastomotic leak higher than usual.  Continue protonix for GERD        Signed By: Placido Thompson MD     April 21, 2022

## 2022-04-21 NOTE — CONSULTS
Hospitalist consult Note               Daily Progress Note: 4/21/2022      Subjective:   Hospital course to date: Patient is a 81-year-old male with a history of paroxysmal atrial fibrillation/flutter, diabetes, asthma, polymyositis, steroid dependent, myasthenia gravis and hypertension who was admitted to the surgical service electively for resection of a large cecal polyp not amenable to endoscopic resection. Patient underwent surgical resection on 4/15. Overnight patient was noted to have SVT on telemetry. He was given adenosine 6 mg and 12 mg without improvement. Cardiology recommended amiodarone which was started and subsequently went back into sinus rhythm.    -------     Patient is tolerating soft diet. Reports that he is having severe GERD. Only medication that works for him is nexium. I discussed with him that I would start him on pantoprazole which is as strong as nexium.           Problem List:  Problem List as of 4/21/2022 Date Reviewed: 4/15/2022          Codes Class Noted - Resolved    Colon cancer ([facility] ICD-10-CM: C18.9  ICD-9-CM: 153.9  4/15/2022 - Present        Cecal polyp ICD-10-CM: K63.5  ICD-9-CM: 211.3  4/15/2022 - Present        Adenomatous polyp of ascending colon ICD-10-CM: D12.2  ICD-9-CM: 211.3  3/22/2022 - Present        Muscle weakness (generalized) ICD-10-CM: M62.81  ICD-9-CM: 728.87  1/1/2022 - Present        Enlarged prostate ICD-10-CM: N40.0  ICD-9-CM: 600.00  9/1/2020 - Present        Atrial flutter ([facility] ICD-10-CM: I48.92  ICD-9-CM: 427.32  6/29/2019 - Present        Diabetes mellitus ([facility] ICD-10-CM: E11.9  ICD-9-CM: 250.00  1/20/2016 - Present        Polymyositis ([facility] ICD-10-CM: M33.20  ICD-9-CM: 710.4  5/16/2012 - Present        Allergic rhinitis ICD-10-CM: J30.9  ICD-9-CM: 477.9  4/6/2012 - Present        Migraine ICD-10-CM: A45.568  ICD-9-CM: 346.90  4/6/2012 - Present        Gastroesophageal reflux disease ICD-10-CM: K21.9  ICD-9-CM: 530.81  9/28/2011 - Present Hypertension ICD-10-CM: I10  ICD-9-CM: 401.9  6/1/2010 - Present        Erectile dysfunction ICD-10-CM: N52.9  ICD-9-CM: 607.84  3/4/2009 - Present        Herniation of intervertebral disc ICD-10-CM: [id number]  ICD-9-CM: 722.2  3/4/2009 - Present    Overview Signed 1/21/2022 11:56 AM by Berkley Roberson of this note might be different from the original.  [employer] Annotated Display: Herniation of intervertebral disc  Comments:  03/04/2009 11:04 - Cathryn Woods MD, RUDY CEJA  MRI 1/09 L5-S1             Asthma ICD-10-CM: J45.909  ICD-9-CM: 493.90  5/31/2007 - Present        Myasthenia gravis ([facility] ICD-10-CM: G70.00  ICD-9-CM: 358.00  5/31/2007 - Present              Medications reviewed  Current Facility-Administered Medications   Medication Dose Route Frequency    alum-mag hydroxide-simeth (MYLANTA) oral suspension 30 mL  30 mL Oral Q4H PRN    pantoprazole (PROTONIX) tablet 40 mg  40 mg Oral ACB    verapamil ER (VERELAN PM) capsule 200 mg  200 mg Oral QHS    potassium chloride SR (KLOR-CON 10) tablet 10 mEq  10 mEq Oral BID    apixaban (ELIQUIS) tablet 5 mg  5 mg Oral BID    amiodarone (CORDARONE) tablet 200 mg  200 mg Oral DAILY    oxyCODONE-acetaminophen (PERCOCET 10)  mg per tablet 1 Tablet  1 Tablet Oral Q6H PRN    ondansetron (ZOFRAN ODT) tablet 4 mg  4 mg Oral Q6H PRN    0.9% sodium chloride infusion  50 mL/hr IntraVENous CONTINUOUS    ceFAZolin (ANCEF) 2 g in sterile water (preservative free) 20 mL IV syringe  2 g IntraVENous Q8H    metroNIDAZOLE (FLAGYL) IVPB premix 500 mg  500 mg IntraVENous Q8H    HYDROmorphone (DILAUDID) injection 1 mg  1 mg IntraVENous Q4H PRN    HYDROmorphone (DILAUDID) syringe 0.5 mg  0.5 mg IntraVENous Q4H PRN    ondansetron (ZOFRAN) injection 4 mg  4 mg IntraVENous Q6H PRN    lisinopriL (PRINIVIL, ZESTRIL) tablet 20 mg  20 mg Oral DAILY    predniSONE (DELTASONE) tablet 20 mg  20 mg Oral DAILY WITH BREAKFAST    insulin lispro (HUMALOG) injection SubCUTAneous AC&HS    glucose chewable tablet 16 g  4 Tablet Oral PRN    glucagon (GLUCAGEN) injection 1 mg  1 mg IntraMUSCular PRN    dextrose 10% infusion 0-250 mL  0-250 mL IntraVENous PRN    labetaloL (NORMODYNE;TRANDATE) injection 10 mg  10 mg IntraVENous Q6H PRN       Review of Systems:   A comprehensive review of systems was negative except for that written in the HPI. Objective:   Physical Exam:     Visit Vitals  BP (!) 164/94 (BP 1 Location: Left lower arm, BP Patient Position: Semi fowlers) Comment: Notified RN   Pulse 94   Temp 98.4 °F (36.9 °C)   Resp 12   Wt 66.8 kg (147 lb 4.3 oz)   SpO2 96%   BMI 22.78 kg/m²    O2 Flow Rate (L/min): 6 l/min O2 Device: None (Room air)    Temp (24hrs), Av.5 °F (36.9 °C), Min:98.2 °F (36.8 °C), Max:98.7 °F (37.1 °C)    No intake/output data recorded. No intake/output data recorded. General:   Awake and alert   Lungs:   Clear to auscultation bilaterally. Chest wall:  No tenderness or deformity. Heart:  Regular rate and rhythm, S1, S2 normal, no murmur, click, rub or gallop. Abdomen:   Soft, non-tender. Bowel sounds normal. No masses,  No organomegaly. Surgical wound clean   Extremities: Extremities normal, atraumatic, no cyanosis or edema. Pulses: 2+ and symmetric all extremities. Skin: Skin color, texture, turgor normal. No rashes or lesions   Neurologic: CNII-XII intact. No gross focal deficits         Data Review:       Recent Days:  Recent Labs     22  0630 22  0849 22  0400   WBC 10.4 10.9 9.8   HGB 9.7* 10.2* 10.2*   HCT 31.4* 32.9* 32.3*    242 217     Recent Labs     22  0630 22  0849 22  0400    140 137   K 3.9 3.6 3.1*    108 107   CO2 26 25 26   * 107* 67   BUN 5* 4* 4*   CREA 0.54* 0.51* 0.44*   CA 8.4* 8.6 8.4*   MG 1.9 1.9  --    ALB  --   --  3.4*   TBILI  --   --  0.5   ALT  --   --  20     No results for input(s): PH, PCO2, PO2, HCO3, FIO2 in the last 72 hours.     24 Hour Results:  Recent Results (from the past 24 hour(s))   GLUCOSE, POC    Collection Time: 04/20/22 11:11 AM   Result Value Ref Range    Glucose (POC) 125 (H) 65 - 117 mg/dL    Performed by Carmen Metzger, POC    Collection Time: 04/20/22  4:12 PM   Result Value Ref Range    Glucose (POC) 375 (H) 65 - 117 mg/dL    Performed by Liban Powers, POC    Collection Time: 04/20/22 10:06 PM   Result Value Ref Range    Glucose (POC) 134 (H) 65 - 117 mg/dL    Performed by Keshav White    CBC WITH AUTOMATED DIFF    Collection Time: 04/21/22  6:30 AM   Result Value Ref Range    WBC 10.4 4.1 - 11.1 K/uL    RBC 3.99 (L) 4.10 - 5.70 M/uL    HGB 9.7 (L) 12.1 - 17.0 g/dL    HCT 31.4 (L) 36.6 - 50.3 %    MCV 78.7 (L) 80.0 - 99.0 FL    MCH 24.3 (L) 26.0 - 34.0 PG    MCHC 30.9 30.0 - 36.5 g/dL    RDW 14.0 11.5 - 14.5 %    PLATELET 805 256 - 055 K/uL    MPV 11.8 8.9 - 12.9 FL    NRBC 0.0 0.0  WBC    ABSOLUTE NRBC 0.00 0.00 - 0.01 K/uL    NEUTROPHILS 63 32 - 75 %    LYMPHOCYTES 24 12 - 49 %    MONOCYTES 10 5 - 13 %    EOSINOPHILS 2 0 - 7 %    BASOPHILS 0 0 - 1 %    IMMATURE GRANULOCYTES 1 (H) 0 - 0.5 %    ABS. NEUTROPHILS 6.5 1.8 - 8.0 K/UL    ABS. LYMPHOCYTES 2.5 0.8 - 3.5 K/UL    ABS. MONOCYTES 1.1 (H) 0.0 - 1.0 K/UL    ABS. EOSINOPHILS 0.2 0.0 - 0.4 K/UL    ABS. BASOPHILS 0.0 0.0 - 0.1 K/UL    ABS. IMM.  GRANS. 0.1 (H) 0.00 - 0.04 K/UL    DF AUTOMATED     METABOLIC PANEL, BASIC    Collection Time: 04/21/22  6:30 AM   Result Value Ref Range    Sodium 140 136 - 145 mmol/L    Potassium 3.9 3.5 - 5.1 mmol/L    Chloride 108 97 - 108 mmol/L    CO2 26 21 - 32 mmol/L    Anion gap 6 5 - 15 mmol/L    Glucose 118 (H) 65 - 100 mg/dL    BUN 5 (L) 6 - 20 mg/dL    Creatinine 0.54 (L) 0.70 - 1.30 mg/dL    BUN/Creatinine ratio 9 (L) 12 - 20      GFR est AA >60 >60 ml/min/1.73m2    GFR est non-AA >60 >60 ml/min/1.73m2    Calcium 8.4 (L) 8.5 - 10.1 mg/dL   MAGNESIUM    Collection Time: 04/21/22  6:30 AM   Result Value Ref Range    Magnesium 1.9 1.6 - 2.4 mg/dL   GLUCOSE, POC    Collection Time: 04/21/22  7:42 AM   Result Value Ref Range    Glucose (POC) 111 65 - 117 mg/dL    Performed by Piyush Rivera        No orders to display        Assessment:  Acute hypokalemia- on the lower side    Paroxysmal supraventricular tachycardia, now back in sinus rhythm    History of paroxysmal atrial fibrillation/flutter    Diabetes mellitus type 2, insulin requiring. Well-controlled    Polymyositis, steroid dependent    Myasthenia gravis    Gastroesophageal reflux disease    Hypertension    Status post laparoscopic ileocecal resection for large cecal polyp      Plan:  Start po pantoprazole  continue long acting verapamil  Continue oral amiodarone 200 mg daily  Change from full diet to soft diet  HB stable, continue eliquis    Will sign off. Care Plan discussed with: Patient/Family      Total time spent with patient: 30 minutes.     Bren Sifuentes MD

## 2022-04-21 NOTE — PROGRESS NOTES
Problem: Falls - Risk of  Goal: *Absence of Falls  Description: Document Susi Bare Fall Risk and appropriate interventions in the flowsheet.   Outcome: Progressing Towards Goal  Note: Fall Risk Interventions:  Mobility Interventions: Patient to call before getting OOB         Medication Interventions: Teach patient to arise slowly         History of Falls Interventions: Bed/chair exit alarm

## 2022-04-21 NOTE — PROGRESS NOTES
Problem: Falls - Risk of  Goal: *Absence of Falls  Description: Document Armando Benites Fall Risk and appropriate interventions in the flowsheet. Outcome: Progressing Towards Goal  Note: Fall Risk Interventions:  Mobility Interventions: Patient to call before getting OOB         Medication Interventions: Teach patient to arise slowly         History of Falls Interventions: Bed/chair exit alarm         Problem: Patient Education: Go to Patient Education Activity  Goal: Patient/Family Education  Outcome: Progressing Towards Goal     Problem: Pain  Goal: *Control of Pain  Outcome: Progressing Towards Goal     Problem: Infection - Risk of, Surgical Site Infection  Goal: *Absence of surgical site infection signs and symptoms  Outcome: Progressing Towards Goal     Problem: Pressure Injury - Risk of  Goal: *Prevention of pressure injury  Description: Document Yuri Scale and appropriate interventions in the flowsheet.   Outcome: Progressing Towards Goal  Note: Pressure Injury Interventions:  Sensory Interventions: Keep linens dry and wrinkle-free         Activity Interventions: Increase time out of bed    Mobility Interventions: Pressure redistribution bed/mattress (bed type)    Nutrition Interventions: Offer support with meals,snacks and hydration    Friction and Shear Interventions: Minimize layers                Problem: Patient Education: Go to Patient Education Activity  Goal: Patient/Family Education  Outcome: Progressing Towards Goal

## 2022-04-22 NOTE — ADT AUTH CERT NOTES
Bowel Surgery: Colectomy, Partial, with or without Ostomy, by Laparoscopy - Care Day 7 (4/21/2022) by Tyler Clifford RN       Review Entered Review Status   4/22/2022 14:58 Completed      Criteria Review      Care Day: 7 Care Date: 4/21/2022 Level of Care: Telemetry    Guideline Day 2    Level Of Care    (X) Floor    4/22/2022 14:58:56 EDT by Cedric Hayes      4/21 tele    Clinical Status    (X) * Procedure completed    4/22/2022 14:58:56 EDT by Cedric Hayes      Postoperative day 6 status post laparoscopic ileocecectomy    ( ) * Hemodynamic stability    4/22/2022 14:58:56 EDT by Cedric Hayes      98.4 °F (36.9 °C) 94 164/94  11 Semi fowlers 12 96 % RA    152/95 135/77 153/100 163/96    Activity    ( ) * Increase ambulation    Routes    (X) IV fluids    4/22/2022 14:58:56 EDT by Cedric Hayes      ivf @ 50    (X) IV medications    4/22/2022 14:58:56 EDT by Cedric Hayes      iv ancef 2 gm q8h  iv flagyl 500mg q8h  iv zofran 4mg q6h prn given x1    (X) Advance diet as tolerated    4/22/2022 14:58:56 EDT by Cedric Hayes      easy to chew gi bland    Medications    (X) Epidural, PCA, or other analgesics    4/22/2022 14:58:56 EDT by Cedric Hayes      iv dilaudid 1 mg q4h prn severe pain given x2 given 1 mg additonal x1    * Milestone   Additional Notes    DATE: 4/21/22 LOC IP TELE       Pertinent Updates: complains of severe GERD.  He states he takes Nexium at home, has been switched to Protonix this a.m      Abnl/Pertinent Labs:   HGB: 9.7 (L)   HCT: 31.4 (L)   GLUCOSE,FAST - POC: 111   GLUCOSE,FAST - POC: 199 (H)   GLUCOSE,FAST - POC: 264 (H)      Diagnostic    Ct abd    IMPRESSION   1. Status post ileal cecal surgery. Surrounding extraluminal dense material may represent blood products (no indication of enteric contrast administration). Anastomotic leak cannot be excluded.  Consider imaging with water-soluble oral contrast. Free air in the abdomen likely within normal limits post recentsurgery. The bowel does not appear obstructed. 2. Bilateral lower lung airspace disease likely infectious or inflammatory. 3. Bilateral femoral head sclerosis suggests AVN. Physical Exam:   Abdomen is soft, nondistended, tender palpation epigastrium and right abdomen, incisions clean and intact      MD Assessments & Plans:   Patient slightly more tender this a.m. will obtain a CT scan of the abdomen and pelvis r/o anastomotic disruption.  Given chronic steroid use risk of anastomotic leak higher than usual.   Continue protonix for GERD      Medications:   Po mylanta 30 ml q4h prn given x2 po eliquis 5 mg 2x daily    Po amiodarone 200mg daily lispro sc 2 units x1 6 units x2 po lisinopril 20 mg daily po protonix 40 mg daily before breakfast po klor con 10 10 meq 2x daily po deltasone 20mg daily w breakfast po verelan pm 200mg bedtime       Orders: labs scds                    Bowel Surgery: Colectomy, Partial, with or without Ostomy, by Laparoscopy - Care Day 6 (4/20/2022) by Marely Casas RN       Review Entered Review Status   4/22/2022 14:41 Completed      Criteria Review      Care Day: 6 Care Date: 4/20/2022 Level of Care: Telemetry    Guideline Day 2    Level Of Care    (X) Floor    4/22/2022 14:41:03 EDT by Pat Soliman      4/20 tele    Clinical Status    (X) * Procedure completed    4/22/2022 14:41:03 EDT by Pat Soliman      Postoperative day 5 status post laparoscopic ileocecectomy    ( ) * Hemodynamic stability    4/22/2022 14:41:03 EDT by Pat Soliman      98 °F (36.7 °C) 70 136/86 18 100 %   179/87 155/86 121/69    Activity    ( ) * Increase ambulation    Routes    (X) IV fluids    4/22/2022 14:41:03 EDT by Pat Soliman      ivf @ 50    (X) IV medications    4/22/2022 14:41:04 EDT by Pat Soliman      iv ancef 2 gm q8h  iv flagyl 500mg q8h  iv zofran 4mg q6h prn given x1  iv pepcid 20 mg 12    (X) Advance diet as tolerated    4/22/2022 14:41:04 EDT by Andrea Norton Ted Began      reg gi bland diet    Medications    (X) Epidural, PCA, or other analgesics    4/22/2022 14:41:04 EDT by Rosangela Castano      iv dilaudid 1 mg q4h prn severe pain given x4  po percocet 10 1 tab q6h prn severe pain given x1    * Milestone   Additional Notes    DATE: 4/20/22 LOC IP  TELE      Abnl/Pertinent Labs:   K 3.6   GLUCOSE,FAST - POC: 125 (H)   GLUCOSE,FAST - POC: 375 (H)   GLUCOSE,FAST - POC: 134 (H)      Physical Exam:   Tolerating oral intake with no nausea or vomiting   Continues to have bowel movementsAbdomen is soft, nondistended, appropriately tender, laparoscopic incisions clean, mild ecchymosis around periumbilical incision      MD Assessments & Plans:   Doing well postoperative day 5   Will advance to GI soft diet   Patient has restarted Eliquis   Is on amiodarone and verapamil ER for atrial flutter, rate well controlled      Medications:   Po amiodarone 200mg daily po eliquis 5mg 2x daily lispro sc 10units given x1 po lisinopril 20 mg daily po klor con 10 meq 2 xdaily  po deltasone 20mg daily w breakfast po verelan pm 200mg bedtime po klor con 40 meq given x      Orders: daily labs scds

## 2022-04-22 NOTE — PROGRESS NOTES
Patient upset pain medication was discontinued without speaking to him. He says hydromorphone is the only thing actually helping him for pain but the percocet doesn't work. He reports a prior bad experience with refusing pain medications and doctors refusing to give him anything for pain management so he takes what is offered but he doesn't feel like percocet works for anything greater than a headache. He said if hydromorphone is discontinued he might as well go home where he can use his teas and supplements to help with pain management. Will continue to monitor. Problem: Falls - Risk of  Goal: *Absence of Falls  Description: Document Ad Celestin Fall Risk and appropriate interventions in the flowsheet. Outcome: Progressing Towards Goal  Note: Fall Risk Interventions:  Mobility Interventions: Patient to call before getting OOB         Medication Interventions: Patient to call before getting OOB,Teach patient to arise slowly         History of Falls Interventions: Bed/chair exit alarm         Problem: Patient Education: Go to Patient Education Activity  Goal: Patient/Family Education  Outcome: Progressing Towards Goal     Problem: Pain  Goal: *Control of Pain  Outcome: Not Progressing Towards Goal     Problem: Infection - Risk of, Surgical Site Infection  Goal: *Absence of surgical site infection signs and symptoms  Outcome: Progressing Towards Goal     Problem: Pressure Injury - Risk of  Goal: *Prevention of pressure injury  Description: Document Yuri Scale and appropriate interventions in the flowsheet.   Outcome: Progressing Towards Goal  Note: Pressure Injury Interventions:  Sensory Interventions: Keep linens dry and wrinkle-free,Minimize linen layers         Activity Interventions: Increase time out of bed    Mobility Interventions: Pressure redistribution bed/mattress (bed type)    Nutrition Interventions: Offer support with meals,snacks and hydration    Friction and Shear Interventions: Minimize layers Problem: Patient Education: Go to Patient Education Activity  Goal: Patient/Family Education  Outcome: Progressing Towards Goal

## 2022-04-22 NOTE — PROGRESS NOTES
Progress Note    Patient: Cordelia Vee MRN: [id number]  SSN: [id number]    YOB: 1970  Age: 49 y.o. Sex: male      Admit Date: 4/15/2022    LOS: 7 days     Subjective:   Postoperative day 7 status post laparoscopic ileocecectomy  Patient seen in bed  Complains of abdominal pain  Continues to have loose bowel movements    Objective:     Vitals:    04/22/22 0714 04/22/22 1105 04/22/22 1250 04/22/22 1401   BP: (!) 146/90 104/88  129/87   Pulse: 92 87  86   Resp: 18 18  18   Temp: 98.3 °F (36.8 °C) 98.7 °F (37.1 °C)  98.6 °F (37 °C)   SpO2: 100% 100%  97%   Weight:       Height:   5' 7.4\" (1.712 m)         Intake and Output:  Current Shift: No intake/output data recorded. Last three shifts: No intake/output data recorded. Review of Systems:  ROS     Physical Exam:   Rhythm is soft, nondistended, tender palpation of the right abdomen out of the surgical site, no rebound or guarding    Lab/Data Review:  Recent Results (from the past 24 hour(s))   GLUCOSE, POC    Collection Time: 04/22/22  7:19 AM   Result Value Ref Range    Glucose (POC) 109 65 - 117 mg/dL    Performed by Toya Guallpa    CBC WITH AUTOMATED DIFF    Collection Time: 04/22/22  8:18 AM   Result Value Ref Range    WBC 10.7 4.1 - 11.1 K/uL    RBC 4.27 4. 10 - 5.70 M/uL    HGB 10.4 (L) 12.1 - 17.0 g/dL    HCT 33.8 (L) 36.6 - 50.3 %    MCV 79.2 (L) 80.0 - 99.0 FL    MCH 24.4 (L) 26.0 - 34.0 PG    MCHC 30.8 30.0 - 36.5 g/dL    RDW 14.5 11.5 - 14.5 %    PLATELET 893 832 - 739 K/uL    MPV 11.3 8.9 - 12.9 FL    NRBC 0.0 0.0  WBC    ABSOLUTE NRBC 0.00 0.00 - 0.01 K/uL    NEUTROPHILS 61 32 - 75 %    LYMPHOCYTES 26 12 - 49 %    MONOCYTES 10 5 - 13 %    EOSINOPHILS 2 0 - 7 %    BASOPHILS 0 0 - 1 %    IMMATURE GRANULOCYTES 1 (H) 0 - 0.5 %    ABS. NEUTROPHILS 6.5 1.8 - 8.0 K/UL    ABS. LYMPHOCYTES 2.8 0.8 - 3.5 K/UL    ABS. MONOCYTES 1.1 (H) 0.0 - 1.0 K/UL    ABS. EOSINOPHILS 0.2 0.0 - 0.4 K/UL    ABS.  BASOPHILS 0.0 0.0 - 0.1 K/UL ABS. IMM.  GRANS. 0.1 (H) 0.00 - 0.04 K/UL    DF AUTOMATED     METABOLIC PANEL, BASIC    Collection Time: 04/22/22  8:18 AM   Result Value Ref Range    Sodium 139 136 - 145 mmol/L    Potassium 3.8 3.5 - 5.1 mmol/L    Chloride 107 97 - 108 mmol/L    CO2 27 21 - 32 mmol/L    Anion gap 5 5 - 15 mmol/L    Glucose 91 65 - 100 mg/dL    BUN 6 6 - 20 mg/dL    Creatinine 0.46 (L) 0.70 - 1.30 mg/dL    BUN/Creatinine ratio 13 12 - 20      GFR est AA >60 >60 ml/min/1.73m2    GFR est non-AA >60 >60 ml/min/1.73m2    Calcium 8.6 8.5 - 10.1 mg/dL   MAGNESIUM    Collection Time: 04/22/22  8:18 AM   Result Value Ref Range    Magnesium 2.0 1.6 - 2.4 mg/dL   GLUCOSE, POC    Collection Time: 04/22/22 11:07 AM   Result Value Ref Range    Glucose (POC) 182 (H) 65 - 117 mg/dL    Performed by Elvin Gagnon, POC    Collection Time: 04/22/22  4:19 PM   Result Value Ref Range    Glucose (POC) 296 (H) 65 - 117 mg/dL    Performed by Liliana Andres           Assessment:     Active Problems:    Colon cancer ([address] (4/15/2022)      Cecal polyp (4/15/2022)        Plan:   CT scan results from yesterday noted no clear sign of anastomotic leak  Patient continues to have right-sided abdominal pain  White blood cell count normal  Continue with diet  Likely discharge on Sunday, patient is on chronic steroids and therefore chance of anastomotic disruption or leak remains high    Signed By: Ena Kessler MD     April 22, 2022

## 2022-04-22 NOTE — PROGRESS NOTES
Nutrition Assessment     Type and Reason for Visit: Initial,RD nutrition re-screen/LOS (LOS x7)    Nutrition Recommendations/Plan:   1. Continue diet, advance as tolerated. 2. Ensure HP x2/d (320 kcal, 38 gm PRO). 3. Please document as % PO and ONS intake in I/Os. Nutrition Assessment:   Admitted for colon CA and cecal polyp w/ sx resection. Pt reported good appetite w/ poor intake 2/2 food preferences- multiple food allergies, prefers fish. Food preferences obtained. Reported 100 lb wt loss over 1 yr 2/2 catabolic illness. UBW=160 lbs x2 weeks ago, LQU=036 lbs on admission? Trend wts. Pt requested high protein ONS. Labs: H/H 10.4/33.8, MCV 79.2, MCH 24.4, POC -182 mg/dL. Meds: PPI, KCl, lisinopril, prednisone, humalog, cefazolin, dilaudid. Malnutrition Assessment:  Malnutrition Status: Mild malnutrition     Estimated Daily Nutrient Needs:  Energy (kcal):  1870 kcal/d  Protein (g):  80 gm/d       Fluid (ml/day):  1900 mL/d    Nutrition Related Findings:  NFPE w/o acute wasting. No N/V/C nor chewing/swallowing issues reported; +D x24 hrs? Last BM 4/22.  No edema observed    Current Nutrition Therapies:  ADULT DIET Easy to Chew; GI [place] (GERD/Peptic Ulcer)    Anthropometric Measures:  Height:  5' 7.4\" (171.2 cm)  Current Body Wt:     BMI: 22.8    Nutrition Diagnosis:   · Inadequate oral intake related to altered GI function as evidenced by intake 26-50%,weight loss,GI abnormality,diarrhea    Nutrition Interventions:   Food and/or Nutrient Delivery: Continue current diet,Start oral nutrition supplement  Nutrition Education/Counseling: No recommendations at this time  Coordination of Nutrition Care: Continue to monitor while inpatient    Goals:  Goals: Meet at least 75% of estimated needs,PO intake 50% or greater,by next RD assessment    Nutrition Monitoring and Evaluation:   Behavioral-Environmental Outcomes: None identified  Food/Nutrient Intake Outcomes: Food and nutrient intake,Supplement intake,Diet advancement/tolerance  Physical Signs/Symptoms Outcomes: Biochemical data,Chewing or swallowing,Meal time behavior,Diarrhea,Weight    Discharge Planning:    No discharge needs at this time    Mikal Simmons RD  Contact: ext. [phone] or [employer].

## 2022-04-22 NOTE — PROGRESS NOTES
Chart reviewed, DCP remains for patient to d/c to home with [facility], accepted with [facility] [place], once medically stable. Updates sent via [facility] to [facility]. CM continues to follow and monitor for needs.

## 2022-04-23 NOTE — PROGRESS NOTES
Problem: Falls - Risk of  Goal: *Absence of Falls  Description: Document Juddmon Dyana Fall Risk and appropriate interventions in the flowsheet.   Outcome: Progressing Towards Goal  Note: Fall Risk Interventions:  Mobility Interventions: Patient to call before getting OOB         Medication Interventions: Bed/chair exit alarm         History of Falls Interventions: Vital signs minimum Q4HRs X 24 hrs (comment for end date)

## 2022-04-24 NOTE — PROGRESS NOTES
Progress Note    Patient: Dewey Drake MRN: [id number]  SSN: [id number]    YOB: 1970  Age: 49 y.o. Sex: male      Admit Date: 4/15/2022    LOS: 9 days     Subjective:   Postoperative day 9 status post laparoscopic ileocecectomy  Patient continues to complain of some right-sided abdominal pain which is worse with bowel movements. Continues to have loose bowel movements    Objective:     Vitals:    04/23/22 2030 04/24/22 0418 04/24/22 0756 04/24/22 1140   BP: 132/88 (!) 142/81 132/84 (!) 157/94   Pulse: 84 80 74 76   Resp: 12 20 18 18   Temp: 98.4 °F (36.9 °C) 98 °F (36.7 °C) 97.9 °F (36.6 °C) 98 °F (36.7 °C)   SpO2: 100% 98% 99% 99%   Weight:       Height:            Intake and Output:  Current Shift: No intake/output data recorded. Last three shifts: No intake/output data recorded. Review of Systems:  ROS     Physical Exam:   Abdomen is soft, tender palpation on the right side with no rebound or guarding, incisions are clean and intact, abdomen nondistended    Lab/Data Review:  Recent Results (from the past 24 hour(s))   GLUCOSE, POC    Collection Time: 04/23/22  4:30 PM   Result Value Ref Range    Glucose (POC) 277 (H) 65 - 117 mg/dL    Performed by Frank Vizcaino (LPN)    GLUCOSE, POC    Collection Time: 04/23/22  9:24 PM   Result Value Ref Range    Glucose (POC) 151 (H) 65 - 117 mg/dL    Performed by Linh Brandon    GLUCOSE, POC    Collection Time: 04/24/22  9:02 AM   Result Value Ref Range    Glucose (POC) 146 (H) 65 - 117 mg/dL    Performed by OTILIO SANCHEZ    GLUCOSE, POC    Collection Time: 04/24/22 11:43 AM   Result Value Ref Range    Glucose (POC) 219 (H) 65 - 117 mg/dL    Performed by Yrn Xie           Assessment:     Active Problems:    Colon cancer ([address] (4/15/2022)      Cecal polyp (4/15/2022)        Plan:   CBC and CMP in a.m., if CBC shows elevated white blood cell count will rescan.   Continue diet  Continue to encourage ambulation out of bed  Continue home medications    Signed By: Bart Goel MD     April 24, 2022

## 2022-04-24 NOTE — PROGRESS NOTES
Problem: Falls - Risk of  Goal: *Absence of Falls  Description: Document Earma Mohsen Fall Risk and appropriate interventions in the flowsheet. Outcome: Progressing Towards Goal  Note: Fall Risk Interventions:  Mobility Interventions: Patient to call before getting OOB    Problem: Pain  Goal: *Control of Pain  Outcome: Progressing Towards Goal     Problem: Pressure Injury - Risk of  Goal: *Prevention of pressure injury  Description: Document Yuri Scale and appropriate interventions in the flowsheet.   Outcome: Progressing Towards Goal  Note: Pressure Injury Interventions:  Sensory Interventions: Assess changes in LOC         Activity Interventions: Increase time out of bed    Mobility Interventions: PT/OT evaluation    Nutrition Interventions: Document food/fluid/supplement intake    Friction and Shear Interventions: HOB 30 degrees or less       Problem: Patient Education: Go to Patient Education Activity  Goal: Patient/Family Education  Outcome: Progressing Towards Goal       Medication Interventions: Teach patient to arise slowly       History of Falls Interventions: Door open when patient unattended

## 2022-04-25 NOTE — PROGRESS NOTES
Progress Note    Patient: Zion Purvis MRN: [id number]  SSN: [id number]    YOB: 1970  Age: 49 y.o. Sex: male      Admit Date: 4/15/2022    LOS: 10 days     Subjective:   Postoperative day 10 status post laparoscopic ileocecectomy  Patient seen in bed    Objective:     Vitals:    04/25/22 0355 04/25/22 0724 04/25/22 1109 04/25/22 1444   BP: (!) 158/98 (!) 159/99 (!) 151/101 (!) 149/95   Pulse: 94 84 84 95   Resp: 18 18 18 18   Temp: 98.7 °F (37.1 °C) 98.1 °F (36.7 °C) 98 °F (36.7 °C) 98 °F (36.7 °C)   SpO2: 100% 100% 99% 100%   Weight:       Height:            Intake and Output:  Current Shift: 04/25 0701 - 04/25 1900  In: 360 [P.O.:360]  Out: -   Last three shifts: No intake/output data recorded.     Review of Systems:  ROS     Physical Exam:   Abdomen is soft, nondistended, tender palpation on the right side, incisions are clean and intact    Lab/Data Review:  Recent Results (from the past 24 hour(s))   GLUCOSE, POC    Collection Time: 04/24/22  8:34 PM   Result Value Ref Range    Glucose (POC) 157 (H) 65 - 117 mg/dL    Performed by Anila Borrero    GLUCOSE, POC    Collection Time: 04/25/22  7:54 AM   Result Value Ref Range    Glucose (POC) 101 65 - 117 mg/dL    Performed by Rylee Campa    GLUCOSE, POC    Collection Time: 04/25/22 12:20 PM   Result Value Ref Range    Glucose (POC) 239 (H) 65 - 117 mg/dL    Performed by Rylee Campa             Assessment:     Active Problems:    Colon cancer ([address] (4/15/2022)      Cecal polyp (4/15/2022)        Plan:   Patient reports that he was having loose stools however he has not had a bowel movement now for over 1 day  Patient continues to have right-sided abdominal pain that he describes as a sharp cramping pain    Is tolerating diet with no nausea or vomiting    If pain continues we will plan on CT scan tomorrow    Signed By: Hugo Tariq MD     April 25, 2022

## 2022-04-25 NOTE — PROGRESS NOTES
Problem: Falls - Risk of  Goal: *Absence of Falls  Description: Document Phill Addison Fall Risk and appropriate interventions in the flowsheet. Outcome: Progressing Towards Goal  Note: Fall Risk Interventions:  Mobility Interventions: Patient to call before getting OOB         Medication Interventions: Teach patient to arise slowly         History of Falls Interventions: Door open when patient unattended         Problem: Pain  Goal: *Control of Pain  Outcome: Progressing Towards Goal     Problem: Infection - Risk of, Surgical Site Infection  Goal: *Absence of surgical site infection signs and symptoms  Outcome: Progressing Towards Goal     Problem: Pressure Injury - Risk of  Goal: *Prevention of pressure injury  Description: Document Yuri Scale and appropriate interventions in the flowsheet.   Outcome: Progressing Towards Goal  Note: Pressure Injury Interventions:  Sensory Interventions: Assess changes in LOC         Activity Interventions: PT/OT evaluation    Mobility Interventions: PT/OT evaluation    Nutrition Interventions: Document food/fluid/supplement intake,Offer support with meals,snacks and hydration    Friction and Shear Interventions: HOB 30 degrees or less

## 2022-04-25 NOTE — PROGRESS NOTES
1600 Assumed care of patient after receiving report from previous nurse. Assessment done and unchanged. States he is having a pretty consistent pain of 7 out of 10. Very talkative and pleasant. 1710 Pain medication administered   1740 Sitting up on edge of bed eating dinner. States he feels much better.

## 2022-04-26 NOTE — PROGRESS NOTES
Dr Nathen Strong has been informed that pt's BS was 352. Per MD order-instead of 10 units of Humalog, to give 12 units at this time.

## 2022-04-26 NOTE — PROGRESS NOTES
Postoperative day 11 status post laparoscopic ileocecectomy  Patient seen in bed  Reports he had a small bowel movement described as having darkish solid stool balls    Abdomen is soft, tender to palpation on the right side positive right lower quadrant, nondistended, incisions clean and intact    Recent Results (from the past 24 hour(s))   GLUCOSE, POC    Collection Time: 04/25/22  8:32 PM   Result Value Ref Range    Glucose (POC) 171 (H) 65 - 117 mg/dL    Performed by [address] [place] [address], POC    Collection Time: 04/26/22  8:15 AM   Result Value Ref Range    Glucose (POC) 88 65 - 117 mg/dL    Performed by [address] [place] [address], POC    Collection Time: 04/26/22 11:28 AM   Result Value Ref Range    Glucose (POC) 213 (H) 65 - 117 mg/dL    Performed by Jimmy Aguilar, POC    Collection Time: 04/26/22  4:37 PM   Result Value Ref Range    Glucose (POC) 352 (H) 65 - 117 mg/dL    Performed by Tonja Ann:  CT scan abdomen pelvis with oral contrast given ongoing pain which she describes as increasing intensity in the right lower quadrant.   Patient is otherwise doing well afebrile, vital signs stable

## 2022-04-26 NOTE — PROGRESS NOTES
Problem: Falls - Risk of  Goal: *Absence of Falls  Description: Document Zoran Nahun Fall Risk and appropriate interventions in the flowsheet. Outcome: Progressing Towards Goal  Note: Fall Risk Interventions:  Mobility Interventions: PT Consult for mobility concerns,Patient to call before getting OOB         Medication Interventions: Patient to call before getting OOB,Teach patient to arise slowly         History of Falls Interventions: Consult care management for discharge planning,Door open when patient unattended         Problem: Patient Education: Go to Patient Education Activity  Goal: Patient/Family Education  Outcome: Progressing Towards Goal     Problem: Pain  Goal: *Control of Pain  Outcome: Progressing Towards Goal     Problem: Infection - Risk of, Surgical Site Infection  Goal: *Absence of surgical site infection signs and symptoms  Outcome: Progressing Towards Goal     Problem: Pressure Injury - Risk of  Goal: *Prevention of pressure injury  Description: Document Yuri Scale and appropriate interventions in the flowsheet.   Outcome: Progressing Towards Goal  Note: Pressure Injury Interventions:  Sensory Interventions: Assess changes in LOC,Keep linens dry and wrinkle-free,Minimize linen layers         Activity Interventions: Increase time out of bed,PT/OT evaluation    Mobility Interventions: Assess need for specialty bed,PT/OT evaluation    Nutrition Interventions: Document food/fluid/supplement intake    Friction and Shear Interventions: Minimize layers,HOB 30 degrees or less                Problem: Patient Education: Go to Patient Education Activity  Goal: Patient/Family Education  Outcome: Progressing Towards Goal

## 2022-04-27 NOTE — ROUTINE PROCESS
Dual RN skin assessment revealed a midline abdominal incision open to the air that is healing with well-approximated edges and no drainage, redness, or swelling at site. No pressure injuries or other skin abnormalities noted.

## 2022-04-27 NOTE — PROGRESS NOTES
Problem: Falls - Risk of  Goal: *Absence of Falls  Description: Document Em Akins Fall Risk and appropriate interventions in the flowsheet.   Outcome: Progressing Towards Goal  Note: Fall Risk Interventions:  Mobility Interventions: PT Consult for mobility concerns         Medication Interventions: Teach patient to arise slowly         History of Falls Interventions: Consult care management for discharge planning,Door open when patient unattended,Evaluate medications/consider consulting pharmacy

## 2022-04-27 NOTE — PROGRESS NOTES
Problem: Falls - Risk of  Goal: *Absence of Falls  Description: Document Nirmala Scott Fall Risk and appropriate interventions in the flowsheet. Outcome: Progressing Towards Goal  Note: Fall Risk Interventions:  Mobility Interventions: PT Consult for mobility concerns         Medication Interventions: Teach patient to arise slowly         History of Falls Interventions: Consult care management for discharge planning,Door open when patient unattended,Evaluate medications/consider consulting pharmacy         Problem: Patient Education: Go to Patient Education Activity  Goal: Patient/Family Education  Outcome: Progressing Towards Goal     Problem: Pain  Goal: *Control of Pain  Outcome: Progressing Towards Goal     Problem: Infection - Risk of, Surgical Site Infection  Goal: *Absence of surgical site infection signs and symptoms  Outcome: Progressing Towards Goal     Problem: Pressure Injury - Risk of  Goal: *Prevention of pressure injury  Description: Document Yuri Scale and appropriate interventions in the flowsheet.   Outcome: Progressing Towards Goal  Note: Pressure Injury Interventions:  Sensory Interventions: Assess changes in LOC,Keep linens dry and wrinkle-free,Maintain/enhance activity level         Activity Interventions: Increase time out of bed    Mobility Interventions: PT/OT evaluation    Nutrition Interventions: Document food/fluid/supplement intake    Friction and Shear Interventions: Minimize layers                Problem: Patient Education: Go to Patient Education Activity  Goal: Patient/Family Education  Outcome: Progressing Towards Goal

## 2022-04-27 NOTE — PROGRESS NOTES
Chart reviewed, DCP remains for patient to d/c to home with [facility], accepted with [facility], updates sent via [facility] to [facility].     CM continues to follow and monitor chart for needs.

## 2022-04-27 NOTE — PROGRESS NOTES
Called Dr Catie Weldon to make him aware that pt stated that he takes 20 units of Lantus every day. Pt does not  have a current order for long acting insulin and he has been high. Per MD, he will be starting on latus tomorrow. No orders receive at this time. Night nurse has been notified.

## 2022-04-27 NOTE — PROGRESS NOTES
Bedside shift change report given to [facility]  (oncoming nurse) by Damon Capone RN (offgoing nurse). Report included the following information SBAR, Kardex, MAR, Recent Results and Cardiac Rhythm NSR.

## 2022-04-27 NOTE — PROGRESS NOTES
Progress Note    Patient: Nataliya Mujica MRN: [id number]  SSN: [id number]    YOB: 1970  Age: 49 y.o. Sex: male      Admit Date: 4/15/2022    LOS: 12 days     Subjective:   Postoperative day 12  Patient seen in bed  Continues to report right lower abdominal pain  Tolerating the    Objective:     Vitals:    04/27/22 0802 04/27/22 0819 04/27/22 1229 04/27/22 1422   BP: (!) 155/96 (!) 162/98 (!) 160/99 (!) 160/93   Pulse: 90 76 98 99   Resp: 18 18 18 18   Temp: 98.4 °F (36.9 °C) 98.8 °F (37.1 °C) 97.3 °F (36.3 °C) 98.1 °F (36.7 °C)   SpO2: 100% 100% 100% 100%   Weight:       Height:            Intake and Output:  Current Shift: 04/27 0701 - 04/27 1900  In: -   Out: 425 [Urine:425]  Last three shifts: 04/25 1901 - 04/27 0700  In: 1320 [P.O.:1320]  Out: 1200 [Urine:1200]    Review of Systems:  ROS     Physical Exam:   Abdomen is soft, nondistended, tender palpation right lower quadrant, incisions all clean and intact    Lab/Data Review:  Recent Results (from the past 24 hour(s))   GLUCOSE, POC    Collection Time: 04/26/22  4:37 PM   Result Value Ref Range    Glucose (POC) 352 (H) 65 - 117 mg/dL    Performed by Veronica Mccarthy    GLUCOSE, POC    Collection Time: 04/26/22  9:18 PM   Result Value Ref Range    Glucose (POC) 81 65 - 117 mg/dL    Performed by Gino Garcia    CBC WITH AUTOMATED DIFF    Collection Time: 04/27/22  6:02 AM   Result Value Ref Range    WBC 10.2 4.1 - 11.1 K/uL    RBC 4.19 4. 10 - 5.70 M/uL    HGB 10.3 (L) 12.1 - 17.0 g/dL    HCT 33.7 (L) 36.6 - 50.3 %    MCV 80.4 80.0 - 99.0 FL    MCH 24.6 (L) 26.0 - 34.0 PG    MCHC 30.6 30.0 - 36.5 g/dL    RDW 14.9 (H) 11.5 - 14.5 %    PLATELET 902 345 - 965 K/uL    MPV 11.6 8.9 - 12.9 FL    NRBC 0.0 0.0  WBC    ABSOLUTE NRBC 0.00 0.00 - 0.01 K/uL    NEUTROPHILS 67 32 - 75 %    LYMPHOCYTES 20 12 - 49 %    MONOCYTES 11 5 - 13 %    EOSINOPHILS 1 0 - 7 %    BASOPHILS 0 0 - 1 %    IMMATURE GRANULOCYTES 1 (H) 0 - 0.5 %    ABS. NEUTROPHILS 6.8 1.8 - 8.0 K/UL    ABS. LYMPHOCYTES 2.0 0.8 - 3.5 K/UL    ABS. MONOCYTES 1.1 (H) 0.0 - 1.0 K/UL    ABS. EOSINOPHILS 0.1 0.0 - 0.4 K/UL    ABS. BASOPHILS 0.0 0.0 - 0.1 K/UL    ABS. IMM. GRANS. 0.1 (H) 0.00 - 0.04 K/UL    DF AUTOMATED     METABOLIC PANEL, COMPREHENSIVE    Collection Time: 04/27/22  6:02 AM   Result Value Ref Range    Sodium 138 136 - 145 mmol/L    Potassium 3.6 3.5 - 5.1 mmol/L    Chloride 105 97 - 108 mmol/L    CO2 30 21 - 32 mmol/L    Anion gap 3 (L) 5 - 15 mmol/L    Glucose 132 (H) 65 - 100 mg/dL    BUN 7 6 - 20 mg/dL    Creatinine 0.53 (L) 0.70 - 1.30 mg/dL    BUN/Creatinine ratio 13 12 - 20      GFR est AA >60 >60 ml/min/1.73m2    GFR est non-AA >60 >60 ml/min/1.73m2    Calcium 9.1 8.5 - 10.1 mg/dL    Bilirubin, total 0.4 0.2 - 1.0 mg/dL    AST (SGOT) 21 15 - 37 U/L    ALT (SGPT) 23 12 - 78 U/L    Alk. phosphatase 72 45 - 117 U/L    Protein, total 6.2 (L) 6.4 - 8.2 g/dL    Albumin 3.4 (L) 3.5 - 5.0 g/dL    Globulin 2.8 2.0 - 4.0 g/dL    A-G Ratio 1.2 1.1 - 2.2     GLUCOSE, POC    Collection Time: 04/27/22  7:43 AM   Result Value Ref Range    Glucose (POC) 98 65 - 117 mg/dL    Performed by Deisy Adams, POC    Collection Time: 04/27/22 12:32 PM   Result Value Ref Range    Glucose (POC) 260 (H) 65 - 117 mg/dL    Performed by Melo Hui             Assessment:     Active Problems:    Colon cancer ([address] (4/15/2022)      Cecal polyp (4/15/2022)        Plan:   Follow-up CT scan results, patient . Suspicion for anastomotic disruption is low however he is on steroids.    carb healthy diet for diabetes  Continue out of bed ambulate    Signed By: Chuck Conway MD     April 27, 2022

## 2022-04-28 NOTE — PROGRESS NOTES
Postoperative day 13  Patient seen in bed  Reports right lower quadrant abdominal pain mildly improved  No bowel movement x2 days    Abdomen soft, nondistended, tender palpation right lower quadrant although improved, incisions clean    CT scan results noted no evidence of leak    Plan:  Start MiraLAX daily  Continue to encourage out of bed ambulation  Anticipate discharge in 24 to 48 hours

## 2022-04-28 NOTE — PROGRESS NOTES
Comprehensive Nutrition Assessment    Type and Reason for Visit: RD nutrition re-screen/LOS    Nutrition Recommendations/Plan:   1. Continue current diet  2. Continue Ensure HP 2x/day  3. Monitor and record intakes, supplement acceptance, and Bms in I/Os     Malnutrition Assessment:  Malnutrition Status:  No malnutrition (04/28/22 1325)      Nutrition Assessment:    Admitted for colon CA and cecal polyp w/ sx resection. Pt reported good appetite w/ poor intake 2/2 food preferences- multiple food allergies, prefers fish. Food preferences obtained. Reported 100 lb wt loss over 1 yr 2/2 catabolic illness. UBW=160 lbs x2 weeks ago, BXI=996 lbs on admission? Trend wts. Pt requested high protein ONS. (4/28) Intakes excellent, >76% of meals, with good ONS acceptance. Labs: Na 138, K 3.6, BUN 7, Creat 0.53, Gluc 132, Alb 3.4. Meds: mylanta, insulin lispro, ondansetron, pantoprazole, potassium chloride, prednisone. Nutrition Related Findings:    Nourished per NFPE. No n/v, d/c, or problems chewing/swallowing. No edema. Last BM 4/24. Wound Type: Surgical incision    Current Nutrition Intake & Therapies:  Average Meal Intake: %  Average Supplement Intake: %  ADULT ORAL NUTRITION SUPPLEMENT Breakfast, Lunch; Low Calorie/High Protein  ADULT DIET Easy to Chew; 3 carb choices (45 gm/meal); GI [place] (GERD/Peptic Ulcer); Lactose-Controlled; Likes fish. May eat fish, chicken, turkey, yogurt. Anthropometric Measures:  Height: 5' 7.4\" (171.2 cm)  Ideal Body Weight (IBW): 150 lbs (68 kg)  Admission Body Weight: 160 lb  Current Body Wt:  66.8 kg (147 lb 4.3 oz), 98.2 % IBW. Bed scale  Current BMI (kg/m2): 22.8  Usual Body Weight: 72.6 kg (160 lb)  % Weight Change (Calculated): -8  Weight Adjustment: No adjustment  BMI Category: Normal weight (BMI 18.5-24. 9)    Estimated Daily Nutrient Needs:  Energy Requirements Based On: Kcal/kg  Weight Used for Energy Requirements: Current  Energy (kcal/day): 2004kcal (30kcal/kg - Ca)  Weight Used for Protein Requirements: Current  Protein (g/day): 100g (1.5g/kg - Ca)  Method Used for Fluid Requirements: ml/kg  Fluid (ml/day): 1670mL (25mL/kg)    Nutrition Diagnosis:   · Inadequate protein-energy intake related to catabolic illness as evidenced by weight loss    Nutrition Interventions:   Food and/or Nutrient Delivery: Continue current diet,Continue oral nutrition supplement  Nutrition Education/Counseling: Education not indicated  Coordination of Nutrition Care: Continue to monitor while inpatient    Goals:  Previous Goal Met: Goal(s) achieved  Goals: Meet at least 75% of estimated needs,by next RD assessment    Nutrition Monitoring and Evaluation:   Behavioral-Environmental Outcomes: None identified  Food/Nutrient Intake Outcomes: Food and nutrient intake,Supplement intake  Physical Signs/Symptoms Outcomes: Biochemical data,Meal time behavior,Weight    Discharge Planning:     Too soon to determine    Paula Montiel RD  Contact: [phone]

## 2022-04-28 NOTE — PROGRESS NOTES
Problem: Falls - Risk of  Goal: *Absence of Falls  Description: Document Em Akins Fall Risk and appropriate interventions in the flowsheet. Outcome: Progressing Towards Goal  Note: Fall Risk Interventions:  Mobility Interventions: PT Consult for mobility concerns         Medication Interventions: Teach patient to arise slowly         History of Falls Interventions: Door open when patient unattended,Evaluate medications/consider consulting pharmacy,Consult care management for discharge planning         Problem: Patient Education: Go to Patient Education Activity  Goal: Patient/Family Education  Outcome: Progressing Towards Goal     Problem: Pain  Goal: *Control of Pain  Outcome: Progressing Towards Goal     Problem: Infection - Risk of, Surgical Site Infection  Goal: *Absence of surgical site infection signs and symptoms  Outcome: Progressing Towards Goal     Problem: Pressure Injury - Risk of  Goal: *Prevention of pressure injury  Description: Document Yuri Scale and appropriate interventions in the flowsheet.   Outcome: Progressing Towards Goal  Note: Pressure Injury Interventions:  Sensory Interventions: Assess changes in LOC,Keep linens dry and wrinkle-free,Maintain/enhance activity level         Activity Interventions: Increase time out of bed    Mobility Interventions: PT/OT evaluation    Nutrition Interventions: Document food/fluid/supplement intake    Friction and Shear Interventions: Minimize layers                Problem: Patient Education: Go to Patient Education Activity  Goal: Patient/Family Education  Outcome: Progressing Towards Goal

## 2022-04-29 NOTE — PROGRESS NOTES
Problem: Falls - Risk of  Goal: *Absence of Falls  Description: Document Phill Addison Fall Risk and appropriate interventions in the flowsheet. Outcome: Progressing Towards Goal  Note: Fall Risk Interventions:  Mobility Interventions: PT Consult for mobility concerns         Medication Interventions: Teach patient to arise slowly         History of Falls Interventions: Door open when patient unattended,Evaluate medications/consider consulting pharmacy,Consult care management for discharge planning         Problem: Patient Education: Go to Patient Education Activity  Goal: Patient/Family Education  Outcome: Progressing Towards Goal     Problem: Pain  Goal: *Control of Pain  Outcome: Progressing Towards Goal     Problem: Infection - Risk of, Surgical Site Infection  Goal: *Absence of surgical site infection signs and symptoms  Outcome: Progressing Towards Goal     Problem: Pressure Injury - Risk of  Goal: *Prevention of pressure injury  Description: Document Yuri Scale and appropriate interventions in the flowsheet.   Outcome: Progressing Towards Goal  Note: Pressure Injury Interventions:  Sensory Interventions: Assess changes in LOC,Keep linens dry and wrinkle-free,Maintain/enhance activity level         Activity Interventions: Increase time out of bed    Mobility Interventions: PT/OT evaluation    Nutrition Interventions: Document food/fluid/supplement intake    Friction and Shear Interventions: Minimize layers                Problem: Patient Education: Go to Patient Education Activity  Goal: Patient/Family Education  Outcome: Progressing Towards Goal

## 2022-04-29 NOTE — PROGRESS NOTES
Chart reviewed, DCP remains for patient to dc to home with [facility] once medically stable. Patient has been accepted with [facility], [facility] 24-48 hours after DC. CM continues to follow and monitor for needs.

## 2022-04-29 NOTE — PROGRESS NOTES
Problem: Falls - Risk of  Goal: *Absence of Falls  Description: Document Ad Celestin Fall Risk and appropriate interventions in the flowsheet. Outcome: Progressing Towards Goal  Note: Fall Risk Interventions:  Mobility Interventions: Patient to call before getting OOB         Medication Interventions: Teach patient to arise slowly         History of Falls Interventions: Door open when patient unattended         Problem: Patient Education: Go to Patient Education Activity  Goal: Patient/Family Education  Outcome: Progressing Towards Goal     Problem: Pain  Goal: *Control of Pain  Outcome: Progressing Towards Goal     Problem: Infection - Risk of, Surgical Site Infection  Goal: *Absence of surgical site infection signs and symptoms  Outcome: Progressing Towards Goal     Problem: Infection - Risk of, Surgical Site Infection  Goal: *Absence of surgical site infection signs and symptoms  Outcome: Progressing Towards Goal     Problem: Pressure Injury - Risk of  Goal: *Prevention of pressure injury  Description: Document Yuri Scale and appropriate interventions in the flowsheet.   Outcome: Progressing Towards Goal  Note: Pressure Injury Interventions:  Sensory Interventions: Assess changes in LOC         Activity Interventions: Increase time out of bed    Mobility Interventions: PT/OT evaluation    Nutrition Interventions: Document food/fluid/supplement intake    Friction and Shear Interventions: Minimize layers                Problem: Patient Education: Go to Patient Education Activity  Goal: Patient/Family Education  Outcome: Progressing Towards Goal

## 2022-04-29 NOTE — PROGRESS NOTES
Progress Note    Patient: Gerry Dave MRN: [id number]  SSN: [id number]    YOB: 1970  Age: 49 y.o.   Sex: male      Admit Date: 4/15/2022    LOS: 14 days     Subjective:   Postoperative day 14  Patient seen in bed  Reports he had a small bowel movement  Pain right lower quadrant improved  Complains of numbness and tingling right arm to his hand    Objective:     Vitals:    04/29/22 0835 04/29/22 0937 04/29/22 1245 04/29/22 1408   BP: (!) 153/98 128/75 (!) 153/93 (!) 143/90   Pulse:   82 83   Resp:   18 18   Temp:   98 °F (36.7 °C) 98.6 °F (37 °C)   SpO2:   99% 100%   Weight:       Height:            Intake and Output:  Current Shift: 04/29 0701 - 04/29 1900  In: 500 [P.O.:500]  Out: -   Last three shifts: 04/27 1901 - 04/29 0700  In: -   Out: 1950 [Urine:1950]    Review of Systems:  ROS     Physical Exam:   Diminished soft, nondistended, mildly tender palpation right lower quadrant, incisions are clean    Lab/Data Review:  Recent Results (from the past 24 hour(s))   GLUCOSE, POC    Collection Time: 04/28/22  7:24 PM   Result Value Ref Range    Glucose (POC) 247 (H) 65 - 117 mg/dL    Performed by [address], POC    Collection Time: 04/29/22  7:27 AM   Result Value Ref Range    Glucose (POC) 125 (H) 65 - 117 mg/dL    Performed by [facility], POC    Collection Time: 04/29/22 11:42 AM   Result Value Ref Range    Glucose (POC) 284 (H) 65 - 117 mg/dL    Performed by [facility], POC    Collection Time: 04/29/22  4:06 PM   Result Value Ref Range    Glucose (POC) 248 (H) 65 - 117 mg/dL    Performed by Phillip Barnes             Assessment:     Active Problems:    Colon cancer ([address] (4/15/2022)      Cecal polyp (4/15/2022)        Plan:   IV is in the same arm will saline lock  Continue diet  Continue MiraLAX  Out of bed encourage ambulation    Signed By: Lakeisha Lewis MD     April 29, 2022

## 2022-04-30 NOTE — PROGRESS NOTES
Patient's blood sugar is 450. Dr. Kevon Perez notified. New order given to give humalog 15 units now and recheck in 1 hour.     Signed By: Almaz Haro RN     April 30, 2022

## 2022-04-30 NOTE — PROGRESS NOTES
Progress Note    Patient: Dewey Drake MRN: [id number]  SSN: [id number]    YOB: 1970  Age: 49 y.o. Sex: male      Admit Date: 4/15/2022    LOS: 15 days     Subjective:   Postoperative day 15  Patient reports she has had no bowel movement or flatus over the past day  Abdominal discomfort    Objective:     Vitals:    04/30/22 0340 04/30/22 0735 04/30/22 1114 04/30/22 1438   BP: 134/86 (!) 141/91 (!) 144/88 (!) 140/88   Pulse: 82 87 79 76   Resp: 20 20 20 19   Temp: 98 °F (36.7 °C) 98.5 °F (36.9 °C) 98.6 °F (37 °C) 97.7 °F (36.5 °C)   SpO2: 95% 100% 99% 100%   Weight:       Height:            Intake and Output:  Current Shift: No intake/output data recorded.   Last three shifts: 04/28 1901 - 04/30 0700  In: 500 [P.O.:500]  Out: 1450 [Urine:1450]    Review of Systems:  ROS     Physical Exam:   Abdomen soft, tender palpation right lower quadrant, nondistended, incisions clean, periumbilical incision superior aspect has a small seroma    Lab/Data Review:  Recent Results (from the past 24 hour(s))   GLUCOSE, POC    Collection Time: 04/29/22  8:55 PM   Result Value Ref Range    Glucose (POC) 264 (H) 65 - 117 mg/dL    Performed by [address], POC    Collection Time: 04/30/22  7:40 AM   Result Value Ref Range    Glucose (POC) 112 65 - 117 mg/dL    Performed by Yrn Xie    GLUCOSE, POC    Collection Time: 04/30/22 11:17 AM   Result Value Ref Range    Glucose (POC) 333 (H) 65 - 117 mg/dL    Performed by Yrn Xie           Assessment:     Active Problems:    Colon cancer ([address] (4/15/2022)      Cecal polyp (4/15/2022)        Plan:   Continue MiraLAX   Continue to encourage out of bed ambulation  Continue diet as ordered  Patient continues on amiodarone and verapamil per cardiology with Eliquis        Signed By: Chuck Conway MD     April 30, 2022

## 2022-04-30 NOTE — PROGRESS NOTES
04/30/22 1914   Midline Catheter 02/54/59 Left;Basilic   Placement Date: 04/30/22   Description (optional): 20 10 cm midline  Inserted By: Dave Brooks RN  Number of Attempts: 1  Size: (c) Other(comment)  Length (cm): 10 centimeters  Location: Left;Basilic  Arm Circumference at Insertion (cm): 36 cm  CV. .. Criteria for Appropriate Use Limited/no vessel suitable for conventional peripheral access   Site Assessment Clean, dry, & intact   Phlebitis Assessment 0   Infiltration Assessment 0   Arm Circumference (cm) 36 cm   Date of Last Dressing Change 04/30/22   Dressing Status Clean, dry, & intact; New   External Catheter Length (cm) 0 centimeters   Dressing Type Disk with Chlorhexadine gluconate (CHG); Stabilization/securement device;Transparent;Tape   Hub Color/Line Status Pink;Patent; Flushed   Positive Blood Return (Site #1) Yes   Alcohol Cap Used Yes

## 2022-04-30 NOTE — PROGRESS NOTES
Problem: Falls - Risk of  Goal: *Absence of Falls  Description: Document Zaira Henning Fall Risk and appropriate interventions in the flowsheet.   Outcome: Progressing Towards Goal  Note: Fall Risk Interventions:  Mobility Interventions: Bed/chair exit alarm,Patient to call before getting OOB         Medication Interventions: Bed/chair exit alarm         History of Falls Interventions: Bed/chair exit alarm,Door open when patient unattended         Problem: Patient Education: Go to Patient Education Activity  Goal: Patient/Family Education  Outcome: Progressing Towards Goal     Problem: Pain  Goal: *Control of Pain  Outcome: Progressing Towards Goal     Problem: Infection - Risk of, Surgical Site Infection  Goal: *Absence of surgical site infection signs and symptoms  Outcome: Progressing Towards Goal

## 2022-05-01 NOTE — PROGRESS NOTES
Bedside and Verbal shift change report given to Alexander Wilcox RN (oncoming nurse) by Kika Wellington RN (offgoing nurse). Report included the following information SBAR, Kardex, Intake/Output, MAR and Recent Results.

## 2022-05-01 NOTE — ROUTINE PROCESS
Patient complained of nausea, vomiting x2, constipation and abdominal spasm. Medicated with Oxycodone 10 mg P.O and 4mg Zofran IV.

## 2022-05-01 NOTE — PROGRESS NOTES
Patient is sitting on side of bed, head and arms leaning on table, states that he experiences abdominal spasm every 3-4 min., reports pain 10/10.

## 2022-05-01 NOTE — PROGRESS NOTES
Patient lying on bed, resting quietly, still refuses to take oral meds. Called attending if oral pills can be changed to IV, instructed to try to give meds will sips of water for now.

## 2022-05-01 NOTE — PROGRESS NOTES
2924D  Patient cannot take oral pills at this time, reports that he vomited the oral pill he took this morning, before shift. While in the bathroom, he was also vomiting mucosy and bile emesis. Patient was given Zofran IV and assisted back to bed, was shivering, blankets provided. 1007H  Patient is resting quietly in bed.

## 2022-05-01 NOTE — PROGRESS NOTES
Problem: Falls - Risk of  Goal: *Absence of Falls  Description: Document Antione Joseph Fall Risk and appropriate interventions in the flowsheet. Outcome: Progressing Towards Goal  Note: Fall Risk Interventions:  Mobility Interventions: Bed/chair exit alarm    Problem: Pressure Injury - Risk of  Goal: *Prevention of pressure injury  Description: Document Yuri Scale and appropriate interventions in the flowsheet.   Note: Pressure Injury Interventions:  Sensory Interventions: Assess changes in LOC,Float heels         Activity Interventions: Increase time out of bed    Mobility Interventions: Float heels,HOB 30 degrees or less    Nutrition Interventions: Document food/fluid/supplement intake    Friction and Shear Interventions: HOB 30 degrees or less                Medication Interventions: Bed/chair exit alarm       History of Falls Interventions: Bed/chair exit alarm         Problem: Pain  Goal: *Control of Pain  Outcome: Progressing Towards Goal

## 2022-05-01 NOTE — ROUTINE PROCESS
Patient has had 3 episodes of vomiting and nausea, excruitiating abdominal pain and spasm. Dr Estela Cleary called and informed, ordered NS @100cc/hr and CBC & CMP now.

## 2022-05-02 NOTE — PROGRESS NOTES
Progress Note    Patient: Cordelia Vee MRN: [id number]  SSN: [id number]    YOB: 1970  Age: 49 y.o. Sex: male      Admit Date: 4/15/2022    LOS: 17 days     Subjective:   Postoperative day 17  Patient seen in bed  Continues to complain of cramping abdominal pain  Denies any further emesis  States he had a small bowel movement    Objective:     Vitals:    05/01/22 1941 05/01/22 2357 05/02/22 0404 05/02/22 0748   BP: 121/79 127/72 (!) 161/102 119/76   Pulse: 99 95 97 95   Resp: 18 20 22 20   Temp: 98.8 °F (37.1 °C) 98.6 °F (37 °C) 98.8 °F (37.1 °C) 98.8 °F (37.1 °C)   SpO2: 99% 99% 95% 95%   Weight:       Height:            Intake and Output:  Current Shift: No intake/output data recorded. Last three shifts: 04/30 1901 - 05/02 0700  In: 1083.3 [I.V.:1083.3]  Out: 501 [Urine:450]    Review of Systems:  ROS     Physical Exam:   Abdomen is soft, tender in the right lower abdomen, nondistended, incisions clean and intact    Lab/Data Review:  Recent Results (from the past 24 hour(s))   CBC WITH AUTOMATED DIFF    Collection Time: 05/01/22 10:22 AM   Result Value Ref Range    WBC 11.3 (H) 4.1 - 11.1 K/uL    RBC 4.83 4.10 - 5.70 M/uL    HGB 11.8 (L) 12.1 - 17.0 g/dL    HCT 38.8 36.6 - 50.3 %    MCV 80.3 80.0 - 99.0 FL    MCH 24.4 (L) 26.0 - 34.0 PG    MCHC 30.4 30.0 - 36.5 g/dL    RDW 14.9 (H) 11.5 - 14.5 %    PLATELET 725 670 - 919 K/uL    MPV 12.1 8.9 - 12.9 FL    NRBC 0.0 0.0  WBC    ABSOLUTE NRBC 0.00 0.00 - 0.01 K/uL    NEUTROPHILS 78 (H) 32 - 75 %    LYMPHOCYTES 9 (L) 12 - 49 %    MONOCYTES 11 5 - 13 %    EOSINOPHILS 1 0 - 7 %    BASOPHILS 0 0 - 1 %    IMMATURE GRANULOCYTES 1 (H) 0 - 0.5 %    ABS. NEUTROPHILS 8.8 (H) 1.8 - 8.0 K/UL    ABS. LYMPHOCYTES 1.0 0.8 - 3.5 K/UL    ABS. MONOCYTES 1.3 (H) 0.0 - 1.0 K/UL    ABS. EOSINOPHILS 0.1 0.0 - 0.4 K/UL    ABS. BASOPHILS 0.0 0.0 - 0.1 K/UL    ABS. IMM.  GRANS. 0.1 (H) 0.00 - 0.04 K/UL    DF AUTOMATED     METABOLIC PANEL, COMPREHENSIVE Collection Time: 05/01/22 10:22 AM   Result Value Ref Range    Sodium 139 136 - 145 mmol/L    Potassium 3.3 (L) 3.5 - 5.1 mmol/L    Chloride 103 97 - 108 mmol/L    CO2 32 21 - 32 mmol/L    Anion gap 4 (L) 5 - 15 mmol/L    Glucose 184 (H) 65 - 100 mg/dL    BUN 14 6 - 20 mg/dL    Creatinine 0.48 (L) 0.70 - 1.30 mg/dL    BUN/Creatinine ratio 29 (H) 12 - 20      GFR est AA >60 >60 ml/min/1.73m2    GFR est non-AA >60 >60 ml/min/1.73m2    Calcium 9.2 8.5 - 10.1 mg/dL    Bilirubin, total 0.6 0.2 - 1.0 mg/dL    AST (SGOT) 19 15 - 37 U/L    ALT (SGPT) 21 12 - 78 U/L    Alk.  phosphatase 84 45 - 117 U/L    Protein, total 6.6 6.4 - 8.2 g/dL    Albumin 3.3 (L) 3.5 - 5.0 g/dL    Globulin 3.3 2.0 - 4.0 g/dL    A-G Ratio 1.0 (L) 1.1 - 2.2     GLUCOSE, POC    Collection Time: 05/01/22 11:29 AM   Result Value Ref Range    Glucose (POC) 188 (H) 65 - 117 mg/dL    Performed by [address], POC    Collection Time: 05/01/22  3:33 PM   Result Value Ref Range    Glucose (POC) 112 65 - 117 mg/dL    Performed by Arline Anderson    GLUCOSE, POC    Collection Time: 05/01/22  8:31 PM   Result Value Ref Range    Glucose (POC) 106 65 - 117 mg/dL    Performed by Laly Wu (PCT)           Assessment:     Active Problems:    Colon cancer ([address] (4/15/2022)      Cecal polyp (4/15/2022)        Plan:   Premedicate with Benadryl 50 mg and obtain CT scan abdomen pelvis with oral contrast.  Continue n.p.o. ice chips only for comfort    Signed By: Phoebe Fuentes MD     May 2, 2022

## 2022-05-02 NOTE — PROGRESS NOTES
Problem: Falls - Risk of  Goal: *Absence of Falls  Description: Document Leafy Navas Fall Risk and appropriate interventions in the flowsheet. Outcome: Progressing Towards Goal  Note: Fall Risk Interventions:  Mobility Interventions: Patient to call before getting OOB         Medication Interventions: Teach patient to arise slowly         History of Falls Interventions: Bed/chair exit alarm         Problem: Patient Education: Go to Patient Education Activity  Goal: Patient/Family Education  Outcome: Progressing Towards Goal     Problem: Pain  Goal: *Control of Pain  Outcome: Progressing Towards Goal     Problem: Infection - Risk of, Surgical Site Infection  Goal: *Absence of surgical site infection signs and symptoms  Outcome: Progressing Towards Goal     Problem: Pressure Injury - Risk of  Goal: *Prevention of pressure injury  Description: Document Yuri Scale and appropriate interventions in the flowsheet.   Outcome: Progressing Towards Goal  Note: Pressure Injury Interventions:  Sensory Interventions: Assess changes in LOC         Activity Interventions: Increase time out of bed    Mobility Interventions: HOB 30 degrees or less    Nutrition Interventions: Document food/fluid/supplement intake,Discuss nutritional consult with provider,Offer support with meals,snacks and hydration    Friction and Shear Interventions: HOB 30 degrees or less                Problem: Patient Education: Go to Patient Education Activity  Goal: Patient/Family Education  Outcome: Progressing Towards Goal

## 2022-05-02 NOTE — PROGRESS NOTES
Problem: Falls - Risk of  Goal: *Absence of Falls  Description: Document Zoran Garnica Fall Risk and appropriate interventions in the flowsheet. Outcome: Progressing Towards Goal  Note: Fall Risk Interventions:  Mobility Interventions: Bed/chair exit alarm,PT Consult for mobility concerns,PT Consult for assist device competence         Medication Interventions: Bed/chair exit alarm         History of Falls Interventions: Bed/chair exit alarm         Problem: Patient Education: Go to Patient Education Activity  Goal: Patient/Family Education  Outcome: Progressing Towards Goal     Problem: Pain  Goal: *Control of Pain  Outcome: Progressing Towards Goal     Problem: Infection - Risk of, Surgical Site Infection  Goal: *Absence of surgical site infection signs and symptoms  Outcome: Progressing Towards Goal     Problem: Pressure Injury - Risk of  Goal: *Prevention of pressure injury  Description: Document Yuri Scale and appropriate interventions in the flowsheet.   Outcome: Progressing Towards Goal  Note: Pressure Injury Interventions:  Sensory Interventions: Assess changes in LOC,Float heels         Activity Interventions: Increase time out of bed,PT/OT evaluation    Mobility Interventions: Float heels,HOB 30 degrees or less    Nutrition Interventions: Document food/fluid/supplement intake,Offer support with meals,snacks and hydration    Friction and Shear Interventions: HOB 30 degrees or less                Problem: Patient Education: Go to Patient Education Activity  Goal: Patient/Family Education  Outcome: Progressing Towards Goal

## 2022-05-03 NOTE — CONSULTS
Hospitalist consult Note               Daily Progress Note: 5/3/2022      Subjective:   Hospital course to date: Patient is a 67-year-old male with a history of paroxysmal atrial fibrillation/flutter, diabetes, asthma, polymyositis, steroid dependent, myasthenia gravis and hypertension who was admitted to the surgical service electively for resection of a large cecal polyp not amenable to endoscopic resection. Patient underwent surgical resection on 4/15  -------     Patient remains NPO due to concerns of SBO. He is on water sips with meds only. Denies abdominal pain chest pain or shortness of breath.   Passing flatus and had a small bowel movement yesterday       Problem List:  Problem List as of 5/3/2022 Date Reviewed: 4/15/2022          Codes Class Noted - Resolved    Colon cancer ([facility] ICD-10-CM: C18.9  ICD-9-CM: 153.9  4/15/2022 - Present        Cecal polyp ICD-10-CM: K63.5  ICD-9-CM: 211.3  4/15/2022 - Present        Adenomatous polyp of ascending colon ICD-10-CM: D12.2  ICD-9-CM: 211.3  3/22/2022 - Present        Muscle weakness (generalized) ICD-10-CM: M62.81  ICD-9-CM: 728.87  1/1/2022 - Present        Enlarged prostate ICD-10-CM: N40.0  ICD-9-CM: 600.00  9/1/2020 - Present        Atrial flutter ([facility] ICD-10-CM: I48.92  ICD-9-CM: 427.32  6/29/2019 - Present        Diabetes mellitus ([facility] ICD-10-CM: E11.9  ICD-9-CM: 250.00  1/20/2016 - Present        Polymyositis ([facility] ICD-10-CM: M33.20  ICD-9-CM: 710.4  5/16/2012 - Present        Allergic rhinitis ICD-10-CM: J30.9  ICD-9-CM: 477.9  4/6/2012 - Present        Migraine ICD-10-CM: E09.473  ICD-9-CM: 346.90  4/6/2012 - Present        Gastroesophageal reflux disease ICD-10-CM: K21.9  ICD-9-CM: 530.81  9/28/2011 - Present        Hypertension ICD-10-CM: I10  ICD-9-CM: 401.9  6/1/2010 - Present        Erectile dysfunction ICD-10-CM: N52.9  ICD-9-CM: 607.84  3/4/2009 - Present        Herniation of intervertebral disc ICD-10-CM: [id number]  ICD-9-CM: 722.2 3/4/2009 - Present    Overview Signed 1/21/2022 11:56 AM by Verona Guerrero of this note might be different from the original.  [employer] Annotated Display: Herniation of intervertebral disc  Comments:  03/04/2009 11:04 - Etta Gunderson MD, RUDY CEJA  MRI 1/09 L5-S1             Asthma ICD-10-CM: J45.909  ICD-9-CM: 493.90  5/31/2007 - Present        Myasthenia gravis ([address] ICD-10-CM: G70.00  ICD-9-CM: 358.00  5/31/2007 - Present              Medications reviewed  Current Facility-Administered Medications   Medication Dose Route Frequency    ketorolac (TORADOL) injection 30 mg  30 mg IntraVENous Q6H PRN    methylPREDNISolone (PF) (SOLU-MEDROL) injection 20 mg  20 mg IntraVENous DAILY    famotidine (PF) (PEPCID) 20 mg in 0.9% sodium chloride 10 mL injection  20 mg IntraVENous Q12H    docusate sodium (COLACE) capsule 100 mg  100 mg Oral BID    0.9% sodium chloride infusion  100 mL/hr IntraVENous CONTINUOUS    HYDROmorphone (DILAUDID) injection 1 mg  1 mg IntraVENous Q4H PRN    [Held by provider] alum-mag hydroxide-simeth (MYLANTA) oral suspension 30 mL  30 mL Oral Q4H PRN    [Held by provider] pantoprazole (PROTONIX) tablet 40 mg  40 mg Oral ACB    verapamil ER (VERELAN PM) capsule 200 mg  200 mg Oral QHS    [Held by provider] potassium chloride SR (KLOR-CON 10) tablet 10 mEq  10 mEq Oral BID    apixaban (ELIQUIS) tablet 5 mg  5 mg Oral BID    amiodarone (CORDARONE) tablet 200 mg  200 mg Oral DAILY    oxyCODONE-acetaminophen (PERCOCET 10)  mg per tablet 1 Tablet  1 Tablet Oral Q6H PRN    ondansetron (ZOFRAN ODT) tablet 4 mg  4 mg Oral Q6H PRN    ondansetron (ZOFRAN) injection 4 mg  4 mg IntraVENous Q6H PRN    lisinopriL (PRINIVIL, ZESTRIL) tablet 20 mg  20 mg Oral DAILY    [Held by provider] predniSONE (DELTASONE) tablet 20 mg  20 mg Oral DAILY WITH BREAKFAST    insulin lispro (HUMALOG) injection   SubCUTAneous AC&HS    glucose chewable tablet 16 g  4 Tablet Oral PRN    glucagon (GLUCAGEN) injection 1 mg  1 mg IntraMUSCular PRN    dextrose 10% infusion 0-250 mL  0-250 mL IntraVENous PRN    labetaloL (NORMODYNE;TRANDATE) injection 10 mg  10 mg IntraVENous Q6H PRN       Review of Systems:   A comprehensive review of systems was negative except for that written in the HPI. Objective:   Physical Exam:     Visit Vitals  /80 (BP 1 Location: Right upper arm, BP Patient Position: At rest)   Pulse 74   Temp 99.2 °F (37.3 °C)   Resp 20   Ht 5' 7.4\" (1.712 m)   Wt 66.8 kg (147 lb 4.3 oz)   SpO2 98%   BMI 22.79 kg/m²    O2 Flow Rate (L/min): 6 l/min O2 Device: None (Room air)    Temp (24hrs), Av.4 °F (37.4 °C), Min:99 °F (37.2 °C), Max:99.8 °F (37.7 °C)    No intake/output data recorded.  1901 -  0700  In: -   Out: 400 [Urine:400]    General:   Awake and alert   Lungs:   Clear to auscultation bilaterally. Chest wall:  No tenderness or deformity. Heart:  Regular rate and rhythm, S1, S2 normal, no murmur, click, rub or gallop. Abdomen:   Soft, non-tender. Bowel sounds normal. No masses,  No organomegaly. Surgical wound clean   Extremities: Extremities normal, atraumatic, no cyanosis or edema. Pulses: 2+ and symmetric all extremities. Skin: Skin color, texture, turgor normal. No rashes or lesions   Neurologic: CNII-XII intact. No gross focal deficits         Data Review:       Recent Days:  Recent Labs     22  1022   WBC 11.3*   HGB 11.8*   HCT 38.8        Recent Labs     22  1022      K 3.3*      CO2 32   *   BUN 14   CREA 0.48*   CA 9.2   ALB 3.3*   TBILI 0.6   ALT 21     No results for input(s): PH, PCO2, PO2, HCO3, FIO2 in the last 72 hours.     24 Hour Results:  Recent Results (from the past 24 hour(s))   GLUCOSE, POC    Collection Time: 22  3:44 PM   Result Value Ref Range    Glucose (POC) 63 (L) 65 - 117 mg/dL    Performed by Roberta Freeman    GLUCOSE, POC    Collection Time: 22  4:31 PM   Result Value Ref Range    Glucose (POC) 106 65 - 117 mg/dL    Performed by Conchis Tavera    GLUCOSE, POC    Collection Time: 05/02/22  9:14 PM   Result Value Ref Range    Glucose (POC) 112 65 - 117 mg/dL    Performed by MORGAN Barrera [address], POC    Collection Time: 05/02/22  9:19 PM   Result Value Ref Range    Glucose (POC) 124 (H) 65 - 117 mg/dL    Performed by MORGAN [address]rera [address], POC    Collection Time: 05/03/22  7:37 AM   Result Value Ref Range    Glucose (POC) 101 65 - 117 mg/dL    Performed by Meenu Rubio    GLUCOSE, POC    Collection Time: 05/03/22 11:00 AM   Result Value Ref Range    Glucose (POC) 127 (H) 65 - 117 mg/dL    Performed by Doug Vasquez        CT ABD PELV WO CONT   Final Result   Moderate grade partial small bowel obstruction. Small volume complex ascites. CT ABD PELV WO CONT   Final Result   1. Improving hemoperitoneum and pneumoperitoneum. 2.  Ileocolic anastomosis in the right lower quadrant. Incompletely evaluated   without contrast. No evidence of bowel obstruction. 3.  Reticular and groundglass airspace disease in the visualized lung bases   redemonstrated. 4.  See full report for detailed findings. CT ABD PELV WO CONT   Final Result   1. Status post ileal cecal surgery. Surrounding extraluminal dense material may   represent blood products (no indication of enteric contrast administration). Anastomotic leak cannot be excluded. Consider imaging with water-soluble oral   contrast. Free air in the abdomen likely within normal limits post recent   surgery. The bowel does not appear obstructed. 2. Bilateral lower lung airspace disease likely infectious or inflammatory. 3. Bilateral femoral head sclerosis suggests AVN. Assessment:  Acute hypokalemia- on the lower side    Paroxysmal supraventricular tachycardia, now back in sinus rhythm    History of paroxysmal atrial fibrillation/flutter. Now on elliquis/amiodarone    Diabetes mellitus type 2, insulin requiring. Well-controlled    Polymyositis, steroid dependent    Myasthenia gravis    Gastroesophageal reflux disease    Hypertension    Status post laparoscopic ileocecal resection for large cecal polyp      Plan:  continue long acting verapamil  Continue oral amiodarone 200 mg daily  Consider starting liquid diet if ok by surgeon  Encourage use of incentive spirometer    Will sign off. Care Plan discussed with: Patient/Family      Total time spent with patient: 30 minutes.     Archie Bowden MD

## 2022-05-03 NOTE — PROGRESS NOTES
Chart reviewed, DCP remains for patient to d/c to home with [facility], accepted with [facility], once medically stable. \    CM continues to follow and monitor for needs.

## 2022-05-03 NOTE — PROGRESS NOTES
After medicating th patient with the iv dose of Toradol along with Zofran and dilaudid iv, the patient stated that he feel much better. The patient stated that the abdomen spasm have stopped. The patient is treating his MS with daily doses of prednisone and since he can not tolerate anything by mouth, he would like to know could he get iv Solu medrol so that he can stay on his treatment plan. I also got a telephone order to consult the  Hospitalist for treatment like his HTN if needed.

## 2022-05-03 NOTE — PROGRESS NOTES
Progress Note    Patient: Edenilson Watson MRN: [id number]  SSN: [id number]    YOB: 1970  Age: 49 y.o. Sex: male      Admit Date: 4/15/2022    LOS: 18 days     Subjective:   Postoperative day 18  Patient is seen in bed  Reports he has not had emesis since last night  Passing a small amount of flatus  Abdominal cramping pain improved    Objective:     Vitals:    05/02/22 1554 05/02/22 2010 05/03/22 0225 05/03/22 0734   BP: 133/88 (!) 150/95 (!) 155/95 (!) 153/98   Pulse: 99 64 (!) 101 92   Resp: 20 20 20 20   Temp: 99.7 °F (37.6 °C) 99.8 °F (37.7 °C) 99 °F (37.2 °C) 99.3 °F (37.4 °C)   SpO2: 100% 100% 99% 100%   Weight:       Height:            Intake and Output:  Current Shift: No intake/output data recorded. Last three shifts: 05/01 1901 - 05/03 0700  In: -   Out: 400 [Urine:400]    Review of Systems:  ROS     Physical Exam:   Abdomen soft, mildly distended, tender lower quadrants, incisions clean and intact    Lab/Data Review:  Recent Results (from the past 24 hour(s))   GLUCOSE, POC    Collection Time: 05/02/22  3:44 PM   Result Value Ref Range    Glucose (POC) 63 (L) 65 - 117 mg/dL    Performed by [email or web address]    GLUCOSE, POC    Collection Time: 05/02/22  4:31 PM   Result Value Ref Range    Glucose (POC) 106 65 - 117 mg/dL    Performed by [email or web address]    GLUCOSE, POC    Collection Time: 05/02/22  9:14 PM   Result Value Ref Range    Glucose (POC) 112 65 - 117 mg/dL    Performed by [address], POC    Collection Time: 05/02/22  9:19 PM   Result Value Ref Range    Glucose (POC) 124 (H) 65 - 117 mg/dL    Performed by [employer] [address], POC    Collection Time: 05/03/22  7:37 AM   Result Value Ref Range    Glucose (POC) 101 65 - 117 mg/dL    Performed by [facility]       CT abdomen pelvis May 2, 2022:     Unchanged appearance lung bases.     Liver appears unremarkable on this nonenhanced study.  Probable small volume  dependent sludge within nondistended bladder gallbladder. Pancreas, spleen, and  bilateral adrenal glands appear unremarkable. Each kidney unchanged.     Stomach nondistended. Mid/distal small bowel loops are fluid and air-filled  distended. Transition suspect at level of surgical anastomosis right mid  abdomen. Prior partial colectomy. Small volume complex ascites (nonspecific high density content, possible heme or  other dense material, bowel leak not excluded).     Unchanged appearance anterior abdominal wall.     IMPRESSION  Moderate grade partial small bowel obstruction. Small volume complex ascites. Assessment:     Active Problems:    Colon cancer ([address] (4/15/2022)      Cecal polyp (4/15/2022)        Plan:   Reviewed CT findings with the patient, told him that this will resolve is probably edema at the anastomosis; patient to remain n.p.o. with sips of water with meds. Hospitalist service consulted to switch his amiodarone and verapamil to IV medications. I have switched his prednisone to methylprednisolone IV.   Continue current pain management  Out of bed encourage ambulation    Signed By: Karthikeyan Corley MD     May 3, 2022

## 2022-05-04 NOTE — PROGRESS NOTES
Problem: Falls - Risk of  Goal: *Absence of Falls  Description: Document Phill Addison Fall Risk and appropriate interventions in the flowsheet.   Outcome: Progressing Towards Goal  Note: Fall Risk Interventions:  Mobility Interventions: Patient to call before getting OOB    Medication Interventions: Patient to call before getting OOB,Teach patient to arise slowly    History of Falls Interventions: Bed/chair exit alarm    Problem: Patient Education: Go to Patient Education Activity  Goal: Patient/Family Education  Outcome: Progressing Towards Goal     Problem: Pain  Goal: *Control of Pain  Outcome: Progressing Towards Goal     Problem: Infection - Risk of, Surgical Site Infection  Goal: *Absence of surgical site infection signs and symptoms  Outcome: Progressing Towards Goal

## 2022-05-04 NOTE — ADT AUTH CERT NOTES
Bowel Surgery: Colectomy, Partial, with or without Ostomy, by Laparoscopy - Care Day 18 (5/2/2022) by Kirstie Marrero RN       Review Status Review Entered   Completed 5/3/2022 16:18      Criteria Review      Care Day: 18 Care Date: 5/2/2022 Level of Care: Telemetry    Guideline Day 2    Level Of Care    (X) Floor    5/3/2022 16:18:44 EDT by Kirstie cr    Clinical Status    (X) * Procedure completed    ( ) * Hemodynamic stability    5/3/2022 16:18:44 EDT by Kirstie Marrero      99.8 92 161/102 20 95%RA    Activity    ( ) * Increase ambulation    Routes    (X) IV fluids    5/3/2022 16:18:44 EDT by Kirstie Marrero      [email or web address]    (X) IV medications    5/3/2022 16:18:44 EDT by Kirstie Marrero      zofran 4mg ivq6 prnx3    ( ) Advance diet as tolerated    5/3/2022 16:18:44 EDT by Kirstie Marrero      NPO    Medications    (X) Epidural, PCA, or other analgesics    5/3/2022 16:18:44 EDT by Kirstie Marrero      dilaudid 1mg ivq4 prnx5 toradol 30mg ivx1    * Milestone   Additional Notes   5/2 LOC IP Tele      CT abd   Moderate grade partial small bowel obstruction. Small volume complex ascites.       Surgery   Subjective:   Postoperative day 17   Patient seen in bed   Continues to complain of cramping abdominal pain   Denies any further emesis   States he had a small bowel movement   Plan:   Premedicate with Benadryl 50 mg and obtain CT scan abdomen pelvis with oral contrast.   Continue n.p.o. ice chips only for comfort      Physical Exam:    Abdomen is soft, tender in the right lower abdomen, nondistended, incisions clean and intact            Bowel Surgery: Colectomy, Partial, with or without Ostomy, by Laparoscopy - Care Day 17 (5/1/2022) by Kirstie Marrero RN       Review Status Review Entered   Completed 5/3/2022 16:14      Criteria Review      Care Day: 17 Care Date: 5/1/2022 Level of Care: Telemetry    Guideline Day 2    Level Of Care    (X) Floor    5/3/2022 16:14:12 EDT by Wu cr Clinical Status    (X) * Procedure completed    ( ) * Hemodynamic stability    5/3/2022 16:14:12 EDT by Reema Nye      99.9 102 145/92 20 99%RA    Activity    ( ) * Increase ambulation    Routes    (X) IV fluids    5/3/2022 16:14:12 EDT by [email or web address]    (X) IV medications    5/3/2022 16:14:12 EDT by Reema Nye      zpfran 4mg ivq6 prnx3    (X) Advance diet as tolerated    5/3/2022 16:14:12 EDT by Reema Nye      Easy to Chew; 3 carb choices (45 gm/meal); Lactose-Controlled; Likes fish. May eat fish, chicken, turkey, yogurt    Medications    (X) Epidural, PCA, or other analgesics    5/3/2022 16:14:12 EDT by Reema Nye      dilaudid 1mg ivq4 prnx2 percocet 10-325mg poq6 prnx1    * Milestone   Additional Notes   5/1 LOC IP Tele      Labs   WBC: 11.3 (H)   NRBC: 0.0   RBC: 4.83   HGB: 11.8 (L)   HCT: 38.8   MCV: 80.3   MCH: 24.4 (L)   MCHC: 30.4   RDW: 14.9 (H)   PLATELET: 784   MPV: 12.1   NEUTROPHILS: 78 (H)   LYMPHOCYTES: 9 (L)   MONOCYTES: 11   EOSINOPHILS: 1   BASOPHILS: 0   IMMATURE GRANULOCYTES: 1 (H)   DF: AUTOMATED   ABSOLUTE NRBC: 0.00   ABS. NEUTROPHILS: 8.8 (H)   ABS. IMM. GRANS.: 0.1 (H)   ABS. LYMPHOCYTES: 1.0   ABS. MONOCYTES: 1.3 (H)   ABS. EOSINOPHILS: 0.1   ABS. BASOPHILS: 0.0   Sodium: 139   Potassium: 3.3 (L)   Chloride: 103   CO2: 32   Anion gap: 4 (L)   Glucose: 184 (H)   BUN: 14   Creatinine: 0.48 (L)   BUN/Creatinine ratio: 29 (H)   Calcium: 9.2   GFR est non-AA: >60   GFR est AA: >60   Bilirubin, total: 0.6   Protein, total: 6.6   Albumin: 3.3 (L)   Globulin: 3.3   A-G Ratio: 1.0 (L)   ALT: 21   AST: 19   Alk.  phosphatase: 84      Surgery   Subjective:   Postoperative day 16   Patient seen in bed   Had increased abdominal pain and several episodes of emesis early this morning which she states initially was food and then progressed to just bilious vomiting   Currently lying in bed complaining only of mild right side abdominal pain   Plan:   N.p.o.   IV fluids CBC and CMP   Abdominal KUB      Physical Exam:    Abdomen soft, nondistended, mildly tender palpation on the right side, incisions clean and intact               Bowel Surgery: Colectomy, Partial, with or without Ostomy, by Laparoscopy - Care Day 16 (4/30/2022) by Walt Mcardle, RN       Review Status Review Entered   Completed 5/3/2022 16:10      Criteria Review      Care Day: 16 Care Date: 4/30/2022 Level of Care: Telemetry    Guideline Day 2    Level Of Care    (X) Floor    5/3/2022 16:10:01 EDT by Walt Mcardle      tele    Clinical Status    (X) * Procedure completed    ( ) * Hemodynamic stability    5/3/2022 16:10:01 EDT by Walt Mcardle      98.5 87 20 141/91 100%RA    Activity    ( ) * Increase ambulation    Routes    ( ) IV medications    5/3/2022 16:10:01 EDT by Walt Mcardle      amiodarone 200mg podaily eliquis 5mg [id number] lisinopril 20mg podaily protonix 40mg podaily potassium 10meq ca1sicndg prednisone 20mg podaily verpamil 200mg poqbedtime    (X) Advance diet as tolerated    5/3/2022 16:10:01 EDT by Walt Mcardle      Easy to Chew; 3 carb choices (45 gm/meal); Lactose-Controlled; Likes fish.  May eat fish, chicken, turkey, yogurt    Medications    ( ) Epidural, PCA, or other analgesics    5/3/2022 16:10:01 EDT by Walt Mcardle      percocet 10-325mg poq6 prnx3    * Milestone   Additional Notes   4/30 LOC IP Tele      Surgery   Subjective:   Postoperative day 15   Patient reports she has had no bowel movement or flatus over the past day   Abdominal discomfort    Plan:   Continue MiraLAX    Continue to encourage out of bed ambulation   Continue diet as ordered   Patient continues on amiodarone and verapamil per cardiology with Eliquis      Physical Exam:    Abdomen soft, tender palpation right lower quadrant, nondistended, incisions clean, periumbilical incision superior aspect has a small seroma

## 2022-05-04 NOTE — PROGRESS NOTES
2420: Chart reviewed. Per MD note, patient remains NPO with sips of water. When medically stable, patient to discharge home with [facility] SN only and hospital bed from [address] will continue to follow patient and recs of medical team.     1410: Today's clinical attached in [place] [address] to [facility].

## 2022-05-04 NOTE — PROGRESS NOTES
Received pt from [facility]. Pt is alert, oriented and does not have any complaints at this time. Will continue to care for pt until end of shift.

## 2022-05-04 NOTE — PROGRESS NOTES
Problem: Falls - Risk of  Goal: *Absence of Falls  Description: Document Nimesh Bocanegra Fall Risk and appropriate interventions in the flowsheet. Outcome: Progressing Towards Goal  Note: Fall Risk Interventions:  Mobility Interventions: Patient to call before getting OOB         Medication Interventions: Teach patient to arise slowly         History of Falls Interventions: Investigate reason for fall         Problem: Patient Education: Go to Patient Education Activity  Goal: Patient/Family Education  Outcome: Progressing Towards Goal     Problem: Pain  Goal: *Control of Pain  Outcome: Progressing Towards Goal     Problem: Infection - Risk of, Surgical Site Infection  Goal: *Absence of surgical site infection signs and symptoms  Outcome: Progressing Towards Goal     Problem: Pressure Injury - Risk of  Goal: *Prevention of pressure injury  Description: Document Yuri Scale and appropriate interventions in the flowsheet.   Outcome: Progressing Towards Goal  Note: Pressure Injury Interventions:  Sensory Interventions: Maintain/enhance activity level,Minimize linen layers         Activity Interventions: Increase time out of bed    Mobility Interventions: Float heels    Nutrition Interventions: Document food/fluid/supplement intake,Offer support with meals,snacks and hydration    Friction and Shear Interventions: Apply protective barrier, creams and emollients,Minimize layers                Problem: Patient Education: Go to Patient Education Activity  Goal: Patient/Family Education  Outcome: Progressing Towards Goal

## 2022-05-04 NOTE — PROGRESS NOTES
Progress Note    Patient: Amanda Joseph MRN: [id number]  SSN: [id number]    YOB: 1970  Age: 49 y.o. Sex: male      Admit Date: 4/15/2022    LOS: 19 days     Subjective:   Postoperative day 19  Patient seen in bed  Had small bowel movement yesterday midmorning and reports he passed flatus yesterday throughout the day  Denies nausea    Objective:     Vitals:    05/04/22 0345 05/04/22 0739 05/04/22 1044 05/04/22 1148   BP: (!) 146/96 130/78 128/85 (!) 141/90   Pulse: 82 75 78 84   Resp: 20 16 16 20   Temp: 98 °F (36.7 °C) 97.8 °F (36.6 °C) 98.1 °F (36.7 °C) 97.8 °F (36.6 °C)   SpO2: 100% 100% 100% 100%   Weight:       Height:            Intake and Output:  Current Shift: No intake/output data recorded. Last three shifts: No intake/output data recorded.     Review of Systems:  ROS     Physical Exam:   Abdomen is soft, very minimally distended, appropriately tender, incisions clean    Lab/Data Review:  Recent Results (from the past 24 hour(s))   GLUCOSE, POC    Collection Time: 05/03/22  4:06 PM   Result Value Ref Range    Glucose (POC) 194 (H) 65 - 117 mg/dL    Performed by [address] [place] [address], POC    Collection Time: 05/03/22  9:36 PM   Result Value Ref Range    Glucose (POC) 143 (H) 65 - 117 mg/dL    Performed by Bon Walton    GLUCOSE, POC    Collection Time: 05/04/22  7:44 AM   Result Value Ref Range    Glucose (POC) 76 65 - 117 mg/dL    Performed by [address], POC    Collection Time: 05/04/22 11:14 AM   Result Value Ref Range    Glucose (POC) 105 65 - 117 mg/dL    Performed by Maryellen Teague           Assessment:     Active Problems:    Colon cancer ([address] (4/15/2022)      Cecal polyp (4/15/2022)        Plan:   Start clear liquid diet  Continue amiodarone and verapamil  Continue home dose steroids  Continue Eliquis    Signed By: Candance Bushy, MD     May 4, 2022

## 2022-05-05 NOTE — PROGRESS NOTES
Problem: Falls - Risk of  Goal: *Absence of Falls  Description: Document Ad Celestin Fall Risk and appropriate interventions in the flowsheet. Outcome: Progressing Towards Goal  Note: Fall Risk Interventions:  Mobility Interventions: Strengthening exercises (ROM-active/passive)         Medication Interventions: Teach patient to arise slowly         History of Falls Interventions: Investigate reason for fall         Problem: Patient Education: Go to Patient Education Activity  Goal: Patient/Family Education  Outcome: Progressing Towards Goal     Problem: Pain  Goal: *Control of Pain  Outcome: Progressing Towards Goal     Problem: Infection - Risk of, Surgical Site Infection  Goal: *Absence of surgical site infection signs and symptoms  Outcome: Progressing Towards Goal     Problem: Pressure Injury - Risk of  Goal: *Prevention of pressure injury  Description: Document Yuri Scale and appropriate interventions in the flowsheet.   Outcome: Progressing Towards Goal  Note: Pressure Injury Interventions:  Sensory Interventions: Minimize linen layers         Activity Interventions: Increase time out of bed    Mobility Interventions: HOB 30 degrees or less    Nutrition Interventions: Document food/fluid/supplement intake    Friction and Shear Interventions: Apply protective barrier, creams and emollients                Problem: Patient Education: Go to Patient Education Activity  Goal: Patient/Family Education  Outcome: Progressing Towards Goal

## 2022-05-05 NOTE — PROGRESS NOTES
Comprehensive Nutrition Assessment    Type and Reason for Visit: Reassess (Goal)    Nutrition Recommendations/Plan:   1. NPO per MD rec's.  2. Initiate PPN as D5, AA 4.25% at 42 mL/hr, advance to goal rate of 83 mL/hr. PPN provides 678 kcal (34%), 85 gm PRO(90%). 3. Add D5 to IVF @ 100 mL/hr for additional kcals. PPN+IVF provides 1086 kcal(54%). 4. Please document PPN rate, tolerance, BMs in I/Os. Malnutrition Assessment:  Malnutrition Status:  Mild malnutrition (05/05/22 1122)   Context:  Acute illness       Nutrition Assessment:    Admitted for colon CA and cecal polyp w/ sx resection. Pt reported good appetite w/ poor intake 2/2 food preferences- multiple food allergies, prefers fish. Food preferences obtained. Reported 100 lb wt loss over 1 yr 2/2 catabolic illness. UBW=160 lbs x2 weeks ago, IHR=656 lbs on admission? Trend wts. Pt requested high protein ONS. (4/28) Intakes excellent, >76% of meals, with good ONS acceptance. (5/5)Noted +N/V episodes on 5/1, MD rec'd NPO; CT on 5/2 showed partial SBO-still NPO. Advanced to Clear Liq diet yesterday by MD. Pt reported +N/V/C x3 within 48 hrs; stated abd pain improves w/ episodes of vomiting. Unable to tolerate water/broth. Spoke w/ Attending agreeable to NPO and initiating PPN for EENs. RD to provide rec's. Labs: POC BG  mg/dL. Meds: [employer] mL/hr, colace, lisinopril, solu-medrol. Nutrition Related Findings:    +N/V/C/abd pain reported per Pt. No c/s issues but unable to tolerate diet. Last BM 5/3 per note? No edema. Wound Type: Surgical incision (Abdomen)     Current Nutrition Intake & Therapies:  Average Meal Intake: 1-25%  Average Supplement Intake: None ordered  ADULT DIET Clear Liquid; Likes fish. May eat fish, chicken, turkey, yogurt. Anthropometric Measures:  Height: 5' 7.4\" (171.2 cm)  Ideal Body Weight (IBW): 150 lbs (68 kg)  Admission Body Weight: 160 lb  Current Body Wt:  66.8 kg (147 lb 4.3 oz), 98.2 % IBW.  Bed scale  Current BMI (kg/m2): 22.8  Usual Body Weight: 72.6 kg (160 lb)  % Weight Change (Calculated): -8  Weight Adjustment: No adjustment  BMI Category: Normal weight (BMI 18.5-24. 9)    Estimated Daily Nutrient Needs:  Energy Requirements Based On: Kcal/kg  Weight Used for Energy Requirements: Ideal  Energy (kcal/day): 2004kcal (30kcal/kg - Ca)  Weight Used for Protein Requirements: Current  Protein (g/day): 94 gm/d  (1.4-CA)  Method Used for Fluid Requirements: 1 ml/kcal  Fluid (ml/day): 2004 mL/d    Nutrition Diagnosis:   · Inadequate oral intake related to catabolic illness,altered GI function as evidenced by NPO or clear liquid status due to medical condition,intake 0-25%,GI abnormality,vomiting,nausea,constipation    Nutrition Interventions:   Food and/or Nutrient Delivery: Continue NPO,Start parenteral nutrition  Nutrition Education/Counseling: No recommendations at this time  Coordination of Nutrition Care: Continue to monitor while inpatient  Plan of Care discussed with: MD, RN, Pt.    Goals:  Previous Goal Met: Progress towards goal(s) declining  Goals: Initiate nutrition support,by next RD assessment,Tolerate nutrition support at goal rate    Nutrition Monitoring and Evaluation:   Behavioral-Environmental Outcomes: None identified  Food/Nutrient Intake Outcomes: Parenteral nutrition intake/tolerance  Physical Signs/Symptoms Outcomes: Biochemical data,Hemodynamic status,GI status,Weight,Constipation,Nausea/vomiting    Discharge Planning: Too soon to determine    Denisse Michaels RD  Contact: ext. [phone] or [employer].

## 2022-05-05 NOTE — PROGRESS NOTES
Spiritual Care Assessment/Progress Note  [facility]      NAME: Abbie Webster      MRN: [id number]  AGE: 49 y.o.  SEX: male  Congregational Affiliation: Other   Language: English     5/5/2022     Total Time (in minutes): 16     Spiritual Assessment begun in [facility] through conversation with:         [x]Patient        [] Family    [] Friend(s)        Reason for Consult: Initial/Spiritual assessment, patient floor     Spiritual beliefs: (Please include comment if needed)     [] Identifies with a krishna tradition:         [] Supported by a krishna community:            [] Claims no spiritual orientation:           [] Seeking spiritual identity:                [x] Adheres to an individual form of spirituality:           [] Not able to assess:                           Identified resources for coping:      [] Prayer                               [] Music                  [] Guided Imagery     [x] Family/friends                 [] Pet visits     [] Devotional reading                         [] Unknown     [] Other:                                             Interventions offered during this visit: (See comments for more details)    Patient Interventions: Affirmation of emotions/emotional suffering,Affirmation of krishna,Catharsis/review of pertinent events in supportive environment,Bridging,Initial/Spiritual assessment, patient floor,Iconic (affirming the presence of God/Higher Power),Life review/legacy,Normalization of emotional/spiritual concerns,Congregational beliefs/image of God discussed,Coping skills reviewed/reinforced           Plan of Care:     [] Support spiritual and/or cultural needs    [] Support AMD and/or advance care planning process      [] Support grieving process   [] Coordinate Rites and/or Rituals    [] Coordination with community clergy   [] No spiritual needs identified at this time   [] Detailed Plan of Care below (See Comments)  [] Make referral to Music Therapy  [] Make referral to Pet Therapy     [] Make referral to Addiction services  [] Make referral to [facility]  [] Make referral to Spiritual Care Partner  [] No future visits requested        [x] Contact Spiritual Care for further referrals     Comments:  Visited patient in [address] [place] [address] for initial assessment. Patient was alone during the visit. Patient shared about his medical history of numerous hospitalization since young age and good support of many family members. Shared about his spiritual search and journey over the years and his current work of [employer] and [employer] which seemed meaningful to him. Provided chaplaincy education and supportive presence while exploring his needs and resources. Facilitated storytelling and normalized his experiences while affirming his familial support, health care needs and spiritual connections. Advised of  availability. Contact chaplains for further referrals. Chaplain Gery Leventhal, M.Div.    can be reached by calling the  at [facility]  [phone]

## 2022-05-05 NOTE — PROGRESS NOTES
Progress Note    Patient: Yrn Pearce MRN: [id number]  SSN: [id number]    YOB: 1970  Age: 49 y.o. Sex: male      Admit Date: 4/15/2022    LOS: 20 days     Subjective:   Postoperative day 20  Patient emesis last night  Denies bowel movement or flatus    Objective:     Vitals:    05/05/22 0237 05/05/22 0854 05/05/22 1110 05/05/22 1538   BP: (!) 140/84 (!) 150/91  (!) 153/96   Pulse: 85 88  80   Resp: 18 18     Temp: 98.6 °F (37 °C) 98.4 °F (36.9 °C)  98.3 °F (36.8 °C)   SpO2: 98% 100%  100%   Weight:       Height:   5' 7.4\" (1.712 m)         Intake and Output:  Current Shift: 05/05 0701 - 05/05 1900  In: 2700 [P.O.:500; I.V.:2200]  Out: -   Last three shifts: No intake/output data recorded. Review of Systems:  ROS     Physical Exam:   Abdomen is soft, mildly distended, appropriately tender, incisions clean    Lab/Data Review:  Recent Results (from the past 24 hour(s))   GLUCOSE, POC    Collection Time: 05/04/22  8:53 PM   Result Value Ref Range    Glucose (POC) 135 (H) 65 - 117 mg/dL    Performed by Sandie Bojorquez, POC    Collection Time: 05/05/22  8:38 AM   Result Value Ref Range    Glucose (POC) 83 65 - 117 mg/dL    Performed by HENRIQUE [id number] [address], POC    Collection Time: 05/05/22 10:58 AM   Result Value Ref Range    Glucose (POC) 102 65 - 117 mg/dL    Performed by HENRIQUE [id number] [place], POC    Collection Time: 05/05/22  3:18 PM   Result Value Ref Range    Glucose (POC) 124 (H) 65 - 117 mg/dL    Performed by Leighton Cisneros           Assessment:     Active Problems:    Colon cancer ([address] (4/15/2022)      Cecal polyp (4/15/2022)        Plan:   Abdomen KUB consistent with distal obstruction versus small bowel ileus  I advised the patient to only take sips of clears for comfort  Will start PPN  Repeat labs in a.m.   Continue home meds except    Signed By: John Paul Ruelas MD     May 5, 2022

## 2022-05-06 NOTE — PROGRESS NOTES
Progress Note    Patient: Zee Spring MRN: [id number]  SSN: [id number]    YOB: 1970  Age: 49 y.o. Sex: male      Admit Date: 4/15/2022    LOS: 21 days     Subjective:   Postoperative day 21  Patient has flatus or bowel movement  No further emesis    Objective:     Vitals:    05/05/22 1110 05/05/22 1538 05/05/22 1910 05/06/22 0321   BP:  (!) 153/96 (!) 158/98 (!) 157/97   Pulse:  80 81 85   Resp:   18 18   Temp:  98.3 °F (36.8 °C) 98.6 °F (37 °C) 98.4 °F (36.9 °C)   SpO2:  100% 100% 100%   Weight:       Height: 5' 7.4\" (1.712 m)           Intake and Output:  Current Shift: No intake/output data recorded. Last three shifts: 05/04 1901 - 05/06 0700  In: 2700 [P.O.:500;  I.V.:2200]  Out: -     Review of Systems:  ROS     Physical Exam:   Abdomen is soft, mildly distended, appropriately tender at periumbilical incision, incisions clean and intact    Lab/Data Review:  Recent Results (from the past 24 hour(s))   GLUCOSE, POC    Collection Time: 05/05/22  8:38 AM   Result Value Ref Range    Glucose (POC) 83 65 - 117 mg/dL    Performed by HENRIQUE [id number] [address] [place], POC    Collection Time: 05/05/22 10:58 AM   Result Value Ref Range    Glucose (POC) 102 65 - 117 mg/dL    Performed by HENRIQUE [id number] [place], POC    Collection Time: 05/05/22  3:18 PM   Result Value Ref Range    Glucose (POC) 124 (H) 65 - 117 mg/dL    Performed by HENRIQUE [id number] [address] [place], POC    Collection Time: 05/05/22  7:45 PM   Result Value Ref Range    Glucose (POC) 81 65 - 117 mg/dL    Performed by Dannie Lopez    GLUCOSE, POC    Collection Time: 05/06/22  5:51 AM   Result Value Ref Range    Glucose (POC) 57 (L) 65 - 117 mg/dL    Performed by Bryan Kang    GLUCOSE, POC    Collection Time: 05/06/22  6:32 AM   Result Value Ref Range    Glucose (POC) 96 65 - 117 mg/dL    Performed by Bryan Kang             Assessment:     Active Problems:    Colon cancer ([facility] [address] [facility] (4/15/2022)      Cecal polyp (4/15/2022)        Plan:   Continue early obstruction status post laparoscopic ileocecectomy for large cecal polyp. Continue n.p.o. with sips only. We will start PPN today. Repeat KUB and labs today.     signed By: Kell Powers MD     May 6, 2022

## 2022-05-06 NOTE — PROGRESS NOTES
Problem: Falls - Risk of  Goal: *Absence of Falls  Description: Document Lala Davenport Fall Risk and appropriate interventions in the flowsheet. Outcome: Progressing Towards Goal  Note: Fall Risk Interventions:  Mobility Interventions: OT consult for ADLs         Medication Interventions: Teach patient to arise slowly         History of Falls Interventions: Room close to nurse's station         Problem: Patient Education: Go to Patient Education Activity  Goal: Patient/Family Education  Outcome: Progressing Towards Goal     Problem: Pain  Goal: *Control of Pain  Outcome: Progressing Towards Goal     Problem: Infection - Risk of, Surgical Site Infection  Goal: *Absence of surgical site infection signs and symptoms  Outcome: Progressing Towards Goal     Problem: Pressure Injury - Risk of  Goal: *Prevention of pressure injury  Description: Document Yuri Scale and appropriate interventions in the flowsheet.   Outcome: Progressing Towards Goal  Note: Pressure Injury Interventions:  Sensory Interventions: Minimize linen layers,Maintain/enhance activity level         Activity Interventions: Increase time out of bed    Mobility Interventions: HOB 30 degrees or less    Nutrition Interventions: Document food/fluid/supplement intake,Offer support with meals,snacks and hydration    Friction and Shear Interventions: Apply protective barrier, creams and emollients,Minimize layers                Problem: Patient Education: Go to Patient Education Activity  Goal: Patient/Family Education  Outcome: Progressing Towards Goal

## 2022-05-06 NOTE — PROGRESS NOTES
1392  CM reviewed the clinical record. Patient has no dc order at this time. Per MD note, patient will start on PPN, remains NPO. CM will continue to follow patient's progress and providers recommendations at dc. Notes uploaded via [employer] [employer].      [address], [address]

## 2022-05-06 NOTE — PROGRESS NOTES
Problem: Falls - Risk of  Goal: *Absence of Falls  Description: Document Zoran Garnica Fall Risk and appropriate interventions in the flowsheet. Outcome: Progressing Towards Goal  Note: Fall Risk Interventions:  Mobility Interventions: OT consult for ADLs         Medication Interventions: Teach patient to arise slowly         History of Falls Interventions: Room close to nurse's station         Problem: Patient Education: Go to Patient Education Activity  Goal: Patient/Family Education  Outcome: Progressing Towards Goal     Problem: Pain  Goal: *Control of Pain  Outcome: Progressing Towards Goal     Problem: Infection - Risk of, Surgical Site Infection  Goal: *Absence of surgical site infection signs and symptoms  Outcome: Progressing Towards Goal     Problem: Pressure Injury - Risk of  Goal: *Prevention of pressure injury  Description: Document Yuri Scale and appropriate interventions in the flowsheet.   Outcome: Progressing Towards Goal  Note: Pressure Injury Interventions:  Sensory Interventions: Minimize linen layers,Maintain/enhance activity level         Activity Interventions: Increase time out of bed    Mobility Interventions: HOB 30 degrees or less    Nutrition Interventions: Document food/fluid/supplement intake,Offer support with meals,snacks and hydration    Friction and Shear Interventions: Apply protective barrier, creams and emollients,Minimize layers                Problem: Patient Education: Go to Patient Education Activity  Goal: Patient/Family Education  Outcome: Progressing Towards Goal

## 2022-05-06 NOTE — PROGRESS NOTES
Problem: Falls - Risk of  Goal: *Absence of Falls  Description: Document Luis Manuel Cease Fall Risk and appropriate interventions in the flowsheet. Outcome: Progressing Towards Goal  Note: Fall Risk Interventions:  Mobility Interventions: Patient to call before getting OOB         Medication Interventions: Teach patient to arise slowly         History of Falls Interventions: Room close to nurse's station         Problem: Patient Education: Go to Patient Education Activity  Goal: Patient/Family Education  Outcome: Progressing Towards Goal     Problem: Pain  Goal: *Control of Pain  Outcome: Progressing Towards Goal     Problem: Infection - Risk of, Surgical Site Infection  Goal: *Absence of surgical site infection signs and symptoms  Outcome: Progressing Towards Goal     Problem: Pressure Injury - Risk of  Goal: *Prevention of pressure injury  Description: Document Yuri Scale and appropriate interventions in the flowsheet.   Outcome: Progressing Towards Goal  Note: Pressure Injury Interventions:  Sensory Interventions: Minimize linen layers    Moisture Interventions: Minimize layers    Activity Interventions: Increase time out of bed    Mobility Interventions: HOB 30 degrees or less    Nutrition Interventions: Discuss nutritional consult with provider    Friction and Shear Interventions: Minimize layers                Problem: Patient Education: Go to Patient Education Activity  Goal: Patient/Family Education  Outcome: Progressing Towards Goal

## 2022-05-07 NOTE — PROGRESS NOTES
Progress Note    Patient: Earnestine Katz MRN: [id number]  SSN: [id number]    YOB: 1970  Age: 49 y.o.   Sex: male      Admit Date: 4/15/2022    LOS: 22 days     Subjective:   Postoperative day 22  Patient reports he passed flatus yesterday  Continues to complain of intermittent cramping  No emesis    Objective:     Vitals:    05/06/22 1505 05/06/22 1946 05/07/22 0235 05/07/22 0814   BP: 133/83 125/80 (!) 148/87 121/77   Pulse: 78 75 72 76   Resp: 20 20 20 18   Temp: 99.5 °F (37.5 °C) 99 °F (37.2 °C) 98.2 °F (36.8 °C) 98 °F (36.7 °C)   SpO2: 100% 99% 100% 100%   Weight:       Height:            Intake and Output:  Current Shift: 05/07 0701 - 05/07 1900  In: 30 [P.O.:30]  Out: 400 [Urine:400]  Last three shifts: 05/05 1901 - 05/07 0700  In: 1081.7 [P.O.:20; I.V.:1061.7]  Out: 1700 [Urine:1700]    Review of Systems:  ROS     Physical Exam:   Abdomen is soft, appropriately tender, mildly distended, incisions clean and intact    Lab/Data Review:  Recent Results (from the past 24 hour(s))   GLUCOSE, POC    Collection Time: 05/06/22  2:48 PM   Result Value Ref Range    Glucose (POC) 102 65 - 117 mg/dL    Performed by HENRIQUE [id number] [address], POC    Collection Time: 05/06/22  9:12 PM   Result Value Ref Range    Glucose (POC) 124 (H) 65 - 117 mg/dL    Performed by [id number], POC    Collection Time: 05/07/22  8:17 AM   Result Value Ref Range    Glucose (POC) 161 (H) 65 - 117 mg/dL    Performed by Cally Galvez (LPN)    GLUCOSE, POC    Collection Time: 05/07/22 11:20 AM   Result Value Ref Range    Glucose (POC) 130 (H) 65 - 117 mg/dL    Performed by Rebeca Vasquez           Assessment:     Active Problems:    Colon cancer ([address] (4/15/2022)      Cecal polyp (4/15/2022)        Plan:   Continue n.p.o./PPN  Abdomen KUB tomorrow morning  Labs tomorrow morning  Continue Eliquis and cardiac meds for atrial fibrillation    Signed By: Dennise Lemus MD     May 7, 2022

## 2022-05-08 NOTE — PROGRESS NOTES
0900 Patient has order for telemetry. Nurse went to room to place leads on patient and to explain that he has order for telemetry. Patient became agitated and stated that he refuses to have any more wires hooked onto his body. MD made aware.

## 2022-05-08 NOTE — PROGRESS NOTES
Progress Note    Patient: Raj Dozier MRN: [id number]  SSN: [id number]    YOB: 1970  Age: 49 y.o. Sex: male      Admit Date: 4/15/2022    LOS: 23 days     Subjective:   Postoperative day 23  Patient reports he continues to pass flatus  Continued intermittent cramping pain  No emesis    Objective:     Vitals:    05/08/22 0358 05/08/22 0528 05/08/22 0843 05/08/22 0855   BP: (!) 155/96 120/75 136/89 136/89   Pulse: 90 82 74 74   Resp: 18 16     Temp: 98.3 °F (36.8 °C) 98 °F (36.7 °C)     SpO2: 100% 100%     Weight:       Height:            Intake and Output:  Current Shift: No intake/output data recorded. Last three shifts: 05/06 1901 - 05/08 0700  In: 1472.3 [P.O.:90; I.V.:1382.3]  Out: 2100 [Urine:2100]    Review of Systems:  ROS     Physical Exam:   Abdomen is soft, mildly distended, mildly tender palpation, no guarding, incisions clean    Lab/Data Review:  Recent Results (from the past 24 hour(s))   GLUCOSE, POC    Collection Time: 05/07/22  4:37 PM   Result Value Ref Range    Glucose (POC) 205 (H) 65 - 117 mg/dL    Performed by [facility]    GLUCOSE, POC    Collection Time: 05/07/22  9:01 PM   Result Value Ref Range    Glucose (POC) 110 65 - 117 mg/dL    Performed by Luis Manuel Slater    METABOLIC PANEL, COMPREHENSIVE    Collection Time: 05/08/22  6:18 AM   Result Value Ref Range    Sodium 140 136 - 145 mmol/L    Potassium 3.7 3.5 - 5.1 mmol/L    Chloride 109 (H) 97 - 108 mmol/L    CO2 28 21 - 32 mmol/L    Anion gap 3 (L) 5 - 15 mmol/L    Glucose 127 (H) 65 - 100 mg/dL    BUN 12 6 - 20 mg/dL    Creatinine 0.33 (L) 0.70 - 1.30 mg/dL    BUN/Creatinine ratio 36 (H) 12 - 20      GFR est AA >60 >60 ml/min/1.73m2    GFR est non-AA >60 >60 ml/min/1.73m2    Calcium 8.5 8.5 - 10.1 mg/dL    Bilirubin, total 0.4 0.2 - 1.0 mg/dL    AST (SGOT) 11 (L) 15 - 37 U/L    ALT (SGPT) 15 12 - 78 U/L    Alk.  phosphatase 71 45 - 117 U/L    Protein, total 5.6 (L) 6.4 - 8.2 g/dL    Albumin 3.1 (L) 3.5 - 5.0 g/dL Globulin 2.5 2.0 - 4.0 g/dL    A-G Ratio 1.2 1.1 - 2.2     PHOSPHORUS    Collection Time: 05/08/22  6:18 AM   Result Value Ref Range    Phosphorus 2.7 2.6 - 4.7 mg/dL   CBC WITH AUTOMATED DIFF    Collection Time: 05/08/22  6:18 AM   Result Value Ref Range    WBC 7.1 4.1 - 11.1 K/uL    RBC 4.72 4.10 - 5.70 M/uL    HGB 11.2 (L) 12.1 - 17.0 g/dL    HCT 36.7 36.6 - 50.3 %    MCV 77.8 (L) 80.0 - 99.0 FL    MCH 23.7 (L) 26.0 - 34.0 PG    MCHC 30.5 30.0 - 36.5 g/dL    RDW 14.2 11.5 - 14.5 %    PLATELET 666 617 - 939 K/uL    MPV 11.7 8.9 - 12.9 FL    NRBC 0.0 0.0  WBC    ABSOLUTE NRBC 0.00 0.00 - 0.01 K/uL    NEUTROPHILS 61 32 - 75 %    LYMPHOCYTES 25 12 - 49 %    MONOCYTES 13 5 - 13 %    EOSINOPHILS 1 0 - 7 %    BASOPHILS 0 0 - 1 %    IMMATURE GRANULOCYTES 0 0 - 0.5 %    ABS. NEUTROPHILS 4.3 1.8 - 8.0 K/UL    ABS. LYMPHOCYTES 1.8 0.8 - 3.5 K/UL    ABS. MONOCYTES 0.9 0.0 - 1.0 K/UL    ABS. EOSINOPHILS 0.1 0.0 - 0.4 K/UL    ABS. BASOPHILS 0.0 0.0 - 0.1 K/UL    ABS. IMM. GRANS. 0.0 0.00 - 0.04 K/UL    DF AUTOMATED     GLUCOSE, POC    Collection Time: 05/08/22  7:59 AM   Result Value Ref Range    Glucose (POC) 125 (H) 65 - 117 mg/dL    Performed by Kizzy Ramires           Assessment:     Active Problems:    Colon cancer ([address] (4/15/2022)      Cecal polyp (4/15/2022)        Plan:   Continue n.p.o. sips  Continue PPN  Out of bed encourage ambulation  Repeat KUB in a.m.     Signed By: Hortencia Arroyo MD     May 8, 2022

## 2022-05-08 NOTE — PROGRESS NOTES
Problem: Falls - Risk of  Goal: *Absence of Falls  Description: Document Starleen Freeze Fall Risk and appropriate interventions in the flowsheet. Outcome: Progressing Towards Goal  Note: Fall Risk Interventions:  Mobility Interventions: Patient to call before getting OOB         Medication Interventions: Bed/chair exit alarm,Assess postural VS orthostatic hypotension,Patient to call before getting OOB         History of Falls Interventions: Bed/chair exit alarm         Problem: Patient Education: Go to Patient Education Activity  Goal: Patient/Family Education  Outcome: Progressing Towards Goal     Problem: Pain  Goal: *Control of Pain  Outcome: Progressing Towards Goal     Problem: Pressure Injury - Risk of  Goal: *Prevention of pressure injury  Description: Document Yuri Scale and appropriate interventions in the flowsheet. Outcome: Progressing Towards Goal  Note: Pressure Injury Interventions:  Sensory Interventions: Assess changes in LOC,Turn and reposition approx.  every two hours (pillows and wedges if needed),Minimize linen layers,Maintain/enhance activity level    Moisture Interventions: Absorbent underpads,Minimize layers    Activity Interventions: Increase time out of bed    Mobility Interventions: HOB 30 degrees or less    Nutrition Interventions: Document food/fluid/supplement intake    Friction and Shear Interventions: Apply protective barrier, creams and emollients,HOB 30 degrees or less,Minimize layers                Problem: Patient Education: Go to Patient Education Activity  Goal: Patient/Family Education  Outcome: Progressing Towards Goal

## 2022-05-09 NOTE — PROGRESS NOTES
CM reviewed the clinical record. Patient continues on PPN. No dc order noted yet. Patient's discharge dispo: home with [place], [facility].     [address], [address]

## 2022-05-09 NOTE — PROGRESS NOTES
Problem: Falls - Risk of  Goal: *Absence of Falls  Description: Document Joe Parker Fall Risk and appropriate interventions in the flowsheet.   Outcome: Progressing Towards Goal  Note: Fall Risk Interventions:  Mobility Interventions: Patient to call before getting OOB         Medication Interventions: Bed/chair exit alarm         History of Falls Interventions: Bed/chair exit alarm         Problem: Patient Education: Go to Patient Education Activity  Goal: Patient/Family Education  Outcome: Progressing Towards Goal     Problem: Pain  Goal: *Control of Pain  Outcome: Progressing Towards Goal     Problem: Infection - Risk of, Surgical Site Infection  Goal: *Absence of surgical site infection signs and symptoms  Outcome: Progressing Towards Goal

## 2022-05-09 NOTE — PROGRESS NOTES
Progress Note    Patient: Ivette Rey MRN: [id number]  SSN: [id number]    YOB: 1970  Age: 49 y.o. Sex: male      Admit Date: 4/15/2022    LOS: 24 days     Subjective:   Patient seen in bed  Reports he passed flatus several times yesterday and once today  Abdominal pain improved    Objective:     Vitals:    05/09/22 0018 05/09/22 0218 05/09/22 0647 05/09/22 0755   BP: (!) 152/93 (!) 146/100 (!) 141/96 (!) 146/92   Pulse: 77 80 92 83   Resp: 16 16 16 18   Temp: 98.4 °F (36.9 °C) 98.2 °F (36.8 °C) 98.2 °F (36.8 °C) 98.1 °F (36.7 °C)   SpO2: 100% 100% 100% 100%   Weight:       Height:            Intake and Output:  Current Shift: 05/09 0701 - 05/09 1900  In: 0   Out: 600 [Urine:600]  Last three shifts: 05/07 1901 - 05/09 0700  In: 1442.3 [P.O.:60; I.V.:1382.3]  Out: 1000 [Urine:1000]    Review of Systems:  ROS     Physical Exam:   Abdomen is soft, mildly distended, mildly tender palpation right side, incisions clean    Lab/Data Review:  Recent Results (from the past 24 hour(s))   GLUCOSE, POC    Collection Time: 05/08/22  4:32 PM   Result Value Ref Range    Glucose (POC) 261 (H) 65 - 117 mg/dL    Performed by Leonides Cardona, POC    Collection Time: 05/08/22 10:44 PM   Result Value Ref Range    Glucose (POC) 122 (H) 65 - 117 mg/dL    Performed by Pedro Moser    GLUCOSE, POC    Collection Time: 05/09/22  9:16 AM   Result Value Ref Range    Glucose (POC) 109 65 - 117 mg/dL    Performed by Shi Oliver    GLUCOSE, POC    Collection Time: 05/09/22 11:45 AM   Result Value Ref Range    Glucose (POC) 148 (H) 65 - 117 mg/dL    Performed by New Fam           Assessment:     Active Problems:    Colon cancer ([address] (4/15/2022)      Cecal polyp (4/15/2022)        Plan:   Improving  Continue PPN  Abdominal KUB in a.m.   Repeat labs in the    Signed By: Nyla Herrera MD     May 9, 2022

## 2022-05-09 NOTE — PROGRESS NOTES
Nutrition Assessment     Type and Reason for Visit: Reassess (interim)    Nutrition Recommendations/Plan:   Continue w/ PPN as D5, AA 4.25% at 42 mL/hr, advance to goal rate of 83 mL/hr.            provides 678 kcal (34%), 85 gm PRO(90%). D5W @ 25ml/hr/ 102kcals (38%)    Once Medically stable, start alternative means of nutrition via NGT or diet upgraded to better meet EENS     Nutrition Assessment:  Admitted for colon CA and cecal polyp w/ sx resection. Pt reported good appetite w/ poor intake 2/2 food preferences. (4/28) Intakes excellent, >76% of meals, with good ONS acceptance. (5/5)Noted +N/V episodes on 5/1, MD rec'd NPO; CT on 5/2 showed partial SBO-still NPO. Advanced to Clear Liq diet yesterday by MD. Pt reported +N/V/C x3 within 48 hrs; stated abd pain improves w/ episodes of vomiting. Unable to tolerate water/broth. Made NPO and initiating PPN for EENs. (5/9): results still some obstruction, PPN x4day @ goal rate, meeting less 50% of caloric needs. Rec to start alternative means of nutrition via  NGT or upgrade diet as medically able too to better meet EENs. Labs: Protein (5.6), alb (3.1), glucose (127), Cre (.33). Meds: D5W, Eliquis, colace, Humalog, lisinopril, Zofran, PPI    Malnutrition Assessment:  Malnutrition Status: Mild malnutrition     Estimated Daily Nutrient Needs:  Energy (kcal):  2004kcal (30kcal/kg - Ca)  Protein (g):  94 gm/d  (1.4-CA)       Fluid (ml/day):  2004 mL/d    Nutrition Related Findings:  no reported recent n/v/d, intermittent cramping pain. last bm noted (4/30).     Current Nutrition Therapies:  DIET NPO  TPN ADULT-PERIPHERAL AA 4.25% D5% + ELECTROLYTES  TPN ADULT-PERIPHERAL AA 4.25% D5% + ELECTROLYTES  TPN ADULT-PERIPHERAL AA 4.25% D5% + ELECTROLYTES  TPN ADULT-PERIPHERAL AA 4.25% D5% + ELECTROLYTES    Anthropometric Measures:  Height:  5' 7.4\" (171.2 cm)  Current Body Wt:  67.8 kg (149 lb 7.6 oz)  BMI: 23.1    Nutrition Diagnosis:   · Inadequate oral intake related to catabolic illness,altered GI function as evidenced by NPO or clear liquid status due to medical condition,intake 0-25%,GI abnormality,vomiting,nausea,constipation      Nutrition Interventions:   Food and/or Nutrient Delivery: Continue parenteral nutrition  Nutrition Education/Counseling: Education not indicated  Coordination of Nutrition Care: Continue to monitor while inpatient  Plan of Care discussed with: RN    Goals:  Previous Goal Met: Goal(s) achieved  Goals: Initiate nutrition support,by next RD assessment,Tolerate nutrition support at goal rate       Nutrition Monitoring and Evaluation:   Behavioral-Environmental Outcomes: None identified  Food/Nutrient Intake Outcomes: Parenteral nutrition intake/tolerance,Diet advancement/tolerance,Enteral nutrition intake/tolerance  Physical Signs/Symptoms Outcomes: Biochemical data,GI status,Constipation,Weight    Discharge Planning:    No discharge needs at this time    [address]: 1950

## 2022-05-09 NOTE — PROGRESS NOTES
Shift change report given to Moira Elizabeth (oncoming nurse) by Maria Dolores Medeiros (offgoing nurse). Report included the following information SBAR, Kardex, OR Summary, Procedure Summary, Intake/Output, MAR and Recent Results.

## 2022-05-09 NOTE — PROGRESS NOTES
Problem: Falls - Risk of  Goal: *Absence of Falls  Description: Document Susi Martin Fall Risk and appropriate interventions in the flowsheet.   Outcome: Progressing Towards Goal  Note: Fall Risk Interventions:  Mobility Interventions: Patient to call before getting OOB         Medication Interventions: Bed/chair exit alarm         History of Falls Interventions: Bed/chair exit alarm         Problem: Patient Education: Go to Patient Education Activity  Goal: Patient/Family Education  Outcome: Progressing Towards Goal

## 2022-05-10 NOTE — PROGRESS NOTES
Problem: Falls - Risk of  Goal: *Absence of Falls  Description: Document Calvin Castaneda Fall Risk and appropriate interventions in the flowsheet. Outcome: Progressing Towards Goal  Note: Fall Risk Interventions:  Mobility Interventions: Patient to call before getting OOB         Medication Interventions: Bed/chair exit alarm         History of Falls Interventions: Bed/chair exit alarm         Problem: Patient Education: Go to Patient Education Activity  Goal: Patient/Family Education  Outcome: Progressing Towards Goal     Problem: Pain  Goal: *Control of Pain  Outcome: Progressing Towards Goal     Problem: Infection - Risk of, Surgical Site Infection  Goal: *Absence of surgical site infection signs and symptoms  Outcome: Progressing Towards Goal     Problem: Pressure Injury - Risk of  Goal: *Prevention of pressure injury  Description: Document Yuri Scale and appropriate interventions in the flowsheet.   Outcome: Progressing Towards Goal  Note: Pressure Injury Interventions:  Sensory Interventions: Assess changes in LOC,Keep linens dry and wrinkle-free,Minimize linen layers    Moisture Interventions: Absorbent underpads    Activity Interventions: Increase time out of bed    Mobility Interventions: HOB 30 degrees or less    Nutrition Interventions: Document food/fluid/supplement intake    Friction and Shear Interventions: Minimize layers,Apply protective barrier, creams and emollients                Problem: Patient Education: Go to Patient Education Activity  Goal: Patient/Family Education  Outcome: Progressing Towards Goal

## 2022-05-10 NOTE — OP NOTES
Operative Note    Patient: Lazarus Starch  YOB: 1970  MRN: [id number]    Date of Procedure: 4/15/2022     Pre-Op Diagnosis: Adenomatous polyp of cecum     Post-Op Diagnosis: Same as preoperative diagnosis    Procedure(s):  LAPAROSCOPIC ILEOCECECTOMY    Surgeon(s):  Chuck Villegas MD    Surgical Assistant: Hope Opitz    Anesthesia: General     Estimated Blood Loss (mL):  68RW    Complications: None    Specimens:   ID Type Source Tests Collected by Time Destination   1 : Cecum Preservative Cecum  Chukc Villegas MD 4/15/2022 1030 Pathology   1 : Urine Culture Urine Byrd Specimen CULTURE, URINE Chuck Villegas MD 4/15/2022 1053 Microbiology        Implants: * No implants in log *    Drains: * No LDAs found *    Findings: Large polyp in cecum    Detailed Description of Procedure: The patient was brought to the operating room and positioned on the OR table in the supine position. Following the induction of general anesthesia the abdomen was prepped and draped out in the standard sterile fashion. A surgical timeout was performed at which time the patient's identity and surgical procedure were once again confirmed. A small supraumbilical incision was made and taken down through the subcutaneous tissues with electrocautery. The fascia was grasped and elevated between 2 Kocher clamps and sharply incised with a scalpel. A Jaki clamp was used to bluntly pass into the abdominal cavity and 0 Vicryl sutures were placed on either side of the fascial opening and the Hernandez trocar introduced and secured in place. The abdomen was insufflated to pressure of 15 mmHg. Under direct visualization after infiltrating with 0.25% Marcaine with epinephrine a 5 mm suprapubic port and a 5 mm left lower quadrant port was inserted. The patient was placed in the Trendelenburg position with the left side down.     Small bowel was gently mobilized up to the left upper quadrant and the cecum and ascending colon mobilized by dividing the peritoneal attachments using the Enseal device. The patient was then repositioned in the reverse Trendelenburg position and the hepatocolic ligament divided again using the Enseal device. At this point all of them fully mobilized by dividing any filmy retroperitoneal attachments again with the Enseal device. The colon for them to mobilize fully to the midline. A grasper was left on the colon to the superior border and the laparoscope was withdrawn and umbilical port withdrawn and the umbilical incision extended slightly to allow a medium wound protector to be placed. The colon was unable to be delivered up through the wound protector. And appropriate site at the terminal ileum and the distal ascending colon windows were created between the bowel and the mesentery and a Penrose drain passed. A functional end-to-end side-to-side anastomosis was fashioned using the NICHELLE 75 stapling device. The same stapler was used to transect the bowel and close the ventral colotomy. The Enseal device was then used to sequentially clamp and divide the mesentery of the excluded bowel. The specimen was then passed off the table. As ensuring meticulous hemostasis along the mesenteric division line a 3-0 Vicryl suture was placed at the crotch of the anastomosis. With gentle pressure on the bowel was noted the anastomosis was airtight. The bowels are returned to its anatomic position and the abdominal cavity. The wound protector was removed and the fascia of the incision was closed using interrupted figure-of-eight #1 Vicryl sutures. The abdomen was then reinsufflated and a 5 mm laparoscope introduced. The surgical site was inspected and again meticulous hemostasis noted. At this point all ports were withdrawn the abdomen allowed to desufflate. Additional 0.25% Marcaine with epinephrine was infiltrated umbilical incision.   All skin incisions were closed with 4-0 Monocryl subcuticular suture and Dermabond dressing applied. The patient was awakened, extubated, taken recovery in stable condition. All counts were correct at the close of the case x2.     Electronically Signed by Chuck Conway MD on 5/10/2022 at 5:47 PM

## 2022-05-11 NOTE — PROGRESS NOTES
Progress Note    Patient: Alber Nur MRN: [id number]  SSN: [id number]    YOB: 1970  Age: 49 y.o. Sex: male      Admit Date: 4/15/2022    LOS: 26 days     Subjective:   Patient seen in bed  Reports he passed a large amount of flatus yesterday none today  No nausea or vomiting    Objective:     Vitals:    05/10/22 2107 05/11/22 0749 05/11/22 1426 05/11/22 1534   BP: (!) 170/106 (!) 151/97 (!) 151/96    Pulse: 89 83 62    Resp: 20 16 19    Temp: 98.6 °F (37 °C) 97.9 °F (36.6 °C) 98.3 °F (36.8 °C)    SpO2: 99% 100% (!) 85%    Weight:    146 lb 9.7 oz (66.5 kg)   Height:            Intake and Output:  Current Shift: 05/11 0701 - 05/11 1900  In: -   Out: 350 [Urine:350]  Last three shifts: 05/09 1901 - 05/11 0700  In: -   Out: 1850 [Urine:1850]    Review of Systems:  ROS     Physical Exam:   Abdomen is soft, moderately distended, mildly tender, incisions clean    Lab/Data Review:  Recent Results (from the past 24 hour(s))   GLUCOSE, POC    Collection Time: 05/10/22  9:10 PM   Result Value Ref Range    Glucose (POC) 156 (H) 65 - 117 mg/dL    Performed by Aarti Moseley    GLUCOSE, POC    Collection Time: 05/11/22  7:54 AM   Result Value Ref Range    Glucose (POC) 107 65 - 117 mg/dL    Performed by Oralee Call    GLUCOSE, POC    Collection Time: 05/11/22 11:38 AM   Result Value Ref Range    Glucose (POC) 182 (H) 65 - 117 mg/dL    Performed by Nallely Sigala           Assessment:     Active Problems:    Colon cancer ([address] (4/15/2022)      Cecal polyp (4/15/2022)        Plan:   CT scan in a.m., I told the patient that in all likelihood he would need reexploration to see why he is not fully opened up.   Continue PPN  N.p.o. sips only    Signed By: Bob Gross MD     May 11, 2022

## 2022-05-11 NOTE — PROGRESS NOTES
Problem: Falls - Risk of  Goal: *Absence of Falls  Description: Document [place] Fall Risk and appropriate interventions in the flowsheet. Outcome: Progressing Towards Goal  Note: Fall Risk Interventions:  Mobility Interventions: Patient to call before getting OOB         Medication Interventions: Evaluate medications/consider consulting pharmacy,Patient to call before getting OOB         History of Falls Interventions: Bed/chair exit alarm         Problem: Patient Education: Go to Patient Education Activity  Goal: Patient/Family Education  Outcome: Progressing Towards Goal     Problem: Pain  Goal: *Control of Pain  Outcome: Progressing Towards Goal     Problem: Infection - Risk of, Surgical Site Infection  Goal: *Absence of surgical site infection signs and symptoms  Outcome: Progressing Towards Goal     Problem: Pressure Injury - Risk of  Goal: *Prevention of pressure injury  Description: Document Yuri Scale and appropriate interventions in the flowsheet.   Outcome: Progressing Towards Goal  Note: Pressure Injury Interventions:  Sensory Interventions: Assess changes in LOC,Keep linens dry and wrinkle-free,Minimize linen layers    Moisture Interventions: Absorbent underpads    Activity Interventions: Increase time out of bed,PT/OT evaluation    Mobility Interventions: HOB 30 degrees or less,PT/OT evaluation    Nutrition Interventions: Offer support with meals,snacks and hydration,Discuss nutritional consult with provider,Document food/fluid/supplement intake    Friction and Shear Interventions: Minimize layers,Apply protective barrier, creams and emollients                Problem: Patient Education: Go to Patient Education Activity  Goal: Patient/Family Education  Outcome: Progressing Towards Goal

## 2022-05-11 NOTE — ADT AUTH CERT NOTES
Bowel Surgery: Colectomy, Partial, with or without Ostomy, by Laparoscopy - Care Day 25 (5/9/2022) by Lexus Clarke       Review Entered Review Status   5/10/2022 15:40 Completed      Criteria Review      Care Day: 25 Care Date: 5/9/2022 Level of Care: Inpatient Floor    Guideline Day 3    Level Of Care    (X) Floor    5/10/2022 15:40:28 EDT by Lexus Clarke      99.1, 81, 131/84, 16, 97%    Clinical Status    ( ) * No evidence of ileus or bowel obstruction    5/10/2022 15:40:28 EDT by Lexus Clarke      Small bowel dilatation consistent with obstruction    Activity    (X) * Ambulatory    Routes    ( ) * Liquid or usual diet, advance as tolerated    ( ) * Oral medications    (X) Possible IV fluids    5/10/2022 15:40:28 EDT by eLxus Clarke      IVF 25ML/HR    (X) Possible IV medications    5/10/2022 15:40:28 EDT by Lexus Clarke      DILAUDID 1MG IV X4, TORADOL 30MG IV X2, SOLUMEDROL 20MG IV DAILY, ZOFRAN 4MG IV X2,    Medications    (X) * Epidural analgesics absent    * Milestone   Additional Notes   5-9-22      Assessment:       Active Problems:     Colon cancer ([facility] [address] (4/15/2022)         Cecal polyp (4/15/2022)               Plan:   Improving   Continue PPN   Abdominal KUB in a.m.    Repeat labs in the           Bowel Surgery: Colectomy, Partial, with or without Ostomy, by Laparoscopy - Care Day 24 (5/8/2022) by Lexus Clarke       Review Entered Review Status   5/10/2022 15:34 Completed      Criteria Review      Care Day: 24 Care Date: 5/8/2022 Level of Care: Inpatient Floor    Guideline Day 3    Level Of Care    (X) Floor    5/10/2022 15:34:02 EDT by Lexus Clarke      98.5, 68, 126/78, 16, 96%    Clinical Status    (X) * No evidence of ileus or bowel obstruction    Activity    (X) * Ambulatory    Routes    ( ) * Liquid or usual diet, advance as tolerated    ( ) * Oral medications    (X) Possible IV fluids    5/10/2022 15:34:02 EDT by Lexus Clarke      IVF D5 0.45 NS 25ML/HR    (X) Possible IV medications 5/10/2022 15:34:02 EDT by Elenita Blackburn      TORADOL 30MG IV X1  SOLUMEDROL 20MG IV DAILY  ZOFRAN 4MG IV X4  DILAUDID 1MG IV X6    Medications    (X) * Epidural analgesics absent    * Milestone   Additional Notes   5-8-22      Assessment:       Active Problems:     Colon cancer ([facility] [address] (4/15/2022)         Cecal polyp (4/15/2022)               Plan:   Continue n.p.o. sips   Continue PPN   Out of bed encourage ambulation   Repeat KUB in a.m. XR-   IMPRESSION   Persistent small bowel distention as seen with obstruction. 5/8/2022 06:18   Sodium: 140   Potassium: 3.7   Chloride: 109 (H)   CO2: 28   Anion gap: 3 (L)   Glucose: 127 (H)   BUN: 12   Creatinine: 0.33 (L)   BUN/Creatinine ratio: 36 (H)   Calcium: 8.5   Phosphorus: 2.7   GFR est non-AA: >60   GFR est AA: >60   Bilirubin, total: 0.4   Protein, total: 5.6 (L)   Albumin: 3.1 (L)   Globulin: 2.5   A-G Ratio: 1.2   ALT: 15   AST: 11 (L)   Alk.  phosphatase: 71

## 2022-05-12 NOTE — PROGRESS NOTES
Progress Note    Patient: Ji Biswas MRN: [id number]  SSN: [id number]    YOB: 1970  Age: 49 y.o. Sex: male      Admit Date: 4/15/2022    LOS: 27 days     Subjective:   Patient seen in bed  Continues to complain of some abdominal discomfort  Is passing small amount of gas    Objective:     Vitals:    05/11/22 1944 05/12/22 0025 05/12/22 1333 05/12/22 1444   BP: (!) 162/105 (!) 140/99  (!) 149/96   Pulse: 82 71  92   Resp: 20 18  20   Temp: 97.7 °F (36.5 °C) 98.5 °F (36.9 °C)  98.8 °F (37.1 °C)   SpO2: 99% 99%  100%   Weight:       Height:   5' 7.4\" (1.712 m)         Intake and Output:  Current Shift: No intake/output data recorded. Last three shifts: 05/10 1901 - 05/12 0700  In: -   Out: 1950 [Urine:1950]    Review of Systems:  ROS     Physical Exam:   Abdomen is soft, mildly tender palpation mid abdomen, distended, incisions healed    Lab/Data Review:  Recent Results (from the past 24 hour(s))   GLUCOSE, POC    Collection Time: 05/11/22  5:49 PM   Result Value Ref Range    Glucose (POC) 249 (H) 65 - 117 mg/dL    Performed by [address] [place] [address], POC    Collection Time: 05/11/22  7:49 PM   Result Value Ref Range    Glucose (POC) 190 (H) 65 - 117 mg/dL    Performed by Romulo Betts    GLUCOSE, POC    Collection Time: 05/12/22  7:25 AM   Result Value Ref Range    Glucose (POC) 115 65 - 117 mg/dL    Performed by Louis Sherman    GLUCOSE, POC    Collection Time: 05/12/22 11:39 AM   Result Value Ref Range    Glucose (POC) 117 65 - 117 mg/dL    Performed by Antonette Branch           Assessment:     Active Problems:    Colon cancer ([address] (4/15/2022)      Cecal polyp (4/15/2022)        Plan:   I reviewed his new CT scan with him. It still shows significant gaseous distention of the small bowel coming down to the area of his anastomosis. There is some gas and stool in the colon however it appears that he does have an obstruction at the site of the anastomosis.   I recommended exploration with possible resection of the anastomosis and the anastomosis depending on what I find. I reviewed the risk and benefits and the risk of infection, bleeding, injury to intra-abdominal organs and structures, anastomotic leak. He wishes to proceed with surgery and surgery scheduled for tomorrow.     Signed By: John Paul Ruelas MD     May 12, 2022

## 2022-05-12 NOTE — PROGRESS NOTES
Comprehensive Nutrition Assessment    Type and Reason for Visit: Reassess (Goal)    Nutrition Recommendations/Plan:   1. Continue w/ PPN as D5, AA 4.25% at goal rate of 83 mL/hr.            provides 678 kcal (34%), 85 gm PRO(85%) +D5W @ 25ml/hr/ 102kcals (38%)  2. As medically able, advance PO diet or initiate alternative means of nutrition (NGT) to meet EENs  3. Monitor and record intakes and BMs in I/Os     Malnutrition Assessment:  Malnutrition Status:  Mild malnutrition (05/12/22 1332)    Context:  Acute illness     Findings of the 6 clinical characteristics of malnutrition:   Energy Intake:  50% or less of est energy requirements for 5 or more days  Weight Loss:  No significant weight loss     Body Fat Loss:  No significant body fat loss,     Muscle Mass Loss:  No significant muscle mass loss,    Fluid Accumulation:  No significant fluid accumulation,     Strength:  Not performed     Nutrition Assessment:    Admitted for colon CA and cecal polyp w/ sx resection. Pt reported good appetite w/ poor intake 2/2 food preferences. (4/28) Intakes excellent, >76% of meals, with good ONS acceptance. (5/5)Noted +N/V episodes on 5/1, MD rec'd NPO; CT on 5/2 showed partial SBO-still NPO. Advanced to Clear Liq diet yesterday by MD. Pt reported +N/V/C x3 within 48 hrs; stated abd pain improves w/ episodes of vomiting. Unable to tolerate water/broth. Made NPO and initiating PPN for EENs. (5/9): results still some obstruction, PPN x4day @ goal rate, meeting less 50% of caloric needs. Rec to start alternative means of nutrition via NGT or upgrade diet as medically able too to better meet EENs. (5/12) PPN x7days. +flatus last night, no n/v. Plan for CT scan this AM- per MD, cont PPN/NPO. Labs: Na 138, K 3.7, BUN 15, Creat 0.39, Gluc 118, Alb 3.0. Meds: D5%, 0.45% NaCl with KCl 20mEq/L, docusate, insulin lispro, methylprednisone, ondansetron, pantoprazole.     Nutrition Related Findings:    (P) No n/v, d/c, or problems chewing/swallowing. No edema. Last BM 4/30. Wound Type: Surgical incision (abdomen)    Current Nutrition Intake & Therapies:  Average Meal Intake: (P) NPO  Average Supplement Intake: (P) NPO  DIET NPO  TPN ADULT-PERIPHERAL AA 4.25% D5% + ELECTROLYTES  TPN ADULT-PERIPHERAL AA 4.25% D5% + ELECTROLYTES    Anthropometric Measures:  Height: 5' 7.4\" (171.2 cm)  Ideal Body Weight (IBW): 150 lbs (68 kg)  Admission Body Weight: 160 lb  Current Body Wt:  66.5 kg (146 lb 9.7 oz), 97.7 % IBW. Bed scale  Current BMI (kg/m2): 22.7  Usual Body Weight: 72.6 kg (160 lb)  % Weight Change (Calculated): -8  Weight Adjustment: No adjustment  BMI Category: Normal weight (BMI 18.5-24. 9)    Estimated Daily Nutrient Needs:  Energy Requirements Based On: (P) Kcal/kg  Weight Used for Energy Requirements: (P) Current  Energy (kcal/day): (P) 1995kcal (30kcal/kg - Ca)  Weight Used for Protein Requirements: (P) Current  Protein (g/day): (P) 100g (1.5g/kg - Ca)  Method Used for Fluid Requirements: (P) 1 ml/kcal  Fluid (ml/day): (P) 1995mL (1mL/kcal)    Nutrition Diagnosis:   · (P) Inadequate oral intake related to (P) catabolic illness,altered GI function as evidenced by (P) NPO or clear liquid status due to medical condition,GI abnormality,constipation    Nutrition Interventions:   Food and/or Nutrient Delivery: (P) Continue parenteral nutrition  Nutrition Education/Counseling: (P) Education not indicated  Coordination of Nutrition Care: (P) Continue to monitor while inpatient  Plan of Care discussed with: RN    Goals:  Previous Goal Met: (P) Goal(s) achieved  Goals: (P) Initiate nutrition support,Tolerate nutrition support at goal rate    Nutrition Monitoring and Evaluation:   Behavioral-Environmental Outcomes: (P) None identified  Food/Nutrient Intake Outcomes: (P) Parenteral nutrition intake/tolerance,Diet advancement/tolerance  Physical Signs/Symptoms Outcomes: (P) Biochemical data,Constipation,GI status,Nausea/vomiting,Weight    Discharge Planning:    (P) Too soon to determine    Brenda Huang, JONNA  Contact: [phone]

## 2022-05-12 NOTE — ANESTHESIA PREPROCEDURE EVALUATION
Relevant Problems   RESPIRATORY SYSTEM   (+) Asthma      NEUROLOGY   (+) Migraine      CARDIOVASCULAR   (+) Atrial flutter (HCC)   (+) Hypertension      GASTROINTESTINAL   (+) Gastroesophageal reflux disease      ENDOCRINE   (+) Diabetes mellitus (HCC)      PERSONAL HX & FAMILY HX OF CANCER   (+) Colon cancer (HCC)       Anesthetic History   No history of anesthetic complications            Review of Systems / Medical History  Patient summary reviewed, nursing notes reviewed and pertinent labs reviewed    Pulmonary            Asthma     Comments: Allergic rhinitis. Neuro/Psych         Headaches     Cardiovascular    Hypertension        Dysrhythmias : atrial flutter        Comments: 4/19/2022 ECG: Sinus rhythm with Premature atrial complexes   Minimal voltage criteria for LVH, may be normal variant   Nonspecific T wave abnormality   Abnormal ECG   When compared with ECG of 17-APR-2022 08:29,   Premature atrial complexes are now Present   Vent. rate has decreased  BPM   ST no longer depressed in Inferior leads. GI/Hepatic/Renal     GERD          Comments: Colon polyp. Cecal polyp. Small bowel obstruction. Endo/Other    Diabetes: type 2, using insulin    Cancer (CA colon. )    Comments: Polymyositis. Myasthenia gravis. Other Findings   Comments: ELIQUIS: stopped 2 days ago. Physical Exam    Airway  Mallampati: II  TM Distance: 4 - 6 cm  Neck ROM: normal range of motion   Mouth opening: Normal     Cardiovascular    Rhythm: regular  Rate: normal         Dental      Comments: Dentures. Pulmonary  Breath sounds clear to auscultation               Abdominal  GI exam deferred      Comments: On TPN.   Other Findings            Anesthetic Plan    ASA: 3  Anesthesia type: general          Induction: RSI  Anesthetic plan and risks discussed with: Patient

## 2022-05-12 NOTE — ROUTINE PROCESS
Bedside and Verbal shift change report given to Sai Sheth (oncoming nurse) by Marisela Baxter (offgoing nurse). Report included the following information SBAR and MAR.

## 2022-05-13 NOTE — BRIEF OP NOTE
Brief Postoperative Note    Patient: Daniel Gee  YOB: 1970  MRN: [id number]    Date of Procedure: 5/13/2022     Pre-Op Diagnosis: SMALL BOWEL OBSTRUCTION    Post-Op Diagnosis: Same as preoperative diagnosis. Anastomotic stricture secondary to hematoma and omental adhesions    Procedure(s):  Exploratory Laparotomy, Lysis Of Adhesion, resection of ileocolic anastomosis with reanastomosis    Surgeon(s):  Trevon Bloom DO Alyson Shearer, MD    Surgical Assistant: Surg Asst-1: Armin Grade    Anesthesia: General     Estimated Blood Loss (mL): 015 mL    Complications: None    Specimens:   ID Type Source Tests Collected by Time Destination   1 : Ileocolic Anastomosis Preservative Other                  Georgette Huang MD 5/13/2022 1235 Pathology   1 : Urine Urine Byrd Specimen CULTURE, URINE Georgette Huang MD 5/13/2022 1159 Microbiology        Implants: * No implants in log *    Drains:   Brandon-Villaseñor Drain 05/13/22 Right; Lower Abdomen (Active)       Findings: Ileocolic anastomosis in the right upper quadrant surrounding organized hematoma with significant omental adhesions causing obstruction at the anastomosis    Electronically Signed by Ly Alvarenga MD on 5/13/2022 at 1:36 PM

## 2022-05-13 NOTE — ADT AUTH CERT NOTES
Bowel Surgery: Colectomy, Partial, with or without Ostomy, by Laparoscopy - Care Day 29 (5/12/2022) by Denise Laura RN       Review Entered Review Status   5/12/2022 16:33 Completed      Criteria Review      Care Day: 28 Care Date: 5/12/2022 Level of Care: Inpatient Floor    Guideline Day 3    Level Of Care    (X) Floor    5/12/2022 16:33:46 EDT by Edilia Sierra [facility] w/ Tele    Clinical Status    ( ) * No evidence of ileus or bowel obstruction    5/12/2022 16:33:46 EDT by Avel Tijerina to complain of some abdominal discomfort  Is passing small amount of gas  Abdomen is soft, mildly tender palpation mid abdomen, distended, incisions healed    Activity    ( ) * Ambulatory    Routes    ( ) * Liquid or usual diet, advance as tolerated    (X) * Oral medications    5/12/2022 16:33:46 EDT by Dallie Lanes      Cordarone 200mg po qd  Colace po bid  insulin sc ss qid    (X) Possible IV fluids    5/12/2022 16:33:46 EDT by Dallie Lanes      D5% 1/2NS w/ K 2 25 cc/hr    (X) Possible IV medications    5/12/2022 16:33:46 EDT by Dallie Lanes      Cordarone 200mg po qd  D5% 1/2NS w/ K 2 25 cc/hr  Colace po bid  insulin sc ss qid  Dilaudid 1mg iv q4hrs prn x4  Toradol 30mg iv q6hrs prn x2  Solumedrol 20mg ig qd  Reglan 5mg iv q6hrs   Zofran 4mg iv q4hrs prn x3  TPN @ 83 cc/h    Medications    (X) * Epidural analgesics absent    * Milestone   Additional Notes   5/12/22   IP Medical    Patient seen in bed   Continues to complain of some abdominal discomfort   Is passing small amount of gas   Physical Exam:    Abdomen is soft, mildly tender palpation mid abdomen, distended, incisions healed       Assessment:       Active Problems:     Colon cancer ([address] (4/15/2022)         Cecal polyp (4/15/2022)               Plan:   I reviewed his new CT scan with him.  It still shows significant gaseous distention of the small bowel coming down to the area of his anastomosis. Betty Mccann is some gas and stool in the colon however it appears that he does have an obstruction at the site of the anastomosis.  I recommended exploration with possible resection of the anastomosis and the anastomosis depending on what I find.  I reviewed the risk and benefits and the risk of infection, bleeding, injury to intra-abdominal organs and structures, anastomotic leak.  He wishes to proceed with surgery and surgery scheduled for tomorrow      Cordarone 200mg po qd   D5% 1/2NS w/ K 2 25 cc/hr   Colace po bid   insulin sc ss qid   Dilaudid 1mg iv q4hrs prn x4   Toradol 30mg iv q6hrs prn x2   Solumedrol 20mg ig qd   Reglan 5mg iv q6hrs    Zofran 4mg iv q4hrs prn x3   TPN @ 83 cc/h              Bowel Surgery: Colectomy, Partial, with or without Ostomy, by Laparoscopy - Care Day 27 (5/11/2022) by Arsh Mann RN       Review Entered Review Status   5/12/2022 09:48 Completed      Criteria Review      Care Day: 27 Care Date: 5/11/2022 Level of Care: Inpatient Floor    Guideline Day 3    Level Of Care    (X) Floor    5/12/2022 09:48:36 EDT by Benny Ochoa      IP Medical    Clinical Status    ( ) * No evidence of ileus or bowel obstruction    5/12/2022 09:48:36 EDT by Benny Ochoa      Reports he passed a large amount of flatus yesterday none today  No nausea or vomiting  Physical Exam:   Abdomen is soft, moderately distended, mildly tender, incisions clean    Activity    ( ) * Ambulatory    5/12/2022 09:48:36 EDT by Benny Ochoa      Activity Interventions: Increase time out of bed,PT/OT evaluation    Routes    ( ) * Liquid or usual diet, advance as tolerated    5/12/2022 09:48:36 EDT by Benny Ochoa      N.p.o. sips only    (X) * Oral medications    5/12/2022 09:48:36 EDT by Benny Ochoa      Cordarone 200mg po qd  Eliquis 5mg po bid  D5% 1/2NS w/ K 2 25 cc/hr  Colace po bid  insulin sc ss qid    (X) Possible IV fluids    5/12/2022 09:48:36 EDT by Benny Ochoa      D5% 1/2NS w/ K 2 25 cc/hr    (X) Possible IV medications    5/12/2022 09:48:36 EDT by Isiah Motley      Dilaudid 1mg iv q4hrs prn x5  Toradol 30mg iv q6hrs prn x3  Solumedrol 20mg ig qd  Reglan 5mg iv q6hrs   Zofran 4mg iv q4hrs prn x6  TPN # 83 cc/hr    Medications    (X) * Epidural analgesics absent    * Milestone   Additional Notes   5/11/22   IP Medical       Vitals- 98.6, 151/97, 83, 16, 100% on RA      Cordarone 200mg po qd   Eliquis 5mg po bid   D5% 1/2NS w/ K 2 25 cc/hr   Colace po bid   insulin sc ss qid   Dilaudid 1mg iv q4hrs prn x5   Toradol 30mg iv q6hrs prn x3   Solumedrol 20mg ig qd   Reglan 5mg iv q6hrs    Zofran 4mg iv q4hrs prn x6      Assessment:   Active Problems:     Colon cancer ([facility] [address] (4/15/2022)     Cecal polyp (4/15/2022)       Plan:   CT scan in a.m., I told the patient that in all likelihood he would need reexploration to see why he is not fully opened up.    Continue PPN   N.p.o. sips only           Bowel Surgery: Colectomy, Partial, with or without Ostomy, by Laparoscopy - Care Day 26 (5/10/2022) by Jann Solano       Review Entered Review Status   5/11/2022 16:11 Completed      Criteria Review      Care Day: 26 Care Date: 5/10/2022 Level of Care: Inpatient Floor    Guideline Day 3    Level Of Care    (X) Floor    5/11/2022 16:11:16 EDT by Jann Solano      Medical    Clinical Status    ( ) * No evidence of ileus or bowel obstruction    Activity    ( ) * Ambulatory    Routes    ( ) * Liquid or usual diet, advance as tolerated    (X) * Oral medications    5/11/2022 16:11:16 EDT by Jann Solano      Protonix 40mg PO QD  Verapamil 200mg PO QD  Amiadarone 200mg PO QD  Eliquis 5mg PO BID  Colace 100mg PO BID    (X) Possible IV fluids    5/11/2022 16:11:16 EDT by Jann Solano      dextrose 5% - 0.45% NaCl with KCl 20 mEq/L infusion 25ml/hr IV    (X) Possible IV medications    5/11/2022 16:11:16 EDT by Jann Solano      Toradol 30mg IV x3  Dilaudid 1mg IV x6  Solu-medrol 20mg IV QD  Reglan 5mg IV QID  Zofran 4mg IV x5    Medications    (X) * Epidural analgesics absent    5/11/2022 16:11:17 EDT by Rafael Duong      absent    * Milestone   Additional Notes   DATE: 5/10/2022 Continued Stay Review/Medical      Vitals: Temp 98.6, HR 77, R 19, B/P 136/89 and O2 100% RA      Medications:   Amiadarone 200mg PO QD   Eliquis 5mg PO BID   dextrose 5% - 0.45% NaCl with KCl 20 mEq/L infusion 25ml/hr IV   Colace 100mg PO BID   Humalog SSI SC QID   Toradol 30mg IV x3   Lisinopril 20mg PO QD   Dilaudid 1mg IV x6   Solu-medrol 20mg IV QD   Reglan 5mg IV QID   Zofran 4mg IV x5   Protonix 40mg PO QD   Verapamil 200mg PO QD

## 2022-05-13 NOTE — ROUTINE PROCESS
TRANSFER - OUT REPORT:    Verbal/bedside  report given to [facility] RN on Giovanaene Later  being transferred to [address] [place] [address] for Report consisted of patients Situation, Background, Assessment and   Recommendations(SBAR). Information from the following report(s)  was reviewed with the receiving nurse. Opportunity for questions and clarification was provided.

## 2022-05-13 NOTE — ROUTINE PROCESS
Bedside and Verbal shift change report given to Manuel Gordillo (oncoming nurse) by Britney De Paz (offgoing nurse). Report included the following information SBAR and MAR.

## 2022-05-13 NOTE — CONSULTS
Hospitalist consult Note               Daily Progress Note:    Subjective:   Hospital course to date: Patient is a 15-year-old male with a history of paroxysmal atrial fibrillation/flutter, diabetes, asthma, polymyositis, steroid dependent, myasthenia gravis and hypertension who was admitted to the surgical service electively for resection of a large cecal polyp not amenable to endoscopic resection. Patient underwent surgical resection on 4/15  -------     Patient remains NPO due to concerns of SBO. He is on water sips with meds only. Denies abdominal pain chest pain or shortness of breath.   Passing flatus and had a small bowel movement yesterday       Problem List:      Medications reviewed  Current Facility-Administered Medications   Medication Dose Route Frequency    bupivacaine (PF) (MARCAINE) 0.25 % (2.5 mg/mL) injection    PRN    TPN ADULT-PERIPHERAL AA 4.25% D5% + ELECTROLYTES   IntraVENous CONTINUOUS    TPN ADULT-PERIPHERAL AA 4.25% D5% + ELECTROLYTES   IntraVENous CONTINUOUS    HYDROmorphone (DILAUDID) injection 1 mg  1 mg IntraVENous Q4H PRN    pantoprazole (PROTONIX) tablet 40 mg  40 mg Oral QHS    metoclopramide HCl (REGLAN) injection 5 mg  5 mg IntraVENous Q6H    ondansetron (ZOFRAN) injection 4 mg  4 mg IntraVENous Q4H PRN    dextrose 5% - 0.45% NaCl with KCl 20 mEq/L infusion  25 mL/hr IntraVENous CONTINUOUS    methylPREDNISolone (PF) (SOLU-MEDROL) injection 20 mg  20 mg IntraVENous DAILY    docusate sodium (COLACE) capsule 100 mg  100 mg Oral BID    [Held by provider] alum-mag hydroxide-simeth (MYLANTA) oral suspension 30 mL  30 mL Oral Q4H PRN    verapamil ER (VERELAN PM) capsule 200 mg  200 mg Oral QHS    [Held by provider] potassium chloride SR (KLOR-CON 10) tablet 10 mEq  10 mEq Oral BID    [Held by provider] apixaban (ELIQUIS) tablet 5 mg  5 mg Oral BID    amiodarone (CORDARONE) tablet 200 mg  200 mg Oral DAILY    oxyCODONE-acetaminophen (PERCOCET 10)  mg per tablet 1 Tablet  1 Tablet Oral Q6H PRN    lisinopriL (PRINIVIL, ZESTRIL) tablet 20 mg  20 mg Oral DAILY    [Held by provider] predniSONE (DELTASONE) tablet 20 mg  20 mg Oral DAILY WITH BREAKFAST    insulin lispro (HUMALOG) injection   SubCUTAneous AC&HS    glucose chewable tablet 16 g  4 Tablet Oral PRN    glucagon (GLUCAGEN) injection 1 mg  1 mg IntraMUSCular PRN    dextrose 10% infusion 0-250 mL  0-250 mL IntraVENous PRN    labetaloL (NORMODYNE;TRANDATE) 20 mg/4 mL (5 mg/mL) injection 10 mg  10 mg IntraVENous Q6H PRN     Facility-Administered Medications Ordered in Other Encounters   Medication Dose Route Frequency    HYDROmorphone (DILAUDID) injection   IntraVENous PRN    lidocaine (PF) (XYLOCAINE) 20 mg/mL (2 %) injection   IntraVENous PRN    fentaNYL citrate (PF) injection   IntraVENous PRN    propofoL (DIPRIVAN) 10 mg/mL injection   IntraVENous PRN    succinylcholine (ANECTINE) injection   IntraVENous PRN    rocuronium injection   IntraVENous PRN    dexamethasone (DECADRON) 4 mg/mL injection   IntraVENous PRN    lactated Ringers infusion   IntraVENous CONTINUOUS    ceFAZolin (ANCEF) injection   IntraVENous PRN    metroNIDAZOLE (FLAGYL) IVPB premix   IntraVENous PRN       Review of Systems:   A comprehensive review of systems was negative except for that written in the HPI. Objective:   Physical Exam:     Visit Vitals  BP (!) 140/95   Pulse 71   Temp 98.4 °F (36.9 °C)   Resp 16   Ht 5' 7.4\" (1.712 m)   Wt 66.5 kg (146 lb 9.7 oz)   SpO2 100%   BMI 22.69 kg/m²    O2 Flow Rate (L/min): 6 l/min O2 Device: None (Room air)    Temp (24hrs), Av.7 °F (37.1 °C), Min:98.4 °F (36.9 °C), Max:98.8 °F (37.1 °C)    701 -  1900  In: 100 [I.V.:100]  Out: -    1901 -  0700  In: -   Out: 2100 [Urine:2100]    General:   Awake and alert   Lungs:   Clear to auscultation bilaterally. Chest wall:  No tenderness or deformity.    Heart:  Regular rate and rhythm, S1, S2 normal, no murmur, click, rub or gallop. Abdomen:   Soft, non-tender. Bowel sounds normal. No masses,  No organomegaly. Surgical wound clean   Extremities: Extremities normal, atraumatic, no cyanosis or edema. Pulses: 2+ and symmetric all extremities. Skin: Skin color, texture, turgor normal. No rashes or lesions   Neurologic: CNII-XII intact. No gross focal deficits         Data Review:       Recent Days:  No results for input(s): WBC, HGB, HCT, PLT, HGBEXT, HCTEXT, PLTEXT, HGBEXT, HCTEXT, PLTEXT in the last 72 hours. Recent Labs     05/13/22  0734      K 4.0      CO2 29   *   BUN 18   CREA 0.46*   CA 9.0   ALB 3.3*   TBILI 0.3   ALT 21     No results for input(s): PH, PCO2, PO2, HCO3, FIO2 in the last 72 hours.     24 Hour Results:  Recent Results (from the past 24 hour(s))   GLUCOSE, POC    Collection Time: 05/12/22  4:42 PM   Result Value Ref Range    Glucose (POC) 309 (H) 65 - 117 mg/dL    Performed by Lulu Du    GLUCOSE, POC    Collection Time: 05/12/22  7:38 PM   Result Value Ref Range    Glucose (POC) 149 (H) 65 - 117 mg/dL    Performed by [address] [place] [address], POC    Collection Time: 05/12/22 10:57 PM   Result Value Ref Range    Glucose (POC) 78 65 - 117 mg/dL    Performed by [address] [place] [address], POC    Collection Time: 05/13/22 12:23 AM   Result Value Ref Range    Glucose (POC) 108 65 - 117 mg/dL    Performed by Holli 176, COMPREHENSIVE    Collection Time: 05/13/22  7:34 AM   Result Value Ref Range    Sodium 139 136 - 145 mmol/L    Potassium 4.0 3.5 - 5.1 mmol/L    Chloride 106 97 - 108 mmol/L    CO2 29 21 - 32 mmol/L    Anion gap 4 (L) 5 - 15 mmol/L    Glucose 103 (H) 65 - 100 mg/dL    BUN 18 6 - 20 mg/dL    Creatinine 0.46 (L) 0.70 - 1.30 mg/dL    BUN/Creatinine ratio 39 (H) 12 - 20      GFR est AA >60 >60 ml/min/1.73m2    GFR est non-AA >60 >60 ml/min/1.73m2    Calcium 9.0 8.5 - 10.1 mg/dL    Bilirubin, total 0.3 0.2 - 1.0 mg/dL    AST (SGOT) 9 (L) 15 - 37 U/L    ALT (SGPT) 21 12 - 78 U/L    Alk. phosphatase 79 45 - 117 U/L    Protein, total 6.3 (L) 6.4 - 8.2 g/dL    Albumin 3.3 (L) 3.5 - 5.0 g/dL    Globulin 3.0 2.0 - 4.0 g/dL    A-G Ratio 1.1 1.1 - 2.2     GLUCOSE, POC    Collection Time: 05/13/22  8:23 AM   Result Value Ref Range    Glucose (POC) 92 65 - 117 mg/dL    Performed by Oralee CataÃ±o        CT ABD PELV WO CONT   Final Result   Small bowel ileus versus partial obstruction, at the ileocolic   anastomosis or ileocecal valve, slightly greater than previous. There is mixed   density fluid throughout this region, as before. . Consider follow-up study with   IV contrast and if possible, oral contrast. Consider NG tube. XR ABD (KUB)   Final Result      XR ABD (KUB)   Final Result   Small bowel dilatation consistent with obstruction, not   significantly changed. XR ABD (KUB)   Final Result   Persistent small bowel distention as seen with obstruction. XR ABD (KUB)   Final Result   No significant change compared to abdomen series 5/5/2022. Persistent air and fluid-filled dilated small bowel loops. XR ABD (KUB)   Final Result   Multiple distended gas-filled loops of small bowel compatible with   partial small bowel obstruction versus ileus. CT ABD PELV WO CONT   Final Result   Moderate grade partial small bowel obstruction. Small volume complex ascites. CT ABD PELV WO CONT   Final Result   1. Improving hemoperitoneum and pneumoperitoneum. 2.  Ileocolic anastomosis in the right lower quadrant. Incompletely evaluated   without contrast. No evidence of bowel obstruction. 3.  Reticular and groundglass airspace disease in the visualized lung bases   redemonstrated. 4.  See full report for detailed findings. CT ABD PELV WO CONT   Final Result   1. Status post ileal cecal surgery. Surrounding extraluminal dense material may   represent blood products (no indication of enteric contrast administration).    Anastomotic leak cannot be excluded. Consider imaging with water-soluble oral   contrast. Free air in the abdomen likely within normal limits post recent   surgery. The bowel does not appear obstructed. 2. Bilateral lower lung airspace disease likely infectious or inflammatory. 3. Bilateral femoral head sclerosis suggests AVN. Assessment:  Acute hypokalemia- on the lower side    Paroxysmal supraventricular tachycardia, now back in sinus rhythm    History of paroxysmal atrial fibrillation/flutter. Now on elliquis/amiodarone    Diabetes mellitus type 2, insulin requiring. Well-controlled    Polymyositis, steroid dependent    Myasthenia gravis    Gastroesophageal reflux disease    Hypertension    Status post laparoscopic ileocecal resection for large cecal polyp      Plan:  continue long acting verapamil  Continue oral amiodarone 200 mg daily  Consider starting liquid diet if ok by surgeon  Encourage use of incentive spirometer    Will sign off. Care Plan discussed with: Patient/Family      Total time spent with patient: 30 minutes.     Fernandez Tena MD

## 2022-05-13 NOTE — PROGRESS NOTES
Problem: Falls - Risk of  Goal: *Absence of Falls  Description: Document Aidan Rajput Fall Risk and appropriate interventions in the flowsheet.   Outcome: Progressing Towards Goal  Note: Fall Risk Interventions:  Mobility Interventions: Bed/chair exit alarm         Medication Interventions: Bed/chair exit alarm         History of Falls Interventions: Bed/chair exit alarm         Problem: Patient Education: Go to Patient Education Activity  Goal: Patient/Family Education  Outcome: Progressing Towards Goal     Problem: Pain  Goal: *Control of Pain  Outcome: Progressing Towards Goal     Problem: Infection - Risk of, Surgical Site Infection  Goal: *Absence of surgical site infection signs and symptoms  Outcome: Progressing Towards Goal

## 2022-05-13 NOTE — ANESTHESIA POSTPROCEDURE EVALUATION
Procedure(s):  Exploratory Laparotomy, Lysis Of Adhesion, ILEOCOLIC ANASTAMOSIS WITH REANASTOMOSIS.     general    Anesthesia Post Evaluation      Multimodal analgesia: multimodal analgesia used between 6 hours prior to anesthesia start to PACU discharge  Patient location during evaluation: PACU  Patient participation: complete - patient participated  Level of consciousness: awake  Pain score: 0  Pain management: adequate  Airway patency: patent  Anesthetic complications: no  Cardiovascular status: acceptable and hemodynamically stable  Respiratory status: spontaneous ventilation  Hydration status: euvolemic  Post anesthesia nausea and vomiting:  controlled  Final Post Anesthesia Temperature Assessment:  Normothermia (36.0-37.5 degrees C)      INITIAL Post-op Vital signs:   Vitals Value Taken Time   /88 05/13/22 1400   Temp 36.6 °C (97.9 °F) 05/13/22 1350   Pulse 91 05/13/22 1400   Resp 15 05/13/22 1400   SpO2 100 % 05/13/22 1400

## 2022-05-13 NOTE — PROGRESS NOTES
Clinical chart reviewed. Patient is having surgery today. He remains acute at this time. His discharge plan at this time continues to be home with [facility]. Clinical updates have been provided via Ben and CM will continue to follow.

## 2022-05-14 NOTE — PROGRESS NOTES
Problem: Falls - Risk of  Goal: *Absence of Falls  Description: Document Green Salvia Fall Risk and appropriate interventions in the flowsheet. Outcome: Progressing Towards Goal  Note: Fall Risk Interventions:  Mobility Interventions: Assess mobility with egress test         Medication Interventions: Patient to call before getting OOB         History of Falls Interventions: Bed/chair exit alarm         Problem: Patient Education: Go to Patient Education Activity  Goal: Patient/Family Education  Outcome: Progressing Towards Goal     Problem: Pain  Goal: *Control of Pain  Outcome: Progressing Towards Goal     Problem: Infection - Risk of, Surgical Site Infection  Goal: *Absence of surgical site infection signs and symptoms  Outcome: Progressing Towards Goal     Problem: Pressure Injury - Risk of  Goal: *Prevention of pressure injury  Description: Document Yuri Scale and appropriate interventions in the flowsheet.   Outcome: Progressing Towards Goal  Note: Pressure Injury Interventions:  Sensory Interventions: Assess changes in LOC    Moisture Interventions: Absorbent underpads    Activity Interventions: Increase time out of bed    Mobility Interventions: HOB 30 degrees or less    Nutrition Interventions: Document food/fluid/supplement intake    Friction and Shear Interventions: HOB 30 degrees or less                Problem: Patient Education: Go to Patient Education Activity  Goal: Patient/Family Education  Outcome: Progressing Towards Goal

## 2022-05-14 NOTE — PROGRESS NOTES
Bedside shift change report given to Norma Quinonez RN (oncoming nurse) by Philip Barrera RN (offgoing nurse). Report included the following information SBAR, Kardex, MAR and Recent Results.

## 2022-05-14 NOTE — PROGRESS NOTES
Problem: Falls - Risk of  Goal: *Absence of Falls  Description: Document Lala Davenport Fall Risk and appropriate interventions in the flowsheet.   Outcome: Progressing Towards Goal  Note: Fall Risk Interventions:  Mobility Interventions: Assess mobility with egress test         Medication Interventions: Patient to call before getting OOB         History of Falls Interventions: Bed/chair exit alarm         Problem: Patient Education: Go to Patient Education Activity  Goal: Patient/Family Education  Outcome: Progressing Towards Goal     Problem: Pain  Goal: *Control of Pain  Outcome: Progressing Towards Goal     Problem: Infection - Risk of, Surgical Site Infection  Goal: *Absence of surgical site infection signs and symptoms  Outcome: Progressing Towards Goal

## 2022-05-14 NOTE — PROGRESS NOTES
Progress Note    Patient: Sumaya Carmona MRN: [id number]  SSN: [id number]    YOB: 1970  Age: 49 y.o. Sex: male      Admit Date: 4/15/2022    LOS: 29 days     Subjective:   Postoperative day 1 status post reexploration with resection of ileocolic anastomosis and the anastomosis  Patient seen in bed  Only complaint is surgical site pain    Objective:     Vitals:    05/13/22 1448 05/13/22 2004 05/14/22 1019 05/14/22 1423   BP: (!) 144/85 136/89 116/84 109/76   Pulse: 93 95 89 80   Resp: 19 19 20 20   Temp: 97.4 °F (36.3 °C) 99 °F (37.2 °C) 98.1 °F (36.7 °C) 98.8 °F (37.1 °C)   SpO2: 94% 93% 100% 100%   Weight:  161 lb 6 oz (73.2 kg)     Height:            Intake and Output:  Current Shift: No intake/output data recorded. Last three shifts: 05/12 1901 - 05/14 0700  In: 1900 [I.V.:1900]  Out: 1815 [Urine:1400; Drains:215]    Review of Systems:  ROS     Physical Exam:   Abdomen is soft, appropriately tender, nondistended, LES drain serosanguineous drainage, incision dressing clean    Lab/Data Review:  Recent Results (from the past 24 hour(s))   GLUCOSE, POC    Collection Time: 05/13/22  4:36 PM   Result Value Ref Range    Glucose (POC) 317 (H) 65 - 117 mg/dL    Performed by Audra Morgan    CBC WITH AUTOMATED DIFF    Collection Time: 05/14/22  1:50 AM   Result Value Ref Range    WBC 15.8 (H) 4.1 - 11.1 K/uL    RBC 4.55 4.10 - 5.70 M/uL    HGB 11.0 (L) 12.1 - 17.0 g/dL    HCT 34.8 (L) 36.6 - 50.3 %    MCV 76.5 (L) 80.0 - 99.0 FL    MCH 24.2 (L) 26.0 - 34.0 PG    MCHC 31.6 30.0 - 36.5 g/dL    RDW 15.0 (H) 11.5 - 14.5 %    PLATELET 744 973 - 845 K/uL    MPV 12.1 8.9 - 12.9 FL    NRBC 0.0 0.0  WBC    ABSOLUTE NRBC 0.00 0.00 - 0.01 K/uL    NEUTROPHILS 80 (H) 32 - 75 %    LYMPHOCYTES 6 (L) 12 - 49 %    MONOCYTES 14 (H) 5 - 13 %    EOSINOPHILS 0 0 - 7 %    BASOPHILS 0 0 - 1 %    IMMATURE GRANULOCYTES 0 0 - 0.5 %    ABS. NEUTROPHILS 12.5 (H) 1.8 - 8.0 K/UL    ABS.  LYMPHOCYTES 1.0 0.8 - 3.5 K/UL ABS. MONOCYTES 2.2 (H) 0.0 - 1.0 K/UL    ABS. EOSINOPHILS 0.0 0.0 - 0.4 K/UL    ABS. BASOPHILS 0.0 0.0 - 0.1 K/UL    ABS. IMM. GRANS. 0.1 (H) 0.00 - 0.04 K/UL    DF AUTOMATED     METABOLIC PANEL, COMPREHENSIVE    Collection Time: 05/14/22  1:50 AM   Result Value Ref Range    Sodium 137 136 - 145 mmol/L    Potassium 4.2 3.5 - 5.1 mmol/L    Chloride 107 97 - 108 mmol/L    CO2 25 21 - 32 mmol/L    Anion gap 5 5 - 15 mmol/L    Glucose 163 (H) 65 - 100 mg/dL    BUN 31 (H) 6 - 20 mg/dL    Creatinine 0.68 (L) 0.70 - 1.30 mg/dL    BUN/Creatinine ratio 46 (H) 12 - 20      GFR est AA >60 >60 ml/min/1.73m2    GFR est non-AA >60 >60 ml/min/1.73m2    Calcium 8.0 (L) 8.5 - 10.1 mg/dL    Bilirubin, total 0.4 0.2 - 1.0 mg/dL    AST (SGOT) 10 (L) 15 - 37 U/L    ALT (SGPT) 19 12 - 78 U/L    Alk.  phosphatase 65 45 - 117 U/L    Protein, total 5.5 (L) 6.4 - 8.2 g/dL    Albumin 2.7 (L) 3.5 - 5.0 g/dL    Globulin 2.8 2.0 - 4.0 g/dL    A-G Ratio 1.0 (L) 1.1 - 2.2     GLUCOSE, POC    Collection Time: 05/14/22  7:38 AM   Result Value Ref Range    Glucose (POC) 166 (H) 65 - 117 mg/dL    Performed by [address], POC    Collection Time: 05/14/22 11:40 AM   Result Value Ref Range    Glucose (POC) 148 (H) 65 - 117 mg/dL    Performed by [address], POC    Collection Time: 05/14/22  2:30 PM   Result Value Ref Range    Glucose (POC) 212 (H) 65 - 117 mg/dL    Performed by Terrie Sears           Assessment:     Active Problems:    Colon cancer ([address] (4/15/2022)      Cecal polyp (4/15/2022)        Plan:   Overall doing well  Stop Toradol, patient does have some bloody output from his LES  Bolus 500 cc normal saline  We will start oxycodone 10 mg every 4 hours for breakthrough pain which patient can have in addition to Dilaudid  MALCOLM Byrd in a.m. tomorrow  Continue n.p.o. ice chips only    Signed By: Kristina Tovar MD     May 14, 2022

## 2022-05-15 NOTE — PROGRESS NOTES
Problem: Falls - Risk of  Goal: *Absence of Falls  Description: Document Phill Addison Fall Risk and appropriate interventions in the flowsheet.   Outcome: Progressing Towards Goal  Note: Fall Risk Interventions:  Mobility Interventions: Bed/chair exit alarm         Medication Interventions: Bed/chair exit alarm         History of Falls Interventions: Bed/chair exit alarm

## 2022-05-15 NOTE — ROUTINE PROCESS
Bedside and Verbal shift change report given to George Lilly  (oncoming nurse) by Jj Yi RN (offgoing nurse). Report included the following information SBAR, Kardex, MAR, Accordion, and Recent Results.

## 2022-05-15 NOTE — ANESTHESIA PREPROCEDURE EVALUATION
Relevant Problems   RESPIRATORY SYSTEM   (+) Asthma      NEUROLOGY   (+) Migraine      CARDIOVASCULAR   (+) Atrial flutter (HCC)   (+) Hypertension      GASTROINTESTINAL   (+) Gastroesophageal reflux disease      ENDOCRINE   (+) Diabetes mellitus (HCC)      PERSONAL HX & FAMILY HX OF CANCER   (+) Colon cancer (HCC)       Anesthetic History   No history of anesthetic complications            Review of Systems / Medical History  Patient summary reviewed, nursing notes reviewed and pertinent labs reviewed    Pulmonary            Asthma     Comments: Allergic rhinitis. Neuro/Psych         Headaches     Cardiovascular    Hypertension        Dysrhythmias : atrial flutter        Comments: 4/19/2022 ECG: Sinus rhythm with Premature atrial complexes   Minimal voltage criteria for LVH, may be normal variant   Nonspecific T wave abnormality   Abnormal ECG   When compared with ECG of 17-APR-2022 08:29,   Premature atrial complexes are now Present   Vent. rate has decreased  BPM   ST no longer depressed in Inferior leads. GI/Hepatic/Renal     GERD          Comments: Colon polyp. Cecal polyp. Small bowel obstruction  S/P EX-LAP ON 5-15-22. Endo/Other    Diabetes: type 2, using insulin    Cancer (CA colon. )    Comments: Polymyositis. Myasthenia gravis. Other Findings   Comments: ELIQUIS: stopped 4 days. ANASTOMOTIC LEAK. Physical Exam    Airway  Mallampati: II  TM Distance: 4 - 6 cm  Neck ROM: normal range of motion   Mouth opening: Normal     Cardiovascular    Rhythm: regular  Rate: normal         Dental      Comments: Dentures. Pulmonary  Breath sounds clear to auscultation               Abdominal  GI exam deferred      Comments: On TPN.   Other Findings            Anesthetic Plan    ASA: 4, emergent  Anesthesia type: general          Induction: RSI  Anesthetic plan and risks discussed with: Patient

## 2022-05-15 NOTE — PROGRESS NOTES
05/15/22 0745   Vital Signs   Temp 99 °F (37.2 °C)   Temp Source Oral   Pulse (Heart Rate) (!) 118   Heart Rate Source Monitor   Resp Rate 18   O2 Sat (%) 98 %   Level of Consciousness Alert (0)   BP (!) 85/55   MAP (Calculated) 65   MEWS Score 4     Patient has MEWS of 4 with elevated HR and low BP. Spoke with Dr. Brien Clifford and received an order for 1L NS bolus, STAT CBC and CMP.

## 2022-05-15 NOTE — PROGRESS NOTES
Patient seen in bed  Tachycardic and hypotensive  Abdomen soft, tender, LES drain with bilious drainage  Assessment plan  Perforated viscus, question of anastomotic leak versus small bowel injury. Patient require exploratory laparotomy. Discussed with patient's. Reviewed risk and benefits and patient is amenable to surgery.

## 2022-05-15 NOTE — BRIEF OP NOTE
Brief Postoperative Note    Patient: Earnestine Katz  YOB: 1970  MRN: [id number]    Date of Procedure: 5/15/2022     Pre-Op Diagnosis: Intestinal perforation ([address] [K63.1]    Post-Op Diagnosis: Anastomotic leak    Procedure(s): Exploratory laparotomy, abdominal washout, resection of ileocolic anastomosis with end ileostomy    Surgeon(s):  MD Wolf Ogden Arman Grater, DO    Surgical Assistant: None    Anesthesia: General     Estimated Blood Loss (mL): 03VJ    Complications: None    Specimens:   ID Type Source Tests Collected by Time Destination   1 : Ileocolic Anastomosis Preservative Colon  rAline Blancas MD 5/15/2022 1124 Pathology        Implants: * No implants in log *    Drains:   Brandon-Villaseñor Drain 05/13/22 Right; Lower Abdomen (Active)   Site Assessment Clean, dry, & intact 05/15/22 0830   Dressing Status Clean, dry, & intact 05/15/22 0830   Status Patent; Charged;Draining 05/15/22 0830   Drainage Color Brown 05/15/22 0830   Output (ml) 70 ml 05/15/22 0830       Findings: Perforation at ileocolic anastomosis, stool peritonitis    Electronically Signed by Dennise Lemus MD on 5/15/2022 at 12:22 PM

## 2022-05-15 NOTE — PROGRESS NOTES
Patient to be transferred to ICU after surgery. Verbal report given to Rohan at ICU. Patient's belongings were packed and taken to room 264.

## 2022-05-15 NOTE — ANESTHESIA POSTPROCEDURE EVALUATION
Procedure(s):  Exploratory laparotomy, abdominal washout, resection of ileocolic anastomosis with end ileostomy.     general    Anesthesia Post Evaluation        Patient location during evaluation: PACU  Patient participation: complete - patient participated  Level of consciousness: awake and alert  Pain score: 0  Pain management: adequate  Airway patency: patent  Anesthetic complications: no  Cardiovascular status: acceptable and hemodynamically stable  Respiratory status: spontaneous ventilation  Hydration status: acceptable  Post anesthesia nausea and vomiting:  controlled  Final Post Anesthesia Temperature Assessment:  Normothermia (36.0-37.5 degrees C)      INITIAL Post-op Vital signs:   Vitals Value Taken Time   /89 05/15/22 1230   Temp 36.6 °C (97.8 °F) 05/15/22 1230   Pulse 108 05/15/22 1230   Resp 20 05/15/22 1230   SpO2 100 % 05/15/22 1230

## 2022-05-16 NOTE — PROGRESS NOTES
Bedside shift change report given to KIERSTEN WANG (oncoming nurse) by Haily Dave RN (offgoing nurse). Report included the following information SBAR, OR Summary, Procedure Summary, Intake/Output, MAR and Recent Results.

## 2022-05-16 NOTE — PROGRESS NOTES
Progress Note    Patient: Link Hatchet MRN: [id number]  SSN: [id number]    YOB: 1970  Age: 49 y.o.   Sex: male      Admit Date: 4/15/2022    LOS: 31 days     Subjective:   Patient seen in bed  Postoperative day 1 status post exploratory laparotomy resection of ileocolic anastomosis with end ileostomy for anastomotic leak  Has been hemodynamically stable      Objective:     Vitals:    05/16/22 1200 05/16/22 1300 05/16/22 1330 05/16/22 1523   BP: 110/70 106/70  125/86   Pulse:    (!) 107   Resp: 13 12  18   Temp:    98.6 °F (37 °C)   SpO2: 96% 100%  99%   Weight:       Height:   5' 7.4\" (1.712 m)         Intake and Output:  Current Shift: 05/16 0701 - 05/16 1900  In: -   Out: 675 [Urine:675]  Last three shifts: 05/14 1901 - 05/16 0700  In: 6825 [I.V.:6825]  Out: 4290 [Urine:1910; Drains:540]    Review of Systems:  ROS     Physical Exam:   Abdomen is soft, nondistended, appropriately tender, midline incision dressing clean, ileostomy right abdomen pink and protuberant, LES drain serosanguineous drainage    Lab/Data Review:  Recent Results (from the past 24 hour(s))   GLUCOSE, POC    Collection Time: 05/15/22  4:25 PM   Result Value Ref Range    Glucose (POC) 199 (H) 65 - 117 mg/dL    Performed by [address], POC    Collection Time: 05/15/22  9:06 PM   Result Value Ref Range    Glucose (POC) 163 (H) 65 - 117 mg/dL    Performed by MARIE DUPREE    CBC WITH AUTOMATED DIFF    Collection Time: 05/16/22  2:11 AM   Result Value Ref Range    WBC 16.0 (H) 4.1 - 11.1 K/uL    RBC 3.25 (L) 4.10 - 5.70 M/uL    HGB 7.9 (L) 12.1 - 17.0 g/dL    HCT 24.8 (L) 36.6 - 50.3 %    MCV 76.3 (L) 80.0 - 99.0 FL    MCH 24.3 (L) 26.0 - 34.0 PG    MCHC 31.9 30.0 - 36.5 g/dL    RDW 14.7 (H) 11.5 - 14.5 %    PLATELET 382 488 - 522 K/uL    MPV 13.0 (H) 8.9 - 12.9 FL    NRBC 0.0 0.0  WBC    ABSOLUTE NRBC 0.00 0.00 - 0.01 K/uL    NEUTROPHILS 89 (H) 32 - 75 %    LYMPHOCYTES 3 (L) 12 - 49 %    MONOCYTES 6 5 - 13 %    EOSINOPHILS 0 0 - 7 %    BASOPHILS 0 0 - 1 %    IMMATURE GRANULOCYTES 1 (H) 0 - 0.5 %    ABS. NEUTROPHILS 14.2 (H) 1.8 - 8.0 K/UL    ABS. LYMPHOCYTES 0.5 (L) 0.8 - 3.5 K/UL    ABS. MONOCYTES 1.0 0.0 - 1.0 K/UL    ABS. EOSINOPHILS 0.0 0.0 - 0.4 K/UL    ABS. BASOPHILS 0.1 0.0 - 0.1 K/UL    ABS. IMM. GRANS. 0.2 (H) 0.00 - 0.04 K/UL    DF AUTOMATED     METABOLIC PANEL, COMPREHENSIVE    Collection Time: 05/16/22  2:11 AM   Result Value Ref Range    Sodium 147 (H) 136 - 145 mmol/L    Potassium 4.6 3.5 - 5.1 mmol/L    Chloride 114 (H) 97 - 108 mmol/L    CO2 24 21 - 32 mmol/L    Anion gap 9 5 - 15 mmol/L    Glucose 112 (H) 65 - 100 mg/dL    BUN 46 (H) 6 - 20 mg/dL    Creatinine 0.94 0.70 - 1.30 mg/dL    BUN/Creatinine ratio 49 (H) 12 - 20      GFR est AA >60 >60 ml/min/1.73m2    GFR est non-AA >60 >60 ml/min/1.73m2    Calcium 7.1 (L) 8.5 - 10.1 mg/dL    Bilirubin, total 0.2 0.2 - 1.0 mg/dL    AST (SGOT) 16 15 - 37 U/L    ALT (SGPT) 9 (L) 12 - 78 U/L    Alk. phosphatase 46 45 - 117 U/L    Protein, total 3.8 (L) 6.4 - 8.2 g/dL    Albumin 1.9 (L) 3.5 - 5.0 g/dL    Globulin 1.9 (L) 2.0 - 4.0 g/dL    A-G Ratio 1.0 (L) 1.1 - 2.2     GLUCOSE, POC    Collection Time: 05/16/22  7:29 AM   Result Value Ref Range    Glucose (POC) 77 65 - 117 mg/dL    Performed by [address], POC    Collection Time: 05/16/22 11:39 AM   Result Value Ref Range    Glucose (POC) 77 65 - 117 mg/dL    Performed by Starling Grater           Assessment:     Active Problems:    Colon cancer ([facility] [address] (4/15/2022)      Cecal polyp (4/15/2022)        Plan:   Urine output improved, likewise creatinine and BUN normalizing  Restart PPN for nutritional support  Patient may have ice chips  Recheck CBC and CMP in a.m.   Sequential compression devices for DVT prophylaxis      Signed By: Enmanuel Odell MD     May 16, 2022

## 2022-05-16 NOTE — PROGRESS NOTES
Patient was in the ICU briefly post procedure but has been transferred back out to [address] [place] [address]. His discharge plan for most of this admission has been to return home with home health. Due to his prolonged hospital stay he would likely benefit from PT and OT to ensure he does not decondition. At this time discharge plan remains home with [facility] once medically stable. 2

## 2022-05-16 NOTE — PROGRESS NOTES
Pt started satting in the mid-80s with a good pleth. Pt still alert and oriented and states he feels like nothing has changed. Started pt on [facility] with improvement to 93%.

## 2022-05-16 NOTE — PROGRESS NOTES
Comprehensive Nutrition Assessment    Type and Reason for Visit: Reassess (interim)    Nutrition Recommendations/Plan:   1. Re-initiate PPN as [employer] 4.25/5 at goal rate of 83 mL/hr         500ml 20% lipids 3x/wk (Avg 429kcals/d; 42.9g lipid/d)         Provides 1109kcals (56%) and 85g pro (85%)  *Unable to advance to TPN as pt denies wanting [employer]    2. As medically able, advance PO diet or initiate alternative means of nutrition (NGT) to meet EENs  3. Monitor and record intakes and BMs in I/Os     Malnutrition Assessment:  Malnutrition Status:  Mild malnutrition (05/12/22 1332)    Context:  Acute illness         Nutrition Assessment:    Admitted for colon CA and cecal polyp w/ sx resection. Pt reported good appetite w/ poor intake 2/2 food preferences. (4/28) Intakes excellent, >76% of meals, with good ONS acceptance. (5/2) CT indicative of partial SBO. (5/4)  Advanced to Clear Liq however N/V/C continues, (5/5) NPO, PPN initiated. (5/9) CT still finding SBO. (5/13) Ex-lap with resection of ileocolic anastamosis and reanastomosis, (5/15) return to OR for anastomotic leak. PPN off x2 days now, receiving D5 at 125ml/hr providing 510kcals/d. RD discussed with pt need to advance to TPN however pt denies wanting central line. Discussed with RN & Surgeon, plan to continue PPN. RD communicated recs with pharmacy, will advance to 500ml bag 3x/wk to try and increase kcal provision. Overall, PPN provided x8d. Labs: H/H 7.9/24.8, Na 147, BUN  46, BG , ALT 9, alb 1.9. Meds: Cefazolin, D5% at 125ml/hr, Pepcid, dilaudid, insulin, solumedrol, flayl, zofran. Nutrition Related Findings:    Visually appears with loss to clavicles, ?temple. F/u with repeat NFPE at goal. Pt denies N/V, no c/s issues. NGT in place post-op, ~450ml OP x24, now out. LES drain in place, decreasing OP x24hr. No edema. Last BM 4/30, pt now with ostomy.  Wound Type: Multiple,Surgical incision (Multiple surgical incisions abd)      Current Nutrition Intake & Therapies:  Average Meal Intake: NPO  Average Supplement Intake: NPO  DIET NPO Ice Chips  TPN ADULT-PERIPHERAL AA 4.25% D5% + ELECTROLYTES  Additional Calorie Sources:  D5 at 125ml/hr providing 510kcals./d      Anthropometric Measures:  Height: 5' 7.4\" (171.2 cm)  Ideal Body Weight (IBW): 150 lbs (68 kg)  Admission Body Weight: 160 lb  Current Body Wt:  68.2 kg (150 lb 5.7 oz) (5/15), 100.2 % IBW. Bed scale  Current BMI (kg/m2): 23.3  Usual Body Weight: 72.6 kg (160 lb)  % Weight Change (Calculated): -8  Weight Adjustment: No adjustment                 BMI Category: Normal weight (BMI 18.5-24. 9)      Estimated Daily Nutrient Needs:  Energy Requirements Based On: Kcal/kg  Weight Used for Energy Requirements: Current  Energy (kcal/day): 1995kcal (30kcal/kg - Ca)  Weight Used for Protein Requirements: Current  Protein (g/day): 100g (1.5g/kg - Ca)  Method Used for Fluid Requirements: 1 ml/kcal  Fluid (ml/day): 1995mL (1mL/kcal)    Nutrition Diagnosis:   · Inadequate oral intake related to catabolic illness,altered GI function as evidenced by NPO or clear liquid status due to medical condition,GI abnormality,constipation      Nutrition Interventions:   Food and/or Nutrient Delivery: Continue NPO,Start parenteral nutrition  Nutrition Education/Counseling: No recommendations at this time,Education initiated (Discussed with pt need for TPN, pt denied wanting CVC placed.)  Coordination of Nutrition Care: Continue to monitor while inpatient  Plan of Care discussed with: RN, pt, Pharmacy, Surgeon    Goals:  Previous Goal Met: No progress toward goal(s)  Goals: Initiate nutrition support,Tolerate nutrition support at goal rate       Nutrition Monitoring and Evaluation:   Behavioral-Environmental Outcomes: None identified  Food/Nutrient Intake Outcomes: Parenteral nutrition intake/tolerance,Diet advancement/tolerance  Physical Signs/Symptoms Outcomes: Weight,Biochemical data,GI status    Discharge Planning: Too soon to determine    [employer]: Ext [phone], or via [address] [place] [address] Avenue

## 2022-05-17 NOTE — PROGRESS NOTES
Dressing to abdomen changed, ileostomy bag emptied, 20 ml of yellow coloured fluid emptied from LES drain.

## 2022-05-17 NOTE — PROGRESS NOTES
Bedside shift change report given to KIERSTEN WANG (oncoming nurse) by Saige Lindsay RN (offgoing nurse). Report included the following information SBAR, Procedure Summary, Intake/Output, MAR and Recent Results.

## 2022-05-17 NOTE — PROGRESS NOTES
Problem: Falls - Risk of  Goal: *Absence of Falls  Description: Document Joe Parker Fall Risk and appropriate interventions in the flowsheet. Outcome: Progressing Towards Goal  Note: Fall Risk Interventions:  Mobility Interventions: Bed/chair exit alarm,OT consult for ADLs,Patient to call before getting OOB,PT Consult for mobility concerns         Medication Interventions: Bed/chair exit alarm    Elimination Interventions: Bed/chair exit alarm,Call light in reach,Patient to call for help with toileting needs,Toileting schedule/hourly rounds    History of Falls Interventions: Bed/chair exit alarm,Door open when patient unattended,Room close to nurse's station         Problem: Pressure Injury - Risk of  Goal: *Prevention of pressure injury  Description: Document Yuri Scale and appropriate interventions in the flowsheet. Outcome: Progressing Towards Goal  Note: Pressure Injury Interventions:  Sensory Interventions: Discuss PT/OT consult with provider,Float heels,Keep linens dry and wrinkle-free,Minimize linen layers,Monitor skin under medical devices,Pressure redistribution bed/mattress (bed type),Turn and reposition approx. every two hours (pillows and wedges if needed)    Moisture Interventions: Absorbent underpads,Internal/External urinary devices,Limit adult briefs,Maintain skin hydration (lotion/cream),Minimize layers,Moisture barrier,Offer toileting Q_hr    Activity Interventions: Pressure redistribution bed/mattress(bed type),PT/OT evaluation    Mobility Interventions: Float heels,HOB 30 degrees or less,Pressure redistribution bed/mattress (bed type),PT/OT evaluation,Turn and reposition approx.  every two hours(pillow and wedges)    Nutrition Interventions: Document food/fluid/supplement intake    Friction and Shear Interventions: Apply protective barrier, creams and emollients,HOB 30 degrees or less,Lift sheet,Minimize layers

## 2022-05-17 NOTE — PROGRESS NOTES
Attempted to reach PICC team to discuss need for IV access, unable to reach anyone at time of call. Voicemail left requesting return call. Dr. Boo Romano aware of limited IV access.

## 2022-05-17 NOTE — PROGRESS NOTES
Limited IV access with multiple IV abx, fluids and PPN/lipids. PICC team consulted for assistance with access.

## 2022-05-18 NOTE — PROGRESS NOTES
Progress Note    Patient: Alanna Fraga MRN: [id number]  SSN: [id number]    YOB: 1970  Age: 49 y.o. Sex: male      Admit Date: 4/15/2022    LOS: 33 days     Subjective:   Patient seen in bed  Tolerating clear liquids  Ileostomy functioning    Objective:     Vitals:    05/17/22 0827 05/17/22 0944 05/17/22 1613 05/17/22 1952   BP: (!) 157/89  (!) 145/92 (!) 155/91   Pulse: 98  93 91   Resp: 18 18 18   Temp: 98.5 °F (36.9 °C)  98.8 °F (37.1 °C) 98.5 °F (36.9 °C)   SpO2: 98% 98% 98% 98%   Weight:       Height:            Intake and Output:  Current Shift: No intake/output data recorded.   Last three shifts: 05/16 1901 - 05/18 0700  In: -   Out: 1985 [Urine:1900; Drains:85]    Review of Systems:  ROS     Physical Exam:   And is soft, appropriately tender, nondistended, midline incision clean with packing strips in place, LES serous drainage, ileostomy pink protuberant and productive of stool    Lab/Data Review:  Recent Results (from the past 24 hour(s))   GLUCOSE, POC    Collection Time: 05/17/22 11:48 AM   Result Value Ref Range    Glucose (POC) 107 65 - 117 mg/dL    Performed by Raina Johnston    GLUCOSE, POC    Collection Time: 05/17/22  3:47 PM   Result Value Ref Range    Glucose (POC) 191 (H) 65 - 117 mg/dL    Performed by Sandrea Schaumann    GLUCOSE, POC    Collection Time: 05/17/22  7:49 PM   Result Value Ref Range    Glucose (POC) 160 (H) 65 - 117 mg/dL    Performed by Cole Marquez           Assessment:     Active Problems:    Colon cancer ([address] (4/15/2022)      Cecal polyp (4/15/2022)        Plan:   Overall doing well  Advance to full liquid diet  Discontinue TPN  PT/OT  Wound care to see for ileostomy training  Will be out of town until May 22, Dr. Vasquez Enter  covering    Signed By: Kaitlin Corona MD     May 18, 2022

## 2022-05-18 NOTE — PROGRESS NOTES
13:13 DR Cesilia Singh notified of HG 6.8, and to clarify discontinuing of TPN, states did not reorder the TPN, let it run out. No order for transfusion with the 6.8 hemaglobin.

## 2022-05-18 NOTE — PROGRESS NOTES
Clinical chart reviewed. PT and OT are recommending Inpatient Rehab. CM met with the patient at the bedside and discussed his plan and the recommendations. He acknowledged that he is very weak and is agreeable to inpatient rehab. He would like a referral sent to [address] [place] [address] and they cannot accept he is okay with any IRF. Choice obtained and referral has been sent to [address] [place] [address].

## 2022-05-18 NOTE — PROGRESS NOTES
Temperature 101.5 called to Dr. Brain Ricks paged and notified. Tylenol ordered. Daughter called, patient gave consent to talk with daughter and to give her a code.

## 2022-05-18 NOTE — PROGRESS NOTES
PHYSICAL THERAPY EVALUATION  Patient: Jojo Calvillo (55 y.o. male)  Date: 5/18/2022  Primary Diagnosis: Adenomatous polyp of colon, unspecified part of colon [D12.6]  Colon cancer ([facility] [address] [C18.9]  Cecal polyp [K63.5]  Procedure(s) (LRB):  Exploratory laparotomy, abdominal washout, resection of ileocolic anastomosis with end ileostomy (N/A) 3 Days Post-Op   Precautions: Falls       ASSESSMENT  Pt is a 48 yo male admitted on 4/15/2022 for surgical management of a large cecal polyp that was not amenable to endoscopic resection; and underwent exploratory laparotomy, abdominal washout, and resection of ileocolic anastomosis with end ileostomy by Brian Platt MD on 5/15/22. PMH: Arrhythmia, Asthma, DM, HTN, Myasthenia Gravis, Polymyositis, Primary Hypersomnolence disorder. Pt semi-supine upon PT/OT arrival, agreeable to evaluation. Pt A&O x 4. Per pt report, pt resides with a roommate in a 2 story townhouse with 8 HERBIE, bilateral handrails, 10 interior steps and bilateral handrails, pt was independent for ADLS/IADLS, ambulating without need of AD with mobility prior to admission. DME owned: none. Based on the objective data described below, the patient presents with generalized weakness, impaired functional mobility, impaired amb, impaired balance, and decreased activity tolerance. Pt instructed in log roll technique to decrease stress on abdominal region during mobility; verbalized understanding. Pt required Min A for bed mobility, Min A supine <> sit (assist with LEs), Min A sit <> stand transfers. Pt side-stepped 2 feet with gt belt and CGA; demonstrating slow, shuffling gt pattern with no LOB or knee buckling noted with pt demonstrating generalized unsteadiness. Pt did fair with session today with additional time and mod A verbal cueing for encouragement to participate in therapy due to high pain levels. At end of session, pt performed oral hygiene with OT while supine in bed (See OT notes for more details). Pt will benefit from continued skilled PT to address above deficits and return to PLOF. Current PT DC recommendation IRF pending current pain management. Current Level of Function Impacting Discharge (mobility/balance): Min A    Other factors to consider for discharge: severity of deficits, PMH, acute medical status, length of stay (33 days)      PLAN :  Recommendations and Planned Interventions: bed mobility training, transfer training, gait training, therapeutic exercises, neuromuscular re-education, patient and family training/education, and therapeutic activities      Recommend with staff: Merla Boast for transfers and bed mobility    Frequency/Duration: Patient will be followed by physical therapy:  3-5x/week to address goals. Recommendation for discharge: (in order for the patient to meet his/her long term goals)  [facility],[address]     This discharge recommendation:  Has been made in collaboration with the attending provider and/or case management    IF patient discharges home will need the following DME: to be determined (TBD)         SUBJECTIVE:   Patient stated i've familiar to the hospital and all the aspects, you don't have to tell me about it.     OBJECTIVE DATA SUMMARY:   HISTORY:    Past Medical History:   Diagnosis Date    Adverse effect of anesthesia     Arrhythmia     atrial flutter    Asthma     Diabetes ([facility] [address]     Hypertension     Ill-defined condition     Spinal meningitis and avascular necrosis     Myasthenia gravis     Polymyositis ([facility] [address] [facility] 2001    muscle disorder    Primary hypersomnolence disorder      Past Surgical History:   Procedure Laterality Date    HX COLONOSCOPY  2021    HX ENDOSCOPY      HX OTHER SURGICAL      lung abscess removal    AK SHOULDER SURG PROC UNLISTED      Left Shoulder       Home Situation  Home Environment: Apartment ([facility] )  # Steps to Enter: 8  Rails to Enter: Yes  Hand Rails : Bilateral  Wheelchair Ramp: No  One/Two Story Residence: Two story  # of Interior Steps: 10  Interior Rails: Both  Lift Chair Available: No  Living Alone: No  Support Systems: Friend/Neighbor  Patient Expects to be Discharged to[de-identified] Home with home health  Current DME Used/Available at Home: None  Tub or Shower Type: Tub/Shower combination    EXAMINATION/PRESENTATION/DECISION MAKING:   Critical Behavior:  Neurologic State: Alert  Orientation Level: Oriented X4  Cognition: Follows commands     Hearing: Auditory  Auditory Impairment: None  Skin:  intact where visible  Edema: none noted  Range Of Motion:  AROM: Generally decreased, functional           PROM: Within functional limits           Strength:    Strength: Generally decreased, functional           Coordination:  Coordination: Within functional limits  Vision:      Functional Mobility:  Bed Mobility:  Rolling: Minimum assistance  Supine to Sit: Minimum assistance  Sit to Supine: Minimum assistance  Scooting: Minimum assistance  Transfers:  Sit to Stand: Minimum assistance;Assist x1  Stand to Sit: Minimum assistance;Assist x1           Balance:   Sitting: Impaired; With support  Sitting - Static: Good (unsupported)  Sitting - Dynamic: Fair (occasional)  Standing: Impaired; Without support  Standing - Static: Fair  Standing - Dynamic : Fair  Ambulation/Gait Training:  Distance (ft): 2 Feet (ft) (side stepped toward head of bed)  Assistive Device: Gait belt  Ambulation - Level of Assistance: Contact guard assistance; Additional time     Gait Description (WDL): Exceptions to [facility]           Base of Support: Narrowed     Speed/Daysi: Slow;Shuffled;Pace decreased (<100 feet/min)            Therapeutic Exercises:   Not completed during this session    Functional Measure:  [address] Short Form  How much difficulty does the patient currently have. .. Unable A Lot A Little None   1. Turning over in bed (including adjusting bedclothes, sheets and blankets)?    [] 1   [] 2   [x] 3   [] 4 2.  Sitting down on and standing up from a chair with arms ( e.g., wheelchair, bedside commode, etc.)   [] 1   [] 2   [x] 3   [] 4   3. Moving from lying on back to sitting on the side of the bed? [] 1   [] 2   [x] 3   [] 4          How much help from another person does the patient currently need. .. Total A Lot A Little None   4. Moving to and from a bed to a chair (including a wheelchair)? [] 1   [] 2   [x] 3   [] 4   5. Need to walk in hospital room? [] 1   [x] 2   [] 3   [] 4   6. Climbing 3-5 steps with a railing? [] 1   [x] 2   [] 3   [] 4   © 2007, Trustees of [employer], under license to [employer]. All rights reserved     Score:  Initial: 16/24 Most Recent: X (Date: 5/18/22 )   Interpretation of Tool:  Represents activities that are increasingly more difficult (i.e. Bed mobility, Transfers, Gait). Score 24 23 22-20 19-15 14-10 9-7 6   Modifier CH CI CJ CK CL CM CN         Physical Therapy Evaluation Charge Determination   History Examination Presentation Decision-Making   HIGH Complexity :3+ comorbidities / personal factors will impact the outcome/ POC  HIGH Complexity : 4+ Standardized tests and measures addressing body structure, function, activity limitation and / or participation in recreation  LOW Complexity : Stable, uncomplicated  Other outcome measures ampac 6  Mod      Based on the above components, the patient evaluation is determined to be of the following complexity level: MEDIUM    Pain Rating:  10/10 reported of abdomen, but pt was able to tolerate full session with additional time and rest breaks. Activity Tolerance:   Fair and requires rest breaks    After treatment patient left in no apparent distress:   Supine in bed, Heels elevated for pressure relief, Call bell within reach, Bed / chair alarm activated, and Side rails x 3 and nursing updated.     GOALS:    Problem: Mobility Impaired (Adult and Pediatric)  Goal: *Acute Goals and Plan of Care (Insert Text)  Description: Pt will be I with LE HEP in 7 days. Pt will perform bed mobility with SBA in 7 days. Pt will perform transfers with SBA in 7 days. Pt will amb 5-10 feet with LRAD safely with Mod I/Independent in 7 days. Pt will demonstrate improvement in standing dynamic balance from CGA to Mod I/Independent in 7 days. Outcome: Not Met       COMMUNICATION/EDUCATION:   The patients plan of care was discussed with: Occupational therapist, Registered nurse, and Case management. Fall prevention education was provided and the patient/caregiver indicated understanding., Patient/family have participated as able in goal setting and plan of care. , and Patient/family agree to work toward stated goals and plan of care. PT/OT sessions occurred together for increased safety of pt and clinician. NICK Anderson, under direct supervision of Zena Cardenas, PT, DPT provided care and treatment of pt with verbal consent from pt received.         Thank you for this referral.  NICK Anderson, PT, DPT   Time Calculation: 24 mins

## 2022-05-18 NOTE — PROGRESS NOTES
Comprehensive Nutrition Assessment    Type and Reason for Visit: (P) Reassess (Goal)    Nutrition Recommendations/Plan:   Continue current diet, advancing as medically able  Add ensure enlive 3x/day  Monitor and record PO intakes, supplement acceptance, and Bms in I/Os     Malnutrition Assessment:  Malnutrition Status:  Mild malnutrition (05/18/22 1429)    Context:  Acute illness     Findings of the 6 clinical characteristics of malnutrition:   Energy Intake:  50% or less of est energy requirements for 5 or more days  Weight Loss:  No significant weight loss     Body Fat Loss:  No significant body fat loss,     Muscle Mass Loss:  No significant muscle mass loss,    Fluid Accumulation:  No significant fluid accumulation,     Strength:  Not performed     Nutrition Assessment:    (P) Admitted for colon CA and cecal polyp w/ sx resection. Pt reported good appetite w/ poor intake 2/2 food preferences. (4/28) Intakes excellent, >76% of meals, with good ONS acceptance. (5/2) CT indicative of partial SBO. (5/4) Advanced to Clear Liq however N/V/C continues, (5/5) NPO, PPN initiated. (5/9) CT still finding SBO. (5/13) Ex-lap with resection of ileocolic anastamosis and reanastomosis, (5/15) return to OR for anastomotic leak. PPN off x2 days now, receiving D5 at 125ml/hr providing 510kcals/d. RD discussed with pt need to advance to TPN however pt denies wanting central line. Discussed with RN & Surgeon, plan to continue PPN. RD communicated recs with pharmacy, will advance to 500ml bag 3x/wk to try and increase kcal provision. Overall, PPN provided x8d. (5/18) Pt tolerating clear liquids and advanced to full with plan let TPN run out. Will add ONS. Labs: Na 139, K 4.0, BUN 17, Creat 0.33, Glus 171, Alb 2.0. Meds: D5%-0.45% NaCl with KCl, famotidine, insulin lispro, ondansetron, prednisone. Nutrition Related Findings:    (P) NFPE not performed. No n/v, d/c. problems chewing/swallowing. LES drain in place. No edema.  BM 5/17 +ostomy. Wound Type: Multiple,Surgical incision (Multiple surgical incisions abd)    Current Nutrition Intake & Therapies:  Average Meal Intake: (P) 1-25%  Average Supplement Intake: (P) None ordered  TPN ADULT-PERIPHERAL AA 4.25% D5% + ELECTROLYTES  TPN ADULT-PERIPHERAL AA 4.25% D5% + ELECTROLYTES  ADULT DIET Full Liquid    Anthropometric Measures:  Height: (P) 5' 7.4\" (171.2 cm)  Ideal Body Weight (IBW): (P) 150 lbs ((P) 68 kg)  Admission Body Weight: 160 lb  Current Body Wt:  (P) 68.2 kg (150 lb 5.7 oz), (P) 100.2 % IBW.  (P) Bed scale  Current BMI (kg/m2): (P) 23.3  Usual Body Weight: 72.6 kg (160 lb)  % Weight Change (Calculated): -8  Weight Adjustment: (P) No adjustment  BMI Category: (P) Normal weight (BMI 22.0-24.9) age over 72    Estimated Daily Nutrient Needs:  Energy Requirements Based On: (P) Kcal/kg  Weight Used for Energy Requirements: (P) Current  Energy (kcal/day): (P) 2046kcal (30kcal/kg)  Weight Used for Protein Requirements: (P) Current  Protein (g/day): (P) 102g (1.5g/kg)  Method Used for Fluid Requirements: (P) 1 ml/kcal  Fluid (ml/day): (P) 2046mL (1mL/kcal)    Nutrition Diagnosis:   (P) Inadequate oral intake related to (P) catabolic illness,altered GI function as evidenced by (P) GI abnormality    Nutrition Interventions:   Food and/or Nutrient Delivery: (P) Continue current diet,Start oral nutrition supplement  Nutrition Education/Counseling: (P) No recommendations at this time  Coordination of Nutrition Care: (P) No recommendation at this time  Plan of Care discussed with: RN, pt, Pharmacy, Surgeon    Goals:  Previous Goal Met: (P) Goal(s) achieved  Goals: (P) PO intake 50% or greater,by next RD assessment    Nutrition Monitoring and Evaluation:   Behavioral-Environmental Outcomes: (P) None identified  Food/Nutrient Intake Outcomes: (P) Diet advancement/tolerance,Food and nutrient intake,Supplement intake  Physical Signs/Symptoms Outcomes: (P) Biochemical data,Meal time behavior,Weight,GI status    Discharge Planning:    (P) Too soon to determine    Saul Morales RD  Contact: [phone]

## 2022-05-18 NOTE — PROGRESS NOTES
OCCUPATIONAL THERAPY EVALUATION  Patient: Edenilson Watson (88 y.o. male)  Date: 5/18/2022  Primary Diagnosis: Adenomatous polyp of colon, unspecified part of colon [D12.6]  Colon cancer ([address] [C18.9]  Cecal polyp [K63.5]  Procedure(s) (LRB):  Exploratory laparotomy, abdominal washout, resection of ileocolic anastomosis with end ileostomy (N/A) 3 Days Post-Op   Precautions: fall risk, log rolling technique       ASSESSMENT  Pt is a 48 y/o M with PMH of paroxysmal atrial fibrillation/flutter, DM, asthma, polymyositis, steroid dependent, myasthenia gravis and HTN presenting to [facility] surgical service for elective resection of a large cecal polyp not amendable to endoscopic resection; pt s/p laparoscopic ileocecectomy for large cecal polyp 4/15/22 and transferred to ICU for a-fib following procedure. Pt later s/p exploratory laparotomy, abdominal washout, resection of ileocolic anastomosis with end ileostomy with Dr. Maynor Ronquillo 5/15/22. Pt seen on medical floor with OT orders received 5/18/22. Pt received semi-supine in bed upon arrival, AXO x4, and agreeable to OT/PT evaluations at this time. Per pt report, pt lives with roommates in a two-story [facility] with approx 8 HERBIE and B HR from exterior and 10 steps to 2nd floor with B HR. Pt states he was IND with ADLs, driving and ambulatory without AD at [facility]. No other DME owned at this time. Based on current observations, pt presents with deficits in generalized strength/AROM, bed mobility, static/dynamic sitting balance, static/dynamic standing balance, functional activity tolerance, and c/o significant abdominal pain impacting overall performance of ADLs and functional transfers/mobility. Pt currently requires min A for bed mobility via log roll technique, total A to don socks to feet in long sitting. Pt tearful 2/2 pain with activity however agreeable to continue therapy and attempt sit><stand transfer on this time.  Pt completed sit><stand with min A, additional time using gt belt and RW for safety, side stepping to [facility] with continued CGA-min A and returning min A with min cues for safe descent/eccentric control. Sit>supine completed with min A and basic grooming (washing face, oral care) s/p setup of items for retrieval in long sitting with intact coordination observed. Overall, pt tolerates session fair with c/o 9/10 abdominal pain at rest, 10/10 after activity and additional time for all bed level/OOB activity today 2/2 pain. Pt would benefit from continued skilled OT services to address current impairments and improve IND and safety with self cares and functional transfers/mobility. Current OT d/c recommendation IRF once medically appropriate. Other factors to consider for discharge: family/social support, DME, time since onset, severity of deficits, decline from functional baseline     Patient will benefit from skilled therapy intervention to address the above noted impairments. PLAN :  Recommendations and Planned Interventions: self care training, functional mobility training, therapeutic exercise, balance training, therapeutic activities, endurance activities and patient education    Frequency/Duration: Patient will be followed by occupational therapy:  3-5x/week to address goals. Recommendation for discharge: (in order for the patient to meet his/her long term goals)  [address] [facility]     This discharge recommendation:  Has been made in collaboration with the attending provider and/or case management       SUBJECTIVE:   Patient stated we can try.     OBJECTIVE DATA SUMMARY:   HISTORY:   Past Medical History:   Diagnosis Date    Adverse effect of anesthesia     Arrhythmia     atrial flutter    Asthma     Diabetes ([facility]     Hypertension     Ill-defined condition     Spinal meningitis and avascular necrosis     Myasthenia gravis     Polymyositis ([facility] [facility] 2001    muscle disorder    Primary hypersomnolence disorder      Past Surgical History: Procedure Laterality Date    HX COLONOSCOPY  2021    HX ENDOSCOPY      HX OTHER SURGICAL      lung abscess removal    VA SHOULDER SURG PROC UNLISTED      Left Shoulder       Expanded or extensive additional review of patient history:     Home Situation  Home Environment: Apartment ([facility] )  # Steps to Enter: 8  Rails to Enter: Yes  Hand Rails : Bilateral  Wheelchair Ramp: No  One/Two Story Residence: Two story  # of Interior Steps: 10  Interior Rails: Both  Lift Chair Available: No  Living Alone: No  Support Systems: Friend/Neighbor  Patient Expects to be Discharged to[de-identified] Home with home health  Current DME Used/Available at Home: None  Tub or Shower Type: Tub/Shower combination      EXAMINATION OF PERFORMANCE DEFICITS:  Cognitive/Behavioral Status:  Neurologic State: Alert  Orientation Level: Oriented X4  Cognition: Follows commands          Hearing: Auditory  Auditory Impairment: None      Range of Motion:  AROM: Generally decreased, functional  PROM: Within functional limits                      Strength:  Strength: Generally decreased, functional                Coordination:  Coordination: Within functional limits  Fine Motor Skills-Upper: Left Intact; Right Intact    Gross Motor Skills-Upper: Left Intact; Right Intact    Balance:  Sitting: Impaired; With support  Sitting - Static: Good (unsupported)  Sitting - Dynamic: Fair (occasional)  Standing: Impaired; Without support  Standing - Static: Fair  Standing - Dynamic : Fair    Functional Mobility and Transfers for ADLs:  Bed Mobility:  Rolling: Minimum assistance  Supine to Sit: Minimum assistance  Sit to Supine: Minimum assistance  Scooting: Minimum assistance    Transfers:  Sit to Stand: Minimum assistance;Assist x1  Stand to Sit: Minimum assistance;Assist x1      ADL Intervention and task modifications:       Grooming  Grooming Assistance: Set-up; Supervision  Position Performed: Long sitting on bed  Washing Face: Set-up; Supervision  Brushing Teeth: Set-up; Supervision                   Lower Body Dressing Assistance  Socks: Total assistance (dependent)  Position Performed: Long sitting on bed              Therapeutic Exercise:  Pt would benefit from UE HEP to improve overall UE AROM/strength and can be further educated in next treatment session. Functional Measure:    [employer] AM-PACTM \"6 Clicks\"                                                       Daily Activity Inpatient Short Form  How much help from another person does the patient currently need. .. Total; A Lot A Little None   1. Putting on and taking off regular lower body clothing? []  1 [x]  2 []  3 []  4   2. Bathing (including washing, rinsing, drying)? []  1 [x]  2 []  3 []  4   3. Toileting, which includes using toilet, bedpan or urinal? [] 1 [x]  2 []  3 []  4   4. Putting on and taking off regular upper body clothing? []  1 []  2 [x]  3 []  4   5. Taking care of personal grooming such as brushing teeth? []  1 []  2 [x]  3 []  4   6. Eating meals? []  1 []  2 []  3 [x]  4   © 2007, Trustees of [employer], under license to [employer]. All rights reserved     Score: 16/24     Interpretation of Tool:  Represents clinically-significant functional categories (i.e. Activities of daily living). Percentage of Impairment CH    0%   CI    1-19% CJ    20-39% CK    40-59% CL    60-79% CM    80-99% CN     100%   [facility]  Score 6-24 24 23 20-22 15-19 10-14 7-9 6         Occupational Therapy Evaluation Charge Determination   History Examination Decision-Making   LOW Complexity : Brief history review  LOW Complexity : 1-3 performance deficits relating to physical, cognitive , or psychosocial skils that result in activity limitations and / or participation restrictions  MEDIUM Complexity : Patient may present with comorbidities that affect occupational performnce.  Miniml to moderate modification of tasks or assistance (eg, physical or verbal ) with assesment(s) is necessary to enable patient to complete evaluation       Based on the above components, the patient evaluation is determined to be of the following complexity level: LOW   Pain Ratin/10 abdominal pain at rest, 10/10 after activity     Activity Tolerance:   Fair and requires rest breaks    After treatment patient left in no apparent distress:    Supine in bed, Call bell within reach and Side rails x 3    COMMUNICATION/EDUCATION:   The patients plan of care was discussed with: Physical therapist and Registered nurse. Patient/family have participated as able in goal setting and plan of care. and Patient/family agree to work toward stated goals and plan of care. This patients plan of care is appropriate for delegation to [facility].     OT/PT sessions occurred together for increased patient and clinician safety as pt with decreased activity tolerance at this time     Thank you for this referral.  Siva Richey  Time Calculation: 27 mins   Problem: Self Care Deficits Care Plan (Adult)  Goal: *Acute Goals and Plan of Care (Insert Text)  Description: Pt stated goal \"to decrease pain\"  Pt will be Mod I sup <> sit in prep for EOB ADLs  Pt will be Mod I grooming standing sink side LRAD  Pt will be Mod I UB dressing sitting EOB/long sit   Pt will be Mod I LE dressing sitting EOB/long sit  Pt will be Mod I sit <>  prep for toileting LRAD  Pt will be Mod I toileting/toilet transfer/cloth mgmt LRAD  Pt will be IND following UE HEP in prep for self care tasks      Outcome: Not Met

## 2022-05-19 NOTE — PROGRESS NOTES
CM received message from [facility] indicating they do not have any beds available until late next week. CM has sent additional IRF referrals to [facility]. Awaiting response at this time. Patient will require insurance auth.

## 2022-05-19 NOTE — PROGRESS NOTES
Patient declined PT session this afternoon despite max encouragement. Pt reported pain is too high right now and medicine is not helping. Requesting to hold PT today due to pain in hopes to talk with the doctor. Will follow up with patient tomorrow.

## 2022-05-19 NOTE — PROGRESS NOTES
Problem: Falls - Risk of  Goal: *Absence of Falls  Description: Document Lisa Blood Fall Risk and appropriate interventions in the flowsheet. Outcome: Progressing Towards Goal  Note: Fall Risk Interventions:  Mobility Interventions: PT Consult for mobility concerns,OT consult for ADLs,Patient to call before getting OOB         Medication Interventions: Patient to call before getting OOB    Elimination Interventions: Call light in reach,Patient to call for help with toileting needs    History of Falls Interventions: Bed/chair exit alarm         Problem: Patient Education: Go to Patient Education Activity  Goal: Patient/Family Education  Outcome: Progressing Towards Goal     Problem: Pain  Goal: *Control of Pain  Outcome: Progressing Towards Goal     Problem: Infection - Risk of, Surgical Site Infection  Goal: *Absence of surgical site infection signs and symptoms  Outcome: Progressing Towards Goal     Problem: Pressure Injury - Risk of  Goal: *Prevention of pressure injury  Description: Document Yuri Scale and appropriate interventions in the flowsheet.   Outcome: Progressing Towards Goal  Note: Pressure Injury Interventions:  Sensory Interventions: Assess changes in LOC    Moisture Interventions: Maintain skin hydration (lotion/cream)    Activity Interventions: Increase time out of bed,PT/OT evaluation    Mobility Interventions: PT/OT evaluation    Nutrition Interventions: Document food/fluid/supplement intake    Friction and Shear Interventions: Minimize layers                Problem: Patient Education: Go to Patient Education Activity  Goal: Patient/Family Education  Outcome: Progressing Towards Goal     Problem: Patient Education: Go to Patient Education Activity  Goal: Patient/Family Education  Outcome: Progressing Towards Goal     Problem: Patient Education: Go to Patient Education Activity  Goal: Patient/Family Education  Outcome: Progressing Towards Goal

## 2022-05-19 NOTE — ROUTINE PROCESS
Bedside and Verbal shift change report given to Mei Waldrop RN (oncoming nurse) by Samara Sun RN (offgoing nurse). Report included the following information SBAR and MAR.

## 2022-05-19 NOTE — PROGRESS NOTES
OCCUPATIONAL THERAPY TREATMENT  Patient: Alber Nur (89 y.o. male)  Date: 5/19/2022  Diagnosis: Adenomatous polyp of colon, unspecified part of colon [D12.6]  Colon cancer ([facility] [C18.9]  Cecal polyp [K63.5] <principal problem not specified>  Procedure(s) (LRB):  Exploratory laparotomy, abdominal washout, resection of ileocolic anastomosis with end ileostomy (N/A) 4 Days Post-Op  Precautions:    Chart, occupational therapy assessment, plan of care, and goals were reviewed. ASSESSMENT  Patient continues with skilled OT services and is progressing towards goals. Upon MCGUIRE arrival, pt semi supine in bed and agreeable to tx session after max encouragement provided from RN and therapist.  Pt stating that he was in a lot of pain and unable to participate until receiving pain medication. RN reassured pt that he just received the medication and should complete therapy. Pt became slightly agitated and completed bed mobility with Zbigniew for rolling, supine>sit with Zbigniew x2, sit>supine with Zbigniew, and CGA for scooting to EOB. Pt completed seated bladder hygiene at EOB using urinal with SBA. Pt impulsively completed sit>stand from EOB with CGA using RW for balance upon standing and able to take few steps. Pt returned to EOB and continued to attempt to use urinal but unsuccessful. Pt completed second sit>stand with CGA using RW while holding urinal.  Pt reporting too much discomfort in stomach to returned to EOB then completed sit>supine with Zbigniew x1 for BLE. Pt scooted to [facility] with MaxA x2. Pt completed semi supine face washing with setup A. Pt left semi supine in bed with call bell within reach and needs met. Will continue to follow pt throughout remainder of stay and progress towards OT goals. Recommending IRF at discharge when medically appropriate.     Other factors to consider for discharge: family/social support, DME, time since onset, severity of deficits, decline from functional baseline         PLAN :  Patient continues to benefit from skilled intervention to address the above impairments. Continue treatment per established plan of care. to address goals. Recommendation for discharge: (in order for the patient to meet his/her long term goals)  [address]     This discharge recommendation:  Has been made in collaboration with the attending provider and/or case management    IF patient discharges home will need the following DME: TBD       SUBJECTIVE:   Patient stated I can not do anything until I have some pain medication.     OBJECTIVE DATA SUMMARY:   Cognitive/Behavioral Status:  Neurologic State: Alert  Orientation Level: Oriented X4  Cognition: Impulsive    Functional Mobility and Transfers for ADLs:  Bed Mobility:  Rolling: Minimum assistance  Supine to Sit: Minimum assistance;Assist x2  Sit to Supine: Minimum assistance  Scooting: Contact guard assistance    Transfers:  Sit to Stand: Contact guard assistance    Balance:  Sitting: Impaired; With support  Sitting - Static: Good (unsupported)  Sitting - Dynamic: Fair (occasional)  Standing: Impaired; With support  Standing - Static: Constant support; Fair  Standing - Dynamic : Constant support; Fair    ADL Intervention:  Grooming  Position Performed: Long sitting on bed  Washing Face: Set-up    Toileting  Bladder Hygiene: Stand-by assistance    Pain:  9/10 stomach pain    Activity Tolerance:   Fair and requires frequent rest breaks  Please refer to the flowsheet for vital signs taken during this treatment. After treatment patient left in no apparent distress:   Supine in bed and Call bell within reach    COMMUNICATION/COLLABORATION:   The patients plan of care was discussed with: Physical therapy assistant and Registered nurse.      MAYNOR Kendrick  Time Calculation: 31 mins    Problem: Self Care Deficits Care Plan (Adult)  Goal: *Acute Goals and Plan of Care (Insert Text)  Description: Pt stated goal \"to decrease pain\"  Pt will be Mod I sup <> sit in prep for EOB ADLs  Pt will be Mod I grooming standing sink side LRAD  Pt will be Mod I UB dressing sitting EOB/long sit   Pt will be Mod I LE dressing sitting EOB/long sit  Pt will be Mod I sit <>  prep for toileting LRAD  Pt will be Mod I toileting/toilet transfer/cloth mgmt LRAD  Pt will be IND following UE HEP in prep for self care tasks      Outcome: Progressing Towards Goal

## 2022-05-19 NOTE — WOUND CARE
Physician written order left on patient chart for surgeon. Patient enrolled in [facility] [place] [address] [employer] [place] programs. OSTOMY Assessment     Surgery Date:_5/15/22____     Stoma Tissue Assessment: _X__Post-op ___Follow-up        Type of Diversion: _X__New___ Established ___Revision___Permanent____Temporary     __X___  Ileostomy   _____  Colostomy: ____ Ascending, ____ Transverse, _____. Sigmoid   _____  Rutha Chiles   _____  Ileal Conduit   _____  Mucous Fistul        Pressure Injury Interventions:  Sensory Interventions: Keep linens dry and wrinkle-free,Maintain/enhance activity level,Minimize linen layers,Pressure redistribution bed/mattress (bed type),Turn and reposition approx.  every two hours (pillows and wedges if needed)     Moisture Interventions: Apply protective barrier, creams and emollients,Internal/External urinary devices,Limit adult briefs,Minimize layers,Moisture barrier     Activity Interventions: Pressure redistribution bed/mattress     Mobility Interventions: Pressure redistribution bed/mattress      Nutrition Interventions: Discuss nutritional consult with provider     Friction and Shear Interventions: HOB 30 degrees or less,Minimize layers,Transferring/repositioning devices                 Appearance of Ostomy:   _X_ Bright Red /Moist/Viable ___ Kennedy Zulay Red ___ Pink ___ Sloughing ___Necrotic____Pallor ____Red  ___Dry ____Moist     Stoma Size: _~30____ (mm)       _X__Round ___ Oval ___Irregular      Stoma Height:   ____Flush ____Retracted _X__Budded ____Edematous ____Prolapse      Stoma Location  ____ Left Side          __X__Right Side     _____Umbilicus  _____Incision Line  ____ Above Belt       ____ Below Belt     Abdominal Contours  ____ Firm ____ Flat ____Flabby _X__Soft ___Round ___ Hard ___ Other         Stoma Function: __Yes __No  Output:   ____ Yellow ____Amber ____ Pink(Hematuria) ____ Tea Colored   ____ Clots ____Foul Odor ____ Mucous      Peristomal skin:   __X_ Intact ___ Irritant Dermatitis ___ Allergic Contact Dermatitis ___Candidiasis ___Caput Medusae ___Folliculitis ___Mechanical Trauma ___Mucosal Transplantation    ___Pyoderma Gangrenosum ___Hyperplasia ___Radiation Trauma ___Allergies mpling  ___ Dimpling ____Blistered ____Fragile ____Macerated ___Peeling  ___Ulceration  ___ Fungal ___Peristomal Hernia ___Non-blanchable ___ Hypergranulation        Stoma Complications:   __Excessive Bleeding __Ischemia __ Abscess __Necrosis __Prolapse   __Hernia __ Retraction __Stenosis __Mucosal Separation __Melanosis Coli   __Laceration __Other      Application for Patient     Education:  -Empty / [place] pouch when 1/3-1/2 full of stool or gas. -Change appliance (wafer and pouch) 2-3 per week. If leaking, change as soon as possible to prevent skin irritation.  -Best time for routine change of appliance is first thing in the morning before consuming food or liquids (depending on which type of ostomy). -Clean stoma and peristomal skin with plain water or NS, may use a mild soap. No soaps with lotions as they may prevent adhesive from adhering to skin. May bathe with appliance on or off.  -Stoma should always be red and moist.  If stoma appears dry and dusky, notify surgeon immediately. Will continue with education, encouraged patient to read over ostomy materials provided. Encouraged patient to empty his bag today with the assistance of the nurse.         Pouch System Recommended:   X__One-Piece _X_Two-Piece ___Custom      Flat:   ___Pre-cut   _X__Cut-to fit      Convexity: ___Shallow ___Deep___ Flexible ___Creative Convexity   ___Pre-cut ___Cut to Fit     Recommend: Coloplast [place] Flex wafer #[id number] with [employer]oplast Anam EasiClose drainable pouch # [id number]       Accessory Products   _X_ Adhesive Seals _X_Adhesive Remover Wipes _X_Barrier Wafer _X_Barrier Wipes   __Deodorizer __Liquid Adhesive _X_ Paste _X_ Powder _X_Strip   _X_ Support Belt __Tape          Kylha Pham RN, BSN  [facility]  05/19/22  9:32 AM

## 2022-05-20 NOTE — WOUND CARE
WCN attempted education with patient, patient states that his pain is too much right now. WCN assisted patient with emptying his bag, advised patient to let the nurses observe him emptying his bag today. Patient stated that he did not get to practice yesterday doing this. Will continue to provide education and work with patient on care of his ostomy. Patient stated that he has not read any of his education material because of his pain level. Discussed with RN, Calli Davis about patient pain, stated patient had just had pain medications. Also, discussed with RN for patient to empty his bag today with assistance. Reiterated the following education:    -Empty / burp pouch when 1/3-1/2 full of stool or gas. -Change appliance (wafer and pouch) 2-3 per week. If leaking, change as soon as possible to prevent skin irritation.  -Best time for routine change of appliance is first thing in the morning before consuming food or liquids (depending on which type of ostomy).

## 2022-05-20 NOTE — PROGRESS NOTES
Pt's daughter Estefany Chen called concerned that patient is not ready to be discharged. She feels that the patient is not ready due to pain issues. She requested to talk to the doctor.  called to send message to Marce Schmidt NP and given Kadie Francis's number [phone].

## 2022-05-20 NOTE — PROGRESS NOTES
Problem: Falls - Risk of  Goal: *Absence of Falls  Description: Document Danne Lobe Fall Risk and appropriate interventions in the flowsheet. Outcome: Progressing Towards Goal  Note: Fall Risk Interventions:  Mobility Interventions: PT Consult for mobility concerns,Patient to call before getting OOB,PT Consult for assist device competence         Medication Interventions: Patient to call before getting OOB    Elimination Interventions: Call light in reach,Patient to call for help with toileting needs    History of Falls Interventions: Bed/chair exit alarm         Problem: Patient Education: Go to Patient Education Activity  Goal: Patient/Family Education  Outcome: Progressing Towards Goal     Problem: Pain  Goal: *Control of Pain  Outcome: Progressing Towards Goal     Problem: Infection - Risk of, Surgical Site Infection  Goal: *Absence of surgical site infection signs and symptoms  Outcome: Progressing Towards Goal     Problem: Pressure Injury - Risk of  Goal: *Prevention of pressure injury  Description: Document Yuri Scale and appropriate interventions in the flowsheet.   Outcome: Progressing Towards Goal  Note: Pressure Injury Interventions:  Sensory Interventions: Assess changes in LOC    Moisture Interventions: Absorbent underpads,Minimize layers    Activity Interventions: Increase time out of bed    Mobility Interventions: PT/OT evaluation    Nutrition Interventions: Document food/fluid/supplement intake    Friction and Shear Interventions: Minimize layers                Problem: Patient Education: Go to Patient Education Activity  Goal: Patient/Family Education  Outcome: Progressing Towards Goal     Problem: Patient Education: Go to Patient Education Activity  Goal: Patient/Family Education  Outcome: Progressing Towards Goal     Problem: Patient Education: Go to Patient Education Activity  Goal: Patient/Family Education  Outcome: Progressing Towards Goal

## 2022-05-20 NOTE — CONSULTS
Rehab Consult    Patient: Earnestine Katz MRN: [id number]  SSN: [id number]    YOB: 1970  Age: 49 y.o. Sex: male      Consulting provider: Brain Ricks  Reason for consult: Evaluate for rehab needs     Admit date: 4/15/2022  LOS (days): 35    CC: Difficulty walking     Subjective: This is a 49 y.o. male with a past medical history including myasthenia gravis, hypertension, A. fib, diabetes mellitus type 2, GERD, polymyositis on chronic steroids. He was admitted to [address] [place] [address] for surgical management of a large cecal polyp. Patient is status post laparoscopic ileocecectomy  with Dr. Brain Ricks. Postoperatively patient was transferred to the ICU of amiodarone. Later during hospital course on 5/15/2022 patient returned to the OR with Dr. Brain Ricks for exploratory lap resection of ilio colic anastomosis with end ileostomy for anastomotic leak. Patient returned to the ICU postoperatively and has since been transferred out. Patient seen today while he is sitting in bed appears to be in acute pain that he feels has not improved at this time. Patient also noted to be on TPN. Functional History -   Baseline: Independent with mobility/transfers/ADLs. Current:Patient currently functioning at a min assist for his transfers, ADLs, mobility  Living situation: Reports live with roommate [place].     Past Medical History:   Diagnosis Date    Adverse effect of anesthesia     Arrhythmia     atrial flutter    Asthma     Diabetes ([facility]     Hypertension     Ill-defined condition     Spinal meningitis and avascular necrosis     Myasthenia gravis     Polymyositis ([facility] 2001    muscle disorder    Primary hypersomnolence disorder      Past Surgical History:   Procedure Laterality Date    HX COLONOSCOPY  2021    HX ENDOSCOPY      HX OTHER SURGICAL      lung abscess removal    NM SHOULDER SURG PROC UNLISTED      Left Shoulder      Family History   Problem Relation Age of Onset    Heart Disease Mother     Diabetes Father     Stroke Father      Social History     Tobacco Use    Smoking status: Never Smoker    Smokeless tobacco: Never Used   Substance Use Topics    Alcohol use: No      Current Facility-Administered Medications   Medication    acetaminophen (TYLENOL) tablet 650 mg    famotidine (PF) (PEPCID) 20 mg in 0.9% sodium chloride 10 mL injection    dextrose 5% - 0.45% NaCl with KCl 20 mEq/L infusion    TPN ADULT-PERIPHERAL AA 4.25% D5% + ELECTROLYTES    oxyCODONE IR (ROXICODONE) tablet 10 mg    ceFAZolin (ANCEF) 2 g in sterile water (preservative free) 20 mL IV syringe    metroNIDAZOLE (FLAGYL) IVPB premix 500 mg    ondansetron (ZOFRAN) injection 4 mg    verapamil ER (VERELAN PM) capsule 200 mg    [Held by provider] potassium chloride SR (KLOR-CON 10) tablet 10 mEq    [Held by provider] apixaban (ELIQUIS) tablet 5 mg    amiodarone (CORDARONE) tablet 200 mg    lisinopriL (PRINIVIL, ZESTRIL) tablet 20 mg    predniSONE (DELTASONE) tablet 20 mg    insulin lispro (HUMALOG) injection    glucose chewable tablet 16 g    glucagon (GLUCAGEN) injection 1 mg    dextrose 10% infusion 0-250 mL    labetaloL (NORMODYNE;TRANDATE) 20 mg/4 mL (5 mg/mL) injection 10 mg        Allergies   Allergen Reactions    Beef Containing Products Anaphylaxis and Other (comments)    Diltiazem Other (comments)     Reaction Type: Allergy; Reaction(s): Pressure in chest    Iodinated Contrast Media Other (comments)     Reaction Type: Allergy; Severity: Severe; Reaction(s): hives,throat swelling    Pork Derived (Porcine) Hives    Shellfish Containing Products Swelling     Other reaction(s): Other  Reaction Type: Allergy; Reaction(s): Hives,throat swelling    Sulfa (Sulfonamide Antibiotics) Unknown (comments)     Spinal meningitis    Sulfur Unknown (comments)     Reaction Type: Allergy  Spinal meningitis    Lactose Diarrhea     Lactose intolerant per Pt.        Review of Systems:  Positive for fatigue. Otherwise a complete review of systems was negative except as noted above in HPI. Objective:     Vitals:    05/19/22 1531 05/19/22 2024 05/20/22 0119 05/20/22 0824   BP: 122/84 127/81 112/76 119/81   Pulse: (!) 108 (!) 104 94 99   Resp: 19 20 20 20   Temp: 98.9 °F (37.2 °C) 99 °F (37.2 °C) (!) 100.5 °F (38.1 °C) 99.2 °F (37.3 °C)   SpO2: 99% 93% 95% 90%   Weight:       Height:            Physical Exam:  General: NAD, frustrated with pain  HEENT: anicteric, moist oral mucosa   CV: RRR, no murmurs, 2+ pulses   Respiratory: clear and aerating well, no increased WOB  Abdomen: nondistended, tender, incision site dressed  Extremities: trace BLE edema  Musculoskeletal: moving extremities at least antigravity  Neuro: alert, normal speech, CN 2-12 intact, sensation intact to light touch     Labs:  Recent Results (from the past 24 hour(s))   GLUCOSE, POC    Collection Time: 05/19/22  3:35 PM   Result Value Ref Range    Glucose (POC) 119 (H) 65 - 117 mg/dL    Performed by Uyen Cardona, POC    Collection Time: 05/19/22  9:03 PM   Result Value Ref Range    Glucose (POC) 150 (H) 65 - 117 mg/dL    Performed by Yohana Gregory    GLUCOSE, POC    Collection Time: 05/20/22  8:18 AM   Result Value Ref Range    Glucose (POC) 93 65 - 117 mg/dL    Performed by Jai Briggs          Imaging:  XR ABD (KUB)    Result Date: 5/8/2022  Persistent small bowel distention as seen with obstruction. XR ABD (KUB)    Result Date: 5/6/2022  No significant change compared to abdomen series 5/5/2022. Persistent air and fluid-filled dilated small bowel loops. XR ABD (KUB)    Result Date: 5/5/2022  Multiple distended gas-filled loops of small bowel compatible with partial small bowel obstruction versus ileus. CT ABD PELV WO CONT    Result Date: 5/2/2022  Moderate grade partial small bowel obstruction. Small volume complex ascites. CT ABD PELV WO CONT    Result Date: 4/27/2022  1.   Improving hemoperitoneum and pneumoperitoneum. 2.  Ileocolic anastomosis in the right lower quadrant. Incompletely evaluated without contrast. No evidence of bowel obstruction. 3.  Reticular and groundglass airspace disease in the visualized lung bases redemonstrated. 4.  See full report for detailed findings. CT ABD PELV WO CONT    Result Date: 4/21/2022  1. Status post ileal cecal surgery. Surrounding extraluminal dense material may represent blood products (no indication of enteric contrast administration). Anastomotic leak cannot be excluded. Consider imaging with water-soluble oral contrast. Free air in the abdomen likely within normal limits post recent surgery. The bowel does not appear obstructed. 2. Bilateral lower lung airspace disease likely infectious or inflammatory. 3. Bilateral femoral head sclerosis suggests AVN. Impression/Plan:     Diagnoses:     Critical Illness myopathy  Cecal polyp  S/p  laparoscopic ileocecectomy  exploratory lap resection of ilio colic anastomosis with end ileostomy for anastomotic leak  Abd pain post surgery  Myasthenia Gravis  Polymyositis chronic steroids  Diabetes mellitus     This patient would benefit from participation in an intensive inpatient rehabilitation program.     This patient can likely tolerate 3 hours of therapy per day once pain controlled. Discussed with patient. Barriers to rehab: Pain, needs to be off TPN tolerating diet    [ x ] Will need submission for insurance authorization for inpatient rehab. Continue PT/OT in the acute setting. Will continue to follow.        Signed By: Lidia Stoner NP     May 20, 2022    Physical Medicine and Rehabilitation

## 2022-05-20 NOTE — PROGRESS NOTES
PHYSICAL THERAPY TREATMENT  Patient: Jer Stoddard (77 y.o. male)  Date: 5/20/2022  Diagnosis: Adenomatous polyp of colon, unspecified part of colon [D12.6]  Colon cancer ([facility] [C18.9]  Cecal polyp [K63.5] <principal problem not specified>  Procedure(s) (LRB):  Exploratory laparotomy, abdominal washout, resection of ileocolic anastomosis with end ileostomy (N/A) 5 Days Post-Op  Precautions:    Chart, physical therapy assessment, plan of care and goals were reviewed. ASSESSMENT  Patient continues with skilled PT services and is progressing towards goals. Patient demos weakness overall and continues to be in high pain 10/10 in the abdomen despite recent pain meds but was agreeable to limited mobility. Patient was min A x1 for bed mobility, and SBA for sit to stand transfers at bedside holding to RW with no LOB but req additional time. Pt declined ambulation or transfer to the chair despite education and encouragement. If patient improves with mobility and pain tolerance, may benefit from IRF at d/c. Will cont to assess safe/appropriate d/c plan each tx. Current Level of Function Impacting Discharge (mobility/balance): weakness, unsteady mobility    Other factors to consider for discharge: pain tolerance, safety, lack of mobility         PLAN :  Patient continues to benefit from skilled intervention to address the above impairments. Continue treatment per established plan of care. to address goals. Recommendation for discharge: (in order for the patient to meet his/her long term goals)  IRF pending pain tolerance/participation    This discharge recommendation:  Has been made in collaboration with the attending provider and/or case management    IF patient discharges home will need the following DME: to be determined (TBD)       SUBJECTIVE:   Patient stated I need to see the doctor.     OBJECTIVE DATA SUMMARY:   Critical Behavior:  Neurologic State: Alert  Orientation Level: Oriented X4  Cognition: Follows commands     Functional Mobility Training:  Bed Mobility:  Rolling: Minimum assistance  Supine to Sit: Minimum assistance  Sit to Supine: Minimum assistance  Scooting: Minimum assistance    Transfers:  Sit to Stand: Stand-by assistance; Additional time  Stand to Sit: Stand-by assistance; Additional time      Balance:  Sitting: Intact; Without support  Sitting - Static: Good (unsupported)  Sitting - Dynamic: Good (unsupported)  Standing: Impaired; With support  Standing - Static: Constant support;Good    Ambulation/Gait Training: patient declined ambulation    Pain Ratin/10    Activity Tolerance:   Fair and requires rest breaks  Please refer to the flowsheet for vital signs taken during this treatment. After treatment patient left in no apparent distress:   Supine in bed and Call bell within reach    COMMUNICATION/COLLABORATION:   The patients plan of care was discussed with: Occupational therapy assistant and Registered nurse. Partial cotx today as pt has not been cooperative with therapy due to pain and pt not participating much. Maritza Pena   Time Calculation: 10 mins         Problem: Mobility Impaired (Adult and Pediatric)  Goal: *Acute Goals and Plan of Care (Insert Text)  Description: Pt will be I with LE HEP in 7 days. Pt will perform bed mobility with SBA in 7 days. Pt will perform transfers with SBA in 7 days. Pt will amb 5-10 feet with LRAD safely with Mod I/Independent in 7 days. Pt will demonstrate improvement in standing dynamic balance from CGA to Mod I/Independent in 7 days.      Outcome: Progressing Towards Goal

## 2022-05-20 NOTE — PROGRESS NOTES
OCCUPATIONAL THERAPY TREATMENT  Patient: Jer Stoddard (51 y.o. male)  Date: 5/20/2022  Diagnosis: Adenomatous polyp of colon, unspecified part of colon [D12.6]  Col[facility] ([facility] [C18.9]  Cecal polyp [K63.5] <principal problem not specified>  Procedure(s) (LRB):  Exploratory laparotomy, abdominal washout, resection of ileocolic anastomosis with end ileostomy (N/A) 5 Days Post-Op  Precautions:    Chart, occupational therapy assessment, plan of care, and goals were reviewed. ASSESSMENT  Patient continues with skilled OT services and is progressing towards goals. Upon MCGUIRE/PTA arrival, pt semi supine in bed complaining of significant pain in stomach and agreeable to tx session after max encouragement provided from therapist.  Pt completed all bed mobility with Zbigniew x1 this date. Donning of socks completed at EOB with total A due to pt reporting too much pain and unable to Pt completed sit>stand with Zbigniew using RW for balance upon standing. Pt attempted using urinal with SBA at EOB and then in standing with SBA. Donning of clean gown completed at EOB with ModA due to line management and increased pain. Pt returned to supine with Zbigniew and scooted to [facility]. Pt left semi supine in bed with call bell within reach and needs met. Will continue to follow pt throughout remainder of stay and progress towards OT goals. Recommending IRF pending pain tolerance at discharge when medically appropriate. RN notified of pt pain level. Other factors to consider for discharge: family/social support, DME, time since onset, severity of deficits, decline from functional baseline         PLAN :  Patient continues to benefit from skilled intervention to address the above impairments. Continue treatment per established plan of care. to address goals.     Recommendation for discharge: (in order for the patient to meet his/her long term goals)  [address] pending pain tolerance    This discharge recommendation:  Has been made in collaboration with the attending provider and/or case management    IF patient discharges home will need the following DME: TBD       SUBJECTIVE:   Patient stated I am not getting in the chair, I will try to stand up.     OBJECTIVE DATA SUMMARY:   Cognitive/Behavioral Status:  Neurologic State: Alert     Cognition: Follows commands    Functional Mobility and Transfers for ADLs:  Bed Mobility:  Rolling: Minimum assistance  Supine to Sit: Minimum assistance  Sit to Supine: Minimum assistance  Scooting: Minimum assistance    Transfers:  Sit to Stand: Stand-by assistance; Additional time    Balance:  Sitting: Intact; Without support  Sitting - Static: Good (unsupported)  Sitting - Dynamic: Good (unsupported)  Standing: Impaired; With support  Standing - Static: Constant support;Good    ADL Intervention:  Upper Body [address]: Moderate assistance (line management and pain)    Lower Body Dressing Assistance  Socks: Total assistance (dependent) (too much pain to reach)    Toileting  Bladder Hygiene: Stand-by assistance    Pain:  10/10 stomach pain    Activity Tolerance:   Poor and requires frequent rest breaks  Please refer to the flowsheet for vital signs taken during this treatment. After treatment patient left in no apparent distress:   Supine in bed, Call bell within reach, and Bed / chair alarm activated    COMMUNICATION/COLLABORATION:   The patients plan of care was discussed with: Physical therapy assistant and Registered nurse.      MAYNOR Kendrick  Time Calculation: 10 mins    Problem: Self Care Deficits Care Plan (Adult)  Goal: *Acute Goals and Plan of Care (Insert Text)  Description: Pt stated goal \"to decrease pain\"  Pt will be Mod I sup <> sit in prep for EOB ADLs  Pt will be Mod I grooming standing sink side LRAD  Pt will be Mod I UB dressing sitting EOB/long sit   Pt will be Mod I LE dressing sitting EOB/long sit  Pt will be Mod I sit <>  prep for toileting LRAD  Pt will be Mod I toileting/toilet transfer/cloth mgmt LRAD  Pt will be IND following UE HEP in prep for self care tasks      Outcome: Progressing Towards Goal

## 2022-05-20 NOTE — ROUTINE PROCESS
Bedside and Verbal shift change report given to Leticia Hudson RN (oncoming nurse) by Shoshana Daily RN (offgoing nurse). Report included the following information SBAR and MAR.

## 2022-05-20 NOTE — PROGRESS NOTES
CM received notification from [facility] that the patient is appropriate for their facility, however, he reports a lot of pain and has been declining to work with therapy yesterday and today. [facility] will consider the patient but wants to see him participate with therapy a little more and have his pain better managed. SWETHA then received call from Alicia Simmons with [address] [place] [address] Street who stated they are able to start auth. CM informed them to please start auth if they are able as they are the patient's first choice. Also, SWETHA noted there are no progress notes from an attending physician since 5/18. Notified patient's nurse as Dr. Ashley Burnett last note stated he would be out of town until 5/22 and Dr. Rakel Prado was covering. SWETHA will continue to follow, however, patient is going to have to work with therapy in order to obtain insurance auth for [facility].

## 2022-05-21 NOTE — PROGRESS NOTES
Covering for Dr. Boo Romano    Chief Complaint: abdominal pain      Subjective:  Mr. Zee Spring is a 49y.o. year old * male S/P Exploratory Laparotomy with resection of ileocolic anastomosis with end ileostomy on 5/15/22. Tolerating full liquids. Ileostomy has liquid stool. But still has abdominal pain. No nausea or vomiting. No fever or chills. Review of Systems:   Constitutional:  no fever,  no chills,  no sweats, No weakness, No fatigue, No decreased activity. Respiratory: No shortness of breath, No cough, No sputum production, No hemoptysis, No wheezing, No cyanosis. Cardiovascular: No chest pain, No palpitations, No bradycardia, No tachycardia, No peripheral edema, No syncope. Gastrointestinal: No nausea,  No vomiting, No diarrhea, No constipation, No heartburn, abdominal pain. Genitourinary: No dysuria, No hematuria, No change in urine stream, No urethral discharge, No lesions. Musculoskeletal: No back pain, No neck pain, No joint pain, No muscle pain, No claudication, No decreased range of motion, No trauma. Integumentary: No rash, No pruritus, No abrasions. Neurologic: Alert and oriented X4, No abnormal balance, No headache, No confusion, No numbness, No tingling. Psychiatric: No anxiety, No depression, No nick. Physical Exam:     Vitals & Measurements:     Wt Readings from Last 3 Encounters:   05/18/22 69.9 kg (154 lb 1.6 oz)   04/08/22 73.7 kg (162 lb 7.7 oz)   02/21/22 69.9 kg (154 lb)     Temp Readings from Last 3 Encounters:   05/20/22 99.4 °F (37.4 °C)   04/08/22 98.5 °F (36.9 °C)   02/21/22 98.6 °F (37 °C)     BP Readings from Last 3 Encounters:   05/20/22 132/83   04/08/22 (!) 158/98   02/21/22 (!) 147/95     Pulse Readings from Last 3 Encounters:   05/20/22 88   04/08/22 86   02/21/22 87      Ht Readings from Last 3 Encounters:   05/18/22 5' 7.4\" (1.712 m)   04/08/22 5' 7.42\" (1.712 m)   02/21/22 5' 8\" (1.727 m)      Date 05/20/22 0700 - 05/21/22 0659 05/21/22 0700 - 05/22/22 0659   Shift [id number] 1900-0659 24 Hour Total 8508-8805 2115-2071 24 Hour Total   INTAKE   Shift Total(mL/kg)         OUTPUT   Drains 650 3 653        Output (ml) (Brandon-Villaseñor Drain 05/13/22 Right; Lower Abdomen) 650 3 653      Stool 400  400        Output (ml) (Ileostomy 05/15/22 Right  Abdomen) 400  400      Shift Total(mL/kg) 1050(15) 3(0) 1053(15.1)      NET -1050 -3 -1053      Weight (kg) 69.9 69.9 69.9 69.9 69.9 69.9       General: well appearing, no acute distress  Respiratory: normal chest wall expansion, CTA B, no r/r/w, no rubs  Cardiovascular: RRR, no m/r/g, Normal S1 and S2  Abdomen: Soft, tenderness over right lower quadrant & left side,  non-distended, normal bowel sounds in all quadrants, no hepatosplenomegaly, no tympany. Incision scar: clean and dry, Stoma viable with some liquid in bag, LES drain minimal & serous.   Genitourinary: No inguinal hernia, normal external gentalia, Testis & scrotum normal, no renal angle tenderness  Musculoskeletal: normal ROM in upper and lower extremities  Integumentary: warm, dry, and pink, with no rash, purpura, or petechia  Neurological:Cranial Nerves II-XII grossly intact, No sensory or motor deficit  Psychiatric: Cooperative with normal mood, affect, and cognition    Laboratory Values:   Recent Results (from the past 24 hour(s))   GLUCOSE, POC    Collection Time: 05/20/22  8:18 AM   Result Value Ref Range    Glucose (POC) 93 65 - 117 mg/dL    Performed by Kristy Bauman, POC    Collection Time: 05/20/22 12:18 PM   Result Value Ref Range    Glucose (POC) 146 (H) 65 - 117 mg/dL    Performed by Jade Diaz    GLUCOSE, POC    Collection Time: 05/20/22  5:00 PM   Result Value Ref Range    Glucose (POC) 169 (H) 65 - 117 mg/dL    Performed by Jade RUBIN, POC    Collection Time: 05/20/22  9:05 PM   Result Value Ref Range    Glucose (POC) 145 (H) 65 - 117 mg/dL    Performed by Cuco Pandya          Radiology:   CT ABD PELV WO CONT Final Result   Small bowel ileus versus partial obstruction, at the ileocolic   anastomosis or ileocecal valve, slightly greater than previous. There is mixed   density fluid throughout this region, as before. . Consider follow-up study with   IV contrast and if possible, oral contrast. Consider NG tube. XR ABD (KUB)   Final Result      XR ABD (KUB)   Final Result   Small bowel dilatation consistent with obstruction, not   significantly changed. XR ABD (KUB)   Final Result   Persistent small bowel distention as seen with obstruction. XR ABD (KUB)   Final Result   No significant change compared to abdomen series 5/5/2022. Persistent air and fluid-filled dilated small bowel loops. XR ABD (KUB)   Final Result   Multiple distended gas-filled loops of small bowel compatible with   partial small bowel obstruction versus ileus. CT ABD PELV WO CONT   Final Result   Moderate grade partial small bowel obstruction. Small volume complex ascites. CT ABD PELV WO CONT   Final Result   1. Improving hemoperitoneum and pneumoperitoneum. 2.  Ileocolic anastomosis in the right lower quadrant. Incompletely evaluated   without contrast. No evidence of bowel obstruction. 3.  Reticular and groundglass airspace disease in the visualized lung bases   redemonstrated. 4.  See full report for detailed findings. CT ABD PELV WO CONT   Final Result   1. Status post ileal cecal surgery. Surrounding extraluminal dense material may   represent blood products (no indication of enteric contrast administration). Anastomotic leak cannot be excluded. Consider imaging with water-soluble oral   contrast. Free air in the abdomen likely within normal limits post recent   surgery. The bowel does not appear obstructed. 2. Bilateral lower lung airspace disease likely infectious or inflammatory. 3. Bilateral femoral head sclerosis suggests AVN.             Assessment:  Problem List Items Addressed This Visit None       Cecal polyp  S/P Exploratory Laparotomy with resection of ileocolic anastomosis with end ileostomy on 5/15/22. Plan:    1. Admission  2. Diet: full liquids   3. IV fluids  4. SCD  5. IS  6. Pain medications  7. Antibiotics  8. Nausea medication  9. Labs & Radiology:in am  10. Plan discussed with patient and family and answered all their questions. Thank you for allowing me to participate in the care of this patient.

## 2022-05-21 NOTE — PROGRESS NOTES
PHYSICAL THERAPY TREATMENT  Patient: Yrn Pearce (17 y.o. male)  Date: 5/21/2022  Diagnosis: Adenomatous polyp of colon, unspecified part of colon [D12.6]  [facility] [C18.9]  Cecal polyp [K63.5] <principal problem not specified>  Procedure(s) (LRB):  Exploratory laparotomy, abdominal washout, resection of ileocolic anastomosis with end ileostomy (N/A) 6 Days Post-Op  Precautions:    Chart, physical therapy assessment, plan of care and goals were reviewed. ASSESSMENT  Patient continues with skilled PT services and is progressing towards goals. Patient received supine and agreeable to begin therapy. Patient was limited by pain but was able to complete therex on EOB, see chart below. Patient able to transfer from supine <> sit EOB with min assist with additional time due to pain control. Once EOB patient completed exercises and was encouraged to complete them throughout the day as well. Patient then returned back to supine from EOB with min assist for LE management. He remained in bed with all needs in reach. Current Level of Function Impacting Discharge (mobility/balance): weakness    Other factors to consider for discharge: pain tolerance, lack of mobility          PLAN :  Patient continues to benefit from skilled intervention to address the above impairments. Continue treatment per established plan of care. to address goals. Recommendation for discharge: (in order for the patient to meet his/her long term goals)  Inpatient Rehabilitation Facility pending pain tolerance and participation    This discharge recommendation:  Has been made in collaboration with the attending provider and/or case management    IF patient discharges home will need the following DME: to be determined (TBD)       SUBJECTIVE:   Patient stated Fox Ramirez see what I can do.     OBJECTIVE DATA SUMMARY:   Critical Behavior:  Neurologic State: Alert  Orientation Level: Oriented to person,Oriented to place,Oriented to situation  Cognition: Follows commands     Functional Mobility Training:  Bed Mobility:  Rolling: Minimum assistance  Supine to Sit: Minimum assistance  Sit to Supine: Minimum assistance  Scooting: Stand-by assistance     Balance:  Sitting: Intact; Without support  Sitting - Static: Good (unsupported)  Sitting - Dynamic: Good (unsupported)      Therapeutic Exercises:       EXERCISE   Sets   Reps   Active Active Assist   Passive Self ROM   Comments   Ankle Pumps 1 10 [x] [] [] []    Long Arc Quads 1 10 [x] [] [] []    Marching 1 10 [x] [] [] []       [] [] [] []        Pain Ratin/10 RN administered pain meds before session    Activity Tolerance:   Poor  Please refer to the flowsheet for vital signs taken during this treatment. After treatment patient left in no apparent distress:   Supine in bed and Call bell within reach    COMMUNICATION/COLLABORATION:   The patients plan of care was discussed with: Registered nurse. Problem: Mobility Impaired (Adult and Pediatric)  Goal: *Acute Goals and Plan of Care (Insert Text)  Description: Pt will be I with LE HEP in 7 days. Pt will perform bed mobility with SBA in 7 days. Pt will perform transfers with SBA in 7 days. Pt will amb 5-10 feet with LRAD safely with Mod I/Independent in 7 days. Pt will demonstrate improvement in standing dynamic balance from CGA to Mod I/Independent in 7 days.      Outcome: Progressing Towards Goal     Bebo Asencio PTA   Time Calculation: 17 mins

## 2022-05-21 NOTE — PROGRESS NOTES
Covering for Dr. Ludin Patel    Chief Complaint: abdominal pain      Subjective:  Mr. Caty Freeman is a 49y.o. year old * male S/P Exploratory Laparotomy with resection of ileocolic anastomosis with end ileostomy on 5/15/22. Tolerating full liquids. Ileostomy has liquid stool. But still has abdominal pain. No nausea or vomiting. No fever or chills. Hemoglobin 6.6  Elevated WBC. Review of Systems:   Constitutional:  no fever,  no chills,  no sweats, No weakness, No fatigue, No decreased activity. Respiratory: No shortness of breath, No cough, No sputum production, No hemoptysis, No wheezing, No cyanosis. Cardiovascular: No chest pain, No palpitations, No bradycardia, No tachycardia, No peripheral edema, No syncope. Gastrointestinal: No nausea,  No vomiting, No diarrhea, No constipation, No heartburn, abdominal pain. Genitourinary: No dysuria, No hematuria, No change in urine stream, No urethral discharge, No lesions. Musculoskeletal: No back pain, No neck pain, No joint pain, No muscle pain, No claudication, No decreased range of motion, No trauma. Integumentary: No rash, No pruritus, No abrasions. Neurologic: Alert and oriented X4, No abnormal balance, No headache, No confusion, No numbness, No tingling. Psychiatric: No anxiety, No depression, No nick. Physical Exam:     Vitals & Measurements:     Wt Readings from Last 3 Encounters:   05/18/22 69.9 kg (154 lb 1.6 oz)   04/08/22 73.7 kg (162 lb 7.7 oz)   02/21/22 69.9 kg (154 lb)     Temp Readings from Last 3 Encounters:   05/21/22 98.6 °F (37 °C)   04/08/22 98.5 °F (36.9 °C)   02/21/22 98.6 °F (37 °C)     BP Readings from Last 3 Encounters:   05/21/22 94/60   04/08/22 (!) 158/98   02/21/22 (!) 147/95     Pulse Readings from Last 3 Encounters:   05/21/22 89   04/08/22 86   02/21/22 87      Ht Readings from Last 3 Encounters:   05/18/22 5' 7.4\" (1.712 m)   04/08/22 5' 7.42\" (1.712 m)   02/21/22 5' 8\" (1.727 m)      Date 05/20/22 0700 - 05/21/22 0616 05/21/22 0700 - 05/22/22 0659   Shift 7710-2567 5112-8512 24 Hour Total 1102-3354 0515-3922 24 Hour Total   INTAKE   Shift Total(mL/kg)         OUTPUT   Urine(mL/kg/hr)  1000(1.2) 1000(0.6)        Urine Voided  1000 1000      Drains 650 3 653        Output (ml) (Brandon-Villaseñor Drain 05/13/22 Right; Lower Abdomen) 650 3 653      Stool  100  100     Output (ml) (Ileostomy 05/15/22 Right  Abdomen)  100  100   Shift Total(mL/kg) 1050(15) 1653(23.6) 2712(64.9) 100(1.4)  100(1.4)   NET -1050 -1653 -2703 -100  -100   Weight (kg) 69.9 69.9 69.9 69.9 69.9 69.9       General: well appearing, no acute distress  Respiratory: normal chest wall expansion, CTA B, no r/r/w, no rubs  Cardiovascular: RRR, no m/r/g, Normal S1 and S2  Abdomen: Soft, tenderness over right lower quadrant & left side,  non-distended, normal bowel sounds in all quadrants, no hepatosplenomegaly, no tympany. Incision scar: clean and dry, Stoma viable with some liquid in bag, LES drain minimal & serous.   Genitourinary: No inguinal hernia, normal external gentalia, Testis & scrotum normal, no renal angle tenderness  Musculoskeletal: normal ROM in upper and lower extremities  Integumentary: warm, dry, and pink, with no rash, purpura, or petechia  Neurological:Cranial Nerves II-XII grossly intact, No sensory or motor deficit  Psychiatric: Cooperative with normal mood, affect, and cognition    Laboratory Values:   Recent Results (from the past 24 hour(s))   GLUCOSE, POC    Collection Time: 05/20/22  5:00 PM   Result Value Ref Range    Glucose (POC) 169 (H) 65 - 117 mg/dL    Performed by Lesly Barros    GLUCOSE, POC    Collection Time: 05/20/22  9:05 PM   Result Value Ref Range    Glucose (POC) 145 (H) 65 - 117 mg/dL    Performed by Praful Merino    CBC WITH AUTOMATED DIFF    Collection Time: 05/21/22  4:13 AM   Result Value Ref Range    WBC 16.4 (H) 4.1 - 11.1 K/uL    RBC 2.78 (L) 4.10 - 5.70 M/uL    HGB 6.6 (L) 12.1 - 17.0 g/dL    HCT 20.7 (L) 36.6 - 50.3 %    MCV 74.5 (L) 80.0 - 99.0 FL    MCH 23.7 (L) 26.0 - 34.0 PG    MCHC 31.9 30.0 - 36.5 g/dL    RDW 14.6 (H) 11.5 - 14.5 %    PLATELET 387 690 - 101 K/uL    MPV 13.8 (H) 8.9 - 12.9 FL    NRBC 0.1 (H) 0.0  WBC    ABSOLUTE NRBC 0.02 (H) 0.00 - 0.01 K/uL    NEUTROPHILS 81 (H) 32 - 75 %    LYMPHOCYTES 8 (L) 12 - 49 %    MONOCYTES 10 5 - 13 %    EOSINOPHILS 0 0 - 7 %    BASOPHILS 0 0 - 1 %    IMMATURE GRANULOCYTES 1 (H) 0 - 0.5 %    ABS. NEUTROPHILS 13.4 (H) 1.8 - 8.0 K/UL    ABS. LYMPHOCYTES 1.3 0.8 - 3.5 K/UL    ABS. MONOCYTES 1.6 (H) 0.0 - 1.0 K/UL    ABS. EOSINOPHILS 0.0 0.0 - 0.4 K/UL    ABS. BASOPHILS 0.0 0.0 - 0.1 K/UL    ABS. IMM. GRANS. 0.2 (H) 0.00 - 0.04 K/UL    DF AUTOMATED     METABOLIC PANEL, BASIC    Collection Time: 05/21/22  4:13 AM   Result Value Ref Range    Sodium 133 (L) 136 - 145 mmol/L    Potassium 4.5 3.5 - 5.1 mmol/L    Chloride 106 97 - 108 mmol/L    CO2 22 21 - 32 mmol/L    Anion gap 5 5 - 15 mmol/L    Glucose 138 (H) 65 - 100 mg/dL    BUN 6 6 - 20 mg/dL    Creatinine 0.34 (L) 0.70 - 1.30 mg/dL    BUN/Creatinine ratio 18 12 - 20      GFR est AA >60 >60 ml/min/1.73m2    GFR est non-AA >60 >60 ml/min/1.73m2    Calcium 7.8 (L) 8.5 - 10.1 mg/dL   GLUCOSE, POC    Collection Time: 05/21/22  9:05 AM   Result Value Ref Range    Glucose (POC) 121 (H) 65 - 117 mg/dL    Performed by Ambreen De Leon    GLUCOSE, POC    Collection Time: 05/21/22 11:45 AM   Result Value Ref Range    Glucose (POC) 115 65 - 117 mg/dL    Performed by Mara Wayne (Trvlr)          Radiology:   CT ABD PELV WO CONT   Final Result   Small bowel ileus versus partial obstruction, at the ileocolic   anastomosis or ileocecal valve, slightly greater than previous. There is mixed   density fluid throughout this region, as before. . Consider follow-up study with   IV contrast and if possible, oral contrast. Consider NG tube.       XR ABD (KUB)   Final Result      XR ABD (KUB)   Final Result   Small bowel dilatation consistent with obstruction, not   significantly changed. XR ABD (KUB)   Final Result   Persistent small bowel distention as seen with obstruction. XR ABD (KUB)   Final Result   No significant change compared to abdomen series 5/5/2022. Persistent air and fluid-filled dilated small bowel loops. XR ABD (KUB)   Final Result   Multiple distended gas-filled loops of small bowel compatible with   partial small bowel obstruction versus ileus. CT ABD PELV WO CONT   Final Result   Moderate grade partial small bowel obstruction. Small volume complex ascites. CT ABD PELV WO CONT   Final Result   1. Improving hemoperitoneum and pneumoperitoneum. 2.  Ileocolic anastomosis in the right lower quadrant. Incompletely evaluated   without contrast. No evidence of bowel obstruction. 3.  Reticular and groundglass airspace disease in the visualized lung bases   redemonstrated. 4.  See full report for detailed findings. CT ABD PELV WO CONT   Final Result   1. Status post ileal cecal surgery. Surrounding extraluminal dense material may   represent blood products (no indication of enteric contrast administration). Anastomotic leak cannot be excluded. Consider imaging with water-soluble oral   contrast. Free air in the abdomen likely within normal limits post recent   surgery. The bowel does not appear obstructed. 2. Bilateral lower lung airspace disease likely infectious or inflammatory. 3. Bilateral femoral head sclerosis suggests AVN. Assessment:  Problem List Items Addressed This Visit     None       Cecal polyp  S/P Exploratory Laparotomy with resection of ileocolic anastomosis with end ileostomy on 5/15/22. Plan:    1. Admission  2. Diet: full liquids   3. IV fluids  4. SCD  5. IS  6. Pain medications  7. Antibiotics  8. Nausea medication  9. Transfuse with 2 units of pRBC. 10. Labs in am. If WBC goes up will re CT him.   11. Plan discussed with patient and family and answered all their questions. Thank you for allowing me to participate in the care of this patient.

## 2022-05-22 NOTE — PROGRESS NOTES
PHYSICAL THERAPY TREATMENT  Patient: Candace Hoyos (80 y.o. male)  Date: 5/22/2022  Diagnosis: Adenomatous polyp of colon, unspecified part of colon [D12.6]  [facility] [C18.9]  Cecal polyp [K63.5] <principal problem not specified>  Procedure(s) (LRB):  Exploratory laparotomy, abdominal washout, resection of ileocolic anastomosis with end ileostomy (N/A) 7 Days Post-Op  Precautions:    Chart, physical therapy assessment, plan of care and goals were reviewed. ASSESSMENT  Patient continues with skilled PT services and is progressing slowly towards goals. Patient received semi-supine in bed, agreeable for PT treatment. Pt with c/o pain at abdomen- 8/10, participated in there ex's in semi-supine for b/l Le strengthening,  requires SBA for rolling to L side,  Pantera for supine<>sit transfers, demonstrates intact static/dynamic sitting balance with support. Pt performed STS x 2 attempts with Pantera and cues for hand placement . Pt took few side steps towards the HOB-3' with RW, Pantera, demonstrates  narrow JESSICA and shuffled steps. Overall, pt tolerates session fair today,continues with skilled PT services to address deficits with static/dynamic standing balance, activity tolerance and strength b/l Le impacting overall performance of  transfers/mobility. Recommend d/c to IRF when medically appropriate. Current Level of Function Impacting Discharge (mobility/balance): Pt requires Pantera for transfers/mobility. Other factors to consider for discharge: severity of deficits, time since onset, PLOF         PLAN :  Patient continues to benefit from skilled intervention to address the above impairments. Continue treatment per established plan of care. to address goals.     Recommendation for discharge: (in order for the patient to meet his/her long term goals)  [address]     This discharge recommendation:  Has been made in collaboration with the attending provider and/or case management    IF patient discharges home will need the following DME: to be determined (TBD)       SUBJECTIVE:   Patient stated  I am in so much pain     OBJECTIVE DATA SUMMARY:   Critical Behavior:  Neurologic State: Alert  Orientation Level: Oriented X4  Cognition: Follows commands     Functional Mobility Training:  Bed Mobility:  Rolling: Stand-by assistance  Supine to Sit: Minimum assistance  Sit to Supine: Minimum assistance  Scooting: Stand-by assistance        Transfers:  Sit to Stand: Minimum assistance  Stand to Sit: Minimum assistance    Balance:  Sitting: Intact; Without support  Standing: Impaired; With support  Standing - Static: Fair;Constant support  Standing - Dynamic : Fair;Constant support  Ambulation/Gait Training:  Distance (ft): 3 Feet (ft) (took few side steps towards the [facility])  Assistive Device: Walker, rolling;Gait belt  Ambulation - Level of Assistance: Minimal assistance; Additional time    Base of Support: Narrowed     Speed/Daysi: Slow;Shuffled      Therapeutic Exercises:   AP, AAROM for hip abduction-adduction, heel slides  Pain Rating:  Pain at abdomen- 8/10    Activity Tolerance:   Fair  Please refer to the flowsheet for vital signs taken during this treatment. After treatment patient left in no apparent distress:   Supine in bed, Call bell within reach, and Bed / chair alarm activated    COMMUNICATION/COLLABORATION:   The patients plan of care was discussed with: Registered nurse. Problem: Mobility Impaired (Adult and Pediatric)  Goal: *Acute Goals and Plan of Care (Insert Text)  Description: Pt will be I with LE HEP in 7 days. Pt will perform bed mobility with SBA in 7 days. Pt will perform transfers with SBA in 7 days. Pt will amb 5-10 feet with LRAD safely with Mod I/Independent in 7 days. Pt will demonstrate improvement in standing dynamic balance from CGA to Mod I/Independent in 7 days.      Outcome: Progressing Towards Goal     Kavon Tolliver   Time Calculation: 38 mins

## 2022-05-22 NOTE — PROGRESS NOTES
08:00  Temperature low grade. Patient awake alert and oriented. 10:15 medicated with dilaudid 1.5mg for complain of abdominal pain. 12:37 Dr Bienvenido Estrada in changed  Packing . Oxycodone IR given by Stephanie Gill RN  13:45 Family at bedside. Requesting to talk with the doctor. Dr Bienvenido Estrada Paged and notified, states will come and see them in the room. Patient and family informed.   14:12 Medicated for pain

## 2022-05-22 NOTE — PROGRESS NOTES
Covering for Dr. Brien Clifford    Chief Complaint: abdominal pain      Subjective:  Mr. Cora Gruber is a 49y.o. year old * male S/P Exploratory Laparotomy with resection of ileocolic anastomosis with end ileostomy on 5/15/22. Tolerating full liquids. Ileostomy has liquid stool. But still has abdominal pain. No nausea or vomiting. No fever or chills. Transfused with 2 units of pRBC overnight. Leukocytosis better. Review of Systems:   Constitutional:  no fever,  no chills,  no sweats, No weakness, No fatigue, No decreased activity. Respiratory: No shortness of breath, No cough, No sputum production, No hemoptysis, No wheezing, No cyanosis. Cardiovascular: No chest pain, No palpitations, No bradycardia, No tachycardia, No peripheral edema, No syncope. Gastrointestinal: No nausea,  No vomiting, No diarrhea, No constipation, No heartburn, abdominal pain. Genitourinary: No dysuria, No hematuria, No change in urine stream, No urethral discharge, No lesions. Musculoskeletal: No back pain, No neck pain, No joint pain, No muscle pain, No claudication, No decreased range of motion, No trauma. Integumentary: No rash, No pruritus, No abrasions. Neurologic: Alert and oriented X4, No abnormal balance, No headache, No confusion, No numbness, No tingling. Psychiatric: No anxiety, No depression, No nick. Physical Exam:     Vitals & Measurements:     Wt Readings from Last 3 Encounters:   05/22/22 65.1 kg (143 lb 8.3 oz)   04/08/22 73.7 kg (162 lb 7.7 oz)   02/21/22 69.9 kg (154 lb)     Temp Readings from Last 3 Encounters:   05/22/22 99.4 °F (37.4 °C)   04/08/22 98.5 °F (36.9 °C)   02/21/22 98.6 °F (37 °C)     BP Readings from Last 3 Encounters:   05/22/22 133/81   04/08/22 (!) 158/98   02/21/22 (!) 147/95     Pulse Readings from Last 3 Encounters:   05/22/22 95   04/08/22 86   02/21/22 87      Ht Readings from Last 3 Encounters:   05/18/22 5' 7.4\" (1.712 m)   04/08/22 5' 7.42\" (1.712 m)   02/21/22 5' 8\" (1.727 m) Date 05/21/22 0700 - 05/22/22 0659 05/22/22 0700 - 05/23/22 0659   Shift 6687-8767 0825-8916 24 Hour Total 4077-0252 9597-9436 24 Hour Total   INTAKE   Blood  310 310        Volume (TRANSFUSE PACKED RBC'S)  310 310      Shift Total(mL/kg)  310(4.4) 310(4.4)      OUTPUT   Urine(mL/kg/hr)  1800(2.1) 1800(1.1)        Urine Voided  1800 1800      Drains 3  3        Output (ml) (Brandon-Villaseñor Drain 05/13/22 Right; Lower Abdomen) 3  3      Stool 300 50 350        Output (ml) (Ileostomy 05/15/22 Right  Abdomen) 300 50 350      Shift Total(mL/kg) 303(4.3) 1850(26.5) 2153(30.8)      NET -303 -1670 -4987      Weight (kg) 69.9 69.9 69.9 65.1 65.1 65.1       General: well appearing, no acute distress  Respiratory: normal chest wall expansion, CTA B, no r/r/w, no rubs  Cardiovascular: RRR, no m/r/g, Normal S1 and S2  Abdomen: Soft, tenderness over right lower quadrant & left side,  non-distended, normal bowel sounds in all quadrants, no hepatosplenomegaly, no tympany. Incision scar: some purulent drainage. couple of sutures removed & packed the wound with AquacelAg,   Stoma viable with some liquid in bag, LES drain minimal & serous.   Genitourinary: No inguinal hernia, normal external gentalia, Testis & scrotum normal, no renal angle tenderness  Musculoskeletal: normal ROM in upper and lower extremities  Integumentary: warm, dry, and pink, with no rash, purpura, or petechia  Neurological:Cranial Nerves II-XII grossly intact, No sensory or motor deficit  Psychiatric: Cooperative with normal mood, affect, and cognition    Laboratory Values:   Recent Results (from the past 24 hour(s))   TYPE & SCREEN    Collection Time: 05/21/22  4:33 PM   Result Value Ref Range    Crossmatch Expiration 05/24/2022,2359     ABO/Rh(D) A Positive     Antibody screen Negative     Unit number [id number]     Blood component type RC LR     Unit division 00     Status of unit [address] to transfuse     Crossmatch result Compatible Unit number [id number]     Blood component type [facility]     Unit division 00     Status of unit Allocated     TRANSFUSION STATUS Ok to transfuse     Crossmatch result Compatible    GLUCOSE, POC    Collection Time: 05/21/22  4:51 PM   Result Value Ref Range    Glucose (POC) 155 (H) 65 - 117 mg/dL    Performed by Mehnaz Mcgowan    COVID-19 WITH INFLUENZA A/B    Collection Time: 05/21/22  6:00 PM   Result Value Ref Range    SARS-CoV-2 by PCR Not Detected Not Detected      Influenza A by PCR Not Detected Not Detected      Influenza B by PCR Not Detected Not Detected     GLUCOSE, POC    Collection Time: 05/21/22  8:24 PM   Result Value Ref Range    Glucose (POC) 137 (H) 65 - 117 mg/dL    Performed by Indy Leung E, POC    Collection Time: 05/22/22  8:21 AM   Result Value Ref Range    Glucose (POC) 139 (H) 65 - 117 mg/dL    Performed by Kade Valadez    CBC WITH AUTOMATED DIFF    Collection Time: 05/22/22 10:58 AM   Result Value Ref Range    WBC 13.0 (H) 4.1 - 11.1 K/uL    RBC 3.32 (L) 4.10 - 5.70 M/uL    HGB 8.2 (L) 12.1 - 17.0 g/dL    HCT 24.9 (L) 36.6 - 50.3 %    MCV 75.0 (L) 80.0 - 99.0 FL    MCH 24.7 (L) 26.0 - 34.0 PG    MCHC 32.9 30.0 - 36.5 g/dL    RDW 14.9 (H) 11.5 - 14.5 %    PLATELET 585 135 - 776 K/uL    MPV 12.5 8.9 - 12.9 FL    NRBC 0.2 (H) 0.0  WBC    ABSOLUTE NRBC 0.02 (H) 0.00 - 0.01 K/uL    NEUTROPHILS 79 (H) 32 - 75 %    LYMPHOCYTES 10 (L) 12 - 49 %    MONOCYTES 9 5 - 13 %    EOSINOPHILS 1 0 - 7 %    BASOPHILS 0 0 - 1 %    IMMATURE GRANULOCYTES 1 (H) 0 - 0.5 %    ABS. NEUTROPHILS 10.3 (H) 1.8 - 8.0 K/UL    ABS. LYMPHOCYTES 1.3 0.8 - 3.5 K/UL    ABS. MONOCYTES 1.1 (H) 0.0 - 1.0 K/UL    ABS. EOSINOPHILS 0.1 0.0 - 0.4 K/UL    ABS. BASOPHILS 0.0 0.0 - 0.1 K/UL    ABS. IMM.  GRANS. 0.1 (H) 0.00 - 0.04 K/UL    DF AUTOMATED     METABOLIC PANEL, BASIC    Collection Time: 05/22/22 10:58 AM   Result Value Ref Range    Sodium 135 (L) 136 - 145 mmol/L    Potassium 3.9 3.5 - 5.1 mmol/L Chloride 105 97 - 108 mmol/L    CO2 26 21 - 32 mmol/L    Anion gap 4 (L) 5 - 15 mmol/L    Glucose 139 (H) 65 - 100 mg/dL    BUN 7 6 - 20 mg/dL    Creatinine 0.35 (L) 0.70 - 1.30 mg/dL    BUN/Creatinine ratio 20 12 - 20      GFR est AA >60 >60 ml/min/1.73m2    GFR est non-AA >60 >60 ml/min/1.73m2    Calcium 7.8 (L) 8.5 - 10.1 mg/dL   GLUCOSE, POC    Collection Time: 05/22/22 11:52 AM   Result Value Ref Range    Glucose (POC) 120 (H) 65 - 117 mg/dL    Performed by Aislinn Hernadez          Radiology:   CT ABD PELV WO CONT   Final Result   Small bowel ileus versus partial obstruction, at the ileocolic   anastomosis or ileocecal valve, slightly greater than previous. There is mixed   density fluid throughout this region, as before. . Consider follow-up study with   IV contrast and if possible, oral contrast. Consider NG tube. XR ABD (KUB)   Final Result      XR ABD (KUB)   Final Result   Small bowel dilatation consistent with obstruction, not   significantly changed. XR ABD (KUB)   Final Result   Persistent small bowel distention as seen with obstruction. XR ABD (KUB)   Final Result   No significant change compared to abdomen series 5/5/2022. Persistent air and fluid-filled dilated small bowel loops. XR ABD (KUB)   Final Result   Multiple distended gas-filled loops of small bowel compatible with   partial small bowel obstruction versus ileus. CT ABD PELV WO CONT   Final Result   Moderate grade partial small bowel obstruction. Small volume complex ascites. CT ABD PELV WO CONT   Final Result   1. Improving hemoperitoneum and pneumoperitoneum. 2.  Ileocolic anastomosis in the right lower quadrant. Incompletely evaluated   without contrast. No evidence of bowel obstruction. 3.  Reticular and groundglass airspace disease in the visualized lung bases   redemonstrated. 4.  See full report for detailed findings. CT ABD PELV WO CONT   Final Result   1. Status post ileal cecal surgery. Surrounding extraluminal dense material may   represent blood products (no indication of enteric contrast administration). Anastomotic leak cannot be excluded. Consider imaging with water-soluble oral   contrast. Free air in the abdomen likely within normal limits post recent   surgery. The bowel does not appear obstructed. 2. Bilateral lower lung airspace disease likely infectious or inflammatory. 3. Bilateral femoral head sclerosis suggests AVN. Assessment:  Problem List Items Addressed This Visit     None       Cecal polyp  S/P Exploratory Laparotomy with resection of ileocolic anastomosis with end ileostomy on 5/15/22. Plan:    1. Admission  2. Diet: soft diet. 3. TPN  4. SCD  5. IS  6. Pain medications  7. Antibiotics  8. Nausea medication  9. Labs in am.  10. Plan discussed with patient and family and answered all their questions. Thank you for allowing me to participate in the care of this patient.

## 2022-05-22 NOTE — ROUTINE PROCESS
Bedside and Verbal shift change report given to Katie Macias (oncoming nurse) by Genaro Mancuso (offgoing nurse). Report included the following information SBAR and MAR.

## 2022-05-23 NOTE — PROGRESS NOTES
Patient will benefit from PICC Placement for TPN and vascular access. 05/22/22 2010   Peripheral IV 05/22/22 Anterior;Right Forearm   Placement Date/Time: 05/22/22 2009   Number of Attempts: 1  Inserted By: Bobby Sandoval RN  Size: (c) 22 G  Orientation: Anterior;Right  Location: Forearm   Site Assessment Clean, dry, & intact   Phlebitis Assessment 0   Infiltration Assessment 0   Dressing Status Clean, dry, & intact; New   Dressing Type Tape;Transparent   Hub Color/Line Status Other (comment); Flushed;Patent;Blue  (brisk blood return)   Alcohol Cap Used Yes

## 2022-05-23 NOTE — PROGRESS NOTES
OCCUPATIONAL THERAPY TREATMENT  Patient: Alanna Fraga (56 y.o. male)  Date: 5/23/2022  Diagnosis: Adenomatous polyp of colon, unspecified part of colon [D12.6]  [facility] ([facility] [C18.9]  Cecal polyp [K63.5] <principal problem not specified>  Procedure(s) (LRB):  Exploratory laparotomy, abdominal washout, resection of ileocolic anastomosis with end ileostomy (N/A) 8 Days Post-Op  Precautions:    Chart, occupational therapy assessment, plan of care, and goals were reviewed. ASSESSMENT  Patient continues with skilled OT services and is progressing towards goals. Pt. Received semi-supine in bed and agreeable to tx session. Pt. Performed bed mobility with additional time and SBA. Pt. Demonstrated good sitting balance. Pt. C/o 6/10 pain in abdominal region while seated at EOB. Pt. Performed sit> stand with Min A and use of RW upon standing for support. Pt able to perform a few side steps from bed > chair with Min A for safety. Pt. Required additional time lowering self into chair. While seated in chair pt performed facial grooming with set-up assistance. Pt. Educated on UE therex to perform 3 x daily to maintain/ increase UB strength, pt verbalized understanding. Pt. Left reclined in chair with needs met and call bell within reach. REC. IRF when medically appropriate for discharge. Current Level of Function Impacting Discharge (ADLs): general weakness, assistance with OOB transfers, pain in abdominal region limiting participation. Other factors to consider for discharge: PLOF, time since on set         PLAN :  Patient continues to benefit from skilled intervention to address the above impairments. Continue treatment per established plan of care. to address goals.     Recommendation for discharge: (in order for the patient to meet his/her long term goals)  Therapy 3 hours per day 5-7 days per week    This discharge recommendation:  Has been made in collaboration with the attending provider and/or case management    IF patient discharges home will need the following DME: TBD       SUBJECTIVE:   Patient stated I have MS.    OBJECTIVE DATA SUMMARY:   Cognitive/Behavioral Status:  Neurologic State: Alert  Orientation Level: Oriented X4  Cognition: Follows commands    Functional Mobility and Transfers for ADLs:  Bed Mobility:  Rolling: Stand-by assistance  Supine to Sit: Stand-by assistance  Scooting: Stand-by assistance    Transfers:  Sit to Stand: Minimum assistance     Bed to Chair: Minimum assistance    Balance:  Sitting: Intact; Without support  Standing: Impaired; With support  Standing - Static: Constant support; Fair  Standing - Dynamic : Constant support; Fair    ADL Intervention:       Grooming  Grooming Assistance: Independent; Set-up  Position Performed: Seated in chair  Washing Face: Independent; Set-up  Therapeutic Exercises:   Pt. Educated on UE therex, pt verbalized understanding    Pain:  8/10 pain reported in abdomen   Activity Tolerance:   Fair and requires rest breaks  Please refer to the flowsheet for vital signs taken during this treatment. After treatment patient left in no apparent distress:   Sitting in chair, Heels elevated for pressure relief, and Call bell within reach    COMMUNICATION/COLLABORATION:   The patients plan of care was discussed with: Registered nurseSalomón Sanchez  Time Calculation: 33 mins    Problem: Self Care Deficits Care Plan (Adult)  Goal: *Acute Goals and Plan of Care (Insert Text)  Description: Pt stated goal \"to decrease pain\"  Pt will be Mod I sup <> sit in prep for EOB ADLs  Pt will be Mod I grooming standing sink side LRAD  Pt will be Mod I UB dressing sitting EOB/long sit   Pt will be Mod I LE dressing sitting EOB/long sit  Pt will be Mod I sit <>  prep for toileting LRAD  Pt will be Mod I toileting/toilet transfer/cloth mgmt LRAD  Pt will be IND following UE HEP in prep for self care tasks      Outcome: Progressing Towards Goal

## 2022-05-23 NOTE — ROUTINE PROCESS
Bedside and Verbal shift change report given to Francisco Frey (oncoming nurse) by Maria E Newman (offgoing nurse). Report included the following information SBAR and MAR.

## 2022-05-23 NOTE — PROGRESS NOTES
Paged Dr Desmond Maynard to notify him of elevated temperature, and 2nd unit of blood not given. Awaiting call back.

## 2022-05-23 NOTE — WOUND CARE
WCN attempted education and bag change with patient, patient about to get washed up and linens changed. Patient requested Jennifer Rodriguez return this afternoon for education and bag change. 1530: Attempted education, patient declined and stated that he was worn out from therapy. Appliance changed, will continue education tomorrow morning. Patient has been practicing cutting out wafers to fit over stoma. Patient stated that his brother and his sister both have ostomys so he somewhat familiar with them.

## 2022-05-23 NOTE — PROGRESS NOTES
Progress Note    Patient: Caty Freeman MRN: [id number]  SSN: [id number]    YOB: 1970  Age: 49 y.o. Sex: male      Admit Date: 4/15/2022    LOS: 38 days     Subjective:   Patient seen in bed  Is tolerating diet  Denies nausea vomiting    Objective:     Vitals:    05/23/22 0247 05/23/22 0747 05/23/22 1647 05/23/22 1650   BP: 121/84 132/80  139/76   Pulse: 76 81  70   Resp:  18  18   Temp:  98.6 °F (37 °C)  97.8 °F (36.6 °C)   SpO2: 100% 100%  94%   Weight:   141 lb 8.6 oz (64.2 kg)    Height:            Intake and Output:  Current Shift: No intake/output data recorded.   Last three shifts: 05/21 1901 - 05/23 0700  In: 310   Out: 6215 [Urine:5800; Drains:100]    Review of Systems:  ROS     Physical Exam:   Abdomen is soft, appropriately tender, nondistended, incision clean, ileostomy right abdomen pink and protuberant and productive of stool, LES drain with serous drainage    Lab/Data Review:  Recent Results (from the past 24 hour(s))   GLUCOSE, POC    Collection Time: 05/22/22  9:26 PM   Result Value Ref Range    Glucose (POC) 239 (H) 65 - 117 mg/dL    Performed by Olivia Franco    GLUCOSE, POC    Collection Time: 05/23/22  8:01 AM   Result Value Ref Range    Glucose (POC) 164 (H) 65 - 117 mg/dL    Performed by Lorenzo Drummond    GLUCOSE, POC    Collection Time: 05/23/22  1:05 PM   Result Value Ref Range    Glucose (POC) 226 (H) 65 - 117 mg/dL    Performed by Lorenzo Drummond    GLUCOSE, POC    Collection Time: 05/23/22  4:47 PM   Result Value Ref Range    Glucose (POC) 214 (H) 65 - 117 mg/dL    Performed by Alek Geller             Assessment:     Active Problems:    Colon cancer ([address] (4/15/2022)      Cecal polyp (4/15/2022)        Plan:   Doing well  Continue diet  D/C PPN  Continue PT/OT  Stoma teaching      Signed By: Phoebe Fuentes MD     May 23, 2022

## 2022-05-24 NOTE — WOUND CARE
MICHAELN in for continued ostomy education. Patient bag was changed early this morning due to leaking. WCN assisted patient with practicing of measuring stoma with stoma guide on ostomy care model. Patient able to demonstrate measuring stoma, uctting wafer to fit stoma and pouch application. WCN left 1 piece and 2 piece bags/wafer in patient room to continue to practice cutting wafers and apply pouch to ostomy care model. Will continue with education tomorrow. Discussed the following:    -Measuring the stoma  -Cutting wafer to fit stoma and wafer and pouch application.  -Empty / burp pouch when 1/3-1/2 full of stool or gas. -Change appliance (wafer and pouch) 2-3 per week. If leaking, change as soon as possible to prevent skin irritation.  -Best time for routine change of appliance is first thing in the morning before consuming food or liquids (depending on which type of ostomy). -Clean stoma and peristomal skin with plain water or NS, may use a mild soap. No soaps with lotions as they may prevent adhesive from adhering to skin. May bathe with appliance on or off.  -Purposes and how to use barrier rings, stoma paste, and stoma powder.   -Stoma should always be red and moist.  If stoma appears dry and dusky, notify surgeon immediately.

## 2022-05-24 NOTE — CONSULTS
NEURO CONSULT      REASON FOR ADMISSION:  Visual obscurations      HISTORY:  Mr. Carey is 49years old with a history of for multiple medical problems with status post surgery consulted to allergy because of complaints of remittent visual obscurations. Patient states that he occasionally sees different parts of the same object. He said difficulties explaining exactly what his visual obscurations are, nevertheless he does have some visual problems. He comes across as frustrated and describing what his visual obscurations are. He denies any focality.         ROS: Above +    General:                     No fever, no chills, no sweats, no generalized weakness, no weight loss/gain,                                       No loss of appetite   Eyes:                           No blurred vision, no eye pain, no loss of vision, no double vision  ENT:                            rhinorrhea, no pharyngitis   Respiratory:               No cough, no sputum production, no SOB, no GRAVES, no wheezing, no pleuritic pain   Cardiology:                No chest pain, no palpitations, no orthopnea, no PND, no edema, no syncope   Gastrointestinal:       No abdominal pain , no N/V, no diarrhea, no dysphagia, no constipation, no bleeding   Genitourinary:           frequency, no urgency, no dysuria, no hematuria, no incontinence   Muskuloskeletal :      No arthralgia, no myalgia, no back pain  Hematology:              No easy bruising, no nose or gum bleeding, no lymphadenopathy   Dermatological:         No rash, no ulceration, no pruritis, no color change / jaundice  Endocrine:                 hot flashes or polydipsia   Neurological:             No headache, no dizziness, no confusion, no focal weakness, no paresthesia,                                      No Speech difficulties, no memory loss, no gait difficulty  Psychological:          No neelings of anxiety, no depression, no agitation      NEURO EXAM:    Mental status: Awake, alert, oriented to date, month, year, not aphasic. Patient does have some difficulties explaining his visual obscurations. Cranial nerves: Patient does have intermittent visual obscurations, the rest of his cranial nerves are intact. He is partially edentulous    Motor exam: Patient moves all 4 extremities    Sensory exam: Patient is in mild pain from his surgery. There is no tactile extinction    Coordination: Finger-to-nose is intact. 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 daily  Patient may eventually need to see an ophthalmologist      ALLERGIES:    Allergies   Allergen Reactions    Beef Containing Products Anaphylaxis and Other (comments)    Diltiazem Other (comments)     Reaction Type: Allergy; Reaction(s): Pressure in chest    Iodinated Contrast Media Other (comments)     Reaction Type: Allergy; Severity: Severe; Reaction(s): hives,throat swelling    Pork Derived (Porcine) Hives    Shellfish Containing Products Swelling     Other reaction(s): Other  Reaction Type: Allergy; Reaction(s): Hives,throat swelling    Sulfa (Sulfonamide Antibiotics) Unknown (comments)     Spinal meningitis    Sulfur Unknown (comments)     Reaction Type: Allergy  Spinal meningitis    Lactose Diarrhea     Lactose intolerant per Pt.        MEDS:      Current Facility-Administered Medications:     HYDROmorphone (DILAUDID) injection 1 mg, 1 mg, IntraVENous, Q4H PRN, Gabriella Parra MD    nystatin (MYCOSTATIN) 100,000 unit/mL oral suspension 500,000 Units, 500,000 Units, Oral, QID, Gabriella Parra MD, 500,000 Units at 05/24/22 0900    0.9% sodium chloride infusion 250 mL, 250 mL, IntraVENous, PRN, Abraham Hidalgo MD    acetaminophen (TYLENOL) tablet 650 mg, 650 mg, Oral, Q4H PRN, Tim Fairbanks MD, 650 mg at 05/18/22 1838    famotidine (PF) (PEPCID) 20 mg in 0.9% sodium chloride 10 mL injection, 20 mg, IntraVENous, Q12H, Tim Fairbanks MD, 20 mg at 05/24/22 1036    dextrose 5% - 0.45% NaCl with KCl 20 mEq/L infusion, 75 mL/hr, IntraVENous, CONTINUOUS, Tim Fairbanks MD, Last Rate: 75 mL/hr at 05/24/22 1037, 75 mL/hr at 05/24/22 1037    oxyCODONE IR (ROXICODONE) tablet 10 mg, 10 mg, Oral, Q4H PRN, Tim Fairbanks MD, 10 mg at 05/24/22 0325    ceFAZolin (ANCEF) 2 g in sterile water (preservative free) 20 mL IV syringe, 2 g, IntraVENous, Q8H, Tim Fairbanks MD, Last Rate: 400 mL/hr at 05/24/22 0532, 2 g at 05/24/22 0532    metroNIDAZOLE (FLAGYL) IVPB premix 500 mg, 500 mg, IntraVENous, Q8H, Tim Fairbanks MD, Last Rate: 100 mL/hr at 05/24/22 0325, 500 mg at 05/24/22 0325    ondansetron (ZOFRAN) injection 4 mg, 4 mg, IntraVENous, Q4H PRN, Tim Fairbanks MD, 4 mg at 05/23/22 0251    verapamil ER (VERELAN PM) capsule 200 mg, 200 mg, Oral, QHS, Lou CABRERA MD, 200 mg at 05/23/22 2136    [Held by provider] potassium chloride SR (KLOR-CON 10) tablet 10 mEq, 10 mEq, Oral, BID, Lydia Reyes MD, 10 mEq at 04/30/22 2142    [Held by provider] apixaban (ELIQUIS) tablet 5 mg, 5 mg, Oral, BID, Aury Cardona MD, 5 mg at 05/11/22 0901    amiodarone (CORDARONE) tablet 200 mg, 200 mg, Oral, DAILY, Shira Long MD, 200 mg at 05/24/22 1036    lisinopriL (PRINIVIL, ZESTRIL) tablet 20 mg, 20 mg, Oral, DAILY, Tim Fairbanks MD, 20 mg at 05/24/22 1036    predniSONE (DELTASONE) tablet 20 mg, 20 mg, Oral, DAILY WITH BREAKFAST, Tim Fairbanks MD, 20 mg at 05/24/22 1036    insulin lispro (HUMALOG) injection, , SubCUTAneous, AC&HS, Tim Fairbanks MD, 4 Units at 05/23/22 2124    glucose chewable tablet 16 g, 4 Tablet, Oral, PRN, Tamara Miranda, MD Fariha    glucagon (GLUCAGEN) injection 1 mg, 1 mg, IntraMUSCular, PRN, Pam Kay MD, 1 mg at 05/06/22 0559    dextrose 10% infusion 0-250 mL, 0-250 mL, IntraVENous, PRN, Pam Kay MD, Last Rate: 750 mL/hr at 05/02/22 1558, 125 mL at 05/02/22 1558    labetaloL (NORMODYNE;TRANDATE) 20 mg/4 mL (5 mg/mL) injection 10 mg, 10 mg, IntraVENous, Q6H PRN, Pam Kay MD, 10 mg at 05/03/22 1423    LABS:  Recent Results (from the past 24 hour(s))   GLUCOSE, POC    Collection Time: 05/23/22  4:47 PM   Result Value Ref Range    Glucose (POC) 214 (H) 65 - 117 mg/dL    Performed by Darinel Geller    GLUCOSE, POC    Collection Time: 05/23/22  8:52 PM   Result Value Ref Range    Glucose (POC) 217 (H) 65 - 117 mg/dL    Performed by Thuy Bermudez    GLUCOSE, POC    Collection Time: 05/23/22  9:17 PM   Result Value Ref Range    Glucose (POC) 202 (H) 65 - 117 mg/dL    Performed by Alena Burrell    CBC WITH AUTOMATED DIFF    Collection Time: 05/24/22  4:59 AM   Result Value Ref Range    WBC 13.7 (H) 4.1 - 11.1 K/uL    RBC 3.25 (L) 4.10 - 5.70 M/uL    HGB 7.8 (L) 12.1 - 17.0 g/dL    HCT 24.4 (L) 36.6 - 50.3 %    MCV 75.1 (L) 80.0 - 99.0 FL    MCH 24.0 (L) 26.0 - 34.0 PG    MCHC 32.0 30.0 - 36.5 g/dL    RDW 15.5 (H) 11.5 - 14.5 %    PLATELET 668 488 - 990 K/uL    MPV 12.5 8.9 - 12.9 FL    NRBC 0.3 (H) 0.0  WBC    ABSOLUTE NRBC 0.04 (H) 0.00 - 0.01 K/uL    NEUTROPHILS 75 32 - 75 %    LYMPHOCYTES 14 12 - 49 %    MONOCYTES 10 5 - 13 %    EOSINOPHILS 0 0 - 7 %    BASOPHILS 0 0 - 1 %    IMMATURE GRANULOCYTES 1 (H) 0 - 0.5 %    ABS. NEUTROPHILS 10.4 (H) 1.8 - 8.0 K/UL    ABS. LYMPHOCYTES 1.9 0.8 - 3.5 K/UL    ABS. MONOCYTES 1.3 (H) 0.0 - 1.0 K/UL    ABS. EOSINOPHILS 0.1 0.0 - 0.4 K/UL    ABS. BASOPHILS 0.0 0.0 - 0.1 K/UL    ABS. IMM.  GRANS. 0.1 (H) 0.00 - 0.04 K/UL    DF AUTOMATED     METABOLIC PANEL, COMPREHENSIVE    Collection Time: 05/24/22  4:59 AM   Result Value Ref Range    Sodium 135 (L) 136 - 145 mmol/L    Potassium 4.1 3.5 - 5.1 mmol/L    Chloride 106 97 - 108 mmol/L    CO2 24 21 - 32 mmol/L    Anion gap 5 5 - 15 mmol/L    Glucose 154 (H) 65 - 100 mg/dL    BUN 11 6 - 20 mg/dL    Creatinine 0.48 (L) 0.70 - 1.30 mg/dL    BUN/Creatinine ratio 23 (H) 12 - 20      GFR est AA >60 >60 ml/min/1.73m2    GFR est non-AA >60 >60 ml/min/1.73m2    Calcium 8.6 8.5 - 10.1 mg/dL    Bilirubin, total 0.2 0.2 - 1.0 mg/dL    AST (SGOT) 9 (L) 15 - 37 U/L    ALT (SGPT) 7 (L) 12 - 78 U/L    Alk. phosphatase 82 45 - 117 U/L    Protein, total 5.4 (L) 6.4 - 8.2 g/dL    Albumin 2.2 (L) 3.5 - 5.0 g/dL    Globulin 3.2 2.0 - 4.0 g/dL    A-G Ratio 0.7 (L) 1.1 - 2.2     GLUCOSE, POC    Collection Time: 05/24/22  7:21 AM   Result Value Ref Range    Glucose (POC) 132 (H) 65 - 117 mg/dL    Performed by Lisa Gonzalez    GLUCOSE, POC    Collection Time: 05/24/22 11:20 AM   Result Value Ref Range    Glucose (POC) 129 (H) 65 - 117 mg/dL    Performed by Lisa Gonzalez        Visit Vitals  /80 (BP 1 Location: Left lower arm, BP Patient Position: At rest)   Pulse 87   Temp 98.1 °F (36.7 °C)   Resp 18   Ht 5' 7.4\" (1.712 m)   Wt 64.2 kg (141 lb 8.6 oz)   SpO2 100%   BMI 21.90 kg/m²       Imaging:  CT ABD PELV WO CONT   Final Result   Small bowel ileus versus partial obstruction, at the ileocolic   anastomosis or ileocecal valve, slightly greater than previous. There is mixed   density fluid throughout this region, as before. . Consider follow-up study with   IV contrast and if possible, oral contrast. Consider NG tube. XR ABD (KUB)   Final Result      XR ABD (KUB)   Final Result   Small bowel dilatation consistent with obstruction, not   significantly changed. XR ABD (KUB)   Final Result   Persistent small bowel distention as seen with obstruction. XR ABD (KUB)   Final Result   No significant change compared to abdomen series 5/5/2022. Persistent air and fluid-filled dilated small bowel loops.       XR ABD (KUB)   Final Result   Multiple distended gas-filled loops of small bowel compatible with   partial small bowel obstruction versus ileus. CT ABD PELV WO CONT   Final Result   Moderate grade partial small bowel obstruction. Small volume complex ascites. CT ABD PELV WO CONT   Final Result   1. Improving hemoperitoneum and pneumoperitoneum. 2.  Ileocolic anastomosis in the right lower quadrant. Incompletely evaluated   without contrast. No evidence of bowel obstruction. 3.  Reticular and groundglass airspace disease in the visualized lung bases   redemonstrated. 4.  See full report for detailed findings. CT ABD PELV WO CONT   Final Result   1. Status post ileal cecal surgery. Surrounding extraluminal dense material may   represent blood products (no indication of enteric contrast administration). Anastomotic leak cannot be excluded. Consider imaging with water-soluble oral   contrast. Free air in the abdomen likely within normal limits post recent   surgery. The bowel does not appear obstructed. 2. Bilateral lower lung airspace disease likely infectious or inflammatory. 3. Bilateral femoral head sclerosis suggests AVN.       MRI BRAIN WO CONT    (Results Pending)

## 2022-05-24 NOTE — PROGRESS NOTES
Progress Note    Patient: Benjie Burnett MRN: [id number]  SSN: [id number]    YOB: 1970  Age: 49 y.o.   Sex: male      Admit Date: 4/15/2022    LOS: 39 days     Subjective:   Patient is seen in bed  Tolerating diet, ileostomy functioning  Continues to complain of some visual disturbances  Has been in a sinus rhythm    Objective:     Vitals:    05/23/22 1650 05/23/22 2114 05/24/22 0409 05/24/22 1000   BP: 139/76 (!) 150/86 133/78 120/80   Pulse: 70 80 79 87   Resp: 18 20 18 18   Temp: 97.8 °F (36.6 °C) 98.6 °F (37 °C) 98.5 °F (36.9 °C) 98.1 °F (36.7 °C)   SpO2: 94% 100%  100%   Weight:       Height:            Intake and Output:  Current Shift: 05/24 0701 - 05/24 1900  In: -   Out: 400 [Urine:400]  Last three shifts: 05/22 1901 - 05/24 0700  In: -   Out: 1753 [Urine:3900]    Review of Systems:  ROS     Physical Exam:   Diminished soft, nondistended, appropriately tender, ileostomy right abdomen pink protuberant and productive stool, LES drain serous drainage, midline incision clean    Lab/Data Review:  Recent Results (from the past 24 hour(s))   GLUCOSE, POC    Collection Time: 05/23/22  4:47 PM   Result Value Ref Range    Glucose (POC) 214 (H) 65 - 117 mg/dL    Performed by Darinel Geller    GLUCOSE, POC    Collection Time: 05/23/22  8:52 PM   Result Value Ref Range    Glucose (POC) 217 (H) 65 - 117 mg/dL    Performed by Jaron Hardy    GLUCOSE, POC    Collection Time: 05/23/22  9:17 PM   Result Value Ref Range    Glucose (POC) 202 (H) 65 - 117 mg/dL    Performed by Rere Bose    CBC WITH AUTOMATED DIFF    Collection Time: 05/24/22  4:59 AM   Result Value Ref Range    WBC 13.7 (H) 4.1 - 11.1 K/uL    RBC 3.25 (L) 4.10 - 5.70 M/uL    HGB 7.8 (L) 12.1 - 17.0 g/dL    HCT 24.4 (L) 36.6 - 50.3 %    MCV 75.1 (L) 80.0 - 99.0 FL    MCH 24.0 (L) 26.0 - 34.0 PG    MCHC 32.0 30.0 - 36.5 g/dL    RDW 15.5 (H) 11.5 - 14.5 %    PLATELET 323 745 - 587 K/uL    MPV 12.5 8.9 - 12.9 FL    NRBC 0.3 (H) 0.0  WBC    ABSOLUTE NRBC 0.04 (H) 0.00 - 0.01 K/uL    NEUTROPHILS 75 32 - 75 %    LYMPHOCYTES 14 12 - 49 %    MONOCYTES 10 5 - 13 %    EOSINOPHILS 0 0 - 7 %    BASOPHILS 0 0 - 1 %    IMMATURE GRANULOCYTES 1 (H) 0 - 0.5 %    ABS. NEUTROPHILS 10.4 (H) 1.8 - 8.0 K/UL    ABS. LYMPHOCYTES 1.9 0.8 - 3.5 K/UL    ABS. MONOCYTES 1.3 (H) 0.0 - 1.0 K/UL    ABS. EOSINOPHILS 0.1 0.0 - 0.4 K/UL    ABS. BASOPHILS 0.0 0.0 - 0.1 K/UL    ABS. IMM. GRANS. 0.1 (H) 0.00 - 0.04 K/UL    DF AUTOMATED     METABOLIC PANEL, COMPREHENSIVE    Collection Time: 05/24/22  4:59 AM   Result Value Ref Range    Sodium 135 (L) 136 - 145 mmol/L    Potassium 4.1 3.5 - 5.1 mmol/L    Chloride 106 97 - 108 mmol/L    CO2 24 21 - 32 mmol/L    Anion gap 5 5 - 15 mmol/L    Glucose 154 (H) 65 - 100 mg/dL    BUN 11 6 - 20 mg/dL    Creatinine 0.48 (L) 0.70 - 1.30 mg/dL    BUN/Creatinine ratio 23 (H) 12 - 20      GFR est AA >60 >60 ml/min/1.73m2    GFR est non-AA >60 >60 ml/min/1.73m2    Calcium 8.6 8.5 - 10.1 mg/dL    Bilirubin, total 0.2 0.2 - 1.0 mg/dL    AST (SGOT) 9 (L) 15 - 37 U/L    ALT (SGPT) 7 (L) 12 - 78 U/L    Alk.  phosphatase 82 45 - 117 U/L    Protein, total 5.4 (L) 6.4 - 8.2 g/dL    Albumin 2.2 (L) 3.5 - 5.0 g/dL    Globulin 3.2 2.0 - 4.0 g/dL    A-G Ratio 0.7 (L) 1.1 - 2.2     GLUCOSE, POC    Collection Time: 05/24/22  7:21 AM   Result Value Ref Range    Glucose (POC) 132 (H) 65 - 117 mg/dL    Performed by Gerard Yash    GLUCOSE, POC    Collection Time: 05/24/22 11:20 AM   Result Value Ref Range    Glucose (POC) 129 (H) 65 - 117 mg/dL    Performed by Gerard Berrios           Assessment:     Active Problems:    Colon cancer ([address] (4/15/2022)      Cecal polyp (4/15/2022)        Plan:   Continue diet as ordered, encourage patient to increase intake and to drink nutritional shakes  Will consult neurology for visual disturbances  Continue IV antibiotics  Continue PT/OT  Continue stoma teaching  Plan discharge early next week    Signed By: Avelino Leigh Jeannine Yusuf MD     May 24, 2022

## 2022-05-24 NOTE — PROGRESS NOTES
Comprehensive Nutrition Assessment    Type and Reason for Visit: (P) Reassess (Goal)    Nutrition Recommendations/Plan:   Continue current diet  Continue ensure enlive 3x/day  Monitor and record PO intake, supplement acceptance, and Bms in I/Os     Malnutrition Assessment:  Malnutrition Status:  Mild malnutrition (05/18/22 1429)    Context:  Acute illness     Findings of the 6 clinical characteristics of malnutrition:   Energy Intake:  50% or less of est energy requirements for 5 or more days  Weight Loss:  No significant weight loss     Body Fat Loss:  No significant body fat loss,     Muscle Mass Loss:  No significant muscle mass loss,    Fluid Accumulation:  No significant fluid accumulation,     Strength:  Not performed     Nutrition Assessment:    (P) Admitted for colon CA and cecal polyp w/ sx resection. Pt reported good appetite w/ poor intake 2/2 food preferences. (4/28) Intakes excellent, >76% of meals, with good ONS acceptance. (5/2) CT indicative of partial SBO. (5/4) Advanced to Clear Liq however N/V/C continues, (5/5) NPO, PPN initiated. (5/9) CT still finding SBO. (5/13) Ex-lap with resection of ileocolic anastamosis and reanastomosis, (5/15) return to OR for anastomotic leak. PPN off x2 days now, receiving D5 at 125ml/hr providing 510kcals/d. RD discussed with pt need to advance to TPN however pt denies wanting central line. Discussed with RN & Surgeon, plan to continue PPN. RD communicated recs with pharmacy, will advance to 500ml bag 3x/wk to try and increase kcal provision. Overall, PPN provided x8d. (5/18) Pt tolerating clear liquids and advanced to full with plan let TPN run out. Will add ONS. (5/24) Pt continues to tolerate diet without n/v. Labs: Na 135, K 4.1, BUN 11, Creat 0.48, Gluc 154, Alb 2.2. Meds: D5NaCl, famotidine, ondansetron, potassium chloride, prednisone. Nutrition Related Findings:    (P) No n/v, d/c. problems chewing/swallowing. LES drain in place. No edema.  BM 5/123 +ostomy. Wound Type: (P) Multiple,Surgical incision    Current Nutrition Intake & Therapies:  ADULT ORAL NUTRITION SUPPLEMENT Breakfast, Lunch, Dinner; Standard High Calorie/High Protein  ADULT DIET Dysphagia - Soft & Bite Sized    Anthropometric Measures:  Height: (P) 5' 7.4\" (171.2 cm)  Ideal Body Weight (IBW): (P) 150 lbs ((P) 68 kg)  Admission Body Weight: 160 lb  Current Body Wt:  (P) 64.2 kg (141 lb 8.6 oz), (P) 94.4 % IBW. Bed scale  Current BMI (kg/m2): (P) 21.9  Usual Body Weight: 72.6 kg (160 lb)  % Weight Change (Calculated): -8  Weight Adjustment: No adjustment  BMI Category: (P) Normal weight (BMI 18.5-24. 9)    Estimated Daily Nutrient Needs:  Energy Requirements Based On: (P) Kcal/kg  Weight Used for Energy Requirements: (P) Current  Energy (kcal/day): (P) 1926kcal (30kcal/kg)  Weight Used for Protein Requirements: (P) Current  Protein (g/day): (P) 96g (1.5g/kg)  Method Used for Fluid Requirements: (P) 1 ml/kcal  Fluid (ml/day): (P) 1926mL (1mL/kcal)    Nutrition Diagnosis:   Inadequate oral intake related to catabolic illness,altered GI function as evidenced by GI abnormality    Nutrition Interventions:   Food and/or Nutrient Delivery: (P) Continue current diet,Continue oral nutrition supplement  Nutrition Education/Counseling: (P) No recommendations at this time  Coordination of Nutrition Care: (P) Continue to monitor while inpatient  Plan of Care discussed with: RN, pt, Pharmacy, Surgeon    Goals:  Previous Goal Met: (P) Progressing toward goal(s)  Goals: (P) PO intake 50% or greater,by next RD assessment    Nutrition Monitoring and Evaluation:   Behavioral-Environmental Outcomes: (P) None identified  Food/Nutrient Intake Outcomes: (P) Diet advancement/tolerance,Food and nutrient intake,Supplement intake  Physical Signs/Symptoms Outcomes: (P) Biochemical data,Meal time behavior,Weight    Discharge Planning:    (P) Too soon to determine    Deion JONNA Lovelace  Contact: [phone]

## 2022-05-24 NOTE — PROGRESS NOTES
OCCUPATIONAL THERAPY TREATMENT  Patient: Caty Freeman (3 y.o. male)  Date: 5/24/2022  Diagnosis: Adenomatous polyp of colon, unspecified part of colon [D12.6]  [facility] ([facility] [C18.9]  Cecal polyp [K63.5] <principal problem not specified>  Procedure(s) (LRB):  Exploratory laparotomy, abdominal washout, resection of ileocolic anastomosis with end ileostomy (N/A) 9 Days Post-Op  Precautions:    Chart, occupational therapy assessment, plan of care, and goals were reviewed. ASSESSMENT  Patient continues with skilled OT services and is progressing towards goals. Pt. Received seated in chair and agreeable to tx session. Pt. Requesting to return to bed d/t increased pain in abdominal region. Pt. Performed sit> stand with Min A, use of RW upon standing for support, functional ambulation from chair> bed with CGA, pt demonstrates slow steady pace. While seated at EOB pt. Performed oral care and facial grooming with set-up assistance and IND to perform task. Pt. Returned semi-supine in bed with CGA. Pt. Left semi-supine in bed with needs met and call bell within reach. REC. IRF when medically appropriate for discharge. Current Level of Function Impacting Discharge (ADLs): decreased activity tolerance d/t pain in abdominal region. Other factors to consider for discharge: PLOF, time since on set          PLAN :  Patient continues to benefit from skilled intervention to address the above impairments. Continue treatment per established plan of care. to address goals. Recommendation for discharge: (in order for the patient to meet his/her long term goals)  Therapy 3 hours per day 5-7 days per week    This discharge recommendation:  Has been made in collaboration with the attending provider and/or case management    IF patient discharges home will need the following DME: TBD       SUBJECTIVE:   Patient stated  I would like to get back in bed , my stomach is hurting.     OBJECTIVE DATA SUMMARY: Cognitive/Behavioral Status:  Neurologic State: Alert  Orientation Level: Oriented X4  Cognition: Appropriate decision making; Follows commands    Functional Mobility and Transfers for ADLs:  Bed Mobility:  Rolling: Stand-by assistance  Sit to Supine: Stand-by assistance  Scooting: Stand-by assistance    Transfers:  Sit to Stand: Minimum assistance     Bed to Chair: Contact guard assistance    Balance:  Sitting: Intact; Without support  Standing: Impaired; With support  Standing - Static: Constant support; Fair  Standing - Dynamic : Constant support; Fair    ADL Intervention:    Grooming  Grooming Assistance: Independent; Set-up  Position Performed: Seated edge of bed  Washing Face: Independent; Set-up  Brushing Teeth: Independent; Set-up    Pain:  9/10 pain reported    Activity Tolerance:   Fair  Please refer to the flowsheet for vital signs taken during this treatment. After treatment patient left in no apparent distress:   Supine in bed, Call bell within reach, and Bed / chair alarm activated    COMMUNICATION/COLLABORATION:   The patients plan of care was discussed with: Registered nurse.      Camilo Read  Time Calculation: 14 mins    Problem: Self Care Deficits Care Plan (Adult)  Goal: *Acute Goals and Plan of Care (Insert Text)  Description: Pt stated goal \"to decrease pain\"  Pt will be Mod I sup <> sit in prep for EOB ADLs  Pt will be Mod I grooming standing sink side LRAD  Pt will be Mod I UB dressing sitting EOB/long sit   Pt will be Mod I LE dressing sitting EOB/long sit  Pt will be Mod I sit <>  prep for toileting LRAD  Pt will be Mod I toileting/toilet transfer/cloth mgmt LRAD  Pt will be IND following UE HEP in prep for self care tasks      Outcome: Progressing Towards Goal

## 2022-05-24 NOTE — PROGRESS NOTES
PHYSICAL THERAPY TREATMENT  Patient: Daniel Gee (80 y.o. male)  Date: 5/24/2022  Diagnosis: Adenomatous polyp of colon, unspecified part of colon [D12.6]  [facility] [C18.9]  Cecal polyp [K63.5] <principal problem not specified>  Procedure(s) (LRB):  Exploratory laparotomy, abdominal washout, resection of ileocolic anastomosis with end ileostomy (N/A) 9 Days Post-Op  Precautions:    Chart, physical therapy assessment, plan of care and goals were reviewed. ASSESSMENT  Patient continues with skilled PT services and is progressing towards goals. Pt semi supine in bed upon arrival and agreeable to session. Pt completed rolling with SBA. Sup>sit with min A. Using log roll technique. Pt STS with CGA and RW for balance upon standing. Pt amb ~3' with RW, no LOB or  knee buckling. Pt sat into recliner and set up with breakfast. Pt educated on seated and semi supine therex, pt verbalized understanding. Pt continues to be limited by pain. Left with call bell in reach and needs met. Rec d/c to IRF once medically appropriate pending pain control. Current Level of Function Impacting Discharge (mobility/balance): min A sup>sit    Other factors to consider for discharge: PLOF, severity of deficits, pain control          PLAN :  Patient continues to benefit from skilled intervention to address the above impairments. Continue treatment per established plan of care. to address goals. Recommendation for discharge: (in order for the patient to meet his/her long term goals)  [address] pending pain control    This discharge recommendation:  Has been made in collaboration with the attending provider and/or case management    IF patient discharges home will need the following DME: to be determined (TBD)       SUBJECTIVE:   Patient stated i'll sit for a little.  Maybe 20 to 30 minutes    OBJECTIVE DATA SUMMARY:   Critical Behavior:  Neurologic State: Alert  Orientation Level: Oriented X4  Cognition: Follows commands     Functional Mobility Training:  Bed Mobility:  Rolling: Stand-by assistance  Supine to Sit: Minimum assistance  Sit to Supine: Stand-by assistance  Scooting: Stand-by assistance    Transfers:  Sit to Stand: Contact guard assistance  Stand to Sit: Contact guard assistance  Bed to Chair: Contact guard assistance    Balance:  Sitting: Intact  Sitting - Static: Good (unsupported)  Sitting - Dynamic: Good (unsupported)  Standing: Impaired; With support  Standing - Static: Constant support; Fair  Standing - Dynamic : Constant support; Fair    Ambulation/Gait Training:  Distance (ft): 3 Feet (ft)  Assistive Device: Walker, rolling  Ambulation - Level of Assistance: Contact guard assistance  Base of Support: Narrowed  Speed/Daysi: Slow    Therapeutic Exercises:   Educated on seated and semi supine therex, pt verbalized understanding    Pain Ratin/10 abdominal pain    Activity Tolerance:   Poor  Please refer to the flowsheet for vital signs taken during this treatment. After treatment patient left in no apparent distress:   Sitting in chair and Call bell within reach    COMMUNICATION/COLLABORATION:   The patients plan of care was discussed with: Occupational therapy assistant and Registered nurse. Problem: Mobility Impaired (Adult and Pediatric)  Goal: *Acute Goals and Plan of Care (Insert Text)  Description: Pt will be I with LE HEP in 7 days. Pt will perform bed mobility with SBA in 7 days. Pt will perform transfers with SBA in 7 days. Pt will amb 5-10 feet with LRAD safely with Mod I/Independent in 7 days. Pt will demonstrate improvement in standing dynamic balance from CGA to Mod I/Independent in 7 days.      Outcome: Progressing Towards Goal       Raul Martin PTA   Time Calculation: 23 mins

## 2022-05-24 NOTE — PROGRESS NOTES
1004    Cm spoke with [facility] liaison. They have received auth, but there are no beds available today. CM will need to find out if PPN was discontinued and if pt will need iv abx at dc.     ______________________________________    Cm spoke with Dr. Richi Kauffman this afternoon. Cm informed him [facility] received auth. Dr. Richi Kauffman indicated pt will be here until next week. MD is not ancticipating pt discharging with iv abx. Cm update [facility] via [facility].

## 2022-05-25 NOTE — PROGRESS NOTES
Progress Note    Patient: Gerry Dave MRN: [id number]  SSN: [id number]    YOB: 1970  Age: 49 y.o.   Sex: male      Admit Date: 4/15/2022    LOS: 40 days     Subjective:   Patient seen in bed  Continues to complain of visual disturbances  Tolerating diet ileostomy functioning  Objective:     Vitals:    05/24/22 1955 05/25/22 0828 05/25/22 0847 05/25/22 1610   BP: 110/75 121/76  104/73   Pulse: 89 84  92   Resp: 16 18  18   Temp: 98.6 °F (37 °C) 98.6 °F (37 °C)  98.1 °F (36.7 °C)   SpO2: 100% 100%  100%   Weight:   141 lb 8.6 oz (64.2 kg)    Height:            Intake and Output:  Current Shift: 05/25 0701 - 05/25 1900  In: -   Out: 600 [Urine:600]  Last three shifts: 05/23 1901 - 05/25 0700  In: -   Out: 2250 [Urine:1725]    Review of Systems:  ROS     Physical Exam:   Abdomen soft, appropriately tender, midline incision is clean with some scant drainage, ileostomy pink protuberant and productive stool, LES drain serous drainage    Lab/Data Review:  Recent Results (from the past 24 hour(s))   GLUCOSE, POC    Collection Time: 05/24/22  9:13 PM   Result Value Ref Range    Glucose (POC) 269 (H) 65 - 117 mg/dL    Performed by Paulina Servin    GLUCOSE, POC    Collection Time: 05/25/22  8:03 AM   Result Value Ref Range    Glucose (POC) 126 (H) 65 - 117 mg/dL    Performed by Virgilio Grimaldo    DUPLEX CAROTID BILATERAL    Collection Time: 05/25/22  2:42 PM   Result Value Ref Range    Left CCA dist sys 84.8 cm/s    Left CCA dist lopez 31.3 cm/s    Left CCA prox sys 135.0 cm/s    Left CCA prox lopez 30.1 cm/s    Left ICA dist sys 133.0 cm/s    Left ICA dist lopez 61.9 cm/s    Left ICA mid sys 156.0 cm/s    Left ICA mid lopez 64.5 cm/s    Left ICA prox sys 77.3 cm/s    Left ICA prox lopez 31.5 cm/s    Left ECA sys 58.3 cm/s    LEFT EXTERNAL CAROTID ARTERY D 10.00 cm/s    Left subclavian prox PSV 67.3 cm/s    Left subclavian prox EDV 0.0 cm/s    Left vertebral sys 50.6 cm/s    LEFT VERTEBRAL ARTERY D 18.80 cm/s    Right cca dist sys 89.9 cm/s    Right CCA dist lopez 20.5 cm/s    Right CCA prox sys 135.0 cm/s    Right CCA prox lopez 27.0 cm/s    Right ICA dist sys 133.0 cm/s    Right ICA dist lopez 65.8 cm/s    Right ICA mid sys 145.0 cm/s    Right ICA mid lopez 84.5 cm/s    Right ICA prox sys 72.5 cm/s    Right ICA prox lopez 23.3 cm/s    Right eca sys 58.8 cm/s    RIGHT EXTERNAL CAROTID ARTERY D 9.55 cm/s    Right subclavian prox PSV 75.9 cm/s    Right subclavian prox EDV 0.0 cm/s    Right vertebral sys 66.6 cm/s    RIGHT VERTEBRAL ARTERY D 27.00 cm/s    Right ICA/CCA sys 0.81     Left ICA/CCA sys 0.91    ECHO ADULT COMPLETE    Collection Time: 05/25/22  2:46 PM   Result Value Ref Range    AV Peak Velocity 1.4 m/s    AV Peak Gradient 8 mmHg    Aortic Root 3.1 cm    IVSd 1.2 (A) 0.6 - 1.0 cm    LVIDd 4.7 4.2 - 5.9 cm    LVIDs 3.3 cm    LVOT Peak Velocity 1.1 m/s    LVOT Peak Gradient 4 mmHg    LVPWd 0.6 0.6 - 1.0 cm    LV E' Lateral Velocity 13 cm/s    LV E' Septal Velocity 8 cm/s    LA Diameter 3.5 cm    MV E Wave Deceleration Time 303.0 ms    MV A Velocity 0.37 m/s    MV E Velocity 0.72 m/s    NC Max Velocity 0.9 m/s    Pulmonary Artery EDP 3 mmHg    PV Max Velocity 0.9 m/s    PV Peak Gradient 3 mmHg    Pulm Vein A Duration 49.0 ms    Pulm Vein A Velocity 0.2 m/s    Est. RA Pressure 3 mmHg    RVIDd 2.8 cm    TR Max Velocity 2.73 m/s    TR Peak Gradient 30 mmHg    Fractional Shortening 2D 30 28 - 44 %    LVIDd Index 2.69 cm/m2    LVIDs Index 1.89 cm/m2    LV RWT Ratio 0.26     LV Mass 2D 142.7 88 - 224 g    LV Mass 2D Index 81.5 49 - 115 g/m2    MV E/A 1.95     E/E' Ratio (Averaged) 7.27     E/E' Lateral 5.54     E/E' Septal 9.00     LA Size Index 2.00 cm/m2    LA/AO Root Ratio 1.13     Ao Root Index 1.77 cm/m2    AV Velocity Ratio 0.79     RVSP 33 mmHg    EF BP 57 55 - 100 %   GLUCOSE, POC    Collection Time: 05/25/22  4:31 PM   Result Value Ref Range    Glucose (POC) 231 (H) 65 - 117 mg/dL    Performed by Lorenzo Ling Kenisha           Assessment:     Active Problems:    Colon cancer ([facility] (4/15/2022)      Cecal polyp (4/15/2022)        Plan:   Patient had duplex study of carotids which demonstrated mild stenosis of the right internal carotid artery less than 50% and mild stenosis of the left internal carotid artery less than 50%. Both the right and left middle ICA with noted to be tortuous. The right external carotid artery is also patent with mild focal plaque. The left external carotid artery noted to be patent. Both the right and left vertebral arteries are antegrade. Patient had MRI of the brain which demonstrated evolving ischemic infarcts in multiple vascular territories involving both the right occipital lobe and left frontal lobe. Left occipital encephalomalacia also noted. Dr. Guillermina Myles has requested a cardiology consult to Dr. 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 I have restarted the patient's Eliquis. He is on aspirin 81 mg daily.     Continue diet    Continue PT/OT  Signed By: Timm Olszewski, MD     May 25, 2022

## 2022-05-25 NOTE — PROGRESS NOTES
Dr. Yecenia Iqbal called and request nursing staff to place cardiology consult to Dr. Joey Bojorquez. From patient MRI., patient has an embolic infarct to the brain and needs the cardiologist to consult the patient. Dr. Yecenia Iqbal stated that the patient needs a AL.

## 2022-05-25 NOTE — WOUND CARE
WCN in to see patient for continued ostomy education. Patient stated that he did not have a chance to practice on ostomy care model after WCN left due to vision issues. Patient stated that he is having a hard time seeing clearly and is not sure what has caused this. Patient states that his daughter is going to come in today and help him with practicing and reviewing education material provided. WCN to continue to follow for ostomy education.

## 2022-05-25 NOTE — PROGRESS NOTES
NEURO PROGRESS NOTE        SUBJECTIVE:    visual obscurations      EXAM:  Awake, Eating breakfast.  Oriented to day, location, month, year. Not aphasic. Still has visual obscurations. No new focality      ASSESSMENT/PLAN:  Awaiting imaging study  Continue aspirin    ALLERGIES:    Allergies   Allergen Reactions    Beef Containing Products Anaphylaxis and Other (comments)    Diltiazem Other (comments)     Reaction Type: Allergy; Reaction(s): Pressure in chest    Iodinated Contrast Media Other (comments)     Reaction Type: Allergy; Severity: Severe; Reaction(s): hives,throat swelling    Pork Derived (Porcine) Hives    Shellfish Containing Products Swelling     Other reaction(s): Other  Reaction Type: Allergy; Reaction(s): Hives,throat swelling    Sulfa (Sulfonamide Antibiotics) Unknown (comments)     Spinal meningitis    Sulfur Unknown (comments)     Reaction Type: Allergy  Spinal meningitis    Lactose Diarrhea     Lactose intolerant per Pt.        MEDS:      Current Facility-Administered Medications:     HYDROmorphone (DILAUDID) injection 1 mg, 1 mg, IntraVENous, Q4H PRN, Jason Lennon MD, 1 mg at 05/25/22 0859    aspirin chewable tablet 81 mg, 81 mg, Oral, DAILY, Tanwi KIMBERLY BASURTO MD, 81 mg at 05/25/22 0854    nystatin (MYCOSTATIN) 100,000 unit/mL oral suspension 500,000 Units, 500,000 Units, Oral, QID, Jason Lennon MD, 500,000 Units at 05/25/22 0849    0.9% sodium chloride infusion 250 mL, 250 mL, IntraVENous, PRN, Ochoa Hidalgo MD    acetaminophen (TYLENOL) tablet 650 mg, 650 mg, Oral, Q4H PRN, Jason Lennon MD, 650 mg at 05/18/22 1838    famotidine (PF) (PEPCID) 20 mg in 0.9% sodium chloride 10 mL injection, 20 mg, IntraVENous, Q12H, Fariha Martinez MD, 20 mg at 05/25/22 0849    dextrose 5% - 0.45% NaCl with KCl 20 mEq/L infusion, 75 mL/hr, IntraVENous, CONTINUOUS, Jason Lennon MD, Last Rate: 75 mL/hr at 05/24/22 2148, 75 mL/hr at 05/24/22 2148    oxyCODONE IR (ROXICODONE) tablet 10 mg, 10 mg, Oral, Q4H PRN, Deanna Jordan MD, 10 mg at 05/25/22 0442    ceFAZolin (ANCEF) 2 g in sterile water (preservative free) 20 mL IV syringe, 2 g, IntraVENous, Q8H, Fariha Martinez MD, Last Rate: 400 mL/hr at 05/25/22 0600, 2 g at 05/25/22 0600    metroNIDAZOLE (FLAGYL) IVPB premix 500 mg, 500 mg, IntraVENous, Q8H, Deanna Jordan MD, Last Rate: 100 mL/hr at 05/25/22 0442, 500 mg at 05/25/22 0442    ondansetron (ZOFRAN) injection 4 mg, 4 mg, IntraVENous, Q4H PRN, Deanna Jordan MD, 4 mg at 05/25/22 0849    verapamil ER (VERELAN PM) capsule 200 mg, 200 mg, Oral, QHS, Janice CABRERA MD, 200 mg at 05/24/22 2146    [Held by provider] potassium chloride SR (KLOR-CON 10) tablet 10 mEq, 10 mEq, Oral, BID, Merle Mae MD, 10 mEq at 04/30/22 2142    [Held by provider] apixaban (ELIQUIS) tablet 5 mg, 5 mg, Oral, BID, Joanne Phillips MD, 5 mg at 05/11/22 0901    amiodarone (CORDARONE) tablet 200 mg, 200 mg, Oral, DAILY, Yvette Vyas MD, 200 mg at 05/25/22 0851    lisinopriL (PRINIVIL, ZESTRIL) tablet 20 mg, 20 mg, Oral, DAILY, Deanna Jordan MD, 20 mg at 05/25/22 0848    predniSONE (DELTASONE) tablet 20 mg, 20 mg, Oral, DAILY WITH BREAKFAST, Deanna Jordan MD, 20 mg at 05/25/22 0849    insulin lispro (HUMALOG) injection, , SubCUTAneous, AC&HS, Deanna Jordan MD, 4 Units at 05/23/22 2124    glucose chewable tablet 16 g, 4 Tablet, Oral, PRN, Deanna Jordan MD    glucagon (GLUCAGEN) injection 1 mg, 1 mg, IntraMUSCular, PRN, Deanna Jordan MD, 1 mg at 05/06/22 0559    dextrose 10% infusion 0-250 mL, 0-250 mL, IntraVENous, PRN, Deanna Jordan MD, Last Rate: 750 mL/hr at 05/02/22 1558, 125 mL at 05/02/22 1558    labetaloL (NORMODYNE;TRANDATE) 20 mg/4 mL (5 mg/mL) injection 10 mg, 10 mg, IntraVENous, Q6H PRN, Deanna Jordan MD, 10 mg at 05/03/22 1423    LABS:  Recent Results (from the past 24 hour(s))   GLUCOSE, POC    Collection Time: 05/24/22 11:20 AM   Result Value Ref Range    Glucose (POC) 129 (H) 65 - 117 mg/dL    Performed by Anel Weaver    TSH 3RD GENERATION    Collection Time: 05/24/22  4:17 PM   Result Value Ref Range    TSH 0.36 0.36 - 3.74 uIU/mL   GLUCOSE, POC    Collection Time: 05/24/22  4:46 PM   Result Value Ref Range    Glucose (POC) 213 (H) 65 - 117 mg/dL    Performed by Delia Murphy    GLUCOSE, POC    Collection Time: 05/24/22  9:13 PM   Result Value Ref Range    Glucose (POC) 269 (H) 65 - 117 mg/dL    Performed by Jim Vargas, POC    Collection Time: 05/25/22  8:03 AM   Result Value Ref Range    Glucose (POC) 126 (H) 65 - 117 mg/dL    Performed by Armando Flash        Visit Vitals  /76 (BP 1 Location: Left upper arm, BP Patient Position: At rest;Semi fowlers)   Pulse 84   Temp 98.6 °F (37 °C)   Resp 18   Ht 5' 7.4\" (1.712 m)   Wt 64.2 kg (141 lb 8.6 oz)   SpO2 100%   BMI 21.90 kg/m²       Imaging:  CT ABD PELV WO CONT   Final Result   Small bowel ileus versus partial obstruction, at the ileocolic   anastomosis or ileocecal valve, slightly greater than previous. There is mixed   density fluid throughout this region, as before. . Consider follow-up study with   IV contrast and if possible, oral contrast. Consider NG tube. XR ABD (KUB)   Final Result      XR ABD (KUB)   Final Result   Small bowel dilatation consistent with obstruction, not   significantly changed. XR ABD (KUB)   Final Result   Persistent small bowel distention as seen with obstruction. XR ABD (KUB)   Final Result   No significant change compared to abdomen series 5/5/2022. Persistent air and fluid-filled dilated small bowel loops. XR ABD (KUB)   Final Result   Multiple distended gas-filled loops of small bowel compatible with   partial small bowel obstruction versus ileus. CT ABD PELV WO CONT   Final Result   Moderate grade partial small bowel obstruction. Small volume complex ascites.       CT ABD PELV WO CONT   Final Result   1. Improving hemoperitoneum and pneumoperitoneum. 2.  Ileocolic anastomosis in the right lower quadrant. Incompletely evaluated   without contrast. No evidence of bowel obstruction. 3.  Reticular and groundglass airspace disease in the visualized lung bases   redemonstrated. 4.  See full report for detailed findings. CT ABD PELV WO CONT   Final Result   1. Status post ileal cecal surgery. Surrounding extraluminal dense material may   represent blood products (no indication of enteric contrast administration). Anastomotic leak cannot be excluded. Consider imaging with water-soluble oral   contrast. Free air in the abdomen likely within normal limits post recent   surgery. The bowel does not appear obstructed. 2. Bilateral lower lung airspace disease likely infectious or inflammatory. 3. Bilateral femoral head sclerosis suggests AVN.       MRI BRAIN WO CONT    (Results Pending)   DUPLEX CAROTID BILATERAL    (Results Pending)

## 2022-05-25 NOTE — PROGRESS NOTES
Problem: Falls - Risk of  Goal: *Absence of Falls  Description: Document [place] Fall Risk and appropriate interventions in the flowsheet. Outcome: Progressing Towards Goal  Note: Fall Risk Interventions:  Mobility Interventions: Bed/chair exit alarm         Medication Interventions: Bed/chair exit alarm    Elimination Interventions: Call light in reach    History of Falls Interventions: Bed/chair exit alarm         Problem: Patient Education: Go to Patient Education Activity  Goal: Patient/Family Education  Outcome: Progressing Towards Goal     Problem: Pain  Goal: *Control of Pain  Outcome: Progressing Towards Goal     Problem: Infection - Risk of, Surgical Site Infection  Goal: *Absence of surgical site infection signs and symptoms  Outcome: Progressing Towards Goal     Problem: Pressure Injury - Risk of  Goal: *Prevention of pressure injury  Description: Document Yuri Scale and appropriate interventions in the flowsheet.   Outcome: Progressing Towards Goal  Note: Pressure Injury Interventions:  Sensory Interventions: Assess changes in LOC    Moisture Interventions: Minimize layers    Activity Interventions: PT/OT evaluation    Mobility Interventions: PT/OT evaluation    Nutrition Interventions: Document food/fluid/supplement intake    Friction and Shear Interventions: Apply protective barrier, creams and emollients                Problem: Patient Education: Go to Patient Education Activity  Goal: Patient/Family Education  Outcome: Progressing Towards Goal     Problem: Patient Education: Go to Patient Education Activity  Goal: Patient/Family Education  Outcome: Progressing Towards Goal     Problem: Patient Education: Go to Patient Education Activity  Goal: Patient/Family Education  Outcome: Progressing Towards Goal

## 2022-05-25 NOTE — PROGRESS NOTES
Primary Nurse Brionna Magana LPN and Shabbir Xie RN performed a dual skin assessment on Stas Powell . Nurse observed ostomy and Ramon drain site is clean and changed. Otherwise his skin is warm, dry, and intact.

## 2022-05-26 NOTE — PROGRESS NOTES
Nutrition Note    RD alerted by FNS ambassador difficulty in finding meals that fit pt diet order. Noted pt with allergies to pork, beef, shellfish, and is lactose intolerant. Pt currently on Soft & Bite-Sized diet per  postoperatively. Reviewed RD notes and discussed with RN today, no hx dysphagia or issues chewing/swallowing per pt or RN observation. Further discussed with Dr. Ludin Patel, approved diet advancement. Advance to regular texture diet.     Electronically signed by Sunday Ramirez on 5/26/2022 at 4:02 PM    Contact: Ext [phone], or via [email or web address]

## 2022-05-26 NOTE — PROGRESS NOTES
Problem: Falls - Risk of  Goal: *Absence of Falls  Description: Document Em Minicallie Fall Risk and appropriate interventions in the flowsheet. Outcome: Progressing Towards Goal  Note: Fall Risk Interventions:  Mobility Interventions: Bed/chair exit alarm,Patient to call before getting OOB,Strengthening exercises (ROM-active/passive),Utilize walker, cane, or other assistive device         Medication Interventions: Bed/chair exit alarm,Patient to call before getting OOB,Utilize gait belt for transfers/ambulation    Elimination Interventions: Call light in reach,Patient to call for help with toileting needs,Urinal in reach    History of Falls Interventions: Bed/chair exit alarm,Door open when patient unattended,Room close to nurse's station         Problem: Pain  Goal: *Control of Pain  Outcome: Progressing Towards Goal     Problem: Pressure Injury - Risk of  Goal: *Prevention of pressure injury  Description: Document Yuri Scale and appropriate interventions in the flowsheet.   Outcome: Progressing Towards Goal  Note: Pressure Injury Interventions:  Sensory Interventions: Assess changes in LOC,Check visual cues for pain,Minimize linen layers    Moisture Interventions: Absorbent underpads,Apply protective barrier, creams and emollients,Internal/External fecal devices,Minimize layers    Activity Interventions: Increase time out of bed    Mobility Interventions: HOB 30 degrees or less    Nutrition Interventions: Document food/fluid/supplement intake    Friction and Shear Interventions: Apply protective barrier, creams and emollients,HOB 30 degrees or less,Minimize layers

## 2022-05-26 NOTE — PROGRESS NOTES
DC Plan: [facility]    [facility] will have to re-submit auth. In addition, they will need another PCR test closer to discharge.

## 2022-05-26 NOTE — ANTIMICROBIAL STEWARDSHIP
Antimicrobial stewardship:  Day 14 of Cefazolin 2g IV Q8h and                   Flagyl 500mg IV Q8h  Please evaluate and consider DC of antibiotics or choice of date to DC antibiotics.

## 2022-05-26 NOTE — PROGRESS NOTES
NEURO PROGRESS NOTE        SUBJECTIVE:       Embolic infarcts of the brain  Visual obscurations      EXAM:  Awake, oriented, not aphasic. Visual obscurations. No focality. Of the brain seen and evaluated. Embolic infarcts to the left frontal lobe and right occipital lobe. Some subcortical white matter changes      ASSESSMENT/PLAN:  Awaiting cardiology consult for AL. Patient should start Plavix 75 mg p.o. daily    ALLERGIES:    Allergies   Allergen Reactions    Beef Containing Products Anaphylaxis and Other (comments)    Diltiazem Other (comments)     Reaction Type: Allergy; Reaction(s): Pressure in chest    Iodinated Contrast Media Other (comments)     Reaction Type: Allergy; Severity: Severe; Reaction(s): hives,throat swelling    Pork Derived (Porcine) Hives    Shellfish Containing Products Swelling     Other reaction(s): Other  Reaction Type: Allergy; Reaction(s): Hives,throat swelling    Sulfa (Sulfonamide Antibiotics) Unknown (comments)     Spinal meningitis    Sulfur Unknown (comments)     Reaction Type: Allergy  Spinal meningitis    Lactose Diarrhea     Lactose intolerant per Pt.        MEDS:      Current Facility-Administered Medications:     HYDROmorphone (DILAUDID) injection 1 mg, 1 mg, IntraVENous, Q4H PRN, Elizabeth Davis MD, 1 mg at 05/26/22 0604    aspirin chewable tablet 81 mg, 81 mg, Oral, DAILY, Tanwi KIMBERLY BASURTO MD, 81 mg at 05/26/22 0855    nystatin (MYCOSTATIN) 100,000 unit/mL oral suspension 500,000 Units, 500,000 Units, Oral, QID, Elizabeth Davis MD, 500,000 Units at 05/25/22 2131    0.9% sodium chloride infusion 250 mL, 250 mL, IntraVENous, PRN, Jaycee Hidalgo MD    acetaminophen (TYLENOL) tablet 650 mg, 650 mg, Oral, Q4H PRN, Elizabeth Davis MD, 650 mg at 05/18/22 1838    famotidine (PF) (PEPCID) 20 mg in 0.9% sodium chloride 10 mL injection, 20 mg, IntraVENous, Q12H, Fariha Woodruff MD, 20 mg at 05/26/22 0858    dextrose 5% - 0.45% NaCl with KCl 20 mEq/L infusion, 75 mL/hr, IntraVENous, CONTINUOUS, Jakub Paez MD, Last Rate: 75 mL/hr at 05/26/22 0624, 75 mL/hr at 05/26/22 0624    oxyCODONE IR (ROXICODONE) tablet 10 mg, 10 mg, Oral, Q4H PRN, Jakub Paez MD, 10 mg at 05/26/22 0855    ceFAZolin (ANCEF) 2 g in sterile water (preservative free) 20 mL IV syringe, 2 g, IntraVENous, Q8H, Fariha Martinez MD, Last Rate: 400 mL/hr at 05/26/22 0543, 2 g at 05/26/22 0543    metroNIDAZOLE (FLAGYL) IVPB premix 500 mg, 500 mg, IntraVENous, Q8H, Jakub Paez MD, Last Rate: 100 mL/hr at 05/26/22 0430, 500 mg at 05/26/22 0430    ondansetron (ZOFRAN) injection 4 mg, 4 mg, IntraVENous, Q4H PRN, Jakub Paez MD, 4 mg at 05/26/22 0133    verapamil ER (VERELAN PM) capsule 200 mg, 200 mg, Oral, QHS, Linnea CABRERA MD, 200 mg at 05/25/22 2254    [Held by provider] potassium chloride SR (KLOR-CON 10) tablet 10 mEq, 10 mEq, Oral, BID, Linnea CABRERA MD, 10 mEq at 04/30/22 2142    apixaban (ELIQUIS) tablet 5 mg, 5 mg, Oral, BID, Elpidio Magana MD, 5 mg at 05/26/22 0855    amiodarone (CORDARONE) tablet 200 mg, 200 mg, Oral, DAILY, Sabrina Carcamo MD, 200 mg at 05/26/22 0855    lisinopriL (PRINIVIL, ZESTRIL) tablet 20 mg, 20 mg, Oral, DAILY, Jakub Paez MD, 20 mg at 05/26/22 0855    predniSONE (DELTASONE) tablet 20 mg, 20 mg, Oral, DAILY WITH BREAKFAST, Jakub Paez MD, 20 mg at 05/26/22 0855    insulin lispro (HUMALOG) injection, , SubCUTAneous, AC&HS, Jakub Paez MD, 6 Units at 05/25/22 2252    glucose chewable tablet 16 g, 4 Tablet, Oral, PRN, Jakub Paez MD    glucagon (GLUCAGEN) injection 1 mg, 1 mg, IntraMUSCular, PRN, Jakub Paez MD, 1 mg at 05/06/22 0559    dextrose 10% infusion 0-250 mL, 0-250 mL, IntraVENous, PRN, Jakub Paez MD, Last Rate: 750 mL/hr at 05/02/22 1558, 125 mL at 05/02/22 1558    labetaloL (NORMODYNE;TRANDATE) 20 mg/4 mL (5 mg/mL) injection 10 mg, 10 mg, IntraVENous, Q6H PRN, Jason Lennon MD, 10 mg at 05/03/22 1423    LABS:  Recent Results (from the past 24 hour(s))   DUPLEX CAROTID BILATERAL    Collection Time: 05/25/22  2:42 PM   Result Value Ref Range    Left CCA dist sys 84.8 cm/s    Left CCA dist lopez 31.3 cm/s    Left CCA prox sys 135.0 cm/s    Left CCA prox lopez 30.1 cm/s    Left ICA dist sys 133.0 cm/s    Left ICA dist lopez 61.9 cm/s    Left ICA mid sys 156.0 cm/s    Left ICA mid lopez 64.5 cm/s    Left ICA prox sys 77.3 cm/s    Left ICA prox lopez 31.5 cm/s    Left ECA sys 58.3 cm/s    LEFT EXTERNAL CAROTID ARTERY D 10.00 cm/s    Left subclavian prox PSV 67.3 cm/s    Left subclavian prox EDV 0.0 cm/s    Left vertebral sys 50.6 cm/s    LEFT VERTEBRAL ARTERY D 18.80 cm/s    Right cca dist sys 89.9 cm/s    Right CCA dist lopez 20.5 cm/s    Right CCA prox sys 135.0 cm/s    Right CCA prox lopez 27.0 cm/s    Right ICA dist sys 133.0 cm/s    Right ICA dist loepz 65.8 cm/s    Right ICA mid sys 145.0 cm/s    Right ICA mid lopez 84.5 cm/s    Right ICA prox sys 72.5 cm/s    Right ICA prox lopez 23.3 cm/s    Right eca sys 58.8 cm/s    RIGHT EXTERNAL CAROTID ARTERY D 9.55 cm/s    Right subclavian prox PSV 75.9 cm/s    Right subclavian prox EDV 0.0 cm/s    Right vertebral sys 66.6 cm/s    RIGHT VERTEBRAL ARTERY D 27.00 cm/s    Right ICA/CCA sys 0.81     Left ICA/CCA sys 0.91    ECHO ADULT COMPLETE    Collection Time: 05/25/22  2:46 PM   Result Value Ref Range    AV Peak Velocity 1.4 m/s    AV Peak Gradient 8 mmHg    Aortic Root 3.1 cm    IVSd 1.2 (A) 0.6 - 1.0 cm    LVIDd 4.7 4.2 - 5.9 cm    LVIDs 3.3 cm    LVOT Peak Velocity 1.1 m/s    LVOT Peak Gradient 4 mmHg    LVPWd 0.6 0.6 - 1.0 cm    LV E' Lateral Velocity 13 cm/s    LV E' Septal Velocity 8 cm/s    LA Diameter 3.5 cm    MV E Wave Deceleration Time 303.0 ms    MV A Velocity 0.37 m/s    MV E Velocity 0.72 m/s    DE Max Velocity 0.9 m/s    Pulmonary Artery EDP 3 mmHg    PV Max Velocity 0.9 m/s    PV Peak Gradient 3 mmHg    Pulm Vein A Duration 49.0 ms    Pulm Vein A Velocity 0.2 m/s    Est. RA Pressure 3 mmHg    RVIDd 2.8 cm    TR Max Velocity 2.73 m/s    TR Peak Gradient 30 mmHg    Fractional Shortening 2D 30 28 - 44 %    LVIDd Index 2.69 cm/m2    LVIDs Index 1.89 cm/m2    LV RWT Ratio 0.26     LV Mass 2D 142.7 88 - 224 g    LV Mass 2D Index 81.5 49 - 115 g/m2    MV E/A 1.95     E/E' Ratio (Averaged) 7.27     E/E' Lateral 5.54     E/E' Septal 9.00     LA Size Index 2.00 cm/m2    LA/AO Root Ratio 1.13     Ao Root Index 1.77 cm/m2    AV Velocity Ratio 0.79     RVSP 33 mmHg    EF BP 57 55 - 100 %   GLUCOSE, POC    Collection Time: 05/25/22  4:31 PM   Result Value Ref Range    Glucose (POC) 231 (H) 65 - 117 mg/dL    Performed by [facility], POC    Collection Time: 05/25/22  9:25 PM   Result Value Ref Range    Glucose (POC) 287 (H) 65 - 117 mg/dL    Performed by [id number], POC    Collection Time: 05/26/22  8:21 AM   Result Value Ref Range    Glucose (POC) 107 65 - 117 mg/dL    Performed by Equilla Cooks        Visit Vitals  /81 (BP 1 Location: Right upper arm, BP Patient Position: At rest)   Pulse 83   Temp 98.1 °F (36.7 °C)   Resp 18   Ht 5' 7.4\" (1.712 m)   Wt 64.2 kg (141 lb 8.6 oz)   SpO2 100%   BMI 21.90 kg/m²       Imaging:  DUPLEX CAROTID BILATERAL   Final Result      MRI BRAIN WO CONT   Final Result         1. Evolving ischemic infarcts in multiple vascular territories, involving both   the right occipital lobe and left frontal lobe. 2. Left occipital encephalomalacia      CT ABD PELV WO CONT   Final Result   Small bowel ileus versus partial obstruction, at the ileocolic   anastomosis or ileocecal valve, slightly greater than previous. There is mixed   density fluid throughout this region, as before. . Consider follow-up study with   IV contrast and if possible, oral contrast. Consider NG tube.       XR ABD (KUB)   Final Result      XR ABD (KUB)   Final Result   Small bowel dilatation consistent with obstruction, not   significantly changed. XR ABD (KUB)   Final Result   Persistent small bowel distention as seen with obstruction. XR ABD (KUB)   Final Result   No significant change compared to abdomen series 5/5/2022. Persistent air and fluid-filled dilated small bowel loops. XR ABD (KUB)   Final Result   Multiple distended gas-filled loops of small bowel compatible with   partial small bowel obstruction versus ileus. CT ABD PELV WO CONT   Final Result   Moderate grade partial small bowel obstruction. Small volume complex ascites. CT ABD PELV WO CONT   Final Result   1. Improving hemoperitoneum and pneumoperitoneum. 2.  Ileocolic anastomosis in the right lower quadrant. Incompletely evaluated   without contrast. No evidence of bowel obstruction. 3.  Reticular and groundglass airspace disease in the visualized lung bases   redemonstrated. 4.  See full report for detailed findings. CT ABD PELV WO CONT   Final Result   1. Status post ileal cecal surgery. Surrounding extraluminal dense material may   represent blood products (no indication of enteric contrast administration). Anastomotic leak cannot be excluded. Consider imaging with water-soluble oral   contrast. Free air in the abdomen likely within normal limits post recent   surgery. The bowel does not appear obstructed. 2. Bilateral lower lung airspace disease likely infectious or inflammatory. 3. Bilateral femoral head sclerosis suggests AVN.

## 2022-05-26 NOTE — WOUND CARE
WCN in for continued ostomy education, patient stated that now was not a good time due to pain. Will follow up at a later time.

## 2022-05-26 NOTE — PROGRESS NOTES
PT treatment session attempted at 901 AM with pt declining treatment at this time due to elevated pain levels. We will return at a later time to perform PT treatment session. Thank you.

## 2022-05-26 NOTE — PROGRESS NOTES
Progress Note    Patient: Bethel Bardales MRN: [id number]  SSN: [id number]    YOB: 1970  Age: 49 y.o. Sex: male      Admit Date: 4/15/2022    LOS: 41 days     Subjective:   Patient seen in bed  MRI results noted, started on Plavix by neurology.   Cardiology consult within for AL  Patient continues to have visual disturbances  Tolerating diet  Ostomy functioning    Objective:     Vitals:    05/25/22 1610 05/25/22 2129 05/26/22 1008 05/26/22 1510   BP: 104/73 130/81  138/83   Pulse: 92 83 95 95   Resp: 18 18 18 18   Temp: 98.1 °F (36.7 °C) 98.1 °F (36.7 °C) 98.8 °F (37.1 °C) 98.8 °F (37.1 °C)   SpO2: 100% 100% (!) 83%    Weight:       Height:            Intake and Output:  Current Shift: 05/26 0701 - 05/26 1900  In: -   Out: 500 [Urine:500]  Last three shifts: 05/24 1901 - 05/26 0700  In: -   Out: 3010 [Urine:2525; Drains:10]    Review of Systems:  ROS     Physical Exam:   Abdomen is soft, nondistended, appropriately tender, stoma is pink and protuberant and productive stool, LES drain serous drainage, midline incision intact with purulent drainage from open areas    Lab/Data Review:  Recent Results (from the past 24 hour(s))   GLUCOSE, POC    Collection Time: 05/25/22  9:25 PM   Result Value Ref Range    Glucose (POC) 287 (H) 65 - 117 mg/dL    Performed by [address] [place] [address], POC    Collection Time: 05/26/22  8:21 AM   Result Value Ref Range    Glucose (POC) 107 65 - 117 mg/dL    Performed by [facility], POC    Collection Time: 05/26/22 11:07 AM   Result Value Ref Range    Glucose (POC) 157 (H) 65 - 117 mg/dL    Performed by [facility], POC    Collection Time: 05/26/22  5:01 PM   Result Value Ref Range    Glucose (POC) 243 (H) 65 - 117 mg/dL    Performed by Dusty Dejesus             Assessment:     Active Problems:    Colon cancer ([address] (4/15/2022)      Cecal polyp (4/15/2022)        Plan:   LES drain removed  3 times daily dressing changes to midline incision  Continue diet  Neurology and cardiology follow-up for embolic infarcts    Signed By: Phoebe Fuentes MD     May 26, 2022

## 2022-05-27 NOTE — PROGRESS NOTES
Problem: Falls - Risk of  Goal: *Absence of Falls  Description: Document Sree Tompkins Fall Risk and appropriate interventions in the flowsheet. Outcome: Progressing Towards Goal  Note: Fall Risk Interventions:  Mobility Interventions: Assess mobility with egress test,Bed/chair exit alarm,Patient to call before getting OOB         Medication Interventions: Bed/chair exit alarm,Patient to call before getting OOB    Elimination Interventions: Bed/chair exit alarm,Call light in reach    History of Falls Interventions: Consult care management for discharge planning,Door open when patient unattended         Problem: Pain  Goal: *Control of Pain  Outcome: Progressing Towards Goal     Problem: Infection - Risk of, Surgical Site Infection  Goal: *Absence of surgical site infection signs and symptoms  Outcome: Progressing Towards Goal     Problem: Pressure Injury - Risk of  Goal: *Prevention of pressure injury  Description: Document Yuri Scale and appropriate interventions in the flowsheet.   Outcome: Progressing Towards Goal  Note: Pressure Injury Interventions:  Sensory Interventions: Assess changes in LOC,Minimize linen layers    Moisture Interventions: Absorbent underpads,Internal/External fecal devices    Activity Interventions: Assess need for specialty bed,PT/OT evaluation    Mobility Interventions: Assess need for specialty bed,PT/OT evaluation    Nutrition Interventions: Document food/fluid/supplement intake    Friction and Shear Interventions: Apply protective barrier, creams and emollients,Minimize layers                Problem: Patient Education: Go to Patient Education Activity  Goal: Patient/Family Education  Outcome: Progressing Towards Goal

## 2022-05-27 NOTE — PROGRESS NOTES
Progress Note    Patient: Earnestine Katz MRN: [id number]  SSN: [id number]    YOB: 1970  Age: 49 y.o. Sex: male      Admit Date: 4/15/2022    LOS: 42 days     Subjective:   Patient seen in bed  No change in visual disturbances  Tolerating diet although continues to have intermittent abdominal pain    Objective:     Vitals:    05/26/22 2130 05/27/22 0354 05/27/22 0548 05/27/22 0855   BP: 112/73 116/75  103/67   Pulse: 93 77 76 75   Resp:  16  18   Temp:  97.9 °F (36.6 °C)  98.9 °F (37.2 °C)   SpO2:  100%  100%   Weight:       Height:            Intake and Output:  Current Shift: No intake/output data recorded. Last three shifts: 05/25 1901 - 05/27 0700  In: 1972.5 [I.V.:1972.5]  Out: 3855 [Urine:2250]    Review of Systems:  ROS     Physical Exam:   Soft, tender palpation mid abdomen, ileostomy right abdomen pink protuberant and productive, midline incision clean with some drainage from open areas    Lab/Data Review:  Recent Results (from the past 24 hour(s))   GLUCOSE, POC    Collection Time: 05/26/22  5:01 PM   Result Value Ref Range    Glucose (POC) 243 (H) 65 - 117 mg/dL    Performed by [facility], POC    Collection Time: 05/26/22  8:21 PM   Result Value Ref Range    Glucose (POC) 272 (H) 65 - 117 mg/dL    Performed by Elia Phoenix    GLUCOSE, POC    Collection Time: 05/27/22  7:39 AM   Result Value Ref Range    Glucose (POC) 120 (H) 65 - 117 mg/dL    Performed by Renate Mcwilliams, POC    Collection Time: 05/27/22 11:26 AM   Result Value Ref Range    Glucose (POC) 124 (H) 65 - 117 mg/dL    Performed by Irais López    GLUCOSE, POC    Collection Time: 05/27/22  4:10 PM   Result Value Ref Range    Glucose (POC) 241 (H) 65 - 117 mg/dL    Performed by Irais López           Assessment:     Active Problems:    Colon cancer ([address] (4/15/2022)      Cecal polyp (4/15/2022)        Plan:   [place] infarcts of the brain, neurology Dr. Ever Beth following.   Transthoracic echo pending. Patient started on Plavix and Eliquis. Continue diet. Continue current analgesics. Out of bed encourage ambulation. Local wound care for incision.     Signed By: Candance Bushy, MD     May 27, 2022

## 2022-05-27 NOTE — PROGRESS NOTES
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  Visual obscurations      EXAM:  Awake, oriented, not aphasic  Visual obscurations have not improved much  No new focality      ASSESSMENT/PLAN:  Awaiting cardiology for AL    ALLERGIES:    Allergies   Allergen Reactions    Beef Containing Products Anaphylaxis and Other (comments)    Diltiazem Other (comments)     Reaction Type: Allergy; Reaction(s): Pressure in chest    Iodinated Contrast Media Other (comments)     Reaction Type: Allergy; Severity: Severe; Reaction(s): hives,throat swelling    Pork Derived (Porcine) Hives    Shellfish Containing Products Swelling     Other reaction(s): Other  Reaction Type: Allergy; Reaction(s): Hives,throat swelling    Sulfa (Sulfonamide Antibiotics) Unknown (comments)     Spinal meningitis    Sulfur Unknown (comments)     Reaction Type: Allergy  Spinal meningitis    Lactose Diarrhea     Lactose intolerant per Pt.        MEDS:      Current Facility-Administered Medications:     clopidogreL (PLAVIX) tablet 75 mg, 75 mg, Oral, DAILY, KIMBERLY Rey IV, MD, 75 mg at 05/27/22 0857    aspirin chewable tablet 81 mg, 81 mg, Oral, DAILY, KIMBERLY Rey IV, MD, 81 mg at 05/27/22 0858    nystatin (MYCOSTATIN) 100,000 unit/mL oral suspension 500,000 Units, 500,000 Units, Oral, QID, Lisbeth Naranjo MD, 500,000 Units at 05/25/22 2131    0.9% sodium chloride infusion 250 mL, 250 mL, IntraVENous, PRN, Vito Hidalgo MD    acetaminophen (TYLENOL) tablet 650 mg, 650 mg, Oral, Q4H PRN, Lisbeth Naranjo MD, 650 mg at 05/18/22 1838    famotidine (PF) (PEPCID) 20 mg in 0.9% sodium chloride 10 mL injection, 20 mg, IntraVENous, Q12H, Fariha Martinez MD, 20 mg at 05/27/22 0856    dextrose 5% - 0.45% NaCl with KCl 20 mEq/L infusion, 75 mL/hr, IntraVENous, CONTINUOUS, Lisbeth Naranjo MD, Last Rate: 75 mL/hr at 05/27/22 0912, 75 mL/hr at 05/27/22 0912    oxyCODONE IR (ROXICODONE) tablet 10 mg, 10 mg, Oral, Q4H PRN, Chuck Villegas MD, 10 mg at 05/27/22 1404    ceFAZolin (ANCEF) 2 g in sterile water (preservative free) 20 mL IV syringe, 2 g, IntraVENous, Q8H, Fariha Martinez MD, Last Rate: 400 mL/hr at 05/27/22 1404, 2 g at 05/27/22 1404    metroNIDAZOLE (FLAGYL) IVPB premix 500 mg, 500 mg, IntraVENous, Q8H, Chuck Villegas MD, Last Rate: 100 mL/hr at 05/27/22 1202, 500 mg at 05/27/22 1202    ondansetron (ZOFRAN) injection 4 mg, 4 mg, IntraVENous, Q4H PRN, Chuck Villegas MD, 4 mg at 05/27/22 0855    verapamil ER (VERELAN PM) capsule 200 mg, 200 mg, Oral, QHS, Ronna CABRERA MD, 200 mg at 05/26/22 2130    [Held by provider] potassium chloride SR (KLOR-CON 10) tablet 10 mEq, 10 mEq, Oral, BID, Victorino Eastman MD, 10 mEq at 04/30/22 2142    apixaban (ELIQUIS) tablet 5 mg, 5 mg, Oral, BID, Porsha Lopez MD, 5 mg at 05/27/22 0857    amiodarone (CORDARONE) tablet 200 mg, 200 mg, Oral, DAILY, Jessica Ho MD, 200 mg at 05/27/22 0858    lisinopriL (PRINIVIL, ZESTRIL) tablet 20 mg, 20 mg, Oral, DAILY, Chuck Villegas MD, 20 mg at 05/27/22 0900    predniSONE (DELTASONE) tablet 20 mg, 20 mg, Oral, DAILY WITH BREAKFAST, Chuck Villegas MD, 20 mg at 05/27/22 0857    insulin lispro (HUMALOG) injection, , SubCUTAneous, AC&HS, Chuck Villegas MD, 6 Units at 05/26/22 2144    glucose chewable tablet 16 g, 4 Tablet, Oral, PRN, Chuck Villegas MD    glucagon (GLUCAGEN) injection 1 mg, 1 mg, IntraMUSCular, PRN, Chuck Villegas MD, 1 mg at 05/06/22 0559    dextrose 10% infusion 0-250 mL, 0-250 mL, IntraVENous, PRN, Chuck Villegas MD, Last Rate: 750 mL/hr at 05/02/22 1558, 125 mL at 05/02/22 1558    labetaloL (NORMODYNE;TRANDATE) 20 mg/4 mL (5 mg/mL) injection 10 mg, 10 mg, IntraVENous, Q6H PRN, Chuck Villegas MD, 10 mg at 05/03/22 1423    LABS:  Recent Results (from the past 24 hour(s))   GLUCOSE, POC    Collection Time: 05/26/22  5:01 PM   Result Value Ref Range    Glucose (POC) 243 (H) 65 - 117 mg/dL    Performed by Max Weinberg, POC    Collection Time: 05/26/22  8:21 PM   Result Value Ref Range    Glucose (POC) 272 (H) 65 - 117 mg/dL    Performed by Yohana Gregory    GLUCOSE, POC    Collection Time: 05/27/22  7:39 AM   Result Value Ref Range    Glucose (POC) 120 (H) 65 - 117 mg/dL    Performed by Nannette, POC    Collection Time: 05/27/22 11:26 AM   Result Value Ref Range    Glucose (POC) 124 (H) 65 - 117 mg/dL    Performed by Alexander Medina Vitals  /67   Pulse 75   Temp 98.9 °F (37.2 °C)   Resp 18   Ht 5' 7.4\" (1.712 m)   Wt 64.2 kg (141 lb 8.6 oz)   SpO2 100%   BMI 21.90 kg/m²       Imaging:  DUPLEX CAROTID BILATERAL   Final Result      MRI BRAIN WO CONT   Final Result         1. Evolving ischemic infarcts in multiple vascular territories, involving both   the right occipital lobe and left frontal lobe. 2. Left occipital encephalomalacia      CT ABD PELV WO CONT   Final Result   Small bowel ileus versus partial obstruction, at the ileocolic   anastomosis or ileocecal valve, slightly greater than previous. There is mixed   density fluid throughout this region, as before. . Consider follow-up study with   IV contrast and if possible, oral contrast. Consider NG tube. XR ABD (KUB)   Final Result      XR ABD (KUB)   Final Result   Small bowel dilatation consistent with obstruction, not   significantly changed. XR ABD (KUB)   Final Result   Persistent small bowel distention as seen with obstruction. XR ABD (KUB)   Final Result   No significant change compared to abdomen series 5/5/2022. Persistent air and fluid-filled dilated small bowel loops. XR ABD (KUB)   Final Result   Multiple distended gas-filled loops of small bowel compatible with   partial small bowel obstruction versus ileus. CT ABD PELV WO CONT   Final Result   Moderate grade partial small bowel obstruction. Small volume complex ascites.       CT ABD PELV WO CONT   Final Result   1. Improving hemoperitoneum and pneumoperitoneum. 2.  Ileocolic anastomosis in the right lower quadrant. Incompletely evaluated   without contrast. No evidence of bowel obstruction. 3.  Reticular and groundglass airspace disease in the visualized lung bases   redemonstrated. 4.  See full report for detailed findings. CT ABD PELV WO CONT   Final Result   1. Status post ileal cecal surgery. Surrounding extraluminal dense material may   represent blood products (no indication of enteric contrast administration). Anastomotic leak cannot be excluded. Consider imaging with water-soluble oral   contrast. Free air in the abdomen likely within normal limits post recent   surgery. The bowel does not appear obstructed. 2. Bilateral lower lung airspace disease likely infectious or inflammatory. 3. Bilateral femoral head sclerosis suggests AVN.

## 2022-05-27 NOTE — ADT AUTH CERT NOTES
Stroke: Ischemic - Care Day 2 (5/26/2022) by Joan Watson       Review Entered Review Status   5/26/2022 16:19 Completed      Criteria Review      Care Day: 2 Care Date: 5/26/2022 Level of Care: Telemetry    Guideline Day 2    Level Of Care    (X) Stroke unit, ICU, telemetry, or floor    5/26/2022 16:19:12 EDT by Joan Watson      IP/ Remote Tele    Clinical Status    (X) * Hemodynamic stability    5/26/2022 16:19:12 EDT by Joan Watson      98.8 °F  95 138/83 18 83% on RA    (X) * Mental status at baseline or stable    5/26/2022 16:19:12 EDT by Joan Watson      Awake, oriented, not aphasic. ( ) * Neurologic deficits absent or stable    5/26/2022 16:19:12 EDT by Joan Watson      Embolic infarcts of the brain  Visual obscurations    (X) * Unimpaired swallowing    Activity    ( ) * Up to chair    (X) Rehabilitation at bedside    (X) Possible assisted ambulation    5/26/2022 16:19:12 EDT by Joan Watson      as tolerated    Routes    (X) * Oral hydration    (X) * Oral medications    5/26/2022 16:19:12 EDT by Joan Watson      ASA 81mg d po, Plavix 75mg d po, Oxycodone 10mg prn x1 po, Prednisone 20mg qd po  IV: Ancef 2g q8 IV, D5 1/2 NS 75mL/hr,Pepcid 20mg q12 IV, Dilaudid 1mg prnx3 IV, Humalog ssin x1, Flagyl 500mg q8 IV, Zofran 4mg prn x1 IV,    (X) * Advance diet as tolerated    Interventions    ( ) Possible physical therapy    5/26/2022 16:19:12 EDT by Aviva Leon pt declining PT treatment at this time due to elevated pain levels    Medications    (X) Antithrombotic therapy    5/26/2022 16:19:12 EDT by Joan Watson      Eliquis 5mg bid po    (X) Blood pressure control    5/26/2022 16:19:12 EDT by Joan Watson      cordarone 200mg d po, lisinopril 20mg d po, verapamil 200mg qhs po    * Milestone   Additional Notes    DATE: 05/26/22      Pertinent Updates:   -pt c/o visual obscurations; refused ostomy education & PT tx d/t pain   MRI Of the brain seen and evaluated by neuro. (+) Embolic infarcts w/ Some subcortical white matter changes      Labs:    08:21 GLUCOSE,FAST - POC: 107   11:07 GLUCOSE,FAST - POC: 157 (H)      MD Consults/Assessments & Plans:   **Neurology Notes**   ASSESSMENT/PLAN:   Awaiting cardiology consult for AL. Patient should start Plavix 75 mg p.o. daily      Orders:   -Consult to Cardiology      CM Notes:   DC Plan: [id number] will have to re-submit auth. In addition, they will need another PCR test closer to discharge.        Stroke: Ischemic - Care Day 1 (5/25/2022) by Bertha Barron       Review Entered Review Status   5/26/2022 15:36 Completed      Criteria Review      Care Day: 1 Care Date: 5/25/2022 Level of Care: Telemetry    Guideline Day 1    Level Of Care    (X) Stroke unit, ICU, telemetry, or floor    5/26/2022 15:36:13 EDT by Bertha Barron      IP/ Tele    Clinical Status    (X) * Clinical Indications met    5/26/2022 15:36:13 EDT by Daron Kauffman pt. had an ischemic stroke    (X) Cerebral hemorrhage excluded    Activity    ( ) Bed rest    5/26/2022 15:36:13 EDT by Bertha Barron      ambulating as tolerated w/ assist    Routes    (X) IV fluids    5/26/2022 15:36:13 EDT by Bertha Barron      D5 1/2 NS 75mL/hr IV    (X) IV medications    5/26/2022 15:36:13 EDT by Bertha Barron      Ancef 2g q8 IV, Pepcid 20mg q12 IV, Dilaudid 1mg prnx3 IV, Flagyl 500mg q8 IV, Zofran 4mg prn x1 IV,  PO meds: Oxycodone 10mg prnx1 po, Prednisone 20mg qd po    Interventions    (X) Neurology consultation    (X) Laboratory studies    5/26/2022 15:36:13 EDT by Daron Kauffman 16:31 [address]: 231 (H)   21:25 GLUCOSE,FAST - POC: 287 (H)    (X) Cardiac monitoring    (X) Imaging studies (ie, brain, intracranial vascular)    5/26/2022 15:36:13 EDT by Bertha Barron      MRI BRAIN W/O CONT:  1. Evolving ischemic infarcts in multiple vascular territories, involving both  the right occipital lobe and left frontal lobe.   2. Left occipital encephalomalacia    ( ) Oxygen if needed to maintain saturation greater than 94%    5/26/2022 15:36:13 EDT by Joan Watson      100% on RA  VS:  98.1 °F 92 104/73 18    Medications    (X) Blood pressure control as indicated    5/26/2022 15:36:13 EDT by Aviva Leon [id number] d po, lisinopril 20mg d po, verapamil 200mg qhs po    (X) Possible antithrombotic therapy    5/26/2022 15:36:13 EDT by Joan Watson      Eliquis 5mg bid po    (X) Possible thrombolytic therapy    5/26/2022 15:36:13 EDT by Joan Watson      ASA 81mg d po, Plavix 75mg d po    * Milestone   Additional Notes    DATE: 05/25/22      Pertinent Updates:   -pt. stated that he is having a hard time seeing clearly and is not sure what has caused this; had MRI of the brain done embolic infarct of the brain was suspected;  requested a cardiology consult; Eliquis restarted and on aspirin 81 mg daily. ECHO   Impression   ·  Left Ventricle: Normal left ventricular systolic function with a visually estimated EF of 55 - 60%. Left ventricle is mildly dilated. Normal wall thickness. Normal wall motion. Normal diastolic function. ·  Aortic Valve: Mild sclerosis of the aortic valve cusp. ·  Mitral Valve: Mildly thickened leaflet. ·  Negative bubble study. Physical Exam:   Mental: Awake, Eating breakfast.   Neurologic: Oriented to day, location, month, year.  Not aphasic. Still has visual obscurations. Abdomen:soft, appropriately tender, midline incision is clean with some scant drainage, ileostomy pink protuberant and productive stool, LES drain serous drainage         MD Consults/Assessments & Plans:   **Neuro Consult**   ASSESSMENT/PLAN:   Awaiting imaging study   Continue aspirin       **Colon &Rectal Surgery Notes**   Plan:   Patient had duplex study of carotids which demonstrated mild stenosis of the right internal carotid artery less than 50% and mild stenosis of the left internal carotid artery less than 50%.  Both the right and left middle ICA with noted to be tortuous.  The right external carotid artery is also patent with mild focal plaque.  The left external carotid artery noted to be patent.  Both the right and left vertebral arteries are antegrade.       Patient had MRI of the brain which demonstrated evolving ischemic infarcts in multiple vascular territories involving both the right occipital lobe and left frontal lobe.  Left occipital encephalomalacia also noted.  Dr. Delonte Og has requested a cardiology consult to Dr. Nicolas Cornejo as he feels that this is a embolic infarct of the brain.  I have restarted the patient's Tye Pena is on aspirin 81 mg daily.       Continue diet       Continue PT/OT        Stroke: Ischemic - Clinical Indications for Admission to Inpatient Care by Stiven Hopper       Review Entered Review Status   5/26/2022 15:07 Completed      Criteria Review      Clinical Indications for Admission to Inpatient Care    Most Recent : Stiven Hopper Most Recent Date: 5/26/2022 15:07:28 EDT    (X) Admission is indicated for  1 or more  of the following  [A] [B] (1) (3) (4) (5) (6):       (X) Acute ischemic stroke [A] with neurologic findings that warrant inpatient care, as indicated       by  1 or more  of the following :          (X) Finding on brain imaging that requires inpatient care (eg, mass, edema, large acute infarction)          [D]          5/26/2022 15:07:28 EDT by Stiven Hopper            MRI BRAIN  IMPRESSION  1. Evolving ischemic infarcts in multiple vascular territories, involving both  the right occipital lobe and left frontal lobe.   2. Left occipital encephalomalacia        Bowel Surgery: Colectomy, Partial, with or without Ostomy, by Laparoscopy - Care Day 36 (5/24/2022) by Homero Farrar       Review Entered Review Status   5/25/2022 15:20 Completed      Criteria Review      Care Day: 40 Care Date: 5/24/2022 Level of Care: Inpatient Floor    Guideline Day 3    Level Of Care ( ) Floor    5/25/2022 15:20:22 EDT by Justen Gracia      5/24 medical    Clinical Status    (X) * No evidence of ileus or bowel obstruction    5/25/2022 15:20:22 EDT by Justen Gracia      none indicated    Activity    ( ) * Ambulatory    5/25/2022 15:20:22 EDT by Justen Gracia      up ad doreen    Routes    ( ) * Liquid or usual diet, advance as tolerated    5/25/2022 15:20:22 EDT by Justen Gracia      Dysphagia    (X) * Oral medications    5/25/2022 15:20:22 EDT by Justen Gracia      po cordarone 200mg daily  po zestril 20mg daily  po mycostatin 500,000 units 4xdaily  po deltasone 20mg daily  po verelan 200mg q bedtime    (X) Possible IV fluids    5/25/2022 15:20:22 EDT by Justen Gracai      dextrose 5% - 0.45% NaCl with KCl 20 mEq/L @75    (X) Possible IV medications    5/25/2022 15:20:22 EDT by Justen Gracia      IV ancef 2g q8h  IV flagyl 500mg q8h  IV dilaudid 1mg q3h prn given x1  IV pepcid 20mg q12h    Medications    (X) * Epidural analgesics absent    5/25/2022 15:20:22 EDT by Justen Gracia      n/a    (X) Possible mu-opioid receptor antagonist    5/25/2022 15:20:22 EDT by Justen Gracia      PO roxicodone 10mg q4h prn given 3x    * Milestone   Additional Notes   5/24 medical      Pertinent Updates:    Tolerating diet, ileostomy functioning   Continues to complain of some visual disturbances   Has been in a sinus rhythm      Vitals:   98.1 °F (36.7 °C) 87 120/80 18 100 % RA      Abnl/Pertinent Labs/Radiology/Diagnostic Studies:   WBC: 13.7 (H)   NRBC: 0.3 (H)   RBC: 3.25 (L)   HGB: 7.8 (L)   HCT: 24.4 (L)   MCV: 75.1 (L)   MCH: 24.0 (L)   RDW: 15.5 (H)   na 135    glucose 154      Physical Exam:   Diminished soft, nondistended, appropriately tender, ileostomy right abdomen pink protuberant and productive stool, LES drain serous drainage, midline incision clean      MD Consults/Assessments & Plans:   Continue diet as ordered, encourage patient to increase intake and to drink nutritional shakes   Will consult neurology for visual disturbances   Continue IV antibiotics   Continue PT/OT   Continue stoma teaching   Plan discharge early next week      Per neuro:   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 daily   Patient may eventually need to see an ophthalmologist      Orders:   daily labs, may shower, scds      PT:   Patient continues with skilled PT services and is progressing towards goals. Pt semi supine in bed upon arrival and agreeable to session. Pt completed rolling with SBA. Sup>sit with min A. Using log roll technique.  Pt STS with CGA and RW for balance upon standing. Pt amb ~3' with RW, no LOB or  knee buckling. Pt sat into recliner and set up with breakfast. Pt educated on seated and semi supine therex, pt verbalized understanding. Pt continues to be limited by pain. Left with call bell in reach and needs met. Rec d/c to IRF once medically appropriate pending pain control.     Current Level of Function Impacting Discharge (mobility/balance): min A sup>sit    Other factors to consider for discharge: PLOF, severity of deficits, pain control       OT:   Patient continues with skilled OT services and is progressing towards goals. Pt. Received seated in chair and agreeable to tx session. Pt. Requesting to return to bed d/t increased pain in abdominal region. Pt. Performed sit> stand with Min A, use of RW upon standing for support, functional ambulation from chair> bed with CGA, pt demonstrates slow steady pace. While seated at EOB pt. Performed oral care and facial grooming with set-up assistance and IND to perform task. Pt. Returned semi-supine in bed with CGA. Pt. Left semi-supine in bed with needs met and call bell within reach. REC. IRF when medically appropriate for discharge.     Current Level of Function Impacting Discharge (ADLs): decreased activity tolerance d/t pain in abdominal region.     Other factors to consider for discharge: PLOF, time since on set

## 2022-05-27 NOTE — PROGRESS NOTES
DC Plan: IRF (will need auth again)    Cm was informed pt's mother - Kerry De Leon would like to speak with writer. Cm met with pt at the bedside. CM received permission from pt to speak with the following family members: Mrs. Quinn Blankenship (mother), Mrs. Ning Rogers (daughter), Mrs. Schaefer (sister). Pt's discharge plan was discussed. Pt is aware he was accepted with [facility], but he would like to know what other inpatient rehab facilities are located in [place]. Cm stopped by pt's room and provided him with a list of inpatient rehab facilities. Cm informed pt Cm will f/up with him this afternoon. Pt was agreeable with plan. Cuong spoke with pt's mother - Mrs. Quinn Blankenship [phone] and provided her with an update. Cm met with pt at the bedside this afternoon. At this time, pt would like to stay with [facility]. CM informed pt CM will f/up with him on Tuesday.

## 2022-05-27 NOTE — PROGRESS NOTES
PHYSICAL THERAPY TREATMENT  Patient: Alanna Fraga (16 y.o. male)  Date: 5/27/2022  Diagnosis: Adenomatous polyp of colon, unspecified part of colon [D12.6]  [facility] [C18.9]  Cecal polyp [K63.5] <principal problem not specified>  Procedure(s) (LRB):  Exploratory laparotomy, abdominal washout, resection of ileocolic anastomosis with end ileostomy (N/A) 12 Days Post-Op  Precautions:    Chart, physical therapy assessment, plan of care and goals were reviewed. ASSESSMENT  Patient continues with skilled PT services and is progressing towards goals. Pt semi-supine upon PT arrival, agreeable to session. Pt required SBA for bed mobility, Min A for supine > sit transfers, and CGA for sit <> stand transfers. Pt amb 5 feet with RW, gt belt, and CGA, demonstrates slow, step through gait pattern with no LOB or knee buckling. Pt then performed HEP therex while seated in recliner as listed in therex section. Pt did well with session today currently needing assistance for management of B LE during transfers and assistance with managing IV pole during functional mobility. Will continue to benefit from skilled PT services, and will continue to progress as tolerated. Current Level of Function Impacting Discharge (mobility/balance): SBA-CGA    Other factors to consider for discharge: acute medical status, PMH, length of stay (42 days)         PLAN :  Patient continues to benefit from skilled intervention to address the above impairments. Continue treatment per established plan of care to address goals. Frequency/Duration: Patient will be followed by physical therapy:  3-5x/week to address goals.     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     OBJECTIVE DATA SUMMARY:   Critical Behavior:  Neurologic State: Alert  Orientation Level: Oriented X4  Cognition: Follows commands,Appropriate decision making,Appropriate for age attention/concentration,Appropriate safety awareness     Functional Mobility Training:  Bed Mobility:  Rolling: Stand-by assistance  Supine to Sit: Minimum assistance;Assist x1     Scooting: Stand-by assistance        Transfers:  Sit to Stand: Contact guard assistance  Stand to Sit: Contact guard assistance                             Balance:  Sitting: Intact; Without support  Sitting - Static: Good (unsupported)  Sitting - Dynamic: Good (unsupported)  Standing: Impaired; With support  Standing - Static: Good;Constant support  Standing - Dynamic : Good;Constant support  Ambulation/Gait Training:  Distance (ft): 5 Feet (ft) (Bed>recliner)  Assistive Device: Walker, rolling  Ambulation - Level of Assistance: Contact guard assistance     Gait Description (WDL): Exceptions to WDL           Base of Support: Narrowed     Speed/Daysi: Slow;Pace decreased (<100 feet/min)       Therapeutic Exercises:   Pt completed the following exercises on B LE      EXERCISE   Sets   Reps   Active Active Assist   Passive Self ROM   Comments   Ankle Pumps 1 8 [x] [] [] []    Quad Sets/Glut Sets   [] [] [] []    Hamstring Sets   [] [] [] []    Short Arc Quads   [] [] [] []    Heel Slides   [] [] [] []    Straight Leg Raises   [] [] [] []    Hip abd/add 1 10 [x] [] [] []    Long Arc Quads 1 10 [x] [] [] []    Marching 2 8 [x] [] [] [] standing      [] [] [] []       Pain Rating:  Pt reported 7/10 of abdomen    Activity Tolerance:   Good and requires rest breaks    After treatment patient left in no apparent distress:   Sitting in chair and Call bell within reach    GOALS:    Problem: Mobility Impaired (Adult and Pediatric)  Goal: *Acute Goals and Plan of Care (Insert Text)  Description: Pt will be I with LE HEP in 7 days. Pt will perform bed mobility with SBA in 7 days. Pt will perform transfers with SBA in 7 days. Pt will amb 5-10 feet with LRAD safely with Mod I/Independent in 7 days. Pt will demonstrate improvement in standing dynamic balance from CGA to Mod I/Independent in 7 days. Outcome: Progressing Towards Goal       COMMUNICATION/COLLABORATION:   The patients plan of care was discussed with: Occupational therapist, Registered nurse, and Case management. NICK Jacinto, under direct supervision of [facility] PT, DPT provided care and treatment of pt with verbal consent from pt received.       NICK Jacinto, PT, DPT   Time Calculation: 18 mins

## 2022-05-27 NOTE — PROGRESS NOTES
Problem: Pain  Goal: *Control of Pain  Outcome: Progressing Towards Goal  Note: Patient is reporting that his pain is being a little better controlled. He does state that his pain is NEVER below a 4. Pain will continue to be assessed and treated as needed. Bedside shift change report given to Chapo Chan (oncoming nurse) by Sabra Leyva RN (offgoing nurse). Report included the following information SBAR, Kardex, ED Summary, Procedure Summary, Intake/Output, MAR, Accordion, Recent Results and Cardiac Rhythm NSR.

## 2022-05-27 NOTE — WOUND CARE
WCN in for continued ostomy teaching, patient stated having pain and vision troubles. WCN to continue to follow for ostomy needs, discussed the following with patient:    -Empty / burp pouch when 1/3-1/2 full of stool or gas. -Change appliance (wafer and pouch) 2-3 per week. If leaking, change as soon as possible to prevent skin irritation.  -Best time for routine change of appliance is first thing in the morning before consuming food or liquids (depending on which type of ostomy). -Stoma should always be red and moist.  If stoma appears dry and dusky, notify surgeon immediately.

## 2022-05-28 NOTE — PROGRESS NOTES
Problem: Falls - Risk of  Goal: *Absence of Falls  Description: Document Antione Joseph Fall Risk and appropriate interventions in the flowsheet.   Outcome: Progressing Towards Goal  Note: Fall Risk Interventions:  Mobility Interventions: Assess mobility with egress test,Bed/chair exit alarm,Patient to call before getting OOB         Medication Interventions: Bed/chair exit alarm,Patient to call before getting OOB    Elimination Interventions: Bed/chair exit alarm,Call light in reach    History of Falls Interventions: Bed/chair exit alarm,Room close to nurse's station

## 2022-05-28 NOTE — PROGRESS NOTES
Problem: Mobility Impaired (Adult and Pediatric)  Goal: *Acute Goals and Plan of Care (Insert Text)  Description: Pt will be I with LE HEP in 7 days. Pt will perform bed mobility with SBA in 7 days. Pt will perform transfers with SBA in 7 days. Pt will amb 5-10 feet with LRAD safely with Mod I/Independent in 7 days. Pt will demonstrate improvement in standing dynamic balance from CGA to Mod I/Independent in 7 days. Outcome: Progressing Towards Goal   PHYSICAL THERAPY TREATMENT  Patient: Yrn Pearce (12 y.o. male)  Date: 5/28/2022  Diagnosis: Adenomatous polyp of colon, unspecified part of colon [D12.6]  [facility] [C18.9]  Cecal polyp [K63.5] <principal problem not specified>  Procedure(s) (LRB):  Exploratory laparotomy, abdominal washout, resection of ileocolic anastomosis with end ileostomy (N/A) 13 Days Post-Op  Precautions:    Chart, physical therapy assessment, plan of care and goals were reviewed. ASSESSMENT  Patient continues with skilled PT services and is progressing towards goals. Decreased assistance required for bed mobility. Demos good sitting balance with no support. Participated with seated therex, additional time required. Quick fatigue with RLE. Required pull to stand from EOB. Once in standing, demo good static standing balance. Took few steps to chair, knee buckle noted on R. Completed chair transfer with CGA, RW. C/o increased fatigue and pain at end of tx. Current Level of Function Impacting Discharge (mobility/balance): Fair balance. Other factors to consider for discharge: Fall risk. PLAN :  Patient continues to benefit from skilled intervention to address the above impairments. Continue treatment per established plan of care. to address goals.     Recommendation for discharge: (in order for the patient to meet his/her long term goals)  Inpatient Rehabilitation Facility            SUBJECTIVE:   Patient stated i'm hurting a lot, this room being so cold is making it worse.     OBJECTIVE DATA SUMMARY:   Critical Behavior:  Neurologic State: Alert  Orientation Level: Oriented X4  Cognition: Appropriate decision making     Functional Mobility Training:  Bed Mobility:     Supine to Sit: Stand-by assistance     Scooting: Stand-by assistance        Transfers:  Sit to Stand: Stand-by assistance  Stand to Sit: Contact guard assistance        Bed to Chair: Contact guard assistance                    Balance:  Sitting: Intact  Sitting - Static: Good (unsupported)  Sitting - Dynamic: Good (unsupported)  Standing: Pull to stand; Intact; With support  Standing - Static: Good  Standing - Dynamic : Fair  Ambulation/Gait Training:  Distance (ft): 5 Feet (ft)  Assistive Device: Gait belt;Walker, rolling  Ambulation - Level of Assistance: Contact guard assistance        Gait Abnormalities: Antalgic        Base of Support: Narrowed     Speed/Daysi: Slow          Therapeutic Exercises:   Heel/toe raises x10  LAQ x10  Pain Ratin/10    Activity Tolerance:   Fair      After treatment patient left in no apparent distress:   Sitting in chair and Call bell within reach    COMMUNICATION/COLLABORATION:   Nsg entered room to give meds during tx.      Lakshmi Manning   Time Calculation: 31 mins

## 2022-05-29 NOTE — PROGRESS NOTES
Talked to patient about other medications to possibly help IV meds to last longer. Patient refused oral meds.  Giving Dilaudid per prn order to try to stay ahead of the painl

## 2022-05-29 NOTE — PROGRESS NOTES
PHYSICAL THERAPY REASSESSMENT  Patient: Zee Spring (49 y.o. male)  Date: 5/29/2022  Primary Diagnosis: Adenomatous polyp of colon, unspecified part of colon [D12.6]  Colon cancer ([facility] [address] [C18.9]  Cecal polyp [K63.5]  Procedure(s) (LRB):  Exploratory laparotomy, abdominal washout, resection of ileocolic anastomosis with end ileostomy (N/A) 14 Days Post-Op   Precautions: falls    ASSESSMENT  Pt is a 48 yo male admitted on 4/15/2022 for surgical management of a large cecal polyp that was not amenable to endoscopic resection; and underwent exploratory laparotomy, abdominal washout, and resection of ileocolic anastomosis with end ileostomy by Carlee Vieyra MD on 5/15/22. PMH: Arrhythmia, Asthma, DM, HTN, Myasthenia Gravis, Polymyositis, Primary Hypersomnolence disorder    Per initial PT eval on 5/18/22, pt reported that he resides with a roommate in a 2 story townhouse with 8 HERBIE, bilateral handrails, 10 interior steps and bilateral handrails, pt was independent for ADLS/IADLS, ambulating without need of AD with mobility prior to admission. DME owned: none. Patient received semi-supine in bed , agreeable for PT reassessment. Based on current observations, pt continues to present with deficits in generalized strength/AROM, functional activity tolerance, balance impacting overall performance of functional transfers/mobility. Pt with c/o pain in upper abdomen- 7/10, mod I for supine>sit transfers , demonstrates Intact static/dynamic sitting balance with support . Completed there ex's in unsupported sitting position for b/l LE strengthening . Pt performed STS x 2 attempts with CGA, CGA for bed<>chair transfers, fair dynamic standing balance with support. Pt ambulated ~5' with Rw, CGA/Pantera with unsteady gait, instability at b/l knees and increased risk of buckling at b/l knees. Pt returned to bed from sit>semi-supine with CGA.        Overall, pt tolerates session fair today, progressing slowly towards the goals, and would benefit from continued skilled PT services to address noted deficits and maximize IND/safety with functional transfers/mobility. PT goals and POC reviewed and continues to remain appropriate to reflect current progress. PT recommending IRF at discharge once medically appropriate. Current Level of Function Impacting Discharge (mobility/balance): Pt requires CGA for transfers and CGA/Pantera for mobility with RW. Other factors to consider for discharge: severity of deficits, time since onset          PLAN :  Recommendations and Planned Interventions: bed mobility training, transfer training, gait training, therapeutic exercises, neuromuscular re-education, patient and family training/education, and therapeutic activities      Frequency/Duration: Patient will be followed by physical therapy:  3-5x/week to address goals.     Recommendation for discharge: (in order for the patient to meet his/her long term goals)  [facility]     This discharge recommendation:  Has been made in collaboration with the attending provider and/or case management    IF patient discharges home will need the following DME: rolling walker         SUBJECTIVE:   Patient stated  I am hurting a lot in my stomach     OBJECTIVE DATA SUMMARY:   HISTORY:    Past Medical History:   Diagnosis Date    Adverse effect of anesthesia     Arrhythmia     atrial flutter    Asthma     Diabetes ([address]     Hypertension     Ill-defined condition     Spinal meningitis and avascular necrosis     Myasthenia gravis     Polymyositis ([facility] [address] 2001    muscle disorder    Primary hypersomnolence disorder      Past Surgical History:   Procedure Laterality Date    HX COLONOSCOPY  2021    HX ENDOSCOPY      HX OTHER SURGICAL      lung abscess removal    MT SHOULDER SURG PROC UNLISTED      Left Shoulder       Personal factors and/or comorbidities impacting plan of care:     Home Situation  Home Environment: Apartment ([facility] )  # Steps to Enter: 8  Rails to Enter: Yes  Hand Rails : Bilateral  Wheelchair Ramp: No  One/Two Story Residence: Two story  # of Interior Steps: 10  Interior Rails: Both  Lift Chair Available: No  Living Alone: No  Support Systems: Friend/Neighbor  Patient Expects to be Discharged to[de-identified] Home with home health  Current DME Used/Available at Home: None  Tub or Shower Type: Tub/Shower combination    PLOF: Pt IND for ADLS/IADLS, IND with mobility prior to admission. EXAMINATION/PRESENTATION/DECISION MAKING:   Critical Behavior:  Neurologic State: Alert  Orientation Level: Oriented X4  Cognition: Follows commands     Hearing: Auditory  Auditory Impairment: None  Skin:  intact where exposed    Range Of Motion:   Generally decreased, functional    Strength:    Generally decreased, functional    Tone    Normal    Coordination:  Generally decreased, functional    Functional Mobility:  Bed Mobility:     Supine to Sit: Modified independent  Sit to Supine: Contact guard assistance  Scooting: Modified independent  Transfers:  Sit to Stand: Contact guard assistance  Stand to Sit: Contact guard assistance        Bed to Chair: Contact guard assistance    Balance:   Sitting: Intact; Without support  Sitting - Static: Good (unsupported)  Sitting - Dynamic: Good (unsupported)  Standing: Impaired; With support  Standing - Static: Good;Constant support  Standing - Dynamic : Fair;Constant support  Ambulation/Gait Training:  Distance (ft): 5 Feet (ft)  Assistive Device: Gait belt;Walker, rolling  Ambulation - Level of Assistance: Contact guard assistance;Minimal assistance      Therapeutic Exercises:   AP,LAQ, seated marches- 10 reps    Functional Measure:    [address] [facility] [address] [address] AM-PAC 6 Clicks         Basic Mobility Inpatient Short Form  How much difficulty does the patient currently have. .. Unable A Lot A Little None   1. Turning over in bed (including adjusting bedclothes, sheets and blankets)? [] 1   [] 2   [] 3   [x] 4   2.   Sitting down on and standing up from a chair with arms ( e.g., wheelchair, bedside commode, etc.)   [] 1   [] 2   [x] 3   [] 4   3. Moving from lying on back to sitting on the side of the bed? [] 1   [] 2   [] 3   [x] 4          How much help from another person does the patient currently need. .. Total A Lot A Little None   4. Moving to and from a bed to a chair (including a wheelchair)? [] 1   [] 2   [x] 3   [] 4   5. Need to walk in hospital room? [] 1   [] 2   [x] 3   [] 4   6. Climbing 3-5 steps with a railing? [x] 1   [] 2   [] 3   [] 4   © 2007, Trustees of [address] [place] [address] [address] [id number], under license to [employer]. All rights reserved     Score:  Initial: 18 Most Recent: X (Date: -5/29/22- )   Interpretation of Tool:  Represents activities that are increasingly more difficult (i.e. Bed mobility, Transfers, Gait). Score 24 23 22-20 19-15 14-10 9-7 6   Modifier CH CI CJ CK CL CM CN          Physical Therapy Evaluation Charge Determination   History Examination Presentation Decision-Making   MEDIUM  Complexity : 1-2 comorbidities / personal factors will impact the outcome/ POC  MEDIUM Complexity : 3 Standardized tests and measures addressing body structure, function, activity limitation and / or participation in recreation  LOW Complexity : Stable, uncomplicated  Other Functional Measure [facility] 6 medium complexity      Based on the above components, the patient evaluation is determined to be of the following complexity level: LOW     Pain Rating:  Pain at upper abdomen- 7/10    Activity Tolerance:   Fair  Please refer to the flowsheet for vital signs taken during this treatment. After treatment patient left in no apparent distress:   Supine in bed, Call bell within reach, and Bed / chair alarm activated    COMMUNICATION/EDUCATION:   The patients plan of care was discussed with: Registered nurse. Fall prevention education was provided and the patient/caregiver indicated understanding.  and Patient/family agree to work toward stated goals and plan of care. Problem: Mobility Impaired (Adult and Pediatric)  Goal: *Acute Goals and Plan of Care (Insert Text)  Description: Pt will be I with LE HEP in 7 days. Pt will perform bed mobility with SBA in 7 days. Pt will perform transfers with SBA in 7 days. Pt will amb 5-10 feet with LRAD safely with Mod I/Independent in 7 days. Pt will demonstrate improvement in standing dynamic balance from CGA to Mod I/Independent in 7 days.      Outcome: Progressing Towards Goal     Thank you for this referral.  Crispin Tolliver   Time Calculation: 25 mins

## 2022-05-29 NOTE — PROGRESS NOTES
Cardiology was reconsulted for assessment of cardiac source of emboli by AL due to patient's embolic CVA. I have discussed with the patient AL procedure with risk and benefits. Patient understands the procedure and wishes to proceed. I will schedule patient's AL as soon as possible as per availability of CV lab staff.

## 2022-05-29 NOTE — PROGRESS NOTES
Problem: Self Care Deficits Care Plan (Adult)  Goal: *Acute Goals and Plan of Care (Insert Text)  Description: Pt stated goal \"to decrease pain\"  Pt will be Mod I sup <> sit in prep for EOB ADLs  Pt will be Mod I grooming standing sink side LRAD  Pt will be Mod I UB dressing sitting EOB/long sit   Pt will be Mod I LE dressing sitting EOB/long sit  Pt will be Mod I sit <>  prep for toileting LRAD  Pt will be Mod I toileting/toilet transfer/cloth mgmt LRAD  Pt will be IND following UE HEP in prep for self care tasks      Outcome: Progressing Towards Goal   OCCUPATIONAL THERAPY TREATMENT  Patient: Caty Freeman (31 y.o. male)  Date: 5/29/2022  Diagnosis: Adenomatous polyp of colon, unspecified part of colon [D12.6]  Colon cancer ([facility] [address] [C18.9]  Cecal polyp [K63.5] <principal problem not specified>  Procedure(s) (LRB):  Exploratory laparotomy, abdominal washout, resection of ileocolic anastomosis with end ileostomy (N/A) 14 Days Post-Op  Precautions:    Chart, occupational therapy assessment, plan of care, and goals were reviewed. ASSESSMENT  Patient continues with skilled OT services and is progressing towards goals. Patient received semi supine in bed upon MCGUIRE'S arrival and agreeable to work with therapist. Pt requesting to get to chair to eat lunch. Patient required mod I for supine to sit EOB and scooting using bed rail with additional time 2/2 pain in stomach. Seated EOB, pt required set-up for LB dressing (tammy socks) crossing leg method with good dynamic sitting balance. Physician arrived to discuss pt's care. STS and bed to chair tf using Rw/gait belt with CGA and vc's for hand placement for safety and balance. No LOB noted. Seated in chair, pt required set-up for simple grooming (facial care) 2/2 pain and generalized weakness. Pt IND for eating. Pt left in chair with call bell.  Patient would benefit from continued skilled Occupational services while at [facility] in order to increase safety and independence with self care and functional tranfers/mobility. Recommend at discharge to IRF when medically appropriate. Current Level of Function Impacting Discharge (ADLs): Set-up LB dressing and grooming and IND for eating    Other factors to consider for discharge: Time of onset, medical prognosis/diagnosis, severity of deficits, PLOF, pain, and family support          PLAN :  Patient continues to benefit from skilled intervention to address the above impairments. Continue treatment per established plan of care. to address goals. Recommend with staff: chair level grooming    Recommend next OT session: BSC    Recommendation for discharge: (in order for the patient to meet his/her long term goals)  [facility]     This discharge recommendation:  Has been made in collaboration with the attending provider and/or case management    IF patient discharges home will need the following DME: TBD       SUBJECTIVE:   Patient stated I want to get to the chair to eat.     OBJECTIVE DATA SUMMARY:   Cognitive/Behavioral Status:  Neurologic State: Alert  Orientation Level: Oriented X4  Cognition: Follows commands             Functional Mobility and Transfers for ADLs:  Bed Mobility:  Supine to Sit: Modified independent; Additional time  Scooting: Modified independent    Transfers:  Sit to Stand: Contact guard assistance     Bed to Chair: Contact guard assistance    Balance:  Sitting: Intact  Sitting - Static: Good (unsupported)  Sitting - Dynamic: Good (unsupported)  Standing: Impaired; With support  Standing - Static: Good;Constant support  Standing - Dynamic : Fair;Constant support    ADL Intervention:  Feeding  Feeding Assistance: Independent  Container Management: Independent  Cutting Food: Independent  Utensil Management: Independent    Grooming  Grooming Assistance: Set-up  Position Performed: Seated in chair  Washing Face: Set-up         Lower Body Dressing Assistance  Dressing Assistance: Set-up  Socks: Set-up  Leg Crossed Method Used: Yes  Position Performed: Seated edge of bed    Pain:  8/10 stomach area    Activity Tolerance:   Fair  Please refer to the flowsheet for vital signs taken during this treatment. After treatment patient left in no apparent distress:   Sitting in chair and Call bell within reach    COMMUNICATION/COLLABORATION:   The patients plan of care was discussed with: Registered nurse.      MAYNOR Garcias  Time Calculation: 16 mins

## 2022-05-29 NOTE — PROGRESS NOTES
Progress Note    Patient: Feroz Tatum MRN: [id number]  SSN: [id number]    YOB: 1970  Age: 49 y.o. Sex: male      Admit Date: 4/15/2022    LOS: 44 days     Subjective:   Patient seen in bed  Continues to have visual disturbances  AL pending  Continues on Plavix for embolic infarcts  Tolerating diet ileostomy working    Objective:     Vitals:    05/28/22 2113 05/29/22 0013 05/29/22 0542 05/29/22 0742   BP: 128/84 (!) 142/87  109/64   Pulse:  83  65   Resp:  20  20   Temp:  98.1 °F (36.7 °C)  97.7 °F (36.5 °C)   SpO2:  100%  96%   Weight:   144 lb 6.4 oz (65.5 kg)    Height:            Intake and Output:  Current Shift: No intake/output data recorded. Last three shifts: 05/27 1901 - 05/29 0700  In: 4300 [P.O.:720; I.V.:3580]  Out: 4980 [Urine:4325]    Review of Systems:  ROS     Physical Exam:   Abdomen is soft, nondistended, tender palpation, midline incision decreased drainage, ileostomy pink protuberant and productive stool    Lab/Data Review:  Recent Results (from the past 24 hour(s))   GLUCOSE, POC    Collection Time: 05/28/22  4:21 PM   Result Value Ref Range    Glucose (POC) 232 (H) 65 - 117 mg/dL    Performed by Tacos Munson, POC    Collection Time: 05/28/22  8:23 PM   Result Value Ref Range    Glucose (POC) 192 (H) 65 - 117 mg/dL    Performed by Darinel Geller    GLUCOSE, POC    Collection Time: 05/29/22  8:10 AM   Result Value Ref Range    Glucose (POC) 121 (H) 65 - 117 mg/dL    Performed by Kadie Altman    GLUCOSE, POC    Collection Time: 05/29/22 11:45 AM   Result Value Ref Range    Glucose (POC) 249 (H) 65 - 117 mg/dL    Performed by Milli Cardenas           Assessment:     Active Problems:    Colon cancer ([address] (4/15/2022)      Cecal polyp (4/15/2022)        Plan:   Continue diet encourage oral intake  Continue current analgesics  Dr. Paco Todd consulted for CT  Continue Plavix for neurology  Repeat labs in a.m.     Signed By: Vida Schwartz MD     May 29, 2022

## 2022-05-30 NOTE — PROCEDURES
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 DESCRIPTION:  Recording is done digitally on computer. Electrodes are placed in a 10-20 international system. Initial recording is equipment calibration followed by biocalibration. Initial montages are bipolar montages. TECHNIQUE:  Tracing started with the patient awake, eyes closed, with well-formed bioccipital alpha rhythms in the 9 Hz frequency. As the recording continues, the patient is hooked up to an EKG machine that shows a heart rate of 114. Tracing continued with the patient being exposed to photic stimulation without any abnormality. Hyperventilation was not done. There were no abnormalities. IMPRESSION:  This is a normal EEG, awake.       Anurag Mares MD      [id number]  D:  2022 15:22  T:  2022 17:29  JOB #:  [id number]

## 2022-05-30 NOTE — PROGRESS NOTES
Patient is still complaining of pain uncontrolled by medication. Educated patient about trying other means to control pain such as meditation and repositioning. Educated patient on the need to watch for signs of infection and to try not to mess with dressings or connectors for ostomy in order to prevent germs from getting to incision.

## 2022-05-30 NOTE — PROGRESS NOTES
Progress Note    Patient: Dewey Drake MRN: [id number]  SSN: [id number]    YOB: 1970  Age: 49 y.o.   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  By cardiology plan for AL tomorrow  Left hip pain, has history of AVN    Objective:     Vitals:    05/29/22 2046 05/30/22 0624 05/30/22 0731 05/30/22 1457   BP: 114/75  128/84 133/88   Pulse: 65  86 89   Resp: 18  18 18   Temp: 98.6 °F (37 °C)  98.5 °F (36.9 °C) 98.6 °F (37 °C)   SpO2: 98%  100% 100%   Weight:  145 lb 11.6 oz (66.1 kg)     Height:            Intake and Output:  Current Shift: 05/30 0701 - 05/30 1900  In: -   Out: 650 [Urine:350]  Last three shifts: 05/28 1901 - 05/30 0700  In: 1475 [I.V.:1475]  Out: 1925 [Urine:1675]    Review of Systems:  ROS     Physical Exam:   Soft, nondistended, appropriately tender, midline incision clean with some scant drainage, ileostomy right lower quadrant pink protuberant and productive of stool    Lab/Data Review:  Recent Results (from the past 24 hour(s))   GLUCOSE, POC    Collection Time: 05/29/22  4:01 PM   Result Value Ref Range    Glucose (POC) 299 (H) 65 - 117 mg/dL    Performed by Kiya Sy    GLUCOSE, POC    Collection Time: 05/29/22  8:04 PM   Result Value Ref Range    Glucose (POC) 255 (H) 65 - 117 mg/dL    Performed by Olegario Li    GLUCOSE, POC    Collection Time: 05/30/22  7:31 AM   Result Value Ref Range    Glucose (POC) 115 65 - 117 mg/dL    Performed by SALVATORE CHOWDHURY    CBC WITH AUTOMATED DIFF    Collection Time: 05/30/22 11:20 AM   Result Value Ref Range    WBC 12.2 (H) 4.1 - 11.1 K/uL    RBC 3.52 (L) 4.10 - 5.70 M/uL    HGB 8.3 (L) 12.1 - 17.0 g/dL    HCT 26.8 (L) 36.6 - 50.3 %    MCV 76.1 (L) 80.0 - 99.0 FL    MCH 23.6 (L) 26.0 - 34.0 PG    MCHC 31.0 30.0 - 36.5 g/dL    RDW 17.2 (H) 11.5 - 14.5 %    PLATELET 735 (H) 754 - 400 K/uL    MPV 10.5 8.9 - 12.9 FL    NRBC 0.0 0.0  WBC    ABSOLUTE NRBC 0.00 0.00 - 0.01 K/uL    NEUTROPHILS 69 32 - 75 %    LYMPHOCYTES 18 12 - 49 %    MONOCYTES 11 5 - 13 %    EOSINOPHILS 1 0 - 7 %    BASOPHILS 0 0 - 1 %    IMMATURE GRANULOCYTES 1 (H) 0 - 0.5 %    ABS. NEUTROPHILS 8.3 (H) 1.8 - 8.0 K/UL    ABS. LYMPHOCYTES 2.2 0.8 - 3.5 K/UL    ABS. MONOCYTES 1.3 (H) 0.0 - 1.0 K/UL    ABS. EOSINOPHILS 0.1 0.0 - 0.4 K/UL    ABS. BASOPHILS 0.1 0.0 - 0.1 K/UL    ABS. IMM. GRANS. 0.1 (H) 0.00 - 0.04 K/UL    DF AUTOMATED     METABOLIC PANEL, COMPREHENSIVE    Collection Time: 05/30/22 11:20 AM   Result Value Ref Range    Sodium 137 136 - 145 mmol/L    Potassium 3.9 3.5 - 5.1 mmol/L    Chloride 108 97 - 108 mmol/L    CO2 23 21 - 32 mmol/L    Anion gap 6 5 - 15 mmol/L    Glucose 148 (H) 65 - 100 mg/dL    BUN 5 (L) 6 - 20 mg/dL    Creatinine 0.43 (L) 0.70 - 1.30 mg/dL    BUN/Creatinine ratio 12 12 - 20      GFR est AA >60 >60 ml/min/1.73m2    GFR est non-AA >60 >60 ml/min/1.73m2    Calcium 8.2 (L) 8.5 - 10.1 mg/dL    Bilirubin, total 0.2 0.2 - 1.0 mg/dL    AST (SGOT) 8 (L) 15 - 37 U/L    ALT (SGPT) 6 (L) 12 - 78 U/L    Alk.  phosphatase 72 45 - 117 U/L    Protein, total 5.6 (L) 6.4 - 8.2 g/dL    Albumin 2.3 (L) 3.5 - 5.0 g/dL    Globulin 3.3 2.0 - 4.0 g/dL    A-G Ratio 0.7 (L) 1.1 - 2.2     GLUCOSE, POC    Collection Time: 05/30/22 11:38 AM   Result Value Ref Range    Glucose (POC) 181 (H) 65 - 117 mg/dL    Performed by Genaro Carey           Assessment:     Active Problems:    Colon cancer ([address] (4/15/2022)      Cecal polyp (4/15/2022)        Plan:   Mild increase in leukocytosis 12,200, will start Zosyn  Consult orthopedic surgery for left hip pain  Continue Plavix for embolic infarcts  Continue diet as ordered  Encourage ambulation      Signed By: Radha Stanton MD     May 30, 2022

## 2022-05-30 NOTE — PROGRESS NOTES
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)      EXAM:  Awake, states he will be getting an endoscopy  Cardiology input appreciated regards to getting a AL  Patient states that he still has elements of the visual obscurations secondary to stroke (Balint syndromr)  Oriented to day, month, and the  Follows one-step commands as best as possible      ASSESSMENT/PLAN:  Work-up and treatment continues    ALLERGIES:    Allergies   Allergen Reactions    Beef Containing Products Anaphylaxis and Other (comments)    Diltiazem Other (comments)     Reaction Type: Allergy; Reaction(s): Pressure in chest    Iodinated Contrast Media Other (comments)     Reaction Type: Allergy; Severity: Severe; Reaction(s): hives,throat swelling    Pork Derived (Porcine) Hives    Shellfish Containing Products Swelling     Other reaction(s): Other  Reaction Type: Allergy; Reaction(s): Hives,throat swelling    Sulfa (Sulfonamide Antibiotics) Unknown (comments)     Spinal meningitis    Sulfur Unknown (comments)     Reaction Type: Allergy  Spinal meningitis    Lactose Diarrhea     Lactose intolerant per Pt.        MEDS:      Current Facility-Administered Medications:     HYDROmorphone (DILAUDID) injection 1 mg, 1 mg, IntraVENous, Q4H PRN, Dennise Haynes MD, 1 mg at 05/30/22 1048    HYDROmorphone (DILAUDID) syringe 0.5 mg, 0.5 mg, IntraVENous, Q4H PRN, Dennise Haynes MD, 0.5 mg at 05/27/22 1948    clopidogreL (PLAVIX) tablet 75 mg, 75 mg, Oral, DAILY, KIMBERLY Rey IV, MD, 75 mg at 05/30/22 0845    aspirin chewable tablet 81 mg, 81 mg, Oral, DAILY, KIMBERLY Rey IV, MD, 81 mg at 05/30/22 0845    nystatin (MYCOSTATIN) 100,000 unit/mL oral suspension 500,000 Units, 500,000 Units, Oral, QID, Dennise Haynes MD, 500,000 Units at 05/29/22 0915    0.9% sodium chloride infusion 250 mL, 250 mL, IntraVENous, PRN, Blair Hidalgo MD    acetaminophen (TYLENOL) tablet 650 mg, 650 mg, Oral, Q4H PRN, Whitney Villeda MD, 650 mg at 05/18/22 1838    famotidine (PF) (PEPCID) 20 mg in 0.9% sodium chloride 10 mL injection, 20 mg, IntraVENous, Q12H, Whitney Villeda MD, 20 mg at 05/30/22 0844    dextrose 5% - 0.45% NaCl with KCl 20 mEq/L infusion, 75 mL/hr, IntraVENous, CONTINUOUS, Whitney Villeda MD, Last Rate: 75 mL/hr at 05/29/22 1602, 75 mL/hr at 05/29/22 1602    oxyCODONE IR (ROXICODONE) tablet 10 mg, 10 mg, Oral, Q4H PRN, Whitney Villeda MD, 10 mg at 05/27/22 1404    ondansetron (ZOFRAN) injection 4 mg, 4 mg, IntraVENous, Q4H PRN, Whitney Villeda MD, 4 mg at 05/30/22 1048    verapamil ER (VERELAN PM) capsule 200 mg, 200 mg, Oral, QHS, Chris CABRERA MD, 200 mg at 05/29/22 2209    [Held by provider] potassium chloride SR (KLOR-CON 10) tablet 10 mEq, 10 mEq, Oral, BID, Chris CABRERA MD, 10 mEq at 04/30/22 2142    apixaban (ELIQUIS) tablet 5 mg, 5 mg, Oral, BID, Phoebe Schofield MD, 5 mg at 05/30/22 0845    amiodarone (CORDARONE) tablet 200 mg, 200 mg, Oral, DAILY, Shayna Whittaker MD, 200 mg at 05/30/22 0845    lisinopriL (PRINIVIL, ZESTRIL) tablet 20 mg, 20 mg, Oral, DAILY, Whitney Villeda MD, 20 mg at 05/30/22 0845    predniSONE (DELTASONE) tablet 20 mg, 20 mg, Oral, DAILY WITH BREAKFAST, Whitney Villeda MD, 20 mg at 05/30/22 0845    insulin lispro (HUMALOG) injection, , SubCUTAneous, AC&HS, Whitney Villeda MD, 2 Units at 05/30/22 1208    glucose chewable tablet 16 g, 4 Tablet, Oral, PRN, Whitney Villeda MD    glucagon (GLUCAGEN) injection 1 mg, 1 mg, IntraMUSCular, PRN, Whitney Villeda MD, 1 mg at 05/06/22 0559    dextrose 10% infusion 0-250 mL, 0-250 mL, IntraVENous, PRN, Whitney Villeda MD, Last Rate: 750 mL/hr at 05/02/22 1558, 125 mL at 05/02/22 1558    labetaloL (NORMODYNE;TRANDATE) 20 mg/4 mL (5 mg/mL) injection 10 mg, 10 mg, IntraVENous, Q6H PRN, Whitney Villeda MD, 10 mg at 05/03/22 1423    LABS:  Recent Results (from the past 24 hour(s))   GLUCOSE, POC    Collection Time: 05/29/22  4:01 PM   Result Value Ref Range    Glucose (POC) 299 (H) 65 - 117 mg/dL    Performed by Vel Greene, POC    Collection Time: 05/29/22  8:04 PM   Result Value Ref Range    Glucose (POC) 255 (H) 65 - 117 mg/dL    Performed by Sony Parker    GLUCOSE, POC    Collection Time: 05/30/22  7:31 AM   Result Value Ref Range    Glucose (POC) 115 65 - 117 mg/dL    Performed by SALVATORE CHOWDHURY    CBC WITH AUTOMATED DIFF    Collection Time: 05/30/22 11:20 AM   Result Value Ref Range    WBC 12.2 (H) 4.1 - 11.1 K/uL    RBC 3.52 (L) 4.10 - 5.70 M/uL    HGB 8.3 (L) 12.1 - 17.0 g/dL    HCT 26.8 (L) 36.6 - 50.3 %    MCV 76.1 (L) 80.0 - 99.0 FL    MCH 23.6 (L) 26.0 - 34.0 PG    MCHC 31.0 30.0 - 36.5 g/dL    RDW 17.2 (H) 11.5 - 14.5 %    PLATELET 961 (H) 151 - 400 K/uL    MPV 10.5 8.9 - 12.9 FL    NRBC 0.0 0.0  WBC    ABSOLUTE NRBC 0.00 0.00 - 0.01 K/uL    NEUTROPHILS 69 32 - 75 %    LYMPHOCYTES 18 12 - 49 %    MONOCYTES 11 5 - 13 %    EOSINOPHILS 1 0 - 7 %    BASOPHILS 0 0 - 1 %    IMMATURE GRANULOCYTES 1 (H) 0 - 0.5 %    ABS. NEUTROPHILS 8.3 (H) 1.8 - 8.0 K/UL    ABS. LYMPHOCYTES 2.2 0.8 - 3.5 K/UL    ABS. MONOCYTES 1.3 (H) 0.0 - 1.0 K/UL    ABS. EOSINOPHILS 0.1 0.0 - 0.4 K/UL    ABS. BASOPHILS 0.1 0.0 - 0.1 K/UL    ABS. IMM.  GRANS. 0.1 (H) 0.00 - 0.04 K/UL    DF AUTOMATED     METABOLIC PANEL, COMPREHENSIVE    Collection Time: 05/30/22 11:20 AM   Result Value Ref Range    Sodium 137 136 - 145 mmol/L    Potassium 3.9 3.5 - 5.1 mmol/L    Chloride 108 97 - 108 mmol/L    CO2 23 21 - 32 mmol/L    Anion gap 6 5 - 15 mmol/L    Glucose 148 (H) 65 - 100 mg/dL    BUN 5 (L) 6 - 20 mg/dL    Creatinine 0.43 (L) 0.70 - 1.30 mg/dL    BUN/Creatinine ratio 12 12 - 20      GFR est AA >60 >60 ml/min/1.73m2    GFR est non-AA >60 >60 ml/min/1.73m2    Calcium 8.2 (L) 8.5 - 10.1 mg/dL    Bilirubin, total 0.2 0.2 - 1.0 mg/dL    AST (SGOT) 8 (L) 15 - 37 U/L ALT (SGPT) 6 (L) 12 - 78 U/L    Alk. phosphatase 72 45 - 117 U/L    Protein, total 5.6 (L) 6.4 - 8.2 g/dL    Albumin 2.3 (L) 3.5 - 5.0 g/dL    Globulin 3.3 2.0 - 4.0 g/dL    A-G Ratio 0.7 (L) 1.1 - 2.2     GLUCOSE, POC    Collection Time: 05/30/22 11:38 AM   Result Value Ref Range    Glucose (POC) 181 (H) 65 - 117 mg/dL    Performed by Genaro Salcidoacher        Visit Vitals  /84 (BP 1 Location: Right upper arm, BP Patient Position: At rest)   Pulse 86   Temp 98.5 °F (36.9 °C)   Resp 18   Ht 5' 7.4\" (1.712 m)   Wt 66.1 kg (145 lb 11.6 oz)   SpO2 100%   BMI 22.55 kg/m²       Imaging:  DUPLEX CAROTID BILATERAL   Final Result      MRI BRAIN WO CONT   Final Result         1. Evolving ischemic infarcts in multiple vascular territories, involving both   the right occipital lobe and left frontal lobe. 2. Left occipital encephalomalacia      CT ABD PELV WO CONT   Final Result   Small bowel ileus versus partial obstruction, at the ileocolic   anastomosis or ileocecal valve, slightly greater than previous. There is mixed   density fluid throughout this region, as before. . Consider follow-up study with   IV contrast and if possible, oral contrast. Consider NG tube. XR ABD (KUB)   Final Result      XR ABD (KUB)   Final Result   Small bowel dilatation consistent with obstruction, not   significantly changed. XR ABD (KUB)   Final Result   Persistent small bowel distention as seen with obstruction. XR ABD (KUB)   Final Result   No significant change compared to abdomen series 5/5/2022. Persistent air and fluid-filled dilated small bowel loops. XR ABD (KUB)   Final Result   Multiple distended gas-filled loops of small bowel compatible with   partial small bowel obstruction versus ileus. CT ABD PELV WO CONT   Final Result   Moderate grade partial small bowel obstruction. Small volume complex ascites. CT ABD PELV WO CONT   Final Result   1. Improving hemoperitoneum and pneumoperitoneum. 2.  Ileocolic anastomosis in the right lower quadrant. Incompletely evaluated   without contrast. No evidence of bowel obstruction. 3.  Reticular and groundglass airspace disease in the visualized lung bases   redemonstrated. 4.  See full report for detailed findings. CT ABD PELV WO CONT   Final Result   1. Status post ileal cecal surgery. Surrounding extraluminal dense material may   represent blood products (no indication of enteric contrast administration). Anastomotic leak cannot be excluded. Consider imaging with water-soluble oral   contrast. Free air in the abdomen likely within normal limits post recent   surgery. The bowel does not appear obstructed. 2. Bilateral lower lung airspace disease likely infectious or inflammatory. 3. Bilateral femoral head sclerosis suggests AVN.

## 2022-05-30 NOTE — CONSULTS
Consult Date: 5/30/2022     CONSULT TO ORTHOPEDIC SURGERY  Consult performed by: Silvestre Lee MD  Consult ordered by: Familia Sheikh MD  Reason for consult: Left hip pain  Assessment/Recommendations: 52-year-old male with known history of avascular necrosis of hips with acute exacerbation of left hip pain in past 24 hours. Patient denies any new injury. Patient's CT scan showed a stable avascular necrosis without any significant collapse. Clinical examination shows good range of motion of the hip with terminal range of internal rotation with flexion with pain. Patient does have poor muscle tone in lower extremities bilaterally. CT scan reviewed. New x-rays of pelvis with left hip ordered. No acute orthopedic intervention while patient is in-house currently  Patient's hip avascular necrosis can be major managed with partial weightbearing using walker and oral pain medications to control the pain. Patient is to follow-up on outpatient basis with a hip surgeons and may need further evaluation with MRI of the hip and discussion of treatment options if symptoms not improving just with resting, less weightbearing and the oral medications. All questions were answered. Patient can follow-up with [facility] with hip surgeons Dr Debra Rocha or Dr Rob Macias or Dr Uriel Paredes at [phone]          Stevie Hartmann is a 49 y.o.  male who presents for surgical management of a large cecal polyp not amenable to endoscopic resection. He had his colonoscopy performed at [facility] and biopsies were all negative for invasive malignancy. I saw him for this back in February at that time we discussed surgery and I recommend a laparoscopic right colectomy. He does have a number of medical issues including myasthenia gravis, hypertension, atrial flutter, insulin-dependent diabetes mellitus, GERD. 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   He has not been able to taper down off of his steroids and is on 20 mg daily. He has been seen by his neurologist for preoperative clearance with his history of myasthenia gravis.     Today the patient denies any abdominal pain. He denies any blood in the stool. He denies any unexpected weight loss. ..     Past Medical History:   Diagnosis Date    Adverse effect of anesthesia     Arrhythmia     atrial flutter    Asthma     Diabetes ([facility] [address]     Hypertension     Ill-defined condition     Spinal meningitis and avascular necrosis     Myasthenia gravis     Polymyositis ([facility] [address] 2001    muscle disorder    Primary hypersomnolence disorder       Past Surgical History:   Procedure Laterality Date    HX COLONOSCOPY  2021    HX ENDOSCOPY      HX OTHER SURGICAL      lung abscess removal    GA SHOULDER SURG PROC UNLISTED      Left Shoulder     Family History   Problem Relation Age of Onset    Heart Disease Mother     Diabetes Father     Stroke Father       Social History     Tobacco Use    Smoking status: Never Smoker    Smokeless tobacco: Never Used   Substance Use Topics    Alcohol use: No       Current Facility-Administered Medications   Medication Dose Route Frequency Provider Last Rate Last Admin    piperacillin-tazobactam (ZOSYN) 3.375 g in 0.9% sodium chloride (MBP/ADV) 100 mL MBP  3.375 g IntraVENous Q8H Zuleika Metzger MD 25 mL/hr at 05/30/22 1721 3.375 g at 05/30/22 1721    HYDROmorphone (DILAUDID) injection 1 mg  1 mg IntraVENous Q4H PRN Zuleika Metzger MD   1 mg at 05/30/22 1827    HYDROmorphone (DILAUDID) syringe 0.5 mg  0.5 mg IntraVENous Q4H PRN Zuleika Metzger MD   0.5 mg at 05/27/22 1948    clopidogreL (PLAVIX) tablet 75 mg  75 mg Oral DAILY Cydney Funez MD   75 mg at 05/30/22 0845    aspirin chewable tablet 81 mg  81 mg Oral DAILY Tanwi IV, Juan Hernandez MD   81 mg at 05/30/22 0845    nystatin (MYCOSTATIN) 100,000 unit/mL oral suspension 500,000 Units  500,000 Units Oral QID Zuleika Metzger MD   500,000 Units at 05/29/22 0915    0.9% sodium chloride infusion 250 mL  250 mL IntraVENous PRN Lokesh Hidalgo MD        acetaminophen (TYLENOL) tablet 650 mg  650 mg Oral Q4H PRN Hurlock MD Cheo   650 mg at 05/18/22 1838    famotidine (PF) (PEPCID) 20 mg in 0.9% sodium chloride 10 mL injection  20 mg IntraVENous Q12H Hurlock MD Cheo   20 mg at 05/30/22 0844    dextrose 5% - 0.45% NaCl with KCl 20 mEq/L infusion  75 mL/hr IntraVENous CONTINUOUS Hurlock MD Cheo 75 mL/hr at 05/29/22 1602 75 mL/hr at 05/29/22 1602    oxyCODONE IR (ROXICODONE) tablet 10 mg  10 mg Oral Q4H PRN Hurlock MD Cheo   10 mg at 05/27/22 1404    ondansetron (ZOFRAN) injection 4 mg  4 mg IntraVENous Q4H PRN Hurlock MD Cheo   4 mg at 05/30/22 1827    verapamil ER (VERELAN PM) capsule 200 mg  200 mg Oral QHS Pita Cuba MD   200 mg at 05/29/22 2209    [Held by provider] potassium chloride SR (KLOR-CON 10) tablet 10 mEq  10 mEq Oral BID Pita Cuba MD   10 mEq at 04/30/22 2142    apixaban (ELIQUIS) tablet 5 mg  5 mg Oral BID Cheryl Avilez MD   5 mg at 05/30/22 0845    amiodarone (CORDARONE) tablet 200 mg  200 mg Oral DAILY Leslie Pedro MD   200 mg at 05/30/22 0845    lisinopriL (PRINIVIL, ZESTRIL) tablet 20 mg  20 mg Oral DAILY Hurlock MD Cheo   20 mg at 05/30/22 0845    predniSONE (DELTASONE) tablet 20 mg  20 mg Oral DAILY WITH Anaid Alvarez MD   20 mg at 05/30/22 0845    insulin lispro (HUMALOG) injection   SubCUTAneous AC&HS Osmany Grider MD   6 Units at 05/30/22 1721    glucose chewable tablet 16 g  4 Tablet Oral PRN Osmany Grider MD        glucagon (GLUCAGEN) injection 1 mg  1 mg IntraMUSCular PRN Osmany Grider MD   1 mg at 05/06/22 0559    dextrose 10% infusion 0-250 mL  0-250 mL IntraVENous PRN Hurlock MD Cheo 750 mL/hr at 05/02/22 1558 125 mL at 05/02/22 1558    labetaloL (NORMODYNE;TRANDATE) 20 mg/4 mL (5 mg/mL) injection 10 mg  10 mg IntraVENous Q6H PRN Pedro Pop MD   10 mg at 05/03/22 1423        Review of Systems   Constitutional: Negative. HENT: Negative. Respiratory: Negative. Cardiovascular: Negative. Musculoskeletal: Positive for arthralgias and gait problem. Neurological: Negative for numbness. Psychiatric/Behavioral: Negative. Objective     Vital signs for last 24 hours:  Visit Vitals  /80 (BP 1 Location: Left upper arm, BP Patient Position: At rest)   Pulse 87   Temp 99.3 °F (37.4 °C)   Resp 21   Ht 5' 7.4\" (1.712 m)   Wt 66.1 kg (145 lb 11.6 oz)   SpO2 99%   BMI 22.55 kg/m²       Intake/Output this shift:  Current Shift: No intake/output data recorded. Last 3 Shifts: 05/29 0701 - 05/30 1900  In: 720 [P.O.:720]  Out: 1575 [Urine:1225]    Data Review:   Recent Results (from the past 24 hour(s))   GLUCOSE, POC    Collection Time: 05/29/22  8:04 PM   Result Value Ref Range    Glucose (POC) 255 (H) 65 - 117 mg/dL    Performed by Ad Ridley    GLUCOSE, POC    Collection Time: 05/30/22  7:31 AM   Result Value Ref Range    Glucose (POC) 115 65 - 117 mg/dL    Performed by SALVATORE CHOWDHURY    CBC WITH AUTOMATED DIFF    Collection Time: 05/30/22 11:20 AM   Result Value Ref Range    WBC 12.2 (H) 4.1 - 11.1 K/uL    RBC 3.52 (L) 4.10 - 5.70 M/uL    HGB 8.3 (L) 12.1 - 17.0 g/dL    HCT 26.8 (L) 36.6 - 50.3 %    MCV 76.1 (L) 80.0 - 99.0 FL    MCH 23.6 (L) 26.0 - 34.0 PG    MCHC 31.0 30.0 - 36.5 g/dL    RDW 17.2 (H) 11.5 - 14.5 %    PLATELET 191 (H) 498 - 400 K/uL    MPV 10.5 8.9 - 12.9 FL    NRBC 0.0 0.0  WBC    ABSOLUTE NRBC 0.00 0.00 - 0.01 K/uL    NEUTROPHILS 69 32 - 75 %    LYMPHOCYTES 18 12 - 49 %    MONOCYTES 11 5 - 13 %    EOSINOPHILS 1 0 - 7 %    BASOPHILS 0 0 - 1 %    IMMATURE GRANULOCYTES 1 (H) 0 - 0.5 %    ABS. NEUTROPHILS 8.3 (H) 1.8 - 8.0 K/UL    ABS. LYMPHOCYTES 2.2 0.8 - 3.5 K/UL    ABS. MONOCYTES 1.3 (H) 0.0 - 1.0 K/UL    ABS. EOSINOPHILS 0.1 0.0 - 0.4 K/UL    ABS.  BASOPHILS 0.1 0.0 - 0.1 K/UL    ABS. IMM. GRANS. 0.1 (H) 0.00 - 0.04 K/UL    DF AUTOMATED     METABOLIC PANEL, COMPREHENSIVE    Collection Time: 05/30/22 11:20 AM   Result Value Ref Range    Sodium 137 136 - 145 mmol/L    Potassium 3.9 3.5 - 5.1 mmol/L    Chloride 108 97 - 108 mmol/L    CO2 23 21 - 32 mmol/L    Anion gap 6 5 - 15 mmol/L    Glucose 148 (H) 65 - 100 mg/dL    BUN 5 (L) 6 - 20 mg/dL    Creatinine 0.43 (L) 0.70 - 1.30 mg/dL    BUN/Creatinine ratio 12 12 - 20      GFR est AA >60 >60 ml/min/1.73m2    GFR est non-AA >60 >60 ml/min/1.73m2    Calcium 8.2 (L) 8.5 - 10.1 mg/dL    Bilirubin, total 0.2 0.2 - 1.0 mg/dL    AST (SGOT) 8 (L) 15 - 37 U/L    ALT (SGPT) 6 (L) 12 - 78 U/L    Alk. phosphatase 72 45 - 117 U/L    Protein, total 5.6 (L) 6.4 - 8.2 g/dL    Albumin 2.3 (L) 3.5 - 5.0 g/dL    Globulin 3.3 2.0 - 4.0 g/dL    A-G Ratio 0.7 (L) 1.1 - 2.2     GLUCOSE, POC    Collection Time: 05/30/22 11:38 AM   Result Value Ref Range    Glucose (POC) 181 (H) 65 - 117 mg/dL    Performed by Donna Morris    GLUCOSE, POC    Collection Time: 05/30/22  3:55 PM   Result Value Ref Range    Glucose (POC) 270 (H) 65 - 117 mg/dL    Performed by Donna Morris    GLUCOSE, POC    Collection Time: 05/30/22  7:46 PM   Result Value Ref Range    Glucose (POC) 320 (H) 65 - 117 mg/dL    Performed by Olegario Li      CT-scan of abdomen and pelvis-oral studies reveal AVN without any significant collapse. New x-rays of left hip pelvis ordered. Physical Exam  Vitals and nursing note reviewed. Constitutional:       Appearance: Normal appearance. He is normal weight. HENT:      Head: Normocephalic and atraumatic. Right Ear: External ear normal.      Left Ear: External ear normal.      Nose: Nose normal.      Mouth/Throat:      Mouth: Mucous membranes are dry. Pharynx: Oropharynx is clear. Eyes:      Extraocular Movements: Extraocular movements intact.       Conjunctiva/sclera: Conjunctivae normal.      Pupils: Pupils are equal, round, and reactive to light. Cardiovascular:      Rate and Rhythm: Normal rate and regular rhythm. Pulmonary:      Effort: Pulmonary effort is normal. No respiratory distress. Musculoskeletal:         General: Tenderness present. No deformity or signs of injury. Normal range of motion. Comments: Right hip logroll and flexion extension without any pain or spasm knee and ankle are nontender. Hip anterior groin has tenderness. Patient's flexion to 90 degrees internal extra rotation does produce some pain. Patient's both lower extremities have decreased muscle tone from disuse. Skin:     General: Skin is warm and dry. Capillary Refill: Capillary refill takes less than 2 seconds. Neurological:      General: No focal deficit present. Mental Status: He is alert and oriented to person, place, and time. Psychiatric:         Mood and Affect: Mood normal.         Thought Content:  Thought content normal.         Judgment: Judgment normal.

## 2022-05-31 NOTE — WOUND CARE
WCN called due to ostomy leaking. Rosy-stomal skin cleansed with NS and patted dry with gauze. Wafer cut to fit stoma and applied over stoma with no skin showing between wafer and stoma. Ostomy bag applied and no leaking noticed before WCN left the room. Please re-consult for any bagging needs.

## 2022-05-31 NOTE — WOUND CARE
WCN consulted for leaking ostomy appliance. Removed leaking appliance and performed the following: removed adhesive and stoma paste with adhesive remover wipe; cleaned peristomal skin with NS; applied non-sting skin barrier prep wipe to peristomal skin; cut wafer to fit stoma (approximately 25 mm round); applied barrier ring to wafer; applied strip paste to abdominal skin fold/groove; applied wafer; ensured all areas of adhesive were adhering to skin; trimmed faceplate (where pouch attaches) at inferior aspect; applied pouch; held hands over appliance for approximately 2 mins. Advised patient to lay flat for 5 mins more to allow adhesive to adhere well. Problem area seems to be at abdominal fold. When patient sits up or bends, the skin fold causes the adhesive to lift up, thus causing leakage. Patient may benefit from a smaller wafer size, such as SenSura Anam Flex Flat Barrier [id number] for 10-33 mm size stomas. WCN will continue to follow.

## 2022-05-31 NOTE — PROGRESS NOTES
DC Plan: [facility] (will need auth)    CM met with pt at the bedside this afternoon to f/up on his plan of care. Pt confirmed the dc plan is still [facility]. Cm provided pt with some resources. CM will need an updated PCR test closer to discharge.

## 2022-05-31 NOTE — ROUTINE PROCESS
Bedside and Verbal shift change report given to Marc Mosqueda (oncoming nurse) by Nidia Yan (offgoing nurse). Report included the following information SBAR and MAR.

## 2022-05-31 NOTE — PROGRESS NOTES
PT treatment attempted at 15:15 however, told to hold by nursing as wound care needed to see patient first to replace colostomy bag/ clean up wound site. Will continue to follow pt and will attempt treatment at a later time.  Thank you

## 2022-05-31 NOTE — PROGRESS NOTES
Progress Note    Patient: Ji Biswas MRN: [id number]  SSN: [id number]    YOB: 1970  Age: 49 y.o. Sex: male      Admit Date: 4/15/2022    LOS: 46 days     Subjective:   Patient seen in bed  Did not have AL today is not sure why  Having problems with stoma pouching    Objective:     Vitals:    05/30/22 2241 05/31/22 0809 05/31/22 1153 05/31/22 1627   BP: (!) 146/92 124/60  (!) 140/87   Pulse: 90 85  83   Resp: 19 19 19   Temp: 98.6 °F (37 °C) 99.1 °F (37.3 °C)  98 °F (36.7 °C)   SpO2: 100% 100%  100%   Weight:       Height:   5' 7.4\" (1.712 m)         Intake and Output:  Current Shift: No intake/output data recorded. Last three shifts: 05/29 1901 - 05/31 0700  In: 720 [P.O.:720]  Out: 3385 [Urine:2975]    Review of Systems:  ROS     Physical Exam:   Soft, mildly tender palpation on the right side, midline incision is clean with scant drainage, ileostomy right abdomen is pink and protuberant currently pouched    Lab/Data Review:  Recent Results (from the past 24 hour(s))   GLUCOSE, POC    Collection Time: 05/30/22  7:46 PM   Result Value Ref Range    Glucose (POC) 320 (H) 65 - 117 mg/dL    Performed by Shashank Morales    GLUCOSE, POC    Collection Time: 05/31/22  7:53 AM   Result Value Ref Range    Glucose (POC) 168 (H) 65 - 117 mg/dL    Performed by Josselin Carlton    GLUCOSE, POC    Collection Time: 05/31/22 11:51 AM   Result Value Ref Range    Glucose (POC) 265 (H) 65 - 117 mg/dL    Performed by Josselin Carlton    GLUCOSE, POC    Collection Time: 05/31/22  4:36 PM   Result Value Ref Range    Glucose (POC) 325 (H) 65 - 117 mg/dL    Performed by Vega Palacio           Assessment:     Active Problems:    Colon cancer ([address] (4/15/2022)      Cecal polyp (4/15/2022)        Plan:   Continue diet  Continue current pain management  Labs in a.m.   Continue Plavix, neurology following    Signed By: Surya Connor MD     May 31, 2022

## 2022-05-31 NOTE — PROGRESS NOTES
Problem: Falls - Risk of  Goal: *Absence of Falls  Description: Document Bard  Fall Risk and appropriate interventions in the flowsheet.   5/31/2022 1050 by Barry Garcia RN  Outcome: Progressing Towards Goal  Note: Fall Risk Interventions:  Mobility Interventions: Bed/chair exit alarm,Patient to call before getting OOB         Medication Interventions: Bed/chair exit alarm    Elimination Interventions: Bed/chair exit alarm,Call light in reach    History of Falls Interventions: Bed/chair exit alarm,Room close to nurse's station      5/31/2022 1050 by Barry Garcia RN  Outcome: Progressing Towards Goal  Note: Fall Risk Interventions:  Mobility Interventions: Bed/chair exit alarm,Patient to call before getting OOB         Medication Interventions: Bed/chair exit alarm    Elimination Interventions: Bed/chair exit alarm,Call light in reach    History of Falls Interventions: Bed/chair exit alarm,Room close to nurse's station         Problem: Pain  Goal: *Control of Pain  5/31/2022 1050 by Barry Garcia RN  Outcome: Progressing Towards Goal  5/31/2022 1050 by Barry Garcia RN  Outcome: Progressing Towards Goal     Problem: Infection - Risk of, Surgical Site Infection  Goal: *Absence of surgical site infection signs and symptoms  5/31/2022 1050 by Barry Garcia RN  Outcome: Progressing Towards Goal  5/31/2022 1050 by Barry Garcia RN  Outcome: Progressing Towards Goal

## 2022-05-31 NOTE — PROGRESS NOTES
PT treatment attempted at 14:14 however, patient reported that his colostomy bag was leaking and needed to be fixed. Nursing was alerted to situation. Will continue to follow pt and will attempt treatment at a later time.  Thank you

## 2022-05-31 NOTE — PROGRESS NOTES
Comprehensive Nutrition Assessment    Type and Reason for Visit: (P) Reassess (Goal)    Nutrition Recommendations/Plan:   Continue current diet  Continue ensure enlive 3x/day  Monitor and record PO intake, supplement acceptance, and Bms in I/Os     Malnutrition Assessment:  Malnutrition Status:  Mild malnutrition (05/31/22 1152)    Context:  Acute illness     Findings of the 6 clinical characteristics of malnutrition:   Energy Intake:  75% or less of est energy req for 7 or more days  Weight Loss:  No significant weight loss     Body Fat Loss:  No significant body fat loss,     Muscle Mass Loss:  No significant muscle mass loss,    Fluid Accumulation:  No significant fluid accumulation,     Strength:  Not performed     Nutrition Assessment:    (P) Admitted for colon CA and cecal polyp w/ sx resection. Pt reported good appetite w/ poor intake 2/2 food preferences. (4/28) Intakes excellent, >76% of meals, with good ONS acceptance. (5/2) CT indicative of partial SBO. (5/4) Advanced to Clear Liq however N/V/C continues, (5/5) NPO, PPN initiated. (5/9) CT still finding SBO. (5/13) Ex-lap with resection of ileocolic anastamosis and reanastomosis, (5/15) return to OR for anastomotic leak. PPN off x2 days now, receiving D5 at 125ml/hr providing 510kcals/d. RD discussed with pt need to advance to TPN however pt denies wanting central line. Discussed with RN & Surgeon, plan to continue PPN. RD communicated recs with pharmacy, will advance to 500ml bag 3x/wk to try and increase kcal provision. Overall, PPN provided x8d. (5/18) Pt tolerating clear liquids and advanced to full with plan let TPN run out. Will add ONS. (5/24) Pt continues to tolerate diet without n/v. (5/31) S/p diet upgrade to regular texture intakes improved to >50% of meals. Tolerating diet, no weight loss. Labs: Na 137, K 3.9. BUN 5, Creat 0.43, Gluc 148, Alb 2.3. Meds: D5-NaCl, famotidine, ondansetron, prednisone.     Nutrition Related Findings:    (P) No n/v, d/c. problems chewing/swallowing. LES drain in place. No edema. BM 5/30 +ostomy. Wound Type: (P) Multiple,Surgical incision    Current Nutrition Intake & Therapies:  Average Meal Intake: (P) 51-75%  Average Supplement Intake: (P) 1-25%  ADULT DIET Regular; NO beef, pork, shellfish. Lactose intolerant  ADULT ORAL NUTRITION SUPPLEMENT Breakfast, Lunch, Dinner; Standard High Calorie/High Protein    Anthropometric Measures:  Height: (P) 5' 7.4\" (171.2 cm)  Ideal Body Weight (IBW): (P) 150 lbs ((P) 68 kg)  Admission Body Weight: 160 lb  Current Body Wt:  (P) 66.1 kg (145 lb 11.6 oz), (P) 97.1 % IBW. (P) Bed scale  Current BMI (kg/m2): (P) 22.6  Usual Body Weight: 72.6 kg (160 lb)  % Weight Change (Calculated): -8  Weight Adjustment: No adjustment  BMI Category: (P) Normal weight (BMI 18.5-24. 9)    Estimated Daily Nutrient Needs:  Energy Requirements Based On: (P) Kcal/kg  Weight Used for Energy Requirements: (P) Current  Energy (kcal/day): (P) 1983kcal (30kcal/kg)  Weight Used for Protein Requirements: (P) Current  Protein (g/day): (P) 99g (1.5g/kg)  Method Used for Fluid Requirements: (P) 1 ml/kcal  Fluid (ml/day): (P) 1983mL (1mL/kcal)    Nutrition Diagnosis:   (P) Inadequate oral intake related to (P) catabolic illness,altered GI function as evidenced by (P) GI abnormality    Nutrition Interventions:   Food and/or Nutrient Delivery: (P) Continue current diet,Continue oral nutrition supplement  Nutrition Education/Counseling: (P) No recommendations at this time  Coordination of Nutrition Care: (P) Continue to monitor while inpatient,Feeding assistance/environmental change  Plan of Care discussed with: RN, pt, Pharmacy, Surgeon    Goals:  Previous Goal Met: (P) Goal(s) achieved  Goals: (P) PO intake 50% or greater,by next RD assessment    Nutrition Monitoring and Evaluation:   Behavioral-Environmental Outcomes: (P) None identified  Food/Nutrient Intake Outcomes: (P) Diet advancement/tolerance,Food and nutrient intake,Supplement intake  Physical Signs/Symptoms Outcomes: (P) Biochemical data,Meal time behavior,Weight    Discharge Planning:    (P) Too soon to determine    Pamela Gill RD  Contact: [phone]

## 2022-06-01 NOTE — PROGRESS NOTES
Progress Note    Patient: Lory Camarena MRN: [id number]  SSN: [id number]    YOB: 1970  Age: 49 y.o. Sex: male      Admit Date: 4/15/2022    LOS: 47 days     Subjective:   Patient seen in bed  No new complaints  Continues to have visual disturbance    Objective:     Vitals:    05/31/22 1922 06/01/22 0050 06/01/22 0845 06/01/22 1502   BP: (!) 152/75 (!) 140/91 119/80 122/76   Pulse: 86 87 84 87   Resp: 20 18 18 18   Temp: 99.1 °F (37.3 °C) 98.5 °F (36.9 °C) 98.8 °F (37.1 °C) 98.5 °F (36.9 °C)   SpO2: 100% 100% 100% 100%   Weight:   145 lb 8.1 oz (66 kg)    Height:            Intake and Output:  Current Shift: 06/01 0701 - 06/01 1900  In: -   Out: 1450 [Urine:650]  Last three shifts: 05/30 1901 - 06/01 0700  In: -   Out: 8844 [Urine:2650]    Review of Systems:  ROS     Physical Exam:   Abdomen is soft, nondistended, appropriately tender, midline incision clean with scant drainage, ileostomy pink productive and pouched    Lab/Data Review:  Recent Results (from the past 24 hour(s))   GLUCOSE, POC    Collection Time: 05/31/22  7:27 PM   Result Value Ref Range    Glucose (POC) 194 (H) 65 - 117 mg/dL    Performed by TAN MORENO    CBC WITH AUTOMATED DIFF    Collection Time: 06/01/22  8:25 AM   Result Value Ref Range    WBC 11.2 (H) 4.1 - 11.1 K/uL    RBC 3.73 (L) 4.10 - 5.70 M/uL    HGB 8.6 (L) 12.1 - 17.0 g/dL    HCT 28.2 (L) 36.6 - 50.3 %    MCV 75.6 (L) 80.0 - 99.0 FL    MCH 23.1 (L) 26.0 - 34.0 PG    MCHC 30.5 30.0 - 36.5 g/dL    RDW 17.3 (H) 11.5 - 14.5 %    PLATELET 132 (H) 286 - 400 K/uL    MPV 11.1 8.9 - 12.9 FL    NRBC 0.0 0.0  WBC    ABSOLUTE NRBC 0.00 0.00 - 0.01 K/uL    NEUTROPHILS 67 32 - 75 %    LYMPHOCYTES 20 12 - 49 %    MONOCYTES 11 5 - 13 %    EOSINOPHILS 1 0 - 7 %    BASOPHILS 0 0 - 1 %    IMMATURE GRANULOCYTES 1 (H) 0 - 0.5 %    ABS. NEUTROPHILS 7.5 1.8 - 8.0 K/UL    ABS. LYMPHOCYTES 2.2 0.8 - 3.5 K/UL    ABS. MONOCYTES 1.2 (H) 0.0 - 1.0 K/UL    ABS.  EOSINOPHILS 0.1 0.0 - 0.4 K/UL    ABS. BASOPHILS 0.1 0.0 - 0.1 K/UL    ABS. IMM. GRANS. 0.1 (H) 0.00 - 0.04 K/UL    DF AUTOMATED     METABOLIC PANEL, COMPREHENSIVE    Collection Time: 06/01/22  8:25 AM   Result Value Ref Range    Sodium 139 136 - 145 mmol/L    Potassium 4.3 3.5 - 5.1 mmol/L    Chloride 108 97 - 108 mmol/L    CO2 29 21 - 32 mmol/L    Anion gap 2 (L) 5 - 15 mmol/L    Glucose 141 (H) 65 - 100 mg/dL    BUN 8 6 - 20 mg/dL    Creatinine 0.48 (L) 0.70 - 1.30 mg/dL    BUN/Creatinine ratio 17 12 - 20      GFR est AA >60 >60 ml/min/1.73m2    GFR est non-AA >60 >60 ml/min/1.73m2    Calcium 8.6 8.5 - 10.1 mg/dL    Bilirubin, total 0.2 0.2 - 1.0 mg/dL    AST (SGOT) 7 (L) 15 - 37 U/L    ALT (SGPT) 6 (L) 12 - 78 U/L    Alk.  phosphatase 72 45 - 117 U/L    Protein, total 5.8 (L) 6.4 - 8.2 g/dL    Albumin 2.4 (L) 3.5 - 5.0 g/dL    Globulin 3.4 2.0 - 4.0 g/dL    A-G Ratio 0.7 (L) 1.1 - 2.2     GLUCOSE, POC    Collection Time: 06/01/22  8:48 AM   Result Value Ref Range    Glucose (POC) 180 (H) 65 - 117 mg/dL    Performed by AchaLa, POC    Collection Time: 06/01/22 11:44 AM   Result Value Ref Range    Glucose (POC) 139 (H) 65 - 117 mg/dL    Performed by AchaLa, POC    Collection Time: 06/01/22  5:23 PM   Result Value Ref Range    Glucose (POC) 440 (H) 65 - 117 mg/dL    Performed by AchaLa, POC    Collection Time: 06/01/22  5:25 PM   Result Value Ref Range    Glucose (POC) 336 (H) 65 - 117 mg/dL    Performed by Lulú Otto           Assessment:     Active Problems:    Colon cancer ([facility] [address] (4/15/2022)      Cecal polyp (4/15/2022)        Plan:   Diet as ordered with nutritional supplements  Scheduled for AL tomorrow  Diflucan for oral thrush  Out of bed encourage ambulation/PT/OT    Signed By: Kristina Tovar MD     June 1, 2022

## 2022-06-01 NOTE — PROGRESS NOTES
PHYSICAL THERAPY TREATMENT  Patient: Suly Brantley (69 y.o. male)  Date: 6/1/2022  Diagnosis: Adenomatous polyp of colon, unspecified part of colon [D12.6]  [facility] [C18.9]  Cecal polyp [K63.5] <principal problem not specified>  Procedure(s) (LRB):  Exploratory laparotomy, abdominal washout, resection of ileocolic anastomosis with end ileostomy (N/A) 17 Days Post-Op  Precautions:    Chart, physical therapy assessment, plan of care and goals were reviewed. ASSESSMENT  Patient continues with skilled PT services and is progressing towards goals. Patient seated upright in bed upon approach and agreed to therapy session today. Patient AXO times 4. Patient agreed to transfer to bedside chair for breakfast. Patient was mod I for scooting to EOB. Patient then performed SST to RW at [facility] 62. Patient reported that his colostomy bag felt like it was pulling too much and that he wouldn't be able to make it to the chair. Patient did agree to take 3 side steps up and down length of bed( 6 steps total). Patient had slow but steady gait with no LOB or knee buckling during gait. Patient then sat EOB at [facility] 62. Patient reported that he felt colostomy bag was leaking/ pulled out of place. Patient then performed sit>supine transfer at [facility]. Patient then set up for breakfast and left semi supine in bed with call bell within reach and all needs meet. Both wound care nursing as well as patients nurse was alerted to patients colostomy bag situation. Current Level of Function Impacting Discharge (mobility/balance): impaired balance, pain with standing from colostomy bag    Other factors to consider for discharge: PLOF, assistance at home, level of deficits, acute medical state         PLAN :  Patient continues to benefit from skilled intervention to address the above impairments. Continue treatment per established plan of care. to address goals.     Recommendation for discharge: (in order for the patient to meet his/her long term goals)  Inpatient Rehabilitation Facility     This discharge recommendation:  Has been made in collaboration with the attending provider and/or case management    IF patient discharges home will need the following DME: gait belt and rolling walker       SUBJECTIVE:   Patient stated the colostomy bag is pulling to much right now .     OBJECTIVE DATA SUMMARY:   Critical Behavior:  Neurologic State: Alert  Orientation Level: Oriented X4  Cognition: Appropriate decision making,Follows commands     Functional Mobility Training:  Bed Mobility:  Sit to Supine: Stand-by assistance  Scooting: Modified independent  Transfers:  Sit to Stand: Contact guard assistance  Stand to Sit: Contact guard assistance  Balance:  Sitting: Intact; Without support  Sitting - Static: Good (unsupported)  Sitting - Dynamic: Good (unsupported)  Standing: Impaired; With support  Standing - Static: Constant support;Good  Standing - Dynamic : Constant support; Fair  Ambulation/Gait Training:  Distance (ft): 6 Feet (ft)  Assistive Device: Gait belt;Walker, rolling  Ambulation - Level of Assistance: Contact guard assistance  Base of Support: Narrowed  Speed/Daysi: Slow  Step Length: Left shortened;Right shortened  Pain Ratin/10 in stomach    Activity Tolerance:   Good  Please refer to the flowsheet for vital signs taken during this treatment. After treatment patient left in no apparent distress:   Supine in bed, Call bell within reach, and Bed / chair alarm activated    COMMUNICATION/COLLABORATION:   The patients plan of care was discussed with: Registered nurse. Problem: Mobility Impaired (Adult and Pediatric)  Goal: *Acute Goals and Plan of Care (Insert Text)  Description: Pt will be I with LE HEP in 7 days. Pt will perform bed mobility with SBA in 7 days. Pt will perform transfers with SBA in 7 days. Pt will amb 5-10 feet with LRAD safely with Mod I/Independent in 7 days.     Pt will demonstrate improvement in standing dynamic balance from CGA to Mod I/Independent in 7 days.      Outcome: Progressing Towards Goal       Morna Ramp, PTA   Time Calculation: 23 mins

## 2022-06-01 NOTE — ROUTINE PROCESS
Bedside and Verbal shift change report given to [facility] (oncoming nurse) by Jaime Alvarenga (offgoing nurse). Report included the following information SBAR and MAR.

## 2022-06-01 NOTE — ANESTHESIA PREPROCEDURE EVALUATION
Relevant Problems   RESPIRATORY SYSTEM   (+) Asthma      NEUROLOGY   (+) Migraine      CARDIOVASCULAR   (+) Atrial flutter (HCC)   (+) Hypertension      GASTROINTESTINAL   (+) Gastroesophageal reflux disease      ENDOCRINE   (+) Diabetes mellitus (HCC)      PERSONAL HX & FAMILY HX OF CANCER   (+) Colon cancer (HCC)       Anesthetic History   No history of anesthetic complications            Review of Systems / Medical History  Patient summary reviewed, nursing notes reviewed and pertinent labs reviewed    Pulmonary            Asthma     Comments: Allergic rhinitis. Neuro/Psych         Headaches    Comments: \"HAZZY VISION. Cardiovascular    Hypertension        Dysrhythmias : atrial flutter        Comments: 4/19/2022 ECG: Sinus rhythm with Premature atrial complexes   Minimal voltage criteria for LVH, may be normal variant   Nonspecific T wave abnormality   Abnormal ECG   When compared with ECG of 17-APR-2022 08:29,   Premature atrial complexes are now Present   Vent. rate has decreased  BPM   ST no longer depressed in Inferior leads. GI/Hepatic/Renal     GERD          Comments: Colon polyp. Cecal polyp. Small bowel obstruction  S/P EX-LAP ON 5-15-22. S/p colostomy. Endo/Other    Diabetes: type 2, using insulin    Cancer (CA colon. ) and anemia    Comments: Polymyositis. Myasthenia gravis. Other Findings   Comments: ELIQUIS: LAST ADMINISTERED ON 06-1-22    PLAVIX; LAST ADMINISTERED ON 6-1-22. Priti Bey Physical Exam    Airway  Mallampati: II  TM Distance: 4 - 6 cm  Neck ROM: normal range of motion   Mouth opening: Normal     Cardiovascular    Rhythm: regular  Rate: normal         Dental      Comments: Dentures. Pulmonary  Breath sounds clear to auscultation               Abdominal  GI exam deferred      Comments: On TPN.   Other Findings            Anesthetic Plan    ASA: 3  Anesthesia type: total IV anesthesia          Induction: Intravenous  Anesthetic plan and risks discussed with: Patient

## 2022-06-01 NOTE — PROGRESS NOTES
BEDSIDE Verbal  shift change report received from  Андрей Castro. (offgoing  nurse) by Zen Vasquez  (oncoming nurse). Report included the following information SBAR, New Orders, Recent Labs, Upcoming procedures.

## 2022-06-01 NOTE — WOUND CARE
WCN in for ostomy education and bag check. Patient sitting up eating breakfast, ostomy bag in place with no leaks at this time. Patient stated that when he was working with PT he felt like his bag was going to come off. Ostomy bag was emptied with 200ml of brown soft stool. Educated patient on importance of checking bag often and emptying when 1/3 to 1/2 full. Patient stated that due to his vision issues he has not been able to see well enough to empty his bag. Patient may no longer be a good candidate for continued ostomy education due to vision issues. Patient will need assistance at home with changing and emptying of appliance.

## 2022-06-01 NOTE — PROGRESS NOTES
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  No change in mental obscurations  No new focality      ASSESSMENT/PLAN:  Awaiting AL    ALLERGIES:    Allergies   Allergen Reactions    Beef Containing Products Anaphylaxis and Other (comments)    Diltiazem Other (comments)     Reaction Type: Allergy; Reaction(s): Pressure in chest    Iodinated Contrast Media Other (comments)     Reaction Type: Allergy; Severity: Severe; Reaction(s): hives,throat swelling    Pork Derived (Porcine) Hives    Shellfish Containing Products Swelling     Other reaction(s): Other  Reaction Type: Allergy; Reaction(s): Hives,throat swelling    Sulfa (Sulfonamide Antibiotics) Unknown (comments)     Spinal meningitis    Sulfur Unknown (comments)     Reaction Type: Allergy  Spinal meningitis    Lactose Diarrhea     Lactose intolerant per Pt.        MEDS:      Current Facility-Administered Medications:     piperacillin-tazobactam (ZOSYN) 3.375 g in 0.9% sodium chloride (MBP/ADV) 100 mL MBP, 3.375 g, IntraVENous, Q8H, Fariha Martinez MD, Last Rate: 25 mL/hr at 06/01/22 0048, 3.375 g at 06/01/22 0048    HYDROmorphone (DILAUDID) injection 1 mg, 1 mg, IntraVENous, Q4H PRN, Elizabeth Davis MD, 1 mg at 06/01/22 0457    HYDROmorphone (DILAUDID) syringe 0.5 mg, 0.5 mg, IntraVENous, Q4H PRN, Elizabeth Davis MD, 0.5 mg at 05/27/22 1948    clopidogreL (PLAVIX) tablet 75 mg, 75 mg, Oral, DAILY, KIMBERLY Rey IV, MD, 75 mg at 05/31/22 0846    aspirin chewable tablet 81 mg, 81 mg, Oral, DAILY, KIMBERLY Rey IV, MD, 81 mg at 05/31/22 0846    nystatin (MYCOSTATIN) 100,000 unit/mL oral suspension 500,000 Units, 500,000 Units, Oral, QID, Elizabeth Davis MD, 500,000 Units at 05/29/22 0915    0.9% sodium chloride infusion 250 mL, 250 mL, IntraVENous, PRN, Jaycee Hidalgo MD    acetaminophen (TYLENOL) tablet 650 mg, 650 mg, Oral, Q4H PRN, Arline Blancas MD, 650 mg at 05/18/22 1838    famotidine (PF) (PEPCID) 20 mg in 0.9% sodium chloride 10 mL injection, 20 mg, IntraVENous, Q12H, Arline Blancas MD, 20 mg at 05/31/22 2055    dextrose 5% - 0.45% NaCl with KCl 20 mEq/L infusion, 75 mL/hr, IntraVENous, CONTINUOUS, Arline Blancas MD, Last Rate: 75 mL/hr at 06/01/22 0048, 75 mL/hr at 06/01/22 0048    oxyCODONE IR (ROXICODONE) tablet 10 mg, 10 mg, Oral, Q4H PRN, Arline Blancas MD, 10 mg at 05/27/22 1404    ondansetron (ZOFRAN) injection 4 mg, 4 mg, IntraVENous, Q4H PRN, Arline Blancas MD, 4 mg at 06/01/22 0457    verapamil ER (VERELAN PM) capsule 200 mg, 200 mg, Oral, QHS, Becky CABRERA MD, 200 mg at 05/31/22 2101    [Held by provider] potassium chloride SR (KLOR-CON 10) tablet 10 mEq, 10 mEq, Oral, BID, Becky CABRERA MD, 10 mEq at 04/30/22 2142    apixaban (ELIQUIS) tablet 5 mg, 5 mg, Oral, BID, Juan See MD, 5 mg at 05/31/22 2101    amiodarone (CORDARONE) tablet 200 mg, 200 mg, Oral, DAILY, Shashi Glass MD, 200 mg at 05/31/22 0846    lisinopriL (PRINIVIL, ZESTRIL) tablet 20 mg, 20 mg, Oral, DAILY, Arline Blancas MD, 20 mg at 05/31/22 0846    predniSONE (DELTASONE) tablet 20 mg, 20 mg, Oral, DAILY WITH BREAKFAST, Arline Blancas MD, 20 mg at 05/31/22 0846    insulin lispro (HUMALOG) injection, , SubCUTAneous, AC&HS, Arline Blancas MD, 2 Units at 05/31/22 2103    glucose chewable tablet 16 g, 4 Tablet, Oral, PRN, Arline Blancas MD    glucagon (GLUCAGEN) injection 1 mg, 1 mg, IntraMUSCular, PRN, Arline Blancas MD, 1 mg at 05/06/22 0559    dextrose 10% infusion 0-250 mL, 0-250 mL, IntraVENous, PRN, Arline Blancas MD, Last Rate: 750 mL/hr at 05/02/22 1558, 125 mL at 05/02/22 1558    labetaloL (NORMODYNE;TRANDATE) 20 mg/4 mL (5 mg/mL) injection 10 mg, 10 mg, IntraVENous, Q6H PRN, Arline Blancas MD, 10 mg at 05/03/22 1423    LABS:  Recent Results (from the past 24 hour(s))   GLUCOSE, POC    Collection Time: 05/31/22 11:51 AM   Result Value Ref Range    Glucose (POC) 265 (H) 65 - 117 mg/dL    Performed by Cindy Nettles    GLUCOSE, POC    Collection Time: 05/31/22  4:36 PM   Result Value Ref Range    Glucose (POC) 325 (H) 65 - 117 mg/dL    Performed by Mayank Chain, POC    Collection Time: 05/31/22  7:27 PM   Result Value Ref Range    Glucose (POC) 194 (H) 65 - 117 mg/dL    Performed by [address] [place] [address] Road, POC    Collection Time: 06/01/22  8:48 AM   Result Value Ref Range    Glucose (POC) 180 (H) 65 - 117 mg/dL    Performed by Marvin Blew        Visit Vitals  /80 (BP 1 Location: Right upper arm, BP Patient Position: Lying)   Pulse 84   Temp 98.8 °F (37.1 °C)   Resp 18   Ht 5' 7.4\" (1.712 m)   Wt 66 kg (145 lb 8.1 oz)   SpO2 100%   BMI 22.52 kg/m²       Imaging:  XR HIP LT W OR WO PELV 2-3 VWS   Final Result      DUPLEX CAROTID BILATERAL   Final Result      MRI BRAIN WO CONT   Final Result         1. Evolving ischemic infarcts in multiple vascular territories, involving both   the right occipital lobe and left frontal lobe. 2. Left occipital encephalomalacia      CT ABD PELV WO CONT   Final Result   Small bowel ileus versus partial obstruction, at the ileocolic   anastomosis or ileocecal valve, slightly greater than previous. There is mixed   density fluid throughout this region, as before. . Consider follow-up study with   IV contrast and if possible, oral contrast. Consider NG tube. XR ABD (KUB)   Final Result      XR ABD (KUB)   Final Result   Small bowel dilatation consistent with obstruction, not   significantly changed. XR ABD (KUB)   Final Result   Persistent small bowel distention as seen with obstruction. XR ABD (KUB)   Final Result   No significant change compared to abdomen series 5/5/2022. Persistent air and fluid-filled dilated small bowel loops.       XR ABD (KUB)   Final Result Multiple distended gas-filled loops of small bowel compatible with   partial small bowel obstruction versus ileus. CT ABD PELV WO CONT   Final Result   Moderate grade partial small bowel obstruction. Small volume complex ascites. CT ABD PELV WO CONT   Final Result   1. Improving hemoperitoneum and pneumoperitoneum. 2.  Ileocolic anastomosis in the right lower quadrant. Incompletely evaluated   without contrast. No evidence of bowel obstruction. 3.  Reticular and groundglass airspace disease in the visualized lung bases   redemonstrated. 4.  See full report for detailed findings. CT ABD PELV WO CONT   Final Result   1. Status post ileal cecal surgery. Surrounding extraluminal dense material may   represent blood products (no indication of enteric contrast administration). Anastomotic leak cannot be excluded. Consider imaging with water-soluble oral   contrast. Free air in the abdomen likely within normal limits post recent   surgery. The bowel does not appear obstructed. 2. Bilateral lower lung airspace disease likely infectious or inflammatory. 3. Bilateral femoral head sclerosis suggests AVN.

## 2022-06-01 NOTE — PROGRESS NOTES
Bedside and Verbal shift change report given to Calvin Bajwa RN (oncoming nurse) by Buddy Weaver LPN offgoing nurse). Report included the following information SBAR and MAR.

## 2022-06-02 NOTE — PROGRESS NOTES
Bedside and Verbal shift change report given to  Etienne Palacios RN(oncoming nurse) by Severino Arevalo (offgoing nurse). Report included the following information SBAR and MAR.

## 2022-06-02 NOTE — PROGRESS NOTES
Problem: Falls - Risk of  Goal: *Absence of Falls  Description: Document Earma Mohsen Fall Risk and appropriate interventions in the flowsheet. Outcome: Progressing Towards Goal  Note: Fall Risk Interventions:  Mobility Interventions: Bed/chair exit alarm,Patient to call before getting OOB,Strengthening exercises (ROM-active/passive),Utilize walker, cane, or other assistive device         Medication Interventions: Bed/chair exit alarm,Patient to call before getting OOB,Teach patient to arise slowly    Elimination Interventions: Bed/chair exit alarm,Call light in reach,Toileting schedule/hourly rounds,Urinal in reach    History of Falls Interventions: Bed/chair exit alarm,Door open when patient unattended,Room close to nurse's station         Problem: Pain  Goal: *Control of Pain  Outcome: Progressing Towards Goal     Problem: Pressure Injury - Risk of  Goal: *Prevention of pressure injury  Description: Document Yuri Scale and appropriate interventions in the flowsheet.   Outcome: Progressing Towards Goal  Note: Pressure Injury Interventions:  Sensory Interventions: Assess changes in LOC,Check visual cues for pain,Minimize linen layers,Keep linens dry and wrinkle-free    Moisture Interventions: Absorbent underpads,Apply protective barrier, creams and emollients,Check for incontinence Q2 hours and as needed,Internal/External fecal devices,Maintain skin hydration (lotion/cream),Minimize layers    Activity Interventions: Increase time out of bed    Mobility Interventions: Float heels,HOB 30 degrees or less    Nutrition Interventions: Document food/fluid/supplement intake    Friction and Shear Interventions: Apply protective barrier, creams and emollients,HOB 30 degrees or less,Minimize layers

## 2022-06-02 NOTE — PROGRESS NOTES
Progress Note    Patient: Earnestine Katz MRN: [id number]  SSN: [id number]    YOB: 1970  Age: 49 y.o. Sex: male      Admit Date: 4/15/2022    LOS: 48 days     Subjective:   Seen in bed  Had AL, no thrombus identified  Continues to have visual disturbances  Tolerating diet stoma functioning    Objective:     Vitals:    06/02/22 1210 06/02/22 1215 06/02/22 1346 06/02/22 1540   BP: 123/79 120/81  120/78   Pulse: 78 78  88   Resp: 13 12  14   Temp:    98 °F (36.7 °C)   SpO2: 100% 100% 100% 100%   Weight:       Height:            Intake and Output:  Current Shift: 06/02 0701 - 06/02 1900  In: 100 [I.V.:100]  Out: 975 [Urine:825]  Last three shifts: 05/31 1901 - 06/02 0700  In: -   Out: 2370 [Urine:1550]    Review of Systems:  ROS     Physical Exam:   Abdomen is soft, nondistended, tender palpation of the right side, incision intact with drainage, stoma right abdomen pink protuberant and pouched. Lab/Data Review:  Recent Results (from the past 24 hour(s))   GLUCOSE, POC    Collection Time: 06/01/22  8:24 PM   Result Value Ref Range    Glucose (POC) 317 (H) 65 - 117 mg/dL    Performed by Juan Loredo    GLUCOSE, POC    Collection Time: 06/02/22  7:46 AM   Result Value Ref Range    Glucose (POC) 190 (H) 65 - 117 mg/dL    Performed by [employer], POC    Collection Time: 06/02/22  1:08 PM   Result Value Ref Range    Glucose (POC) 116 65 - 117 mg/dL    Performed by [employer], POC    Collection Time: 06/02/22  4:18 PM   Result Value Ref Range    Glucose (POC) 301 (H) 65 - 117 mg/dL    Performed by Alanna Smyth           Assessment:     Active Problems:    Colon cancer ([address] (4/15/2022)      Cecal polyp (4/15/2022)        Plan:   Neurology following for embolic strokes, AL negative. Patient is on Plavix and Eliquis.   Continue diet  Continue PT/OT  Continue current analgesics  Out of bed encourage ambulation    Signed By: Dennise Lemus MD     Nancy 2, 2022

## 2022-06-02 NOTE — PROGRESS NOTES
Bedside shift change report received from Melissa Hill.[email or web address], (offgoing nurse) to Nila Ordonez RN (oncoming nurse). Report included the following information SBAR, Kardex, Procedure Summary, Intake/Output, MAR, Accordion, Recent Results and Med Rec Status.

## 2022-06-02 NOTE — PROGRESS NOTES
PT treatment attempted at 11:54 however, Patient off floor at TE . Will continue to follow pt and will attempt treatment at a later time.  Thank you

## 2022-06-02 NOTE — PROGRESS NOTES
Clinical chart reviewed. Patient is having a AL today. CM has been updating [facility] but they do not want to start auth again until they know with certainty the patient will be medically stable. [address] [place] will likely take 2 to 3 days and they will require a new COVID PCR closer to admission. CM will discuss with attending regarding anticipated dc date.

## 2022-06-02 NOTE — PROGRESS NOTES
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 Speaks in a soft voice. 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  Mildly diffusely weak      ASSESSMENT/PLAN:  Patient is still obtain a AL today if possible    ALLERGIES:    Allergies   Allergen Reactions    Beef Containing Products Anaphylaxis and Other (comments)    Diltiazem Other (comments)     Reaction Type: Allergy; Reaction(s): Pressure in chest    Iodinated Contrast Media Other (comments)     Reaction Type: Allergy; Severity: Severe; Reaction(s): hives,throat swelling    Pork Derived (Porcine) Hives    Shellfish Containing Products Swelling     Other reaction(s): Other  Reaction Type: Allergy; Reaction(s): Hives,throat swelling    Sulfa (Sulfonamide Antibiotics) Unknown (comments)     Spinal meningitis    Sulfur Unknown (comments)     Reaction Type: Allergy  Spinal meningitis    Lactose Diarrhea     Lactose intolerant per Pt.        MEDS:      Current Facility-Administered Medications:     fluconazole (DIFLUCAN) tablet 200 mg, 200 mg, Oral, Q7D, Fariha Martinez MD, 200 mg at 06/01/22 1816    piperacillin-tazobactam (ZOSYN) 3.375 g in 0.9% sodium chloride (MBP/ADV) 100 mL MBP, 3.375 g, IntraVENous, Q8H, Fariha Martinez MD, Last Rate: 25 mL/hr at 06/02/22 0841, 3.375 g at 06/02/22 0841    HYDROmorphone (DILAUDID) injection 1 mg, 1 mg, IntraVENous, Q4H PRN, Paris Dumas MD, 1 mg at 06/02/22 0721    HYDROmorphone (DILAUDID) syringe 0.5 mg, 0.5 mg, IntraVENous, Q4H PRN, Paris Dumas MD, 0.5 mg at 05/27/22 1948    clopidogreL (PLAVIX) tablet 75 mg, 75 mg, Oral, DAILY, Tanwi IV, KIMBERLY Rosales MD, 75 mg at 06/02/22 0841    aspirin chewable tablet 81 mg, 81 mg, Oral, DAILY, Tanwi IV, Koby Hough MD, 81 mg at 06/02/22 0841   0.9% sodium chloride infusion 250 mL, 250 mL, IntraVENous, PRN, Lokesh Hidalgo MD    acetaminophen (TYLENOL) tablet 650 mg, 650 mg, Oral, Q4H PRN, Osmany Grider MD, 650 mg at 05/18/22 1838    famotidine (PF) (PEPCID) 20 mg in 0.9% sodium chloride 10 mL injection, 20 mg, IntraVENous, Q12H, Osmany Grider MD, 20 mg at 06/02/22 0841    dextrose 5% - 0.45% NaCl with KCl 20 mEq/L infusion, 75 mL/hr, IntraVENous, CONTINUOUS, Osmany Grider MD, Last Rate: 75 mL/hr at 06/02/22 0632, 75 mL/hr at 06/02/22 6826    oxyCODONE IR (ROXICODONE) tablet 10 mg, 10 mg, Oral, Q4H PRN, Osmany Grider MD, 10 mg at 05/27/22 1404    ondansetron (ZOFRAN) injection 4 mg, 4 mg, IntraVENous, Q4H PRN, Osmany Grider MD, 4 mg at 06/02/22 0721    verapamil ER (VERELAN PM) capsule 200 mg, 200 mg, Oral, QHS, Akin CABRERA MD, 200 mg at 06/01/22 2123    [Held by provider] potassium chloride SR (KLOR-CON 10) tablet 10 mEq, 10 mEq, Oral, BID, Pita Cuba MD, 10 mEq at 04/30/22 2142    apixaban (ELIQUIS) tablet 5 mg, 5 mg, Oral, BID, Cheryl Avilez MD, 5 mg at 06/02/22 0841    amiodarone (CORDARONE) tablet 200 mg, 200 mg, Oral, DAILY, Leslie Pedro MD, 200 mg at 06/02/22 0841    lisinopriL (PRINIVIL, ZESTRIL) tablet 20 mg, 20 mg, Oral, DAILY, Osmany Grider MD, 20 mg at 06/02/22 0841    predniSONE (DELTASONE) tablet 20 mg, 20 mg, Oral, DAILY WITH BREAKFAST, Osmany Grider MD, 20 mg at 06/02/22 0841    insulin lispro (HUMALOG) injection, , SubCUTAneous, AC&HS, Osmany Grider MD, 2 Units at 06/02/22 0840    glucose chewable tablet 16 g, 4 Tablet, Oral, PRN, Osmany Grider MD    glucagon (GLUCAGEN) injection 1 mg, 1 mg, IntraMUSCular, PRN, McFarlan MD Cheo, 1 mg at 05/06/22 0559    dextrose 10% infusion 0-250 mL, 0-250 mL, IntraVENous, PRN, Osmany Grider MD, Last Rate: 750 mL/hr at 05/02/22 1558, 125 mL at 05/02/22 1558    labetaloL (NORMODYNE;TRANDATE) 20 mg/4 mL (5 mg/mL) injection 10 mg, 10 mg, IntraVENous, Q6H PRN, Whitney Villeda MD, 10 mg at 05/03/22 1423    LABS:  Recent Results (from the past 24 hour(s))   GLUCOSE, POC    Collection Time: 06/01/22 11:44 AM   Result Value Ref Range    Glucose (POC) 139 (H) 65 - 117 mg/dL    Performed by [employer], POC    Collection Time: 06/01/22  5:23 PM   Result Value Ref Range    Glucose (POC) 440 (H) 65 - 117 mg/dL    Performed by Jose [employer] [place], POC    Collection Time: 06/01/22  5:25 PM   Result Value Ref Range    Glucose (POC) 336 (H) 65 - 117 mg/dL    Performed by Jose mycir[employer]lekaushik [place], POC    Collection Time: 06/01/22  8:24 PM   Result Value Ref Range    Glucose (POC) 317 (H) 65 - 117 mg/dL    Performed by Ghanshyam Pritchard    GLUCOSE, POC    Collection Time: 06/02/22  7:46 AM   Result Value Ref Range    Glucose (POC) 190 (H) 65 - 117 mg/dL    Performed by Justino March        Visit Vitals  /74 (BP 1 Location: Right upper arm, BP Patient Position: Lying)   Pulse 90   Temp 98.2 °F (36.8 °C)   Resp 18   Ht 5' 7.4\" (1.712 m)   Wt 66 kg (145 lb 8.1 oz)   SpO2 100%   BMI 22.52 kg/m²       Imaging:  XR HIP LT W OR WO PELV 2-3 VWS   Final Result      DUPLEX CAROTID BILATERAL   Final Result      MRI BRAIN WO CONT   Final Result         1. Evolving ischemic infarcts in multiple vascular territories, involving both   the right occipital lobe and left frontal lobe. 2. Left occipital encephalomalacia      CT ABD PELV WO CONT   Final Result   Small bowel ileus versus partial obstruction, at the ileocolic   anastomosis or ileocecal valve, slightly greater than previous. There is mixed   density fluid throughout this region, as before. . Consider follow-up study with   IV contrast and if possible, oral contrast. Consider NG tube. XR ABD (KUB)   Final Result      XR ABD (KUB)   Final Result   Small bowel dilatation consistent with obstruction, not   significantly changed.       XR ABD (KUB)   Final Result   Persistent small bowel distention as seen with obstruction. XR ABD (KUB)   Final Result   No significant change compared to abdomen series 5/5/2022. Persistent air and fluid-filled dilated small bowel loops. XR ABD (KUB)   Final Result   Multiple distended gas-filled loops of small bowel compatible with   partial small bowel obstruction versus ileus. CT ABD PELV WO CONT   Final Result   Moderate grade partial small bowel obstruction. Small volume complex ascites. CT ABD PELV WO CONT   Final Result   1. Improving hemoperitoneum and pneumoperitoneum. 2.  Ileocolic anastomosis in the right lower quadrant. Incompletely evaluated   without contrast. No evidence of bowel obstruction. 3.  Reticular and groundglass airspace disease in the visualized lung bases   redemonstrated. 4.  See full report for detailed findings. CT ABD PELV WO CONT   Final Result   1. Status post ileal cecal surgery. Surrounding extraluminal dense material may   represent blood products (no indication of enteric contrast administration). Anastomotic leak cannot be excluded. Consider imaging with water-soluble oral   contrast. Free air in the abdomen likely within normal limits post recent   surgery. The bowel does not appear obstructed. 2. Bilateral lower lung airspace disease likely infectious or inflammatory. 3. Bilateral femoral head sclerosis suggests AVN.

## 2022-06-02 NOTE — ANESTHESIA POSTPROCEDURE EVALUATION
* No procedures listed *.    total IV anesthesia    Anesthesia Post Evaluation      Multimodal analgesia: multimodal analgesia used between 6 hours prior to anesthesia start to PACU discharge  Patient location during evaluation: PACU  Patient participation: complete - patient participated  Level of consciousness: awake  Pain score: 0  Pain management: adequate  Airway patency: patent  Anesthetic complications: no  Cardiovascular status: acceptable  Respiratory status: acceptable  Hydration status: acceptable  Post anesthesia nausea and vomiting:  controlled  Final Post Anesthesia Temperature Assessment:  Normothermia (36.0-37.5 degrees C)      INITIAL Post-op Vital signs:   Vitals Value Taken Time   /81 06/02/22 1215   Temp     Pulse 78 06/02/22 1215   Resp 12 06/02/22 1215   SpO2 100 % 06/02/22 1215

## 2022-06-02 NOTE — PROGRESS NOTES
PT treatment attempted at 14:08 however, patient reported feeling to fatigued and \"wiped out\" from earlier AL test. Patient agree to attempt therapy tomorrow but needed to rest the rest of today. Will continue to follow pt and will attempt treatment at a later time.  Thank you

## 2022-06-02 NOTE — PROGRESS NOTES
AL Preliminary findings; Patient's AL completed without complications. No echocardiographic evidence of cardiac source of emboli, valvular vegetation, PFO or any intracardiac shunt. Patient's echocardiogram was notable for mild tricuspid regurgitation and mild to moderate mitral regurgitation. LV ejection fraction 55 to 60%.

## 2022-06-03 NOTE — WOUND CARE
WCN in for ostomy evaluation and continued education. Patient states that he is still having vision issues and is unable to see ostomy bag well. Encouraged patient to continue to feel bag often to know if it needs to be emptied or if has gas in it. Encouraged patient to continue to try to empty bag with nurse throughout the day.

## 2022-06-03 NOTE — PROGRESS NOTES
Midline on day 28. Will continue to monitor site as patient needs continues. 06/03/22 1228   Midline Catheter 10/93/[birth year] Left;Basilic   Placement Date/Time: 05/05/22 1513   Description (optional): 18ga  Inserted By: India León  Number of Attempts: 1  Length (cm): 10 centimeters  Location: Left;Basilic  Arm Circumference at Insertion (cm): 26 cm  CVC Insertion Practices: Chlorohexadine sk. .. Criteria for Appropriate Use Limited/no vessel suitable for conventional peripheral access   Site Assessment Clean, dry, & intact   Phlebitis Assessment 0   Infiltration Assessment 0   Date of Last Dressing Change 05/29/22   Dressing Status Clean, dry, & intact   External Catheter Length (cm) 0 centimeters   Dressing Type Disk with Chlorhexadine gluconate (CHG); Stabilization/securement device;Tape;Transparent   Hub Color/Line Status Green;Patent; Infusing   Positive Blood Return (Site #1) Yes   Alcohol Cap Used No  (infusing)

## 2022-06-03 NOTE — PROGRESS NOTES
PHYSICAL THERAPY TREATMENT  Patient: Link Hatchet (38 y.o. male)  Date: 6/3/2022  Diagnosis: Adenomatous polyp of colon, unspecified part of colon [D12.6]  [facility] [C18.9]  Cecal polyp [K63.5] <principal problem not specified>  Procedure(s) (LRB):  Exploratory laparotomy, abdominal washout, resection of ileocolic anastomosis with end ileostomy (N/A) 19 Days Post-Op  Precautions:    Chart, physical therapy assessment, plan of care and goals were reviewed. ASSESSMENT  Patient continues with skilled PT services and is progressing towards goals. Patient supine in bed upon approach and agreed to therapy today. Patient was AXO times 4. Patient was supervision to bed mobility, supine>sit , SBA for sit>supine and mod I for scooting to EOB. Patient then performed STS to RW and CGA and ambulated 20 feet inside room with RW at [facility]. Patient had slow but steady gait with no LOB or knee buckling but did present with slightly posterior leaning that patient was able to self control. Patient then returned to seated EOB at [facility] and performed seated TE ( see details below). Patient then returned to supine in bed at [facility] for sit>supine transfer( patient had slight difficulty getting LLE into bed 2/2 to pain in Lt hip). Patient then left supine in bed with call bell within reach and all needs meet. Current Level of Function Impacting Discharge (mobility/balance): impaired balance, general weakness    Other factors to consider for discharge: PLOF, assitance at home, acute medical state, level of deficits. PLAN :  Patient continues to benefit from skilled intervention to address the above impairments. Continue treatment per established plan of care. to address goals.     Recommendation for discharge: (in order for the patient to meet his/her long term goals)  [address]     This discharge recommendation:  Has been made in collaboration with the attending provider and/or case management    IF patient discharges home will need the following DME: gait belt and rolling walker       SUBJECTIVE:   Patient stated Andrew Pillai do what I can but I dont want to upset my MS.    OBJECTIVE DATA SUMMARY:   Critical Behavior:  Neurologic State: Alert  Orientation Level: Oriented X4  Cognition: Follows commands     Functional Mobility Training:  Bed Mobility:  Rolling: Supervision  Supine to Sit: Supervision  Sit to Supine: Stand-by assistance  Scooting: Modified independent  Transfers:  Sit to Stand: Contact guard assistance  Stand to Sit: Contact guard assistance  Balance:  Sitting: Intact; Without support  Sitting - Static: Good (unsupported)  Sitting - Dynamic: Good (unsupported)  Standing: Impaired; With support  Standing - Static: Constant support;Good  Standing - Dynamic : Constant support; Fair  Ambulation/Gait Training:  Distance (ft): 20 Feet (ft)  Assistive Device: Gait belt;Walker, rolling  Ambulation - Level of Assistance: Contact guard assistance  Base of Support: Narrowed  Speed/Daysi: Slow  Therapeutic Exercises:   1x15 AP  1x15 LAQ  1x15 seated marches  1x12 glut squeezes  Pain Ratin/10 in stomach    Activity Tolerance:   Good  Please refer to the flowsheet for vital signs taken during this treatment. After treatment patient left in no apparent distress:   Supine in bed, Call bell within reach, Bed / chair alarm activated, and Side rails x 3    COMMUNICATION/COLLABORATION:   The patients plan of care was discussed with: Registered nurse. Problem: Mobility Impaired (Adult and Pediatric)  Goal: *Acute Goals and Plan of Care (Insert Text)  Description: Pt will be I with LE HEP in 7 days. Pt will perform bed mobility with SBA in 7 days. Pt will perform transfers with SBA in 7 days. Pt will amb 5-10 feet with LRAD safely with Mod I/Independent in 7 days. Pt will demonstrate improvement in standing dynamic balance from CGA to Mod I/Independent in 7 days.      Outcome: Progressing Towards Goal Lorin Fail, PTA   Time Calculation: 23 mins

## 2022-06-03 NOTE — PROGRESS NOTES
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 AL negative  Visual obscuration still present. (I have informed patient that the visual obscurations are gone the last for quite some time)  No new focality      ASSESSMENT/PLAN:  Current treatment continues    ALLERGIES:    Allergies   Allergen Reactions    Beef Containing Products Anaphylaxis and Other (comments)    Diltiazem Other (comments)     Reaction Type: Allergy; Reaction(s): Pressure in chest    Iodinated Contrast Media Other (comments)     Reaction Type: Allergy; Severity: Severe; Reaction(s): hives,throat swelling    Pork Derived (Porcine) Hives    Shellfish Containing Products Swelling     Other reaction(s): Other  Reaction Type: Allergy; Reaction(s): Hives,throat swelling    Sulfa (Sulfonamide Antibiotics) Unknown (comments)     Spinal meningitis    Sulfur Unknown (comments)     Reaction Type: Allergy  Spinal meningitis    Lactose Diarrhea     Lactose intolerant per Pt.        MEDS:      Current Facility-Administered Medications:     fluconazole (DIFLUCAN) tablet 200 mg, 200 mg, Oral, Q7D, Fariha Martinez MD, 200 mg at 06/01/22 1816    piperacillin-tazobactam (ZOSYN) 3.375 g in 0.9% sodium chloride (MBP/ADV) 100 mL MBP, 3.375 g, IntraVENous, Q8H, Fariha Martinez MD, Last Rate: 25 mL/hr at 06/03/22 0854, 3.375 g at 06/03/22 0854    HYDROmorphone (DILAUDID) injection 1 mg, 1 mg, IntraVENous, Q4H PRN, Jakub Paez MD, 1 mg at 06/03/22 1248    HYDROmorphone (DILAUDID) syringe 0.5 mg, 0.5 mg, IntraVENous, Q4H PRN, Jakub Paez MD, 0.5 mg at 05/27/22 1948    clopidogreL (PLAVIX) tablet 75 mg, 75 mg, Oral, DAILY, KIMBERLY Rey IV, MD, 75 mg at 06/03/22 0853    aspirin chewable tablet 81 mg, 81 mg, Oral, DAILY, KIMBERLY Rey IV, MD, 81 mg at 06/03/22 0853    0.9% sodium chloride infusion 250 mL, 250 mL, IntraVENous, PRN, Jeovanny Hidalgo MD    acetaminophen (TYLENOL) tablet 650 mg, 650 mg, Oral, Q4H PRN, Jamila Taveras MD, 650 mg at 05/18/22 1838    famotidine (PF) (PEPCID) 20 mg in 0.9% sodium chloride 10 mL injection, 20 mg, IntraVENous, Q12H, Jamila Taveras MD, 20 mg at 06/03/22 0853    dextrose 5% - 0.45% NaCl with KCl 20 mEq/L infusion, 75 mL/hr, IntraVENous, CONTINUOUS, Jamila Taveras MD, Last Rate: 75 mL/hr at 06/02/22 0632, 75 mL/hr at 06/02/22 5938    oxyCODONE IR (ROXICODONE) tablet 10 mg, 10 mg, Oral, Q4H PRN, Jamila Taveras MD, 10 mg at 05/27/22 1404    ondansetron (ZOFRAN) injection 4 mg, 4 mg, IntraVENous, Q4H PRN, Jamila Taveras MD, 4 mg at 06/03/22 0226    verapamil ER (VERELAN PM) capsule 200 mg, 200 mg, Oral, QHS, Carley CABRERA MD, 200 mg at 06/02/22 2227    [Held by provider] potassium chloride SR (KLOR-CON 10) tablet 10 mEq, 10 mEq, Oral, BID, Carley CABRERA MD, 10 mEq at 04/30/22 2142    apixaban (ELIQUIS) tablet 5 mg, 5 mg, Oral, BID, Reynaldo eYh MD, 5 mg at 06/03/22 8735    amiodarone (CORDARONE) tablet 200 mg, 200 mg, Oral, DAILY, Castro Alvarez MD, 200 mg at 06/03/22 0853    lisinopriL (PRINIVIL, ZESTRIL) tablet 20 mg, 20 mg, Oral, DAILY, Jamila Taveras MD, 20 mg at 06/03/22 0853    predniSONE (DELTASONE) tablet 20 mg, 20 mg, Oral, DAILY WITH BREAKFAST, Jamila Taveras MD, 20 mg at 06/03/22 0853    insulin lispro (HUMALOG) injection, , SubCUTAneous, AC&HS, Jamila Taveras MD, 4 Units at 06/03/22 1248    glucose chewable tablet 16 g, 4 Tablet, Oral, PRN, Jamila Taveras MD    glucagon (GLUCAGEN) injection 1 mg, 1 mg, IntraMUSCular, PRN, Jamila Taveras MD, 1 mg at 05/06/22 0559    dextrose 10% infusion 0-250 mL, 0-250 mL, IntraVENous, PRN, Jamila Taveras MD, Last Rate: 750 mL/hr at 05/02/22 1558, 125 mL at 05/02/22 1558    labetaloL (NORMODYNE;TRANDATE) 20 mg/4 mL (5 mg/mL) injection 10 mg, 10 mg, IntraVENous, Q6H PRN, Keven Newton MD, 10 mg at 05/03/22 1423    LABS:  Recent Results (from the past 24 hour(s))   GLUCOSE, POC    Collection Time: 06/02/22  4:18 PM   Result Value Ref Range    Glucose (POC) 301 (H) 65 - 117 mg/dL    Performed by [address] [place] [address], POC    Collection Time: 06/02/22 10:33 PM   Result Value Ref Range    Glucose (POC) 264 (H) 65 - 117 mg/dL    Performed by Bayron Dominguez RN    GLUCOSE, POC    Collection Time: 06/03/22  8:10 AM   Result Value Ref Range    Glucose (POC) 247 (H) 65 - 117 mg/dL    Performed by Isidro [address], POC    Collection Time: 06/03/22 11:36 AM   Result Value Ref Range    Glucose (POC) 219 (H) 65 - 117 mg/dL    Performed by Bismark Medina Vitals  BP (!) 147/96 (BP 1 Location: Right upper arm)   Pulse 85   Temp 98 °F (36.7 °C)   Resp 18   Ht 5' 7.4\" (1.712 m)   Wt 66 kg (145 lb 8.1 oz)   SpO2 100%   BMI 22.52 kg/m²       Imaging:  XR HIP LT W OR WO PELV 2-3 VWS   Final Result      DUPLEX CAROTID BILATERAL   Final Result      MRI BRAIN WO CONT   Final Result         1. Evolving ischemic infarcts in multiple vascular territories, involving both   the right occipital lobe and left frontal lobe. 2. Left occipital encephalomalacia      CT ABD PELV WO CONT   Final Result   Small bowel ileus versus partial obstruction, at the ileocolic   anastomosis or ileocecal valve, slightly greater than previous. There is mixed   density fluid throughout this region, as before. . Consider follow-up study with   IV contrast and if possible, oral contrast. Consider NG tube. XR ABD (KUB)   Final Result      XR ABD (KUB)   Final Result   Small bowel dilatation consistent with obstruction, not   significantly changed. XR ABD (KUB)   Final Result   Persistent small bowel distention as seen with obstruction. XR ABD (KUB)   Final Result   No significant change compared to abdomen series 5/5/2022.    Persistent air and fluid-filled dilated small bowel loops.      XR ABD (KUB)   Final Result   Multiple distended gas-filled loops of small bowel compatible with   partial small bowel obstruction versus ileus. CT ABD PELV WO CONT   Final Result   Moderate grade partial small bowel obstruction. Small volume complex ascites. CT ABD PELV WO CONT   Final Result   1. Improving hemoperitoneum and pneumoperitoneum. 2.  Ileocolic anastomosis in the right lower quadrant. Incompletely evaluated   without contrast. No evidence of bowel obstruction. 3.  Reticular and groundglass airspace disease in the visualized lung bases   redemonstrated. 4.  See full report for detailed findings. CT ABD PELV WO CONT   Final Result   1. Status post ileal cecal surgery. Surrounding extraluminal dense material may   represent blood products (no indication of enteric contrast administration). Anastomotic leak cannot be excluded. Consider imaging with water-soluble oral   contrast. Free air in the abdomen likely within normal limits post recent   surgery. The bowel does not appear obstructed. 2. Bilateral lower lung airspace disease likely infectious or inflammatory. 3. Bilateral femoral head sclerosis suggests AVN.

## 2022-06-03 NOTE — PROGRESS NOTES
Problem: Self Care Deficits Care Plan (Adult)  Goal: *Acute Goals and Plan of Care (Insert Text)  Description: Pt stated goal \"to decrease pain\"  Pt will be Mod I sup <> sit in prep for EOB ADLs  Pt will be Mod I grooming standing sink side LRAD  Pt will be Mod I UB dressing sitting EOB/long sit   Pt will be Mod I LE dressing sitting EOB/long sit  Pt will be Mod I sit <>  prep for toileting LRAD  Pt will be Mod I toileting/toilet transfer/cloth mgmt LRAD  Pt will be IND following UE HEP in prep for self care tasks      Outcome: Progressing Towards Goal     OCCUPATIONAL THERAPY RE-EVALUATION  Patient: Brain Davis (60 y.o. male)  Date: 6/3/2022  Diagnosis: Adenomatous polyp of colon, unspecified part of colon [D12.6]  Colon cancer ([facility] [address] [C18.9]  Cecal polyp [K63.5] <principal problem not specified>  Procedure(s) (LRB):  Exploratory laparotomy, abdominal washout, resection of ileocolic anastomosis with end ileostomy (N/A) 19 Days Post-Op  Precautions:    Chart, occupational therapy assessment, plan of care, and goals were reviewed. ASSESSMENT  Patient is 48 y/o male came [facility] with cecal polyp and adm 4/15/2022 for adenomatous polyp of colon and is s/p adenomatous polyp of cecum (4/15/2022 by Dr. Luz Galindo), small bowel obstruction s/p exploratory laparotomy, lysis of adhesion, resection of ileocolic anastomosis with reanastomosis(5/13/2022 by Wolf Traore and Dr. Luz Galindo). Per brain MRI, \"evolving ischemic infarcts in multiple vascular territories involving the right occipital lobe and left frontal lobe; left occipital encephalomalacia. \" Per Dr. Parul Linton note on 5/4/7857 \"embolic infarcts to the brain leading to a balint syndrome\". Pt has hx of atrial flutter, asthma, DM, HTN, spinal meningitis and avascular necrosis, myasthenia gravis, polymyositis muscle disorder, primary hypersomnolence disorder, colonoscopy, endoscopy,left shoulder procedure, GERD.  Per initial OT evaluation, pt lives with roommates in two story [facility] home with 8 steps tammy rails to enter and 10 steps tammy rails to second and was independent for self care and functional transfers/mobility at baseline with no AD or DME. Pt initially seen by OT on 5/18/2022 and seen for 5 tx since and making good progress towards goals. Limiting factor continues to be pain per pt report. Pt received semi supine in bed A&Ox4 and agreeable for OT RA 2/2 LOS. Pt currently presents with decreased balance, decreased activity tolerance, generalized weakness (noted with decreased AROM/strength for tammy shoulder flexion) and increased need for assist with self care (SBA xiao slip on shoes EOB level, SBA oral/facial hygeine seated EOB) and functional transfers/mobility (Supervision sup->sit and scooting EOB, CGA sit<->stand and BSC transfer simulated with RW and gait belt with pt declining toilet transfer 2/2 pain/fatigue after completing ambulation with PTA earlier in day). Pt educated on tammy UE HEP in prep for self care tasks with pt verbalizing understanding. Pt would benefit from continued skilled OT services while at [facility] in order to increase safety and independence with self care and functional transfers/mobility. Recommend discharge to IRF when medically appropriate. Other factors to consider for discharge: time since onset, severity of deficits         PLAN :  Recommendations and Planned Interventions: self care training, functional mobility training, therapeutic exercise, balance training, therapeutic activities, endurance activities, patient education, and home safety training    Recommend with staff: assist with self care tasks    Recommend next OT session: EOB/sink level self care    Frequency/Duration: Patient will be followed by physical therapy:  3-5x/week to address goals.     Recommendation for discharge: (in order for the patient to meet his/her long term goals)  [address]     This discharge recommendation:  Has been made in collaboration with the attending provider and/or case management    Equipment recommendations for successful discharge (if) home: TBD       SUBJECTIVE:   Patient stated I overdid it earlier.     OBJECTIVE DATA SUMMARY:   Hospital course since last seen and reason for reevaluation: LOS    Cognitive/Behavioral Status:  Neurologic State: Alert  Orientation Level: Oriented X4  Cognition: Follows commands     Hearing: Auditory  Auditory Impairment: None    Range of Motion:  AROM: Generally decreased, functional     Strength:  Strength: Generally decreased, functional (limited shoulder flexion demonstrated)     Functional Mobility and Transfers for ADLs:  Bed Mobility:  Rolling: Supervision  Supine to Sit: Supervision  Sit to Supine: Minimum assistance  Scooting: Supervision (EOB)    Transfers:  Sit to Stand: Contact guard assistance     Bed to Chair: Contact guard assistance (simulated)    Balance:  Sitting: Intact; Without support  Sitting - Static: Good (unsupported)  Sitting - Dynamic: Good (unsupported)  Standing: Impaired; With support  Standing - Static: Constant support;Good  Standing - Dynamic : Constant support; Fair    ADL Assessment:     Oral Facial Hygiene/Grooming: Stand-by assistance    Lower Body Dressing: Stand-by assistance    ADL Intervention and task modifications:    Grooming  Grooming Assistance: Stand-by assistance  Position Performed: Seated edge of bed  Washing Face: Stand-by assistance  Washing Hands: Stand-by assistance  Brushing Teeth: Stand-by assistance    Lower Body Dressing Assistance  Slip on Shoes Without Back: Stand-by assistance  Position Performed: Seated edge of bed      Pain:  7/10 abdominal pain    Activity Tolerance:   Good  Please refer to the flowsheet for vital signs taken during this treatment.     After treatment patient left in no apparent distress:   Supine in bed, Call bell within reach, and Bed / chair alarm activated    COMMUNICATION/COLLABORATION:   The patients plan of care was discussed with: Registered nurse.      Alin Solar  Time Calculation: 27 mins

## 2022-06-03 NOTE — PROGRESS NOTES
Progress Note    Patient: Brandy Fox MRN: [id number]  SSN: [id number]    YOB: 1970  Age: 47 y.o. Sex: male      Admit Date: 4/15/2022    LOS: 49 days     Subjective:   Patient seen in bed  Continues to complain of visual obscuration  Continues with right sided abdominal pain however is tolerating diet    Objective:     Vitals:    06/02/22 1346 06/02/22 1540 06/02/22 2025 06/03/22 0900   BP:  120/78 (!) 143/93 (!) 147/96   Pulse:  88 86 85   Resp:  14 18 18   Temp:  98 °F (36.7 °C) 97.9 °F (36.6 °C) 98 °F (36.7 °C)   SpO2: 100% 100% 100% 100%   Weight:       Height:            Intake and Output:  Current Shift: No intake/output data recorded.   Last three shifts: 06/01 1901 - 06/03 0700  In: 100 [I.V.:100]  Out: 1575 [Urine:1125]    Review of Systems:  ROS     Physical Exam:   Abdomen is soft, moderately tender palpation periincisional right lower quadrant, incision clean drainage from open wounds, stoma pink and protuberant right abdomen    Lab/Data Review:  Recent Results (from the past 24 hour(s))   GLUCOSE, POC    Collection Time: 06/02/22  4:18 PM   Result Value Ref Range    Glucose (POC) 301 (H) 65 - 117 mg/dL    Performed by [address] [place] [address], POC    Collection Time: 06/02/22 10:33 PM   Result Value Ref Range    Glucose (POC) 264 (H) 65 - 117 mg/dL    Performed by Vandana Young RN    GLUCOSE, POC    Collection Time: 06/03/22  8:10 AM   Result Value Ref Range    Glucose (POC) 247 (H) 65 - 117 mg/dL    Performed by Isidro [address], POC    Collection Time: 06/03/22 11:36 AM   Result Value Ref Range    Glucose (POC) 219 (H) 65 - 117 mg/dL    Performed by Jennifer Chaves           Assessment:     Active Problems:    Colon cancer ([address] (4/15/2022)      Cecal polyp (4/15/2022)        Plan:   Etiology of strokes unclear, neurology following; AL negative  Continue diet as ordered  Continue dry dressings as needed to keep midline incision clean  PT/OT    Signed By: Giancarlo Florentino Duarte Rivera MD     Nancy 3, 2022

## 2022-06-03 NOTE — PROGRESS NOTES
Problem: Falls - Risk of  Goal: *Absence of Falls  Description: Document Marco A [place] Fall Risk and appropriate interventions in the flowsheet. Outcome: Progressing Towards Goal  Note: Fall Risk Interventions:  Mobility Interventions: Bed/chair exit alarm,Patient to call before getting OOB,Strengthening exercises (ROM-active/passive),Utilize walker, cane, or other assistive device         Medication Interventions: Bed/chair exit alarm,Patient to call before getting OOB,Teach patient to arise slowly,Utilize gait belt for transfers/ambulation    Elimination Interventions: Bed/chair exit alarm,Call light in reach,Patient to call for help with toileting needs,Toileting schedule/hourly rounds,Urinal in reach    History of Falls Interventions: Bed/chair exit alarm,Door open when patient unattended,Room close to nurse's station,Utilize gait belt for transfer/ambulation         Problem: Pain  Goal: *Control of Pain  Outcome: Progressing Towards Goal     Problem: Pressure Injury - Risk of  Goal: *Prevention of pressure injury  Description: Document Yuri Scale and appropriate interventions in the flowsheet.   Outcome: Progressing Towards Goal  Note: Pressure Injury Interventions:  Sensory Interventions: Assess changes in LOC,Check visual cues for pain,Keep linens dry and wrinkle-free,Minimize linen layers    Moisture Interventions: Absorbent underpads,Apply protective barrier, creams and emollients,Maintain skin hydration (lotion/cream),Minimize layers,Moisture barrier    Activity Interventions: Increase time out of bed,PT/OT evaluation    Mobility Interventions: HOB 30 degrees or less    Nutrition Interventions: Document food/fluid/supplement intake    Friction and Shear Interventions: Apply protective barrier, creams and emollients,HOB 30 degrees or less,Minimize layers

## 2022-06-04 NOTE — PROGRESS NOTES
Progress Note    Patient: Benjie Burnett MRN: [id number]  SSN: [id number]    YOB: 1970  Age: 47 y.o. Sex: male      Admit Date: 4/15/2022    LOS: 50 days     Subjective:   Patient seen in bed  Continues to complain of right-sided abdominal pain as well as left hip pain  Tolerating diet and ileostomy functioning well    Objective:     Vitals:    06/03/22 2000 06/03/22 2106 06/04/22 0122 06/04/22 0909   BP:  (!) 150/96 (!) 138/94 (!) 141/92   Pulse:  92 88 83   Resp:  18 18 18   Temp:  98.4 °F (36.9 °C) 98.4 °F (36.9 °C) 98.5 °F (36.9 °C)   SpO2: 100% 100% 100% 100%   Weight:       Height:            Intake and Output:  Current Shift: No intake/output data recorded. Last three shifts: 06/02 1901 - 06/04 0700  In: -   Out: 1300 [Urine:900]    Review of Systems:  ROS     Physical Exam:   Abdomen is soft, nondistended, mildly tender palpation of the right side, ileostomy pink and productive, midline incision intact with drainage from open areas    Lab/Data Review:  Recent Results (from the past 24 hour(s))   GLUCOSE, POC    Collection Time: 06/03/22  4:56 PM   Result Value Ref Range    Glucose (POC) 341 (H) 65 - 117 mg/dL    Performed by Isidro [address], POC    Collection Time: 06/03/22  7:37 PM   Result Value Ref Range    Glucose (POC) 271 (H) 65 - 117 mg/dL    Performed by Amanda Fry    GLUCOSE, POC    Collection Time: 06/04/22  9:16 AM   Result Value Ref Range    Glucose (POC) 354 (H) 65 - 117 mg/dL    Performed by Charmayne Brave    GLUCOSE, POC    Collection Time: 06/04/22 12:18 PM   Result Value Ref Range    Glucose (POC) 155 (H) 65 - 117 mg/dL    Performed by Charmayne Brave           Assessment:     Active Problems:    Colon cancer ([address] (4/15/2022)      Cecal polyp (4/15/2022)        Plan:    Will discuss with wound care in the a.m. patient would likely benefit from convex appliance  Patient continues to drain from his midline incision we will obtain CT scan abdomen pelvis tomorrow  Repeat labs in a.m.   Continue Zosyn  Continue current analgesics    Signed By: Karthikeyan Corley MD     June 4, 2022

## 2022-06-05 NOTE — PROGRESS NOTES
Problem: Mobility Impaired (Adult and Pediatric)  Goal: *Acute Goals and Plan of Care (Insert Text)  Description: Pt will be I with LE HEP in 7 days. Pt will perform bed mobility with SBA in 7 days. Pt will perform transfers with SBA in 7 days. Pt will amb 5-10 feet with LRAD safely with Mod I/Independent in 7 days. Pt will demonstrate improvement in standing dynamic balance from CGA to Mod I/Independent in 7 days. Outcome: Progressing Towards Goal   PHYSICAL THERAPY TREATMENT  Patient: Lory Camarena (38 y.o. male)  Date: 6/5/2022  Diagnosis: Adenomatous polyp of colon, unspecified part of colon [D12.6]  [facility] ([facility] [C18.9]  Cecal polyp [K63.5] <principal problem not specified>  Procedure(s) (LRB):  Exploratory laparotomy, abdominal washout, resection of ileocolic anastomosis with end ileostomy (N/A) 21 Days Post-Op  Precautions:    Chart, physical therapy assessment, plan of care and goals were reviewed. ASSESSMENT  Patient continues with skilled PT services and is progressing towards goals. Pt received semi supine in bed, pleasant, but declining session at first stating he needed to sleep. With minimal encouragement pt did agree to Tx. Pt rolled to R, went from supine to sitting and scooted to EOB with mod Indep but additional time, pt moving very slowly. Upon sitting, pt stated his ostomy bag needed changing. Called on RN who arrived and attended to pt. Pt then stood to [facility] with min A/CGA and amb 35 ft with CGA and cues to increase step height. Pt very slow moving and took one standing rest break. Pt stood again while ryann pad being change. Pt went back to bed needing min A for sit>supine to bring LEs onto bed. Pt was the too tired to complete LE ex. He was left semi supine in bed in NAD, call bell within reach, all needs addressed. Pt stated it was now time for his pain meds. Made RN aware.     Current Level of Function Impacting Discharge (mobility/balance): assist x 1, standing balance deficits    Other factors to consider for discharge: severity of deficits, pain, safety, home environment and support available         PLAN :  Patient continues to benefit from skilled intervention to address the above impairments. Continue treatment per established plan of care. to address goals. Recommendation for discharge: (in order for the patient to meet his/her long term goals)  Home with [address] [place] [address] Brendan    This discharge recommendation:  Has been made in collaboration with the attending provider and/or case management    IF patient discharges home will need the following DME: rolling walker and to be determined (TBD)       SUBJECTIVE:   Patient stated I can get any sleep.     OBJECTIVE DATA SUMMARY:   Critical Behavior:  Neurologic State: Alert  Orientation Level: Oriented X4  Cognition: Follows commands     Functional Mobility Training:  Bed Mobility:  Rolling: Modified independent  Supine to Sit: Modified independent  Sit to Supine: Minimum assistance  Scooting: Modified independent     Transfers:  Sit to Stand: Minimum assistance;Contact guard assistance  Stand to Sit: Minimum assistance;Contact guard assistance     Balance:  Sitting: Intact  Sitting - Static: Good (unsupported)  Sitting - Dynamic: Good (unsupported)  Standing: Impaired; Without support  Standing - Static: Good;Constant support  Standing - Dynamic : Fair;Constant support    Ambulation/Gait Training:  Distance (ft): 35 Feet (ft)  Assistive Device: Walker, rolling;Gait belt  Ambulation - Level of Assistance: Contact guard assistance  Step Length: Right shortened;Left shortened     Pain Ratin/10 stomach    Activity Tolerance:   Fair and requires rest breaks  Please refer to the flowsheet for vital signs taken during this treatment.     After treatment patient left in no apparent distress:   Supine in bed, Call bell within reach, Bed / chair alarm activated, and Side rails x 3    COMMUNICATION/COLLABORATION:   The patients plan of care was discussed with: Registered nurse.      Pattie Mccall, JAMIE   Time Calculation: 23 mins

## 2022-06-05 NOTE — PROGRESS NOTES
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  It is more common that emboli to the brain scatter and dissolve after causing a stroke and this is essentially, most likely the case with this patient. He should continue on Eliquis and aspirin for now. 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 ALLERGIES:    Allergies   Allergen Reactions    Beef Containing Products Anaphylaxis and Other (comments)    Diltiazem Other (comments)     Reaction Type: Allergy; Reaction(s): Pressure in chest    Iodinated Contrast Media Other (comments)     Reaction Type: Allergy; Severity: Severe; Reaction(s): hives,throat swelling    Pork Derived (Porcine) Hives    Shellfish Containing Products Swelling     Other reaction(s): Other  Reaction Type: Allergy; Reaction(s): Hives,throat swelling    Sulfa (Sulfonamide Antibiotics) Unknown (comments)     Spinal meningitis    Sulfur Unknown (comments)     Reaction Type: Allergy  Spinal meningitis    Lactose Diarrhea     Lactose intolerant per Pt.        MEDS:      Current Facility-Administered Medications:     fluconazole (DIFLUCAN) tablet 200 mg, 200 mg, Oral, Q7D, Fariha Martinez MD, 200 mg at 06/01/22 1816    piperacillin-tazobactam (ZOSYN) 3.375 g in 0.9% sodium chloride (MBP/ADV) 100 mL MBP, 3.375 g, IntraVENous, Q8H, Fariha Martinez MD, Last Rate: 25 mL/hr at 06/05/22 1032, 3.375 g at 06/05/22 1032    HYDROmorphone (DILAUDID) injection 1 mg, 1 mg, IntraVENous, Q4H PRN, Jason Lennon MD, 1 mg at 06/05/22 1055    HYDROmorphone (DILAUDID) syringe 0.5 mg, 0.5 mg, IntraVENous, Q4H PRN, Alexandre Lopez MD, 0.5 mg at 05/27/22 1948    clopidogreL (PLAVIX) tablet 75 mg, 75 mg, Oral, DAILY, KIMBERLY Rey IV, MD, 75 mg at 06/05/22 1032    aspirin chewable tablet 81 mg, 81 mg, Oral, DAILY, KIMBERLY Rey IV, MD, 81 mg at 06/05/22 1032    0.9% sodium chloride infusion 250 mL, 250 mL, IntraVENous, PRN, Barbra Hidalgo MD    acetaminophen (TYLENOL) tablet 650 mg, 650 mg, Oral, Q4H PRN, Alexandre Lopez MD, 650 mg at 05/18/22 1838    famotidine (PF) (PEPCID) 20 mg in 0.9% sodium chloride 10 mL injection, 20 mg, IntraVENous, Q12H, Alexandre Lopez MD, 20 mg at 06/05/22 1032    dextrose 5% - 0.45% NaCl with KCl 20 mEq/L infusion, 75 mL/hr, IntraVENous, CONTINUOUS, Alexandre Lopez MD, Last Rate: 75 mL/hr at 06/05/22 0649, 75 mL/hr at 06/05/22 0649    oxyCODONE IR (ROXICODONE) tablet 10 mg, 10 mg, Oral, Q4H PRN, Alexandre Lopez MD, 10 mg at 05/27/22 1404    ondansetron (ZOFRAN) injection 4 mg, 4 mg, IntraVENous, Q4H PRN, Alexandre Lopez MD, 4 mg at 06/05/22 1055    verapamil ER (VERELAN PM) capsule 200 mg, 200 mg, Oral, QHS, Heloise Peabody E, MD, 200 mg at 06/04/22 2151    [Held by provider] potassium chloride SR (KLOR-CON 10) tablet 10 mEq, 10 mEq, Oral, BID, Heloise Peabody E, MD, 10 mEq at 04/30/22 2142    apixaban (ELIQUIS) tablet 5 mg, 5 mg, Oral, BID, Magda Ortega MD, 5 mg at 06/05/22 1032    amiodarone (CORDARONE) tablet 200 mg, 200 mg, Oral, DAILY, Starla Ramos MD, 200 mg at 06/05/22 1032    lisinopriL (PRINIVIL, ZESTRIL) tablet 20 mg, 20 mg, Oral, DAILY, Alexandre Lopez MD, 20 mg at 06/05/22 1032    predniSONE (DELTASONE) tablet 20 mg, 20 mg, Oral, DAILY WITH BREAKFAST, Alexandre Lopez MD, 20 mg at 06/05/22 1032    insulin lispro (HUMALOG) injection, , SubCUTAneous, AC&HS, Alexandre Lopez MD, 2 Units at 06/05/22 1032    glucose chewable tablet 16 g, 4 Tablet, Oral, PRN, Alexandre Lopez MD    glucagon (GLUCAGEN) injection 1 mg, 1 mg, IntraMUSCular, PRN, Georgette Huang MD, 1 mg at 05/06/22 0559    dextrose 10% infusion 0-250 mL, 0-250 mL, IntraVENous, PRN, Georgette Huang MD, Last Rate: 750 mL/hr at 05/02/22 1558, 125 mL at 05/02/22 1558    labetaloL (NORMODYNE;TRANDATE) 20 mg/4 mL (5 mg/mL) injection 10 mg, 10 mg, IntraVENous, Q6H PRN, Georgette Huang MD, 10 mg at 05/03/22 1423    LABS:  Recent Results (from the past 24 hour(s))   GLUCOSE, POC    Collection Time: 06/04/22  2:45 PM   Result Value Ref Range    Glucose (POC) 170 (H) 65 - 117 mg/dL    Performed by Ed Hui    GLUCOSE, POC    Collection Time: 06/04/22  5:25 PM   Result Value Ref Range    Glucose (POC) 448 (H) 65 - 117 mg/dL    Performed by Isidro 60, POC    Collection Time: 06/04/22  9:45 PM   Result Value Ref Range    Glucose (POC) 133 (H) 65 - 117 mg/dL    Performed by Hector, POC    Collection Time: 06/05/22  8:16 AM   Result Value Ref Range    Glucose (POC) 184 (H) 65 - 117 mg/dL    Performed by Etienne Silveira    GLUCOSE, POC    Collection Time: 06/05/22 12:11 PM   Result Value Ref Range    Glucose (POC) 252 (H) 65 - 117 mg/dL    Performed by Etienne Silveira        Visit Vitals  BP (!) 145/93 (BP 1 Location: Right upper arm, BP Patient Position: At rest)   Pulse 89   Temp 98.7 °F (37.1 °C)   Resp 18   Ht 5' 7.4\" (1.712 m)   Wt 66 kg (145 lb 8.1 oz)   SpO2 100%   BMI 22.52 kg/m²       Imaging:  XR HIP LT W OR WO PELV 2-3 VWS   Final Result      DUPLEX CAROTID BILATERAL   Final Result      MRI BRAIN WO CONT   Final Result         1. Evolving ischemic infarcts in multiple vascular territories, involving both   the right occipital lobe and left frontal lobe. 2. Left occipital encephalomalacia      CT ABD PELV WO CONT   Final Result   Small bowel ileus versus partial obstruction, at the ileocolic   anastomosis or ileocecal valve, slightly greater than previous. There is mixed   density fluid throughout this region, as before. Amee Carter Consider follow-up study with   IV contrast and if possible, oral contrast. Consider NG tube. XR ABD (KUB)   Final Result      XR ABD (KUB)   Final Result   Small bowel dilatation consistent with obstruction, not   significantly changed. XR ABD (KUB)   Final Result   Persistent small bowel distention as seen with obstruction. XR ABD (KUB)   Final Result   No significant change compared to abdomen series 5/5/2022. Persistent air and fluid-filled dilated small bowel loops. XR ABD (KUB)   Final Result   Multiple distended gas-filled loops of small bowel compatible with   partial small bowel obstruction versus ileus. CT ABD PELV WO CONT   Final Result   Moderate grade partial small bowel obstruction. Small volume complex ascites. CT ABD PELV WO CONT   Final Result   1. Improving hemoperitoneum and pneumoperitoneum. 2.  Ileocolic anastomosis in the right lower quadrant. Incompletely evaluated   without contrast. No evidence of bowel obstruction. 3.  Reticular and groundglass airspace disease in the visualized lung bases   redemonstrated. 4.  See full report for detailed findings. CT ABD PELV WO CONT   Final Result   1. Status post ileal cecal surgery. Surrounding extraluminal dense material may   represent blood products (no indication of enteric contrast administration). Anastomotic leak cannot be excluded. Consider imaging with water-soluble oral   contrast. Free air in the abdomen likely within normal limits post recent   surgery. The bowel does not appear obstructed. 2. Bilateral lower lung airspace disease likely infectious or inflammatory. 3. Bilateral femoral head sclerosis suggests AVN.       CT ABD PELV WO CONT    (Results Pending)

## 2022-06-05 NOTE — PROGRESS NOTES
Patient seen in bed  Chief complaint continues to be right sided abdominal pain  Tolerating diet, ileostomy functioning although he states he did eat again    Exam: Abdomen is soft, tender palpation on the right side, midline incision clean with some scant drainage, ileostomy pink protuberant and productive of stool    Assessment and plan: A.m. labs pending, CT scan abdomen pelvis pending. Etiology of small well.   Embolic infarcts unclear, neurology following patient is on Plavix and Eliquis  Continue current analgesics  Follow-up on lab and CT results

## 2022-06-05 NOTE — PROGRESS NOTES
Problem: Self Care Deficits Care Plan (Adult)  Goal: *Acute Goals and Plan of Care (Insert Text)  Description: Pt stated goal \"to decrease pain\"  Pt will be Mod I sup <> sit in prep for EOB ADLs  Pt will be Mod I grooming standing sink side LRAD  Pt will be Mod I UB dressing sitting EOB/long sit   Pt will be Mod I LE dressing sitting EOB/long sit  Pt will be Mod I sit <>  prep for toileting LRAD  Pt will be Mod I toileting/toilet transfer/cloth mgmt LRAD  Pt will be IND following UE HEP in prep for self care tasks      Outcome: Progressing Towards Goal   OCCUPATIONAL THERAPY TREATMENT  Patient: Bethel Bardales (23 y.o. male)  Date: 6/5/2022  Diagnosis: Adenomatous polyp of colon, unspecified part of colon [D12.6]  Colon cancer ([facility] [address] [C18.9]  Cecal polyp [K63.5] <principal problem not specified>  Procedure(s) (LRB):  Exploratory laparotomy, abdominal washout, resection of ileocolic anastomosis with end ileostomy (N/A) 21 Days Post-Op  Precautions: FALL  Chart, occupational therapy assessment, plan of care, and goals were reviewed. ASSESSMENT  Patient continues with skilled OT services and is progressing slowly towards goals. Patient received semi supine in bed upon MCGUIRE'S arrival and agreeable to work with therapist with encouragement. Patient is mod I for supine to sit EOB and scooting using bed rail. Seated EOB, pt required set-up for LB dressing (slip on crocs) and grooming (oral (dentures)/facial care and washed hands) with additional time. Pt declined sink level or chair level self care d/t pain level. EOB, patient educated on and completed bilateral UE HEP to increase strength/endurance needed for ADL'S and functional transfers with rest breaks 2/2 generalized weakness, see grid below. Sit to supine with min A for tammy legs d/t pt reporting he was too weak to lift his LE. Pt left resting in bed with call bell,bed alarm and all needs met. Pt continues to be limited by pain.  Patient would benefit from continued skilled Occupational services while at [address] [place] [address] in order to increase safety and independence with self care and functional tranfers/mobility. Recommend at discharge to IRF when medically appropriate. Current Level of Function Impacting Discharge (ADLs): set-up for grooming and LB dressing    Other factors to consider for discharge: Time of onset, medical prognosis/diagnosis, severity of deficits, PLOF, pain, and family support          PLAN :  Patient continues to benefit from skilled intervention to address the above impairments. Continue treatment per established plan of care. to address goals. Recommend with staff: chair level grooming    Recommend next OT session: sink level grooming    Recommendation for discharge: (in order for the patient to meet his/her long term goals)  [facility]     This discharge recommendation:  Has been made in collaboration with the attending provider and/or case management    IF patient discharges home will need the following DME: TBD       SUBJECTIVE:   Patient stated I can't get to the chair because of the pain.     OBJECTIVE DATA SUMMARY:   Cognitive/Behavioral Status:  Neurologic State: Alert  Orientation Level: Oriented X4  Cognition: Follows commands             Functional Mobility and Transfers for ADLs:  Bed Mobility:  Supine to Sit: Modified independent  Sit to Supine: Minimum assistance  Scooting: Modified independent    Transfers:             Balance:  Sitting: Intact  Sitting - Static: Good (unsupported)  Sitting - Dynamic: Good (unsupported)    ADL Intervention:       Grooming  Grooming Assistance: Set-up  Position Performed: Seated edge of bed  Washing Face: Set-up  Washing Hands: Set-up  Brushing Teeth: Set-up                   Lower Body Dressing Assistance  Dressing Assistance: Set-up  Slip on Shoes Without Back: Set-up  Leg Crossed Method Used: No  Position Performed: Seated edge of bed    Therapeutic Exercises:   Exercise Sets Reps AROM AAROM PROM Self PROM Comments   Chest press 3 10 [x] [] [] []    Elbow flex/ext 3 15 [x] [] [] []    Shoulder E/F 3 10 [x] [] [] []         Pain:  7/10 at rest and 7.5 with activity in stomach area    Activity Tolerance:   Good and requires rest breaks  Please refer to the flowsheet for vital signs taken during this treatment. After treatment patient left in no apparent distress:   Supine in bed, Call bell within reach, Bed / chair alarm activated, and Side rails x 3    COMMUNICATION/COLLABORATION:   The patients plan of care was discussed with: Registered nurse.      MAYNOR Dawson  Time Calculation: 23 mins

## 2022-06-06 NOTE — PROGRESS NOTES
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  Status post GI surgery  Other medical problems      EXAM:  Awake, alert, oriented, not aphasic  Visual obscuration still present  No new focality        ASSESSMENT/PLAN:  Current treatment continues    ALLERGIES:    Allergies   Allergen Reactions    Beef Containing Products Anaphylaxis and Other (comments)    Diltiazem Other (comments)     Reaction Type: Allergy; Reaction(s): Pressure in chest    Iodinated Contrast Media Other (comments)     Reaction Type: Allergy; Severity: Severe; Reaction(s): hives,throat swelling    Pork Derived (Porcine) Hives    Shellfish Containing Products Swelling     Other reaction(s): Other  Reaction Type: Allergy; Reaction(s): Hives,throat swelling    Sulfa (Sulfonamide Antibiotics) Unknown (comments)     Spinal meningitis    Sulfur Unknown (comments)     Reaction Type: Allergy  Spinal meningitis    Lactose Diarrhea     Lactose intolerant per Pt.        MEDS:      Current Facility-Administered Medications:     fluconazole (DIFLUCAN) tablet 200 mg, 200 mg, Oral, Q7D, Nilesh Clement MD, 200 mg at 06/01/22 1816    piperacillin-tazobactam (ZOSYN) 3.375 g in 0.9% sodium chloride (MBP/ADV) 100 mL MBP, 3.375 g, IntraVENous, Q8H, Fariha Martinez MD, Last Rate: 25 mL/hr at 06/06/22 0515, 3.375 g at 06/06/22 0515    HYDROmorphone (DILAUDID) injection 1 mg, 1 mg, IntraVENous, Q4H PRN, Nilesh Clement MD, 1 mg at 06/06/22 0406    HYDROmorphone (DILAUDID) syringe 0.5 mg, 0.5 mg, IntraVENous, Q4H PRN, Nilesh Clement MD, 0.5 mg at 05/27/22 1948    clopidogreL (PLAVIX) tablet 75 mg, 75 mg, Oral, DAILY, KIMBERLY Rey IV, MD, 75 mg at 06/05/22 1032    aspirin chewable tablet 81 mg, 81 mg, Oral, DAILY, KIMBERLY Rey IV, MD, 81 mg at 06/05/22 1032    0.9% sodium chloride infusion 250 mL, 250 mL, IntraVENous, PRN, Abigail Hidalgo MD    acetaminophen (TYLENOL) tablet 650 mg, 650 mg, Oral, Q4H PRN, Alexandre Lopez MD, 650 mg at 05/18/22 1838    famotidine (PF) (PEPCID) 20 mg in 0.9% sodium chloride 10 mL injection, 20 mg, IntraVENous, Q12H, Alexandre Lopez MD, 20 mg at 06/05/22 2218    dextrose 5% - 0.45% NaCl with KCl 20 mEq/L infusion, 75 mL/hr, IntraVENous, CONTINUOUS, Alexandre Lopez MD, Last Rate: 75 mL/hr at 06/06/22 0011, 75 mL/hr at 06/06/22 0011    oxyCODONE IR (ROXICODONE) tablet 10 mg, 10 mg, Oral, Q4H PRN, Alexandre Lopez MD, 10 mg at 05/27/22 1404    ondansetron (ZOFRAN) injection 4 mg, 4 mg, IntraVENous, Q4H PRN, Alexandre Lopez MD, 4 mg at 06/06/22 0406    verapamil ER (VERELAN PM) capsule 200 mg, 200 mg, Oral, QHS, Heloise Peabody E, MD, 200 mg at 06/05/22 2218    [Held by provider] potassium chloride SR (KLOR-CON 10) tablet 10 mEq, 10 mEq, Oral, BID, Heloise Peabody E, MD, 10 mEq at 04/30/22 2142    apixaban (ELIQUIS) tablet 5 mg, 5 mg, Oral, BID, Magda Ortega MD, 5 mg at 06/05/22 2218    amiodarone (CORDARONE) tablet 200 mg, 200 mg, Oral, DAILY, Starla Ramos MD, 200 mg at 06/05/22 1032    lisinopriL (PRINIVIL, ZESTRIL) tablet 20 mg, 20 mg, Oral, DAILY, Alexandre Lopez MD, 20 mg at 06/05/22 1032    predniSONE (DELTASONE) tablet 20 mg, 20 mg, Oral, DAILY WITH BREAKFAST, Alexandre Lopez MD, 20 mg at 06/05/22 1032    insulin lispro (HUMALOG) injection, , SubCUTAneous, AC&HS, Alexandre Lopez MD, 6 Units at 06/05/22 2218    glucose chewable tablet 16 g, 4 Tablet, Oral, PRN, Alexandre Lopez MD    glucagon (GLUCAGEN) injection 1 mg, 1 mg, IntraMUSCular, PRN, Alexandre Lopez MD, 1 mg at 05/06/22 0559    dextrose 10% infusion 0-250 mL, 0-250 mL, IntraVENous, PRN, Alexandre Lopez MD, Last Rate: 750 mL/hr at 05/02/22 1558, 125 mL at 05/02/22 1558    labetaloL (NORMODYNE;TRANDATE) 20 mg/4 mL (5 mg/mL) injection 10 mg, 10 mg, IntraVENous, Q6H PRN, Alexandre Lopez MD, 10 mg at 05/03/22 1423    LABS:  Recent Results (from the past 24 hour(s))   GLUCOSE, POC    Collection Time: 06/05/22  8:16 AM   Result Value Ref Range    Glucose (POC) 184 (H) 65 - 117 mg/dL    Performed by Cecilia Motto    GLUCOSE, POC    Collection Time: 06/05/22 12:11 PM   Result Value Ref Range    Glucose (POC) 252 (H) 65 - 117 mg/dL    Performed by Cecilia Motto    GLUCOSE, POC    Collection Time: 06/05/22  4:52 PM   Result Value Ref Range    Glucose (POC) 275 (H) 65 - 117 mg/dL    Performed by Orquidea Diallo    CBC WITH AUTOMATED DIFF    Collection Time: 06/05/22  6:15 PM   Result Value Ref Range    WBC 13.2 (H) 4.1 - 11.1 K/uL    RBC 3.94 (L) 4.10 - 5.70 M/uL    HGB 8.9 (L) 12.1 - 17.0 g/dL    HCT 29.6 (L) 36.6 - 50.3 %    MCV 75.1 (L) 80.0 - 99.0 FL    MCH 22.6 (L) 26.0 - 34.0 PG    MCHC 30.1 30.0 - 36.5 g/dL    RDW 18.3 (H) 11.5 - 14.5 %    PLATELET 065 (H) 533 - 400 K/uL    MPV 11.7 8.9 - 12.9 FL    NRBC 0.0 0.0  WBC    ABSOLUTE NRBC 0.00 0.00 - 0.01 K/uL    NEUTROPHILS 93 (H) 32 - 75 %    LYMPHOCYTES 4 (L) 12 - 49 %    MONOCYTES 2 (L) 5 - 13 %    EOSINOPHILS 0 0 - 7 %    BASOPHILS 0 0 - 1 %    IMMATURE GRANULOCYTES 1 (H) 0 - 0.5 %    ABS. NEUTROPHILS 12.3 (H) 1.8 - 8.0 K/UL    ABS. LYMPHOCYTES 0.5 (L) 0.8 - 3.5 K/UL    ABS. MONOCYTES 0.3 0.0 - 1.0 K/UL    ABS. EOSINOPHILS 0.0 0.0 - 0.4 K/UL    ABS. BASOPHILS 0.0 0.0 - 0.1 K/UL    ABS. IMM.  GRANS. 0.1 (H) 0.00 - 0.04 K/UL    DF AUTOMATED     METABOLIC PANEL, COMPREHENSIVE    Collection Time: 06/05/22  6:15 PM   Result Value Ref Range    Sodium 135 (L) 136 - 145 mmol/L    Potassium 5.1 3.5 - 5.1 mmol/L    Chloride 104 97 - 108 mmol/L    CO2 25 21 - 32 mmol/L    Anion gap 6 5 - 15 mmol/L    Glucose 289 (H) 65 - 100 mg/dL    BUN 10 6 - 20 mg/dL    Creatinine 0.66 (L) 0.70 - 1.30 mg/dL    BUN/Creatinine ratio 15 12 - 20      GFR est AA >60 >60 ml/min/1.73m2    GFR est non-AA >60 >60 ml/min/1.73m2    Calcium 8.4 (L) 8.5 - 10.1 mg/dL    Bilirubin, total 0.2 0.2 - 1.0 mg/dL    AST (SGOT) 21 15 - 37 U/L    ALT (SGPT) 16 12 - 78 U/L    Alk. phosphatase 88 45 - 117 U/L    Protein, total 5.6 (L) 6.4 - 8.2 g/dL    Albumin 2.6 (L) 3.5 - 5.0 g/dL    Globulin 3.0 2.0 - 4.0 g/dL    A-G Ratio 0.9 (L) 1.1 - 2.2     GLUCOSE, POC    Collection Time: 06/05/22  7:28 PM   Result Value Ref Range    Glucose (POC) 279 (H) 65 - 117 mg/dL    Performed by Darinel Sorianoas        Visit Vitals  /75   Pulse 78   Temp 98.6 °F (37 °C)   Resp 18   Ht 5' 7.4\" (1.712 m)   Wt 66 kg (145 lb 8.1 oz)   SpO2 98%   BMI 22.52 kg/m²       Imaging:  XR HIP LT W OR WO PELV 2-3 VWS   Final Result      DUPLEX CAROTID BILATERAL   Final Result      MRI BRAIN WO CONT   Final Result         1. Evolving ischemic infarcts in multiple vascular territories, involving both   the right occipital lobe and left frontal lobe. 2. Left occipital encephalomalacia      CT ABD PELV WO CONT   Final Result   Small bowel ileus versus partial obstruction, at the ileocolic   anastomosis or ileocecal valve, slightly greater than previous. There is mixed   density fluid throughout this region, as before. . Consider follow-up study with   IV contrast and if possible, oral contrast. Consider NG tube. XR ABD (KUB)   Final Result      XR ABD (KUB)   Final Result   Small bowel dilatation consistent with obstruction, not   significantly changed. XR ABD (KUB)   Final Result   Persistent small bowel distention as seen with obstruction. XR ABD (KUB)   Final Result   No significant change compared to abdomen series 5/5/2022. Persistent air and fluid-filled dilated small bowel loops. XR ABD (KUB)   Final Result   Multiple distended gas-filled loops of small bowel compatible with   partial small bowel obstruction versus ileus. CT ABD PELV WO CONT   Final Result   Moderate grade partial small bowel obstruction. Small volume complex ascites. CT ABD PELV WO CONT   Final Result   1. Improving hemoperitoneum and pneumoperitoneum.    2. Ileocolic anastomosis in the right lower quadrant. Incompletely evaluated   without contrast. No evidence of bowel obstruction. 3.  Reticular and groundglass airspace disease in the visualized lung bases   redemonstrated. 4.  See full report for detailed findings. CT ABD PELV WO CONT   Final Result   1. Status post ileal cecal surgery. Surrounding extraluminal dense material may   represent blood products (no indication of enteric contrast administration). Anastomotic leak cannot be excluded. Consider imaging with water-soluble oral   contrast. Free air in the abdomen likely within normal limits post recent   surgery. The bowel does not appear obstructed. 2. Bilateral lower lung airspace disease likely infectious or inflammatory. 3. Bilateral femoral head sclerosis suggests AVN.       CT ABD PELV WO CONT    (Results Pending)

## 2022-06-06 NOTE — PROGRESS NOTES
Progress Note    Patient: Dewey Drake MRN: [id number]  SSN: [id number]    YOB: 1970  Age: 47 y.o. Sex: male      Admit Date: 4/15/2022    LOS: 52 days     Subjective:   Patient seen in bed  Tolerating diet  Ileostomy functioning  Continues to have visual disturbances    Objective:     Vitals:    06/05/22 1059 06/05/22 2031 06/06/22 0821 06/06/22 1246   BP: (!) 145/93 138/75 (!) 150/89    Pulse: 89 78 83    Resp: 18 18 20    Temp: 98.7 °F (37.1 °C) 98.6 °F (37 °C) 98.4 °F (36.9 °C)    SpO2: 100% 98% 100%    Weight:    145 lb 8.1 oz (66 kg)   Height:            Intake and Output:  Current Shift: No intake/output data recorded. Last three shifts: 06/04 1901 - 06/06 0700  In: -   Out: 1100 [Urine:900]    Review of Systems:  ROS     Physical Exam:   Abdomen is soft, mildly tender palpation of the right side, ileostomy is pink and productive of stool, incisions clean with scant drainage from open wound portion    Lab/Data Review:  Recent Results (from the past 24 hour(s))   GLUCOSE, POC    Collection Time: 06/05/22  4:52 PM   Result Value Ref Range    Glucose (POC) 275 (H) 65 - 117 mg/dL    Performed by Eddy Garcia    CBC WITH AUTOMATED DIFF    Collection Time: 06/05/22  6:15 PM   Result Value Ref Range    WBC 13.2 (H) 4.1 - 11.1 K/uL    RBC 3.94 (L) 4.10 - 5.70 M/uL    HGB 8.9 (L) 12.1 - 17.0 g/dL    HCT 29.6 (L) 36.6 - 50.3 %    MCV 75.1 (L) 80.0 - 99.0 FL    MCH 22.6 (L) 26.0 - 34.0 PG    MCHC 30.1 30.0 - 36.5 g/dL    RDW 18.3 (H) 11.5 - 14.5 %    PLATELET 820 (H) 225 - 400 K/uL    MPV 11.7 8.9 - 12.9 FL    NRBC 0.0 0.0  WBC    ABSOLUTE NRBC 0.00 0.00 - 0.01 K/uL    NEUTROPHILS 93 (H) 32 - 75 %    LYMPHOCYTES 4 (L) 12 - 49 %    MONOCYTES 2 (L) 5 - 13 %    EOSINOPHILS 0 0 - 7 %    BASOPHILS 0 0 - 1 %    IMMATURE GRANULOCYTES 1 (H) 0 - 0.5 %    ABS. NEUTROPHILS 12.3 (H) 1.8 - 8.0 K/UL    ABS. LYMPHOCYTES 0.5 (L) 0.8 - 3.5 K/UL    ABS. MONOCYTES 0.3 0.0 - 1.0 K/UL    ABS.  EOSINOPHILS 0.0 0.0 - 0.4 K/UL    ABS. BASOPHILS 0.0 0.0 - 0.1 K/UL    ABS. IMM. GRANS. 0.1 (H) 0.00 - 0.04 K/UL    DF AUTOMATED     METABOLIC PANEL, COMPREHENSIVE    Collection Time: 06/05/22  6:15 PM   Result Value Ref Range    Sodium 135 (L) 136 - 145 mmol/L    Potassium 5.1 3.5 - 5.1 mmol/L    Chloride 104 97 - 108 mmol/L    CO2 25 21 - 32 mmol/L    Anion gap 6 5 - 15 mmol/L    Glucose 289 (H) 65 - 100 mg/dL    BUN 10 6 - 20 mg/dL    Creatinine 0.66 (L) 0.70 - 1.30 mg/dL    BUN/Creatinine ratio 15 12 - 20      GFR est AA >60 >60 ml/min/1.73m2    GFR est non-AA >60 >60 ml/min/1.73m2    Calcium 8.4 (L) 8.5 - 10.1 mg/dL    Bilirubin, total 0.2 0.2 - 1.0 mg/dL    AST (SGOT) 21 15 - 37 U/L    ALT (SGPT) 16 12 - 78 U/L    Alk.  phosphatase 88 45 - 117 U/L    Protein, total 5.6 (L) 6.4 - 8.2 g/dL    Albumin 2.6 (L) 3.5 - 5.0 g/dL    Globulin 3.0 2.0 - 4.0 g/dL    A-G Ratio 0.9 (L) 1.1 - 2.2     GLUCOSE, POC    Collection Time: 06/05/22  7:28 PM   Result Value Ref Range    Glucose (POC) 279 (H) 65 - 117 mg/dL    Performed by Darinel Geller    GLUCOSE, POC    Collection Time: 06/06/22  7:44 AM   Result Value Ref Range    Glucose (POC) 116 65 - 117 mg/dL    Performed by Jannice Res    GLUCOSE, POC    Collection Time: 06/06/22 11:38 AM   Result Value Ref Range    Glucose (POC) 282 (H) 65 - 117 mg/dL    Performed by Jannice Res           Assessment:     Active Problems:    Colon cancer ([address] (4/15/2022)      Cecal polyp (4/15/2022)        Plan:   CT results noted, mild increase in white blood cell count, continue Zosyn  Continue diet as ordered  Continue PT/OT  Neurology following, patient on Plavix and Eliquis for embolic stroke    Signed By: Kt Crawford MD     June 6, 2022

## 2022-06-06 NOTE — PROGRESS NOTES
On Friday CM requested [facility] initiate insurance auth. CM has messaged Araceli Lopez with [facility] this morning and attempted to call this afternoon at [phone]. No answer at this time. CM is trying to confirm auth was initiated. We still will need a new covid PCR ordered. Per [facility] it is good for 7 days.

## 2022-06-06 NOTE — PROGRESS NOTES
Problem: Falls - Risk of  Goal: *Absence of Falls  Description: Document Tae Santana Fall Risk and appropriate interventions in the flowsheet. Outcome: Progressing Towards Goal  Note: Fall Risk Interventions:  Mobility Interventions: Bed/chair exit alarm         Medication Interventions: Bed/chair exit alarm    Elimination Interventions: Call light in reach    History of Falls Interventions: Bed/chair exit alarm         Problem: Patient Education: Go to Patient Education Activity  Goal: Patient/Family Education  Outcome: Progressing Towards Goal     Problem: Pain  Goal: *Control of Pain  Outcome: Progressing Towards Goal     Problem: Infection - Risk of, Surgical Site Infection  Goal: *Absence of surgical site infection signs and symptoms  Outcome: Progressing Towards Goal     Problem: Pressure Injury - Risk of  Goal: *Prevention of pressure injury  Description: Document Yuri Scale and appropriate interventions in the flowsheet.   Outcome: Progressing Towards Goal  Note: Pressure Injury Interventions:  Sensory Interventions: Assess changes in LOC    Moisture Interventions: Apply protective barrier, creams and emollients    Activity Interventions: PT/OT evaluation    Mobility Interventions: Float heels    Nutrition Interventions: Document food/fluid/supplement intake    Friction and Shear Interventions: Apply protective barrier, creams and emollients                Problem: Patient Education: Go to Patient Education Activity  Goal: Patient/Family Education  Outcome: Progressing Towards Goal     Problem: Patient Education: Go to Patient Education Activity  Goal: Patient/Family Education  Outcome: Progressing Towards Goal     Problem: Patient Education: Go to Patient Education Activity  Goal: Patient/Family Education  Outcome: Progressing Towards Goal

## 2022-06-07 NOTE — PROGRESS NOTES
Problem: Pain  Goal: *Control of Pain  Outcome: Progressing Towards Goal  Note: Patient is reporting that is pain in improving with medication.  Pain will continue to be assessed and treated as needed

## 2022-06-07 NOTE — PROGRESS NOTES
Progress Note    Patient: Brandy Fox MRN: [id number]  SSN: [id number]    YOB: 1970  Age: 47 y.o.   Sex: male      Admit Date: 4/15/2022    LOS: 53 days     Subjective:   Patient seen in bed  Continues to complain of right-sided abdominal pain which she states is slightly worse with eating  Ileostomy continues to function well      Objective:     Vitals:    06/07/22 0533 06/07/22 0808 06/07/22 1345 06/07/22 1537   BP:  (!) 142/91  131/84   Pulse: 80 90     Resp:  20  19   Temp:  98 °F (36.7 °C)  99.1 °F (37.3 °C)   SpO2:  100%  100%   Weight:       Height:   5' 7.4\" (1.712 m)         Intake and Output:  Current Shift: 06/07 0701 - 06/07 1900  In: -   Out: 125   Last three shifts: 06/05 1901 - 06/07 0700  In: 1442.6 [I.V.:1442.6]  Out: 1750 [Urine:1300]    Review of Systems:  ROS     Physical Exam:   And is soft, nondistended, tender palpation on the right side, ileostomy right side pink and productive stool, midline incision decreased drainage from open areas    Lab/Data Review:  Recent Results (from the past 24 hour(s))   GLUCOSE, POC    Collection Time: 06/06/22  8:40 PM   Result Value Ref Range    Glucose (POC) 179 (H) 65 - 117 mg/dL    Performed by Ron Echeverria, POC    Collection Time: 06/07/22  8:05 AM   Result Value Ref Range    Glucose (POC) 231 (H) 65 - 117 mg/dL    Performed by Genaro Carey    GLUCOSE, POC    Collection Time: 06/07/22 11:59 AM   Result Value Ref Range    Glucose (POC) 209 (H) 65 - 117 mg/dL    Performed by Genaro Carey    GLUCOSE, POC    Collection Time: 06/07/22  3:56 PM   Result Value Ref Range    Glucose (POC) 216 (H) 65 - 117 mg/dL    Performed by Genaro Carey           Assessment:     Active Problems:    Colon cancer ([address] (4/15/2022)      Cecal polyp (4/15/2022)        Plan:   Continue diet with nutritional supplements  Continue physical therapy  Repeat labs in a.m., patient is on Zosyn currently  Continue Plavix and Eliquis      Signed By: Kell Powers MD     June 7, 2022

## 2022-06-07 NOTE — PROGRESS NOTES
Comprehensive Nutrition Assessment    Type and Reason for Visit: Reassess (Goal)    Nutrition Recommendations/Plan:   1. Continue diet-- update preferences. 2. Modify ONS-- prefers plant-based/nondairy ONS. 3. Monitor while IP. Malnutrition Assessment:  Malnutrition Status:  Mild malnutrition (05/31/22 1152)    Context:  Acute illness       Nutrition Assessment:    Admitted for colon CA and cecal polyp w/ sx resection. Pt reported good appetite w/ poor intake 2/2 food preferences. (4/28) Intakes excellent, >76% of meals, with good ONS acceptance. (5/2) CT indicative of partial SBO. (5/4) Advanced to Clear Liq however N/V/C continues, (5/5) NPO, PPN initiated. (5/9) CT still finding SBO. (5/13) Ex-lap with resection of ileocolic anastamosis and reanastomosis, (5/15) return to OR for anastomotic leak. PPN off x2 days now, receiving D5 at 125ml/hr providing 510kcals/d. RD discussed with pt need to advance to TPN however pt denies wanting central line. Discussed with RN & Surgeon, plan to continue PPN. RD communicated recs with pharmacy, will advance to 500ml bag 3x/wk to try and increase kcal provision. Overall, PPN provided x8d. (5/18) Pt tolerating clear liquids and advanced to full with plan let TPN run out. Will add ONS. (5/24) Pt continues to tolerate diet without n/v. (5/31) S/p diet upgrade to regular texture intakes improved to >50% of meals. Tolerating diet, no wt loss. (6/7)Fair appetite/intake reported- ate 50% 2/2 preferences. Intake likely to improve w/ provision of Alternative menu. Pt had concern for ONS; prefers plant based- modify as requested. Labs: POC -318 mg/dL. Meds: Humalog, lisinopril, pepcid, zosyn, prednisone, zofran, dilaudid. Nutrition Related Findings:    No N/V/D/C nor c/s issues per Pt. Last BM 6/6 +ostomy in place. No edema.  Wound Type: Multiple,Surgical incision     Current Nutrition Intake & Therapies:  Average Meal Intake: 26-50%  Average Supplement Intake: %  ADULT DIET Regular; NO beef, pork, shellfish. Lactose intolerant. Cheese is Okay as requested. ADULT ORAL NUTRITION SUPPLEMENT Breakfast, Lunch, Dinner; Clear Liquid    Anthropometric Measures:  Height: 5' 7.4\" (171.2 cm)  Ideal Body Weight (IBW): 150 lbs (68 kg)  Admission Body Weight: 160 lb  Current Body Wt:  66 kg (145 lb 8.1 oz), 97 % IBW. Bed scale  Current BMI (kg/m2): 22.5  Usual Body Weight: 72.6 kg (160 lb)  % Weight Change (Calculated): -8  Weight Adjustment: No adjustment   BMI Category: Normal weight (BMI 18.5-24. 9)    Estimated Daily Nutrient Needs:  Energy Requirements Based On: Kcal/kg  Weight Used for Energy Requirements: Current  Energy (kcal/day): 1983kcal (30kcal/kg)  Weight Used for Protein Requirements: Current  Protein (g/day): 99g (1.5g/kg)  Method Used for Fluid Requirements: 1 ml/kcal  Fluid (ml/day): 1983mL (1mL/kcal)    Nutrition Diagnosis:   · Inadequate oral intake related to other (specify),food and nutrition related knowledge deficit,altered GI function (food preferences) as evidenced by intake 26-50%,GI abnormality    Nutrition Interventions:   Food and/or Nutrient Delivery: Continue current diet,Modify oral nutrition supplement  Nutrition Education/Counseling: Survival skills/brief education completed  Coordination of Nutrition Care: Continue to monitor while inpatient  Plan of Care discussed with: RN, Pt.    Goals:  Previous Goal Met: Goal(s) achieved  Goals: PO intake 50% or greater,Meet at least 75% of estimated needs    Nutrition Monitoring and Evaluation:   Behavioral-Environmental Outcomes: Beliefs and attitudes  Food/Nutrient Intake Outcomes: Diet advancement/tolerance,Food and nutrient intake,Supplement intake  Physical Signs/Symptoms Outcomes: Biochemical data,Meal time behavior,Weight,GI status    Discharge Planning:    Continue oral nutrition supplement    Mikal Simmons RD  Contact: ext. [phone] or [employer].

## 2022-06-07 NOTE — PROGRESS NOTES
OCCUPATIONAL THERAPY TREATMENT  Patient: Edenilson Watson (55 y.o. male)  Date: 6/7/2022  Diagnosis: Adenomatous polyp of colon, unspecified part of colon [D12.6]  Colon cancer ([facility] [C18.9]  Cecal polyp [K63.5] <principal problem not specified>  Procedure(s) (LRB):  Exploratory laparotomy, abdominal washout, resection of ileocolic anastomosis with end ileostomy (N/A) 23 Days Post-Op  Precautions:    Chart, occupational therapy assessment, plan of care, and goals were reviewed. ASSESSMENT  Patient continues with skilled OT services and is progressing towards goals. Pt. Received semi-supine in bed and agreeable to tx session. Pt. Performed bed mobility with Mod I, good sitting balance noted at EOB, sit> stand CGA and use of RW upon standing for support. Functional ambulation in pts room with CGA and use of RW. Pt. Not able to tolerate ADl's at sink level and limited participation in functional ambulation d/t increased pain . Pt transferred to chair with CGA and additional time. Pt. Completed ADL's while seated in chair with set-up assistance and IND to perform. Pt. C/o 8/10 pain in colostomy bag area. Pt. Left with PTA in pts room to participate in tx session. REC. IRF when medically appropriate for discharge. Current Level of Function Impacting Discharge (ADLs): assistance with functional ambulation, pain limiting functional ambulation to perform OOB ADL's, set-up assistance for ADL's    Other factors to consider for discharge:PLOF, time since on set         PLAN :  Patient continues to benefit from skilled intervention to address the above impairments. Continue treatment per established plan of care. to address goals.     Recommendation for discharge: (in order for the patient to meet his/her long term goals)  Therapy 3 hours per day 5-7 days per week    This discharge recommendation:  Has been made in collaboration with the attending provider and/or case management    IF patient discharges home will need the following DME: TBD       SUBJECTIVE:   Patient agreeable to tx session. OBJECTIVE DATA SUMMARY:   Cognitive/Behavioral Status:  Neurologic State: Alert  Orientation Level: Oriented X4  Cognition: Appropriate decision making; Follows commands    Functional Mobility and Transfers for ADLs:  Bed Mobility:  Rolling: Modified independent  Supine to Sit: Modified independent  Scooting: Modified independent    Transfers:  Sit to Stand: Contact guard assistance     Bed to Chair: Contact guard assistance    Balance:  Sitting: Intact  Standing: Impaired; With support  Standing - Static: Constant support;Good  Standing - Dynamic : Constant support; Fair    ADL Intervention:       Grooming  Grooming Assistance: Independent  Position Performed: Seated in chair  Washing Face: Independent; Set-up  Brushing Teeth: Independent      Pain:  8/10 pain reported    Activity Tolerance:   Fair and requires rest breaks  Please refer to the flowsheet for vital signs taken during this treatment. After treatment patient left in no apparent distress:   Sitting in chair and Call bell within reach    COMMUNICATION/COLLABORATION:   The patients plan of care was discussed with: Physical therapy assistant and Registered nurse.  Slight overlap with PTA     Zarnia Samples  Time Calculation: 33 mins    Problem: Self Care Deficits Care Plan (Adult)  Goal: *Acute Goals and Plan of Care (Insert Text)  Description: Pt stated goal \"to decrease pain\"  Pt will be Mod I sup <> sit in prep for EOB ADLs  Pt will be Mod I grooming standing sink side LRAD  Pt will be Mod I UB dressing sitting EOB/long sit   Pt will be Mod I LE dressing sitting EOB/long sit  Pt will be Mod I sit <>  prep for toileting LRAD  Pt will be Mod I toileting/toilet transfer/cloth mgmt LRAD  Pt will be IND following UE HEP in prep for self care tasks      Outcome: Progressing Towards Goal

## 2022-06-07 NOTE — PROGRESS NOTES
Per attending patient is not yet medically stable. Updates provided to [facility]. Auth remains pending at this time. Patient will need a new COVID PCR prior to going [facility]. Per [facility] the PCR is good for 7 days.

## 2022-06-07 NOTE — PROGRESS NOTES
Problem: Mobility Impaired (Adult and Pediatric)  Goal: *Acute Goals and Plan of Care (Insert Text)  Description: Pt will be I with LE HEP in 7 days. Pt will perform bed mobility with SBA in 7 days. Pt will perform transfers with SBA in 7 days. Pt will amb 5-10 feet with LRAD safely with Mod I/Independent in 7 days. Pt will demonstrate improvement in standing dynamic balance from CGA to Mod I/Independent in 7 days. Outcome: Progressing Towards Goal   PHYSICAL THERAPY TREATMENT  Patient: Nelly García (89 y.o. male)  Date: 6/7/2022  Diagnosis: Adenomatous polyp of colon, unspecified part of colon [D12.6]  Colon cancer ([address] [C18.9]  Cecal polyp [K63.5] <principal problem not specified>  Procedure(s) (LRB):  Exploratory laparotomy, abdominal washout, resection of ileocolic anastomosis with end ileostomy (N/A) 23 Days Post-Op  Precautions:    Chart, physical therapy assessment, plan of care and goals were reviewed. ASSESSMENT  Patient continues with skilled PT services and is progressing towards goals. Pt. Sitting in chair upon arrival working with OT. OT ambulated pt to the sink prior to PT arrival. Pt. Wanted to get back to bed. Sit to stand with Mod assist X 1 and RW from low chair. Pt. Ambulated 15' with RW and CGA X 1. Pt. Stated he was tired from working with OT and cannot ambulate any farther today. Limited with gt. Distance due to fatigue. Sit to supine with min assist X 1. Pt. Performed some LE exercises in supine. Current Level of Function Impacting Discharge (mobility/balance): decreased mobility/low endurance    Other factors to consider for discharge: safety         PLAN :  Patient continues to benefit from skilled intervention to address the above impairments. Continue treatment per established plan of care. to address goals.     Recommendation for discharge: (in order for the patient to meet his/her long term goals)  [address]     This discharge recommendation:  Has been made in collaboration with the attending provider and/or case management    IF patient discharges home will need the following DME: IRF       SUBJECTIVE:   Patient stated I can't sit up anymore and I can't walk any further either. \"     OBJECTIVE DATA SUMMARY:   Critical Behavior:  Neurologic State: Alert  Orientation Level: Oriented X4  Cognition: Appropriate decision making,Follows commands     Functional Mobility Training:  Bed Mobility:  Sit to Supine: Minimum assistance  Scooting: Modified independent        Transfers:  Sit to stand with Mod Assist X 1  Stand to Sit: Contact guard assistance to bed. Bed to Chair: Contact guard assistance                    Balance:  Sitting: Intact  Standing: Impaired; With support  Standing - Static: Constant support;Good  Standing - Dynamic : Constant support; Fair  Ambulation/Gait Training:  Distance (ft): 15 Feet (ft)  Assistive Device: Walker, rolling  Ambulation - Level of Assistance: Contact guard assistance                 Base of Support: Narrowed     Speed/Daysi: Slow  Step Length: Right shortened;Left shortened      Pt. Could not ambulate any farther since ambulated some with OT in prior session. Therapeutic Exercises: Therapeutic Exercises:       EXERCISE   Sets   Reps   Active Active Assist   Passive Self ROM   Comments   Ankle Pumps  10 [x] [] [] []    Quad Sets/Glut Sets  10 [x] [] [] []    Hamstring Sets   [] [] [] []    Short Arc Quads   [] [] [] []    Heel Slides  10 [] [x] [] []    Straight Leg Raises   [] [] [] []    Hip abd/add  10 [] [x] [] []    Long Arc Quads   [] [] [] []    Marching   [] [] [] []       [] [] [] []     Reviewed importance of mobility and exercises with pt. Pain Ratin/10     Activity Tolerance:   Fair and requires rest breaks  Please refer to the flowsheet for vital signs taken during this treatment.     After treatment patient left in no apparent distress:   Supine in bed, Call bell within reach, Bed / chair alarm activated, Side rails x 3, and Notified RN of session. COMMUNICATION/COLLABORATION:   The patients plan of care was discussed with: Registered nurse.      Natalie Yepez   Time Calculation: 23 mins

## 2022-06-07 NOTE — PROGRESS NOTES
Pt angry that his IV dilaudid was discontinued. Pt states he does not want to be touched unitl it is corrected. Pt refused to allow ostomy appliance to be changed after calling the nurse to change it. PT refused PO pain medications stating it does not work for him. Dr. Espinoza Proper notified and will renew the pain medication order. Went back to inform the patient of order change. He states he does not want me to do anything for him because he heard me discussing his condition with case management. Pt called on the phone to speak with case management and requested a patient advocate. The conversation with case management was a result of her asking me what was wrong with that patient. Nurse manager notified of patient's concerns and request for a new nurse.

## 2022-06-07 NOTE — PROGRESS NOTES
Problem: Pain  Goal: *Control of Pain  Outcome: Progressing Towards Goal  Note: Patient is reporting that is pain in improving with medication. Pain will continue to be assessed and treated as needed         Bedside shift change report given to Wm Donnelly RN (oncoming nurse) by Shira Hunt (offgoing nurse). Report included the following information {SBAR REPORTS [id number]}.

## 2022-06-08 NOTE — PROGRESS NOTES
Progress Note    Patient: Zion Purvis MRN: [id number]  SSN: [id number]    YOB: 1970  Age: 47 y.o. Sex: male      Admit Date: 4/15/2022    LOS: 54 days     Subjective:   Patient seen in bed  Continues to have issues with pouching of her stoma,, can tell has not been seen by wound care recently  Continues to complain of some right-sided abdominal pain  Is tolerating a diet  Continues to have visual disturbances, followed by neurology    Objective:     Vitals:    06/07/22 2117 06/08/22 0159 06/08/22 0720 06/08/22 0745   BP: (!) 154/95 (!) 145/95  132/88   Pulse: 88 88  89   Resp: 20 20  19   Temp: 98.4 °F (36.9 °C) 98.5 °F (36.9 °C)  97.4 °F (36.3 °C)   SpO2: 100% 100%  100%   Weight:   134 lb 14.7 oz (61.2 kg)    Height:            Intake and Output:  Current Shift: No intake/output data recorded.   Last three shifts: 06/06 1901 - 06/08 0700  In: 1442.6 [I.V.:1442.6]  Out: 5706 [Urine:2000]    Review of Systems:  ROS     Physical Exam:   Physical Exam     Lab/Data Review:  Recent Results (from the past 24 hour(s))   GLUCOSE, POC    Collection Time: 06/07/22 11:59 AM   Result Value Ref Range    Glucose (POC) 209 (H) 65 - 117 mg/dL    Performed by Cuca Rojas    GLUCOSE, POC    Collection Time: 06/07/22  3:56 PM   Result Value Ref Range    Glucose (POC) 216 (H) 65 - 117 mg/dL    Performed by Cuca Crystal    GLUCOSE, POC    Collection Time: 06/07/22  9:23 PM   Result Value Ref Range    Glucose (POC) 272 (H) 65 - 117 mg/dL    Performed by Ivan ALAMO    CBC WITH AUTOMATED DIFF    Collection Time: 06/08/22  2:17 AM   Result Value Ref Range    WBC 13.1 (H) 4.1 - 11.1 K/uL    RBC 3.82 (L) 4.10 - 5.70 M/uL    HGB 8.7 (L) 12.1 - 17.0 g/dL    HCT 28.8 (L) 36.6 - 50.3 %    MCV 75.4 (L) 80.0 - 99.0 FL    MCH 22.8 (L) 26.0 - 34.0 PG    MCHC 30.2 30.0 - 36.5 g/dL    RDW 18.4 (H) 11.5 - 14.5 %    PLATELET 005 627 - 844 K/uL    MPV 10.7 8.9 - 12.9 FL    NRBC 0.0 0.0  WBC    ABSOLUTE NRBC 0.00 0.00 - 0.01 K/uL    NEUTROPHILS 75 32 - 75 %    LYMPHOCYTES 14 12 - 49 %    MONOCYTES 9 5 - 13 %    EOSINOPHILS 1 0 - 7 %    BASOPHILS 0 0 - 1 %    IMMATURE GRANULOCYTES 1 (H) 0 - 0.5 %    ABS. NEUTROPHILS 9.9 (H) 1.8 - 8.0 K/UL    ABS. LYMPHOCYTES 1.9 0.8 - 3.5 K/UL    ABS. MONOCYTES 1.2 (H) 0.0 - 1.0 K/UL    ABS. EOSINOPHILS 0.1 0.0 - 0.4 K/UL    ABS. BASOPHILS 0.0 0.0 - 0.1 K/UL    ABS. IMM.  GRANS. 0.1 (H) 0.00 - 0.04 K/UL    DF AUTOMATED     GLUCOSE, POC    Collection Time: 06/08/22  8:03 AM   Result Value Ref Range    Glucose (POC) 104 65 - 117 mg/dL    Performed by Shital Vasqeuz           Assessment:     Active Problems:    Colon cancer ([facility] [address] (4/15/2022)      Cecal polyp (4/15/2022)        Plan:   Patient states he has missed his dose of rituximab, we will plan on ordering to be administered here  Wound care reconsulted patient may need different appliance possibly a convex appliance he also has significant peristomal irritation  Continue diet  White blood cell count continues to be mildly high at 13,000 continue Zosyn  Midline incision decreased drainage continue to follow with dry dressings    Signed By: Rishi Angel MD     June 8, 2022

## 2022-06-08 NOTE — WOUND CARE
WCN messaged to see patient due to bag leaking. Skin to inferior portion of stoma has some excoriation and irritation. Area cleansed with NS and paste removed with adhesive remover wipe. Skin prep applied to rody-stomal skin and then ostomy powder applied and skin prep re-applied to create crusting to protect irritated skin. Barrier sheet applied over excoriated skin for protection as well. Convex wafer cut to fit over stoma with no skin showing between wafer and stoma. Ostomy wafer/bag applied and hand held over bag for ensure good seal. Asked patient to lay flat for 5-10 more minutes after application to be sure adhesive is well adhered, patient verbalized understanding. WCN walked patient through all of the steps that were taken and reviewed ostomy education. Patient continues to have visual disturbances which interfere with ostomy teaching. Please re-consult WCN for any pouching needs. Following ostomy education reviewed with patient:     -Measuring the stoma  -Cutting wafer to fit stoma and wafer and pouch application.  -Empty / burp pouch when 1/3-1/2 full of stool or gas. -Change appliance (wafer and pouch) 2-3 per week. If leaking, change as soon as possible to prevent skin irritation.  -Best time for routine change of appliance is first thing in the morning before consuming food or liquids (depending on which type of ostomy). -Clean stoma and peristomal skin with plain water or NS, may use a mild soap. No soaps with lotions as they may prevent adhesive from adhering to skin. May bathe with appliance on or off.  -Purposes and how to use barrier rings, stoma paste, and stoma powder.   -Stoma should always be red and moist.  If stoma appears dry and dusky, notify surgeon immediately.

## 2022-06-08 NOTE — PROGRESS NOTES
Problem: Falls - Risk of  Goal: *Absence of Falls  Description: Document Armando Benites Fall Risk and appropriate interventions in the flowsheet. Outcome: Progressing Towards Goal  Note: Fall Risk Interventions:  Mobility Interventions: Assess mobility with egress test,Communicate number of staff needed for ambulation/transfer,OT consult for ADLs,Patient to call before getting OOB,PT Consult for assist device competence,PT Consult for mobility concerns,Utilize walker, cane, or other assistive device         Medication Interventions: Assess postural VS orthostatic hypotension,Evaluate medications/consider consulting pharmacy,Patient to call before getting OOB,Teach patient to arise slowly    Elimination Interventions: Call light in reach,Patient to call for help with toileting needs,Toilet paper/wipes in reach,Toileting schedule/hourly rounds,Urinal in reach    History of Falls Interventions: Consult care management for discharge planning,Door open when patient unattended,Investigate reason for fall,Room close to nurse's station         Problem: Patient Education: Go to Patient Education Activity  Goal: Patient/Family Education  Outcome: Progressing Towards Goal     Problem: Pain  Goal: *Control of Pain  Outcome: Progressing Towards Goal     Problem: Infection - Risk of, Surgical Site Infection  Goal: *Absence of surgical site infection signs and symptoms  Outcome: Progressing Towards Goal     Problem: Pressure Injury - Risk of  Goal: *Prevention of pressure injury  Description: Document Yuri Scale and appropriate interventions in the flowsheet.   Outcome: Progressing Towards Goal  Note: Pressure Injury Interventions:  Sensory Interventions: Assess changes in LOC    Moisture Interventions: Absorbent underpads    Activity Interventions: Assess need for specialty bed    Mobility Interventions: Assess need for specialty bed    Nutrition Interventions: Document food/fluid/supplement intake    Friction and Shear Interventions: Apply protective barrier, creams and emollients

## 2022-06-08 NOTE — PROGRESS NOTES
PHYSICAL THERAPY TREATMENT  Patient: Cedrick Estes (16 y.o. male)  Date: 6/8/2022  Diagnosis: Adenomatous polyp of colon, unspecified part of colon [D12.6]  [facility] [C18.9]  Cecal polyp [K63.5] <principal problem not specified>  Procedure(s) (LRB):  Exploratory laparotomy, abdominal washout, resection of ileocolic anastomosis with end ileostomy (N/A) 24 Days Post-Op  Precautions:    Chart, physical therapy assessment, plan of care and goals were reviewed. ASSESSMENT  Patient continues with skilled PT services and is progressing towards goals. Patient supine in bed upon approach and reluctantly agreed to therapy, simply shaking his head saying lets get this over with. Patient was AXO times 4. Patient was mod I for bed mobility, supine>sit, min A for sit>supine( raising legs up into bed), and Mod I for scooting to EOB. Patient then performed ST to RW at [facility] and ambulated 20 feet ( to door way and back) at [facility] with RW. Patient presented with slow gait with decreased stance phase on LLE 2/2 to pain in Lt hip along with decreased foot clearance on ANNALISA (more so on RLE). Patient did not have any LOB or knee buckling during gait. Patient then sat EOB and performed seated TE ( see details below) patient also reported that he has been regularly performing exercises in bed to distract himself from the pain when it hits him. Patient then was min A for sit>supine back into bed. Patient then left supine in bed with call bell within reach and all needs meet. Nursing alerted that patient needed his colostomy bag emptied     Current Level of Function Impacting Discharge (mobility/balance): impaired balance, general weakness, pain    Other factors to consider for discharge: PLOF, assistance at home, acute medical state, level of deficits          PLAN :  Patient continues to benefit from skilled intervention to address the above impairments. Continue treatment per established plan of care.   to address goals. Recommendation for discharge: (in order for the patient to meet his/her long term goals)  [address]     This discharge recommendation:  Has been made in collaboration with the attending provider and/or case management    IF patient discharges home will need the following DME: gait belt and rolling walker       SUBJECTIVE:   Patient stated lets get this over with.     OBJECTIVE DATA SUMMARY:   Critical Behavior:  Neurologic State: Agitated,Alert  Orientation Level: Oriented X4  Cognition: Follows commands     Functional Mobility Training:  Bed Mobility:  Rolling: Modified independent  Supine to Sit: Modified independent  Sit to Supine: Minimum assistance  Scooting: Modified independent  Transfers:  Sit to Stand: Contact guard assistance  Stand to Sit: Contact guard assistance  Balance:  Sitting: Intact; Without support  Standing: Impaired; With support  Standing - Static: Constant support;Good  Standing - Dynamic : Constant support; Fair  Ambulation/Gait Training:  Distance (ft): 20 Feet (ft)  Assistive Device: Gait belt;Walker, rolling  Ambulation - Level of Assistance: Contact guard assistance  Gait Abnormalities: Antalgic;Decreased step clearance  Base of Support: Narrowed  Stance: Left decreased;Right increased  Speed/Daysi: Slow  Step Length: Left lengthened;Right shortened    Therapeutic Exercises:   1x12 LAQ  1x12 seated marches  1x20 AP  1x10 hip ADD/ABD  1x10 glut squeezes  Pain Ratin/10 in hips and Rt side stomach    Activity Tolerance:   Good  Please refer to the flowsheet for vital signs taken during this treatment. After treatment patient left in no apparent distress:   Supine in bed and Call bell within reach    COMMUNICATION/COLLABORATION:   The patients plan of care was discussed with: Registered nurse.        Problem: Mobility Impaired (Adult and Pediatric)  Goal: *Acute Goals and Plan of Care (Insert Text)  Description: Pt will be I with JUAN MIGUEL HEP in 7 days.  Pt will perform bed mobility with SBA in 7 days. Pt will perform transfers with SBA in 7 days. Pt will amb 5-10 feet with LRAD safely with Mod I/Independent in 7 days. Pt will demonstrate improvement in standing dynamic balance from CGA to Mod I/Independent in 7 days.      Outcome: Progressing Towards Goal       Catherine Leung PTA   Time Calculation: 24 mins

## 2022-06-08 NOTE — PROGRESS NOTES
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 ALLERGIES:    Allergies   Allergen Reactions    Beef Containing Products Anaphylaxis and Other (comments)    Diltiazem Other (comments)     Reaction Type: Allergy; Reaction(s): Pressure in chest    Iodinated Contrast Media Other (comments)     Reaction Type: Allergy; Severity: Severe; Reaction(s): hives,throat swelling    Pork Derived (Porcine) Hives    Shellfish Containing Products Swelling     Other reaction(s): Other  Reaction Type: Allergy; Reaction(s): Hives,throat swelling    Sulfa (Sulfonamide Antibiotics) Unknown (comments)     Spinal meningitis    Sulfur Unknown (comments)     Reaction Type: Allergy  Spinal meningitis    Lactose Diarrhea     Lactose intolerant per Pt.        MEDS:      Current Facility-Administered Medications:     HYDROmorphone (DILAUDID) injection 1 mg, 1 mg, IntraVENous, Q4H PRN, Jason Lennon MD, 1 mg at 06/07/22 1812    HYDROmorphone (DILAUDID) syringe 0.5 mg, 0.5 mg, IntraVENous, Q4H PRN, Jason Lennon MD    fluconazole (DIFLUCAN) tablet 200 mg, 200 mg, Oral, Q7D, Jason Lennon MD, 200 mg at 06/01/22 1816    piperacillin-tazobactam (ZOSYN) 3.375 g in 0.9% sodium chloride (MBP/ADV) 100 mL MBP, 3.375 g, IntraVENous, Q8H, Fariha Martinez MD, Last Rate: 25 mL/hr at 06/07/22 1812, 3.375 g at 06/07/22 1812    clopidogreL (PLAVIX) tablet 75 mg, 75 mg, Oral, DAILY, KIMBERLY Rey IV, MD, 75 mg at 06/07/22 0849    aspirin chewable tablet 81 mg, 81 mg, Oral, DAILY, KIMBERLY Rey IV, MD, 81 mg at 06/07/22 0849    0.9% sodium chloride infusion 250 mL, 250 mL, IntraVENous, PRN, Alex Hidalgo MD    acetaminophen (TYLENOL) tablet 650 mg, 650 mg, Oral, Q4H PRN, Pam Kay MD, 650 mg at 05/18/22 1838    famotidine (PF) (PEPCID) 20 mg in 0.9% sodium chloride 10 mL injection, 20 mg, IntraVENous, Q12H, Pam Kay MD, 20 mg at 06/07/22 0849    dextrose 5% - 0.45% NaCl with KCl 20 mEq/L infusion, 25 mL/hr, IntraVENous, CONTINUOUS, Pam Kay MD, Last Rate: 25 mL/hr at 06/07/22 1248, 25 mL/hr at 06/07/22 1248    oxyCODONE IR (ROXICODONE) tablet 10 mg, 10 mg, Oral, Q4H PRN, Pam Kay MD, 10 mg at 05/27/22 1404    ondansetron (ZOFRAN) injection 4 mg, 4 mg, IntraVENous, Q4H PRN, Pam Kay MD, 4 mg at 06/07/22 1812    verapamil ER (VERELAN PM) capsule 200 mg, 200 mg, Oral, QHS, Nhan CABRERA MD, 200 mg at 06/06/22 2110    [Held by provider] potassium chloride SR (KLOR-CON 10) tablet 10 mEq, 10 mEq, Oral, BID, Nhan CABRERA MD, 10 mEq at 04/30/22 2142    apixaban (ELIQUIS) tablet 5 mg, 5 mg, Oral, BID, Catalina Boeck, MD, 5 mg at 06/07/22 0849    amiodarone (CORDARONE) tablet 200 mg, 200 mg, Oral, DAILY, Neva Babin MD, 200 mg at 06/07/22 0849    lisinopriL (PRINIVIL, ZESTRIL) tablet 20 mg, 20 mg, Oral, DAILY, Pam Kay MD, 20 mg at 06/07/22 0849    predniSONE (DELTASONE) tablet 20 mg, 20 mg, Oral, DAILY WITH BREAKFAST, Pam Kay MD, 20 mg at 06/07/22 0849    insulin lispro (HUMALOG) injection, , SubCUTAneous, AC&HS, Pam Kay MD, 4 Units at 06/07/22 1630    glucose chewable tablet 16 g, 4 Tablet, Oral, PRN, Pam Kay MD    glucagon (GLUCAGEN) injection 1 mg, 1 mg, IntraMUSCular, PRN, Pam Kay MD, 1 mg at 05/06/22 0559    dextrose 10% infusion 0-250 mL, 0-250 mL, IntraVENous, PRN, Pam Kay MD, Last Rate: 750 mL/hr at 05/02/22 1558, 125 mL at 05/02/22 1558    labetaloL (NORMODYNE;TRANDATE) 20 mg/4 mL (5 mg/mL) injection 10 mg, 10 mg, IntraVENous, Q6H PRN, Chuck Villegas MD, 10 mg at 05/03/22 1423    LABS:  Recent Results (from the past 24 hour(s))   GLUCOSE, POC    Collection Time: 06/06/22  8:40 PM   Result Value Ref Range    Glucose (POC) 179 (H) 65 - 117 mg/dL    Performed by Stephan Mendoza, POC    Collection Time: 06/07/22  8:05 AM   Result Value Ref Range    Glucose (POC) 231 (H) 65 - 117 mg/dL    Performed by Omer Healy    GLUCOSE, POC    Collection Time: 06/07/22 11:59 AM   Result Value Ref Range    Glucose (POC) 209 (H) 65 - 117 mg/dL    Performed by Omer Healy    GLUCOSE, POC    Collection Time: 06/07/22  3:56 PM   Result Value Ref Range    Glucose (POC) 216 (H) 65 - 117 mg/dL    Performed by Omer Healy        Visit Vitals  /84 (BP 1 Location: Left lower arm, BP Patient Position: At rest;Lying)   Pulse 90   Temp 99.1 °F (37.3 °C)   Resp 19   Ht 5' 7.4\" (1.712 m)   Wt 66 kg (145 lb 8.1 oz)   SpO2 100%   BMI 22.52 kg/m²       Imaging:  CT ABD PELV WO CONT   Final Result   New right lower quadrant ostomy. Small amount of free air. Bowel   wall prominence in small bowel loops. No evidence of mechanical bowel   obstruction. Fluid collection in the pelvis concerning for abscess. Other   findings as above. Findings conveyed to the patient's nurse Harper Asif on 6/6/2022 at 9:50 AM.      XR HIP LT W OR WO PELV 2-3 VWS   Final Result      DUPLEX CAROTID BILATERAL   Final Result      MRI BRAIN WO CONT   Final Result         1. Evolving ischemic infarcts in multiple vascular territories, involving both   the right occipital lobe and left frontal lobe. 2. Left occipital encephalomalacia      CT ABD PELV WO CONT   Final Result   Small bowel ileus versus partial obstruction, at the ileocolic   anastomosis or ileocecal valve, slightly greater than previous. There is mixed   density fluid throughout this region, as before. . Consider follow-up study with   IV contrast and if possible, oral contrast. Consider NG tube. XR ABD (KUB)   Final Result      XR ABD (KUB)   Final Result   Small bowel dilatation consistent with obstruction, not   significantly changed. XR ABD (KUB)   Final Result   Persistent small bowel distention as seen with obstruction. XR ABD (KUB)   Final Result   No significant change compared to abdomen series 5/5/2022. Persistent air and fluid-filled dilated small bowel loops. XR ABD (KUB)   Final Result   Multiple distended gas-filled loops of small bowel compatible with   partial small bowel obstruction versus ileus. CT ABD PELV WO CONT   Final Result   Moderate grade partial small bowel obstruction. Small volume complex ascites. CT ABD PELV WO CONT   Final Result   1. Improving hemoperitoneum and pneumoperitoneum. 2.  Ileocolic anastomosis in the right lower quadrant. Incompletely evaluated   without contrast. No evidence of bowel obstruction. 3.  Reticular and groundglass airspace disease in the visualized lung bases   redemonstrated. 4.  See full report for detailed findings. CT ABD PELV WO CONT   Final Result   1. Status post ileal cecal surgery. Surrounding extraluminal dense material may   represent blood products (no indication of enteric contrast administration). Anastomotic leak cannot be excluded. Consider imaging with water-soluble oral   contrast. Free air in the abdomen likely within normal limits post recent   surgery. The bowel does not appear obstructed. 2. Bilateral lower lung airspace disease likely infectious or inflammatory. 3. Bilateral femoral head sclerosis suggests AVN.

## 2022-06-08 NOTE — PROGRESS NOTES
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 Current treatment continues        ALLERGIES:    Allergies   Allergen Reactions    Beef Containing Products Anaphylaxis and Other (comments)    Diltiazem Other (comments)     Reaction Type: Allergy; Reaction(s): Pressure in chest    Iodinated Contrast Media Other (comments)     Reaction Type: Allergy; Severity: Severe; Reaction(s): hives,throat swelling    Pork Derived (Porcine) Hives    Shellfish Containing Products Swelling     Other reaction(s): Other  Reaction Type: Allergy; Reaction(s): Hives,throat swelling    Sulfa (Sulfonamide Antibiotics) Unknown (comments)     Spinal meningitis    Sulfur Unknown (comments)     Reaction Type: Allergy  Spinal meningitis    Lactose Diarrhea     Lactose intolerant per Pt.        MEDS:      Current Facility-Administered Medications:     HYDROmorphone (DILAUDID) injection 1 mg, 1 mg, IntraVENous, Q4H PRN, Ruth Escamilla MD, 1 mg at 06/08/22 0829    HYDROmorphone (DILAUDID) syringe 0.5 mg, 0.5 mg, IntraVENous, Q4H PRN, Ruth Escamilla MD    fluconazole (DIFLUCAN) tablet 200 mg, 200 mg, Oral, Q7D, Ruth Escamilla MD, 200 mg at 06/01/22 1816    piperacillin-tazobactam (ZOSYN) 3.375 g in 0.9% sodium chloride (MBP/ADV) 100 mL MBP, 3.375 g, IntraVENous, Q8H, Fariha Martinez MD, Last Rate: 25 mL/hr at 06/08/22 0237, 3.375 g at 06/08/22 0237    clopidogreL (PLAVIX) tablet 75 mg, 75 mg, Oral, DAILY, KIMBERLY Rey IV, MD, 75 mg at 06/08/22 0828    aspirin chewable tablet 81 mg, 81 mg, Oral, DAILY, KIMBERLY Rey IV, MD, 81 mg at 06/08/22 0828    0.9% sodium chloride infusion 250 mL, 250 mL, IntraVENous, PRN, Leandro Hidalgo MD    acetaminophen (TYLENOL) tablet 650 mg, 650 mg, Oral, Q4H PRN, Paris Dumas MD, 650 mg at 05/18/22 1838    famotidine (PF) (PEPCID) 20 mg in 0.9% sodium chloride 10 mL injection, 20 mg, IntraVENous, Q12H, Paris Dumas MD, 20 mg at 06/08/22 0828    dextrose 5% - 0.45% NaCl with KCl 20 mEq/L infusion, 25 mL/hr, IntraVENous, CONTINUOUS, Paris Dumas MD, Last Rate: 25 mL/hr at 06/07/22 1248, 25 mL/hr at 06/07/22 1248    oxyCODONE IR (ROXICODONE) tablet 10 mg, 10 mg, Oral, Q4H PRN, Paris Dumas MD, 10 mg at 05/27/22 1404    ondansetron (ZOFRAN) injection 4 mg, 4 mg, IntraVENous, Q4H PRN, Paris Dumas MD, 4 mg at 06/08/22 0828    verapamil ER (VERELAN PM) capsule 200 mg, 200 mg, Oral, QHS, Anel CABRERA MD, 200 mg at 06/07/22 2234    [Held by provider] potassium chloride SR (KLOR-CON 10) tablet 10 mEq, 10 mEq, Oral, BID, Anel CABRERA MD, 10 mEq at 04/30/22 2142    apixaban (ELIQUIS) tablet 5 mg, 5 mg, Oral, BID, Shashi Wesley MD, 5 mg at 06/08/22 8508    amiodarone (CORDARONE) tablet 200 mg, 200 mg, Oral, DAILY, Mary Beth Coe MD, 200 mg at 06/08/22 0828    lisinopriL (PRINIVIL, ZESTRIL) tablet 20 mg, 20 mg, Oral, DAILY, Paris Dumas MD, 20 mg at 06/08/22 0828    predniSONE (DELTASONE) tablet 20 mg, 20 mg, Oral, DAILY WITH BREAKFAST, Paris Dumas MD, 20 mg at 06/08/22 0828    insulin lispro (HUMALOG) injection, , SubCUTAneous, AC&HS, Paris Dumas MD, 6 Units at 06/07/22 2234    glucose chewable tablet 16 g, 4 Tablet, Oral, PRN, Paris Dumas MD    glucagon (GLUCAGEN) injection 1 mg, 1 mg, IntraMUSCular, PRN, Paris Dumas MD, 1 mg at 05/06/22 0559    dextrose 10% infusion 0-250 mL, 0-250 mL, IntraVENous, PRN, Paris Dumas MD, Last Rate: 750 mL/hr at 05/02/22 1558, 125 mL at 05/02/22 1558    labetaloL (NORMODYNE;TRANDATE) 20 mg/4 mL (5 mg/mL) injection 10 mg, 10 mg, IntraVENous, Q6H PRN, Paris Dumas MD, 10 mg at 05/03/22 1423    LABS:  Recent Results (from the past 24 hour(s))   GLUCOSE, POC    Collection Time: 06/07/22 11:59 AM   Result Value Ref Range    Glucose (POC) 209 (H) 65 - 117 mg/dL    Performed by Rikki Lopez    GLUCOSE, POC    Collection Time: 06/07/22  3:56 PM   Result Value Ref Range    Glucose (POC) 216 (H) 65 - 117 mg/dL    Performed by Rikki Lopez    GLUCOSE, POC    Collection Time: 06/07/22  9:23 PM   Result Value Ref Range    Glucose (POC) 272 (H) 65 - 117 mg/dL    Performed by Denise Hannah    CBC WITH AUTOMATED DIFF    Collection Time: 06/08/22  2:17 AM   Result Value Ref Range    WBC 13.1 (H) 4.1 - 11.1 K/uL    RBC 3.82 (L) 4.10 - 5.70 M/uL    HGB 8.7 (L) 12.1 - 17.0 g/dL    HCT 28.8 (L) 36.6 - 50.3 %    MCV 75.4 (L) 80.0 - 99.0 FL    MCH 22.8 (L) 26.0 - 34.0 PG    MCHC 30.2 30.0 - 36.5 g/dL    RDW 18.4 (H) 11.5 - 14.5 %    PLATELET 017 651 - 767 K/uL    MPV 10.7 8.9 - 12.9 FL    NRBC 0.0 0.0  WBC    ABSOLUTE NRBC 0.00 0.00 - 0.01 K/uL    NEUTROPHILS 75 32 - 75 %    LYMPHOCYTES 14 12 - 49 %    MONOCYTES 9 5 - 13 %    EOSINOPHILS 1 0 - 7 %    BASOPHILS 0 0 - 1 %    IMMATURE GRANULOCYTES 1 (H) 0 - 0.5 %    ABS. NEUTROPHILS 9.9 (H) 1.8 - 8.0 K/UL    ABS. LYMPHOCYTES 1.9 0.8 - 3.5 K/UL    ABS. MONOCYTES 1.2 (H) 0.0 - 1.0 K/UL    ABS. EOSINOPHILS 0.1 0.0 - 0.4 K/UL    ABS. BASOPHILS 0.0 0.0 - 0.1 K/UL    ABS. IMM. GRANS. 0.1 (H) 0.00 - 0.04 K/UL    DF AUTOMATED     GLUCOSE, POC    Collection Time: 06/08/22  8:03 AM   Result Value Ref Range    Glucose (POC) 104 65 - 117 mg/dL    Performed by Phoebe Duet        Visit Vitals  /88 (BP 1 Location: Right upper arm, BP Patient Position: At rest;Semi fowlers)   Pulse 89   Temp 97.4 °F (36.3 °C)   Resp 19   Ht 5' 7.4\" (1.712 m)   Wt 61.2 kg (134 lb 14.7 oz)   SpO2 100%   BMI 20.88 kg/m²       Imaging:  CT ABD PELV WO CONT   Final Result   New right lower quadrant ostomy. Small amount of free air. Bowel   wall prominence in small bowel loops.   No evidence of mechanical bowel   obstruction. Fluid collection in the pelvis concerning for abscess. Other   findings as above. Findings conveyed to the patient's nurse Nicho Allen on 6/6/2022 at 9:50 AM.      XR HIP LT W OR WO PELV 2-3 VWS   Final Result      DUPLEX CAROTID BILATERAL   Final Result      MRI BRAIN WO CONT   Final Result         1. Evolving ischemic infarcts in multiple vascular territories, involving both   the right occipital lobe and left frontal lobe. 2. Left occipital encephalomalacia      CT ABD PELV WO CONT   Final Result   Small bowel ileus versus partial obstruction, at the ileocolic   anastomosis or ileocecal valve, slightly greater than previous. There is mixed   density fluid throughout this region, as before. . Consider follow-up study with   IV contrast and if possible, oral contrast. Consider NG tube. XR ABD (KUB)   Final Result      XR ABD (KUB)   Final Result   Small bowel dilatation consistent with obstruction, not   significantly changed. XR ABD (KUB)   Final Result   Persistent small bowel distention as seen with obstruction. XR ABD (KUB)   Final Result   No significant change compared to abdomen series 5/5/2022. Persistent air and fluid-filled dilated small bowel loops. XR ABD (KUB)   Final Result   Multiple distended gas-filled loops of small bowel compatible with   partial small bowel obstruction versus ileus. CT ABD PELV WO CONT   Final Result   Moderate grade partial small bowel obstruction. Small volume complex ascites. CT ABD PELV WO CONT   Final Result   1. Improving hemoperitoneum and pneumoperitoneum. 2.  Ileocolic anastomosis in the right lower quadrant. Incompletely evaluated   without contrast. No evidence of bowel obstruction. 3.  Reticular and groundglass airspace disease in the visualized lung bases   redemonstrated. 4.  See full report for detailed findings. CT ABD PELV WO CONT   Final Result   1.  Status post ileal cecal surgery. Surrounding extraluminal dense material may   represent blood products (no indication of enteric contrast administration). Anastomotic leak cannot be excluded. Consider imaging with water-soluble oral   contrast. Free air in the abdomen likely within normal limits post recent   surgery. The bowel does not appear obstructed. 2. Bilateral lower lung airspace disease likely infectious or inflammatory. 3. Bilateral femoral head sclerosis suggests AVN.

## 2022-06-09 NOTE — PROGRESS NOTES
Progress Note    Patient: Sarah Friend MRN: [id number]  SSN: [id number]    YOB: 1970  Age: 47 y.o. 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    Objective:     Vitals:    06/08/22 0745 06/08/22 1720 06/08/22 2020 06/09/22 0812   BP: 132/88 (!) 148/99 (!) 145/94 (!) 139/93   Pulse: 89 97 88 79   Resp: 19 18 18 18   Temp: 97.4 °F (36.3 °C) 98.4 °F (36.9 °C) 98.6 °F (37 °C) 98.3 °F (36.8 °C)   SpO2: 100% 100% 100% 100%   Weight:    134 lb 0.6 oz (60.8 kg)   Height:            Intake and Output:  Current Shift: 06/09 0701 - 06/09 1900  In: -   Out: 450 [Urine:250]  Last three shifts: 06/07 1901 - 06/09 0700  In: -   Out: 750 [Urine:250]    Review of Systems:  ROS     Physical Exam:   Abdomen is soft, mildly tender palpation on the right side, ileostomy pink productive stool and pouched, midline incision clean with scant drainage from open wounds    Lab/Data Review:  Recent Results (from the past 24 hour(s))   GLUCOSE, POC    Collection Time: 06/08/22  5:20 PM   Result Value Ref Range    Glucose (POC) 291 (H) 65 - 117 mg/dL    Performed by Nae Gunderson, POC    Collection Time: 06/08/22  8:19 PM   Result Value Ref Range    Glucose (POC) 184 (H) 65 - 117 mg/dL    Performed by Isai Dejesus    GLUCOSE, POC    Collection Time: 06/09/22  8:14 AM   Result Value Ref Range    Glucose (POC) 102 65 - 117 mg/dL    Performed by Fan Shelby    GLUCOSE, POC    Collection Time: 06/09/22 12:10 PM   Result Value Ref Range    Glucose (POC) 163 (H) 65 - 117 mg/dL    Performed by Fan Shelby         Assessment:     Active Problems:    Colon cancer ([facility] [address] (4/15/2022)      Cecal polyp (4/15/2022)        Plan:    Will consult Dr. Zen Hagen from rheumatology to see the patient regarding polymyositis  Continue Zosyn for wound infection, repeat labs in a.m.   Continue diet  Continue aggressive physical therapy  Plavix/Eliquis for embolic infarcts    Signed By: Ena Kessler MD     June 9, 2022

## 2022-06-09 NOTE — WOUND CARE
WCN in to assess ostomy pouch, Patient states original bag applied by [facility] was holding up well and then when bag was being emptied last night it got pulled loose and had to be changed. Current bag has small leak noted to area next to incision in abdominal fold. Ostomy bag removed, skin cleansed with NS and adhesive remover used to remove paste on skin. Excoriated skin crusted using skin prep and ostomy powder x2. Ostomy barrier strip applied to abdominal skin fold to try to even out surface area. Ostomy barrier ring applied around stoma. Using coloplast flip convex pouch, wafer was cut to fit and applied over stoma and ostomy ring, no skin showing between wafer and stoma. WCN held hand over ostomy pouch to allow heat from hand to better adhere wafer to skin. Asked patient to lay flat for about 10 minutes for good adhesion, patient verbalized understanding. WCN to follow up with patient later today. Please re-consult WCN for any bagging needs. Abdominal incision cleansed with NS and new dry dressing applied. 1430: Follow up with patient regarding new ostomy pouch applied. No leaks noticed at this time, bag emptied with about 200ml in bag. Reminded patient to remember to feel for pouch often while awake to see if bag needs to be emptied. Patient states that he is still not able to visualize bag or stoma well and is unable to empty bag himself, reminded to call for nurse when bag needs to be emptied when it is about 1/3 to 1/2 full, patient verbalized understanding. Patient stated that this pouch seems to be more comfortable for him and does not feel like it is pulling as much on his skin. Patient currently has the Coloplast flex flip bag on.

## 2022-06-09 NOTE — CONSULTS
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 He reports a history of myasthenia gravis diagnosed at the same time also followed at [facility] neurology. He is currently treated with Prednisone 20mg once a day and Rituximab infusions. His last infusion was in January of this year. Patient was admitted for elective surgical removal of a polyp and developed a small bowel obstruction and possible anastomotic leak that resulting in a prolong hospital course requiring IV antibiotics. Patient noticed progressive muscle weakness which prompted a rheumatology consult for evaluation. Patient reports progressive muscle weakness in his shoulders and upper arms, neck, core and lower extremities. He reports a long standing history of dysphagia to solids and liquids. He also reports hip pain right greater than left due to avascular necrosis of the hip. He reports occasional shortness of breath and a history of chronic asthma but has not required inhalers recently.       Past Medical History:   Diagnosis Date    Adverse effect of anesthesia     Arrhythmia     atrial flutter    Asthma     Diabetes ([address]     Hypertension     Ill-defined condition     Spinal meningitis and avascular necrosis     Myasthenia gravis     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    muscle disorder    Primary hypersomnolence disorder       Past Surgical History:   Procedure Laterality Date    HX COLONOSCOPY  2021    HX ENDOSCOPY      HX OTHER SURGICAL      lung abscess removal    [place] SHOULDER SURG PROC UNLISTED      Left Shoulder     Family History   Problem Relation Age of Onset    Heart Disease Mother     Diabetes Father     Stroke Father       Social History     Tobacco Use    Smoking status: Never Smoker    Smokeless tobacco: Never Used   Substance Use Topics    Alcohol use: No       Current Facility-Administered Medications   Medication Dose Route Frequency Provider Last Rate Last Admin    HYDROmorphone (DILAUDID) injection 1 mg  1 mg IntraVENous Q4H PRN Ruth Escamilla MD   1 mg at 06/09/22 1436    HYDROmorphone (DILAUDID) syringe 0.5 mg  0.5 mg IntraVENous Q4H PRN Ruth Escamilla MD        fluconazole (DIFLUCAN) tablet 200 mg  200 mg Oral Q7D Ruth Escamilla MD   200 mg at 06/08/22 1734    piperacillin-tazobactam (ZOSYN) 3.375 g in 0.9% sodium chloride (MBP/ADV) 100 mL MBP  3.375 g IntraVENous Q8H Ruth Escamilla MD 25 mL/hr at 06/09/22 1044 3.375 g at 06/09/22 1044    clopidogreL (PLAVIX) tablet 75 mg  75 mg Oral DAILY Caron Elliott MD   75 mg at 06/09/22 1044    aspirin chewable tablet 81 mg  81 mg Oral DAILY Tanwi Nicanor BASURTO MD   81 mg at 06/09/22 1044    0.9% sodium chloride infusion 250 mL  250 mL IntraVENous PRN Dexter Hidalgo MD        acetaminophen (TYLENOL) tablet 650 mg  650 mg Oral Q4H PRN Ruth Escamilla MD   650 mg at 05/18/22 1838    famotidine (PF) (PEPCID) 20 mg in 0.9% sodium chloride 10 mL injection  20 mg IntraVENous Q12H Ruth Escamilla MD   20 mg at 06/09/22 1040    dextrose 5% - 0.45% NaCl with KCl 20 mEq/L infusion  25 mL/hr IntraVENous CONTINUOUS Ruth Escamilla MD 25 mL/hr at 06/07/22 1248 25 mL/hr at 06/07/22 1248    oxyCODONE IR (ROXICODONE) tablet 10 mg  10 mg Oral Q4H PRN Ruth Escamilla MD   10 mg at 05/27/22 1404    ondansetron (ZOFRAN) injection 4 mg  4 mg IntraVENous Q4H PRN Ruth Escamilla MD   4 mg at 06/09/22 1437    verapamil ER (VERELAN PM) capsule 200 mg  200 mg Oral QHS Celester Foster, MD   200 mg at 06/08/22 2118    [Held by provider] potassium chloride SR (KLOR-CON 10) tablet 10 mEq  10 mEq Oral BID Marley Matute MD   10 mEq at 04/30/22 2142    apixaban (ELIQUIS) tablet 5 mg  5 mg Oral BID Chencho Mack MD   5 mg at 06/09/22 1044    amiodarone (CORDARONE) tablet 200 mg  200 mg Oral DAILY Richi Yi MD   200 mg at 06/09/22 1044    lisinopriL (PRINIVIL, ZESTRIL) tablet 20 mg  20 mg Oral DAILY Elizabeth Reddy MD   20 mg at 06/09/22 1044    predniSONE (DELTASONE) tablet 20 mg  20 mg Oral DAILY WITH BREAKFAST Elizabeth Reddy MD   20 mg at 06/09/22 1044    insulin lispro (HUMALOG) injection   SubCUTAneous AC&HS Elizabeth Reddy MD   2 Units at 06/09/22 1130    glucose chewable tablet 16 g  4 Tablet Oral PRN Elizabeth Reddy MD        glucagon (GLUCAGEN) injection 1 mg  1 mg IntraMUSCular PRN Elizabeth Reddy MD   1 mg at 05/06/22 0559    dextrose 10% infusion 0-250 mL  0-250 mL IntraVENous PRN Elizabeth Reddy  mL/hr at 05/02/22 1558 125 mL at 05/02/22 1558    labetaloL (NORMODYNE;TRANDATE) 20 mg/4 mL (5 mg/mL) injection 10 mg  10 mg IntraVENous Q6H PRN Elizabeth Reddy MD   10 mg at 05/03/22 1423        Review of Systems   Constitutional: Positive for chills. Negative for fatigue. HENT: Positive for trouble swallowing. Negative for mouth sores and sore throat. Eyes: Positive for pain. Respiratory: Positive for shortness of breath. Negative for cough. Cardiovascular: Positive for chest pain. Gastrointestinal: Positive for abdominal pain, diarrhea and nausea. Negative for blood in stool, constipation and vomiting. Genitourinary: Negative for dysuria and hematuria. Musculoskeletal: Positive for myalgias. Negative for arthralgias. Skin: Negative for rash. Neurological: Positive for weakness.        Objective     Vital signs for last 24 hours:  Visit Vitals  /87   Pulse 90   Temp 98.6 °F (37 °C)   Resp 18   Ht 5' 7.4\" (1.712 m)   Wt 60.8 kg (134 lb 0.6 oz)   SpO2 100%   BMI 20.74 kg/m²       Intake/Output this shift:  Current Shift: 06/09 0701 - 06/09 1900  In: -   Out: 900 [Urine:700]  Last 3 Shifts: 06/07 1901 - 06/09 0700  In: -   Out: 750 [Urine:250]    Data Review: Recent Results (from the past 24 hour(s))   GLUCOSE, POC    Collection Time: 06/08/22  8:19 PM   Result Value Ref Range    Glucose (POC) 184 (H) 65 - 117 mg/dL    Performed by Renetta Mckeon    GLUCOSE, POC    Collection Time: 06/09/22  8:14 AM   Result Value Ref Range    Glucose (POC) 102 65 - 117 mg/dL    Performed by Jorge Leung    GLUCOSE, POC    Collection Time: 06/09/22 12:10 PM   Result Value Ref Range    Glucose (POC) 163 (H) 65 - 117 mg/dL    Performed by Jorge Leung    GLUCOSE, POC    Collection Time: 06/09/22  5:44 PM   Result Value Ref Range    Glucose (POC) 284 (H) 65 - 117 mg/dL    Performed by Jorge Leung        Physical Exam  Constitutional:       Appearance: He is ill-appearing. HENT:      Head: Normocephalic. Nose: Nose normal.      Mouth/Throat:      Mouth: Mucous membranes are dry. Cardiovascular:      Rate and Rhythm: Normal rate and regular rhythm. Pulmonary:      Effort: Pulmonary effort is normal.      Breath sounds: Normal breath sounds. Abdominal:      Tenderness: There is abdominal tenderness. Comments: RLQ colostomy   Midline dressing D&I   Musculoskeletal:         General: No swelling. Cervical back: Normal range of motion. Comments: LROM bilateral hips  Pain in left hip with internal and external rotation   Skin:     General: Skin is warm. Neurological:      Mental Status: He is alert and oriented to person, place, and time. Motor: Weakness and atrophy present. Comments: 4/5 BUE/LE strength  Scapular winging on the left   Psychiatric:         Mood and Affect: Mood normal.         Thought Content: Thought content normal.             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 He reports a history of myasthenia gravis diagnosed at the same time also followed at [facility] neurology. He is currently treated with Prednisone 20mg once a day and Rituximab infusions.  His last infusion was in January of this year. Patient was admitted for elective surgical removal of a polyp with a complicated course thereafter including progressive weakness leading to a rheumatology consult. At this time we are not sure of is polymyositis is more active due to the complexity of the current situation. We will obtain labs to assess current disease activity and consider treatment adjustments based on the findings. He is due on the 24th of January for his next Rituximab infusion. We will tentatively plan on that, however final decision will be made based on the clinical situation. We agree with continuing Prednisone at 20mg once a day for now and will offer guidance on steroid management in the near future. Thank you for the consult. We will continue to monitor the patient.     Dimas Mcburney, ANTHONY  Rheumatology  [facility]

## 2022-06-09 NOTE — PROGRESS NOTES
Chart reviewed. SWETHA spoke with attending this morning. He indicated the patient is not yet medically stable and anticipates discharge next week. SWETHA notified him of [facility] having auth. It is only good for 48 hours. We will have to re-start auth likely on Monday. SWETHA informed attending about covid PCR needed and he stated he will order tomorrow. SWETHA has provided updates to [address] [place] [address] and will continue to follow.

## 2022-06-09 NOTE — PROGRESS NOTES
Problem: Falls - Risk of  Goal: *Absence of Falls  Description: Document Leafy Navas Fall Risk and appropriate interventions in the flowsheet.   Outcome: Progressing Towards Goal  Note: Fall Risk Interventions:  Mobility Interventions: Bed/chair exit alarm         Medication Interventions: Bed/chair exit alarm    Elimination Interventions: Call light in reach,Bed/chair exit alarm    History of Falls Interventions: Consult care management for discharge planning,Door open when patient unattended,Investigate reason for fall,Room close to nurse's station         Problem: Patient Education: Go to Patient Education Activity  Goal: Patient/Family Education  Outcome: Progressing Towards Goal     Problem: Pain  Goal: *Control of Pain  Outcome: Progressing Towards Goal     Problem: Infection - Risk of, Surgical Site Infection  Goal: *Absence of surgical site infection signs and symptoms  Outcome: Progressing Towards Goal

## 2022-06-09 NOTE — PROGRESS NOTES
PT reassessment attempted. However, per pt, ostomy bag is leaking and needs to be addressed before he participates in PT. Nsg made aware, PT will re-attempt for PT reassessment at a later time.

## 2022-06-09 NOTE — PROGRESS NOTES
OCCUPATIONAL THERAPY TREATMENT  Patient: Jojo Calvillo (87 y.o. male)  Date: 6/9/2022  Diagnosis: Adenomatous polyp of colon, unspecified part of colon [D12.6]  [facility] [C18.9]  Cecal polyp [K63.5] <principal problem not specified>  Procedure(s) (LRB):  Exploratory laparotomy, abdominal washout, resection of ileocolic anastomosis with end ileostomy (N/A) 25 Days Post-Op  Precautions:    Chart, occupational therapy assessment, plan of care, and goals were reviewed. ASSESSMENT  Patient continues with skilled OT services and is progressing towards goals. Pt. Received semi-supine in bed and agreeable to tx session. Pt. Performed bed mobility with Mod I, good sitting balance noted at EOB, LB dressing set-up with slip on shoes, sit> stand CGA with use of RW, pt demonstrates with unsteady gait pattern and slight knee buckling noted. Pt. Able to ambulate in room approx 20 ft. Pt. Returned seated at EOB- pt c/o 6/10 pain in abdominal region. Pt. Performed oral care, facial grooming while seated at EOB with set-up assistance and IND to perform. Pt. Required additional time to complete ADL task . Pt. Returned semi-supine in bed with Min A for tammy LE's d/t pt stating increased pain in left hip. Pt. Left semi-supine with needs met and call bell within reach. REC. IRF when medically appropriate for discharge    Current Level of Function Impacting Discharge (ADLs): set-up assistance with all ADL's, limited in tx session for performing ADL's while standing at sink level d/t pain     Other factors to consider for discharge:PLOF, time since on set         PLAN :  Patient continues to benefit from skilled intervention to address the above impairments. Continue treatment per established plan of care. to address goals.     Recommendation for discharge: (in order for the patient to meet his/her long term goals)  Therapy 3 hours per day 5-7 days per week    This discharge recommendation:  Has been made in collaboration with the attending provider and/or case management    IF patient discharges home will need the following DME: TBD       SUBJECTIVE:   Patient stated  my hip is bothering me today.     OBJECTIVE DATA SUMMARY:   Cognitive/Behavioral Status:  Neurologic State: Alert  Orientation Level: Oriented X4  Cognition: Follows commands  Functional Mobility and Transfers for ADLs:  Bed Mobility:  Rolling: Modified independent  Supine to Sit: Modified independent  Sit to Supine: Minimum assistance  Scooting: Modified independent    Transfers:  Sit to Stand: Contact guard assistance      Balance:  Sitting: Intact; Without support  Standing: Impaired; With support  Standing - Static: Constant support;Good  Standing - Dynamic : Constant support; Fair    ADL Intervention:       Grooming  Grooming Assistance: Independent; Set-up  Position Performed: Seated edge of bed  Washing Face: Independent; Set-up  Brushing Teeth: Independent; Set-up  Brushing/Combing Hair: Independent; Set-up    Pain:  6/10 pain reported    Activity Tolerance:   Fair  Please refer to the flowsheet for vital signs taken during this treatment. After treatment patient left in no apparent distress:   Supine in bed and Call bell within reach    COMMUNICATION/COLLABORATION:   The patients plan of care was discussed with: Registered nurse.      Davey Cunningham  Time Calculation: 38 mins    Problem: Self Care Deficits Care Plan (Adult)  Goal: *Acute Goals and Plan of Care (Insert Text)  Description: Pt stated goal \"to decrease pain\"  Pt will be Mod I sup <> sit in prep for EOB ADLs  Pt will be Mod I grooming standing sink side LRAD  Pt will be Mod I UB dressing sitting EOB/long sit   Pt will be Mod I LE dressing sitting EOB/long sit  Pt will be Mod I sit <>  prep for toileting LRAD  Pt will be Mod I toileting/toilet transfer/cloth mgmt LRAD  Pt will be IND following UE HEP in prep for self care tasks      Outcome: Progressing Towards Goal

## 2022-06-09 NOTE — PROGRESS NOTES
Bedside shift change report given to Franco Meyer RN (oncoming nurse) by Hortencia Boyer RN (offgoing nurse). Report included the following information SBAR, Procedure Summary, Intake/Output, MAR and Recent Results.

## 2022-06-09 NOTE — PROGRESS NOTES
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  Status post GI surgery  Other medical problems      EXAM:  Awake, oriented, nonaphasic, follows commands  No new focality      ASSESSMENT/PLAN:  New current antiplatelet agents    ALLERGIES:    Allergies   Allergen Reactions    Beef Containing Products Anaphylaxis and Other (comments)    Diltiazem Other (comments)     Reaction Type: Allergy; Reaction(s): Pressure in chest    Iodinated Contrast Media Other (comments)     Reaction Type: Allergy; Severity: Severe; Reaction(s): hives,throat swelling    Pork Derived (Porcine) Hives    Shellfish Containing Products Swelling     Other reaction(s): Other  Reaction Type: Allergy; Reaction(s): Hives,throat swelling    Sulfa (Sulfonamide Antibiotics) Unknown (comments)     Spinal meningitis    Sulfur Unknown (comments)     Reaction Type: Allergy  Spinal meningitis    Lactose Diarrhea     Lactose intolerant per Pt.        MEDS:      Current Facility-Administered Medications:     HYDROmorphone (DILAUDID) injection 1 mg, 1 mg, IntraVENous, Q4H PRN, Jamila Taveras MD, 1 mg at 06/09/22 0630    HYDROmorphone (DILAUDID) syringe 0.5 mg, 0.5 mg, IntraVENous, Q4H PRN, Jamila Taveras MD    fluconazole (DIFLUCAN) tablet 200 mg, 200 mg, Oral, Q7D, Jamila Taveras MD, 200 mg at 06/08/22 1734    piperacillin-tazobactam (ZOSYN) 3.375 g in 0.9% sodium chloride (MBP/ADV) 100 mL MBP, 3.375 g, IntraVENous, Q8H, Fariha Martinez MD, Last Rate: 25 mL/hr at 06/09/22 0131, 3.375 g at 06/09/22 0131    clopidogreL (PLAVIX) tablet 75 mg, 75 mg, Oral, DAILY, KIMBERLY Rey IV, MD, 75 mg at 06/08/22 0828    aspirin chewable tablet 81 mg, 81 mg, Oral, DAILY, KIMBERLY Rey IV, MD, 81 mg at 06/08/22 0828    0.9% sodium chloride infusion 250 mL, 250 mL, IntraVENous, PRN, Jeovanny Hidalgo MD    acetaminophen (TYLENOL) tablet 650 mg, 650 mg, Oral, Q4H PRN, Cezar Kyle MD, 650 mg at 05/18/22 1838    famotidine (PF) (PEPCID) 20 mg in 0.9% sodium chloride 10 mL injection, 20 mg, IntraVENous, Q12H, Cezar Kyle MD, 20 mg at 06/08/22 2021    dextrose 5% - 0.45% NaCl with KCl 20 mEq/L infusion, 25 mL/hr, IntraVENous, CONTINUOUS, Cezar Kyle MD, Last Rate: 25 mL/hr at 06/07/22 1248, 25 mL/hr at 06/07/22 1248    oxyCODONE IR (ROXICODONE) tablet 10 mg, 10 mg, Oral, Q4H PRN, Cezar Kyle MD, 10 mg at 05/27/22 1404    ondansetron (ZOFRAN) injection 4 mg, 4 mg, IntraVENous, Q4H PRN, Cezar Kyle MD, 4 mg at 06/09/22 0630    verapamil ER (VERELAN PM) capsule 200 mg, 200 mg, Oral, QHS, Jose CABRERA MD, 200 mg at 06/08/22 2118    [Held by provider] potassium chloride SR (KLOR-CON 10) tablet 10 mEq, 10 mEq, Oral, BID, Jose CABRERA MD, 10 mEq at 04/30/22 2142    apixaban (ELIQUIS) tablet 5 mg, 5 mg, Oral, BID, Trell Fernandes MD, 5 mg at 06/08/22 2022    amiodarone (CORDARONE) tablet 200 mg, 200 mg, Oral, DAILY, Hina Arthur MD, 200 mg at 06/08/22 0828    lisinopriL (PRINIVIL, ZESTRIL) tablet 20 mg, 20 mg, Oral, DAILY, Cezar Kyle MD, 20 mg at 06/08/22 0828    predniSONE (DELTASONE) tablet 20 mg, 20 mg, Oral, DAILY WITH BREAKFAST, Cezar Kyle MD, 20 mg at 06/08/22 0828    insulin lispro (HUMALOG) injection, , SubCUTAneous, AC&HS, Cezar Kyle MD, 2 Units at 06/08/22 2117    glucose chewable tablet 16 g, 4 Tablet, Oral, PRN, Cezar Kyle MD    glucagon (GLUCAGEN) injection 1 mg, 1 mg, IntraMUSCular, PRN, Cezar Kyle MD, 1 mg at 05/06/22 0559    dextrose 10% infusion 0-250 mL, 0-250 mL, IntraVENous, PRN, Cezar Kyle MD, Last Rate: 750 mL/hr at 05/02/22 1558, 125 mL at 05/02/22 1558    labetaloL (NORMODYNE;TRANDATE) 20 mg/4 mL (5 mg/mL) injection 10 mg, 10 mg, IntraVENous, Q6H PRN, Cezar Kyle MD, 10 mg at 05/03/22 1423    LABS:  Recent Results (from the past 24 hour(s))   GLUCOSE, POC Collection Time: 06/08/22  1:29 PM   Result Value Ref Range    Glucose (POC) 234 (H) 65 - 117 mg/dL    Performed by [address] [place] [address], POC    Collection Time: 06/08/22  5:20 PM   Result Value Ref Range    Glucose (POC) 291 (H) 65 - 117 mg/dL    Performed by [place], POC    Collection Time: 06/08/22  8:19 PM   Result Value Ref Range    Glucose (POC) 184 (H) 65 - 117 mg/dL    Performed by Mele Gandhi    GLUCOSE, POC    Collection Time: 06/09/22  8:14 AM   Result Value Ref Range    Glucose (POC) 102 65 - 117 mg/dL    Performed by Wallace Lezama        Visit Vitals  BP (!) 139/93   Pulse 79   Temp 98.3 °F (36.8 °C)   Resp 18   Ht 5' 7.4\" (1.712 m)   Wt 60.8 kg (134 lb 0.6 oz)   SpO2 100%   BMI 20.74 kg/m²       Imaging:  CT ABD PELV WO CONT   Final Result   New right lower quadrant ostomy. Small amount of free air. Bowel   wall prominence in small bowel loops. No evidence of mechanical bowel   obstruction. Fluid collection in the pelvis concerning for abscess. Other   findings as above. Findings conveyed to the patient's nurse Brian Dorado on 6/6/2022 at 9:50 AM.      XR HIP LT W OR WO PELV 2-3 VWS   Final Result      DUPLEX CAROTID BILATERAL   Final Result      MRI BRAIN WO CONT   Final Result         1. Evolving ischemic infarcts in multiple vascular territories, involving both   the right occipital lobe and left frontal lobe. 2. Left occipital encephalomalacia      CT ABD PELV WO CONT   Final Result   Small bowel ileus versus partial obstruction, at the ileocolic   anastomosis or ileocecal valve, slightly greater than previous. There is mixed   density fluid throughout this region, as before. . Consider follow-up study with   IV contrast and if possible, oral contrast. Consider NG tube. XR ABD (KUB)   Final Result      XR ABD (KUB)   Final Result   Small bowel dilatation consistent with obstruction, not   significantly changed.       XR ABD (KUB)   Final Result   Persistent small bowel distention as seen with obstruction. XR ABD (KUB)   Final Result   No significant change compared to abdomen series 5/5/2022. Persistent air and fluid-filled dilated small bowel loops. XR ABD (KUB)   Final Result   Multiple distended gas-filled loops of small bowel compatible with   partial small bowel obstruction versus ileus. CT ABD PELV WO CONT   Final Result   Moderate grade partial small bowel obstruction. Small volume complex ascites. CT ABD PELV WO CONT   Final Result   1. Improving hemoperitoneum and pneumoperitoneum. 2.  Ileocolic anastomosis in the right lower quadrant. Incompletely evaluated   without contrast. No evidence of bowel obstruction. 3.  Reticular and groundglass airspace disease in the visualized lung bases   redemonstrated. 4.  See full report for detailed findings. CT ABD PELV WO CONT   Final Result   1. Status post ileal cecal surgery. Surrounding extraluminal dense material may   represent blood products (no indication of enteric contrast administration). Anastomotic leak cannot be excluded. Consider imaging with water-soluble oral   contrast. Free air in the abdomen likely within normal limits post recent   surgery. The bowel does not appear obstructed. 2. Bilateral lower lung airspace disease likely infectious or inflammatory. 3. Bilateral femoral head sclerosis suggests AVN.

## 2022-06-10 NOTE — PROGRESS NOTES
Problem: Falls - Risk of  Goal: *Absence of Falls  Description: Document Luis Manuel Cea Fall Risk and appropriate interventions in the flowsheet.   Outcome: Progressing Towards Goal  Note: Fall Risk Interventions:  Mobility Interventions: Patient to call before getting OOB         Medication Interventions: Patient to call before getting OOB    Elimination Interventions: Call light in reach,Patient to call for help with toileting needs    History of Falls Interventions: Bed/chair exit alarm,Door open when patient unattended,Room close to nurse's station         Problem: Patient Education: Go to Patient Education Activity  Goal: Patient/Family Education  Outcome: Progressing Towards Goal     Problem: Pain  Goal: *Control of Pain  Outcome: Progressing Towards Goal

## 2022-06-10 NOTE — PROGRESS NOTES
Clinical chart reviewed. Patient is being seen by Rheumatology and having special labs drawn. Per attending he is not medically stable. Anticipate possible discharge next week. [facility] continues to follow but will need new auth and they will not start that until we are sure patient is ready. They will also need a new COVID PCR prior to patient admitting to their facility. They stated it is good for 7 days. CM will continue to follow. If possible if would be helpful for PT and OT to see patient on Sunday to provide updates to [address] [place] [address] on Monday.

## 2022-06-10 NOTE — PROGRESS NOTES
PHYSICAL THERAPY TREATMENT  Patient: Nakia Choi (25 y.o. male)  Date: 6/10/2022  Diagnosis: Adenomatous polyp of colon, unspecified part of colon [D12.6]  [facility] [C18.9]  Cecal polyp [K63.5] <principal problem not specified>  Procedure(s) (LRB):  Exploratory laparotomy, abdominal washout, resection of ileocolic anastomosis with end ileostomy (N/A) 26 Days Post-Op  Precautions:    Chart, physical therapy assessment, plan of care and goals were reviewed. ASSESSMENT  Patient continues with skilled PT services and is progressing towards goals. Patient supine in bed upon approach and agreed to therapy today. Patient was mod I for bed mobility, supine<>sit and scooting to EOB. Patient was CGA for STS to RW and ambulated 20 feet inside room ( to door and back) at [facility]. Patient had slow but steady gait with step to gait pattern and no LOB and knee buckling. Patients gait was limited by pain in stomach and hip. Patient then sat EOB at [facility] and returned to supine in bed at mod I patient verbalized that he had been performing his exercises regularly to help distract himself from the pain and would continue to do so. Patient left supine in bed with call bell within reach and  needs meet. Current Level of Function Impacting Discharge (mobility/balance): impaired balance general weakness, poor activity tolerance    Other factors to consider for discharge: PLOF, assistance at home, acute medical state level of deficits, pain management. PLAN :  Patient continues to benefit from skilled intervention to address the above impairments. Continue treatment per established plan of care. to address goals.     Recommendation for discharge: (in order for the patient to meet his/her long term goals)  [address]     This discharge recommendation:  Has been made in collaboration with the attending provider and/or case management    IF patient discharges home will need the following DME: gait belt and rolling walker       SUBJECTIVE:   Patient stated lets get this over with    OBJECTIVE DATA SUMMARY:   Critical Behavior:  Neurologic State: Alert  Orientation Level: Oriented X4  Cognition: Follows commands     Functional Mobility Training:  Bed Mobility:  Rolling: Modified independent  Supine to Sit: Modified independent  Sit to Supine: Modified independent  Scooting: Modified independent  Transfers:  Sit to Stand: Contact guard assistance  Stand to Sit: Contact guard assistance  Balance:  Sitting: Intact; Without support  Standing: Impaired; With support  Standing - Static: Constant support;Good  Standing - Dynamic : Constant support; Fair  Ambulation/Gait Training:  Distance (ft): 20 Feet (ft)  Assistive Device: Gait belt;Walker, rolling  Ambulation - Level of Assistance: Contact guard assistance  Gait Abnormalities: Step to gait  Base of Support: Narrowed  Speed/Daysi: Slow  Step Length: Left shortened  Pain Ratin/10 in stomach    Activity Tolerance:   Good  Please refer to the flowsheet for vital signs taken during this treatment. After treatment patient left in no apparent distress:   Supine in bed and Call bell within reach    COMMUNICATION/COLLABORATION:   The patients plan of care was discussed with: Registered nurse. Problem: Mobility Impaired (Adult and Pediatric)  Goal: *Acute Goals and Plan of Care (Insert Text)  Description: Pt will be I with LE HEP in 7 days. Pt will perform bed mobility with SBA in 7 days. Pt will perform transfers with SBA in 7 days. Pt will amb 5-10 feet with LRAD safely with Mod I/Independent in 7 days. Pt will demonstrate improvement in standing dynamic balance from CGA to Mod I/Independent in 7 days.      Outcome: Progressing Towards Goal       Purnima Bowman PTA   Time Calculation: 16 mins

## 2022-06-10 NOTE — PROGRESS NOTES
20:50 instructed patient on having his heels floating on pillow, states I have been out the bed with PT and OT and move myself in bed, refused SCD at this time. Ostomy emptied,  Dressing to left arm Mid line changed. Tolerated  Procedure well.

## 2022-06-10 NOTE — PROGRESS NOTES
Bedside shift change report given to Nik Lugo RN (oncoming nurse) by Shabbir Xie RN (offgoing nurse). Report included the following information SBAR, Procedure Summary, Intake/Output and MAR.

## 2022-06-10 NOTE — PROGRESS NOTES
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 Visual obscurations unchanged  Moves all 4 extremities      ASSESSMENT/PLAN:  Continue current antiplatelet agents    ALLERGIES:    Allergies   Allergen Reactions    Beef Containing Products Anaphylaxis and Other (comments)    Diltiazem Other (comments)     Reaction Type: Allergy; Reaction(s): Pressure in chest    Iodinated Contrast Media Other (comments)     Reaction Type: Allergy; Severity: Severe; Reaction(s): hives,throat swelling    Pork Derived (Porcine) Hives    Shellfish Containing Products Swelling     Other reaction(s): Other  Reaction Type: Allergy; Reaction(s): Hives,throat swelling    Sulfa (Sulfonamide Antibiotics) Unknown (comments)     Spinal meningitis    Sulfur Unknown (comments)     Reaction Type: Allergy  Spinal meningitis    Lactose Diarrhea     Lactose intolerant per Pt.        MEDS:      Current Facility-Administered Medications:     HYDROmorphone (DILAUDID) injection 1 mg, 1 mg, IntraVENous, Q3H PRN, Zuleika Metzger MD    HYDROmorphone (DILAUDID) syringe 0.5 mg, 0.5 mg, IntraVENous, Q4H PRN, Zuleika Metzger MD    fluconazole (DIFLUCAN) tablet 200 mg, 200 mg, Oral, Q7D, Zuleika Metzger MD, 200 mg at 06/08/22 1734    piperacillin-tazobactam (ZOSYN) 3.375 g in 0.9% sodium chloride (MBP/ADV) 100 mL MBP, 3.375 g, IntraVENous, Q8H, Fariha Martinez MD, Last Rate: 25 mL/hr at 06/10/22 0314, 3.375 g at 06/10/22 0314    clopidogreL (PLAVIX) tablet 75 mg, 75 mg, Oral, DAILY, KIMBERLY Rey IV, MD, 75 mg at 06/10/22 0826    aspirin chewable tablet 81 mg, 81 mg, Oral, DAILY, KIMBERLY Rey IV, MD, 81 mg at 06/10/22 0826    0.9% sodium chloride infusion 250 mL, 250 mL, IntraVENous, PRN, Shawnee Edu, MD [place] Sicard acetaminophen (TYLENOL) tablet 650 mg, 650 mg, Oral, Q4H PRN, Kendrick Ramsey MD, 650 mg at 05/18/22 1838    famotidine (PF) (PEPCID) 20 mg in 0.9% sodium chloride 10 mL injection, 20 mg, IntraVENous, Q12H, Kendrick Ramsey MD, 20 mg at 06/10/22 0826    dextrose 5% - 0.45% NaCl with KCl 20 mEq/L infusion, 25 mL/hr, IntraVENous, CONTINUOUS, Kendrick Ramsey MD, Last Rate: 25 mL/hr at 06/07/22 1248, 25 mL/hr at 06/07/22 1248    oxyCODONE IR (ROXICODONE) tablet 10 mg, 10 mg, Oral, Q4H PRN, Kendrick Ramsey MD, 10 mg at 05/27/22 1404    ondansetron (ZOFRAN) injection 4 mg, 4 mg, IntraVENous, Q4H PRN, Kendrick Ramsey MD, 4 mg at 06/10/22 0819    verapamil ER (VERELAN PM) capsule 200 mg, 200 mg, Oral, QHS, Kaylee CABRERA MD, 200 mg at 06/09/22 2050    [Held by provider] potassium chloride SR (KLOR-CON 10) tablet 10 mEq, 10 mEq, Oral, BID, Matthew Moran MD, 10 mEq at 04/30/22 2142    apixaban (ELIQUIS) tablet 5 mg, 5 mg, Oral, BID, Sky Conn MD, 5 mg at 06/10/22 9487    amiodarone (CORDARONE) tablet 200 mg, 200 mg, Oral, DAILY, Saad Gregory MD, 200 mg at 06/09/22 1044    lisinopriL (PRINIVIL, ZESTRIL) tablet 20 mg, 20 mg, Oral, DAILY, Kendrick Ramsey MD, 20 mg at 06/10/22 0826    predniSONE (DELTASONE) tablet 20 mg, 20 mg, Oral, DAILY WITH BREAKFAST, Kendrick Ramsey MD, 20 mg at 06/10/22 0826    insulin lispro (HUMALOG) injection, , SubCUTAneous, AC&HS, Kendrick Ramsey MD, 2 Units at 06/10/22 0826    glucose chewable tablet 16 g, 4 Tablet, Oral, PRN, Kendrick Ramsey MD    glucagon (GLUCAGEN) injection 1 mg, 1 mg, IntraMUSCular, PRN, Kendrick Ramsey MD, 1 mg at 05/06/22 0559    dextrose 10% infusion 0-250 mL, 0-250 mL, IntraVENous, PRN, Kendrick Ramsey MD, Last Rate: 750 mL/hr at 05/02/22 1558, 125 mL at 05/02/22 1558    labetaloL (NORMODYNE;TRANDATE) 20 mg/4 mL (5 mg/mL) injection 10 mg, 10 mg, IntraVENous, Q6H PRN, Kendrick Ramsey MD, 10 mg at 05/03/22 1423    LABS:  Recent Results (from the past 24 hour(s))   GLUCOSE, POC    Collection Time: 06/09/22 12:10 PM   Result Value Ref Range    Glucose (POC) 163 (H) 65 - 117 mg/dL    Performed by Edmund Conn    GLUCOSE, POC    Collection Time: 06/09/22  5:44 PM   Result Value Ref Range    Glucose (POC) 284 (H) 65 - 117 mg/dL    Performed by Edmund Conn    SED RATE (ESR)    Collection Time: 06/09/22  8:06 PM   Result Value Ref Range    Sed rate, automated 29 (H) 0 - 20 mm/hr   C REACTIVE PROTEIN, QT    Collection Time: 06/09/22  8:06 PM   Result Value Ref Range    C-Reactive protein 0.72 (H) 0.00 - 0.60 mg/dL   CK    Collection Time: 06/09/22  8:06 PM   Result Value Ref Range    CK 32 (L) 39 - 308 U/L   GLUCOSE, POC    Collection Time: 06/09/22  8:14 PM   Result Value Ref Range    Glucose (POC) 313 (H) 65 - 117 mg/dL    Performed by [employer]    GLUCOSE, POC    Collection Time: 06/10/22  8:07 AM   Result Value Ref Range    Glucose (POC) 195 (H) 65 - 117 mg/dL    Performed by Michael Smith        Visit Vitals  BP (!) 161/92 (BP 1 Location: Right upper arm, BP Patient Position: Semi fowlers)   Pulse 100   Temp 98 °F (36.7 °C)   Resp 18   Ht 5' 7.4\" (1.712 m)   Wt 60.8 kg (134 lb 0.6 oz)   SpO2 100%   BMI 20.74 kg/m²       Imaging:  CT ABD PELV WO CONT   Final Result   New right lower quadrant ostomy. Small amount of free air. Bowel   wall prominence in small bowel loops. No evidence of mechanical bowel   obstruction. Fluid collection in the pelvis concerning for abscess. Other   findings as above. Findings conveyed to the patient's nurse Meghann Reddy on 6/6/2022 at 9:50 AM.      XR HIP LT W OR WO PELV 2-3 VWS   Final Result      DUPLEX CAROTID BILATERAL   Final Result      MRI BRAIN WO CONT   Final Result         1. Evolving ischemic infarcts in multiple vascular territories, involving both   the right occipital lobe and left frontal lobe.       2. Left occipital encephalomalacia      CT ABD PELV WO CONT Final Result   Small bowel ileus versus partial obstruction, at the ileocolic   anastomosis or ileocecal valve, slightly greater than previous. There is mixed   density fluid throughout this region, as before. . Consider follow-up study with   IV contrast and if possible, oral contrast. Consider NG tube. XR ABD (KUB)   Final Result      XR ABD (KUB)   Final Result   Small bowel dilatation consistent with obstruction, not   significantly changed. XR ABD (KUB)   Final Result   Persistent small bowel distention as seen with obstruction. XR ABD (KUB)   Final Result   No significant change compared to abdomen series 5/5/2022. Persistent air and fluid-filled dilated small bowel loops. XR ABD (KUB)   Final Result   Multiple distended gas-filled loops of small bowel compatible with   partial small bowel obstruction versus ileus. CT ABD PELV WO CONT   Final Result   Moderate grade partial small bowel obstruction. Small volume complex ascites. CT ABD PELV WO CONT   Final Result   1. Improving hemoperitoneum and pneumoperitoneum. 2.  Ileocolic anastomosis in the right lower quadrant. Incompletely evaluated   without contrast. No evidence of bowel obstruction. 3.  Reticular and groundglass airspace disease in the visualized lung bases   redemonstrated. 4.  See full report for detailed findings. CT ABD PELV WO CONT   Final Result   1. Status post ileal cecal surgery. Surrounding extraluminal dense material may   represent blood products (no indication of enteric contrast administration). Anastomotic leak cannot be excluded. Consider imaging with water-soluble oral   contrast. Free air in the abdomen likely within normal limits post recent   surgery. The bowel does not appear obstructed. 2. Bilateral lower lung airspace disease likely infectious or inflammatory. 3. Bilateral femoral head sclerosis suggests AVN.

## 2022-06-10 NOTE — PROGRESS NOTES
Progress Note    Patient: Nelly García MRN: [id number]  SSN: [id number]    YOB: 1970  Age: 47 y.o. Sex: male      Admit Date: 4/15/2022    LOS: 56 days     Subjective:   Patient seen in bed  Continues to complain of right-sided abdominal pain  Ileostomy appliance stayed on now for 48 hours  Tolerating diet  Seen by rheumatology    Objective:     Vitals:    06/09/22 2159 06/10/22 0410 06/10/22 0804 06/10/22 1454   BP: 137/89 (!) 161/104 (!) 161/92 (!) 141/93   Pulse:  82 100 91   Resp:   18 18   Temp:  98.6 °F (37 °C) 98 °F (36.7 °C) 98.4 °F (36.9 °C)   SpO2:  100% 100% 100%   Weight:       Height:            Intake and Output:  Current Shift: 06/10 0701 - 06/10 1900  In: -   Out: 650 [Urine:450]  Last three shifts: 06/08 1901 - 06/10 0700  In: -   Out: 2625 [Urine:1750]    Review of Systems:  ROS     Physical Exam:   Abdomen is soft, tender palpation on the right side, ileostomy functioning, midline incision clean with markedly less drainage    Lab/Data Review:  Recent Results (from the past 24 hour(s))   GLUCOSE, POC    Collection Time: 06/09/22  5:44 PM   Result Value Ref Range    Glucose (POC) 284 (H) 65 - 117 mg/dL    Performed by Arsenio Quinn    SED RATE (ESR)    Collection Time: 06/09/22  8:06 PM   Result Value Ref Range    Sed rate, automated 29 (H) 0 - 20 mm/hr   C REACTIVE PROTEIN, QT    Collection Time: 06/09/22  8:06 PM   Result Value Ref Range    C-Reactive protein 0.72 (H) 0.00 - 0.60 mg/dL   CK    Collection Time: 06/09/22  8:06 PM   Result Value Ref Range    CK 32 (L) 39 - 308 U/L   MYOGLOBIN    Collection Time: 06/09/22  8:06 PM   Result Value Ref Range    Myoglobin 48 16 - 116 ng/mL   HEPATITIS PANEL, ACUTE    Collection Time: 06/09/22  8:06 PM   Result Value Ref Range    Hepatitis A, IgM NONREACTIVE NONREACTIVE      __        Hepatitis B surface Ag <0.10 Index    Hep B surface Ag Interp.  Negative Negative    __        Hepatitis B core, IgM NONREACTIVE NONREACTIVE    __ Hepatitis C virus Ab 0.07 Index    Hep C virus Ab Interp.  NONREACTIVE NONREACTIVE   GLUCOSE, POC    Collection Time: 06/09/22  8:14 PM   Result Value Ref Range    Glucose (POC) 313 (H) 65 - 117 mg/dL    Performed by Abel Tena    GLUCOSE, POC    Collection Time: 06/10/22  8:07 AM   Result Value Ref Range    Glucose (POC) 195 (H) 65 - 117 mg/dL    Performed by [address] [place] [address] Road, POC    Collection Time: 06/10/22 12:25 PM   Result Value Ref Range    Glucose (POC) 293 (H) 65 - 117 mg/dL    Performed by Jackie Peacock           Assessment:     Active Problems:    Colon cancer ([facility] [address] (4/15/2022)      Cecal polyp (4/15/2022)        Plan:   Patient continues on Zosyn for superficial wound infection, will DC after tomorrow  Follow-up on labs rheumatologist ordered labs to assess patient's myositis appreciate their assistance with this difficult patient  Continue on Plavix and Eliquis for embolic infarcts  Continue diet  Continue aggressive PT/OT    Signed By: Nellie Jacob MD     Nancy 10, 2022

## 2022-06-10 NOTE — PROGRESS NOTES
Progress Note  Date:6/10/2022       Room:[facility]  Patient Name:Liana Metz     YOB: 1970     Age:52 y.o. Patient is a 47year old male with a history of polymyositis biopsy proven at [facility] in . He is currently treated with Prednisone 20mg once a day and Rituximab infusions. His last infusion was in January of this year. Patient was admitted for elective surgical removal of a polyp and developed a small bowel obstruction and possible anastomotic leak that resulting in a prolong hospital course requiring IV antibiotics and patient noticed progressive muscle weakness. Patient seen today. He reports progressive muscle weakness in his shoulders and upper arms, neck, core and lower extremities. He also reports hip pain right greater than left due to avascular necrosis of the hip, abdominal pain and generalized muscle pain and states the frequency of his pain medicine has been adjusted. He reports a long standing history of dysphagia to solids and liquids. Subjective    Subjective:  Symptoms:  He reports weakness and diarrhea. No shortness of breath, cough or chest pain. Diet:  He reports  nausea. Review of Systems   Constitutional: Positive for chills (cold intolerance). HENT: Positive for trouble swallowing. Negative for mouth sores, sore throat and voice change. Respiratory: Negative for cough and shortness of breath. Cardiovascular: Negative for chest pain. Gastrointestinal: Positive for abdominal pain, diarrhea and nausea. Negative for blood in stool. Genitourinary: Negative for dysuria and hematuria. Musculoskeletal: Positive for myalgias. Neurological: Positive for weakness.      Objective         Vitals Last 24 Hours:  TEMPERATURE:  Temp  Av.4 °F (36.9 °C)  Min: 98 °F (36.7 °C)  Max: 98.6 °F (37 °C)  RESPIRATIONS RANGE: Resp  Av  Min: 18  Max: 18  PULSE OXIMETRY RANGE: SpO2  Av %  Min: 100 %  Max: 100 %  PULSE RANGE: Pulse  Av  Min: 82  Max: 100  BLOOD PRESSURE RANGE: Systolic (90WFA), URP:546 , Min:131 , VZM:442   ; Diastolic (81BCP), RTU:63, Min:87, Max:104    I/O (24Hr): Intake/Output Summary (Last 24 hours) at 6/10/2022 1747  Last data filed at 6/10/2022 1534  Gross per 24 hour   Intake --   Output 1625 ml   Net -1625 ml     Objective:  General Appearance:  Ill-appearing. Vital signs: (most recent): Blood pressure (!) 141/93, pulse 91, temperature 98.4 °F (36.9 °C), resp. rate 18, height 5' 7.4\" (1.712 m), weight 60.8 kg (134 lb 0.6 oz), SpO2 100 %. HEENT: Normal HEENT exam.    Lungs:  Normal effort. Heart: Normal rate. Abdomen: (RLQ colostomy  Midline dressing D&I). There is generalized tenderness. Extremities: (LROM bilateral hips  Pain in left hip with internal and external rotation   No myofascial tenderness appreciated)  Neurological: Patient is alert and oriented to person, place and time. (4/5 BUE/LE strength  Scapular winging on the left   Muscle atrophy). Skin:  Warm and dry. Labs/Imaging/Diagnostics    Labs:  CBC:  Recent Labs     22  0217   WBC 13.1*   RBC 3.82*   HGB 8.7*   HCT 28.8*   MCV 75.4*   RDW 18.4*        CHEMISTRIES:No results for input(s): NA, K, CL, CO2, BUN, CA, PHOS, MG in the last 72 hours. No lab exists for component: CREATININE, GLUCOSEPT/INR:No results for input(s): INR, INREXT in the last 72 hours. No lab exists for component: PROTIME  APTT:No results for input(s): APTT in the last 72 hours. LIVER PROFILE:No results for input(s): AST, ALT in the last 72 hours. No lab exists for component: JUDY Garcia  Lab Results   Component Value Date/Time    ALT (SGPT) 16 2022 06:15 PM    AST (SGOT) 2022 06:15 PM    Alk. phosphatase 88 2022 06:15 PM    Bilirubin, total 0.2 2022 06:15 PM       Imaging Last 24 Hours:  No results found.   Assessment//Plan     Assessment & Plan     Patient is a 47year old male with a history of polymyositis biopsy proven at [facility] in 2001. He reports a history of myasthenia gravis diagnosed at the same time also followed at [facility] neurology. He is currently treated with Prednisone 20mg once a day and Rituximab infusions. His last infusion was in January of this year. He is currently reporting muscle weakness and pain. His CK is 32, Myoglobin 32, Sed rate 29, CRP 0.72 Additional lab work including Anti Hmgcr Ab and Myomarker panel has been ordered and his autoimmune work up his pending. His CK is low which does not suggest or support active polymyositis, but he does have disuse atrophy and muscle deconditioning that contributes to his presentation of generalized weakness. He is due on the 24th of January for his next Rituximab infusion and we will not redose his Rituximab as long as he has an on going abscess infection. We agree with continuing Prednisone at 20mg once a day for now and will offer guidance on steroid management in the future. We will continue to monitor the patient.     Electronically signed by Leidy Soni NP on 6/10/2022 at 5:47 PM

## 2022-06-10 NOTE — PROGRESS NOTES
OCCUPATIONAL THERAPY RE-ASSESSMENT  Patient: Brain Davis (30 y.o. male)  Date: 6/10/2022  Primary Diagnosis: Adenomatous polyp of colon, unspecified part of colon [D12.6]  Colon cancer ([facility] [address] [C18.9]  Cecal polyp [K63.5]  Procedure(s) (LRB):  Exploratory laparotomy, abdominal washout, resection of ileocolic anastomosis with end ileostomy (N/A) 26 Days Post-Op   Precautions: fall risk       ASSESSMENT  Pt seen for re-assessment on 6/10 d/t LOS. Pt was initially evaluated on 5/18 and has been seen for 7 skilled treats. Pt admitted to [facility] for elective surgery to remove polyp; pt developed small bowel obstruction and possible anastomotic leak resulted in a prolonged hospital admission. Pt received in semi-supine upon arrival, AXO x4, and agreeable to working with OT at this time. Based on current observations, pt presents with deficits in generalized strength/AROM, bed mobility, static/dynamic standing balance, functional activity tolerance, decreased safety awareness, and pain impacting overall performance of ADLs and functional transfers/mobility. Pt reports he recently worked with PT, however, agreeable to OT session. He Mod I for bed mobility and sup>sit transfer. He donned slip on shoes w/out back INDly and req'd CGA for STS using RW and then ambulated 10 ft around bed before returning to chair for seated rest break; pt demo'd steady gait w/ slight knee buckling. Pt did report L hip gave out during descend into chair and reports this occurs during every transfer. He completed oral hygiene, facial hygiene, and combed hair w/ set-up/IND. Pt requested to return to bed d/t pain in his L hip. He req'd CGA for STS using RW and ambulated to EOB; he returned to supine w/ min A for LB. He was left in semi-supine w/ all needs/call bell in reach. Overall, pt tolerates session fair with c/o pain in L hip (pt did not rate pain).  Pt would benefit from continued skilled OT services to address current impairments and improve IND and safety with self cares and functional transfers/mobility. OT goals and POC reviewed and continue to remain appropriate at this time. Continue to progress. Current OT recommendation IRF at discharge once medically appropriate. Other factors to consider for discharge: family/social support, DME, time since onset, severity of deficits, decline from functional baseline     Patient will benefit from skilled therapy intervention to address the above noted impairments. PLAN :    Recommendations and Planned Interventions: self care training, functional mobility training, therapeutic exercise, balance training, therapeutic activities, endurance activities and patient education      Frequency/Duration: Patient will be followed by occupational therapy:  3-5x/week to address goals. Recommendation for discharge: (in order for the patient to meet his/her long term goals)  [address] [facility]     This discharge recommendation:  Has been made in collaboration with the attending provider and/or case management    IF patient discharges home will need the following DME: none at this time       SUBJECTIVE:   Patient stated My L hip hurts.     OBJECTIVE DATA SUMMARY:   HISTORY:   Past Medical History:   Diagnosis Date    Adverse effect of anesthesia     Arrhythmia     atrial flutter    Asthma     Diabetes ([facility] [address]     Hypertension     Ill-defined condition     Spinal meningitis and avascular necrosis     Myasthenia gravis     Polymyositis ([facility] [address] 2001    muscle disorder    Primary hypersomnolence disorder      Past Surgical History:   Procedure Laterality Date    HX COLONOSCOPY  2021    HX ENDOSCOPY      HX OTHER SURGICAL      lung abscess removal    CA SHOULDER SURG PROC UNLISTED      Left Shoulder       Expanded or extensive additional review of patient history:     Home Situation  Home Environment: Apartment ([facility] )  # Steps to Enter: 8  Rails to Enter: Yes  [employer] : Bilateral  Wheelchair Ramp: No  One/Two Story Residence: Two story  # of Interior Steps: 10  Interior Rails: Both  Lift Chair Available: No  Living Alone: No  Support Systems: Friend/Neighbor  Patient Expects to be Discharged to[de-identified] Home with home health  Current DME Used/Available at Home: None  Tub or Shower Type: Tub/Shower combination      EXAMINATION OF PERFORMANCE DEFICITS:  Cognitive/Behavioral Status:  Neurologic State: Alert  Orientation Level: Oriented X4  Cognition: Follows commands               Hearing: Auditory  Auditory Impairment: None      Range of Motion:  AROM: Generally decreased, functional  PROM: Generally decreased, functional                      Strength:  Strength: Generally decreased, functional                Coordination:     Fine Motor Skills-Upper: Left Intact; Right Intact    Gross Motor Skills-Upper: Left Intact; Right Intact    Balance:  Sitting: Intact; Without support  Standing: Impaired; With support  Standing - Static: Constant support;Good  Standing - Dynamic : Constant support; Fair    Functional Mobility and Transfers for ADLs:  Bed Mobility:  Rolling: Modified independent  Supine to Sit: Modified independent  Sit to Supine: Modified independent  Scooting: Modified independent    Transfers:  Sit to Stand: Contact guard assistance  Stand to Sit: Contact guard assistance  Bed to Chair: Contact guard assistance  Assistive Device : Walker, rolling      ADL Intervention and task modifications:       Grooming  Washing Face: Set-up; Independent  Brushing Teeth: Set-up; Independent  Brushing/Combing Hair: Set-up; Independent                                    Functional Measure:    [employer] AM-PACTM \"6 Clicks\"                                                       Daily Activity Inpatient Short Form  How much help from another person does the patient currently need. .. Total; A Lot A Little None   1. Putting on and taking off regular lower body clothing? []  1 []  2 [x]  3 []  4   2. Bathing (including washing, rinsing, drying)? []  1 []  2 [x]  3 []  4   3. Toileting, which includes using toilet, bedpan or urinal? [] 1 []  2 [x]  3 []  4   4. Putting on and taking off regular upper body clothing? []  1 []  2 [x]  3 []  4   5. Taking care of personal grooming such as brushing teeth? []  1 []  2 [x]  3 []  4   6. Eating meals? []  1 []  2 []  3 [x]  4   © 2007, Trustees of [address] [place] [address] [address] [address], under license to [employer]. All rights reserved     Score: 19/24     Interpretation of Tool:  Represents clinically-significant functional categories (i.e. Activities of daily living). Percentage of Impairment CH    0%   CI    1-19% CJ    20-39% CK    40-59% CL    60-79% CM    80-99% CN     100%   AMPAC  Score 6-24 24 23 20-22 15-19 10-14 7-9 6       Pain Rating:  Reported pain in L hip but did not rate     Activity Tolerance:   Fair and requires rest breaks    After treatment patient left in no apparent distress:    Supine in bed, Call bell within reach and Side rails x 3    COMMUNICATION/EDUCATION:   The patients plan of care was discussed with: Registered nurse.          Thank you for this referral.  José Luis Saldana OT  Time Calculation: 38 mins   Problem: Self Care Deficits Care Plan (Adult)  Goal: *Acute Goals and Plan of Care (Insert Text)  Description: Pt stated goal \"to decrease pain\"  Pt will be Mod I sup <> sit in prep for EOB ADLs  Pt will be Mod I grooming standing sink side LRAD  Pt will be Mod I UB dressing sitting EOB/long sit   Pt will be Mod I LE dressing sitting EOB/long sit  Pt will be Mod I sit <>  prep for toileting LRAD  Pt will be Mod I toileting/toilet transfer/cloth mgmt LRAD  Pt will be IND following UE HEP in prep for self care tasks      6/10/2022 1140 by Yady Tang, OT  Outcome: Progressing Towards Goal  6/10/2022 1140 by Yady Tang, OT  Outcome: Progressing Towards Goal

## 2022-06-10 NOTE — WOUND CARE
WCN in to see patient and check ostomy bag. Patient stated that he had no issues with this ostomy bag last night. No leaking noted to ostomy at this time. Will continue to monitor, please re-consult WCN for any bagging needs. Extra Cayce Flip ostomy bag left in patients room if needed. Bag needs to be emptied when 1/3 to 1/2 full. Bag emptied by [facility] with about 50ml of brown output.

## 2022-06-11 NOTE — PROGRESS NOTES
OT tx attempted at 1217 however per pt report, pt awaiting pain medication and requesting therapy to come back at a later time. Will continue to follow pt and attempt OT tx at a later time/date.

## 2022-06-11 NOTE — PROGRESS NOTES
Patient seen in bed  Reports he has been having trouble with ileostomy pouching again  Tolerating diet  Continues to have some right-sided abdominal pain    Abdomen is soft, nondistended, ileostomy right abdomen protuberant and productive, midline incision clean with scant drainage    Assessment and plan:  Rheumatology following for polymyositis, lab results pending  Continue Plavix and Eliquis for visual disturbances/embolic infarctions  Continue Zosyn, midline incision marked as drainage clearing up  Wound care to see for ileostomy pouching difficulty

## 2022-06-12 NOTE — PROGRESS NOTES
PHYSICAL THERAPY TREATMENT: WEEKLY REASSESSMENT  Patient: Sarah Friend (36 y.o. male)  Date: 6/12/2022  Primary Diagnosis: Adenomatous polyp of colon, unspecified part of colon [D12.6]  Colon cancer ([facility] [C18.9]  Cecal polyp [K63.5]  Procedure(s) (LRB):  Exploratory laparotomy, abdominal washout, resection of ileocolic anastomosis with end ileostomy (N/A) 28 Days Post-Op   Precautions: fall       ASSESSMENT  Patient continues with skilled PT services and is progressing towards goals. Patient found in bed and agreeable to work with therapy. Patient able to perform bed mob, supine to sit to supine with MI. Able to sit to stand with sba and able to amb with cg ,gait belt and RW  in the room to the door and back to bed. Patient reported he does exs in bed. Patient c/o left hip pain thru out 7/10 pre gait and 8-9/10 post gait. Patient's ostomy bag leaked aftergait when he got back in bed. Patient had call bell on for the nurse upon PT leaving. Patient's progression toward goals since last assessment: Not much difference in strength mobility or endurance from last assessment. SPO2 98% prior to gait and in high 80's after gait return to 94 % with PLB. Current Level of Function Impacting Discharge (mobility/balance): gen decreased in strength endurance to activities. Other factors to consider for discharge: SNF       PLAN :  Goals have been updated based on progression since last assessment. Patient continues to benefit from skilled intervention to address the above impairments. Recommendations and Planned Interventions: bed mobility training, transfer training, gait training, therapeutic exercises, patient and family training/education, and therapeutic activities      Frequency/Duration: Patient will be followed by physical therapy:  5 times a week to address goals.     Recommendation for discharge: (in order for the patient to meet his/her long term goals)  Therapy up to 5 days/week in SNF setting    This discharge recommendation:  Has been made in collaboration with the attending provider and/or case management    IF patient discharges home will need the following DME: rolling walker and wheelchair         SUBJECTIVE:   Patient stated I am ok.     OBJECTIVE DATA SUMMARY:   HISTORY:    Past Medical History:   Diagnosis Date    Adverse effect of anesthesia     Arrhythmia     atrial flutter    Asthma     Diabetes ([facility]     Hypertension     Ill-defined condition     Spinal meningitis and avascular necrosis     Myasthenia gravis     Polymyositis ([facility] 2001    muscle disorder    Primary hypersomnolence disorder      Past Surgical History:   Procedure Laterality Date    HX COLONOSCOPY  2021    HX ENDOSCOPY      HX OTHER SURGICAL      lung abscess removal    MT SHOULDER SURG PROC UNLISTED      Left Shoulder       Personal factors and/or comorbidities impacting plan of care:   Home Situation  Home Environment: Apartment ([facility] )  # Steps to Enter: 8  Rails to Enter: Yes  Hand Rails : Bilateral  Wheelchair Ramp: No  One/Two Story Residence: Two story  # of Interior Steps: 10  Interior Rails: Both  Lift Chair Available: No  Living Alone: No  Support Systems: Friend/Neighbor  Patient Expects to be Discharged to[de-identified] Home with home health  Current DME Used/Available at Home: None  Tub or Shower Type: Tub/Shower combination    EXAMINATION/PRESENTATION/DECISION MAKING:   Critical Behavior:  Neurologic State: Alert  Orientation Level: Oriented X4  Cognition: Appropriate decision making,Follows commands     Hearing:   Auditory  Auditory Impairment: None  Range Of Motion:  AROM: Generally decreased, functional           PROM: Generally decreased, functional           Strength:    Strength: Generally decreased, functional                                    Coordination:  Coordination: Generally decreased, functional       Functional Mobility:  Bed Mobility:  Rolling: Modified independent  Supine to Sit: Modified independent  Sit to Supine: Modified independent  Scooting: Modified independent  Transfers:  Sit to Stand: Stand-by assistance;Modified independent  Stand to Sit: Stand-by assistance;Modified independent                       Balance:   Sitting: Intact  Standing: Intact;Pull to stand; With support  Standing - Static: Good;Constant support  Standing - Dynamic : Fair;Constant support  Ambulation/Gait Training:  Distance (ft): 22 Feet (ft)  Assistive Device: Gait belt;Walker, rolling  Ambulation - Level of Assistance: Contact guard assistance        Gait Abnormalities: Decreased step clearance        Base of Support: Narrowed                       Functional Measure:  [facility] 6 Clicks         Basic Mobility Inpatient Short Form  How much difficulty does the patient currently have. .. Unable A Lot A Little None   1. Turning over in bed (including adjusting bedclothes, sheets and blankets)? [] 1   [] 2   [] 3   [x] 4   2. Sitting down on and standing up from a chair with arms ( e.g., wheelchair, bedside commode, etc.)   [] 1   [] 2   [x] 3   [] 4   3. Moving from lying on back to sitting on the side of the bed? [] 1   [] 2   [] 3   [x] 4          How much help from another person does the patient currently need. .. Total A Lot A Little None   4. Moving to and from a bed to a chair (including a wheelchair)? [] 1   [] 2   [x] 3   [] 4   5. Need to walk in hospital room? [] 1   [] 2   [x] 3   [] 4   6. Climbing 3-5 steps with a railing? [] 1   [] 2   [x] 3   [] 4   © , Trustees of [facility], under license to [employer]. All rights reserved     Score:  Initial:  Most Recent: X (Date:2022 )   Interpretation of Tool:  Represents activities that are increasingly more difficult (i.e. Bed mobility, Transfers, Gait).   Score 24 23 22-20 19-15 14-10 9-7 6   Modifier CH CI CJ CK CL CM CN          Pain Ratin/10 left hip    Activity Tolerance:   Good  Please refer to the flowsheet for vital signs taken during this treatment. After treatment patient left in no apparent distress:   Supine in bed and Call bell within reach    COMMUNICATION/EDUCATION:   The patients plan of care was discussed with: Registered nurse. Fall prevention education was provided and the patient/caregiver indicated understanding.     Thank you for this referral.  Km Oden PT   Time Calculation: 14 mins

## 2022-06-12 NOTE — PROGRESS NOTES
Progress Note    Patient: Gerry Dave MRN: [id number]  SSN: [id number]    YOB: 1970  Age: 47 y.o.   Sex: male      Admit Date: 4/15/2022    LOS: 58 days     Subjective:   Patient seen in bed  Continues have difficulties pouching ileostomy, leaks earlier  Tolerating diet denies nausea vomiting  Continues have some right-sided abdominal pain    Objective:     Vitals:    06/11/22 1901 06/11/22 2059 06/12/22 0447 06/12/22 0809   BP:  120/73  (!) 156/95   Pulse:  88  84   Resp:  18  16   Temp:  98.4 °F (36.9 °C)  97.6 °F (36.4 °C)   SpO2: 99% 100%  100%   Weight:   134 lb 7.7 oz (61 kg)    Height:            Intake and Output:  Current Shift: 06/12 0701 - 06/12 1900  In: 720 [P.O.:720]  Out: 1800 [Urine:1300]  Last three shifts: 06/10 1901 - 06/12 0700  In: 1060 [P.O.:960; I.V.:100]  Out: 2655 [Urine:2350]    Review of Systems:  ROS     Physical Exam:   Soft, mildly tender palpation right side, nondistended, midline incision is clean with no drainage although there is some leakage of stool in the ileostomy towards the midline, ileostomy pink and productive    Lab/Data Review:  Recent Results (from the past 24 hour(s))   GLUCOSE, POC    Collection Time: 06/11/22  4:10 PM   Result Value Ref Range    Glucose (POC) 294 (H) 65 - 117 mg/dL    Performed by Ed Sanchez    GLUCOSE, POC    Collection Time: 06/11/22  8:57 PM   Result Value Ref Range    Glucose (POC) 287 (H) 65 - 117 mg/dL    Performed by Lindsey Gallegos, POC    Collection Time: 06/12/22  7:23 AM   Result Value Ref Range    Glucose (POC) 129 (H) 65 - 117 mg/dL    Performed by Ed Sanchez    GLUCOSE, POC    Collection Time: 06/12/22 12:13 PM   Result Value Ref Range    Glucose (POC) 284 (H) 65 - 117 mg/dL    Performed by Ed Sanchez           Assessment:     Active Problems:    Colon cancer ([address] (4/15/2022)      Cecal polyp (4/15/2022)        Plan:   Wound care to see patient again tomorrow, patient may benefit from a stoma belt currently is having significant difficulty pouching and leakage. Once it is under control can discuss possible discharge to skilled nursing facility. I would like to see a pouch stay on for 2 days. ,  Rheumatology following for myasthenia gravis and polymyositis, neurology following for embolic infarcts, continue diet, continue current pain management  Signed By: Hortencia Arroyo MD     June 12, 2022

## 2022-06-12 NOTE — PROGRESS NOTES
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 Rheumatology already consulted    EXAM:  Awake oriented  No change in visual obscurations  No new focality      ASSESSMENT/PLAN:  Rheumatology already on case; we will defer to rheumatology. 8     ALLERGIES:    Allergies   Allergen Reactions    Beef Containing Products Anaphylaxis and Other (comments)    Diltiazem Other (comments)     Reaction Type: Allergy; Reaction(s): Pressure in chest    Iodinated Contrast Media Other (comments)     Reaction Type: Allergy; Severity: Severe; Reaction(s): hives,throat swelling    Pork Derived (Porcine) Hives    Shellfish Containing Products Swelling     Other reaction(s): Other  Reaction Type: Allergy; Reaction(s): Hives,throat swelling    Sulfa (Sulfonamide Antibiotics) Unknown (comments)     Spinal meningitis    Sulfur Unknown (comments)     Reaction Type: Allergy  Spinal meningitis    Lactose Diarrhea     Lactose intolerant per Pt.        MEDS:      Current Facility-Administered Medications:     HYDROmorphone (DILAUDID) injection 1 mg, 1 mg, IntraVENous, Q3H PRN, Nilesh Clement MD, 1 mg at 06/12/22 1113    fluconazole (DIFLUCAN) tablet 200 mg, 200 mg, Oral, Q7D, Nilesh Clement MD, 200 mg at 06/08/22 1734    piperacillin-tazobactam (ZOSYN) 3.375 g in 0.9% sodium chloride (MBP/ADV) 100 mL MBP, 3.375 g, IntraVENous, Q8H, Fariha Martinez MD, Last Rate: 25 mL/hr at 06/12/22 1241, 3.375 g at 06/12/22 1241    clopidogreL (PLAVIX) tablet 75 mg, 75 mg, Oral, DAILY, KIMBERLY Rye IV, MD, 75 mg at 06/12/22 3376    aspirin chewable tablet 81 mg, 81 mg, Oral, DAILY, KIMBERLY Rey IV, MD, 81 mg at 06/12/22 0812    0.9% sodium chloride infusion 250 mL, 250 mL, IntraVENous, PRN, Abigail Hidalgo MD    acetaminophen (TYLENOL) tablet 650 mg, 650 mg, Oral, Q4H PRN, Zuleika Metzger MD, 650 mg at 05/18/22 1838    famotidine (PF) (PEPCID) 20 mg in 0.9% sodium chloride 10 mL injection, 20 mg, IntraVENous, Q12H, Zuleika Metzger MD, 20 mg at 06/12/22 0812    dextrose 5% - 0.45% NaCl with KCl 20 mEq/L infusion, 25 mL/hr, IntraVENous, CONTINUOUS, Zuleika Metzger MD, Last Rate: 25 mL/hr at 06/10/22 1826, 25 mL/hr at 06/10/22 1826    oxyCODONE IR (ROXICODONE) tablet 10 mg, 10 mg, Oral, Q4H PRN, Zuleika Metzger MD, 10 mg at 05/27/22 1404    ondansetron (ZOFRAN) injection 4 mg, 4 mg, IntraVENous, Q4H PRN, Zuleika Metzger MD, 4 mg at 06/12/22 9171    verapamil ER (VERELAN PM) capsule 200 mg, 200 mg, Oral, QHS, Ilsa Aase E, MD, 200 mg at 06/11/22 2124    [Held by provider] potassium chloride SR (KLOR-CON 10) tablet 10 mEq, 10 mEq, Oral, BID, Fabio Coy MD, 10 mEq at 04/30/22 2142    apixaban (ELIQUIS) tablet 5 mg, 5 mg, Oral, BID, Vashti Vergara MD, 5 mg at 06/12/22 6129    amiodarone (CORDARONE) tablet 200 mg, 200 mg, Oral, DAILY, Author MD Lyudmila, 200 mg at 06/12/22 0385    lisinopriL (PRINIVIL, ZESTRIL) tablet 20 mg, 20 mg, Oral, DAILY, Zuleika Metzger MD, 20 mg at 06/12/22 9428    predniSONE (DELTASONE) tablet 20 mg, 20 mg, Oral, DAILY WITH BREAKFAST, Zuleika Metzger MD, 20 mg at 06/12/22 0812    insulin lispro (HUMALOG) injection, , SubCUTAneous, AC&HS, Zuleika Metzger MD, 6 Units at 06/12/22 1241    glucose chewable tablet 16 g, 4 Tablet, Oral, PRN, Zuleika Metzger MD    glucagon (GLUCAGEN) injection 1 mg, 1 mg, IntraMUSCular, PRN, Zuleika Metzger MD, 1 mg at 05/06/22 0559    dextrose 10% infusion 0-250 mL, 0-250 mL, IntraVENous, PRN, Zuleika Metzger MD, Last Rate: 750 mL/hr at 05/02/22 1558, 125 mL at 05/02/22 1558    labetaloL (NORMODYNE;TRANDATE) 20 mg/4 mL (5 mg/mL) injection 10 mg, 10 mg, IntraVENous, Q6H PRN, Zuleika Metzger MD, 10 mg at 05/03/22 1423    LABS:  Recent Results (from the past 24 hour(s))   GLUCOSE, POC    Collection Time: 06/11/22  4:10 PM   Result Value Ref Range    Glucose (POC) 294 (H) 65 - 117 mg/dL    Performed by Ashwini Kimball    GLUCOSE, POC    Collection Time: 06/11/22  8:57 PM   Result Value Ref Range    Glucose (POC) 287 (H) 65 - 117 mg/dL    Performed by Paula Phan    GLUCOSE, POC    Collection Time: 06/12/22  7:23 AM   Result Value Ref Range    Glucose (POC) 129 (H) 65 - 117 mg/dL    Performed by Ashwini Kimball    GLUCOSE, POC    Collection Time: 06/12/22 12:13 PM   Result Value Ref Range    Glucose (POC) 284 (H) 65 - 117 mg/dL    Performed by [facility]        Visit Vitals  BP (!) 156/95 (BP 1 Location: Right upper arm, BP Patient Position: At rest)   Pulse 84   Temp 97.6 °F (36.4 °C)   Resp 16   Ht 5' 7.4\" (1.712 m)   Wt 61 kg (134 lb 7.7 oz)   SpO2 100%   BMI 20.81 kg/m²       Imaging:  CT ABD PELV WO CONT   Final Result   New right lower quadrant ostomy. Small amount of free air. Bowel   wall prominence in small bowel loops. No evidence of mechanical bowel   obstruction. Fluid collection in the pelvis concerning for abscess. Other   findings as above. Findings conveyed to the patient's nurse Eric De Oliveira on 6/6/2022 at 9:50 AM.      XR HIP LT W OR WO PELV 2-3 VWS   Final Result      DUPLEX CAROTID BILATERAL   Final Result      MRI BRAIN WO CONT   Final Result         1. Evolving ischemic infarcts in multiple vascular territories, involving both   the right occipital lobe and left frontal lobe. 2. Left occipital encephalomalacia      CT ABD PELV WO CONT   Final Result   Small bowel ileus versus partial obstruction, at the ileocolic   anastomosis or ileocecal valve, slightly greater than previous. There is mixed   density fluid throughout this region, as before. . Consider follow-up study with   IV contrast and if possible, oral contrast. Consider NG tube.       XR ABD (KUB)   Final Result      XR ABD (KUB)   Final Result   Small bowel dilatation consistent with obstruction, not   significantly changed. XR ABD (KUB)   Final Result   Persistent small bowel distention as seen with obstruction. XR ABD (KUB)   Final Result   No significant change compared to abdomen series 5/5/2022. Persistent air and fluid-filled dilated small bowel loops. XR ABD (KUB)   Final Result   Multiple distended gas-filled loops of small bowel compatible with   partial small bowel obstruction versus ileus. CT ABD PELV WO CONT   Final Result   Moderate grade partial small bowel obstruction. Small volume complex ascites. CT ABD PELV WO CONT   Final Result   1. Improving hemoperitoneum and pneumoperitoneum. 2.  Ileocolic anastomosis in the right lower quadrant. Incompletely evaluated   without contrast. No evidence of bowel obstruction. 3.  Reticular and groundglass airspace disease in the visualized lung bases   redemonstrated. 4.  See full report for detailed findings. CT ABD PELV WO CONT   Final Result   1. Status post ileal cecal surgery. Surrounding extraluminal dense material may   represent blood products (no indication of enteric contrast administration). Anastomotic leak cannot be excluded. Consider imaging with water-soluble oral   contrast. Free air in the abdomen likely within normal limits post recent   surgery. The bowel does not appear obstructed. 2. Bilateral lower lung airspace disease likely infectious or inflammatory. 3. Bilateral femoral head sclerosis suggests AVN.

## 2022-06-12 NOTE — PROGRESS NOTES
Progress Note  Date:2022       Room:[facility]  Patient Name:Liana Mahajan     YOB: 1970     Age:52 y.o. Subjective    Subjective :History of Polymiositis, Myasthenia Gravis    The patient is a 47year old male with a history of biopsy proven polymyositis that was done at [facility] in , which is treated by Rheumatology. He reports he was given the diagnosis of Myasthenia Gravis that has been treated by Neurology at [facility]. He iscurrently treated with Prednisone 20mg once a day and Rituximab infusions. His last infusion was in 2022. The Patient also was diagnosed having Colon Caoncer in Aug 23, 2021. He was admitted for elective surgical removal of a polyp and developed a small bowel obstruction and possible anastomotic leak that resulting in a prolong hospital course requiring IV antibiotics and patient noticed progressive muscle weakness. Patient muscle weakness in his shoulders and upper arms, neck, core and lower extremities. He states that he has chronic muscle soreness and hip pain, which is felt to be due to avascular necrosis of the hip, abdominal pain and generalized muscle pain and states the frequency of his pain medicine has been adjusted. Review of Systems: The Rheumatology review of systems are remarkable for all the information in the past and present medical history. He's built his he described weakness of the lower extremities and back and does not have a push remedy or hand peripheral complaints    Objective         Vitals Last 24 Hours:  TEMPERATURE:  Temp  Av.5 °F (36.9 °C)  Min: 97.9 °F (36.6 °C)  Max: 99.3 °F (37.4 °C)  RESPIRATIONS RANGE: Resp  Av.7  Min: 16  Max: 18  PULSE OXIMETRY RANGE: SpO2  Av.8 %  Min: 99 %  Max: 100 %  PULSE RANGE: Pulse  Av  Min: 88  Max: 99  BLOOD PRESSURE RANGE: Systolic (21GDQ), JUNE:952 , Min:120 , VPK:690   ; Diastolic (28ETV), UTN:12, Min:73, Max:101    I/O (24Hr):     Intake/Output Summary (Last 24 hours) at 6/11/2022 2312  Last data filed at 6/11/2022 1430  Gross per 24 hour   Intake --   Output 2200 ml   Net -2200 ml     Objective:    HEENT: Normal HEENT exam.    Lungs:  Normal effort. Heart: Normal rate and clear lumngs  Abdomen: (RLQ colostomy  Midline dressing D&I). There is generalized tenderness. Extremities: Normal range of motion,  fROM bilateral hips  Pain in left hip with internal and external rotation   No myofascial tenderness appreciated)  Neurological: Patient is alert and oriented to person, place and time. (4/5 BUE/LE strength  Scapular winging on the left   Muscle atrophy). Skin:  Warm and dry. Labs/Imaging/Diagnostics    Labs:  CBC:No results for input(s): WBC, RBC, HGB, HCT, MCV, RDW, PLT, HGBEXT, HCTEXT, PLTEXT in the last 72 hours. CHEMISTRIES:No results for input(s): NA, K, CL, CO2, BUN, CA, PHOS, MG in the last 72 hours. No lab exists for component: CREATININE, GLUCOSEPT/INR:No results for input(s): INR, INREXT in the last 72 hours. No lab exists for component: PROTIME  APTT:No results for input(s): APTT in the last 72 hours. LIVER PROFILE:No results for input(s): AST, ALT in the last 72 hours. No lab exists for component: Dominic Soda, ALKPHOS  Lab Results   Component Value Date/Time    ALT (SGPT) 16 06/05/2022 06:15 PM    AST (SGOT) 21 06/05/2022 06:15 PM    Alk. phosphatase 88 06/05/2022 06:15 PM    Bilirubin, total 0.2 06/05/2022 06:15 PM       Imaging Last 24 Hours:  No results found. Assessment//Plan   Active Problems:    Colon cancer ([address] (4/15/2022)      Cecal polyp (4/15/2022)      Assessment & Plan:      The patient has history of polymyositis which is clinically stable. The CK is 32 the Myoglobin is 32 in the ESR is 29 in the C-ractive protein is 0.7. He does not show any evidence of disease activity. I we will hold off on using any immunosuppressive agents although he does take prednisone to keep his symptoms controlled.  When he becomes stable And the surgical condition has improved and stabilized with resolution of the infection,     Electronically signed by Vanesa Angulo MD on 6/11/2022 at 11:12 PM

## 2022-06-13 NOTE — PROGRESS NOTES
Progress Note  Date:2022       Room:[facility]  Patient Name:Liana Lopez     YOB: 1970     Age:52 y.o. Subjective    Subjective : Polymyositis; The patient is a 47year old male with a history of biopsy proven polymyositis that was done at [address] [place] [address] in , which is treated by Rheumatology. He reports he was given the diagnosis of Myasthenia Gravis that has been treated by Neurology at [address] [place] [address]. He reports taking Prednisone 20mg once a day for one year. He reported that he walked into the hospital for this Hospital stay and now is weak post operative. The CPK is 32 and Myoglobin ins 47 and CRP was 0.72  Post op and with history of infection on antibiotics. The Polymyositis is in remission based on the lab test results and steroid induced Myopathy is less likely if he had adequate motor strength at time of admission. I will decrease the dose of Prednisone to 15 mg daily based on the clinical and laboratory results for Polymyositis. Neurology will make treatment decisions for Myasthenia Gravis. Review of Systems: The Rheumatology review of systems are remarkable for all the information in the past and present medical history. Objective         Vitals Last 24 Hours:  TEMPERATURE:  Temp  Av.4 °F (36.9 °C)  Min: 98.4 °F (36.9 °C)  Max: 98.4 °F (36.9 °C)  RESPIRATIONS RANGE: Resp  Av  Min: 16  Max: 16  PULSE OXIMETRY RANGE: SpO2  Av %  Min: 97 %  Max: 97 %  PULSE RANGE: Pulse  Av  Min: 86  Max: 86  BLOOD PRESSURE RANGE: Systolic (53NBL), KQ , Min:155 , NPL:452   ; Diastolic (15FCW), PIB:59, Min:96, Max:96    I/O (24Hr):     Intake/Output Summary (Last 24 hours) at 2022 0828  Last data filed at 2022  Gross per 24 hour   Intake 720 ml   Output 1900 ml   Net -1180 ml     Objective  Labs/Imaging/Diagnostics    Labs:  CBC:No results for input(s): WBC, RBC, HGB, HCT, MCV, RDW, PLT, HGBEXT, HCTEXT, PLTEXT in the last 72 hours. CHEMISTRIES:No results for input(s): NA, K, CL, CO2, BUN, CA, PHOS, MG in the last 72 hours. No lab exists for component: CREATININE, GLUCOSEPT/INR:No results for input(s): INR, INREXT in the last 72 hours. No lab exists for component: PROTIME  APTT:No results for input(s): APTT in the last 72 hours. LIVER PROFILE:No results for input(s): AST, ALT in the last 72 hours. No lab exists for component: Burnard Baldy, ALKPHOS  Lab Results   Component Value Date/Time    ALT (SGPT) 16 06/05/2022 06:15 PM    AST (SGOT) 21 06/05/2022 06:15 PM    Alk. phosphatase 88 06/05/2022 06:15 PM    Bilirubin, total 0.2 06/05/2022 06:15 PM       Imaging Last 24 Hours:  No results found. Assessment//Plan   Active Problems:    Colon cancer ([facility] [address] [facility] (4/15/2022)      Cecal polyp (4/15/2022)      Assessment & Plan:    The patient has history of polymyositis which is in remission and clinically stable. The CK is 32 the Myoglobin is 47 and the C-ractive protein is 0.7. He does not show any evidence of disease activity for Polymyositis. I will lower the dose of Prednison to 15 mg daily. Neurology to make treatment decisions for Myasthenia Gravis.       Electronically signed by Jb Biggs MD on 6/13/2022 at 8:28 AM

## 2022-06-13 NOTE — WOUND CARE
WCN in to see patient, patient stated that bag leaked over the weekend and had to be changed. Site care performed and rody-stomal skin crusted with skin prep and ostomy powder x2. Convex wafer cut to fit stoma and applied to skin, ostomy barrier strip applied to inferior aspect of wafer to allow for a better seal. Stoma paste applied under wafer to inferior and medial aspect to ensure no leaking. WCN held hand over wafer/bag to ensure good seal. Bag/wafer were then secured with ostomy belt. Encouraged patient to continue to fill for bag fullness and call for nurse for assistance with emptying bag. Patient stated that he can still not see pouch well enough to empty it himself. Advised nurse/tech should empty bag when 1/3 to 1/2 full to avoid pulling on bag. Patient to lay flat for about 10-15 minutes to allow wafer to adhere well to skin. Please re-consult WCN for any pouching needs.

## 2022-06-13 NOTE — PROGRESS NOTES
Nutrition Note    Notified of patient not drinking ensure clear, requesting almond milk to make \"shakes\". Will adjust diet orders.     Electronically signed by Sarai Arroyo RD on 6/13/2022 at 1:33 PM    Contact: [phone]

## 2022-06-13 NOTE — PROGRESS NOTES
OCCUPATIONAL THERAPY TREATMENT  Patient: Yrn Pearce (84 y.o. male)  Date: 6/13/2022  Diagnosis: Adenomatous polyp of colon, unspecified part of colon [D12.6]  Col[facility] ([facility] [C18.9]  Cecal polyp [K63.5] <principal problem not specified>  Procedure(s) (LRB):  Exploratory laparotomy, abdominal washout, resection of ileocolic anastomosis with end ileostomy (N/A) 29 Days Post-Op  Precautions:    Chart, occupational therapy assessment, plan of care, and goals were reviewed. ASSESSMENT  Patient continues with skilled OT services and is progressing towards goals. Upon MCGUIRE arrival, pt semi supine in bed and agreeable to tx session after encouragement from therapist.  Pt completed bed mobility with supervision for rolling, supine>sit, and scooting to EOB. Pt completed very thorough completion of EOB grooming. Pt completed seated EOB oral hygiene, face washing, and hair brushing with setup A and additional time for completion of all activity. Pt completed donning of slip on shoes without back with supervision. Pt declining OOB mobility at this time due to pain caused in his hip while ambulating during earlier session. Pt returned to supine at end of session with Zbigniew due to requiring assistance raising BLE into bed. Pt left semi supine in bed with call bell within reach and needs met. Will continue to follow pt throughout remainder of stay and progress towards OT goals. Recommending IRF at discharge when medically appropriate to increase activity tolerance, overall strength, and endurance. Other factors to consider for discharge: family/social support, DME, time since onset, severity of deficits, decline from functional baseline         PLAN :  Patient continues to benefit from skilled intervention to address the above impairments. Continue treatment per established plan of care. to address goals.     Recommendation for discharge: (in order for the patient to meet his/her long term goals)  Inpatient Rehabilitation Facility     This discharge recommendation:  Has been made in collaboration with the attending provider and/or case management    IF patient discharges home will need the following DME: TBD       SUBJECTIVE:   Patient stated I am hurting so I do not want to do that again.     OBJECTIVE DATA SUMMARY:   Cognitive/Behavioral Status:  Neurologic State: Alert  Orientation Level: Oriented X4  Cognition: Follows commands    Functional Mobility and Transfers for ADLs:  Bed Mobility:  Rolling: Supervision  Supine to Sit: Supervision  Sit to Supine: Minimum assistance  Scooting: Supervision    Transfers:  Sit to Stand: Stand-by assistance    Balance:  Sitting: Intact; Without support  Sitting - Static: Good (unsupported)  Sitting - Dynamic: Good (unsupported)  Standing: Intact; With support  Standing - Static: Constant support;Good  Standing - Dynamic : Constant support; Fair    ADL Intervention:  Grooming  Position Performed: Seated edge of bed  Washing Face: Set-up  Brushing Teeth: Set-up  Brushing/Combing Hair: Set-up    Lower Body Dressing Assistance  Slip on Shoes Without Back: Supervision    Pain:  8/10 back, stomach, R hip    Activity Tolerance:   Fair and requires rest breaks  Please refer to the flowsheet for vital signs taken during this treatment. After treatment patient left in no apparent distress:   Supine in bed, Call bell within reach, and Side rails x 3    COMMUNICATION/COLLABORATION:   The patients plan of care was discussed with: Registered nurse.      MAYNOR Kendrick  Time Calculation: 36 mins    Problem: Self Care Deficits Care Plan (Adult)  Goal: *Acute Goals and Plan of Care (Insert Text)  Description: Pt stated goal \"to decrease pain\"  Pt will be Mod I sup <> sit in prep for EOB ADLs  Pt will be Mod I grooming standing sink side LRAD  Pt will be Mod I UB dressing sitting EOB/long sit   Pt will be Mod I LE dressing sitting EOB/long sit  Pt will be Mod I sit <>  prep for toileting LRAD  Pt will be Mod I toileting/toilet transfer/cloth mgmt LRAD  Pt will be IND following UE HEP in prep for self care tasks      Outcome: Progressing Towards Goal

## 2022-06-13 NOTE — PROGRESS NOTES
Clinical Chart reviewed. Per attending the patient continues to have issues with new ostomy. CM received call from Kelly Alcocer with [facility] indicating that now the patient was modified independent with mobility he may possibly be too high functioning but they will want to see more therapy updates. CM has provided most recent therapy updates. Awaiting final decision at this time.

## 2022-06-13 NOTE — PROGRESS NOTES
PT treatment attempted at 9:45 however, Patient just had colostomy bag re attached and was required to not move for another 30 minutes. Will continue to follow pt and will attempt treatment at a later time.  Thank you

## 2022-06-13 NOTE — PROGRESS NOTES
PHYSICAL THERAPY TREATMENT  Patient: Bethel Bardales (76 y.o. male)  Date: 6/13/2022  Diagnosis: Adenomatous polyp of colon, unspecified part of colon [D12.6]  [facility] [C18.9]  Cecal polyp [K63.5] <principal problem not specified>  Procedure(s) (LRB):  Exploratory laparotomy, abdominal washout, resection of ileocolic anastomosis with end ileostomy (N/A) 29 Days Post-Op  Precautions:    Chart, physical therapy assessment, plan of care and goals were reviewed. ASSESSMENT  Patient continues with skilled PT services and is progressing towards goals. Patient supine in bed upon approach and agreed to therapy today. Patient was mod I for bed mobility, supine<>sit and scooting EOB. Patient then performed STS to RW at [facility] and ambulated inside room for 20 feet( to doorway and back to EOB) at [facility] with RW. Patient had slow but steady step to gait ( Rt side stepping to Lt) with decreased stance phase on Lt side. Patient had no LOB or knee buckling during gait. Patient then sat EOB and performed seated TE ( see details below)patient then returned to supine in bed at mod I for sit>supine transfer. Patient left supine in bed with call bell within reach and all needs meet. Patient's pain is poorly controlled on current pain control method with patient reporting being in constant 8/10 pain in stomach and hips and reporting that current pain medications \" don't really help\" As per patient. Current Level of Function Impacting Discharge (mobility/balance): impaired balance, general weakness,     Other factors to consider for discharge: PLOF, assistance at home, pain control level of deficits, time since onset. PLAN :  Patient continues to benefit from skilled intervention to address the above impairments. Continue treatment per established plan of care. to address goals.     Recommendation for discharge: (in order for the patient to meet his/her long term goals)  [address]     This discharge recommendation:  Has been made in collaboration with the attending provider and/or case management    IF patient discharges home will need the following DME: rolling walker       SUBJECTIVE:   Patient stated  the meds they got me own don't work and more.     OBJECTIVE DATA SUMMARY:   Critical Behavior:  Neurologic State: Alert  Orientation Level: Oriented X4  Cognition: Appropriate decision making,Follows commands     Functional Mobility Training:  Bed Mobility:  Rolling: Modified independent  Supine to Sit: Modified independent  Sit to Supine: Modified independent  Scooting: Modified independent  Transfers:  Sit to Stand: Stand-by assistance  Stand to Sit: Stand-by assistance  Balance:  Sitting: Intact; Without support  Sitting - Static: Good (unsupported)  Sitting - Dynamic: Good (unsupported)  Standing: Intact; With support  Standing - Static: Constant support;Good  Standing - Dynamic : Constant support; Fair  Ambulation/Gait Training:  Distance (ft): 22 Feet (ft)  Assistive Device: Gait belt;Walker, rolling  Ambulation - Level of Assistance: Contact guard assistance  Gait Abnormalities: Decreased step clearance; Step to gait  Base of Support: Narrowed  Stance: Left decreased;Right increased  Speed/Daysi: Slow  Step Length: Right shortened  Swing Pattern: Right asymmetrical    Therapeutic Exercises:   1x15 LAQ  1x15 seated marches  1x15 AP  1x15 hip ADD/ABD  Pain Ratin/10 in stomach and hips     Activity Tolerance:   Good  Please refer to the flowsheet for vital signs taken during this treatment. After treatment patient left in no apparent distress:   Supine in bed and Call bell within reach    COMMUNICATION/COLLABORATION:   The patients plan of care was discussed with: Registered nurse. Problem: Mobility Impaired (Adult and Pediatric)  Goal: *Acute Goals and Plan of Care (Insert Text)  Description: Pt will be I with LE HEP in 7 days. Pt will perform bed mobility with SBA in 7 days.   Pt will perform transfers with SBA in 7 days. Pt will amb 5-10 feet with LRAD safely with Mod I/Independent in 7 days. Pt will demonstrate improvement in standing dynamic balance from CGA to Mod I/Independent in 7 days.      Outcome: Progressing Towards Goal       Janel Blake PTA   Time Calculation: 24 mins

## 2022-06-13 NOTE — PROGRESS NOTES
Patient is still complaining of near constant pain. Current pain management regimen is not working. Could possibly benefit from a long acting oral pain medicine or maybe a fentanyl patch.

## 2022-06-13 NOTE — PROGRESS NOTES
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 EXAM:  Acknowledge and appreciate rheumatology note of this morning  Awake, oriented, being attended to by nursing staff  No change in visual obscurations. As I have explained to patient in the past, the visual obscurations would last for quite a while. No new focality. ASSESSMENT/PLAN:  As explained to the patient in the past, the visual obscurations would last for a while. They probably will resolve with time but this is not certain. Patient eventually will be transferred to rehab. He has agreed that he will eventually follow-up with me in clinic for continued treatment of his myasthenia. Meanwhile, I will continue to follow patient in the hospital.    ALLERGIES:    Allergies   Allergen Reactions    Beef Containing Products Anaphylaxis and Other (comments)    Diltiazem Other (comments)     Reaction Type: Allergy; Reaction(s): Pressure in chest    Iodinated Contrast Media Other (comments)     Reaction Type: Allergy; Severity: Severe; Reaction(s): hives,throat swelling    Pork Derived (Porcine) Hives    Shellfish Containing Products Swelling     Other reaction(s): Other  Reaction Type: Allergy; Reaction(s): Hives,throat swelling    Sulfa (Sulfonamide Antibiotics) Unknown (comments)     Spinal meningitis    Sulfur Unknown (comments)     Reaction Type: Allergy  Spinal meningitis    Lactose Diarrhea     Lactose intolerant per Pt.        MEDS:      Current Facility-Administered Medications:     predniSONE (DELTASONE) tablet 15 mg, 15 mg, Oral, DAILY WITH BREAKFAST, Srinivasa Pinedo MD    HYDROmorphone (DILAUDID) injection 1 mg, 1 mg, IntraVENous, Q3H PRN, Pedro Pop MD, 1 mg at 06/13/22 0626    fluconazole (DIFLUCAN) tablet 200 mg, 200 mg, Oral, Q7D, Humera Byrne MD, 200 mg at 06/08/22 1734    piperacillin-tazobactam (ZOSYN) 3.375 g in 0.9% sodium chloride (MBP/ADV) 100 mL MBP, 3.375 g, IntraVENous, Q8H, Fariha Martinez MD, Last Rate: 25 mL/hr at 06/13/22 0502, 3.375 g at 06/13/22 0502    clopidogreL (PLAVIX) tablet 75 mg, 75 mg, Oral, DAILY, Tanwi IV, Sinai Calles MD, 75 mg at 06/12/22 0041    aspirin chewable tablet 81 mg, 81 mg, Oral, DAILY, Tanwi IV, KIMBERLY Rosales MD, 81 mg at 06/12/22 0812    0.9% sodium chloride infusion 250 mL, 250 mL, IntraVENous, PRN, Barbara Hidalgo MD    acetaminophen (TYLENOL) tablet 650 mg, 650 mg, Oral, Q4H PRN, Humera Byrne MD, 650 mg at 05/18/22 1838    famotidine (PF) (PEPCID) 20 mg in 0.9% sodium chloride 10 mL injection, 20 mg, IntraVENous, Q12H, Humera Byrne MD, 20 mg at 06/12/22 2053    dextrose 5% - 0.45% NaCl with KCl 20 mEq/L infusion, 25 mL/hr, IntraVENous, CONTINUOUS, Humera Byrne MD, Last Rate: 25 mL/hr at 06/12/22 1748, 25 mL/hr at 06/12/22 1748    oxyCODONE IR (ROXICODONE) tablet 10 mg, 10 mg, Oral, Q4H PRN, Humera Byrne MD, 10 mg at 05/27/22 1404    ondansetron (ZOFRAN) injection 4 mg, 4 mg, IntraVENous, Q4H PRN, Humera Byrne MD, 4 mg at 06/13/22 0311    verapamil ER (VERELAN PM) capsule 200 mg, 200 mg, Oral, QHS, Odilon CABRERA MD, 200 mg at 06/12/22 2100    [Held by provider] potassium chloride SR (KLOR-CON 10) tablet 10 mEq, 10 mEq, Oral, BID, Odilon CABRERA MD, 10 mEq at 04/30/22 2142    apixaban (ELIQUIS) tablet 5 mg, 5 mg, Oral, BID, Idalmis Santillan MD, 5 mg at 06/12/22 2053    amiodarone (CORDARONE) tablet 200 mg, 200 mg, Oral, DAILY, James Reynoso, Karina Phillip MD, 200 mg at 06/12/22 0812    lisinopriL (PRINIVIL, ZESTRIL) tablet 20 mg, 20 mg, Oral, DAILY, Humera Byrne MD, 20 mg at 06/12/22 0812    insulin lispro (HUMALOG) injection, , SubCUTAneous, AC&HS, Humera Byrne MD, 4 Units at 06/12/22 2116    glucose chewable tablet 16 g, 4 Tablet, Oral, PRN, Jean Art MD    glucagon (GLUCAGEN) injection 1 mg, 1 mg, IntraMUSCular, PRN, Jean Art MD, 1 mg at 05/06/22 0559    dextrose 10% infusion 0-250 mL, 0-250 mL, IntraVENous, PRN, Jean Art MD, Last Rate: 750 mL/hr at 05/02/22 1558, 125 mL at 05/02/22 1558    labetaloL (NORMODYNE;TRANDATE) 20 mg/4 mL (5 mg/mL) injection 10 mg, 10 mg, IntraVENous, Q6H PRN, Jean Art MD, 10 mg at 05/03/22 1423    LABS:  Recent Results (from the past 24 hour(s))   GLUCOSE, POC    Collection Time: 06/12/22 12:13 PM   Result Value Ref Range    Glucose (POC) 284 (H) 65 - 117 mg/dL    Performed by Catrachito Joya, POC    Collection Time: 06/12/22  4:23 PM   Result Value Ref Range    Glucose (POC) 306 (H) 65 - 117 mg/dL    Performed by Tim Rhoades    GLUCOSE, POC    Collection Time: 06/12/22  9:10 PM   Result Value Ref Range    Glucose (POC) 202 (H) 65 - 117 mg/dL    Performed by Nicole Samuels    GLUCOSE, POC    Collection Time: 06/13/22  7:55 AM   Result Value Ref Range    Glucose (POC) 120 (H) 65 - 117 mg/dL    Performed by Primo Kaiser        Visit Vitals  BP (!) 156/96 (BP 1 Location: Right upper arm)   Pulse 86   Temp 98.4 °F (36.9 °C)   Resp 16   Ht 5' 7.4\" (1.712 m)   Wt 62.6 kg (138 lb 0.1 oz)   SpO2 97%   BMI 21.36 kg/m²       Imaging:  CT ABD PELV WO CONT   Final Result   New right lower quadrant ostomy. Small amount of free air. Bowel   wall prominence in small bowel loops. No evidence of mechanical bowel   obstruction. Fluid collection in the pelvis concerning for abscess. Other   findings as above. Findings conveyed to the patient's nurse Ramesh Escalona on 6/6/2022 at 9:50 AM.      XR HIP LT W OR WO PELV 2-3 VWS   Final Result      DUPLEX CAROTID BILATERAL   Final Result      MRI BRAIN WO CONT   Final Result         1.  Evolving ischemic infarcts in multiple vascular territories, involving both   the right occipital lobe and left frontal lobe. 2. Left occipital encephalomalacia      CT ABD PELV WO CONT   Final Result   Small bowel ileus versus partial obstruction, at the ileocolic   anastomosis or ileocecal valve, slightly greater than previous. There is mixed   density fluid throughout this region, as before. . Consider follow-up study with   IV contrast and if possible, oral contrast. Consider NG tube. XR ABD (KUB)   Final Result      XR ABD (KUB)   Final Result   Small bowel dilatation consistent with obstruction, not   significantly changed. XR ABD (KUB)   Final Result   Persistent small bowel distention as seen with obstruction. XR ABD (KUB)   Final Result   No significant change compared to abdomen series 5/5/2022. Persistent air and fluid-filled dilated small bowel loops. XR ABD (KUB)   Final Result   Multiple distended gas-filled loops of small bowel compatible with   partial small bowel obstruction versus ileus. CT ABD PELV WO CONT   Final Result   Moderate grade partial small bowel obstruction. Small volume complex ascites. CT ABD PELV WO CONT   Final Result   1. Improving hemoperitoneum and pneumoperitoneum. 2.  Ileocolic anastomosis in the right lower quadrant. Incompletely evaluated   without contrast. No evidence of bowel obstruction. 3.  Reticular and groundglass airspace disease in the visualized lung bases   redemonstrated. 4.  See full report for detailed findings. CT ABD PELV WO CONT   Final Result   1. Status post ileal cecal surgery. Surrounding extraluminal dense material may   represent blood products (no indication of enteric contrast administration). Anastomotic leak cannot be excluded. Consider imaging with water-soluble oral   contrast. Free air in the abdomen likely within normal limits post recent   surgery. The bowel does not appear obstructed. 2. Bilateral lower lung airspace disease likely infectious or inflammatory.    3. Bilateral femoral head sclerosis suggests AVN.

## 2022-06-14 NOTE — PROGRESS NOTES
Problem: Falls - Risk of  Goal: *Absence of Falls  Description: Document Bard Fernandes Fall Risk and appropriate interventions in the flowsheet. Outcome: Progressing Towards Goal  Note: Fall Risk Interventions:  Mobility Interventions: Bed/chair exit alarm,OT consult for ADLs,Patient to call before getting OOB,PT Consult for mobility concerns,Utilize walker, cane, or other assistive device         Medication Interventions: Bed/chair exit alarm,Patient to call before getting OOB    Elimination Interventions: Bed/chair exit alarm,Call light in reach,Patient to call for help with toileting needs,Toileting schedule/hourly rounds,Urinal in reach    History of Falls Interventions: Bed/chair exit alarm,Door open when patient unattended,Room close to nurse's station         Problem: Pain  Goal: *Control of Pain  Outcome: Progressing Towards Goal     Problem: Pressure Injury - Risk of  Goal: *Prevention of pressure injury  Description: Document Yuri Scale and appropriate interventions in the flowsheet. Outcome: Progressing Towards Goal  Note: Pressure Injury Interventions:  Sensory Interventions: Discuss PT/OT consult with provider,Float heels,Keep linens dry and wrinkle-free,Minimize linen layers,Monitor skin under medical devices,Pressure redistribution bed/mattress (bed type),Turn and reposition approx. every two hours (pillows and wedges if needed)    Moisture Interventions: Absorbent underpads,Internal/External fecal devices,Limit adult briefs,Minimize layers,Moisture barrier    Activity Interventions: Pressure redistribution bed/mattress(bed type),PT/OT evaluation    Mobility Interventions: Float heels,HOB 30 degrees or less,Pressure redistribution bed/mattress (bed type),PT/OT evaluation,Turn and reposition approx.  every two hours(pillow and wedges)    Nutrition Interventions: Document food/fluid/supplement intake    Friction and Shear Interventions: Apply protective barrier, creams and emollients,Lift sheet,Minimize layers

## 2022-06-14 NOTE — PROGRESS NOTES
Comprehensive Nutrition Assessment    Type and Reason for Visit: (P) Reassess    Nutrition Recommendations/Plan:   Continue current diet, honoring food preferences as able  Monitor and record PO intakes, supplement acceptance, and Bms in I/Os     Malnutrition Assessment:  Malnutrition Status:  No malnutrition (06/14/22 1208)    Context:  Acute illness     Findings of the 6 clinical characteristics of malnutrition:   Energy Intake:  Mild decrease in energy intake (specify)  Weight Loss:  No significant weight loss     Body Fat Loss:  No significant body fat loss,     Muscle Mass Loss:  No significant muscle mass loss,    Fluid Accumulation:  No significant fluid accumulation,     Strength:  Not performed     Nutrition Assessment:    (P) Admitted for colon CA and cecal polyp w/ sx resection. Pt reported good appetite w/ poor intake 2/2 food preferences. (4/28) Intakes excellent, >76% of meals, with good ONS acceptance. (5/2) CT indicative of partial SBO. (5/4) Advanced to Clear Liq however N/V/C continues, (5/5) NPO, PPN initiated. (5/9) CT still finding SBO. (5/13) Ex-lap with resection of ileocolic anastamosis and reanastomosis, (5/15) return to OR for anastomotic leak. PPN off x2 days now, receiving D5 at 125ml/hr providing 510kcals/d. RD discussed with pt need to advance to TPN however pt denies wanting central line. Discussed with RN & Surgeon, plan to continue PPN. RD communicated recs with pharmacy, will advance to 500ml bag 3x/wk to try and increase kcal provision. Overall, PPN provided x8d. (5/18) Pt tolerating clear liquids and advanced to full with plan let TPN run out. Will add ONS. (5/24) Pt continues to tolerate diet without n/v. (5/31) S/p diet upgrade to regular texture intakes improved to >50% of meals. Tolerating diet, no wt loss. (6/7)Fair appetite/intake reported- ate 50% 2/2 preferences. Intake likely to improve w/ provision of Alternative menu.  Pt had concern for ONS; prefers plant based- modify as requested. (6/14) Continued difficulties with colostomy bag. ONS preferences updated 6/13. Intakes remain ~50% of meals. Labs: reviewed. Meds: D5/NaCl, famotidine, insulin lispro, ondansetron, KCl, prednisone. Nutrition Related Findings:    (P) No N/V/D/C nor c/s issues per Pt. Last BM 6/13 +ostomy in place. No edema. Wound Type: (P) Multiple,Surgical incision    Current Nutrition Intake & Therapies:  Average Meal Intake: (P) 51-75%  Average Supplement Intake: (P) %  ADULT DIET Regular; NO beef, pork, shellfish. Lactose intolerant. Cheese is Okay as requested. ADULT ORAL NUTRITION SUPPLEMENT Breakfast, Lunch, HS Snack; Snack; ALMOND MILK    Anthropometric Measures:  Height: (P) 5' 7.4\" (171.2 cm)  Ideal Body Weight (IBW): (P) 150 lbs ((P) 68 kg)  Admission Body Weight: 160 lb  Current Body Wt:  (P) 63.8 kg (140 lb 10.5 oz), (P) 93.8 % IBW. (P) Bed scale  Current BMI (kg/m2): (P) 21.8  Usual Body Weight: 72.6 kg (160 lb)  % Weight Change (Calculated): -8  Weight Adjustment: (P) No adjustment  BMI Category: (P) Normal weight (BMI 18.5-24. 9)    Estimated Daily Nutrient Needs:  Energy Requirements Based On: (P) Kcal/kg  Weight Used for Energy Requirements: (P) Current  Energy (kcal/day): (P) 1914kcal (30kcal/kg)  Weight Used for Protein Requirements: (P) Current  Protein (g/day): (P) 96g (1.5g/kg)  Method Used for Fluid Requirements: (P) 1 ml/kcal  Fluid (ml/day): (P) 1914mL (1mL/kcal)    Nutrition Diagnosis:   Inadequate oral intake related to other (specify),food and nutrition related knowledge deficit,altered GI function (food preferences) as evidenced by intake 26-50%,GI abnormality    Nutrition Interventions:   Food and/or Nutrient Delivery: (P) Continue current diet,Continue oral nutrition supplement  Nutrition Education/Counseling: (P) Survival skills/brief education completed  Coordination of Nutrition Care: (P) Continue to monitor while inpatient  Plan of Care discussed with: RN, Pt.    Goals:  Previous Goal Met: (P) Goal(s) achieved  Goals: (P) PO intake 50% or greater,Meet at least 75% of estimated needs    Nutrition Monitoring and Evaluation:   Behavioral-Environmental Outcomes: (P) None identified  Food/Nutrient Intake Outcomes: (P) Diet advancement/tolerance,Food and nutrient intake,Supplement intake  Physical Signs/Symptoms Outcomes: (P) Meal time behavior,Weight    Discharge Planning:    (P) Continue oral nutrition supplement    Liang Yarbrough RD  Contact: [phone]

## 2022-06-14 NOTE — PROGRESS NOTES
CM received call from Lane Case with [facility]. She stated they are unable to accept the patient as they feel he is too high functioning now. CM met with the patient at the bedside and explained this too him. He also reports he cannot tolerate a lot of therapy due to his condition and pain. We discussed trying [facility] but the patient stated he cannot tolerate 3 hours of therapy even if it is broken up. CM explained his only other option is a skilled nursing facility. He would like to go to the [facility] area and provided the [place] codes of: [address] [address] CM created a list within a 10 mile radius of the zip codes provided. List given to patient. He will review and provide choices to CM tomorrow.

## 2022-06-14 NOTE — PROGRESS NOTES
Progress Note    Patient: Jojo Calvillo MRN: [id number]  SSN: [id number]    YOB: 1970  Age: 47 y.o.   Sex: male      Admit Date: 4/15/2022    LOS: 60 days     Subjective:   Patient seen in bed  Reports stoma appliance is staying on better  Tolerating diet  Prednisone dose decreased by rheumatology  Objective:     Vitals:    06/13/22 1938 06/14/22 0737 06/14/22 0803 06/14/22 1209   BP: (!) 151/99  (!) 146/96    Pulse: 97  70    Resp: 18  20    Temp: 98.6 °F (37 °C)  98.6 °F (37 °C)    SpO2: 100%  92%    Weight:  140 lb 10.5 oz (63.8 kg)     Height:    5' 7.4\" (1.712 m)        Intake and Output:  Current Shift: 06/14 0701 - 06/14 1900  In: -   Out: 900 [Urine:350]  Last three shifts: 06/12 1901 - 06/14 0700  In: -   Out: 250     Review of Systems:  ROS     Physical Exam:   Physical Exam  Abdominal:      Comments: Abdomen is soft, mildly tender palpation on the right side, nondistended, ileostomy pink and productive, midline incision clean with scant drainage and open wound      abdomen is soft, nondistended, mildly tender palpation on the right side, stoma is pink pouched and productive, midline incision is clean with very scant drainage    Lab/Data Review:  Recent Results (from the past 24 hour(s))   GLUCOSE, POC    Collection Time: 06/13/22  4:28 PM   Result Value Ref Range    Glucose (POC) 315 (H) 65 - 117 mg/dL    Performed by sezmi    GLUCOSE, POC    Collection Time: 06/13/22  7:44 PM   Result Value Ref Range    Glucose (POC) 280 (H) 65 - 117 mg/dL    Performed by Mercedes Mabry    TSH 3RD GENERATION    Collection Time: 06/13/22  8:04 PM   Result Value Ref Range    TSH 0.12 (L) 0.36 - 3.74 uIU/mL   GLUCOSE, POC    Collection Time: 06/14/22  8:03 AM   Result Value Ref Range    Glucose (POC) 141 (H) 65 - 117 mg/dL    Performed by sezmi    GLUCOSE, POC    Collection Time: 06/14/22 12:10 PM   Result Value Ref Range    Glucose (POC) 237 (H) 65 - 117 mg/dL    Performed by Donna Morris             Assessment:     Active Problems:    Colon cancer ([facility] [address] (4/15/2022)      Cecal polyp (4/15/2022)        Plan:   Overall improving  Continue stoma teaching  Continue PT/OT  Continue current analgesics  Appreciate rheumatology and neurology assistance     Signed By: Chuck Conway MD     June 14, 2022

## 2022-06-14 NOTE — WOUND CARE
Ostomy bag seems to be holding up well, no leaks noticed during assessment this morning. Patient stated that bag held up well last night and felt more secure yesterday when working with PT/OT. Reminded patient to feel pouch often throughout the day to see if it needs to be emptied. Bag is to be emptied when 1/3-1/2 full.

## 2022-06-14 NOTE — PROGRESS NOTES
PT treatment attempted at 11:51 however, patients colostomy bag full and needed to bed emptied before performing therapy. patient agreed to to attempt therapy after bag emptied. Patients nurse alerted to bad needed to be emptied. Will continue to follow pt and will attempt treatment at a later time.  Thank you

## 2022-06-14 NOTE — PROGRESS NOTES
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 Follows commands  No new focality      ASSESSMENT/PLAN:  I ordered a myasthenia panel yesterday. Results pending  Patient being followed by rheumatology for polymyositis    ALLERGIES:    Allergies   Allergen Reactions    Beef Containing Products Anaphylaxis and Other (comments)    Diltiazem Other (comments)     Reaction Type: Allergy; Reaction(s): Pressure in chest    Iodinated Contrast Media Other (comments)     Reaction Type: Allergy; Severity: Severe; Reaction(s): hives,throat swelling    Pork Derived (Porcine) Hives    Shellfish Containing Products Swelling     Other reaction(s): Other  Reaction Type: Allergy; Reaction(s): Hives,throat swelling    Sulfa (Sulfonamide Antibiotics) Unknown (comments)     Spinal meningitis    Sulfur Unknown (comments)     Reaction Type: Allergy  Spinal meningitis    Lactose Diarrhea     Lactose intolerant per Pt.        MEDS:      Current Facility-Administered Medications:     predniSONE (DELTASONE) tablet 15 mg, 15 mg, Oral, DAILY WITH BREAKFAST, Sawyer Herzog MD, 15 mg at 06/14/22 0810    HYDROmorphone (DILAUDID) injection 1 mg, 1 mg, IntraVENous, Q3H PRN, Dennise Haynes MD, 1 mg at 06/14/22 1117    fluconazole (DIFLUCAN) tablet 200 mg, 200 mg, Oral, Q7D, Dennise Haynes MD, 200 mg at 06/08/22 1734    piperacillin-tazobactam (ZOSYN) 3.375 g in 0.9% sodium chloride (MBP/ADV) 100 mL MBP, 3.375 g, IntraVENous, Q8H, Fariha Martinez MD, Last Rate: 25 mL/hr at 06/14/22 0503, 3.375 g at 06/14/22 0503    clopidogreL (PLAVIX) tablet 75 mg, 75 mg, Oral, DAILY, KIMBERLY Rey IV, MD, 75 mg at 06/14/22 0811    aspirin chewable tablet 81 mg, 81 mg, Oral, DAILY, KIMBERLY Rey IV, MD, 81 mg at 06/14/22 0810    0.9% sodium chloride infusion 250 mL, 250 mL, IntraVENous, PRN, Alex Hidalgo MD    acetaminophen (TYLENOL) tablet 650 mg, 650 mg, Oral, Q4H PRN, Pam Kay MD, 650 mg at 05/18/22 1838    famotidine (PF) (PEPCID) 20 mg in 0.9% sodium chloride 10 mL injection, 20 mg, IntraVENous, Q12H, Pam Kay MD, 20 mg at 06/14/22 0810    dextrose 5% - 0.45% NaCl with KCl 20 mEq/L infusion, 25 mL/hr, IntraVENous, CONTINUOUS, Pam Kay MD, Last Rate: 25 mL/hr at 06/14/22 0156, 25 mL/hr at 06/14/22 0156    oxyCODONE IR (ROXICODONE) tablet 10 mg, 10 mg, Oral, Q4H PRN, Pam Kay MD, 10 mg at 05/27/22 1404    ondansetron (ZOFRAN) injection 4 mg, 4 mg, IntraVENous, Q4H PRN, Pam Kay MD, 4 mg at 06/14/22 0817    verapamil ER (VERELAN PM) capsule 200 mg, 200 mg, Oral, QHS, Nhan CABRERA MD, 200 mg at 06/13/22 2135    [Held by provider] potassium chloride SR (KLOR-CON 10) tablet 10 mEq, 10 mEq, Oral, BID, Nhan CABRERA MD, 10 mEq at 04/30/22 2142    apixaban (ELIQUIS) tablet 5 mg, 5 mg, Oral, BID, Catalina Boeck, MD, 5 mg at 06/14/22 9539    amiodarone (CORDARONE) tablet 200 mg, 200 mg, Oral, DAILY, Neva Babin MD, 200 mg at 06/14/22 0811    lisinopriL (PRINIVIL, ZESTRIL) tablet 20 mg, 20 mg, Oral, DAILY, Pam Kay MD, 20 mg at 06/14/22 0811    insulin lispro (HUMALOG) injection, , SubCUTAneous, AC&HS, Pam Kay MD, 6 Units at 06/13/22 2135    glucose chewable tablet 16 g, 4 Tablet, Oral, PRN, Pam Kay MD    glucagon (GLUCAGEN) injection 1 mg, 1 mg, IntraMUSCular, PRN, Pam Kay MD, 1 mg at 05/06/22 0559    dextrose 10% infusion 0-250 mL, 0-250 mL, IntraVENous, PRN, Pam Kay MD, Last Rate: 750 mL/hr at 05/02/22 1558, 125 mL at 05/02/22 1558    labetaloL (NORMODYNE;TRANDATE) 20 mg/4 mL (5 mg/mL) injection 10 mg, 10 mg, IntraVENous, Q6H PRN, Pam Kay MD, 10 mg at 05/03/22 1423    LABS:  Recent Results (from the past 24 hour(s))   GLUCOSE, POC    Collection Time: 06/13/22  4:28 PM   Result Value Ref Range    Glucose (POC) 315 (H) 65 - 117 mg/dL    Performed by [employer]    GLUCOSE, POC    Collection Time: 06/13/22  7:44 PM   Result Value Ref Range    Glucose (POC) 280 (H) 65 - 117 mg/dL    Performed by Lala Aragon    TSH 3RD GENERATION    Collection Time: 06/13/22  8:04 PM   Result Value Ref Range    TSH 0.12 (L) 0.36 - 3.74 uIU/mL   GLUCOSE, POC    Collection Time: 06/14/22  8:03 AM   Result Value Ref Range    Glucose (POC) 141 (H) 65 - 117 mg/dL    Performed by [employer]    GLUCOSE, POC    Collection Time: 06/14/22 12:10 PM   Result Value Ref Range    Glucose (POC) 237 (H) 65 - 117 mg/dL    Performed by [employer]        Visit Vitals  BP (!) 146/96 (BP 1 Location: Left upper arm, BP Patient Position: At rest;Lying)   Pulse 70   Temp 98.6 °F (37 °C)   Resp 20   Ht 5' 7.4\" (1.712 m)   Wt 63.8 kg (140 lb 10.5 oz)   SpO2 92%   BMI 21.77 kg/m²       Imaging:  CT ABD PELV WO CONT   Final Result   New right lower quadrant ostomy. Small amount of free air. Bowel   wall prominence in small bowel loops. No evidence of mechanical bowel   obstruction. Fluid collection in the pelvis concerning for abscess. Other   findings as above. Findings conveyed to the patient's nurse Donna Bocanegra on 6/6/2022 at 9:50 AM.      XR HIP LT W OR WO PELV 2-3 VWS   Final Result      DUPLEX CAROTID BILATERAL   Final Result      MRI BRAIN WO CONT   Final Result         1. Evolving ischemic infarcts in multiple vascular territories, involving both   the right occipital lobe and left frontal lobe. 2. Left occipital encephalomalacia      CT ABD PELV WO CONT   Final Result   Small bowel ileus versus partial obstruction, at the ileocolic   anastomosis or ileocecal valve, slightly greater than previous. There is mixed   density fluid throughout this region, as before. . Consider follow-up study with   IV contrast and if possible, oral contrast. Consider NG tube. XR ABD (KUB)   Final Result      XR ABD (KUB)   Final Result   Small bowel dilatation consistent with obstruction, not   significantly changed. XR ABD (KUB)   Final Result   Persistent small bowel distention as seen with obstruction. XR ABD (KUB)   Final Result   No significant change compared to abdomen series 5/5/2022. Persistent air and fluid-filled dilated small bowel loops. XR ABD (KUB)   Final Result   Multiple distended gas-filled loops of small bowel compatible with   partial small bowel obstruction versus ileus. CT ABD PELV WO CONT   Final Result   Moderate grade partial small bowel obstruction. Small volume complex ascites. CT ABD PELV WO CONT   Final Result   1. Improving hemoperitoneum and pneumoperitoneum. 2.  Ileocolic anastomosis in the right lower quadrant. Incompletely evaluated   without contrast. No evidence of bowel obstruction. 3.  Reticular and groundglass airspace disease in the visualized lung bases   redemonstrated. 4.  See full report for detailed findings. CT ABD PELV WO CONT   Final Result   1. Status post ileal cecal surgery. Surrounding extraluminal dense material may   represent blood products (no indication of enteric contrast administration). Anastomotic leak cannot be excluded. Consider imaging with water-soluble oral   contrast. Free air in the abdomen likely within normal limits post recent   surgery. The bowel does not appear obstructed. 2. Bilateral lower lung airspace disease likely infectious or inflammatory. 3. Bilateral femoral head sclerosis suggests AVN.

## 2022-06-14 NOTE — PROGRESS NOTES
PHYSICAL THERAPY TREATMENT  Patient: Bee Robins (15 y.o. male)  Date: 6/14/2022  Diagnosis: Adenomatous polyp of colon, unspecified part of colon [D12.6]  [facility] [C18.9]  Cecal polyp [K63.5] <principal problem not specified>  Procedure(s) (LRB):  Exploratory laparotomy, abdominal washout, resection of ileocolic anastomosis with end ileostomy (N/A) 30 Days Post-Op  Precautions:    Chart, physical therapy assessment, plan of care and goals were reviewed. ASSESSMENT  Patient continues with skilled PT services and is progressing towards goals. Patient seated on EOB upon approach and agreed to therapy today. Patient was mod I for bed mobility, supine>sit, supervision for sit>supine, and mod I for scooting to EOB. Patient performed STS to RW at SBA and ambulated 22 feet inside room with RW at SBA. Patient had slow but steady step to gait pattern with decreased stance phase on LLE secondary to pain. Patient had no LOB or knee buckling during gait either. Patient then sat  Eob and performed seated TE ( see details below). Patient then performed sit>supine at supervision( patient did have slight trouble raising LLE into bed 2/2 to pain). Patient then left supine in bed with call bell within reach and all needs meet. Current Level of Function Impacting Discharge (mobility/balance): general weakness, poor activity tolerance, impaired balance    Other factors to consider for discharge: PLOF, assistance at home, acute medical state, level of deficits, pain management          PLAN :  Patient continues to benefit from skilled intervention to address the above impairments. Continue treatment per established plan of care. to address goals.     Recommendation for discharge: (in order for the patient to meet his/her long term goals)  [facility]     This discharge recommendation:  Has been made in collaboration with the attending provider and/or case management    IF patient discharges home will need the following DME: rolling walker       SUBJECTIVE:   Patient stated lets get this over with.     OBJECTIVE DATA SUMMARY:   Critical Behavior:  Neurologic State: Alert  Orientation Level: Oriented X4  Cognition: Appropriate decision making,Follows commands     Functional Mobility Training:  Bed Mobility:  Rolling: Modified independent  Supine to Sit: Modified independent  Sit to Supine: Supervision  Scooting: Modified independent  Transfers:  Sit to Stand: Stand-by assistance  Stand to Sit: Stand-by assistance  Balance:  Sitting: Intact; Without support  Sitting - Static: Good (unsupported)  Sitting - Dynamic: Good (unsupported)  Standing: Impaired; With support  Standing - Static: Constant support;Good  Standing - Dynamic : Constant support; Fair  Ambulation/Gait Training:  Distance (ft): 22 Feet (ft)  Assistive Device: Gait belt;Walker, rollator  Ambulation - Level of Assistance: Contact guard assistance  Gait Abnormalities: Step to gait  Base of Support: Narrowed  Stance: Left decreased;Right increased  Speed/Daysi: Slow  Step Length: Right shortened  Swing Pattern: Right asymmetrical    Therapeutic Exercises:   1x15 AP  1x15 LAQ  1x15 seated marches  1x10 hip ADD/ABD  Pain Ratin/10 in hipa and stomach    Activity Tolerance:   Fair  Please refer to the flowsheet for vital signs taken during this treatment. After treatment patient left in no apparent distress:   Supine in bed and Call bell within reach    COMMUNICATION/COLLABORATION:   The patients plan of care was discussed with: Registered nurse. Problem: Mobility Impaired (Adult and Pediatric)  Goal: *Acute Goals and Plan of Care (Insert Text)  Description: Pt will be I with LE HEP in 7 days. Pt will perform bed mobility with SBA in 7 days. Pt will perform transfers with SBA in 7 days. Pt will amb 5-10 feet with LRAD safely with Mod I/Independent in 7 days.     Pt will demonstrate improvement in standing dynamic balance from CGA to Mod I/Independent in 7 days.      6/14/2022 1344 by Olga Lorenzo, PTA  Outcome: Progressing Towards Goal  6/14/2022 1344 by Olga Lorenzo, PTA  Outcome: Progressing Towards Goal       Tushar Sepulveda PTA   Time Calculation: 21 mins

## 2022-06-14 NOTE — PROGRESS NOTES
Bedside shift change report given to KIERSTEN WANG (oncoming nurse) by Ashlee Barker RN (offgoing nurse). Report included the following information SBAR, Intake/Output, MAR, Recent Results and Med Rec Status.

## 2022-06-15 NOTE — PROGRESS NOTES
Progress Note    Patient: Cedrick Estes MRN: [id number]  SSN: [id number]    YOB: 1970  Age: 47 y.o.   Sex: male      Admit Date: 4/15/2022    LOS: 61 days     Subjective:   Patient seen in bed  Continues to have abdominal pain  Ileostomy has been pouched  Feels that his visual disturbances are getting worse    Objective:     Vitals:    06/15/22 0001 06/15/22 0818 06/15/22 1208 06/15/22 1552   BP: (!) 155/99 (!) 132/90  126/87   Pulse: 91 80  86   Resp: 20 20  18   Temp: 98.3 °F (36.8 °C) 98.2 °F (36.8 °C)  98.5 °F (36.9 °C)   SpO2: 96% 100%  100%   Weight:   141 lb 5 oz (64.1 kg)    Height:            Intake and Output:  Current Shift: 06/15 0701 - 06/15 1900  In: -   Out: 1400 [Urine:1100]  Last three shifts: 06/13 1901 - 06/15 0700  In: -   Out: 1150 [Urine:350]    Review of Systems:  ROS     Physical Exam:   Abdomen is soft, nondistended, tender palpation on the right side, midline incision scant drainage, ileostomy pink and productive    Lab/Data Review:  Recent Results (from the past 24 hour(s))   GLUCOSE, POC    Collection Time: 06/14/22  8:44 PM   Result Value Ref Range    Glucose (POC) 226 (H) 65 - 117 mg/dL    Performed by Catrachito Joya, POC    Collection Time: 06/15/22  9:10 AM   Result Value Ref Range    Glucose (POC) 88 65 - 117 mg/dL    Performed by [employer], POC    Collection Time: 06/15/22 11:58 AM   Result Value Ref Range    Glucose (POC) 119 (H) 65 - 117 mg/dL    Performed by [employer], POC    Collection Time: 06/15/22  5:07 PM   Result Value Ref Range    Glucose (POC) 242 (H) 65 - 117 mg/dL    Performed by Gallito Rodriguez           Assessment:     Active Problems:    Colon cancer ([address] (4/15/2022)      Cecal polyp (4/15/2022)        Plan:   Repeat MRI brain  Continue diet  Continue current analgesics  Discontinue Zosyn patient has had 2-week course    Signed By: Kalen Oswald MD     Nancy 15, 2022

## 2022-06-15 NOTE — WOUND CARE
Patient ostomy bag holding up well, no leaks noticed at this time. Patient stated that he is still having issues with vision and is unable to empty/change his bag himself. WCN to continue to follow.

## 2022-06-15 NOTE — PROGRESS NOTES
OCCUPATIONAL THERAPY TREATMENT  Patient: Abbie Webster (62 y.o. male)  Date: 6/15/2022  Diagnosis: Adenomatous polyp of colon, unspecified part of colon [D12.6]  Col[facility] ([facility] [C18.9]  Cecal polyp [K63.5] <principal problem not specified>  Procedure(s) (LRB):  Exploratory laparotomy, abdominal washout, resection of ileocolic anastomosis with end ileostomy (N/A) 31 Days Post-Op  Precautions:    Chart, occupational therapy assessment, plan of care, and goals were reviewed. ASSESSMENT  Patient continues with skilled OT services and is progressing towards goals. Upon MCGUIRE arrival, pt semi supine in bed and agreeable to tx session. Pt completed bed mobility with Mod I and agreeable to ambulate in room. PTA entered at this time due to pt limited activity tolerance. Pt continues to be limited by high pain with low tolerance to any mobility or activity. Pt completed ambulation in room with CGA/SBA using RW in preparation for household mobility. Pt returned to EOB with SBA and declining further activity at this time. Reviewed UE HEP and pt verbalized understanding. Pt left sitting at EOB with PTA and needs met. Pt noted with little progress due to pain tolerance. Will continue to follow pt throughout remainder of stay and progress towards OT goals. Changing recommendation from IRF to SNF at discharge due to decreased activity tolerance when medically appropriate. Other factors to consider for discharge: family/social support, DME, time since onset, severity of deficits, decline from functional baseline         PLAN :  Patient continues to benefit from skilled intervention to address the above impairments. Continue treatment per established plan of care. to address goals.     Recommendation for discharge: (in order for the patient to meet his/her long term goals)  Ricky Yates    This discharge recommendation:  Has been made in collaboration with the attending provider and/or case management    IF patient discharges home will need the following DME: TBD       SUBJECTIVE:   Patient stated I have limits and it is to the door and back.     OBJECTIVE DATA SUMMARY:   Cognitive/Behavioral Status:  Neurologic State: Alert    Functional Mobility and Transfers for ADLs:  Bed Mobility:  Rolling: Modified independent  Supine to Sit: Modified independent  Sit to Supine: Minimum assistance;Assist x1 (with LEs)  Scooting: Modified independent    Transfers:  Sit to Stand: Stand-by assistance    Balance:  Sitting: Intact  Standing: Impaired; With support  Standing - Static: Good;Constant support  Standing - Dynamic : Fair;Constant support    ADL Intervention:  Lower Body Dressing Assistance  Slip on Shoes with Back: Supervision    Pain:  8/10 abdomen    Activity Tolerance:   Fair  Please refer to the flowsheet for vital signs taken during this treatment. After treatment patient left in no apparent distress:   EOB with PTA    COMMUNICATION/COLLABORATION:   The patients plan of care was discussed with: Physical therapy assistant. Partial overlap with PT due to pt activity tolerance.     MAYNOR Kendrick  Time Calculation: 16 mins    Problem: Self Care Deficits Care Plan (Adult)  Goal: *Acute Goals and Plan of Care (Insert Text)  Description: Pt stated goal \"to decrease pain\"  Pt will be Mod I sup <> sit in prep for EOB ADLs  Pt will be Mod I grooming standing sink side LRAD  Pt will be Mod I UB dressing sitting EOB/long sit   Pt will be Mod I LE dressing sitting EOB/long sit  Pt will be Mod I sit <>  prep for toileting LRAD  Pt will be Mod I toileting/toilet transfer/cloth mgmt LRAD  Pt will be IND following UE HEP in prep for self care tasks      Outcome: Progressing Towards Goal

## 2022-06-15 NOTE — PROGRESS NOTES
Progress Note  Date:6/15/2022       Room:[facility]  Patient Name:Liana Dempsey     YOB: 1970     Age:52 y.o. Subjective    Subjective : The patient is a 47year old male with a history of biopsy proven polymyositis that was done at [facility] in , which is treated by Rheumatology. He reports he was given the diagnosis of Myasthenia Gravis that has been treated by Neurology at [address] [place] [address] Polymyositis in clinically stable and he has normal CPK and CRP and he is now taking Prednisone 15 mg once a daily. The last CPK was 32 and Myoglobin 47 and CRP was 0.72. He is being treated for a post op infection with antibiotics. He reports having muscle weakness and is states he is being transferred to a rehab hospital for inpatient rehabilitation. Review of Systems: The Rheumatology review of systems are remarkable for all the information in the past and present medical history. Objective         Vitals Last 24 Hours:  TEMPERATURE:  Temp  Av.4 °F (36.9 °C)  Min: 98.3 °F (36.8 °C)  Max: 98.6 °F (37 °C)  RESPIRATIONS RANGE: Resp  Av.5  Min: 18  Max: 20  PULSE OXIMETRY RANGE: SpO2  Av %  Min: 92 %  Max: 100 %  PULSE RANGE: Pulse  Av.6  Min: 70  Max: 91  BLOOD PRESSURE RANGE: Systolic (23QCS), BSB:814 , Min:126 , BUV:465   ; Diastolic (45PBZ), WRR:45, Min:86, Max:99    I/O (24Hr): Intake/Output Summary (Last 24 hours) at 6/15/2022 0009  Last data filed at 2022 1621  Gross per 24 hour   Intake --   Output 1150 ml   Net -1150 ml     Objective:    HEENT: Normal HEENT exam.    Lungs:  Normal effort.    Heart: Normal rate   Lungs:  clear sounds  Abdomen: (RLQ colostomy   Extremities: Normal range of motion,    No myofascial tenderness                                                        Neurological: Patient is alert and oriented to person, place and time.   (4/5 BUE/LE strength  Muscles under developed        Labs/Imaging/Diagnostics Labs:  CBC:No results for input(s): WBC, RBC, HGB, HCT, MCV, RDW, PLT, HGBEXT, HCTEXT, PLTEXT in the last 72 hours. CHEMISTRIES:No results for input(s): NA, K, CL, CO2, BUN, CA, PHOS, MG in the last 72 hours. No lab exists for component: CREATININE, GLUCOSEPT/INR:No results for input(s): INR, INREXT in the last 72 hours. No lab exists for component: PROTIME  APTT:No results for input(s): APTT in the last 72 hours. LIVER PROFILE:No results for input(s): AST, ALT in the last 72 hours. No lab exists for component: [place] Card, ALKPHOS  Lab Results   Component Value Date/Time    ALT (SGPT) 16 06/05/2022 06:15 PM    AST (SGOT) 21 06/05/2022 06:15 PM    Alk. phosphatase 88 06/05/2022 06:15 PM    Bilirubin, total 0.2 06/05/2022 06:15 PM       Imaging Last 24 Hours:  No results found. Assessment//Plan   Active Problems:    Colon cancer ([facility] (4/15/2022)      Cecal polyp (4/15/2022)      Assessment & Plan:     The patient has history of polymyositis which is clinically stable. The last CK was 32 the Myoglobin was 47 in the ESR is 29 in the C-ractive protein is 0. 7. I will continue the current medications for now. He will benefit having Inpatient rehab and monitoring.               Electronically signed by Jacobo Gallegos MD on 6/15/2022 at 12:09 AM

## 2022-06-15 NOTE — PROGRESS NOTES
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 Visual obscurations stable  No new focality      ASSESSMENT/PLAN:  Awaiting myasthenia panel results    ALLERGIES:    Allergies   Allergen Reactions    Beef Containing Products Anaphylaxis and Other (comments)    Diltiazem Other (comments)     Reaction Type: Allergy; Reaction(s): Pressure in chest    Iodinated Contrast Media Other (comments)     Reaction Type: Allergy; Severity: Severe; Reaction(s): hives,throat swelling    Pork Derived (Porcine) Hives    Shellfish Containing Products Swelling     Other reaction(s): Other  Reaction Type: Allergy; Reaction(s): Hives,throat swelling    Sulfa (Sulfonamide Antibiotics) Unknown (comments)     Spinal meningitis    Sulfur Unknown (comments)     Reaction Type: Allergy  Spinal meningitis    Lactose Diarrhea     Lactose intolerant per Pt.        MEDS:      Current Facility-Administered Medications:     predniSONE (DELTASONE) tablet 15 mg, 15 mg, Oral, DAILY WITH BREAKFAST, Junior Lamas MD, 15 mg at 06/14/22 0810    HYDROmorphone (DILAUDID) injection 1 mg, 1 mg, IntraVENous, Q3H PRN, Herb Quiroga MD, 1 mg at 06/15/22 0733    fluconazole (DIFLUCAN) tablet 200 mg, 200 mg, Oral, Q7D, Herb Quiroga MD, 200 mg at 06/08/22 1734    piperacillin-tazobactam (ZOSYN) 3.375 g in 0.9% sodium chloride (MBP/ADV) 100 mL MBP, 3.375 g, IntraVENous, Q8H, Fariha Martinez MD, Last Rate: 25 mL/hr at 06/15/22 0419, 3.375 g at 06/15/22 0419    clopidogreL (PLAVIX) tablet 75 mg, 75 mg, Oral, DAILY, KIMBERLY Rey IV, MD, 75 mg at 06/14/22 0811    aspirin chewable tablet 81 mg, 81 mg, Oral, DAILY, KIMBERLY Rey IV, MD, 81 mg at 06/14/22 0810    0.9% sodium chloride infusion 250 mL, 250 mL, IntraVENous, PRN, Ben Parikh MD  [facility] acetaminophen (TYLENOL) tablet 650 mg, 650 mg, Oral, Q4H PRN, Pedro Pop MD, 650 mg at 05/18/22 1838    famotidine (PF) (PEPCID) 20 mg in 0.9% sodium chloride 10 mL injection, 20 mg, IntraVENous, Q12H, Pedro Pop MD, 20 mg at 06/14/22 2153    dextrose 5% - 0.45% NaCl with KCl 20 mEq/L infusion, 25 mL/hr, IntraVENous, CONTINUOUS, Pedro Pop MD, Last Rate: 25 mL/hr at 06/14/22 0156, 25 mL/hr at 06/14/22 0156    oxyCODONE IR (ROXICODONE) tablet 10 mg, 10 mg, Oral, Q4H PRN, Pedro Pop MD, 10 mg at 05/27/22 1404    ondansetron (ZOFRAN) injection 4 mg, 4 mg, IntraVENous, Q4H PRN, Pedro Pop MD, 4 mg at 06/15/22 0733    verapamil ER (VERELAN PM) capsule 200 mg, 200 mg, Oral, QHS, Coleen CABRERA MD, 200 mg at 06/14/22 2153    [Held by provider] potassium chloride SR (KLOR-CON 10) tablet 10 mEq, 10 mEq, Oral, BID, Kecia Fulton MD, 10 mEq at 04/30/22 2142    apixaban (ELIQUIS) tablet 5 mg, 5 mg, Oral, BID, Javier Leventhal, MD, 5 mg at 06/14/22 2153    amiodarone (CORDARONE) tablet 200 mg, 200 mg, Oral, DAILY, Gerardo Al MD, 200 mg at 06/14/22 0811    lisinopriL (PRINIVIL, ZESTRIL) tablet 20 mg, 20 mg, Oral, DAILY, Pedro Pop MD, 20 mg at 06/14/22 0811    insulin lispro (HUMALOG) injection, , SubCUTAneous, AC&HS, Pedro Pop MD, 4 Units at 06/14/22 2153    glucose chewable tablet 16 g, 4 Tablet, Oral, PRN, Pedro Pop MD    glucagon (GLUCAGEN) injection 1 mg, 1 mg, IntraMUSCular, PRN, Pedro Pop MD, 1 mg at 05/06/22 0559    dextrose 10% infusion 0-250 mL, 0-250 mL, IntraVENous, PRN, Pedro Pop MD, Last Rate: 750 mL/hr at 05/02/22 1558, 125 mL at 05/02/22 1558    labetaloL (NORMODYNE;TRANDATE) 20 mg/4 mL (5 mg/mL) injection 10 mg, 10 mg, IntraVENous, Q6H PRN, Pedro Pop MD, 10 mg at 05/03/22 1423    LABS:  Recent Results (from the past 24 hour(s))   GLUCOSE, POC    Collection Time: 06/14/22 12:10 PM   Result Value Ref Range    Glucose (POC) 237 (H) 65 - 117 mg/dL    Performed by Nadir Perez, POC    Collection Time: 06/14/22  4:25 PM   Result Value Ref Range    Glucose (POC) 419 (H) 65 - 117 mg/dL    Performed by Elly Hickman    GLUCOSE, POC    Collection Time: 06/14/22  8:44 PM   Result Value Ref Range    Glucose (POC) 226 (H) 65 - 117 mg/dL    Performed by Lala Aragon    GLUCOSE, POC    Collection Time: 06/15/22  9:10 AM   Result Value Ref Range    Glucose (POC) 88 65 - 117 mg/dL    Performed by Maria Luisa Thompson        Visit Vitals  BP (!) 132/90 (BP 1 Location: Right upper arm, BP Patient Position: At rest)   Pulse 80   Temp 98.2 °F (36.8 °C)   Resp 20   Ht 5' 7.4\" (1.712 m)   Wt 63.8 kg (140 lb 10.5 oz)   SpO2 100%   BMI 21.77 kg/m²       Imaging:  CT ABD PELV WO CONT   Final Result   New right lower quadrant ostomy. Small amount of free air. Bowel   wall prominence in small bowel loops. No evidence of mechanical bowel   obstruction. Fluid collection in the pelvis concerning for abscess. Other   findings as above. Findings conveyed to the patient's nurse Donna Bocanegra on 6/6/2022 at 9:50 AM.      XR HIP LT W OR WO PELV 2-3 VWS   Final Result      DUPLEX CAROTID BILATERAL   Final Result      MRI BRAIN WO CONT   Final Result         1. Evolving ischemic infarcts in multiple vascular territories, involving both   the right occipital lobe and left frontal lobe. 2. Left occipital encephalomalacia      CT ABD PELV WO CONT   Final Result   Small bowel ileus versus partial obstruction, at the ileocolic   anastomosis or ileocecal valve, slightly greater than previous. There is mixed   density fluid throughout this region, as before. . Consider follow-up study with   IV contrast and if possible, oral contrast. Consider NG tube. XR ABD (KUB)   Final Result      XR ABD (KUB)   Final Result   Small bowel dilatation consistent with obstruction, not   significantly changed.       XR ABD (KUB)   Final Result Persistent small bowel distention as seen with obstruction. XR ABD (KUB)   Final Result   No significant change compared to abdomen series 5/5/2022. Persistent air and fluid-filled dilated small bowel loops. XR ABD (KUB)   Final Result   Multiple distended gas-filled loops of small bowel compatible with   partial small bowel obstruction versus ileus. CT ABD PELV WO CONT   Final Result   Moderate grade partial small bowel obstruction. Small volume complex ascites. CT ABD PELV WO CONT   Final Result   1. Improving hemoperitoneum and pneumoperitoneum. 2.  Ileocolic anastomosis in the right lower quadrant. Incompletely evaluated   without contrast. No evidence of bowel obstruction. 3.  Reticular and groundglass airspace disease in the visualized lung bases   redemonstrated. 4.  See full report for detailed findings. CT ABD PELV WO CONT   Final Result   1. Status post ileal cecal surgery. Surrounding extraluminal dense material may   represent blood products (no indication of enteric contrast administration). Anastomotic leak cannot be excluded. Consider imaging with water-soluble oral   contrast. Free air in the abdomen likely within normal limits post recent   surgery. The bowel does not appear obstructed. 2. Bilateral lower lung airspace disease likely infectious or inflammatory. 3. Bilateral femoral head sclerosis suggests AVN.

## 2022-06-15 NOTE — PROGRESS NOTES
Rehab Progress Note    Patient: Alber Nur MRN: [id number]  SSN: [id number]    YOB: 1970  Age: 47 y.o. Sex: male      Admit date: 4/15/2022   LOS (days): 64    CC: Difficulty walking     Subjective: Follow up with patient today, who appears to be feeling improved at this time. Endorses still having pain, denies any cp, n, or V. Since initial consult patient has been diagnosed with ischemic infarcts in multiple vascular territories involving R occipital lobe and left frontal lobe. Patient reports some visual changes after the infarct. Recommendations have been made for IPR per therapy. Patient wants to go to IPR to gain strength endorses he is unable to do 3 hours of therapy at one time, discussed there would be breaks and would adjust on his toleration on how long sessions would be. HPI (per initial consult note): This is a 49 y.o. male with a past medical history including myasthenia gravis, hypertension, A. fib, diabetes mellitus type 2, GERD, polymyositis on chronic steroids. He was admitted to [facility] for surgical management of a large cecal polyp. Patient is status post laparoscopic ileocecectomy  with Dr. Catie Weldon. [facility] Later during hospital course on 5/15/2022 patient returned to the OR with Dr. Catie Weldon for exploratory lap resection of ilio colic anastomosis with end ileostomy for anastomotic leak. Patient returned to the ICU postoperatively and has since been transferred out. Patient seen today while he is sitting in bed appears to be in acute pain that he feels has not improved at this time. Patient also noted to be on TPN.          Current Facility-Administered Medications   Medication    predniSONE (DELTASONE) tablet 15 mg    HYDROmorphone (DILAUDID) injection 1 mg    fluconazole (DIFLUCAN) tablet 200 mg    piperacillin-tazobactam (ZOSYN) 3.375 g in 0.9% sodium chloride (MBP/ADV) 100 mL MBP    clopidogreL (PLAVIX) tablet 75 mg    aspirin chewable tablet 81 mg    0.9% sodium chloride infusion 250 mL    acetaminophen (TYLENOL) tablet 650 mg    famotidine (PF) (PEPCID) 20 mg in 0.9% sodium chloride 10 mL injection    dextrose 5% - 0.45% NaCl with KCl 20 mEq/L infusion    oxyCODONE IR (ROXICODONE) tablet 10 mg    ondansetron (ZOFRAN) injection 4 mg    verapamil ER (VERELAN PM) capsule 200 mg    [Held by provider] potassium chloride SR (KLOR-CON 10) tablet 10 mEq    apixaban (ELIQUIS) tablet 5 mg    amiodarone (CORDARONE) tablet 200 mg    lisinopriL (PRINIVIL, ZESTRIL) tablet 20 mg    insulin lispro (HUMALOG) injection    glucose chewable tablet 16 g    glucagon (GLUCAGEN) injection 1 mg    dextrose 10% infusion 0-250 mL    labetaloL (NORMODYNE;TRANDATE) 20 mg/4 mL (5 mg/mL) injection 10 mg          Objective:     Vitals:    06/14/22 1712 06/14/22 1924 06/15/22 0001 06/15/22 0818   BP:  (!) 140/94 (!) 155/99 (!) 132/90   Pulse: 86 91 91 80   Resp:  18 20 20   Temp:  98.4 °F (36.9 °C) 98.3 °F (36.8 °C) 98.2 °F (36.8 °C)   SpO2:  100% 96% 100%   Weight:       Height:            Physical Exam:  General: NAD, pleasant and cooperative   HEENT: moist oral mucosa   CV: RRR  Respiratory: no increased WOB  Abdomen: nondistended  Extremities: no peripheral edema   Musculoskeletal: moving extremities at least antigravity  Neuro: alert, normal speech      Labs:  No results for input(s): WBC, HGB, PLT, NA, K, BUN, CREA, HGBEXT, PLTEXT in the last 72 hours. Imaging (last 24 hrs):   No results found. Impression/Plan:     Diagnoses:     Embolic infarcts, R occipital and L frontal  Critical Illness myopathy  Cecal polyp  S/p  laparoscopic ileocecectomy  exploratory lap resection of ilio colic anastomosis with end ileostomy for anastomotic leak  Ostomy site  Abd pain post surgery  Myasthenia Gravis  Polymyositis chronic steroids  Diabetes mellitus     Recommend inpatient rehab.  Now on diet off TPN. Will need continued monitoring and physician supervision regarding recent infarcts, risk modification and teaching. Barriers to rehab: activity tolerance, believes he can tolerate the 3 hours a day    [ x ] Will need submission for insurance authorization for inpatient rehab. Continue PT/OT in the acute setting. Will continue to follow.        Signed By: Bennie Gordon NP     Nancy 15, 2022    Physical Medicine and Rehabilitation

## 2022-06-15 NOTE — PROGRESS NOTES
PHYSICAL THERAPY TREATMENT  Patient: Candace Hoyos (55 y.o. male)  Date: 6/15/2022  Diagnosis: Adenomatous polyp of colon, unspecified part of colon [D12.6]  Colon cancer ([facility] [C18.9]  Cecal polyp [K63.5] <principal problem not specified>  Procedure(s) (LRB):  Exploratory laparotomy, abdominal washout, resection of ileocolic anastomosis with end ileostomy (N/A) 31 Days Post-Op  Precautions:    Chart, physical therapy assessment, plan of care and goals were reviewed. ASSESSMENT  Patient continues with skilled PT services and is progressing towards goals. Pt seated EOB upon arrival with MCGUIRE and agreeable to ambulation. Patient continues to demonstrate high pain with low tolerance to activity. Demos limited progress but is ambulating well with CGA-SBA with RW and no LOB noted. Patient demos slow gait speed and complaining of LLE feeling like it will give out with ambulation. Pt returned to supine with min A to assist LEs into bed. Pt demos low tolerance to 2 sessions of therapy and appreciated back to back sessions with OT and PT today. Patient reviewed LE therex and pt verbalized understanding. Little progress made at this time due to pain tolerance. Will cont to progress as able, rec d/c to SNF. Current Level of Function Impacting Discharge (mobility/balance): safety, weakness, activity tolerance    Other factors to consider for discharge: pain and activity tolerance         PLAN :  Patient continues to benefit from skilled intervention to address the above impairments. Continue treatment per established plan of care. to address goals.     Recommendation for discharge: (in order for the patient to meet his/her long term goals)  Ricky Yates    This discharge recommendation:  Has been made in collaboration with the attending provider and/or case management    IF patient discharges home will need the following DME: to be determined (TBD)       SUBJECTIVE:   Patient stated I have limits and it is to the door and back.     OBJECTIVE DATA SUMMARY:   Critical Behavior:  Neurologic State: Alert  Orientation Level: Appropriate for age  Cognition: Appropriate decision making     Functional Mobility Training:  Bed Mobility:  Rolling: Modified independent  Supine to Sit: Modified independent  Sit to Supine: Minimum assistance;Assist x1 (with LEs)  Scooting: Modified independent        Transfers:  Sit to Stand: Stand-by assistance  Stand to Sit: Stand-by assistance    Balance:  Sitting: Intact  Standing: Impaired; With support  Standing - Static: Good;Constant support  Standing - Dynamic : Fair;Constant support    Ambulation/Gait Training:  Distance (ft): 34 Feet (ft)  Assistive Device: Gait belt;Walker, rolling  Ambulation - Level of Assistance: Stand-by assistance;Contact guard assistance  Gait Abnormalities: Step to gait  Base of Support: Narrowed  Speed/Daysi: Slow    Therapeutic Exercises:   Reviewed JUAN MIGUEL pink     Pain Ratin/10 abdomen    Activity Tolerance:   Fair  Please refer to the flowsheet for vital signs taken during this treatment. After treatment patient left in no apparent distress:   Supine in bed, Call bell within reach, and Side rails x 3    COMMUNICATION/COLLABORATION:   The patients plan of care was discussed with: Occupational therapy assistant. Partial overlap with OT due to pt's activity tolerance and     Elly Pena   Time Calculation: 9 mins         Problem: Mobility Impaired (Adult and Pediatric)  Goal: *Acute Goals and Plan of Care (Insert Text)  Description: Pt will be I with JUAN MIGUEL HEP in 7 days. Pt will perform bed mobility with SBA in 7 days. Pt will perform transfers with SBA in 7 days. Pt will amb 5-10 feet with LRAD safely with Mod I/Independent in 7 days. Pt will demonstrate improvement in standing dynamic balance from CGA to Mod I/Independent in 7 days.      Outcome: Progressing Towards Goal

## 2022-06-15 NOTE — PROGRESS NOTES
Cm met with pt at the bedside. Pt would like his family to visit some of the SNF's prior to making a decision. Pt is agreeable with CM following up with him tomorrow for his decision.

## 2022-06-16 NOTE — PROGRESS NOTES
Progress Note  Date:2022       Room:[facility]  Patient Name:Liana Kwan     YOB: 1970     Age:52 y.o. Subjective    Subjective : The patient is a 47year old male with a history of biopsy proven polymyositis that was done at [facility] in , which is treated by Rheumatology. He reports he was given the diagnosis of Myasthenia Gravis that has been treated by Neurology at [address] [place] [address] Polymyositis in clinically stable and he has normal CPK and CRP and he is now taking Prednisone 15 mg once a daily.   The last CPK was 32 and Myoglobin 47 and CRP was 0.72. No new recommendations.         Review of Systems: The Rheumatology review of systems are remarkable for all the information in the past and present medical history.     Objective         Vitals Last 24 Hours:  TEMPERATURE:  Temp  Av.3 °F (36.8 °C)  Min: 98.2 °F (36.8 °C)  Max: 98.5 °F (36.9 °C)  RESPIRATIONS RANGE: Resp  Av.7  Min: 18  Max: 20  PULSE OXIMETRY RANGE: SpO2  Av %  Min: 100 %  Max: 100 %  PULSE RANGE: Pulse  Av.3  Min: 80  Max: 96  BLOOD PRESSURE RANGE: Systolic (78UYP), OQY:278 , Min:126 , OEM:733   ; Diastolic (59FSN), WOM:37, Min:74, Max:90    I/O (24Hr): Intake/Output Summary (Last 24 hours) at 2022 0210  Last data filed at 6/15/2022 1948  Gross per 24 hour   Intake --   Output 1600 ml   Net -1600 ml     Objective  Labs/Imaging/Diagnostics    Labs:  CBC:No results for input(s): WBC, RBC, HGB, HCT, MCV, RDW, PLT, HGBEXT, HCTEXT, PLTEXT in the last 72 hours. CHEMISTRIES:No results for input(s): NA, K, CL, CO2, BUN, CA, PHOS, MG in the last 72 hours. No lab exists for component: CREATININE, GLUCOSEPT/INR:No results for input(s): INR, INREXT in the last 72 hours. No lab exists for component: PROTIME  APTT:No results for input(s): APTT in the last 72 hours. LIVER PROFILE:No results for input(s): AST, ALT in the last 72 hours.     No lab exists for component: BILIDIR, VIKRAM [facility]  Lab Results   Component Value Date/Time    ALT (SGPT) 16 06/05/2022 06:15 PM    AST (SGOT) 21 06/05/2022 06:15 PM    Alk. phosphatase 88 06/05/2022 06:15 PM    Bilirubin, total 0.2 06/05/2022 06:15 PM       Imaging Last 24 Hours:  No results found. Assessment//Plan   Active Problems:    Colon cancer ([address] (4/15/2022)      Cecal polyp (4/15/2022)      Assessment & Plan:    The patient has history of polymyositis which is clinically stable. The last CK was 32 the Myoglobin was 47 in the ESR is 29 in the C-ractive protein is 0. 7. I will continue the current medications for now.                  Electronically signed by Jamison Wakefield MD on 6/16/2022 at 2:10 AM

## 2022-06-16 NOTE — PROGRESS NOTES
Progress Note    Patient: Lazarus Starch MRN: [id number]  SSN: [id number]    YOB: 1970  Age: 47 y.o. Sex: male      Admit Date: 4/15/2022    LOS: 62 days     Subjective:   Patient seen in bed  No complaints    Objective:     Vitals:    06/15/22 1208 06/15/22 1552 06/15/22 2101 06/15/22 2211   BP:  126/87 (!) 152/74 (!) 148/77   Pulse:  86 96 88   Resp:  18 18 16   Temp:  98.5 °F (36.9 °C) 98.3 °F (36.8 °C) 97.5 °F (36.4 °C)   SpO2:  100% 100% 98%   Weight: 141 lb 5 oz (64.1 kg)      Height:            Intake and Output:  Current Shift: No intake/output data recorded.   Last three shifts: 06/14 1901 - 06/16 0700  In: -   Out: 1750 [Urine:1100]    Review of Systems:  ROS     Physical Exam:   Abdomen is soft, nondistended, tender palpation on the right abdomen, ileostomy pink and productive, midline incision clean with scant drainage    Lab/Data Review:  Recent Results (from the past 24 hour(s))   GLUCOSE, POC    Collection Time: 06/15/22  9:10 AM   Result Value Ref Range    Glucose (POC) 88 65 - 117 mg/dL    Performed by Clara Love, POC    Collection Time: 06/15/22 11:58 AM   Result Value Ref Range    Glucose (POC) 119 (H) 65 - 117 mg/dL    Performed by Clara Kate, POC    Collection Time: 06/15/22  5:07 PM   Result Value Ref Range    Glucose (POC) 242 (H) 65 - 117 mg/dL    Performed by Clara Love, POC    Collection Time: 06/15/22  8:42 PM   Result Value Ref Range    Glucose (POC) 128 (H) 65 - 117 mg/dL    Performed by Miranda Arevalo    GLUCOSE, POC    Collection Time: 06/15/22  8:45 PM   Result Value Ref Range    Glucose (POC) 204 (H) 65 - 117 mg/dL    Performed by Miranda Arevalo    GLUCOSE, POC    Collection Time: 06/16/22  7:48 AM   Result Value Ref Range    Glucose (POC) 146 (H) 65 - 117 mg/dL    Performed by Konstantin Wilder           Assessment:     Active Problems:    [facility] (4/15/2022)      Cecal polyp (4/15/2022)        Plan: Continue diet with nutritional supplements  Out of bed ambulate, continue aggressive PT  Rheumatology recommendations appreciated, patient is currently on prednisone 15 mg daily  Will switch to oral analgesics    Signed By: Vida Schwartz MD     June 16, 2022

## 2022-06-16 NOTE — WOUND CARE
Patient down for MRI, WCN to follow up at a later time for ostomy check. 1400: Ostomy bag still in place at this time, no leaks noted. Plan is for [facility] [place]omy bag/wafer tomorrow. Extra convex wafer/bag in patients room for any changes overnight. Patient will need ostomy belt to stay in place for better securement of bag/wafer.

## 2022-06-16 NOTE — PROGRESS NOTES
NEURO PROGRESS NOTE        SUBJECTIVE:   Embolic infarcts of the brain  Visual obscurations secondary to above  Status post GI surgery  History of myasthenia gravis  History of polymyositis      EXAM:  Awake, sitting up in bed,  Oriented to day, month, year, not aphasic  Holds an ordinary conversation,  Visual obscurations have not improved: In fact, patient perceives them to have worsened. This is not an unusual perception  Patient had an MRI not too long ago,      ASSESSMENT/PLAN:  Results of MRI pending. I will follow-up with that  Results of myasthenia panel pending    ALLERGIES:    Allergies   Allergen Reactions    Beef Containing Products Anaphylaxis and Other (comments)    Diltiazem Other (comments)     Reaction Type: Allergy; Reaction(s): Pressure in chest    Iodinated Contrast Media Other (comments)     Reaction Type: Allergy; Severity: Severe; Reaction(s): hives,throat swelling    Pork Derived (Porcine) Hives    Shellfish Containing Products Swelling     Other reaction(s): Other  Reaction Type: Allergy; Reaction(s): Hives,throat swelling    Sulfa (Sulfonamide Antibiotics) Unknown (comments)     Spinal meningitis    Sulfur Unknown (comments)     Reaction Type: Allergy  Spinal meningitis    Lactose Diarrhea     Lactose intolerant per Pt.        MEDS:      Current Facility-Administered Medications:     HYDROmorphone (DILAUDID) tablet 4 mg, 4 mg, Oral, Q4H PRN, Chuck Villegas MD    HYDROmorphone (DILAUDID) injection 1 mg, 1 mg, IntraVENous, Q3H PRN, Chuck Villegas MD, 1 mg at 06/16/22 0744    predniSONE (DELTASONE) tablet 15 mg, 15 mg, Oral, DAILY WITH BREAKFAST, Bruna Bullock MD, 15 mg at 06/16/22 0938    fluconazole (DIFLUCAN) tablet 200 mg, 200 mg, Oral, Q7D, Chuck Villegas MD, 200 mg at 06/15/22 1713    clopidogreL (PLAVIX) tablet 75 mg, 75 mg, Oral, DAILY, Tanwi IV, Luther Yanes MD, 75 mg at 06/16/22 0938    aspirin chewable tablet 81 mg, 81 mg, Oral, DAILY, Tanwi IV, Joseph Phan MD, 81 mg at 06/16/22 0938    0.9% sodium chloride infusion 250 mL, 250 mL, IntraVENous, PRN, Abigail Hidalgo MD    acetaminophen (TYLENOL) tablet 650 mg, 650 mg, Oral, Q4H PRN, Nilesh Clement MD, 650 mg at 05/18/22 1838    famotidine (PF) (PEPCID) 20 mg in 0.9% sodium chloride 10 mL injection, 20 mg, IntraVENous, Q12H, Nilesh Clemnet MD, 20 mg at 06/16/22 0938    dextrose 5% - 0.45% NaCl with KCl 20 mEq/L infusion, 25 mL/hr, IntraVENous, CONTINUOUS, Nilesh Clement MD, Last Rate: 25 mL/hr at 06/15/22 1940, 25 mL/hr at 06/15/22 1940    oxyCODONE IR (ROXICODONE) tablet 10 mg, 10 mg, Oral, Q4H PRN, Nilesh Clement MD, 10 mg at 05/27/22 1404    ondansetron (ZOFRAN) injection 4 mg, 4 mg, IntraVENous, Q4H PRN, Nilesh Clement MD, 4 mg at 06/16/22 0830    verapamil ER (VERELAN PM) capsule 200 mg, 200 mg, Oral, QHS, Lucy CABRERA MD, 200 mg at 06/15/22 2142    [Held by provider] potassium chloride SR (KLOR-CON 10) tablet 10 mEq, 10 mEq, Oral, BID, Lucy CABRERA MD, 10 mEq at 04/30/22 2142    apixaban (ELIQUIS) tablet 5 mg, 5 mg, Oral, BID, Anabel Toledo MD, 5 mg at 06/16/22 2901    amiodarone (CORDARONE) tablet 200 mg, 200 mg, Oral, DAILY, Clinton Pradhan MD, 200 mg at 06/16/22 0938    lisinopriL (PRINIVIL, ZESTRIL) tablet 20 mg, 20 mg, Oral, DAILY, Nilesh Clement MD, 20 mg at 06/16/22 0938    insulin lispro (HUMALOG) injection, , SubCUTAneous, AC&HS, Nilesh Clement MD, 4 Units at 06/15/22 2141    glucose chewable tablet 16 g, 4 Tablet, Oral, PRN, Nilesh Clement MD    glucagon (GLUCAGEN) injection 1 mg, 1 mg, IntraMUSCular, PRN, Nilesh Clement MD, 1 mg at 05/06/22 0559    dextrose 10% infusion 0-250 mL, 0-250 mL, IntraVENous, PRN, Nilesh Clement MD, Last Rate: 750 mL/hr at 05/02/22 1558, 125 mL at 05/02/22 1558    labetaloL (NORMODYNE;TRANDATE) 20 mg/4 mL (5 mg/mL) injection 10 mg, 10 mg, IntraVENous, Q6H PRN, Nilesh Clmeent MD, 10 mg at 05/03/22 1423    LABS:  Recent Results (from the past 24 hour(s))   GLUCOSE, POC    Collection Time: 06/15/22 11:58 AM   Result Value Ref Range    Glucose (POC) 119 (H) 65 - 117 mg/dL    Performed by London Osgood, POC    Collection Time: 06/15/22  5:07 PM   Result Value Ref Range    Glucose (POC) 242 (H) 65 - 117 mg/dL    Performed by London Osgood, POC    Collection Time: 06/15/22  8:42 PM   Result Value Ref Range    Glucose (POC) 128 (H) 65 - 117 mg/dL    Performed by Hina Sawant    GLUCOSE, POC    Collection Time: 06/15/22  8:45 PM   Result Value Ref Range    Glucose (POC) 204 (H) 65 - 117 mg/dL    Performed by Hina Sawant    GLUCOSE, POC    Collection Time: 06/16/22  7:48 AM   Result Value Ref Range    Glucose (POC) 146 (H) 65 - 117 mg/dL    Performed by Rene Card        Visit Vitals  BP (!) 148/77   Pulse 88   Temp 97.5 °F (36.4 °C)   Resp 16   Ht 5' 7.4\" (1.712 m)   Wt 64.1 kg (141 lb 5 oz)   SpO2 98%   BMI 21.87 kg/m²       Imaging:  CT ABD PELV WO CONT   Final Result   New right lower quadrant ostomy. Small amount of free air. Bowel   wall prominence in small bowel loops. No evidence of mechanical bowel   obstruction. Fluid collection in the pelvis concerning for abscess. Other   findings as above. Findings conveyed to the patient's nurse Jannet Clifford on 6/6/2022 at 9:50 AM.      XR HIP LT W OR WO PELV 2-3 VWS   Final Result      DUPLEX CAROTID BILATERAL   Final Result      MRI BRAIN WO CONT   Final Result         1. Evolving ischemic infarcts in multiple vascular territories, involving both   the right occipital lobe and left frontal lobe. 2. Left occipital encephalomalacia      CT ABD PELV WO CONT   Final Result   Small bowel ileus versus partial obstruction, at the ileocolic   anastomosis or ileocecal valve, slightly greater than previous. There is mixed   density fluid throughout this region, as before. . Consider follow-up study with   IV contrast and if possible, oral contrast. Consider NG tube. XR ABD (KUB)   Final Result      XR ABD (KUB)   Final Result   Small bowel dilatation consistent with obstruction, not   significantly changed. XR ABD (KUB)   Final Result   Persistent small bowel distention as seen with obstruction. XR ABD (KUB)   Final Result   No significant change compared to abdomen series 5/5/2022. Persistent air and fluid-filled dilated small bowel loops. XR ABD (KUB)   Final Result   Multiple distended gas-filled loops of small bowel compatible with   partial small bowel obstruction versus ileus. CT ABD PELV WO CONT   Final Result   Moderate grade partial small bowel obstruction. Small volume complex ascites. CT ABD PELV WO CONT   Final Result   1. Improving hemoperitoneum and pneumoperitoneum. 2.  Ileocolic anastomosis in the right lower quadrant. Incompletely evaluated   without contrast. No evidence of bowel obstruction. 3.  Reticular and groundglass airspace disease in the visualized lung bases   redemonstrated. 4.  See full report for detailed findings. CT ABD PELV WO CONT   Final Result   1. Status post ileal cecal surgery. Surrounding extraluminal dense material may   represent blood products (no indication of enteric contrast administration). Anastomotic leak cannot be excluded. Consider imaging with water-soluble oral   contrast. Free air in the abdomen likely within normal limits post recent   surgery. The bowel does not appear obstructed. 2. Bilateral lower lung airspace disease likely infectious or inflammatory. 3. Bilateral femoral head sclerosis suggests AVN.       MRI BRAIN WO CONT    (Results Pending)

## 2022-06-16 NOTE — PROGRESS NOTES
Cm met with pt at the bedside to f/up on SNF choices. Pt is considering [facility], but would like to speak with [facility] liaison in person. Cm informed pt CM will get in touch with the [facility] liaison to see if she can see him sometime today. Cm attempted to contact merlin Szymanski for [facility]. CM left a voice message. ______________________________________    Cm met with pt at the bedside. Cm discussed completing [facility]. Questions asked. [facility] liaison met with pt at the bedside. [facility] will initiate auth.

## 2022-06-16 NOTE — PROGRESS NOTES
PHYSICAL THERAPY TREATMENT  Patient: Gerry Dave (39 y.o. male)  Date: 6/16/2022  Diagnosis: Adenomatous polyp of colon, unspecified part of colon [D12.6]  [facility] [C18.9]  Cecal polyp [K63.5] <principal problem not specified>  Procedure(s) (LRB):  Exploratory laparotomy, abdominal washout, resection of ileocolic anastomosis with end ileostomy (N/A) 32 Days Post-Op  Precautions:    Chart, physical therapy assessment, plan of care and goals were reviewed. ASSESSMENT  Patient continues with skilled PT services and is progressing towards goals. Patient agreeable to therapy session upon arrival but was able to perform transfers and ambulation CGA-SBA. Patient did increased distance approx 80 ft with RW with no LOB. Demos slow, step to gait pattern and no LOB noted. Patient ambulated well however appears very discouraged/agitated during session with therapist when discussing progress and discharge plans. Pt reporting that pain increases with therapy and he is aware of what he can tolerate mobility wise. Patient stated he must have therapy in shorter increments as he continues to build up strength and tolerance. Currently due to this current level of function and tolerance to activity, rec d/c to SNF. Current Level of Function Impacting Discharge (mobility/balance): weakness, activity tolerance, safety     Other factors to consider for discharge: assistance needed, pain limiting, self limiting behaviors         PLAN :  Patient continues to benefit from skilled intervention to address the above impairments. Continue treatment per established plan of care. to address goals.     Recommendation for discharge: (in order for the patient to meet his/her long term goals)  Ricky Yates    This discharge recommendation:  Has been made in collaboration with the attending provider and/or case management    IF patient discharges home will need the following DME: to be determined (TBD)       SUBJECTIVE: Patient stated I know what my body can do    OBJECTIVE DATA SUMMARY:   Critical Behavior:  Neurologic State: Alert  Orientation Level: Oriented X4  Cognition: Appropriate decision making     Functional Mobility Training:  Bed Mobility:  Performed with MCGUIRE. Transfers:  Sit to Stand: Stand-by assistance  Stand to Sit: Stand-by assistance    Balance:  Sitting: Intact  Standing: Impaired; With support  Standing - Static: Constant support;Good  Standing - Dynamic : Constant support; Fair    Ambulation/Gait Training:  Distance (ft): 80 Feet (ft)  Assistive Device: Gait belt;Walker, rolling  Ambulation - Level of Assistance: Contact guard assistance  Gait Abnormalities: Step to gait  Base of Support: Narrowed  Speed/Daysi: Slow    Pain Rating:  10/10 right hip and stomach    Activity Tolerance:   Fair and requires rest breaks  Please refer to the flowsheet for vital signs taken during this treatment. After treatment patient left in no apparent distress:   Supine in bed and Call bell within reach    COMMUNICATION/COLLABORATION:   The patients plan of care was discussed with: Occupational therapy assistant. cotx with MCGUIRE for increased safety and low tolerance to activity. Debby Pena   Time Calculation: 30 mins         Problem: Mobility Impaired (Adult and Pediatric)  Goal: *Acute Goals and Plan of Care (Insert Text)  Description: Pt will be I with LE HEP in 7 days. Pt will perform bed mobility with SBA in 7 days. Pt will perform transfers with SBA in 7 days. Pt will amb 5-10 feet with LRAD safely with Mod I/Independent in 7 days. Pt will demonstrate improvement in standing dynamic balance from CGA to Mod I/Independent in 7 days.      Outcome: Progressing Towards Goal

## 2022-06-16 NOTE — PROGRESS NOTES
OCCUPATIONAL THERAPY TREATMENT  Patient: Bethel Bardales (89 y.o. male)  Date: 6/16/2022  Diagnosis: Adenomatous polyp of colon, unspecified part of colon [D12.6]  [facility] [C18.9]  Cecal polyp [K63.5] <principal problem not specified>  Procedure(s) (LRB):  Exploratory laparotomy, abdominal washout, resection of ileocolic anastomosis with end ileostomy (N/A) 32 Days Post-Op  Precautions:    Chart, occupational therapy assessment, plan of care, and goals were reviewed. ASSESSMENT  Patient continues with skilled OT services and is progressing towards goals. Upon MCGUIRE/PTA arrival, pt semi supine in bed and agreeable to tx session. Pt completed bed mobility with Mod I this date. Pt completed donning of shoes with supervision at EOB. Pt noted with good sitting balance. Pt completed sit>stand from EOB with CGA/SBA using RW for balance upon standing. Pt able to ambulate into hallway this date an increased distance (see PT note for details) in preparation for household mobility. Pt ambulated well, however appears very discouraged/agitated during session with therapist when discussing progress and discharge plans. Pt reporting that pain increases after therapy and he is aware of what he can tolerate mobility wise. Pt stating that he must have therapy in shorter increments as he continues to build up strength and tolerance. Will continue to follow pt throughout remainder of stay and progress towards OT goals. Recommending SNF at discharge when medically appropriate due to current level of function and activity tolerance. Other factors to consider for discharge: family/social support, DME, time since onset, severity of deficits, decline from functional baseline        PLAN :  Patient continues to benefit from skilled intervention to address the above impairments. Continue treatment per established plan of care. to address goals.     Recommendation for discharge: (in order for the patient to meet his/her long term goals)  Ricky Yates    This discharge recommendation:  Has been made in collaboration with the attending provider and/or case management    IF patient discharges home will need the following DME: TBD       SUBJECTIVE:   Patient stated you guys just want to push me each day.     OBJECTIVE DATA SUMMARY:   Cognitive/Behavioral Status:  Neurologic State: Alert  Orientation Level: Oriented X4  Cognition: Appropriate decision making    Functional Mobility and Transfers for ADLs:  Bed Mobility:  Rolling: Modified independent  Supine to Sit: Modified independent  Sit to Supine: Minimum assistance (for BLE)  Scooting: Modified independent    Transfers:  Sit to Stand: Stand-by assistance    Balance:  Sitting: Intact  Standing: Impaired; With support  Standing - Static: Constant support;Good  Standing - Dynamic : Constant support; Fair    ADL Intervention:  Lower Body Dressing Assistance  Slip on Shoes Without Back: Supervision    Pain:  10/10 stomach and R hip pain    Activity Tolerance:   Fair and requires rest breaks  Please refer to the flowsheet for vital signs taken during this treatment. After treatment patient left in no apparent distress:   Supine in bed and Call bell within reach    COMMUNICATION/COLLABORATION:   The patients plan of care was discussed with: Physical therapy assistant, Registered nurse, and Certified nursing assistant/patient care technician. Cotreat with PT due to pt activity tolerance.     MAYNOR Kendrick  Time Calculation: 31 mins    Problem: Self Care Deficits Care Plan (Adult)  Goal: *Acute Goals and Plan of Care (Insert Text)  Description: Pt stated goal \"to decrease pain\"  Pt will be Mod I sup <> sit in prep for EOB ADLs  Pt will be Mod I grooming standing sink side LRAD  Pt will be Mod I UB dressing sitting EOB/long sit   Pt will be Mod I LE dressing sitting EOB/long sit  Pt will be Mod I sit <>  prep for toileting LRAD  Pt will be Mod I toileting/toilet transfer/cloth mgmt LRAD  Pt will be IND following UE HEP in prep for self care tasks      Outcome: Progressing Towards Goal

## 2022-06-17 NOTE — PROGRESS NOTES
NEURO PROGRESS NOTE        SUBJECTIVE:   Embolic infarcts of the brain  Visual obscurations  Status post GI surgery  History of myasthenia gravis  History of polymyositis    EXAM:  Awake, eating lunch. No change in visual obscurations  No new focality      ASSESSMENT/PLAN:  Myasthenia panel within normal limits at the time the lab was drawn. ALLERGIES:    Allergies   Allergen Reactions    Beef Containing Products Anaphylaxis and Other (comments)    Diltiazem Other (comments)     Reaction Type: Allergy; Reaction(s): Pressure in chest    Iodinated Contrast Media Other (comments)     Reaction Type: Allergy; Severity: Severe; Reaction(s): hives,throat swelling    Pork Derived (Porcine) Hives    Shellfish Containing Products Swelling     Other reaction(s): Other  Reaction Type: Allergy; Reaction(s): Hives,throat swelling    Sulfa (Sulfonamide Antibiotics) Unknown (comments)     Spinal meningitis    Sulfur Unknown (comments)     Reaction Type: Allergy  Spinal meningitis    Lactose Diarrhea     Lactose intolerant per Pt.        MEDS:      Current Facility-Administered Medications:     HYDROmorphone (DILAUDID) tablet 4 mg, 4 mg, Oral, Q4H PRN, Dennise Haynes MD, 4 mg at 06/16/22 1127    HYDROmorphone (DILAUDID) injection 1 mg, 1 mg, IntraVENous, Q3H PRN, Dennise Haynes MD, 1 mg at 06/17/22 1034    predniSONE (DELTASONE) tablet 15 mg, 15 mg, Oral, DAILY WITH BREAKFAST, Sawyer Herzog MD, 15 mg at 06/17/22 0167    fluconazole (DIFLUCAN) tablet 200 mg, 200 mg, Oral, Q7D, Dennise Haynes MD, 200 mg at 06/15/22 1713    clopidogreL (PLAVIX) tablet 75 mg, 75 mg, Oral, DAILY, KIMBERLY Rey IV, MD, 75 mg at 06/17/22 0923    aspirin chewable tablet 81 mg, 81 mg, Oral, DAILY, KIMBERLY Rey IV, MD, 81 mg at 06/17/22 0922    0.9% sodium chloride infusion 250 mL, 250 mL, IntraVENous, PRN, Blair Hidalgo MD    acetaminophen (TYLENOL) tablet 650 mg, 650 mg, Oral, Q4H PRN, Stacey Kwan, Maynor Bolton MD, 650 mg at 05/18/22 1838    famotidine (PF) (PEPCID) 20 mg in 0.9% sodium chloride 10 mL injection, 20 mg, IntraVENous, Q12H, Tim Fairbanks MD, 20 mg at 06/17/22 7967    dextrose 5% - 0.45% NaCl with KCl 20 mEq/L infusion, 25 mL/hr, IntraVENous, CONTINUOUS, Tim Fairbanks MD, Last Rate: 25 mL/hr at 06/16/22 1831, 25 mL/hr at 06/16/22 1831    oxyCODONE IR (ROXICODONE) tablet 10 mg, 10 mg, Oral, Q4H PRN, Tim Fairbanks MD, 10 mg at 05/27/22 1404    ondansetron (ZOFRAN) injection 4 mg, 4 mg, IntraVENous, Q4H PRN, Tim Fairbanks MD, 4 mg at 06/17/22 1037    verapamil ER (VERELAN PM) capsule 200 mg, 200 mg, Oral, QHS, Lydia Reyes MD, 200 mg at 06/16/22 2145    [Held by provider] potassium chloride SR (KLOR-CON 10) tablet 10 mEq, 10 mEq, Oral, BID, Lou CABRERA MD, 10 mEq at 04/30/22 2142    apixaban (ELIQUIS) tablet 5 mg, 5 mg, Oral, BID, Aury Cardona MD, 5 mg at 06/17/22 7983    amiodarone (CORDARONE) tablet 200 mg, 200 mg, Oral, DAILY, Shira Long MD, 200 mg at 06/17/22 4452    lisinopriL (PRINIVIL, ZESTRIL) tablet 20 mg, 20 mg, Oral, DAILY, Tim Fairbanks MD, 20 mg at 06/17/22 3288    insulin lispro (HUMALOG) injection, , SubCUTAneous, AC&HS, Tim Fairbanks MD, 2 Units at 06/16/22 2201    glucose chewable tablet 16 g, 4 Tablet, Oral, PRN, Tim Fairbanks MD    glucagon (GLUCAGEN) injection 1 mg, 1 mg, IntraMUSCular, PRN, Tim Fairbanks MD, 1 mg at 05/06/22 0559    dextrose 10% infusion 0-250 mL, 0-250 mL, IntraVENous, PRN, Tim Fairbanks MD, Last Rate: 750 mL/hr at 05/02/22 1558, 125 mL at 05/02/22 1558    labetaloL (NORMODYNE;TRANDATE) 20 mg/4 mL (5 mg/mL) injection 10 mg, 10 mg, IntraVENous, Q6H PRN, Tim Fairbanks MD, 10 mg at 05/03/22 1423    LABS:  Recent Results (from the past 24 hour(s))   GLUCOSE, POC    Collection Time: 06/16/22  4:56 PM   Result Value Ref Range    Glucose (POC) 210 (H) 65 - 117 mg/dL    Performed by [employer] STEVEN    GLUCOSE, POC    Collection Time: 06/16/22  7:33 PM   Result Value Ref Range    Glucose (POC) 190 (H) 65 - 117 mg/dL    Performed by Carson Callow    GLUCOSE, POC    Collection Time: 06/17/22 12:51 AM   Result Value Ref Range    Glucose (POC) 228 (H) 65 - 117 mg/dL    Performed by [address], POC    Collection Time: 06/17/22  7:59 AM   Result Value Ref Range    Glucose (POC) 141 (H) 65 - 117 mg/dL    Performed by HENRIQUE [id number] Basil Cardona, POC    Collection Time: 06/17/22 10:49 AM   Result Value Ref Range    Glucose (POC) 123 (H) 65 - 117 mg/dL    Performed by HENRIQUE BHATTI        Visit Vitals  BP (!) 140/93 (BP 1 Location: Right upper arm, BP Patient Position: At rest)   Pulse 87   Temp 98 °F (36.7 °C)   Resp 18   Ht 5' 7.4\" (1.712 m)   Wt 61 kg (134 lb 7.7 oz)   SpO2 100%   BMI 20.81 kg/m²       Imaging:  MRI BRAIN WO CONT   Final Result   1. Compared with the previous examination of 5/25/2022, no new areas of   infarction evident. No extension of existing two recent infarcts. 2. Development of early laminar necrosis in the medial right occipital lobe   infarct. 3. Stable cystic encephalomalacia in the very remote left occipital infarct. CT ABD PELV WO CONT   Final Result   New right lower quadrant ostomy. Small amount of free air. Bowel   wall prominence in small bowel loops. No evidence of mechanical bowel   obstruction. Fluid collection in the pelvis concerning for abscess. Other   findings as above. Findings conveyed to the patient's nurse Christina Bagley on 6/6/2022 at 9:50 AM.      XR HIP LT W OR WO PELV 2-3 VWS   Final Result      DUPLEX CAROTID BILATERAL   Final Result      MRI BRAIN WO CONT   Final Result         1. Evolving ischemic infarcts in multiple vascular territories, involving both   the right occipital lobe and left frontal lobe.       2. Left occipital encephalomalacia      CT ABD PELV WO CONT   Final Result   Small bowel ileus versus partial obstruction, at the ileocolic   anastomosis or ileocecal valve, slightly greater than previous. There is mixed   density fluid throughout this region, as before. . Consider follow-up study with   IV contrast and if possible, oral contrast. Consider NG tube. XR ABD (KUB)   Final Result      XR ABD (KUB)   Final Result   Small bowel dilatation consistent with obstruction, not   significantly changed. XR ABD (KUB)   Final Result   Persistent small bowel distention as seen with obstruction. XR ABD (KUB)   Final Result   No significant change compared to abdomen series 5/5/2022. Persistent air and fluid-filled dilated small bowel loops. XR ABD (KUB)   Final Result   Multiple distended gas-filled loops of small bowel compatible with   partial small bowel obstruction versus ileus. CT ABD PELV WO CONT   Final Result   Moderate grade partial small bowel obstruction. Small volume complex ascites. CT ABD PELV WO CONT   Final Result   1. Improving hemoperitoneum and pneumoperitoneum. 2.  Ileocolic anastomosis in the right lower quadrant. Incompletely evaluated   without contrast. No evidence of bowel obstruction. 3.  Reticular and groundglass airspace disease in the visualized lung bases   redemonstrated. 4.  See full report for detailed findings. CT ABD PELV WO CONT   Final Result   1. Status post ileal cecal surgery. Surrounding extraluminal dense material may   represent blood products (no indication of enteric contrast administration). Anastomotic leak cannot be excluded. Consider imaging with water-soluble oral   contrast. Free air in the abdomen likely within normal limits post recent   surgery. The bowel does not appear obstructed. 2. Bilateral lower lung airspace disease likely infectious or inflammatory. 3. Bilateral femoral head sclerosis suggests AVN.

## 2022-06-17 NOTE — WOUND CARE
Removed appliance and cleaned peristomal skin gentle with NS. Peristomal skin much improved compared to previous appliance change on 6/13/22, still slightly tender. Stoma nicely budded, red, and moist.  Small amount of drainage noted at midline incision. Area cleaned with NS and applied dry 4x4 and ABD pad over area. Applied a new convex one-piece appliance and secured stoma belt. All adhesive was securely adhered to skin. Advised patient to lay supine for approximately 10 mins to allow adhesive to firmly adhere to skin. Rn and CNA are to ensure pouch is emptied when 1/3-1/2 full and should be assessed with hourly rounding. Informed [address]. Continue with strict I&O documentation as patient is at risk for dehydration due to liquid stool. WCN will follow up Monday 6/20/22.

## 2022-06-17 NOTE — PROGRESS NOTES
DC Plan: [facility] (auth pending)    [facility] needs updated cbc and metabolic panel. Cm notified attending.

## 2022-06-17 NOTE — PROGRESS NOTES
Problem: Pain  Goal: *Control of Pain  Outcome: Not Progressing Towards Goal  Note: Patient's pain IS NOT being controlled. Despite the prescribed pain medications, the patient reports that at NO time does his pain go below a 5. Consider consulting palliative care         Bedside shift change report given to Lelia Anaya RN (oncoming nurse) by Tim Rojas RN (offgoing nurse). Report included the following information SBAR, Kardex, ED Summary, Procedure Summary, Intake/Output, MAR, Accordion, Recent Results and Cardiac Rhythm NSR.

## 2022-06-17 NOTE — PROGRESS NOTES
Progress Note  Date:2022       Room:[facility]  Patient Name:Liana Bustillo     YOB: 1970     Age:52 y.o. Subjective    Subjective : The patient is a 47year old male with a history of biopsy proven polymyositis that was done at [facility] in , which is treated by Rheumatology. He also reports he was given the diagnosis of Myasthenia Gravis that has been treated by Neurology at [address] [place] [address] Polymyositis in clinically stable and he has normal CPK and CRP and he is now taking Prednisone 15 mg once a daily.   The last CPK was 32 and Myoglobin 47 and CRP was 0.72. No new recommendations.  He is planning to go to inpatient rehab. Review of Systems: The Rheumatology review of systems are remarkable for all the information in the past and present medical history.     Objective         Vitals Last 24 Hours:  TEMPERATURE:  Temp  Av.5 °F (36.9 °C)  Min: 98.5 °F (36.9 °C)  Max: 98.5 °F (36.9 °C)  RESPIRATIONS RANGE: Resp  Av  Min: 18  Max: 18  PULSE OXIMETRY RANGE: SpO2  Av %  Min: 100 %  Max: 100 %  PULSE RANGE: Pulse  Av.5  Min: 85  Max: 88  BLOOD PRESSURE RANGE: Systolic (77GLT), LMZ:446 , Min:132 , IEH:120   ; Diastolic (46ALQ), SLA:99, Min:90, Max:100    I/O (24Hr): Intake/Output Summary (Last 24 hours) at 2022 3146  Last data filed at 2022 2213  Gross per 24 hour   Intake --   Output 1850 ml   Net -1850 ml     Objective;    HEENT: Normal HEENT exam.    Lungs:  Normal effort.    Heart: Normal rate   Lungs:  clear sounds  Abdomen: (RLQ colostomy   Extremities: Normal range of motion,    No myofascial tenderness                                                        Neurological: Patient is alert and oriented to person, place and time.   (4/5 upper and lower extremity strength  Muscles under developed      Labs/Imaging/Diagnostics    Labs:  CBC:No results for input(s): WBC, RBC, HGB, HCT, MCV, RDW, PLT, HGBEXT, HCTEXT, PLTEXT in the last 72 hours. CHEMISTRIES:No results for input(s): NA, K, CL, CO2, BUN, CA, PHOS, MG in the last 72 hours. No lab exists for component: CREATININE, GLUCOSEPT/INR:No results for input(s): INR, INREXT in the last 72 hours. No lab exists for component: PROTIME  APTT:No results for input(s): APTT in the last 72 hours. LIVER PROFILE:No results for input(s): AST, ALT in the last 72 hours. No lab exists for component: Feliz Carolina, ALKPHOS  Lab Results   Component Value Date/Time    ALT (SGPT) 16 06/05/2022 06:15 PM    AST (SGOT) 21 06/05/2022 06:15 PM    Alk. phosphatase 88 06/05/2022 06:15 PM    Bilirubin, total 0.2 06/05/2022 06:15 PM       Imaging Last 24 Hours:  MRI BRAIN WO CONT    Result Date: 6/16/2022  Technique: sagittal T1, axial diffusion, axial T2, axial FLAIR, axial gradient, coronal T2 images of the brain were obtained. Comparisons: MRI examination of 5/25/2022 and head CT examination of 6/11/2009 Findings: On the diffusion weighted images, 2 areas of infarction are again observed. These areas of infarction are unchanged in size compared with the previous examination. There is a small focus of infarction in the anterior medial left frontal lobe. Second focus of the infarction in the medial right occipital lobe. The infarction in the right occipital lobe has developed since Slight high T1 signal involving the cortex. This may represent a early laminar necrosis. No new areas of infarction evident. Cystic encephalomalacia is again observed in the medial left occipital lobe. This is unchanged. On the T2 and FLAIR images, patchy areas of high signal observed in the white matter, consistent with mild ischemic microvascular disease white matter. The ventricles are normal in size and configuration. Flow is present in the basilar and carotid arteries. The major dural sinuses are patent. The craniocervical junction is normal. The pituitary gland is appropriate in size and configuration.  The paranasal sinuses and mastoid air cells are clear. 1. Compared with the previous examination of 5/25/2022, no new areas of infarction evident. No extension of existing two recent infarcts. 2. Development of early laminar necrosis in the medial right occipital lobe infarct. 3. Stable cystic encephalomalacia in the very remote left occipital infarct. Assessment//Plan   Active Problems:    Colon cancer ([address] (4/15/2022)      Cecal polyp (4/15/2022)      Assessment & Plan; The patient has history of polymyositis which is clinically stable. The last CK was 32 the Myoglobin was 47 in the ESR is 29 in the C-ractive protein is 0. 7. I will continue the current medications for now. He will benefit having Inpatient rehab and monitoring. No new recommendations.     (The Note writer data in this note brings in test and lab results from the hospital stay, not managed by Rheumatology)                Electronically signed by Estrella Holt MD on 6/16/2022 at 10:59 PM

## 2022-06-17 NOTE — PROGRESS NOTES
Patient seen in bed  Continues to complain of abdominal pain  Stoma functioning and pouched seen by wound care which changed appliance    Abdomen is soft, nondistended, mildly tender palpation, ileostomy right side pink and productive, midline incision clean with scant drainage    Assessment and plan:  MRI results noted no new infarcts or extension of old infarcts noted on previous MR  Continue Plavix and Eliquis  Being evaluated by [facility] for rehab  Continue diet

## 2022-06-18 NOTE — PROGRESS NOTES
Progress Note    Patient: Ivette Rey MRN: [id number]  SSN: [id number]    YOB: 1970  Age: 47 y.o.   Sex: male      Admit Date: 4/15/2022    LOS: 64 days     Subjective:   Patient seen in bed  Continues to complain of visual issues  Tolerating diet  Pain well managed    Objective:     Vitals:    06/17/22 2004 06/17/22 2012 06/17/22 2032 06/18/22 0846   BP: (!) 164/109  136/88 (!) 149/94   Pulse: 100  89 88   Resp: 18 18 18   Temp: 98.1 °F (36.7 °C)  98.6 °F (37 °C) 98.3 °F (36.8 °C)   SpO2: 100% 98% 100% 100%   Weight:       Height:            Intake and Output:  Current Shift: 06/18 0701 - 06/18 1900  In: -   Out: 500 [Urine:400]  Last three shifts: 06/16 1901 - 06/18 0700  In: 2485.1 [P.O.:995; I.V.:1490.1]  Out: 4100 [Urine:2300]    Review of Systems:  ROS     Physical Exam:   Abdomen is soft, nondistended, mildly tender palpation primarily on the right side, ileostomy pink and productive pouched, midline incision clean    Lab/Data Review:  Recent Results (from the past 24 hour(s))   GLUCOSE, POC    Collection Time: 06/17/22  3:19 PM   Result Value Ref Range    Glucose (POC) 203 (H) 65 - 117 mg/dL    Performed by HENRIQUE [id number] Basil Cardona, POC    Collection Time: 06/17/22  8:08 PM   Result Value Ref Range    Glucose (POC) 218 (H) 65 - 117 mg/dL    Performed by Poppy Kong    GLUCOSE, POC    Collection Time: 06/18/22  8:26 AM   Result Value Ref Range    Glucose (POC) 183 (H) 65 - 117 mg/dL    Performed by Jorge A Nguyễn    GLUCOSE, POC    Collection Time: 06/18/22 11:40 AM   Result Value Ref Range    Glucose (POC) 275 (H) 65 - 117 mg/dL    Performed by Joe Marie (LPN)           Assessment:     Active Problems:    Colon cancer ([address] (4/15/2022)      Cecal polyp (4/15/2022)        Plan:   Patient is requesting a second opinion from neurology, will consult Dr. Ines Conteh current analgesics  Continue Plavix and Eliquis  Continue stoma teaching and care  Repeat CBC and CMP    Signed By: Candance Bushy, MD     June 18, 2022

## 2022-06-18 NOTE — PROGRESS NOTES
Progress Note  Date:2022       Room:[facility]  Patient Name:Liana Shay     YOB: 1970     Age:52 y.o. Subjective    Subjective:  Pain:  He complains of pain that is moderate. The patient is a 47year old male with a history of biopsy proven polymyositis that was done at [facility] in , which is treated by Rheumatology. He also reports he was given the diagnosis of Myasthenia Gravis that has been treated by Neurology at [address] [place] [address] Polymyositis in clinically stable and he has normal CPK and CRP and he is now taking Prednisone 15 mg once daily.   The last CPK was 32 and Myoglobin 47 and CRP was 0.72. He has no new complaints today and I have no new recommendations.  He is planning to go to inpatient rehab once pain symptoms are under control. Review of Systems  Objective         Vitals Last 24 Hours:  TEMPERATURE:  Temp  Av.4 °F (36.9 °C)  Min: 98.1 °F (36.7 °C)  Max: 98.6 °F (37 °C)  RESPIRATIONS RANGE: Resp  Av  Min: 18  Max: 18  PULSE OXIMETRY RANGE: SpO2  Av.6 %  Min: 98 %  Max: 100 %  PULSE RANGE: Pulse  Av.8  Min: 88  Max: 100  BLOOD PRESSURE RANGE: Systolic (01GEH), IUW:829 , Min:136 , TJV:023   ; Diastolic (12NLC), RNP:95, Min:88, Max:109    I/O (24Hr): Intake/Output Summary (Last 24 hours) at 2022 1050  Last data filed at 2022 0847  Gross per 24 hour   Intake 1681.25 ml   Output 3650 ml   Net -1968.75 ml     Objective  Labs/Imaging/Diagnostics    Labs:  CBC:No results for input(s): WBC, RBC, HGB, HCT, MCV, RDW, PLT, HGBEXT, HCTEXT, PLTEXT in the last 72 hours. CHEMISTRIES:No results for input(s): NA, K, CL, CO2, BUN, CA, PHOS, MG in the last 72 hours. No lab exists for component: CREATININE, GLUCOSEPT/INR:No results for input(s): INR, INREXT in the last 72 hours. No lab exists for component: PROTIME  APTT:No results for input(s): APTT in the last 72 hours.   LIVER PROFILE:No results for input(s): AST, ALT in the last 72 hours. No lab exists for component: Shilpa Champion, ALKPHOS  Lab Results   Component Value Date/Time    ALT (SGPT) 16 06/05/2022 06:15 PM    AST (SGOT) 21 06/05/2022 06:15 PM    Alk. phosphatase 88 06/05/2022 06:15 PM    Bilirubin, total 0.2 06/05/2022 06:15 PM       Imaging Last 24 Hours:  No results found. Assessment//Plan   Active Problems:    Colon cancer [address] [address] (4/15/2022)      Cecal polyp (4/15/2022)      Assessment & Plan    The patient has history of polymyositis which is clinically stable. The last CK was 32 the Myoglobin was 47 in the ESR is 29 in the C-ractive protein is 0.7. We will continue the current medications for now with a goal to decrease Prednisone.  He will benefit having Inpatient rehab and monitoring.  No new recommendations.     Shiraz Dyer [facility]  Rheumatology    Electronically signed by Kanu Wheeler NP on 6/18/2022 at 10:50 AM

## 2022-06-18 NOTE — PROGRESS NOTES
Progress Note  Date:2022       Room:[facility]  Patient Name:Liana Moser     YOB: 1970     Age:52 y.o. Subjective    Subjective : The patient is a 47year old male with a history of biopsy proven polymyositis that was done at [facility] in , which is treated by Rheumatology. He also reports he was given the diagnosis of Myasthenia Gravis that has been treated by Neurology at [address] [place] [address] Polymyositis in clinically stable and he has normal CPK and CRP and he is now taking Prednisone 15 mg once a daily.   The last CPK was 32 and Myoglobin 47 and CRP was 0.72. He has no new complaints today and I have no new recommendations.  He is planning to go to inpatient rehab. Review of Systems: The Rheumatology review of systems are remarkable for all the information in the past and present medical history.     Objective         Vitals Last 24 Hours:  TEMPERATURE:  Temp  Av.3 °F (36.8 °C)  Min: 98 °F (36.7 °C)  Max: 98.7 °F (37.1 °C)  RESPIRATIONS RANGE: Resp  Av.2  Min: 16  Max: 18  PULSE OXIMETRY RANGE: SpO2  Av.6 %  Min: 98 %  Max: 100 %  PULSE RANGE: Pulse  Av.7  Min: 86  Max: 100  BLOOD PRESSURE RANGE: Systolic (12PVR), IXR:880 , Min:140 , FATMATA:733   ; Diastolic (37YAT), FLY:654, Min:93, Max:109    I/O (24Hr): Intake/Output Summary (Last 24 hours) at 20227  Last data filed at 2022 1926  Gross per 24 hour   Intake 1483.33 ml   Output 2400 ml   Net -916.67 ml     Objective:    HEENT: Normal HEENT exam.    Lungs:  Normal effort.    Heart: Normal rate   Lungs:  clear sounds  Abdomen: (RLQ colostomy   Extremities: Normal range of motion,    No myofascial tenderness                                                        Neurological: Patient is alert and oriented to person, place and time.   (4/5 upper and lower extremity strength  Muscles under developed      Labs/Imaging/Diagnostics    Labs:  CBC:No results for input(s): WBC, RBC, HGB, HCT, MCV, RDW, PLT, HGBEXT, HCTEXT, PLTEXT in the last 72 hours. CHEMISTRIES:No results for input(s): NA, K, CL, CO2, BUN, CA, PHOS, MG in the last 72 hours. No lab exists for component: CREATININE, GLUCOSEPT/INR:No results for input(s): INR, INREXT in the last 72 hours. No lab exists for component: PROTIME  APTT:No results for input(s): APTT in the last 72 hours. LIVER PROFILE:No results for input(s): AST, ALT in the last 72 hours. No lab exists for component: Bethpage Piety, ALKPHOS  Lab Results   Component Value Date/Time    ALT (SGPT) 16 06/05/2022 06:15 PM    AST (SGOT) 21 06/05/2022 06:15 PM    Alk. phosphatase 88 06/05/2022 06:15 PM    Bilirubin, total 0.2 06/05/2022 06:15 PM       Imaging Last 24 Hours:  No results found. Assessment//Plan   Active Problems:    Colon cancer ([address] (4/15/2022)      Cecal polyp (4/15/2022)      Assessment & Plan:    The patient has history of polymyositis which is clinically stable. The last CK was 32 the Myoglobin was 47 in the ESR is 29 in the C-ractive protein is 0.7. I will continue the current medications for now. [facility] will benefit having Inpatient rehab and monitoring. No new recommendations.       Electronically signed by Pretty Torres MD on 6/17/2022 at 9:37 PM

## 2022-06-18 NOTE — PROGRESS NOTES
Rounding for pain assessment, patient resting quietly appears to be sleeping, no signs of distress observed. Will round within the hour to assess pain.

## 2022-06-18 NOTE — PROGRESS NOTES
NEURO PROGRESS NOTE        SUBJECTIVE:   Embolic infarcts of the brain  Visual obscurations secondary to above  Status post GI surgery  History of myasthenia gravis  History of polymyositis      EXAM:  Awake, eating lunch  Oriented, not aphasic. Holds an ordinary conversation  No change in visual obscurations      ASSESSMENT/PLAN:  Plan she will be transferred to rehab    ALLERGIES:    Allergies   Allergen Reactions    Beef Containing Products Anaphylaxis and Other (comments)    Diltiazem Other (comments)     Reaction Type: Allergy; Reaction(s): Pressure in chest    Iodinated Contrast Media Other (comments)     Reaction Type: Allergy; Severity: Severe; Reaction(s): hives,throat swelling    Pork Derived (Porcine) Hives    Shellfish Containing Products Swelling     Other reaction(s): Other  Reaction Type: Allergy; Reaction(s): Hives,throat swelling    Sulfa (Sulfonamide Antibiotics) Unknown (comments)     Spinal meningitis    Sulfur Unknown (comments)     Reaction Type: Allergy  Spinal meningitis    Lactose Diarrhea     Lactose intolerant per Pt.        MEDS:      Current Facility-Administered Medications:     HYDROmorphone (DILAUDID) tablet 4 mg, 4 mg, Oral, Q4H PRN, Alexandre Reyes MD, 4 mg at 06/16/22 1127    HYDROmorphone (DILAUDID) injection 1 mg, 1 mg, IntraVENous, Q3H PRN, Alexandre Reyes MD, 1 mg at 06/18/22 1148    predniSONE (DELTASONE) tablet 15 mg, 15 mg, Oral, DAILY WITH BREAKFAST, Rola Martínez MD, 15 mg at 06/18/22 0839    fluconazole (DIFLUCAN) tablet 200 mg, 200 mg, Oral, Q7D, Alexandre Reyes MD, 200 mg at 06/15/22 1713    clopidogreL (PLAVIX) tablet 75 mg, 75 mg, Oral, DAILY, KIMBERLY Rey IV, MD, 75 mg at 06/18/22 0839    aspirin chewable tablet 81 mg, 81 mg, Oral, DAILY, KIMBERLY Rey IV, MD, 81 mg at 06/18/22 0839    0.9% sodium chloride infusion 250 mL, 250 mL, IntraVENous, PRN, Kayla Hidalgo MD    acetaminophen (TYLENOL) tablet 650 mg, 650 mg, Oral, Q4H PRN, Jamila Taveras MD, 650 mg at 05/18/22 1838    famotidine (PF) (PEPCID) 20 mg in 0.9% sodium chloride 10 mL injection, 20 mg, IntraVENous, Q12H, Jamila Taveras MD, 20 mg at 06/18/22 0837    dextrose 5% - 0.45% NaCl with KCl 20 mEq/L infusion, 25 mL/hr, IntraVENous, CONTINUOUS, Jamila Taveras MD, Last Rate: 25 mL/hr at 06/16/22 1831, 25 mL/hr at 06/16/22 1831    oxyCODONE IR (ROXICODONE) tablet 10 mg, 10 mg, Oral, Q4H PRN, Jamila Taveras MD, 10 mg at 05/27/22 1404    ondansetron (ZOFRAN) injection 4 mg, 4 mg, IntraVENous, Q4H PRN, Jamila Taveras MD, 4 mg at 06/18/22 1148    verapamil ER (VERELAN PM) capsule 200 mg, 200 mg, Oral, QHS, Carley CABRERA MD, 200 mg at 06/17/22 2209    [Held by provider] potassium chloride SR (KLOR-CON 10) tablet 10 mEq, 10 mEq, Oral, BID, Carley CABRERA MD, 10 mEq at 04/30/22 2142    apixaban (ELIQUIS) tablet 5 mg, 5 mg, Oral, BID, Reynaldo Yeh MD, 5 mg at 06/18/22 4140    amiodarone (CORDARONE) tablet 200 mg, 200 mg, Oral, DAILY, Castro Alvarez MD, 200 mg at 06/18/22 0839    lisinopriL (PRINIVIL, ZESTRIL) tablet 20 mg, 20 mg, Oral, DAILY, Jamila Taveras MD, 20 mg at 06/18/22 0839    insulin lispro (HUMALOG) injection, , SubCUTAneous, AC&HS, Jamila Taveras MD, 6 Units at 06/18/22 1148    glucose chewable tablet 16 g, 4 Tablet, Oral, PRN, Jamila Taveras MD    glucagon (GLUCAGEN) injection 1 mg, 1 mg, IntraMUSCular, PRN, Jamila Taveras MD, 1 mg at 05/06/22 0559    dextrose 10% infusion 0-250 mL, 0-250 mL, IntraVENous, PRN, Jamila Taveras MD, Last Rate: 750 mL/hr at 05/02/22 1558, 125 mL at 05/02/22 1558    labetaloL (NORMODYNE;TRANDATE) 20 mg/4 mL (5 mg/mL) injection 10 mg, 10 mg, IntraVENous, Q6H PRN, Jamila Taveras MD, 10 mg at 05/03/22 1423    LABS:  Recent Results (from the past 24 hour(s))   GLUCOSE, POC    Collection Time: 06/17/22  3:19 PM   Result Value Ref Range    Glucose (POC) 203 (H) 65 - 117 mg/dL    Performed by HENRIQUE BHATTI    GLUCOSE, POC    Collection Time: 06/17/22  8:08 PM   Result Value Ref Range    Glucose (POC) 218 (H) 65 - 117 mg/dL    Performed by Hina Sawant    GLUCOSE, POC    Collection Time: 06/18/22  8:26 AM   Result Value Ref Range    Glucose (POC) 183 (H) 65 - 117 mg/dL    Performed by Daniel Davis    GLUCOSE, POC    Collection Time: 06/18/22 11:40 AM   Result Value Ref Range    Glucose (POC) 275 (H) 65 - 117 mg/dL    Performed by Jt Dhaliwal (LPN)        Visit Vitals  BP (!) 149/94 (BP Patient Position: At rest)   Pulse 88   Temp 98.3 °F (36.8 °C)   Resp 18   Ht 5' 7.4\" (1.712 m)   Wt 61 kg (134 lb 7.7 oz)   SpO2 100%   BMI 20.81 kg/m²       Imaging:  MRI BRAIN WO CONT   Final Result   1. Compared with the previous examination of 5/25/2022, no new areas of   infarction evident. No extension of existing two recent infarcts. 2. Development of early laminar necrosis in the medial right occipital lobe   infarct. 3. Stable cystic encephalomalacia in the very remote left occipital infarct. CT ABD PELV WO CONT   Final Result   New right lower quadrant ostomy. Small amount of free air. Bowel   wall prominence in small bowel loops. No evidence of mechanical bowel   obstruction. Fluid collection in the pelvis concerning for abscess. Other   findings as above. Findings conveyed to the patient's nurse Jannet Clifford on 6/6/2022 at 9:50 AM.      XR HIP LT W OR WO PELV 2-3 VWS   Final Result      DUPLEX CAROTID BILATERAL   Final Result      MRI BRAIN WO CONT   Final Result         1. Evolving ischemic infarcts in multiple vascular territories, involving both   the right occipital lobe and left frontal lobe. 2. Left occipital encephalomalacia      CT ABD PELV WO CONT   Final Result   Small bowel ileus versus partial obstruction, at the ileocolic   anastomosis or ileocecal valve, slightly greater than previous. There is mixed   density fluid throughout this region, as before. . Consider follow-up study with   IV contrast and if possible, oral contrast. Consider NG tube. XR ABD (KUB)   Final Result      XR ABD (KUB)   Final Result   Small bowel dilatation consistent with obstruction, not   significantly changed. XR ABD (KUB)   Final Result   Persistent small bowel distention as seen with obstruction. XR ABD (KUB)   Final Result   No significant change compared to abdomen series 5/5/2022. Persistent air and fluid-filled dilated small bowel loops. XR ABD (KUB)   Final Result   Multiple distended gas-filled loops of small bowel compatible with   partial small bowel obstruction versus ileus. CT ABD PELV WO CONT   Final Result   Moderate grade partial small bowel obstruction. Small volume complex ascites. CT ABD PELV WO CONT   Final Result   1. Improving hemoperitoneum and pneumoperitoneum. 2.  Ileocolic anastomosis in the right lower quadrant. Incompletely evaluated   without contrast. No evidence of bowel obstruction. 3.  Reticular and groundglass airspace disease in the visualized lung bases   redemonstrated. 4.  See full report for detailed findings. CT ABD PELV WO CONT   Final Result   1. Status post ileal cecal surgery. Surrounding extraluminal dense material may   represent blood products (no indication of enteric contrast administration). Anastomotic leak cannot be excluded. Consider imaging with water-soluble oral   contrast. Free air in the abdomen likely within normal limits post recent   surgery. The bowel does not appear obstructed. 2. Bilateral lower lung airspace disease likely infectious or inflammatory. 3. Bilateral femoral head sclerosis suggests AVN.

## 2022-06-18 NOTE — PROGRESS NOTES
Physician Progress Note      PATIENTLennox Maid  CSN #:                  [id number]  :                       1970  ADMIT DATE:       4/15/2022 7:09 AM  DISCH DATE:  RESPONDING  PROVIDER #:        Tamiko Lamas MD          QUERY TEXT:    Pt admitted with Perforation of intestine and  Anastomotic leak. Noted documentation of Post Op Infection on  PN by ordered Rheumatology consultant. If possible, please document in progress notes and discharge summary:    The medical record reflects the following:  Risk Factors: Perforation of intestine, SVT, Aflutter, Partial bowel obstruction, Ascites. Clinical Indicators:  Rheumatology PN: \" He is being treated for a post op infection with antibiotics. \"  Attending PN: \"mild increase in white blood cell count, continue Zosyn. \"  attending PN: \"Continue Zosyn for wound infection\" WBC 11.2> 13.2. Treatment: Rheumatology, neuro, and wound care nurse consulted, IV ABX, wound care, lab monitoring. Thank NBA SaldivarN, RN, CDI Specialist  [email or web address] or [phone]  Options provided:  -- Post Op wound Infection confirmed  -- Post Op wound Infection ruled out  -- Other - I will add my own diagnosis  -- Disagree - Not applicable / Not valid  -- Disagree - Clinically unable to determine / Unknown  -- Refer to Clinical Documentation Reviewer    PROVIDER RESPONSE TEXT:    The diagnosis of Post Op wound Infection was confirmed.     Query created by: Bill Woods on 2022 4:00 PM      Electronically signed by:  Tamiko Lamas MD 2022 1:54 PM

## 2022-06-19 NOTE — PROGRESS NOTES
Progress Note  Date:2022       Room:[facility]  Patient Name:Liana Mahajan     YOB: 1970     Age:52 y.o. Subjective    Subjective     The patient is a 47year old male with a history of biopsy proven polymyositis that was done at [facility] in , which is treated by Rheumatology. He also reports he was given the diagnosis of Myasthenia Gravis that has been treated by Neurology at [address] [place] [address] Polymyositis in clinically stable and he has normal CPK and CRP and he is now taking Prednisone 15 mg once daily.   The last CPK was 32 and Myoglobin 47 and CRP was 0.72. He has no new complaints today and I have no new recommendations.  He reports that he is now being managed by Dr. Ayleen Laguerre (Neurology). He is planning to go to inpatient rehab once pain symptoms are under control. Review of Systems  Objective         Vitals Last 24 Hours:  TEMPERATURE:  Temp  Av.5 °F (36.9 °C)  Min: 98.2 °F (36.8 °C)  Max: 98.7 °F (37.1 °C)  RESPIRATIONS RANGE: Resp  Av.3  Min: 16  Max: 18  PULSE OXIMETRY RANGE: SpO2  Av %  Min: 100 %  Max: 100 %  PULSE RANGE: Pulse  Av.3  Min: 72  Max: 98  BLOOD PRESSURE RANGE: Systolic (38QDO), YNT:664 , Min:121 , LAKE:943   ; Diastolic (33FAA), GG, Min:60, Max:90    I/O (24Hr): Intake/Output Summary (Last 24 hours) at 2022 0906  Last data filed at 2022 1913  Gross per 24 hour   Intake --   Output 950 ml   Net -950 ml     Objective  Labs/Imaging/Diagnostics    Labs:  CBC:  Recent Labs     22  1557   WBC 10.8 9.9   RBC 4.73 4.70   HGB 10.3* 10.3*   HCT 35.0* 34.8*   MCV 74.0* 74.0*   RDW 18.4* 18.6*    322     CHEMISTRIES:  Recent Labs     22  1557    140   K 3.8 4.3    107   CO2 25 26   BUN 12 12   CA 8.7 8.8   PT/INR:No results for input(s): INR, INREXT in the last 72 hours. No lab exists for component: PROTIME  APTT:No results for input(s):  APTT in the last 72 hours.  LIVER PROFILE:  Recent Labs     06/19/22  0108 06/18/22  1557   AST 14* 13*   ALT 25 23     Lab Results   Component Value Date/Time    ALT (SGPT) 25 06/19/2022 01:08 AM    AST (SGOT) 14 (L) 06/19/2022 01:08 AM    Alk. phosphatase 88 06/19/2022 01:08 AM    Bilirubin, total 0.2 06/19/2022 01:08 AM       Imaging Last 24 Hours:  No results found. Assessment//Plan   Active Problems:    Colon cancer ([address] (4/15/2022)      Cecal polyp (4/15/2022)      Assessment & Plan       The patient has history of polymyositis which is clinically stable. The last CK was 32 the Myoglobin was 47 in the ESR is 29 in the C-reactive protein is 0.7.  We will continue Prednisone 15 mg daily for now with a goal to decrease Prednisone.  He will benefit having Inpatient rehab and monitoring.  No new recommendations.     (The Note writer data in this note brings in test and lab results from the hospital stay, not managed by Rheumatology)          Jose Luis RAMÍREZ-BC  Rheumatology    Electronically signed by Jossie Carmona NP on 6/19/2022 at 9:06 AM

## 2022-06-19 NOTE — CONSULTS
NEUROLOGY CONSULT    Name Bee Robins Age 47 y.o. MRN [id number]  1970     Referring Physician: Shine Hart MD    Chief Complaint: Second opinion regarding the cause of the stroke     This is a 47 y.o. ambidextrous pleasant -American male who was admitted to the hospital on 4/15/2022 because of a large cecal polyp not amenable to endoscopic resection and has been in the hospital since. Around mid May he developed some visual symptoms describes as haziness blurring and inability to see things on her left side. At that time an MRI of the brain done revealed a right occipital and left frontal infarcts. Also showed an old left occipital infarct. Patient has history of A. fib/a flutter for which he had been on Eliquis even prior to the admission. Neurologist consulted at the time of this stroke in mid May started the patient on aspirin and subsequently added Plavix to the Eliquis. He is still on all the 3 medicines. The patient's vision has improved but not cleared yet and he wanted a second opinion exactly find out what is going on with him. Per patient he was not told what causes a stroke and from where his symptoms are coming from. Since the patient was still having the symptoms another MRI was done on 2022 which did not reveal any additional strokes    At the time of my evaluation this morning the patient is very alert awake and coherent and he describes extreme blurring or inability to see the left side of my face. Assessment and Plan:  1. History of right occipital and left frontal infarcts in mid May 2022: The MRI in May also showed an old left occipital lobe infarct. The etiology of patient's stroke seems to be embolic secondary to his A. fib/a flutter and he is on Eliquis. He was also started on aspirin and Plavix at the time he had the stroke. His echo, AL and carotid Doppler were all unremarkable.   In my opinion he does not need to antiplatelets and I would recommend discontinuing the Plavix and leave him on baby aspirin 81 mg along with the Eliquis and his situation. I will also order a lipid panel and start him on Lipitor 20 mg once a day keeping an eye on his muscle enzymes and clinical condition, watching for any new weakness or elevation in the enzymes. If that happens we will discontinue the Lipitor right away. Had a lengthy discussion with the patient and explained him the etiology of his stroke. It seems to me that he was satisfied with the answers as he now knew why he had the stroke. I will recommend discontinuing the Plavix and keep the baby aspirin 81 mg once a day. 2.  Polymyositis: This seems to be stable on current regime as his ESR, CRP and CK are in normal range. 3.  A. fib/a flutter: His heart rate seems to be in control and he is getting Eliquis for this. 4.  Hypertension  5. Diabetes mellitus    Thank you for the consult    Allergies   Allergen Reactions    Beef Containing Products Anaphylaxis and Other (comments)    Diltiazem Other (comments)     Reaction Type: Allergy; Reaction(s): Pressure in chest    Iodinated Contrast Media Other (comments)     Reaction Type: Allergy; Severity: Severe; Reaction(s): hives,throat swelling    Pork Derived (Porcine) Hives    Shellfish Containing Products Swelling     Other reaction(s): Other  Reaction Type: Allergy; Reaction(s): Hives,throat swelling    Sulfa (Sulfonamide Antibiotics) Unknown (comments)     Spinal meningitis    Sulfur Unknown (comments)     Reaction Type: Allergy  Spinal meningitis    Lactose Diarrhea     Lactose intolerant per Pt.      Current Facility-Administered Medications   Medication Dose Route Frequency    HYDROmorphone (DILAUDID) tablet 4 mg  4 mg Oral Q4H PRN    HYDROmorphone (DILAUDID) injection 1 mg  1 mg IntraVENous Q3H PRN    predniSONE (DELTASONE) tablet 15 mg  15 mg Oral DAILY WITH BREAKFAST    fluconazole (DIFLUCAN) tablet 200 mg  200 mg Oral Q7D    clopidogreL (PLAVIX) tablet 75 mg  75 mg Oral DAILY    aspirin chewable tablet 81 mg  81 mg Oral DAILY    0.9% sodium chloride infusion 250 mL  250 mL IntraVENous PRN    acetaminophen (TYLENOL) tablet 650 mg  650 mg Oral Q4H PRN    famotidine (PF) (PEPCID) 20 mg in 0.9% sodium chloride 10 mL injection  20 mg IntraVENous Q12H    dextrose 5% - 0.45% NaCl with KCl 20 mEq/L infusion  25 mL/hr IntraVENous CONTINUOUS    oxyCODONE IR (ROXICODONE) tablet 10 mg  10 mg Oral Q4H PRN    ondansetron (ZOFRAN) injection 4 mg  4 mg IntraVENous Q4H PRN    verapamil ER (VERELAN PM) capsule 200 mg  200 mg Oral QHS    [Held by provider] potassium chloride SR (KLOR-CON 10) tablet 10 mEq  10 mEq Oral BID    apixaban (ELIQUIS) tablet 5 mg  5 mg Oral BID    amiodarone (CORDARONE) tablet 200 mg  200 mg Oral DAILY    lisinopriL (PRINIVIL, ZESTRIL) tablet 20 mg  20 mg Oral DAILY    insulin lispro (HUMALOG) injection   SubCUTAneous AC&HS    glucose chewable tablet 16 g  4 Tablet Oral PRN    glucagon (GLUCAGEN) injection 1 mg  1 mg IntraMUSCular PRN    dextrose 10% infusion 0-250 mL  0-250 mL IntraVENous PRN    labetaloL (NORMODYNE;TRANDATE) 20 mg/4 mL (5 mg/mL) injection 10 mg  10 mg IntraVENous Q6H PRN     Past Medical History:   Diagnosis Date    Adverse effect of anesthesia     Arrhythmia     atrial flutter    Asthma     [facility]     Hypertension     Ill-defined condition     Spinal meningitis and avascular necrosis     Myasthenia gravis     [facility] 2001    muscle disorder    Primary hypersomnolence disorder      Social History     Tobacco Use    Smoking status: Never Smoker    Smokeless tobacco: Never Used   Vaping Use    Vaping Use: Never used   Substance Use Topics    Alcohol use: No    Drug use: No     Exam  Visit Vitals  /89 (BP 1 Location: Right upper arm, BP Patient Position: At rest)   Pulse 92   Temp 98.2 °F (36.8 °C)   Resp 18   Ht 5' 7.4\" (1.712 m)   Wt 62.6 kg (138 lb 0.1 oz)   SpO2 100%   BMI 21.36 kg/m²     General: Well developed, well nourished. Patient in no distress   Head: Normocephalic, atraumatic, anicteric sclera   Neck Normal ROM, No thyromegally   Lungs:  Clear to auscultation    Cardiac: Regular rate and rhythm with no murmurs. Abd: Bowel sounds were audible   Ext: No pedal edema   Skin: Supple no rash     NeurologicExam:  Mental Status: Alert and oriented to person place and time   Speech: Fluent no aphasia or dysarthria. Cranial Nerves:   Pupils are equal round and reactive to light and accommodation, extraocular movements are intact and full, visual fields are intact by confrontation, but he reports the vision in the left visual field is hazy and looking at my face he could not see my right eye no nystagmus noted, face is symmetric, sensation in face is intact and symmetric, hearing is intact and symmetric, tongue and uvula are in midline with normal movements, palate is elevating equally, shoulder shrug is intact and symmetric. Motor:  Full and symmetric strength of upper and lower ext -5/5 and symmetric. Normal bulk and tone. Reflexes:   Deep tendon reflexes 2/4 in the upper and 1/4 in the lower and symmetric. Sensory:   Symmetric and intact    Gait:  Gait is deferred   Tremor:   No tremor noted. Cerebellar:  Coordination intact for finger-nose-finger. Neurovascular: No carotid bruits.  No JVD      Lab Review  Lab Results   Component Value Date/Time    WBC 10.8 06/19/2022 01:08 AM    HCT 35.0 (L) 06/19/2022 01:08 AM    HGB 10.3 (L) 06/19/2022 01:08 AM    PLATELET 824 51/56/5604 01:08 AM     Lab Results   Component Value Date/Time    Sodium 138 06/19/2022 01:08 AM    Potassium 3.8 06/19/2022 01:08 AM    Chloride 107 06/19/2022 01:08 AM    CO2 25 06/19/2022 01:08 AM    Glucose 149 (H) 06/19/2022 01:08 AM    BUN 12 06/19/2022 01:08 AM    Creatinine 0.60 (L) 06/19/2022 01:08 AM    Calcium 8.7 06/19/2022 01:08 AM     No results found for: B12LT, FOL, RBCF  No results found for: LDL, LDLC, DLDLP  No results found for: HBA1C, JDA8IAIG, MAA4CEGC  No components found for: TROPQUANT  No results found for: INDIRA [Heartburn] : heartburn [Nl] : Genitourinary

## 2022-06-19 NOTE — PROGRESS NOTES
Progress Note    Patient: Caty Freeman MRN: [id number]  SSN: [id number]    YOB: 1970  Age: 47 y.o. Sex: male      Admit Date: 4/15/2022    LOS: 65 days     Subjective:   Patient seen in bed, no complaints today. Tolerating regular diet, having ileostomy output. Objective:     Vitals:    06/18/22 1913 06/19/22 0801 06/19/22 0806 06/19/22 0808   BP: 121/60  137/89    Pulse: 98  92    Resp: 16  18    Temp: 98.7 °F (37.1 °C)  98.2 °F (36.8 °C)    SpO2: 100%  100% 100%   Weight:  138 lb 0.1 oz (62.6 kg)     Height:            Intake and Output:  Current Shift: 06/19 0701 - 06/19 1900  In: -   Out: 500 [Urine:400]  Last three shifts: 06/17 1901 - 06/19 0700  In: 1681.3 [P.O.:995; I.V.:686.3]  Out: 4000 [Urine:2450]    Physical Exam:   Abdomen is soft, nondistended, mild diffuse tenderness with exaggerated response to palpation, ileostomy pink and productive pouched, midline incision c/d/i    Lab/Data Review:  Recent Results (from the past 24 hour(s))   CBC WITH AUTOMATED DIFF    Collection Time: 06/18/22  3:57 PM   Result Value Ref Range    WBC 9.9 4.1 - 11.1 K/uL    RBC 4.70 4. 10 - 5.70 M/uL    HGB 10.3 (L) 12.1 - 17.0 g/dL    HCT 34.8 (L) 36.6 - 50.3 %    MCV 74.0 (L) 80.0 - 99.0 FL    MCH 21.9 (L) 26.0 - 34.0 PG    MCHC 29.6 (L) 30.0 - 36.5 g/dL    RDW 18.6 (H) 11.5 - 14.5 %    PLATELET 517 666 - 499 K/uL    NRBC 0.0 0.0  WBC    ABSOLUTE NRBC 0.00 0.00 - 0.01 K/uL    NEUTROPHILS 89 (H) 32 - 75 %    LYMPHOCYTES 7 (L) 12 - 49 %    MONOCYTES 4 (L) 5 - 13 %    EOSINOPHILS 0 0 - 7 %    BASOPHILS 0 0 - 1 %    IMMATURE GRANULOCYTES 0 0 - 0.5 %    ABS. NEUTROPHILS 8.7 (H) 1.8 - 8.0 K/UL    ABS. LYMPHOCYTES 0.7 (L) 0.8 - 3.5 K/UL    ABS. MONOCYTES 0.4 0.0 - 1.0 K/UL    ABS. EOSINOPHILS 0.0 0.0 - 0.4 K/UL    ABS. BASOPHILS 0.0 0.0 - 0.1 K/UL    ABS. IMM.  GRANS. 0.0 0.00 - 0.04 K/UL    DF AUTOMATED     METABOLIC PANEL, COMPREHENSIVE    Collection Time: 06/18/22  3:57 PM   Result Value Ref Range Sodium 140 136 - 145 mmol/L    Potassium 4.3 3.5 - 5.1 mmol/L    Chloride 107 97 - 108 mmol/L    CO2 26 21 - 32 mmol/L    Anion gap 7 5 - 15 mmol/L    Glucose 242 (H) 65 - 100 mg/dL    BUN 12 6 - 20 mg/dL    Creatinine 0.54 (L) 0.70 - 1.30 mg/dL    BUN/Creatinine ratio 22 (H) 12 - 20      GFR est AA >60 >60 ml/min/1.73m2    GFR est non-AA >60 >60 ml/min/1.73m2    Calcium 8.8 8.5 - 10.1 mg/dL    Bilirubin, total 0.2 0.2 - 1.0 mg/dL    AST (SGOT) 13 (L) 15 - 37 U/L    ALT (SGPT) 23 12 - 78 U/L    Alk. phosphatase 88 45 - 117 U/L    Protein, total 6.6 6.4 - 8.2 g/dL    Albumin 3.1 (L) 3.5 - 5.0 g/dL    Globulin 3.5 2.0 - 4.0 g/dL    A-G Ratio 0.9 (L) 1.1 - 2.2     GLUCOSE, POC    Collection Time: 06/18/22  4:31 PM   Result Value Ref Range    Glucose (POC) 266 (H) 65 - 117 mg/dL    Performed by Jorge A Nguyễn    GLUCOSE, POC    Collection Time: 06/18/22  8:35 PM   Result Value Ref Range    Glucose (POC) 203 (H) 65 - 117 mg/dL    Performed by Delia Murphy    CBC WITH AUTOMATED DIFF    Collection Time: 06/19/22  1:08 AM   Result Value Ref Range    WBC 10.8 4.1 - 11.1 K/uL    RBC 4.73 4.10 - 5.70 M/uL    HGB 10.3 (L) 12.1 - 17.0 g/dL    HCT 35.0 (L) 36.6 - 50.3 %    MCV 74.0 (L) 80.0 - 99.0 FL    MCH 21.8 (L) 26.0 - 34.0 PG    MCHC 29.4 (L) 30.0 - 36.5 g/dL    RDW 18.4 (H) 11.5 - 14.5 %    PLATELET 015 591 - 233 K/uL    NRBC 0.0 0.0  WBC    ABSOLUTE NRBC 0.00 0.00 - 0.01 K/uL    NEUTROPHILS 73 32 - 75 %    LYMPHOCYTES 17 12 - 49 %    MONOCYTES 8 5 - 13 %    EOSINOPHILS 1 0 - 7 %    BASOPHILS 0 0 - 1 %    IMMATURE GRANULOCYTES 1 (H) 0 - 0.5 %    ABS. NEUTROPHILS 7.9 1.8 - 8.0 K/UL    ABS. LYMPHOCYTES 1.9 0.8 - 3.5 K/UL    ABS. MONOCYTES 0.8 0.0 - 1.0 K/UL    ABS. EOSINOPHILS 0.1 0.0 - 0.4 K/UL    ABS. BASOPHILS 0.0 0.0 - 0.1 K/UL    ABS. IMM.  GRANS. 0.1 (H) 0.00 - 0.04 K/UL    DF AUTOMATED     METABOLIC PANEL, COMPREHENSIVE    Collection Time: 06/19/22  1:08 AM   Result Value Ref Range    Sodium 138 136 - 145 mmol/L    Potassium 3.8 3.5 - 5.1 mmol/L    Chloride 107 97 - 108 mmol/L    CO2 25 21 - 32 mmol/L    Anion gap 6 5 - 15 mmol/L    Glucose 149 (H) 65 - 100 mg/dL    BUN 12 6 - 20 mg/dL    Creatinine 0.60 (L) 0.70 - 1.30 mg/dL    BUN/Creatinine ratio 20 12 - 20      GFR est AA >60 >60 ml/min/1.73m2    GFR est non-AA >60 >60 ml/min/1.73m2    Calcium 8.7 8.5 - 10.1 mg/dL    Bilirubin, total 0.2 0.2 - 1.0 mg/dL    AST (SGOT) 14 (L) 15 - 37 U/L    ALT (SGPT) 25 12 - 78 U/L    Alk.  phosphatase 88 45 - 117 U/L    Protein, total 6.2 (L) 6.4 - 8.2 g/dL    Albumin 3.0 (L) 3.5 - 5.0 g/dL    Globulin 3.2 2.0 - 4.0 g/dL    A-G Ratio 0.9 (L) 1.1 - 2.2     GLUCOSE, POC    Collection Time: 06/19/22  8:06 AM   Result Value Ref Range    Glucose (POC) 145 (H) 65 - 117 mg/dL    Performed by Terrall Crew    GLUCOSE, POC    Collection Time: 06/19/22 11:35 AM   Result Value Ref Range    Glucose (POC) 230 (H) 65 - 117 mg/dL    Performed by Terrall Crew           Assessment:     Active Problems:    Colon cancer ([facility] [address] (4/15/2022)      Cecal polyp (4/15/2022)        Plan:   Patient is requesting a second opinion from neurology, will consult Dr. Wesley Perez current analgesics  Continue Plavix and Eliquis  Continue stoma teaching and care  Labs reviewed    Signed By: Angélica Bloom, DO     June 19, 2022

## 2022-06-20 NOTE — PROGRESS NOTES
Progress Note    Patient: Ji Biswas MRN: [id number]  SSN: [id number]    YOB: 1970  Age: 47 y.o. Sex: male      Admit Date: 4/15/2022    LOS: 66 days     Subjective:   Patient seen in bed  Pain management continues to be an issue  Seen by Dr. Claudia Saucedo who feels that inpatient rehab is appropriate and is working with peer to peer appeal  Tolerating diet and stoma functioning    Objective:     Vitals:    06/19/22 0806 06/19/22 0808 06/19/22 1439 06/20/22 0629   BP: 137/89  (!) 146/96 138/77   Pulse: 92  100 99   Resp: 18 18 18   Temp: 98.2 °F (36.8 °C)  98 °F (36.7 °C) 98.5 °F (36.9 °C)   SpO2: 100% 100% 100%    Weight:       Height:            Intake and Output:  Current Shift: No intake/output data recorded.   Last three shifts: 06/18 1901 - 06/20 0700  In: -   Out: 1600 [Urine:950]    Review of Systems:  ROS     Physical Exam:   Abdomen is soft, nondistended, tender palpation in the right side and lower abdomen, stoma pink and productive, midline incision clean  Lab/Data Review:  Recent Results (from the past 24 hour(s))   GLUCOSE, POC    Collection Time: 06/19/22  4:51 PM   Result Value Ref Range    Glucose (POC) 272 (H) 65 - 117 mg/dL    Performed by Marcelina Ellington, POC    Collection Time: 06/19/22 10:39 PM   Result Value Ref Range    Glucose (POC) 159 (H) 65 - 117 mg/dL    Performed by [email or web address] PANEL    Collection Time: 06/20/22  7:50 AM   Result Value Ref Range    LIPID PROFILE        Cholesterol, total 120 <200 mg/dL    Triglyceride 80 <150 mg/dL    HDL Cholesterol 64 mg/dL    LDL, calculated 40 0 - 100 mg/dL    VLDL, calculated 16 mg/dL    CHOL/HDL Ratio 1.9 0.0 - 5.0     GLUCOSE, POC    Collection Time: 06/20/22  9:11 AM   Result Value Ref Range    Glucose (POC) 131 (H) 65 - 117 mg/dL    Performed by Kristina García    GLUCOSE, POC    Collection Time: 06/20/22  1:06 PM   Result Value Ref Range    Glucose (POC) 245 (H) 65 - 117 mg/dL    Performed by Konstantin Wilder           Assessment:     Active Problems:    Colon cancer ([facility] [address] (4/15/2022)      Cecal polyp (4/15/2022)        Plan:    Will start MS Contin 30 mg every 12 hours  Continue Dilaudid as needed  Continue diet as ordered  Continue PT/OT  Neurology recommendations are to stop Plavix and continue on Eliquis and aspirin  Signed By: Karthikeyan Corley MD     June 20, 2022

## 2022-06-20 NOTE — WOUND CARE
WCN in for ostomy evaluation and continued education. Patient ostomy is pink/red and moist. Ostomy pouch/wafer in place with ostomy belt and no leaks noticed at this time. Patient states ostomy has been holding up well with belt and had no issues over the weekend. Patient states that pain is improving some and is still experiencing the visual disturbances which is making it hard for him to care for ostomy himself. Discussed the following education with patient:    -Measuring the stoma  -Cutting wafer to fit stoma and wafer and pouch application.  -Empty / burp pouch when 1/3-1/2 full of stool or gas. -Change appliance (wafer and pouch) 2-3 per week. If leaking, change as soon as possible to prevent skin irritation.  -Best time for routine change of appliance is first thing in the morning before consuming food or liquids (depending on which type of ostomy). -Clean stoma and peristomal skin with plain water or NS, may use a mild soap. No soaps with lotions as they may prevent adhesive from adhering to skin. May bathe with appliance on or off.  -Purposes and how to use barrier rings, stoma paste, and stoma powder.   -Stoma should always be red and moist.  If stoma appears dry and dusky, notify surgeon immediately.        Griselda Hush, RN, BSN  [facility]  9:24 AM  06/20/22

## 2022-06-20 NOTE — PROGRESS NOTES
Rehab Progress Note    Patient: Zion Purvis MRN: [id number]  SSN: [id number]    YOB: 1970  Age: 47 y.o. Sex: male      Admit date: 4/15/2022   LOS (days): 77    CC: Difficulty walking     Subjective:      Since last visit, continuing to make medical and functional progress. Participating better with therapies, still does need rest breaks frequently due to myasthenia. Notes that he is still below his functional baseline. He notes a fifteen pound weight loss since his hospitalization. Pain regimen being adjusted and he notes good results. Initial insurance authorization for IRF was denied. HPI (per initial consult note): This is a 49 y.o. male with a past medical history including myasthenia gravis, hypertension, A. fib, diabetes mellitus type 2, GERD, polymyositis on chronic steroids. He was admitted to [facility] for surgical management of a large cecal polyp. Patient is status post laparoscopic ileocecectomy  with Dr. Glynda Schaumann. Postoperatively patient was transferred to the ICU of amiodarone. Later during hospital course on 5/15/2022 patient returned to the OR with Dr. Glynda Schaumann for exploratory lap resection of ilio colic anastomosis with end ileostomy for anastomotic leak. Patient returned to the ICU postoperatively and has since been transferred out. Patient seen today while he is sitting in bed appears to be in acute pain that he feels has not improved at this time. Patient also noted to be on TPN.          Current Facility-Administered Medications   Medication    atorvastatin (LIPITOR) tablet 20 mg    HYDROmorphone (DILAUDID) tablet 4 mg    HYDROmorphone (DILAUDID) injection 1 mg    predniSONE (DELTASONE) tablet 15 mg    fluconazole (DIFLUCAN) tablet 200 mg    clopidogreL (PLAVIX) tablet 75 mg    aspirin chewable tablet 81 mg    0.9% sodium chloride infusion 250 mL    acetaminophen (TYLENOL) tablet 650 mg    famotidine (PF) (PEPCID) 20 mg in 0.9% sodium chloride 10 mL injection    dextrose 5% - 0.45% NaCl with KCl 20 mEq/L infusion    oxyCODONE IR (ROXICODONE) tablet 10 mg    ondansetron (ZOFRAN) injection 4 mg    verapamil ER (VERELAN PM) capsule 200 mg    [Held by provider] potassium chloride SR (KLOR-CON 10) tablet 10 mEq    apixaban (ELIQUIS) tablet 5 mg    amiodarone (CORDARONE) tablet 200 mg    lisinopriL (PRINIVIL, ZESTRIL) tablet 20 mg    insulin lispro (HUMALOG) injection    glucose chewable tablet 16 g    glucagon (GLUCAGEN) injection 1 mg    dextrose 10% infusion 0-250 mL    labetaloL (NORMODYNE;TRANDATE) 20 mg/4 mL (5 mg/mL) injection 10 mg          Objective:     Vitals:    06/19/22 0806 06/19/22 0808 06/19/22 1439 06/20/22 0629   BP: 137/89  (!) 146/96 138/77   Pulse: 92  100 99   Resp: 18 18 18   Temp: 98.2 °F (36.8 °C)  98 °F (36.7 °C) 98.5 °F (36.9 °C)   SpO2: 100% 100% 100%    Weight:       Height:            Physical Exam:  General: NAD, pleasant and cooperative   HEENT: moist oral mucosa   CV: RRR  Respiratory: no increased WOB  Abdomen: nondistended, ostomy   Extremities: no peripheral edema   Musculoskeletal: diffuse weakness, moving extremities at least antigravity  Neuro: alert, normal speech      Labs:  Recent Labs     06/19/22  0108 06/18/22  1557   WBC 10.8 9.9   HGB 10.3* 10.3*    322    140   K 3.8 4.3   BUN 12 12   CREA 0.60* 0.54*         Imaging (last 24 hrs):   No results found. Impression/Plan:     Diagnoses:     Embolic infarcts, R occipital and L frontal  Critical Illness myopathy  Cecal polyp  S/p  laparoscopic ileocecectomy  exploratory lap resection of ilio colic anastomosis with end ileostomy for anastomotic leak  Ostomy site  Abd pain post surgery  Myasthenia Gravis  Polymyositis chronic steroids  Diabetes mellitus     Continue to recommend inpatient rehab. Initial authorization by insurance denied, I will assist with peer to peer appeal which has been initiated, awaiting call back.      Will need continued monitoring and physician supervision regarding recent infarcts, risk modification and teaching, myasthenia, critical illness myopathy. Barriers to rehab: none    [ x ] Will need submission for insurance authorization for inpatient rehab. Continue PT/OT in the acute setting. Will continue to follow.        Signed By: Gogo Romo MD     June 20, 2022    Physical Medicine and Rehabilitation

## 2022-06-20 NOTE — PROGRESS NOTES
OCCUPATIONAL THERAPY RE-EVALUATION  Patient: Yrn Pearce (12 y.o. male)  Date: 6/20/2022  Diagnosis: Adenomatous polyp of colon, unspecified part of colon [D12.6]  [facility] [C18.9]  Cecal polyp [K63.5] <principal problem not specified>  Procedure(s) (LRB):  Exploratory laparotomy, abdominal washout, resection of ileocolic anastomosis with end ileostomy (N/A) 36 Days Post-Op  Precautions: fall risk   Chart, occupational therapy assessment, plan of care, and goals were reviewed. ASSESSMENT  Based on the objective data described below, pt is slowly progressing towards goals. Per initial evaluation completed on 05/18/22, pt is a 48 y/o M with PMH of paroxysmal atrial fibrillation/flutter, DM, asthma, polymyositis, steroid dependent, myasthenia gravis, and HTN. Per reassessment completed on 06/10/22, pt was admitted to [facility] for elective surgery to remove polyp. Pt developed a small bowel obstruction and possible anastomotic leak resulting in pt needing to be hospitalized for an extended period of time. Pt is currently being treated for embolic infarcts of R occipital and L frontal, myopathy, myasthenia gravis, polymyositis on chronic steroids, DM, and is s/p laparoscopic ileocecectomy. Pt received semi-supine in bed upon OT arrival, AXO 4, and agreeable to OT reassessment at this time. Based on current observations, pt presents with deficits in strength/AROM, balance, functional activity tolerance and pain impacting pt's performance of ADL's and functional transfers/mobility at this time. Pt currently requires SBA for rolling and sup>sit EOB. Pt able to don slip on shoes without back with mod I while seated EOB. Pt completes sit EOB>stand with RW with CGA and ambulates to door with CGA. Pt noted with shakiness in legs and then requested to return seated EOB, so pt ambulates back to bedside with CGA. Pt completes stand with RW>sit EOB with min A.  Pt then completes BUE therex of 15x elbow curls and 10x shoulder abduction and adduction with pt reporting weakness post therex. Pt then sits EOB and washes face with set up. Pt brushes teeth and walls hair and beard with mod I. Pt then doffs shoes with mod I while seated EOB, and completes sit EOB>sup with min A to guide BLE onto bed. Pt continues to benefit from skilled OT services to improve functional mobility/transfers and ADL performance. Pt left in no apparent distress with call bell in reach, side rails x3, and all known needs met. Recommending d/c to SNF when pt is medically appropriate. Current Level of Function Impacting Discharge (ADLs): min A sit>sup, SBA rolling and sup>sit, mod I LB dressing, tooth brushing, and combing hair, set up face washing    Other factors to consider for discharge: home support, PLOF, severity of deficits         PLAN :  Recommendations and Planned Interventions: self care training, functional mobility training, therapeutic exercise, balance training, therapeutic activities, endurance activities, patient education and home safety training    Recommend next OT session: Toilet transfers    Frequency/Duration: Patient will be followed by physical therapy:  2-3x/week to address goals. Recommendation for discharge: (in order for the patient to meet his/her long term goals)  Ricky Yates    This discharge recommendation:  Has been made in collaboration with the attending provider and/or case management    Equipment recommendations for successful discharge (if) home: bedside commode and shower chair       SUBJECTIVE:   Patient stated I can't walk to to the toilet.     OBJECTIVE DATA SUMMARY:   Hospital course since last seen and reason for reevaluation: Pt currently on OT caseload and due for a weekly reassessment. Cognitive/Behavioral Status:  Neurologic State: Alert  Orientation Level: Oriented X4  Cognition: Appropriate decision making; Follows commands    Hearing:   Auditory  Auditory Impairment: None    Range of Motion:  AROM: Generally decreased, functional    Strength:  Strength: Generally decreased, functional    Coordination:  Coordination: Within functional limits  Fine Motor Skills-Upper: Left Intact; Right Intact    Gross Motor Skills-Upper: Left Intact; Right Intact    Functional Mobility and Transfers for ADLs:  Bed Mobility:  Rolling: Stand-by assistance  Supine to Sit: Stand-by assistance  Sit to Supine: Minimum assistance  Scooting: Stand-by assistance    Transfers:  Sit to Stand: Contact guard assistance    Balance:  Sitting: Intact; Without support  Sitting - Static: Good (unsupported)  Sitting - Dynamic: Good (unsupported)  Standing: Impaired; With support  Standing - Static: Fair;Constant support  Standing - Dynamic : Fair;Constant support    ADL Intervention and task modifications:  Grooming  Position Performed: Seated edge of bed  Washing Face: Set-up  Brushing Teeth: Modified independent  Brushing/Combing Hair: Modified independent    Lower Body Dressing Assistance  Slip on Shoes Without Back: Modified independent    Therapeutic Exercises:   Pt completed 15x elbow curls and 10x shoulder abduction and adduction with reports of some weakness. Functional Measure:    [address] [place] [id number] AM-[facility] \"6 Clicks\"                                                       Daily Activity Inpatient Short Form  How much help from another person does the patient currently need. .. Total; A Lot A Little None   1. Putting on and taking off regular lower body clothing? []  1 []  2 []  3 [x]  4   2. Bathing (including washing, rinsing, drying)? []  1 []  2 [x]  3 []  4   3. Toileting, which includes using toilet, bedpan or urinal? [] 1 []  2 [x]  3 []  4   4. Putting on and taking off regular upper body clothing? []  1 []  2 [x]  3 []  4   5. Taking care of personal grooming such as brushing teeth? []  1 []  2 [x]  3 []  4   6. Eating meals?  []  1 []  2 []  3 [x]  4   © 2007, Trustees of [address] [place] [address] [address] [address], under license to [employer]. All rights reserved     Score: 20/24     Interpretation of Tool:  Represents clinically-significant functional categories (i.e. Activities of daily living). Percentage of Impairment CH    0%   CI    1-19% CJ    20-39% CK    40-59% CL    60-79% CM    80-99% CN     100%   [facility]  Score 6-24 24 23 20-22 15-19 10-14 7-9 6       Pain:  8/10 hip, shoulders, and stomach    Activity Tolerance:   Fair and requires frequent rest breaks  Please refer to the flowsheet for vital signs taken during this treatment. After treatment patient left in no apparent distress:   Supine in bed, Call bell within reach and Side rails x 3    COMMUNICATION/COLLABORATION:   The patients plan of care was discussed with: Physical therapy assistant and Occupational therapy assistant.      Kim Santizo OT  Time Calculation: 45 mins    Problem: Self Care Deficits Care Plan (Adult)  Goal: *Acute Goals and Plan of Care (Insert Text)  Description: Pt stated goal \"to decrease pain\"  Pt will be Mod I sup <> sit in prep for EOB ADLs  Pt will be Mod I grooming standing sink side LRAD  Pt will be Mod I UB dressing sitting EOB/long sit   Pt will be Mod I LE dressing sitting EOB/long sit  Pt will be Mod I sit <>  prep for toileting LRAD  Pt will be Mod I toileting/toilet transfer/cloth mgmt LRAD  Pt will be IND following UE HEP in prep for self care tasks      Outcome: Progressing Towards Goal

## 2022-06-20 NOTE — PROGRESS NOTES
CM was informed by [facility] IRF that patient's insurance denied auth. They offered a Peer to Peer which Wellington Reynoso with [facility] stated she is going to set up for Dr. Matteo Odom to complete. CM will continue to follow.

## 2022-06-21 NOTE — PROGRESS NOTES
PT has discharge pending for [facility]. His pain is not well managed with PO MS contin and IV dilaudid. Ostomy appliance intact with moderate amount of soft stool.

## 2022-06-21 NOTE — PROGRESS NOTES
CM was informed Peer to Peer was completed and it was overturned. Patient has authorization to go to [facility]. They have a bed available today. SWETHA contacted Dr. Rosana Shane and notified him patient could go and was approved. IF patient is cleared he can go to [facility] at [address] and nursing report has been called to [phone]. This was communicated to the patient's nurse as well. SWETHA notified patient himself and he stated he was \"no where near ready\" in regards to his pain management and some other tests being completed. SWETHA informed him that would be up to Dr. Rosana Shane in regards to medical stability but it was my understanding he had been medically cleared. CM informed patient [facility] is a hospital as well and they have physicians there who can continue to follow him and work on pain management. SWETHA also explained the insurance auth would only be good for a short amount of time and if he is medically stable we dont want to delay discharge and risk losing his opportunity at acute rehab. Patient acknowledged his understanding of this.

## 2022-06-21 NOTE — WOUND CARE
WCN in for ostomy re-assessment, pouch holding up well at this time. Patient stated that his bag was leaking some last night, appears to have been patched with stoma paste.

## 2022-06-21 NOTE — PROGRESS NOTES
Peer to peer for insurance authorization for IRF done, reviewer indicates he will approve. Final notice to go to CM.

## 2022-06-21 NOTE — PROGRESS NOTES
Comprehensive Nutrition Assessment    Type and Reason for Visit: (P) Reassess    Nutrition Recommendations/Plan:   Continue current diet, honoring pt preferences  Monitor and record PO intakes and Bms in I/Os     Malnutrition Assessment:  Malnutrition Status:  No malnutrition (06/14/22 1208)    Context:  Acute illness     Findings of the 6 clinical characteristics of malnutrition:   Energy Intake:  Mild decrease in energy intake (specify)  Weight Loss:  No significant weight loss     Body Fat Loss:  No significant body fat loss,     Muscle Mass Loss:  No significant muscle mass loss,    Fluid Accumulation:  No significant fluid accumulation,     Strength:  Not performed     Nutrition Assessment:    (P) Admitted for colon CA and cecal polyp w/ sx resection. Pt reported good appetite w/ poor intake 2/2 food preferences. (4/28) Intakes excellent, >76% of meals, with good ONS acceptance. (5/2) CT indicative of partial SBO. (5/4) Advanced to Clear Liq however N/V/C continues, (5/5) NPO, PPN initiated. (5/9) CT still finding SBO. (5/13) Ex-lap with resection of ileocolic anastamosis and reanastomosis, (5/15) return to OR for anastomotic leak. PPN off x2 days now, receiving D5 at 125ml/hr providing 510kcals/d. RD discussed with pt need to advance to TPN however pt denies wanting central line. Discussed with RN & Surgeon, plan to continue PPN. RD communicated recs with pharmacy, will advance to 500ml bag 3x/wk to try and increase kcal provision. Overall, PPN provided x8d. (5/18) Pt tolerating clear liquids and advanced to full with plan let TPN run out. Will add ONS. (5/24) Pt continues to tolerate diet without n/v. (5/31) S/p diet upgrade to regular texture intakes improved to >50% of meals. Tolerating diet, no wt loss. (6/7)Fair appetite/intake reported- ate 50% 2/2 preferences. Intake likely to improve w/ provision of Alternative menu. Pt had concern for ONS; prefers plant based- modify as requested.  (6/14) Continued difficulties with colostomy bag. ONS preferences updated 6/13. Intakes remain ~50% of meals. (6/21) Ostomy issues improving, less leaking. Visual obscurities worsening. Plan to d/c IRF. Intakes remain ~50%. Labs: Na 138, K 3.8, BUN 12, Creat 0.6, Gluc 149, Alb 3.0. Meds: atorvastatin, D5%/0.45%NaCl/KCl 20mEq/L, famotidine, insulin lispro, ondansetron, potassium chloride, prednisone. Nutrition Related Findings:    (P) No N/V/D/C nor c/s issues per Pt. Last BM 6/20 +ostomy in place. No edema. Wound Type: (P) Multiple,Surgical incision    Current Nutrition Intake & Therapies:  Average Meal Intake: (P) 51-75%  Average Supplement Intake: (P) %  ADULT DIET Regular; NO beef, pork, shellfish. Lactose intolerant. Cheese is Okay as requested. ADULT ORAL NUTRITION SUPPLEMENT Breakfast, Lunch, HS Snack; Snack; ALMOND MILK    Anthropometric Measures:  Height: (P) 5' 7.4\" (171.2 cm)  Ideal Body Weight (IBW): (P) 150 lbs ((P) 68 kg)  Admission Body Weight: 160 lb  Current Body Wt:  (P) 60.5 kg (133 lb 6.1 oz), (P) 88.9 % IBW. (P) Bed scale  Current BMI (kg/m2): (P) 20.6  Usual Body Weight: 72.6 kg (160 lb)  % Weight Change (Calculated): -8  Weight Adjustment: No adjustment  BMI Category: (P) Normal weight (BMI 18.5-24. 9)    Estimated Daily Nutrient Needs:  Energy Requirements Based On: (P) Kcal/kg  Weight Used for Energy Requirements: (P) Current  Energy (kcal/day): (P) 1815kcal (30kcal/kg)  Weight Used for Protein Requirements: (P) Current  Protein (g/day): (P) 91g (1.5g/kg)  Method Used for Fluid Requirements: (P) 1 ml/kcal  Fluid (ml/day): (P) 1815mL (1mL/kcal)    Nutrition Diagnosis:   Inadequate oral intake related to other (specify),food and nutrition related knowledge deficit,altered GI function (food preferences) as evidenced by intake 26-50%,GI abnormality    Nutrition Interventions:   Food and/or Nutrient Delivery: (P) Continue current diet,Continue oral nutrition supplement  Nutrition Education/Counseling: (P) Survival skills/brief education completed  Coordination of Nutrition Care: (P) Continue to monitor while inpatient,Feeding assistance/environmental change  Plan of Care discussed with: RN, Pt.    Goals:  Previous Goal Met: (P) Goal(s) achieved  Goals: (P) PO intake 50% or greater,by next RD assessment    Nutrition Monitoring and Evaluation:   Behavioral-Environmental Outcomes: (P) None identified  Food/Nutrient Intake Outcomes: (P) Diet advancement/tolerance,Food and nutrient intake,Supplement intake  Physical Signs/Symptoms Outcomes: (P) Meal time behavior,Weight    Discharge Planning:    (P) Too soon to determine    Flaquito Weston RD  Contact: [phone]

## 2022-06-22 NOTE — PROGRESS NOTES
CM was informed patient requested to speak with CM. CM met with the patient and he was extremely upset. He stated he has not been informed of discharge this quick and is being treated as a [place] pig. \" CM explained we have been preparing him for this for a while now. Patient then stated he is not ready for discharge and, \"I know my body, no one else does. \"    CM explained medical stability and transition of care to another facility for continued recovery. Patient is adamant physicians have not medically cleared him and he is being forced out the hospital unjustly. CM explained we were informed he was medically stable and needs rehab now to continue to get better and stronger. Patient stated he was being forced to do something he does not want to do. CM explained no one was forcing him. He stated he never wanted to go to [facility] and preferred SNF but no one would listen. CM explained that was not accurate as we provided him a list of Skilled Nursing Facilities at his request but he decided he wanted [facility]. Patient stated he was forced to choose [facility]. CM apologized that he felt that way and reiterated it is ultimately his choice. IRF was recommended for several reasons but he does not have to go. Patient stated he is NOT going to [facility]. CM explained if we cancel this referral he will have no further chance at Inpatient Rehab since he was approved twice, denied the last time and the peer to peer had to be completed. Per [facility] he is almost too high functioning anyway. Patient acknowledged understanding. Stated he will not go to [facility]. He only wants SNF and requested a referral be sent to [facility]. Choice obtained and referral has been sent. [facility] referral has been cancelled. CM contacted patient's insurance and left voice message inquiring about changing auth to SNF. Awaiting returned call at this time.

## 2022-06-22 NOTE — PROGRESS NOTES
Progress Note  Date:2022       Room:[facility]  Patient Name:Liana Kwan     YOB: 1970     Age:52 y.o. Subjective    Subjective       The patient is a 47year old male with a history of biopsy proven polymyositis that was done at [facility] in , which is treated by Rheumatology. He also reports he was given the diagnosis of Myasthenia Gravis that has been treated by Neurology at [address] [place] [address] Polymyositis in clinically stable and he has normal CPK and CRP and he is now taking Prednisone 15 mg once daily.   The last CPK was 32 and Myoglobin 47 and CRP was 0.72. He has no new complaints today and I have no new recommendations.  He reports that he is now being managed by Dr. Sergei Culver (Neurology). He is planning to go to inpatient rehab once pain symptoms are under control.     Review of Systems  Objective         Vitals Last 24 Hours:  TEMPERATURE:  Temp  Av.3 °F (36.8 °C)  Min: 98.1 °F (36.7 °C)  Max: 98.5 °F (36.9 °C)  RESPIRATIONS RANGE: Resp  Av  Min: 18  Max: 18  PULSE OXIMETRY RANGE: SpO2  Av %  Min: 100 %  Max: 100 %  PULSE RANGE: Pulse  Av.5  Min: 97  Max: 98  BLOOD PRESSURE RANGE: Systolic (17KMI), IOD:208 , Min:116 , QKT:503   ; Diastolic (00GCX), PWF:03, Min:80, Max:91    I/O (24Hr): Intake/Output Summary (Last 24 hours) at 2022 0729  Last data filed at 2022 2229  Gross per 24 hour   Intake --   Output 625 ml   Net -625 ml     Objective  Labs/Imaging/Diagnostics    Labs:  CBC:No results for input(s): WBC, RBC, HGB, HCT, MCV, RDW, PLT, HGBEXT, HCTEXT, PLTEXT in the last 72 hours. CHEMISTRIES:No results for input(s): NA, K, CL, CO2, BUN, CA, PHOS, MG in the last 72 hours. No lab exists for component: CREATININE, GLUCOSEPT/INR:No results for input(s): INR, INREXT in the last 72 hours. No lab exists for component: PROTIME  APTT:No results for input(s): APTT in the last 72 hours.   LIVER PROFILE:No results for input(s): AST, ALT in the last 72 hours. No lab exists for component: MICHELE ReddyPHOS  Lab Results   Component Value Date/Time    ALT (SGPT) 25 06/19/2022 01:08 AM    AST (SGOT) 14 (L) 06/19/2022 01:08 AM    Alk. phosphatase 88 06/19/2022 01:08 AM    Bilirubin, total 0.2 06/19/2022 01:08 AM       Imaging Last 24 Hours:  No results found. Assessment//Plan   Active Problems:    Colon cancer ([facility] [address] (4/15/2022)      Cecal polyp (4/15/2022)      Assessment & Plan     The patient has history of polymyositis which is clinically stable. The last CK was 32 the Myoglobin was 47 in the ESR is 29 in the C-reactive protein is 0.7.  We will continue Prednisone 15 mg daily for now with a goal to decrease Prednisone to 10 mg in the coming weeks.  He will benefit having Inpatient rehab and monitoring.  No new recommendations.     (The Note writer data in this note brings in test and lab results from the hospital stay, not managed by Rheumatology)          Tash [facility]  Rheumatology    Electronically signed by Sunny Ivan NP on 6/22/2022 at 7:29 AM

## 2022-06-22 NOTE — PROGRESS NOTES
Progress Note    Patient: Lory Camarena MRN: [id number]  SSN: [id number]    YOB: 1970  Age: 47 y.o. Sex: male      Admit Date: 4/15/2022    LOS: 68 days     Subjective:   Patient seen in bed  Is upset regarding possible discharge to [facility] and rehab  Tolerating diet ileostomy functioning  Continues to complain of abdominal pain    Objective:     Vitals:    06/21/22 1504 06/21/22 2025 06/21/22 2028 06/22/22 1408   BP: 116/80 (!) 129/91  120/81   Pulse: 97 98  91   Resp: 18 18  18   Temp: 98.1 °F (36.7 °C) 98.5 °F (36.9 °C)  98.8 °F (37.1 °C)   SpO2: 100% 100% 100% 100%   Weight:       Height:            Intake and Output:  Current Shift: 06/22 0701 - 06/22 1900  In: -   Out: 200   Last three shifts: 06/20 1901 - 06/22 0700  In: -   Out: 625 [Urine:450]    Review of Systems:  ROS     Physical Exam:   Abdomen is soft, nondistended, tender palpation on the right side, ileostomy pink and productive stool, midline incision clean    Lab/Data Review:  Recent Results (from the past 24 hour(s))   GLUCOSE, POC    Collection Time: 06/21/22  9:46 PM   Result Value Ref Range    Glucose (POC) 103 65 - 117 mg/dL    Performed by Dasia Simpson, POC    Collection Time: 06/22/22  8:34 AM   Result Value Ref Range    Glucose (POC) 134 (H) 65 - 117 mg/dL    Performed by Stefania Ralph, POC    Collection Time: 06/22/22 12:41 PM   Result Value Ref Range    Glucose (POC) 229 (H) 65 - 117 mg/dL    Performed by Fab Pruett    GLUCOSE, POC    Collection Time: 06/22/22  4:32 PM   Result Value Ref Range    Glucose (POC) 224 (H) 65 - 117 mg/dL    Performed by Fab Pruett           Assessment:     Active Problems:    Colon cancer ([address] (4/15/2022)      Cecal polyp (4/15/2022)        Plan:    Will try oral morphine IR to see if this alleviates his pain, will place Dilaudid on hold  Continue diet  Patient will need new referrals to skilled nursing facilities  Continue PT/OT    Signed By: Bob Gross MD     June 22, 2022

## 2022-06-22 NOTE — WOUND CARE
WCN in for ostomy change and continued education. Patient states that he believes he is going to [facility] today. Old pouch removed, skin appears much better and no excoriation noted. Strip paste applied around stoma, wafer cut to fit over stoma with no skin showing between wafer and stoma. WCN held hand over wafer/pouch to ensure good seal is obtained. Ostomy barrier strip applied to inferior portion of wafer to help create a better surface area in skin fold. The following ostomy education was discussed with patient again: Patient states that he is still experiencing visual disturbances which is making it hard for him to care for ostomy. -Measuring the stoma  -Cutting wafer to fit stoma and wafer and pouch application.  -Empty / burp pouch when 1/3-1/2 full of stool or gas. -Change appliance (wafer and pouch) 2-3 per week. If leaking, change as soon as possible to prevent skin irritation.  -Best time for routine change of appliance is first thing in the morning before consuming food or liquids (depending on which type of ostomy). -Clean stoma and peristomal skin with plain water or NS, may use a mild soap. No soaps with lotions as they may prevent adhesive from adhering to skin. May bathe with appliance on or off.  -Purposes and how to use barrier rings, stoma paste, and stoma powder.   -Stoma should always be red and moist.  If stoma appears dry and dusky, notify surgeon immediately.      Melissa Ellington RN, BSN  [facility]  06/22/22  10:14 AM

## 2022-06-22 NOTE — PROGRESS NOTES
CM notified attending about bed being available and insurance authorization obtained for [facility] for transition to [facility] today. CM informed to tell the nurse to not give IV dilaudid this morning in hopes to attempt to transition patient to PO pain medication and hopefully discharge this afternoon. CM informed primary nurse. Nurse has already given IV dilaudid this morning. CM notified [facility] Liaison of current plan. CM informed by [facility] Liaison the physician at their hospital can manage the patients pain during his rehab stay.

## 2022-06-23 NOTE — PROGRESS NOTES
11:22am   CM is still waiting on a response from [facility] and [address] [place] [address]. CM has sent messages both via [facility] and called the facility at [phone] multiple times this morning. There was no option to leave a voice message with Admissions however message was left with  to have Donkirsty Anes in admissions reach back out to . Also, [facility] has not been able to get through to the patient's insurance regarding switching auth. Most likely auth will have to be restarted for SNF level of care. 2:56pm  CM was finally able to receive a response from [facility] and they are reviewing the referral now. [facility] will continue to follow. CM informed facility he is medically stable for discharge and to please start auth.

## 2022-06-23 NOTE — PROGRESS NOTES
Problem: Falls - Risk of  Goal: *Absence of Falls  Description: Document Calvin Castaneda Fall Risk and appropriate interventions in the flowsheet.   Outcome: Progressing Towards Goal  Note: Fall Risk Interventions:  Mobility Interventions: Patient to call before getting OOB         Medication Interventions: Patient to call before getting OOB    Elimination Interventions: Call light in reach,Patient to call for help with toileting needs    History of Falls Interventions: Bed/chair exit alarm         Problem: Pain  Goal: *Control of Pain  Outcome: Progressing Towards Goal

## 2022-06-24 NOTE — PROGRESS NOTES
After receiving a message that [address] needed updated PT and OT notes, they called back and stated they had actually just gotten the auth and had a bed for the patient today. CM called Dr. Misty Burrows and he stated he was out of town and Dr. Lubertha Spatz was covering. CM reached out to Dr. Lubertha Spatz and informed him patient could discharge. Dr. Lubertha Spatz stated the patient reported increased nausea and he was starting him on new nausea medication today. He stated he would likely be able to discharge him tomorrow. Patient will need to discharge if stable to ensure we do not lose his auth. CM met with the patient at the bedside and informed him of the plan. He was with the Patient Ulysees Grade but informed CM it was okay to provide information. Patient acknowledged that he had been accepted to [facility] and was agreeable to discharge there tomorrow. IMM provided. Patient signed. Medicare pt has received, reviewed, and signed 2nd IM letter informing them of their right to appeal the discharge. Signed copied has been placed on pt bedside chart.

## 2022-06-24 NOTE — PROGRESS NOTES
Chart reviewed. At this time [facility] cannot confirm when they will have a bed available. Patient's second choice was [facility]. The referral was sent there, however, they have not responded. CM met with the patient at the bedside and explained the above information. He was agreeable for CM to reach out to other facilities within the same area. Referrals sent. Patient was accepted at [address] [place] [address]. Patient is agreeable to this facility. CM explained thoroughly the location of this facility and that if they started auth today he would likely be able to discharge on Monday. Patient acknowledged his understanding of the above information. DC plan: [facility] once Conchita Henry is approved. CM received notification from [facility] that they need updated PT and OT notes for auth. CM has message therapy dept and asked for updated notes this weekend.

## 2022-06-24 NOTE — WOUND CARE
WCN in for ostomy evaluation and continued education. Patient to possibly discharge on Monday. Ostomy pouch in place,  No leaks noticed at this time. Extra ostomy supplies left in patient if any leaks occur over the weekend. Discussed the following education:    -Measuring the stoma  -Cutting wafer to fit stoma and wafer and pouch application.  -Empty / burp pouch when 1/3-1/2 full of stool or gas. -Change appliance (wafer and pouch) 2-3 per week. If leaking, change as soon as possible to prevent skin irritation.  -Best time for routine change of appliance is first thing in the morning before consuming food or liquids (depending on which type of ostomy). -Clean stoma and peristomal skin with plain water or NS, may use a mild soap. No soaps with lotions as they may prevent adhesive from adhering to skin. May bathe with appliance on or off.  -Purposes and how to use barrier rings, stoma paste, and stoma powder.   -Stoma should always be red and moist.  If stoma appears dry and dusky, notify surgeon immediately.

## 2022-06-24 NOTE — PROGRESS NOTES
Progress Note  Date:2022       Room:[facility]  Patient Name:Liana Fields     YOB: 1970     Age:52 y.o. Subjective    Subjective     The patient is a 47year old male with a history of biopsy proven polymyositis that was done at [facility] in , which is treated by Rheumatology. He also reports he was given the diagnosis of Myasthenia Gravis that has been treated by Neurology at [address] [place] [address] Polymyositis in clinically stable and he has normal CPK and CRP and he is now taking Prednisone 15 mg once daily.   The last CPK was 32 and Myoglobin 47 and CRP was 0.72. He has no new complaints today and I have no new recommendations.  He pending SNF placement and has declined [facility]. He is still working on pain management for abdomen.        Review of Systems  Objective         Vitals Last 24 Hours:  TEMPERATURE:  Temp  Av.2 °F (36.8 °C)  Min: 98 °F (36.7 °C)  Max: 98.5 °F (36.9 °C)  RESPIRATIONS RANGE: Resp  Av  Min: 20  Max: 20  PULSE OXIMETRY RANGE: SpO2  Av %  Min: 100 %  Max: 100 %  PULSE RANGE: Pulse  Av.8  Min: 80  Max: 95  BLOOD PRESSURE RANGE: Systolic (91RKV), MRB:176 , Min:117 , SXJ:019   ; Diastolic (91UPG), LVE:65, Min:79, Max:88    I/O (24Hr): Intake/Output Summary (Last 24 hours) at 2022 0746  Last data filed at 2022 0900  Gross per 24 hour   Intake --   Output 100 ml   Net -100 ml     Objective  Labs/Imaging/Diagnostics    Labs:  CBC:No results for input(s): WBC, RBC, HGB, HCT, MCV, RDW, PLT, HGBEXT, HCTEXT, PLTEXT in the last 72 hours. CHEMISTRIES:No results for input(s): NA, K, CL, CO2, BUN, CA, PHOS, MG in the last 72 hours. No lab exists for component: CREATININE, GLUCOSEPT/INR:No results for input(s): INR, INREXT in the last 72 hours. No lab exists for component: PROTIME  APTT:No results for input(s): APTT in the last 72 hours. LIVER PROFILE:No results for input(s): AST, ALT in the last 72 hours.     No lab exists for component: Shilpa Champion, ALKPHOS  Lab Results   Component Value Date/Time    ALT (SGPT) 25 06/19/2022 01:08 AM    AST (SGOT) 14 (L) 06/19/2022 01:08 AM    Alk. phosphatase 88 06/19/2022 01:08 AM    Bilirubin, total 0.2 06/19/2022 01:08 AM       Imaging Last 24 Hours:  No results found. Assessment//Plan   Active Problems:    Colon cancer ([facility] [address] (4/15/2022)      Cecal polyp (4/15/2022)      Assessment & Plan     The patient has history of polymyositis which is clinically stable and we will continue Prednisone 15 mg daily with a goal to decrease Prednisone to 10 mg in the coming weeks.  He will benefit having Inpatient rehab and monitoring.  No new recommendations.     (The Note writer data in this note brings in test and lab results from the hospital stay, not managed by Rheumatology)          Shiraz Dyer [facility]  Rheumatology    Electronically signed by Kanu Wheeler NP on 6/24/2022 at 7:46 AM

## 2022-06-25 NOTE — PROGRESS NOTES
CM spoke with Dr. Tamica Guzmán. Patient is to be dc'd today to [facility]  Address:  [address], [address]  Phone:  [phone]  Fax:  [phone] verified with the facility that patient is able to be admitted there today. Pt will go to room 219B. Report [phone].     [address], [address]

## 2022-06-25 NOTE — PROGRESS NOTES
Patient was scheduled to be transported today. Nurse and Rocky notified. Transport set up for 11 pm as that is the earliest [address] [facility] can be here. Nurse to call report to [facility]. Case management aware.

## 2022-06-25 NOTE — PROGRESS NOTES
Progress Note  Date:2022       Room:[facility]  Patient Name:Liana Bee     YOB: 1970     Age:52 y.o. Subjective    Subjective:  Symptoms:  He reports weakness. Diet:  He reports  nausea. The patient has had nausea that is being treated with alternative medication. SNF placement has been secured with expected discharge soon. 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 The patient was seen this morning. He reports that his abdominal pain and nausea is doing better at this time after some changes have been made to the management. He has no other complaints. Review of Systems   Constitutional: Negative. HENT: Negative. Eyes: Negative. Respiratory: Negative. Cardiovascular: Negative. Gastrointestinal: Positive for abdominal pain and nausea. Endocrine: Negative. Genitourinary: Negative. Musculoskeletal: Positive for arthralgias and myalgias. Skin: Negative. Allergic/Immunologic: Negative. Neurological: Positive for weakness. Hematological: Negative. Psychiatric/Behavioral: Negative. Objective         Vitals Last 24 Hours:  TEMPERATURE:  Temp  Av.1 °F (36.7 °C)  Min: 97.7 °F (36.5 °C)  Max: 98.9 °F (37.2 °C)  RESPIRATIONS RANGE: Resp  Av  Min: 16  Max: 18  PULSE OXIMETRY RANGE: SpO2  Av.7 %  Min: 96 %  Max: 100 %  PULSE RANGE: Pulse  Av.3  Min: 78  Max: 105  BLOOD PRESSURE RANGE: Systolic (06QVL), YSQ:721 , Min:122 , SBE:180   ; Diastolic (61RRE), QKP:34, Min:85, Max:89    I/O (24Hr): No intake or output data in the 24 hours ending 22 0948  Objective:  General Appearance: In no acute distress. Vital signs: (most recent): Blood pressure 129/85, pulse (!) 105, temperature 97.7 °F (36.5 °C), resp. rate 16, height 5' 7.4\" (1.712 m), weight 60.2 kg (132 lb 11.5 oz), SpO2 96 %. Vital signs are normal.    HEENT: Normal HEENT exam.    Lungs:  Normal effort.     Heart: Normal rate.    Abdomen: (Stoma. ). Extremities: Normal range of motion. Neurological: (4/5 strength in upper and lower extremities. ). Labs/Imaging/Diagnostics    Labs:  CBC:No results for input(s): WBC, RBC, HGB, HCT, MCV, RDW, PLT, HGBEXT, HCTEXT, PLTEXT in the last 72 hours. CHEMISTRIES:No results for input(s): NA, K, CL, CO2, BUN, CA, PHOS, MG in the last 72 hours. No lab exists for component: CREATININE, GLUCOSEPT/INR:No results for input(s): INR, INREXT in the last 72 hours. No lab exists for component: PROTIME  APTT:No results for input(s): APTT in the last 72 hours. LIVER PROFILE:No results for input(s): AST, ALT in the last 72 hours. No lab exists for component: Alexsandra Bleacher, ALKPHOS  Lab Results   Component Value Date/Time    ALT (SGPT) 25 06/19/2022 01:08 AM    AST (SGOT) 14 (L) 06/19/2022 01:08 AM    Alk. phosphatase 88 06/19/2022 01:08 AM    Bilirubin, total 0.2 06/19/2022 01:08 AM       Imaging Last 24 Hours:  No results found. Assessment//Plan   Active Problems:    Colon cancer ([address] (4/15/2022)      Cecal polyp (4/15/2022)      Assessment & Plan    This is a 46 yo male with history of polymyositis treated by rheumatology at [facility] which is currently clinically stable with normal muscle enzyme parameters. We are going to continue Prednisone 15 mg daily with a goal to decrease to 10 mg in the coming weeks. He will benefit having Inpatient rehab and monitoring.  Arrangements for SNF placement are underway with expected discharge soon. We will sign off at this time, and would be happy to have follow up with the patient in our clinic as an outpatient. Thank you for the consult. Please feel free to contact us with any questions.     Electronically signed by Douglas Silverman MD on 6/25/2022 at 9:48 AM

## 2022-06-25 NOTE — PROGRESS NOTES
PT attempt. Pt kindly declined participation with PT secondary to pain. Also expressed frustration regarding colostomy bag, \"I'm having issues, they're going to get somebody to fix it. \" Will follow up next tx.

## 2022-06-25 NOTE — H&P
DISCHARGE SUMMARY    Subjective:     Alanna Fraga is a 47 y.o.  male who presents for surgical management of a large cecal polyp not amenable to endoscopic resection. He had his colonoscopy performed at [facility] and biopsies were all negative for invasive malignancy. I saw him for this back in February at that time we discussed surgery and I recommend a laparoscopic right colectomy. He does have a number of medical issues including myasthenia gravis, hypertension, atrial flutter, insulin-dependent diabetes mellitus, GERD. 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 He has not been able to taper down off of his steroids and is on 20 mg daily. He has been seen by his neurologist for preoperative clearance with his history of myasthenia gravis. Today the patient denies any abdominal pain. He denies any blood in the stool. He denies any unexpected weight loss. .. Past Medical History:   Diagnosis Date    Adverse effect of anesthesia     Arrhythmia     atrial flutter    Asthma     [address] [address]     Hypertension     Ill-defined condition     Spinal meningitis and avascular necrosis     Myasthenia gravis     Polymyositis ([facility] [address] 2001    muscle disorder    Primary hypersomnolence disorder       Past Surgical History:   Procedure Laterality Date    HX COLONOSCOPY  2021    HX ENDOSCOPY      HX OTHER SURGICAL      lung abscess removal    [place] SHOULDER SURG PROC UNLISTED      Left Shoulder     Family History   Problem Relation Age of Onset    Heart Disease Mother     Diabetes Father     Stroke Father       Social History     Tobacco Use    Smoking status: Never Smoker    Smokeless tobacco: Never Used   Substance Use Topics    Alcohol use: No       Prior to Admission medications    Medication Sig Start Date End Date Taking? Authorizing Provider   omeprazole (PRILOSEC) 40 mg capsule Take 40 mg by mouth daily.    Yes Provider, Historical   PEG 3350-Electrolytes (GO-LYTELY) 236-22.74-6.74 -5.86 gram suspension Use as directed prior to surgery to clean bowel  Indications: emptying of the bowel 3/16/22  Yes Zuleika Metzger MD   lisinopriL (PRINIVIL, ZESTRIL) 20 mg tablet Take 20 mg by mouth daily. Yes Provider, Historical   CALCIUM PO Take  by mouth daily. With vitamin D. Unsure of dose   Yes Provider, Historical   Acetylcarnitine 500 mg cap Take  by mouth daily. Yes Provider, Historical   predniSONE (DELTASONE) 20 mg tablet Take 20 mg by mouth daily (with breakfast). Yes Provider, Historical   oxyCODONE IR (ROXICODONE) 10 mg tab immediate release tablet oxycodone 10 mg tablet  Patient not taking: Reported on 4/15/2022    Provider, Historical   rituximab (RITUXAN IV) by IntraVENous route every 6 months. Provider, Historical   apixaban (Eliquis) 5 mg tablet Take 5 mg by mouth daily. Provider, Historical   loperamide (IMODIUM) 2 mg capsule as needed. Patient not taking: Reported on 4/15/2022    Provider, Historical   insulin lispro (HUMALOG) 100 unit/mL injection by SubCUTAneous route. Sliding Scale  4 times a day    Provider, Historical   insulin glargine (LANTUS) 100 unit/mL injection 20 Units by SubCUTAneous route daily. Provider, Historical   OTHER,NON-FORMULARY, DMAE 351mg Daily    Provider, Historical   albuterol (PROVENTIL, VENTOLIN) 90 mcg/Actuation inhaler Take 1-2 Puffs by inhalation every four (4) hours as needed for Wheezing. 4/28/11   Mitchell Skiff, MD   esomeprazole (NEXIUM) 40 mg capsule Take 80 mg by mouth daily. Patient not taking: Reported on 4/15/2022    Tal Christine MD   folic acid (FOLVITE) 1 mg tablet Take 1 mg by mouth daily. Tal Christine MD     Allergies   Allergen Reactions    Beef Containing Products Anaphylaxis and Other (comments)    Diltiazem Other (comments)     Reaction Type: Allergy; Reaction(s): Pressure in chest    Iodinated Contrast Media Other (comments)     Reaction Type: Allergy;  Severity: Severe; Reaction(s): hives,throat swelling    Pork Derived (Porcine) Hives    Shellfish Containing Products Swelling     Other reaction(s): Other  Reaction Type: Allergy; Reaction(s): Hives,throat swelling    Sulfa (Sulfonamide Antibiotics) Unknown (comments)     Spinal meningitis    Sulfur Unknown (comments)     Reaction Type: Allergy  Spinal meningitis    Lactose Diarrhea     Lactose intolerant per Pt. Review of Systems:  A comprehensive review of systems was negative except for that written in the History of Present Illness. Objective:     Patient Vitals for the past 12 hrs:   Temp Pulse Resp BP SpO2   06/25/22 1601 97.4 °F (36.3 °C) 74 16 114/68 100 %   06/25/22 1048 -- -- -- -- 96 %   06/25/22 0800 97.7 °F (36.5 °C) (!) 105 16 129/85 96 %     Physical Exam:      Physical Activity:     Days of Exercise per Week: Not on file    Minutes of Exercise per Session: Not on file     Physical Exam  Constitutional:       General: He is not in acute distress. Appearance: Normal appearance. He is not ill-appearing. HENT:      Head: Normocephalic and atraumatic. Eyes:      Comments: Bilateral ptosis   Cardiovascular:      Rate and Rhythm: Normal rate. Heart sounds: Normal heart sounds. Pulmonary:      Effort: Pulmonary effort is normal.      Breath sounds: Normal breath sounds. Abdominal:      General: There is no distension. Palpations: There is no mass. Tenderness: There is no abdominal tenderness. Hernia: No hernia is present. Musculoskeletal:         General: Normal range of motion. Cervical back: Normal range of motion and neck supple. Skin:     General: Skin is warm and dry. Neurological:      General: No focal deficit present. Mental Status: He is alert. Mental status is at baseline. Psychiatric:         Mood and Affect: Mood normal.         Thought Content:  Thought content normal.         Judgment: Judgment normal.         Data Review:   Recent Results (from the past 24 hour(s))   GLUCOSE, POC    Collection Time: 06/24/22  7:57 PM   Result Value Ref Range    Glucose (POC) 141 (H) 65 - 117 mg/dL    Performed by Ava Garcia    GLUCOSE, POC    Collection Time: 06/25/22  8:05 AM   Result Value Ref Range    Glucose (POC) 88 65 - 117 mg/dL    Performed by Douglas Villeda, POC    Collection Time: 06/25/22 11:48 AM   Result Value Ref Range    Glucose (POC) 123 (H) 65 - 117 mg/dL    Performed by Douglas Villeda, POC    Collection Time: 06/25/22  3:44 PM   Result Value Ref Range    Glucose (POC) 205 (H) 65 - 117 mg/dL    Performed by Lorin Goodwin          Assessment:     Active Problems:    Colon cancer ([facility] [address] (4/15/2022)      Cecal polyp (4/15/2022)        Plan:   I had already reviewed surgery with Mr. Siomara Solorzano and recommended a laparoscopic right colectomy for his cecal polyp. I once again reviewed the procedure with him this morning and reviewed the risk and benefits. I reviewed the risk of infection, bleeding, wound complications, injury to other intra-abdominal organs and structures, anastomotic leak. I did tell him that his risk of leak is higher given the fact he is on steroids chronically. He wishes to proceed and surgery scheduled for this morning. Consent is on the chart    Patient underwent right hemicolectomy with ileocolic anastomosis. He was doing okay in the postoperative period. However he had prolonged ileus requiring NG tube IV fluids and TPN. Subsequently it was diagnosed that he had a bowel obstruction secondary to adhesions. So he underwent an explore laparotomy with ileocolic anastomosis resection with reanastomosis of the ileum to the colon. However postoperative course ended up with anastomotic leak requiring a repeat exploration and ileostomy. Then he has been undergoing treatment for pain control and management of nausea.   He was tolerating diet but he needed for pain medication at the same time to manage his nausea he was also getting nausea medications. Pain was adequately controlled and with the help of Zofran and Phenergan his nausea was also controlled. Currently he is tolerating diet. And is being transferred to the rehab facility today. He has been recommended to follow-up with Dr. Patel Craig in 1 to 2 weeks. Can shower daily. No strenuous activity or weight lifting more than 20 pounds for next 2  Weeks. Been advised to resume his home medications.       Thank you

## 2022-06-25 NOTE — PROGRESS NOTES
Chief Complaint: nausea      Subjective:  Mr.. Zoin Purvis is a 47y.o. year old * male S/P Right hemicolectomy. No nausea. Tolerating diet. Pain well under control. Review of Systems:   Constitutional:  no fever,  no chills,  no sweats, No weakness, No fatigue, No decreased activity. Respiratory: No shortness of breath, No cough, No sputum production, No hemoptysis, No wheezing, No cyanosis. Cardiovascular: No chest pain, No palpitations, No bradycardia, No tachycardia, No peripheral edema, No syncope. Gastrointestinal: nausea,  No vomiting, No diarrhea, No constipation, No heartburn, No abdominal pain. Genitourinary: No dysuria, No hematuria, No change in urine stream, No urethral discharge, No lesions. Musculoskeletal: No back pain, No neck pain, No joint pain, No muscle pain, No claudication, No decreased range of motion, No trauma. Integumentary: No rash, No pruritus, No abrasions. Neurologic: Alert and oriented X4, No abnormal balance, No headache, No confusion, No numbness, No tingling. Psychiatric: No anxiety, No depression, No nick. Physical Exam:     Vitals & Measurements:     Wt Readings from Last 3 Encounters:   06/25/22 60.2 kg (132 lb 11.5 oz)   04/08/22 73.7 kg (162 lb 7.7 oz)   02/21/22 69.9 kg (154 lb)     Temp Readings from Last 3 Encounters:   06/25/22 97.4 °F (36.3 °C)   04/08/22 98.5 °F (36.9 °C)   02/21/22 98.6 °F (37 °C)     BP Readings from Last 3 Encounters:   06/25/22 114/68   04/08/22 (!) 158/98   02/21/22 (!) 147/95     Pulse Readings from Last 3 Encounters:   06/25/22 74   04/08/22 86   02/21/22 87      Ht Readings from Last 3 Encounters:   06/21/22 5' 7.4\" (1.712 m)   04/08/22 5' 7.42\" (1.712 m)   02/21/22 5' 8\" (1.727 m)      Date 06/24/22 0700 - 06/25/22 0659 06/25/22 0700 - 06/26/22 0659   Shift [id number] 1900-0659 24 Hour Total 8259-9329 9232-9381 24 Hour Total   INTAKE   Shift Total(mL/kg)         OUTPUT   Stool 100  100        Stool 100  100      Shift Total(mL/kg) 100(1.6)  100(1.6)      NET -100  -100      Weight (kg) 62.8 62.8 62.8 60.2 60.2 60.2       General: well appearing, no acute distress  Respiratory: normal chest wall expansion, CTA B, no r/r/w, no rubs  Cardiovascular: RRR, no m/r/g, Normal S1 and S2  Abdomen: Soft, non tender, non-distended, normal bowel sounds in all quadrants, no hepatosplenomegaly, no tympany. Incision scar: clean and dry. Stoma viable. Genitourinary: No inguinal hernia, normal external gentalia, Testis & scrotum normal, no renal angle tenderness  Musculoskeletal: normal ROM in upper and lower extremities  Integumentary: warm, dry, and pink, with no rash, purpura, or petechia  Neurological:Cranial Nerves II-XII grossly intact, No sensory or motor deficit  Psychiatric: Cooperative with normal mood, affect, and cognition    Laboratory Values:   Recent Results (from the past 24 hour(s))   GLUCOSE, POC    Collection Time: 06/24/22  7:57 PM   Result Value Ref Range    Glucose (POC) 141 (H) 65 - 117 mg/dL    Performed by Chuck Patel    GLUCOSE, POC    Collection Time: 06/25/22  8:05 AM   Result Value Ref Range    Glucose (POC) 88 65 - 117 mg/dL    Performed by [employer] [place], POC    Collection Time: 06/25/22 11:48 AM   Result Value Ref Range    Glucose (POC) 123 (H) 65 - 117 mg/dL    Performed by [facility], POC    Collection Time: 06/25/22  3:44 PM   Result Value Ref Range    Glucose (POC) 205 (H) 65 - 117 mg/dL    Performed by Lety Moreno          Radiology:   MRI BRAIN WO CONT   Final Result   1. Compared with the previous examination of 5/25/2022, no new areas of   infarction evident. No extension of existing two recent infarcts. 2. Development of early laminar necrosis in the medial right occipital lobe   infarct. 3. Stable cystic encephalomalacia in the very remote left occipital infarct. CT ABD PELV WO CONT   Final Result   New right lower quadrant ostomy. Small amount of free air. Bowel   wall prominence in small bowel loops. No evidence of mechanical bowel   obstruction. Fluid collection in the pelvis concerning for abscess. Other   findings as above. Findings conveyed to the patient's nurse Bryan Both on 6/6/2022 at 9:50 AM.      XR HIP LT W OR WO PELV 2-3 VWS   Final Result      DUPLEX CAROTID BILATERAL   Final Result      MRI BRAIN WO CONT   Final Result         1. Evolving ischemic infarcts in multiple vascular territories, involving both   the right occipital lobe and left frontal lobe. 2. Left occipital encephalomalacia      CT ABD PELV WO CONT   Final Result   Small bowel ileus versus partial obstruction, at the ileocolic   anastomosis or ileocecal valve, slightly greater than previous. There is mixed   density fluid throughout this region, as before. . Consider follow-up study with   IV contrast and if possible, oral contrast. Consider NG tube. XR ABD (KUB)   Final Result      XR ABD (KUB)   Final Result   Small bowel dilatation consistent with obstruction, not   significantly changed. XR ABD (KUB)   Final Result   Persistent small bowel distention as seen with obstruction. XR ABD (KUB)   Final Result   No significant change compared to abdomen series 5/5/2022. Persistent air and fluid-filled dilated small bowel loops. XR ABD (KUB)   Final Result   Multiple distended gas-filled loops of small bowel compatible with   partial small bowel obstruction versus ileus. CT ABD PELV WO CONT   Final Result   Moderate grade partial small bowel obstruction. Small volume complex ascites. CT ABD PELV WO CONT   Final Result   1. Improving hemoperitoneum and pneumoperitoneum. 2.  Ileocolic anastomosis in the right lower quadrant. Incompletely evaluated   without contrast. No evidence of bowel obstruction. 3.  Reticular and groundglass airspace disease in the visualized lung bases   redemonstrated. 4.  See full report for detailed findings.       CT ABD PELV WO CONT   Final Result   1. Status post ileal cecal surgery. Surrounding extraluminal dense material may   represent blood products (no indication of enteric contrast administration). Anastomotic leak cannot be excluded. Consider imaging with water-soluble oral   contrast. Free air in the abdomen likely within normal limits post recent   surgery. The bowel does not appear obstructed. 2. Bilateral lower lung airspace disease likely infectious or inflammatory. 3. Bilateral femoral head sclerosis suggests AVN. Assessment:  Problem List Items Addressed This Visit     None       Right hemicolectomy/Ileotomy. Plan:    Transfer to NH today. Thank you for allowing me to participate in the care of this patient.

## 2022-06-25 NOTE — PROGRESS NOTES
Discharge paperwork reviewed with patient, and he agreed to discharge late this evening to [facility] rehab. Nurse at [facility] view on the second floor took report and was made aware it would be several hours before patient would be discharged. Dressing changed to left abdomen and colostomy changed twice. Report given to oncoming night nurse with packet for [facility] transport.

## 2022-06-25 NOTE — PROGRESS NOTES
Chief Complaint: nausea      Subjective:  Mr.. Abbie Webster is a 47y.o. year old * male S/P Right hemicolectomy. Has been complaining of severe nausea since his pain medication was changed. Zofran seem not to help. Review of Systems:   Constitutional:  no fever,  no chills,  no sweats, No weakness, No fatigue, No decreased activity. Respiratory: No shortness of breath, No cough, No sputum production, No hemoptysis, No wheezing, No cyanosis. Cardiovascular: No chest pain, No palpitations, No bradycardia, No tachycardia, No peripheral edema, No syncope. Gastrointestinal: nausea,  No vomiting, No diarrhea, No constipation, No heartburn, No abdominal pain. Genitourinary: No dysuria, No hematuria, No change in urine stream, No urethral discharge, No lesions. Musculoskeletal: No back pain, No neck pain, No joint pain, No muscle pain, No claudication, No decreased range of motion, No trauma. Integumentary: No rash, No pruritus, No abrasions. Neurologic: Alert and oriented X4, No abnormal balance, No headache, No confusion, No numbness, No tingling. Psychiatric: No anxiety, No depression, No nick. Physical Exam:     Vitals & Measurements:     Wt Readings from Last 3 Encounters:   06/23/22 62.8 kg (138 lb 7.2 oz)   04/08/22 73.7 kg (162 lb 7.7 oz)   02/21/22 69.9 kg (154 lb)     Temp Readings from Last 3 Encounters:   06/24/22 97.7 °F (36.5 °C)   04/08/22 98.5 °F (36.9 °C)   02/21/22 98.6 °F (37 °C)     BP Readings from Last 3 Encounters:   06/24/22 122/89   04/08/22 (!) 158/98   02/21/22 (!) 147/95     Pulse Readings from Last 3 Encounters:   06/24/22 78   04/08/22 86   02/21/22 87      Ht Readings from Last 3 Encounters:   06/21/22 5' 7.4\" (1.712 m)   04/08/22 5' 7.42\" (1.712 m)   02/21/22 5' 8\" (1.727 m)      Date 06/23/22 1900 - 06/24/22 0659 06/24/22 0700 - 06/25/22 0659   Shift 3465-6728 24 Hour Total 7350-8160 7916-8261 24 Hour Total   INTAKE   Shift Total(mL/kg)        OUTPUT   Stool  100 100 100     Stool   100  100     Output (ml) (Ileostomy 05/15/22 Right  Abdomen)  100      Shift Total(mL/kg)  100(1.6) 100(1.6)  100(1.6)   NET  -100 -100  -100   Weight (kg) 62.8 62.8 62.8 62.8 62.8       General: well appearing, no acute distress  Respiratory: normal chest wall expansion, CTA B, no r/r/w, no rubs  Cardiovascular: RRR, no m/r/g, Normal S1 and S2  Abdomen: Soft, non tender, non-distended, normal bowel sounds in all quadrants, no hepatosplenomegaly, no tympany. Incision scar:   Genitourinary: No inguinal hernia, normal external gentalia, Testis & scrotum normal, no renal angle tenderness  Musculoskeletal: normal ROM in upper and lower extremities  Integumentary: warm, dry, and pink, with no rash, purpura, or petechia  Neurological:Cranial Nerves II-XII grossly intact, No sensory or motor deficit  Psychiatric: Cooperative with normal mood, affect, and cognition    Laboratory Values:   Recent Results (from the past 24 hour(s))   GLUCOSE, POC    Collection Time: 06/24/22  8:13 AM   Result Value Ref Range    Glucose (POC) 83 65 - 117 mg/dL    Performed by [employer], POC    Collection Time: 06/24/22 11:45 AM   Result Value Ref Range    Glucose (POC) 156 (H) 65 - 117 mg/dL    Performed by [facility]    GLUCOSE, POC    Collection Time: 06/24/22  4:37 PM   Result Value Ref Range    Glucose (POC) 208 (H) 65 - 117 mg/dL    Performed by [facility]    GLUCOSE, POC    Collection Time: 06/24/22  7:57 PM   Result Value Ref Range    Glucose (POC) 141 (H) 65 - 117 mg/dL    Performed by [employer]          Radiology:   MRI BRAIN WO CONT   Final Result   1. Compared with the previous examination of 5/25/2022, no new areas of   infarction evident. No extension of existing two recent infarcts. 2. Development of early laminar necrosis in the medial right occipital lobe   infarct. 3. Stable cystic encephalomalacia in the very remote left occipital infarct.       CT ABD PELV WO CONT   Final Result   New right lower quadrant ostomy. Small amount of free air. Bowel   wall prominence in small bowel loops. No evidence of mechanical bowel   obstruction. Fluid collection in the pelvis concerning for abscess. Other   findings as above. Findings conveyed to the patient's nurse Eric De Oliveira on 6/6/2022 at 9:50 AM.      XR HIP LT W OR WO PELV 2-3 VWS   Final Result      DUPLEX CAROTID BILATERAL   Final Result      MRI BRAIN WO CONT   Final Result         1. Evolving ischemic infarcts in multiple vascular territories, involving both   the right occipital lobe and left frontal lobe. 2. Left occipital encephalomalacia      CT ABD PELV WO CONT   Final Result   Small bowel ileus versus partial obstruction, at the ileocolic   anastomosis or ileocecal valve, slightly greater than previous. There is mixed   density fluid throughout this region, as before. . Consider follow-up study with   IV contrast and if possible, oral contrast. Consider NG tube. XR ABD (KUB)   Final Result      XR ABD (KUB)   Final Result   Small bowel dilatation consistent with obstruction, not   significantly changed. XR ABD (KUB)   Final Result   Persistent small bowel distention as seen with obstruction. XR ABD (KUB)   Final Result   No significant change compared to abdomen series 5/5/2022. Persistent air and fluid-filled dilated small bowel loops. XR ABD (KUB)   Final Result   Multiple distended gas-filled loops of small bowel compatible with   partial small bowel obstruction versus ileus. CT ABD PELV WO CONT   Final Result   Moderate grade partial small bowel obstruction. Small volume complex ascites. CT ABD PELV WO CONT   Final Result   1. Improving hemoperitoneum and pneumoperitoneum. 2.  Ileocolic anastomosis in the right lower quadrant. Incompletely evaluated   without contrast. No evidence of bowel obstruction.    3.  Reticular and groundglass airspace disease in the visualized lung bases redemonstrated. 4.  See full report for detailed findings. CT ABD PELV WO CONT   Final Result   1. Status post ileal cecal surgery. Surrounding extraluminal dense material may   represent blood products (no indication of enteric contrast administration). Anastomotic leak cannot be excluded. Consider imaging with water-soluble oral   contrast. Free air in the abdomen likely within normal limits post recent   surgery. The bowel does not appear obstructed. 2. Bilateral lower lung airspace disease likely infectious or inflammatory. 3. Bilateral femoral head sclerosis suggests AVN. Assessment:  Problem List Items Addressed This Visit     None       Right hemicolectomy/Ileotomy, POD 1    Plan:    1. Admission  2. Diet: none   3. IV fluids  4. SCD  5. IS  6. Pain medications  7. Antibiotics  8. Nausea medication  9. Byrd  10. NG to low continuous wall suction  11. Labs & Radiology:in am.  12. Plan discussed with patient and family and answered all their questions. Thank you for allowing me to participate in the care of this patient.

## 2022-06-26 NOTE — PROGRESS NOTES
Patient left unit  in stable condition, breathing freely on room air , IV access removed , discharge documents including printed prescriptions sent with patient . Patient discharged to [address] [place] [address] via [id number] [facility].

## 2022-07-20 NOTE — PROGRESS NOTES
Chief Complaint   Patient presents with    Post OP Follow Up   Visit Vitals  BP (!) 161/87 (BP 1 Location: Left arm, BP Patient Position: Sitting)   Pulse 100   Temp 98 °F (36.7 °C) (Temporal)   Resp 18   Ht 5' 7\" (1.702 m)   Wt 148 lb (67.1 kg)   SpO2 95%   BMI 23.18 kg/m²     1. Have you been to the ER, urgent care clinic since your last visit? Hospitalized since your last visit? Yes, drainage from incision site 07/16/2022    2. Have you seen or consulted any other health care providers outside of the [address] [place] [address] since your last visit? Include any pap smears or colon screening.  No

## 2022-07-21 NOTE — OP NOTES
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    Procedure(s):  Exploratory laparotomy, abdominal washout, resection of ileocolic anastomosis with end ileostomy    Surgeon(s):  Padmini Bloom MD    Surgical Assistant: Luli Smith    Anesthesia: General     Estimated Blood Loss (mL):  593     Complications: None    Specimens:   ID Type Source Tests Collected by Time Destination   1 : Ileocolic Anastomosis Preservative Colon  Zuleika Metzger MD 5/15/2022 1124 Pathology        Implants: * No implants in log *    Drains:   [REMOVED] Brandon-Villaseñor Drain 05/13/22 Right; Lower Abdomen (Removed)   Site Assessment Drainage (comment) 05/25/22 1300   Dressing Status New drainage 05/25/22 1300   Status Patent; Charged;Clamped 05/25/22 1300   Drainage Color Serous 05/25/22 1300   Output (ml) 10 ml 05/25/22 1300       [REMOVED] Nasogastric Tube (Removed)   Site Assessment Clean, dry, & intact 05/16/22 0300   Securement Device Adhesive-based kamara 05/16/22 0300   G Port Status Intermittent Suction 05/16/22 0300   Action Taken Placement verified (comment) 05/16/22 0300   Drainage Description Sanguinous 05/16/22 0300   Drainage Chamber Level (ml) 450 ml 05/16/22 0300   Output (ml) 100 ml 05/16/22 0300       Findings: Ileocolic anastomosis in the right upper quadrant with surrounding organized hematoma and significant omental adhesions causing obstruction at the anastomosis    Detailed Description of Procedure: The patient was brought to the operating room and positioned on the OR table in supine position. Following the induction of general anesthesia the abdomen was prepped and draped out in standard sterile fashion.   Surgical timeout performed at which time the patient's identity and surgical procedure once again confirmed. The patient's prior laparoscopic midline incision was then opened and then extended with the scalpel and taken down through subtenons tissues to the level of the fascia. The fascial suture was divided and the abdominal cavity entered. The incision opened along its entire length using electrocautery. Upon entering the abdominal cavity dilated bowel was encountered. We then turned our attention to the right upper quadrant where the patient's ileocolic anastomosis was. There is noted to be an organized hematoma surrounding the as well as an omental band which was adhered down to the anastomosis. This is caused a slight twisting anastomosis with stricturing. We mobilized the anastomosis up in the operative field and irrigated out and cleaned out the hematoma. Given the previous anastomosis decision was made to do a revision. At appropriate site proximally and distally windows were created between the small intestine and the intestinal mesentery and the colon and the colonic mesentery. A side-to-side functional end-to-end anastomosis was then fashioned between the proximal small bowel and distal colon using a NICHELLE 75 stapling device. The same stapler was used to close the antral colotomy and transect the bowel. The mesentery of the excluded bowel was then sequentially divided using the Enseal device. The specimen consisting of small bowel anastomosis was passed off the table. At this point the abdomen was thoroughly irrigated out warm saline. After 1 cm inspecting the anastomosis there is returned to its anatomic position within abdominal cavity. The fascia was then closed with running #1 PDS suture. Subcutaneous tissues irrigated out the skin closed with skin clips. Dressings applied the procedure terminated. The patient was awakened, extubated, taken recovery in stable condition.   All counts were correct at the close the case.  Electronically Signed by Amie Marinelli MD on 7/21/2022 at 4:03 PM

## 2022-07-21 NOTE — OP NOTES
Operative Note    Patient: Daniel Gee  YOB: 1970  MRN: [id number]    Date of Procedure: 5/15/2022     Pre-Op Diagnosis: Intestinal perforation ([facility] [address] [K63.1]    Post-Op Diagnosis: Same as preoperative diagnosis. Procedure(s):  Exploratory laparotomy, abdominal washout, resection of ileocolic anastomosis with end ileostomy    Surgeon(s):  Marina Bloom MD    Surgical Assistant: None    Anesthesia: General     Estimated Blood Loss (mL):  less than 50     Complications: None    Specimens:   ID Type Source Tests Collected by Time Destination   1 : Ileocolic Anastomosis Preservative Colon  Georgette Huang MD 5/15/2022 1124 Pathology        Implants: * No implants in log *    Drains:   [REMOVED] Brandon-Villaseñor Drain 05/13/22 Right; Lower Abdomen (Removed)   Site Assessment Drainage (comment) 05/25/22 1300   Dressing Status New drainage 05/25/22 1300   Status Patent; Charged;Clamped 05/25/22 1300   Drainage Color Serous 05/25/22 1300   Output (ml) 10 ml 05/25/22 1300       [REMOVED] Nasogastric Tube (Removed)   Site Assessment Clean, dry, & intact 05/16/22 0300   Securement Device Adhesive-based kamara 05/16/22 0300   G Port Status Intermittent Suction 05/16/22 0300   Action Taken Placement verified (comment) 05/16/22 0300   Drainage Description Sanguinous 05/16/22 0300   Drainage Chamber Level (ml) 450 ml 05/16/22 0300   Output (ml) 100 ml 05/16/22 0300       Findings: Perforation at ileocolic anastomosis, stool peritonitis    Detailed Description of Procedure: The patient was brought to the operating room and positioned on the OR table in the supine position. Following the induction of general endotracheal anesthesia the abdomen was prepped and draped out in standard sterile fashion. A surgical timeout was performed at which time the patient's identity and surgical procedure were once again confirmed.     Skin clips the patient's prior midline incision were then removed and the skin incision opened. Abscess taken down to the level of the fascia where the fascial suture was cut and removed allowing access to the abdominal cavity. Upon entering the abdominal cavity liquid fecal matter was encountered. This was suctioned out. The patient's ileocolic anastomosis was then noted to be leaking from the transverse staple line. Decision was made to do a resection of the anastomosis and end ileostomy. Bowel was mobilized up out of the abdominal cavity by dividing the omentum off of the transverse colon allowing the transverse colon to mobilize up. At appropriate site on the distal transverse colon the wound was created between the colon and the colonic mesentery and a NICHELLE stapler used to transect the colon. Similarly a window was created proximal to the anastomosis between the small bowel and small bowel mesentery and the NICHELLE stapler used to transect the small bowel. The Enseal device was used to transect the mesentery and the specimen consisting of ileocolic anastomosis passed off the table. The abdomen was then sterilely irrigated out with warm saline. At this point appropriate site in the right lower quadrant trepanation was made in the abdominal wall. This taken down the subcutaneous tissues to the level of the fascia. The fascia was sharply incised to allow 2 fingerbreadths the past.  A Jaki clamp was then passed through the fascial opening and the posterior peritoneum likewise opened. The small bowel was brought up to trepanation for the ileostomy. It came up easily with no tension. A Brandon-Villaseñor drain was placed in the pelvis and brought out through separate stab site in the left lower quadrant. This was secured to the skin with a 2-0 nylon suture. At this point the fascia of the midline incision was closed with running #1 PDS suture. Skin was approximated with nylon sutures in a vertical mattress fashion. 3-0 nylon sutures were used.     The ileostomy was matured in a Isabel fashion using 3-0 Vicryl sutures. At this point dressings and ileostomy appliance were applied the procedure terminated. The patient awakened extubated and taken recovery in stable condition. All counts were correct at the close the case.     Electronically Signed by Nyla eHrrera MD on 7/21/2022 at 3:50 PM

## 2022-07-25 NOTE — TELEPHONE ENCOUNTER
Spoke with SalomónTito he states that from what he gathered from Wanda Solorzano was that he is supposed to follow up in the office on 8/10/22 and then will have surgery on 8/12/22. I told him that Wanda Solorzano did not make me aware of the surgery, but that he is on vacation until Monday. I informed  that I will have to discuss this with Wanda Solorzano when he returns and give him a call back to let him know.

## 2022-07-25 NOTE — TELEPHONE ENCOUNTER
Mr Kady To called back this afternoon and wanted to give us the correct fax #    Fax: [phone]  Attn: April, Medical Records

## 2022-07-25 NOTE — TELEPHONE ENCOUNTER
Mr Juarez Zheng called and needs all of his medications faxed over to his facility, [facility], please call [phone]

## 2022-07-25 NOTE — TELEPHONE ENCOUNTER
Patient called regarding up coming surgery. He claims to have been told that his surgery is scheduled for 8/10/22 and to stay overnight. Is this true?  Call [phone]

## 2022-07-26 NOTE — TELEPHONE ENCOUNTER
Mr. Kadie Gonzales called back again to today wanting a list of medications from the hospital before his discharge to be sent to the rehab facility that he's at. I told Mr. Kadie Gonzales to have the facility to send  a request for discharge orders.

## 2022-07-26 NOTE — TELEPHONE ENCOUNTER
Spoke with April / Silas Moreira at [address] [place] [address]. April called to clarify if  was having surgery or not. I informed her that he has a follow up in the office on 08/10/22, she stated that was the day of his surgery. I gave her the appointment for 08/10/22. I also informed her that  did not bring any paperwork from the facility with medications and the sheet for  to fill out. She states that they will try to get a nurse to come with him at his office visit because it was given to him before he left. I told April that Ramon Liya will be back on Monday and I will discuss this will him and give her a call back.

## 2022-07-30 PROBLEM — C18.9 COLON CANCER (HCC): Status: RESOLVED | Noted: 2022-01-01 | Resolved: 2022-01-01

## 2022-07-30 NOTE — PROGRESS NOTES
OFFICE VISIT NOTE    Lory Camarena is a 47 y.o. male who presents to the office today for:    Chief Complaint   Patient presents with    Post OP Follow Up       Mr. Nichole Cornejo comes in today for follow-up status post his recent hospitalization. He had a fairly prolonged hospitalization after his surgery on April 15 which was a laparoscopic assisted right colectomy for large cecal polyp. After his initial surgery he did develop a bowel obstruction which was intermittent. He was taken back to the operating room approximately 1month later and was noted that the omentum was adhesed down to the area of anastomosis causing a narrowing of his anastomosis with obstruction. He had revision of the anastomosis at that time however 2 days after surgery he developed anastomotic and was taken back to the operatingroomforresection and end ileostomy. He did have a very prolonged hospital course after his ileostomy, but ultimately discharged to skilled nursing facility. Today he states that his tolerating diet ostomy is functioning. He continues to have intermittent abdominal pain.         Past Medical History:   Diagnosis Date    Adverse effect of anesthesia     Arrhythmia     atrial flutter    Asthma     Diabetes ([address]     Hypertension     Ill-defined condition     Spinal meningitis and avascular necrosis     Myasthenia gravis     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    muscle disorder    Primary hypersomnolence disorder        Past Surgical History:   Procedure Laterality Date    HX COLONOSCOPY  2021    HX ENDOSCOPY      HX OTHER SURGICAL      lung abscess removal    MI SHOULDER SURG PROC UNLISTED      Left Shoulder       Family History   Problem Relation Age of Onset    Heart Disease Mother     Diabetes Father     Stroke Father        Social History     Socioeconomic History    Marital status:      Spouse name: Not on file    Number of children: Not on file    Years of education: Not on file    Highest education level: Not on file   Occupational History    Not on file   Tobacco Use    Smoking status: Never    Smokeless tobacco: Never   Vaping Use    Vaping Use: Never used   Substance and Sexual Activity    Alcohol use: No    Drug use: No    Sexual activity: Yes     Birth control/protection: Condom   Other Topics Concern    Not on file   Social History Narrative    Not on file     Social Determinants of Health     Financial Resource Strain: Not on file   Food Insecurity: Not on file   Transportation Needs: Not on file   Physical Activity: Not on file   Stress: Not on file   Social Connections: Not on file   Intimate Partner Violence: Not on file   Housing Stability: Not on file       Allergies   Allergen Reactions    Beef Containing Products Anaphylaxis and Other (comments)    Diltiazem Other (comments)     Reaction Type: Allergy; Reaction(s): Pressure in chest    Iodinated Contrast Media Other (comments)     Reaction Type: Allergy; Severity: Severe; Reaction(s): hives,throat swelling    Pork Derived (Porcine) Hives    Shellfish Containing Products Swelling     Other reaction(s): Other  Reaction Type: Allergy; Reaction(s): Hives,throat swelling    Sulfa (Sulfonamide Antibiotics) Unknown (comments)     Spinal meningitis    Sulfur Unknown (comments)     Reaction Type: Allergy  Spinal meningitis    Lactose Diarrhea     Lactose intolerant per Pt. Current Outpatient Medications   Medication Sig    apixaban (ELIQUIS) 5 mg tablet Take 5 mg by mouth daily. insulin lispro (HUMALOG) 100 unit/mL injection by SubCUTAneous route. Sliding Scale  4 times a day    Acetylcarnitine 500 mg cap Take  by mouth daily. insulin glargine (LANTUS) 100 unit/mL injection 20 Units by SubCUTAneous route daily. predniSONE (DELTASONE) 20 mg tablet Take 20 mg by mouth daily (with breakfast).     albuterol (PROVENTIL, VENTOLIN) 90 mcg/Actuation inhaler Take 1-2 Puffs by inhalation every four (4) hours as needed for Wheezing.    esomeprazole (NEXIUM) 40 mg capsule Take 80 mg by mouth in the morning. omeprazole (PRILOSEC) 40 mg capsule Take 40 mg by mouth daily. (Patient not taking: Reported on 7/20/2022)    oxyCODONE IR (ROXICODONE) 10 mg tab immediate release tablet oxycodone 10 mg tablet (Patient not taking: No sig reported)    lisinopriL (PRINIVIL, ZESTRIL) 20 mg tablet Take 20 mg by mouth daily. (Patient not taking: Reported on 7/20/2022)    rituximab (RITUXAN IV) by IntraVENous route every 6 months. (Patient not taking: Reported on 7/20/2022)    loperamide (IMODIUM) 2 mg capsule as needed. (Patient not taking: No sig reported)    CALCIUM PO Take  by mouth daily. With vitamin D. Unsure of dose (Patient not taking: Reported on 7/20/2022)    OTHER,NON-FORMULARY, DMAE 351mg Daily (Patient not taking: Reported on 7/24/1274)    folic acid (FOLVITE) 1 mg tablet Take 1 mg by mouth daily. (Patient not taking: Reported on 7/20/2022)     No current facility-administered medications for this visit. Review of Systems   All other systems reviewed and are negative. BP (!) 161/87 (BP 1 Location: Left arm, BP Patient Position: Sitting)   Pulse 100   Temp 98 °F (36.7 °C) (Temporal)   Resp 18   Ht 5' 7\" (1.702 m)   Wt 67.1 kg (148 lb)   SpO2 95%   BMI 23.18 kg/m²     Physical Exam  Constitutional:       General: He is not in acute distress. Appearance: He is not ill-appearing. HENT:      Head: Normocephalic and atraumatic. Cardiovascular:      Rate and Rhythm: Normal rate and regular rhythm. Pulmonary:      Effort: Pulmonary effort is normal.      Breath sounds: Normal breath sounds. Abdominal:      Comments:  Abdomen is soft, mildly tender palpation mid abdomen, incision healed, no evidence of incisional hernia, ileostomy right abdomen pink and productive stool   Musculoskeletal:         General: Normal range of motion. Cervical back: Normal range of motion and neck supple. Skin:     General: Skin is warm and dry. Neurological:      General: No focal deficit present. Mental Status: He is alert. Mental status is at baseline. Psychiatric:         Mood and Affect: Mood normal.         Thought Content: Thought content normal.         Judgment: Judgment normal.       Problem List Items Addressed This Visit          Digestive    Adenomatous polyp of ascending colon - Primary       Assessment and Plan:    Mr. Loretta Carlisle is approximately 2 months out from his last surgery. I told him that it is reasonable to proceed with his ileostomy in 1 month. He has tapered down little bit off of steroids. At home I like to see him regain some of the weight that he lost and also continue working with physical therapy  To regain some motor strength. I reviewed the risk and benefits of surgery including the risk of infection, bleeding, wound complications, anastomotic leak. He will come back and see me in 1 month and sooner schedule for August 26, 2022.               Bob Gross MD

## 2022-08-01 NOTE — TELEPHONE ENCOUNTER
Spoke with  he states for  to follow up in the office on 08/10/2022 and he is going to put him down for 08/19/2022 for surgery.

## 2022-08-01 NOTE — TELEPHONE ENCOUNTER
Mr. Mike Reyes [phone] called again this afternoon wanting some clarification on his surgery status. Please give Mr. Mike Reyes a call to clarify what's the next step for his surgery and what's to be expected.

## 2022-08-02 NOTE — TELEPHONE ENCOUNTER
Called and spoke with April and Quintin Culver. I made them aware of the Appointment on 08/10/2022 & the date of surgery on 08/19/2022. She asked if there was a specific time set. I informed her that the hospital would call with the exact time.

## 2022-08-10 PROBLEM — Z93.2 ILEOSTOMY PRESENT (HCC): Status: ACTIVE | Noted: 2022-01-01

## 2022-08-10 NOTE — PERIOP NOTES
1415 [facility] dept called, confirmed pt received his most recent blood transfusion on 5/23/2022, stated it is ok for patient to have T&S on admission. OR schedule flagged. 1430 Dr. Aixa Flores called, made aware pt stated he was instructed by Dr. Aixa Flores to hold his eliquis after his dose on 8/16/2022, pt stated no instruction re: aspirin. Dr. Aixa Flores stated pt can continue his aspirin as usual, pt made aware and verbalized understanding. Patient also stated he was instructed to have clear liquid diet starting after breakfast on 8/17/2022 in preparation for surgery scheduled on 8/19/2022.  1442 Dr. Laci Estrada called, made aware of pt's history, ekg done 4/19/2022 and AL Echo done 6/2/2022. No new orders given, stated no need to repeat ekg today, and stated ok to proceed with surgery as planned. Sylvester Enciso [facility] facility called, spoke with Lucia Garsia LPN, made aware pt has left PAT with CNA from facility, made aware pt has pre-surgery information guide and patient stated he has instructions from Dr. Aixa Flores re: holding eliquis prior to surgery as noted above, requested for patient's physician at facility to be made aware that pt to hold his eliquis and requested for a note with any recommendations to be sent to PAT dept.

## 2022-08-10 NOTE — PROGRESS NOTES
OFFICE VISIT NOTE    Alanna Fraga is a 47 y.o. male who presents to the office today for:    Chief Complaint   Patient presents with    Follow-up     2 weeks f/u-LAPAROSCOPIC ILEOCECECTOMY       Mr. Janak Toribio comes in today for follow-up and to discuss closure of his ileostomy. He underwent a laparoscopic assisted right colectomy for large cecal polyp that was not amenable to endoscopic resection. Postoperatively he did develop a stricture at the anastomosis secondary to omental adhesions at the site. He had a revision of his anastomosis with and subsequently leaked requiring ileostomy. He had a fairly prolonged hospital course however was ultimately discharged to skilled nursing facility tolerating a diet. Today he states that although he is able to pass his ileostomy he does have leakage and that his skin is is excoriated at the site. He is tolerating a diet and has actually gained 5 pounds since his last visit 3 weeks ago. His health history remains unchanged. He has a history of hypertension, polymyositis, myasthenia gravis, asthma, generalized muscle weakness.       Past Medical History:   Diagnosis Date    Adverse effect of anesthesia     Arrhythmia     atrial flutter    Asthma     Diabetes ([address]     Hypertension     Ill-defined condition     Spinal meningitis and avascular necrosis     Myasthenia gravis     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    muscle disorder    Primary hypersomnolence disorder        Past Surgical History:   Procedure Laterality Date    HX COLONOSCOPY  2021    HX ENDOSCOPY      HX OTHER SURGICAL      lung abscess removal    [place] SHOULDER SURG PROC UNLISTED      Left Shoulder       Family History   Problem Relation Age of Onset    Heart Disease Mother     Diabetes Father     Stroke Father        Social History     Socioeconomic History    Marital status:      Spouse name: Not on file    Number of children: Not on file    Years of education: Not on file    Highest education level: Not on file   Occupational History    Not on file   Tobacco Use    Smoking status: Never    Smokeless tobacco: Never   Vaping Use    Vaping Use: Never used   Substance and Sexual Activity    Alcohol use: No    Drug use: No    Sexual activity: Yes     Birth control/protection: Condom   Other Topics Concern    Not on file   Social History Narrative    Not on file     Social Determinants of Health     Financial Resource Strain: Not on file   Food Insecurity: Not on file   Transportation Needs: Not on file   Physical Activity: Not on file   Stress: Not on file   Social Connections: Not on file   Intimate Partner Violence: Not on file   Housing Stability: Not on file       Allergies   Allergen Reactions    Beef Containing Products Anaphylaxis and Other (comments)    Diltiazem Other (comments)     Reaction Type: Allergy; Reaction(s): Pressure in chest    Iodinated Contrast Media Other (comments)     Reaction Type: Allergy; Severity: Severe; Reaction(s): hives,throat swelling    Pork Derived (Porcine) Hives    Shellfish Containing Products Swelling     Other reaction(s): Other  Reaction Type: Allergy; Reaction(s): Hives,throat swelling    Sulfa (Sulfonamide Antibiotics) Unknown (comments)     Spinal meningitis    Sulfur Unknown (comments)     Reaction Type: Allergy  Spinal meningitis    Lactose Diarrhea     Lactose intolerant per Pt. Current Outpatient Medications   Medication Sig    aspirin 81 mg chewable tablet Take 81 mg by mouth in the morning. cholecalciferol (VITAMIN D3) (50,000 UNITS /1250 MCG) capsule Take 50,000 Units by mouth every seven (7) days. ferrous sulfate 325 mg (65 mg iron) tablet Take 325 mg by mouth two (2) times a day. Saccharomyces boulardii (Florastor) 250 mg capsule Take 250 mg by mouth two (2) times a day.     fluconazole (DIFLUCAN) 200 mg tablet Take 200 mg by mouth in the morning. FDA advises cautious prescribing of oral fluconazole in pregnancy. magnesium hydroxide (MILK OF MAGNESIA PO) Take  by mouth. ondansetron hcl (ZOFRAN) 4 mg tablet Take 4 mg by mouth every eight (8) hours as needed for Nausea or Vomiting. promethazine (PHENERGAN) 12.5 mg tablet Take  by mouth every six (6) hours as needed for Nausea. verapamiL (CALAN) 40 mg tablet Take 40 mg by mouth two (2) times a day. oxyCODONE IR (ROXICODONE) 10 mg tab immediate release tablet oxycodone 10 mg tablet    lisinopriL (PRINIVIL, ZESTRIL) 20 mg tablet Take 20 mg by mouth in the morning. apixaban (ELIQUIS) 5 mg tablet Take 5 mg by mouth two (2) times a day. loperamide (IMODIUM) 2 mg capsule as needed. insulin lispro (HUMALOG) 100 unit/mL injection by SubCUTAneous route. Sliding Scale  4 times a day    CALCIUM PO Take  by mouth daily. With vitamin D. Unsure of dose    Acetylcarnitine 500 mg cap Take  by mouth daily. insulin glargine (LANTUS) 100 unit/mL injection Take 40 Units by SubCUTAneous route in the morning. predniSONE (DELTASONE) 20 mg tablet Take 20 mg by mouth daily (with breakfast). albuterol (PROVENTIL, VENTOLIN) 90 mcg/Actuation inhaler Take 1-2 Puffs by inhalation every four (4) hours as needed for Wheezing.    esomeprazole (NEXIUM) 20 mg capsule Take 20 mg by mouth in the morning. folic acid (FOLVITE) 1 mg tablet Take 1 mg by mouth in the morning. omeprazole (PRILOSEC) 40 mg capsule Take 40 mg by mouth daily. (Patient not taking: No sig reported)    rituximab (RITUXAN IV) by IntraVENous route every 6 months. (Patient not taking: No sig reported)    OTHER,NON-FORMULARY, DMAE 351mg Daily (Patient not taking: No sig reported)     No current facility-administered medications for this visit. Review of Systems   All other systems reviewed and are negative.     /88 (BP 1 Location: Left upper arm, BP Patient Position: Sitting, BP Cuff Size: Adult)   Pulse (!) 101 Temp 98.4 °F (36.9 °C) (Temporal)   Ht 5' 8\" (1.727 m)   Wt 69.4 kg (153 lb)   SpO2 98%   BMI 23.26 kg/m²     Physical Exam  Constitutional:       General: He is not in acute distress. Appearance: Normal appearance. He is normal weight. He is not ill-appearing. HENT:      Head: Normocephalic and atraumatic. Cardiovascular:      Rate and Rhythm: Normal rate and regular rhythm. Pulmonary:      Effort: Pulmonary effort is normal.      Breath sounds: Normal breath sounds. Abdominal:      General: There is no distension. Palpations: Abdomen is soft. Tenderness: There is no abdominal tenderness. Comments: Ileostomy right lower quadrant with parastomal skin excoriation   Musculoskeletal:         General: Normal range of motion. Cervical back: Normal range of motion and neck supple. Skin:     General: Skin is warm and dry. Neurological:      General: No focal deficit present. Mental Status: He is alert and oriented to person, place, and time. Psychiatric:         Mood and Affect: Mood normal.         Thought Content: Thought content normal.         Judgment: Judgment normal.       Problem List Items Addressed This Visit          Digestive    Adenomatous polyp of ascending colon - Primary    Relevant Medications    Saccharomyces boulardii (Florastor) 250 mg capsule    magnesium hydroxide (MILK OF MAGNESIA PO)    ondansetron hcl (ZOFRAN) 4 mg tablet       Other    Ileostomy present ([facility] [address]       Assessment and Plan:  I once again reviewed exploratory laparotomy and takedown of ileostomy with the patient. I reviewed the risk and benefits. I reviewed the risk infection, bleeding, wound complications, incisional hernia, anastomotic leak, injury to other intra-abdominal organs and structures. He wishes to proceed with surgery and surgery scheduled for August 19, 2022.   I gave him instructions on clear liquid diet for 1-1/2 days prior to surgery and to stop his Eliquis after August 16, 2022.           Veronique Nelson MD

## 2022-08-10 NOTE — PROGRESS NOTES
Chief Complaint   Patient presents with    Follow-up     2 weeks f/u-LAPAROSCOPIC ILEOCECECTOMY        Visit Vitals  /88 (BP 1 Location: Left upper arm, BP Patient Position: Sitting, BP Cuff Size: Adult)   Pulse (!) 101   Temp 98.4 °F (36.9 °C) (Temporal)   Ht 5' 8\" (1.727 m)   Wt 153 lb (69.4 kg)   SpO2 98%   BMI 23.26 kg/m²    First bp reading 145/93 left upper arm    1. Have you been to the ER, urgent care clinic since your last visit? Hospitalized since your last visit? No    2. Have you seen or consulted any other health care providers outside of the [address] [place] [address] since your last visit? Include any pap smears or colon screening.  No

## 2022-08-11 NOTE — PERIOP NOTES
1050 Dr. Luz Galindo called, made aware of hgb A1C 7.4. No new orders given, stated ok to proceed with surgery as planned.

## 2022-08-12 NOTE — PERIOP NOTES
[address] [facility] facility called, made aware note with any recommendations re:patient holding eliquis per Dr. Rosa Peters instruction for surgery on 8/19/2022 has not been received as yet. Nurse stated he will notify physician at facility and fax note as soon as possible.

## 2022-08-15 NOTE — TELEPHONE ENCOUNTER
Mr. Ever Jacobsen called [phone]. He said that he have an surgery coming up on the 19th of August. He said that for the past 2 weeks his BP have been elevated and he have been having heart palpitations for the last 3-4 days. Mr. Ever Jacobsen said that his diabetes is also out of control, and he just wanted to let Dr. Rosana Shane know what's going on with him. He would like a return call as well.  Thanks

## 2022-08-16 NOTE — PERIOP NOTES
[address] [facility] facility called, left message for unit manager re: no note received as yet re: patient to hold his eliquis per Dr. Fregoso Sham instruction after today's dose, requested for note to be faxed to PAT dept, fax # and phone # left on message.

## 2022-08-18 NOTE — PERIOP NOTES
[address] [facility], LPN called from [facility], stated patient did not have clear liq diet since yesterday as instructed by  for his surgery scheduled on 8/19/22.   1052 Dr. Olga Lucero called, made aware of above note re: patient did not change to clear liquid diet and pt has eaten this morning. Order given for patient to start back on eliquis and to stop after dose on 8/23/2022 and to start clear liquid diet after breakfast on 8/24/2022 and surgery will be rescheduled until 8/26/2022. Dr. Olga Lucero stated his office will call OR to reschedule. Saleem Ayala called, spoke with Raymond Alberts LPN, made aware of above instructions, she verbalized understanding, stated she will make Ms Abdirahman Obando LPN aware. Cherrie Pappas [address], LPN called back to PAT dept, stated the message was received re: surgery to be rescheduled till 8/26/2022 and new instructions re: eliquis and diet prior to surgery also, requested for Ms Luannaren Obando to make the patient's doctor there aware and to fax a note to PAT dept with any recommendations per his doctor re: holding eliquis prior to surgery.

## 2022-08-18 NOTE — TELEPHONE ENCOUNTER
April called from [facility] and stated she doesn't understand why Mr Marly Farrar surgery was canceled for tomorrow and reschedule for the 8/26, spoke w/ Dr Estela Cleary and Mr Bradley Gil was supposed to be on a clear diet but April stated she had no idea about this diet so the patient ate food, please call April back [phone], thanks

## 2022-08-18 NOTE — TELEPHONE ENCOUNTER
Mr Steven Linton called this afternoon and wanted Dr Patel Craig to know that the facility that he is living has not been doing what they are supposed to do to prep him for his surgery tomorrow so it got pushed back to next week, please call when able [phone], thanks

## 2022-08-19 NOTE — TELEPHONE ENCOUNTER
Andrew Zacarias called this morning and wanted to know if Dr Tamara Miranda could pick him up Wednesday 8/24 for his surgery on 8/26, stated there was a lot of stuff wrong with him due to the home he is at, he would like Dr Tamara Miranda to call him please [phone], thanks

## 2022-08-23 NOTE — TELEPHONE ENCOUNTER
called and he wanted to know if  could admit him today to make sure everything is okay for surgery. I explained to him that we weren't able to do that. He states that no one called him back last week to answer his questions or the facilities. I apologized to him and told him I wasn't exactly sure what happened. He said that he will make sure to start the clear liquid diet and will be there on Friday.

## 2022-08-23 NOTE — PERIOP NOTES
[address] [place] [address] facility called, spoke with Verner Piles, LPN, reminded of instruction per  re: holding eliquis after today's dose and pt to start a clear liquid diet after breakfast on 8/24/2022 until procedure scheduled with Dr. Lonny Cortes on 8/26/2022, with patient being NPO after midnight on the night before surgery, Denton verbalized understanding. Denton also made aware note not received as yet from PCP at facility re: patient holding eliquis for his surgery, she stated she will make his doctor aware and fax note to PAT dept as soon as available.

## 2022-08-25 NOTE — TELEPHONE ENCOUNTER
Spoke with [place] at [facility]. 's surgery has been approved. 46644 is the primary CPT code.     DOS 08/26/22-08/28/2022     Authorization # [id number]

## 2022-08-26 PROBLEM — Z98.890 S/P CLOSURE OF ILEOSTOMY: Status: ACTIVE | Noted: 2022-01-01

## 2022-08-26 NOTE — PROGRESS NOTES
Two person skin assessment done on patient with [place]. Mepilex applied to sacrum area. No areas of skin breakdown seen.

## 2022-08-26 NOTE — ANESTHESIA PREPROCEDURE EVALUATION
Relevant Problems   RESPIRATORY SYSTEM   (+) Asthma      NEUROLOGY   (+) Migraine      CARDIOVASCULAR   (+) Atrial flutter (HCC)   (+) Hypertension      GASTROINTESTINAL   (+) Gastroesophageal reflux disease      ENDOCRINE   (+) Diabetes mellitus (HCC)       Anesthetic History   No history of anesthetic complications            Review of Systems / Medical History  Patient summary reviewed, nursing notes reviewed and pertinent labs reviewed    Pulmonary            Asthma        Neuro/Psych         Neuromuscular disease    Comments: Myasthenia gravis Cardiovascular    Hypertension        Dysrhythmias : atrial flutter        Comments:  Left Ventricle: Normal left ventricular systolic function with a visually estimated EF of 55 - 60%. Left ventricle is mildly dilated. Normal wall thickness. Normal wall motion. Normal diastolic function.   Aortic Valve: Mild sclerosis of the aortic valve cusp.   Mitral Valve: Mildly thickened leaflet.   Negative bubble study.     Sinus rhythm with Premature atrial complexes   Minimal voltage criteria for LVH, may be normal variant   Nonspecific T wave abnormality    GI/Hepatic/Renal     GERD           Endo/Other    Diabetes: type 2         Other Findings          Past Medical History:   Diagnosis Date    Adverse effect of anesthesia     pt states due to myasthenia gravis    Arrhythmia     atrial flutter    Asthma     Diabetes ([address]     GERD (gastroesophageal reflux disease)     Hypertension     Ileostomy in place [facility])     Ill-defined condition     Spinal meningitis and avascular necrosis     Myasthenia gravis     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    muscle disorder    Primary hypersomnolence disorder        Past Surgical History:   Procedure Laterality Date    HX COLONOSCOPY  2021    HX ENDOSCOPY      HX ILEOSTOMY      pt states approx may or june 2022    HX OTHER SURGICAL      lung abscess removal    HX PARTIAL COLECTOMY      pt states april 2022    DE SHOULDER SURG PROC UNLISTED      Left Shoulder       Current Outpatient Medications   Medication Instructions    Acetylcarnitine 500 mg cap Oral, DAILY    albuterol (PROVENTIL, VENTOLIN) 90 mcg/Actuation inhaler 1-2 Puffs, Inhalation, EVERY 4 HOURS AS NEEDED    apixaban (ELIQUIS) 5 mg, Oral, 2 TIMES DAILY    aspirin 81 mg, Oral, DAILY    CALCIUM PO 1 Tablet, Oral, DAILY, With vitamin D. Unsure of dose    esomeprazole (NEXIUM) 40 mg, Oral, 2 TIMES DAILY    ferrous sulfate 325 mg, Oral, 2 TIMES DAILY    fluconazole (DIFLUCAN) 200 mg, Oral, EVERY 7 DAYS, FDA advises cautious prescribing of oral fluconazole in pregnancy.     folic acid (FOLVITE) 1 mg, Oral, DAILY    insulin glargine (LANTUS) 40 Units, SubCUTAneous, DAILY    insulin lispro (HUMALOG) 100 unit/mL injection SubCUTAneous, Sliding Scale  4 times a day    lisinopriL (PRINIVIL, ZESTRIL) 20 mg, Oral, DAILY    loperamide (IMODIUM) 2 mg capsule AS NEEDED    morphine IR (MS IR) 15 mg, Oral, 2 TIMES DAILY AS NEEDED    ondansetron hcl (ZOFRAN) 4 mg, Oral, EVERY 8 HOURS AS NEEDED    predniSONE (DELTASONE) 15 mg, Oral, DAILY WITH BREAKFAST    promethazine (PHENERGAN) 12.5 mg tablet Oral, EVERY 6 HOURS AS NEEDED    verapamiL (CALAN) 40 mg, Oral, 2 TIMES DAILY, As needed for palpitations       Current Facility-Administered Medications   Medication Dose Route Frequency    lactated Ringers infusion  20 mL/hr IntraVENous CONTINUOUS    ceFAZolin (ANCEF) 2 g in sterile water (preservative free) 20 mL IV syringe  2 g IntraVENous ONCE    metroNIDAZOLE (FLAGYL) IVPB premix 500 mg  500 mg IntraVENous ONCE    dextrose 10% infusion 0-250 mL  0-250 mL IntraVENous PRN       Patient Vitals for the past 24 hrs:   Temp Pulse Resp BP SpO2   08/26/22 1134 36.9 °C (98.4 °F) 96 18 (!) 138/96 97 %       Lab Results   Component Value Date/Time    WBC 9.8 08/10/2022 02:20 PM    HGB 12.0 (L) 08/10/2022 02:20 PM    HCT 42.2 08/10/2022 02:20 PM    PLATELET 066 87/42/2117 02:20 PM    MCV 76.7 (L) 08/10/2022 02:20 PM     Lab Results   Component Value Date/Time    Sodium 138 06/19/2022 01:08 AM    Potassium 3.8 06/19/2022 01:08 AM    Chloride 107 06/19/2022 01:08 AM    CO2 25 06/19/2022 01:08 AM    Anion gap 6 06/19/2022 01:08 AM    Glucose 149 (H) 06/19/2022 01:08 AM    BUN 12 06/19/2022 01:08 AM    Creatinine 0.60 (L) 06/19/2022 01:08 AM    BUN/Creatinine ratio 20 06/19/2022 01:08 AM    GFR est AA >60 06/19/2022 01:08 AM    GFR est non-AA >60 06/19/2022 01:08 AM    Calcium 8.7 06/19/2022 01:08 AM     No results found for: APTT, PTP, INR, INREXT  Lab Results   Component Value Date/Time    Glucose 149 (H) 06/19/2022 01:08 AM    Glucose (POC) 69 08/26/2022 11:24 AM         Physical Exam    Airway  Mallampati: I  TM Distance: 4 - 6 cm  Neck ROM: normal range of motion   Mouth opening: Normal     Cardiovascular    Rhythm: regular  Rate: normal         Dental  No notable dental hx       Pulmonary  Breath sounds clear to auscultation               Abdominal  GI exam deferred       Other Findings            Anesthetic Plan    ASA: 3  Anesthesia type: general          Induction: Intravenous  Anesthetic plan and risks discussed with: Patient and Family

## 2022-08-26 NOTE — PROGRESS NOTES
[facility] PICC team consulted for midline placement, as client is difficult stick with limited/no peripheral vessels suitable for cannulation and midlines x 2 in the past. Verbal consent obtained from patient. Discussed with primary care nurse in [facility], discussed IVF/medication infusion, infiltration/extravasation risks/assessments, and policy for line change/alcohol swabs/end caps. PowerGlide Pro Midline Catheter single lumen 18g 10cm placed x 1 attempt in the left brachial vein with ultrasound guidance. Client tolerated well, no complaints, no complications noted. Dressed per policy with BARD PowerGlide StatLock device, CHG antimicrobial patch, and sterile Tegaderm. Brisk blood return. End cap and flushed per policy after blood draw.      LOT: [id number]  Exp: 05/31/2023

## 2022-08-26 NOTE — PROGRESS NOTES
Spoke with  about patients blood sugar being 69. Per md give 1/2 amp of D50. Per pharmacy we no longer have that due to shortage order changed. MD aware we do not have a line at this time and he states that is okay to wait.

## 2022-08-26 NOTE — PROGRESS NOTES
Once patient was getting ready to go to surgery he had complaints of chest pain. Dr. Oumar Torres was called, 12 lead was done which was normal and the patient was taken to surgery.

## 2022-08-26 NOTE — BRIEF OP NOTE
Brief Postoperative Note    Patient: Alber Nur  YOB: 1970  MRN: [id number]    Date of Procedure: 8/26/2022     Pre-Op Diagnosis: [facility] present ([facility] [Z93.2]    Post-Op Diagnosis: Same as preoperative diagnosis.       Procedure(s): Exploratory laparotomy with takedown of ileostomy and ileocolic anastomosis    Surgeon(s):  Humera Byrne MD    Surgical Assistant: Silvio    Anesthesia: General     Estimated Blood Loss (mL): 771 mL    Complications: None    Specimens:   ID Type Source Tests Collected by Time Destination   1 : ileostomy end/ distal colon Preservative Colon  Humera Byrne MD 8/26/2022 1815 Pathology   1 : urine Urine Byrd Specimen CULTURE, URINE Humera Byrne MD 8/26/2022 1727 Microbiology        Implants: * No implants in log *    Drains:   Brandon-Villaseñor Drain 08/26/22 Left;Upper Abdomen (Active)       Findings: Fairly extensive adhesions within the abdomen    Electronically Signed by Bob Gross MD on 8/26/2022 at 7:07 PM

## 2022-08-27 NOTE — PROGRESS NOTES
Reason for Admission:  Ileostomy present ([facility] S/P closure of ileostomy                     RUR Score:   13%                  Plan for utilizing home health:   unsue       PCP: First and Last name:  Kerri Garcia MD     Name of Practice:    Are you a current patient: Yes/No:    Approximate date of last visit:    Can you participate in a virtual visit with your PCP:                     Current Advanced Directive/Advance Care Plan: Full Code      Healthcare Decision Maker:   Click here to complete [address] including selection of the Healthcare Decision Maker Relationship (ie \"Primary\")             Primary Decision Maker: HEMA PHILLIP -  - [phone]                  Transition of Care Plan:                    Patient came to us from [facility] [place] [facility]. Patient stated he was their for short term and that he does not want to go back. SNF choice list provided to patient. Does not own any DME but uses rolling walker and wheel chair at [facility]. patient is open to idea of going home at some point. Patient stated he use to live with sister and that is his plan, to return to her. Current discharge plan to be determined by outcome pf surgery and patient needs.

## 2022-08-27 NOTE — ANESTHESIA POSTPROCEDURE EVALUATION
Procedure(s):  EXPLORATORY LAPAROTOMY, CLOSURE ILEOSTOMY; ileocolic anastomosis. general    Anesthesia Post Evaluation        Patient location during evaluation: PACU  Patient participation: complete - patient participated  Level of consciousness: awake and awake and alert  Pain score: 0  Pain management: adequate  Airway patency: patent  Anesthetic complications: no  Cardiovascular status: acceptable  Respiratory status: acceptable  Hydration status: acceptable  Post anesthesia nausea and vomiting:  controlled  Final Post Anesthesia Temperature Assessment:  Normothermia (36.0-37.5 degrees C)      INITIAL Post-op Vital signs:   Vitals Value Taken Time   /83 08/26/22 2000   Temp 37 °C (98.6 °F) 08/26/22 1932   Pulse 108 08/26/22 2000   Resp 13 08/26/22 2000   SpO2 100 % 08/26/22 2001   Vitals shown include unvalidated device data.

## 2022-08-27 NOTE — PROGRESS NOTES
Progress Note    Patient: Brain Davis MRN: [id number]  SSN: [id number]    YOB: 1970  Age: 47 y.o. Sex: male      Admit Date: 8/26/2022    LOS: 1 day     Subjective:   Postoperative day 1 status post total laparotomy takedown ileostomy with ileocolic anastomosis  Patient seen in bed  Complains of significant surgical site pain  Denies nausea  No flatus    Objective:     Vitals:    08/26/22 2033 08/26/22 2145 08/27/22 0303 08/27/22 0736   BP: 123/82 (!) 134/93 132/89 (!) 147/95   Pulse: (!) 118 (!) 117 (!) 111 (!) 125   Resp: 18 20 18 20   Temp: 98.5 °F (36.9 °C) 98.8 °F (37.1 °C) 99.3 °F (37.4 °C) 98 °F (36.7 °C)   SpO2: 100% 100% 96% 100%   Weight:       Height:            Intake and Output:  Current Shift: No intake/output data recorded.   Last three shifts: 08/25 1901 - 08/27 0700  In: 1868.8 [I.V.:1868.8]  Out: 56 [Urine:850; Drains:40]    Review of Systems:  ROS     Physical Exam:   Abdomen soft, appropriately tender, nondistended, midline incision dressing clean, right lower quadrant incision dressing with staining    Lab/Data Review:  Recent Results (from the past 24 hour(s))   GLUCOSE, POC    Collection Time: 08/26/22 11:24 AM   Result Value Ref Range    Glucose (POC) 69 65 - 117 mg/dL    Performed by Sylvester Huerta [id number], POC    Collection Time: 08/26/22  2:13 PM   Result Value Ref Range    Glucose (POC) 101 65 - 117 mg/dL    Performed by [facility]    EKG, 12 LEAD, INITIAL    Collection Time: 08/26/22  4:48 PM   Result Value Ref Range    Ventricular Rate 103 BPM    Atrial Rate 103 BPM    P-R Interval 148 ms    QRS Duration 82 ms    Q-T Interval 352 ms    QTC Calculation (Bezet) 461 ms    Calculated P Axis 60 degrees    Calculated R Axis 7 degrees    Calculated T Axis 75 degrees    Diagnosis       Sinus tachycardia with Premature supraventricular complexes  Otherwise normal ECG  When compared with ECG of 19-APR-2022 12:48,  No significant change was found     GLUCOSE, POC Collection Time: 08/26/22 11:57 PM   Result Value Ref Range    Glucose (POC) 229 (H) 65 - 117 mg/dL    Performed by DAVID Mendez    Collection Time: 08/27/22  7:38 AM   Result Value Ref Range    Glucose (POC) 224 (H) 65 - 117 mg/dL    Performed by Manus Najjar         Assessment:     Active Problems:    S/P closure of ileostomy (8/26/2022)        Plan:    Will increase patient's IV analgesics  Change right lower quadrant dressing daily  Continue n.p.o. awaiting return of bowel function  Continue IV fluids  Follow-up a.m. labs  Continue sequential compression device for DVT prophylaxis      Signed By: Enmanuel Odell MD     August 27, 2022

## 2022-08-28 NOTE — PROGRESS NOTES
@2317 Notified MD Gen Franco of cardiology consult per MD Desmond Barkley for patient sustaining 's. MD said he will assess the assess the patient in the morning.

## 2022-08-28 NOTE — CONSULTS
Cardiology Consult    NAME: Candace Hoyos   :  1970   MRN:  [id number]     Date/Time:  2022 3:10 PM    Patient PCP: Mikie Leung MD  ________________________________________________________________________     Assessment:   Tachycardia  HTN  History of atrial flutter/ SVT  Myasthenia gravis  Type 2 diabetes  Polymyositis  GERD      Plan:   Switch to short acting metoprolol, I am not sure that he is absorbing the verapamil adequately  No evidence of ventricular arrhythmias, atrial arrhythmias is a chronic condition  Pain management, the increased adrenergic tone is driving his heart rate  Check thyroid function studies      []        High complexity decision making was performed        Subjective:   CHIEF COMPLAINT: Tachycardia      REASON FOR CONSULT: Atrial tachycardia      HISTORY OF PRESENT ILLNESS:     Candace Hoyos is a 47 y.o. BLACK/ male who has a history of atrial arrhythmias, hypertension, diabetes, GERD, and myasthenia gravis with polymyositis. Patient has previously seen EP and had an attempted ablation of atrial tachycardia but was not done due to failure to reduce the rhythm. In 2020. He is on apixaban due to atrial arrhythmia. He has a history of SVT with documented heart rates at 200 bpm.  He is on verapamil p.o. He underwent exploratory laparotomy with takedown of ileostomy and ileocolic anastomosis on . Today, he says he is having pains all over. His body is achy. No palpitations or dizziness. Cardiology consult is rec requested due to elevated heart rate at 124. No chest pain, no shortness of breath, no dizziness.            Past Medical History:   Diagnosis Date    Adverse effect of anesthesia     pt states due to myasthenia gravis    Arrhythmia     atrial flutter    Asthma     Diabetes ([address]     GERD (gastroesophageal reflux disease)     Hypertension     Ileostomy in place [facility])     Ill-defined condition     Spinal meningitis and avascular necrosis     Myasthenia gravis     Polymyositis ([facility] [address] 2001    muscle disorder    Primary hypersomnolence disorder       Past Surgical History:   Procedure Laterality Date    HX COLONOSCOPY  2021    HX ENDOSCOPY      HX ILEOSTOMY      pt states approx may or june 2022    HX OTHER SURGICAL      lung abscess removal    HX PARTIAL COLECTOMY      pt states april 2022    KS SHOULDER SURG PROC UNLISTED      Left Shoulder     Allergies   Allergen Reactions    Beef Containing Products Anaphylaxis and Other (comments)    Diltiazem Other (comments)     Reaction Type: Allergy; Reaction(s): Pressure in chest    Iodinated Contrast Media Other (comments)     Reaction Type: Allergy; Severity: Severe; Reaction(s): hives,throat swelling    Pork Derived (Porcine) Hives    Shellfish Containing Products Swelling     Other reaction(s): Other  Reaction Type: Allergy; Reaction(s): Hives,throat swelling    Sulfa (Sulfonamide Antibiotics) Unknown (comments)     Spinal meningitis    Sulfur Unknown (comments)     Reaction Type: Allergy  Spinal meningitis    Lactose Diarrhea     Lactose intolerant per Pt.       Meds:  See below  Social History     Tobacco Use    Smoking status: Never    Smokeless tobacco: Never   Substance Use Topics    Alcohol use: No      Family History   Problem Relation Age of Onset    Heart Disease Mother     Diabetes Father     Stroke Father        REVIEW OF SYSTEMS:     []         Unable to obtain  ROS due to ---   [x]         Total of 12 systems reviewed as follows:    Constitutional: +sore muscles  Eyes:   negative visual changes  ENT:   negative sore throat, tongue or lip swelling  Respiratory:  negative cough, negative dyspnea  Cards:  negative for chest pain, palpitations, lower extremity edema  GI:   negative for nausea, vomiting, diarrhea, and abdominal pain  Genitourinary: negative for frequency, dysuria  Integument:  negative for rash   Hematologic:  negative for easy bruising and gum/nose bleeding  Musculoskel: negative for myalgias,  back pain  Neurological:  + body aches  Behavl/Psych: negative for feelings of anxiety, depression     Pertinent Positives include :    Objective:      Physical Exam:    Last 24hrs VS reviewed since prior progress note. Most recent are:    Visit Vitals  /76 (BP 1 Location: Right upper arm, BP Patient Position: At rest)   Pulse (!) 111   Temp 98.7 °F (37.1 °C)   Resp 18   Ht 5' 8\" (1.727 m)   Wt 74.8 kg (165 lb)   SpO2 97%   BMI 25.09 kg/m²       Intake/Output Summary (Last 24 hours) at 2022 1510  Last data filed at 2022 0600  Gross per 24 hour   Intake 150 ml   Output 2245 ml   Net -2095 ml        General Appearance: Well developed, alert, no acute distress. Ears/Nose/Mouth/Throat: Moist mucosa  Neck: Supple. JVP within normal limits. Carotids good upstrokes  Chest: Lungs clear to auscultation bilaterally. Cardiovascular: Regular rate and rhythm, S1S2 normal, soft systolic murmur  Abdomen: Soft, non-tender, bowel sounds are active. Extremities: No edema bilaterally. distal pulses +1. Skin: Warm and dry. Neuro: Alert and oriented x3, normal speech; follows simple commands  Psychiatric: Cooperative; appropriate    []         Post-cath site without hematoma, bruit, tenderness, or thrill. Distal pulses intact. Data:      Telemetry: ST    EKG: Sinus tachycardia with LVH by voltage criteria  []  No new EKG for review. 04/15/22    ECHO AL W POSSIBLE CARDIOVERSION 2022    Interpretation Summary  Images from the original result were not included.           [address] at [facility]  Phone: [phone]        NAME: Bethel Bardales  :  1970  MRN:  [id number]  Date/Time:  2022 2:22 PM        Anesthesia: Dr. Boo Moran and the team    Operators:   Dr. Kristie Thompson    Procedure:  TRANSESOPHAGEAL ECHO    Indication:   CVA, assess cardiac LETICIA      Procedure description: After obtaining the informed consent, patient was brought to the transesophageal echocardiography area in PACU. Patient was prepped and draped in the usual standard fashion. Patient was sedated using IV propofol as per anesthesia team. After ascertaining the absence of gag reflex, an omni plane probe with the help of water-soluble lubricant jelly was gently passed over the bite guard into the patient's esophagus. Echocardiographic images in various tomographic planes were obtained and simultaneously digitalized on a hard drive of the echocardiography machine. Patient throughout  the procedure was kept comfortable using IV sedation as per anesthesia and was closely monitored for oxygen saturation, hemodynamics, cardiac rhythm and any signs of distress. Patient completed the test without any complications. Transesophageal echocardiographic findings:  1. This is technically adequate echocardiography study. 2.    Normal left LV size and systolic function with grossly preserved wall motion. Estimated LV ejection fraction is 60%  3. Normal RV size and systolic function. 4.    Normal left atrial size. 5.    Normal right atrial size. 6.    Mitral valve is normal in structure with mild to moderate mitral regurgitation. 7.    Tricuspid valve is normal in structure with mild tricuspid regurgitation. 8.    Aortic valve normal trileaflet without stenosis or regurgitation. Aortic root is normal.  9.    Pulmonic valve is normal functioning. 10.  No intracardiac masses, thrombi or any cardiac source of emboli noted. Patient's left atrial appendage was without any clot or thrombi. 11.  Normal-appearing ascending and descending aorta. 12. No patent foramen ovale or intracardiac shunt appreciated by color flow or by agitated saline injection. Impression:  1. No intracardiac masses, thrombi on any cardiac source of emboli noted.   2.  No patent foramen ovale or intracardiac shunt appreciated by injection of agitated saline and color flow. 3.  Normal left ventricle size and systolic function with an estimated LV ejection fraction of 60%. Signed by: Cristine Norman MD on 6/2/2022  2:25 PM      Prior to Admission medications    Medication Sig Start Date End Date Taking? Authorizing Provider   ferrous sulfate 325 mg (65 mg iron) tablet Take 325 mg by mouth two (2) times a day. Yes Provider, Historical   ondansetron hcl (ZOFRAN) 4 mg tablet Take 4 mg by mouth every eight (8) hours as needed for Nausea or Vomiting. Yes Provider, Historical   promethazine (PHENERGAN) 12.5 mg tablet Take  by mouth every six (6) hours as needed for Nausea. Yes Provider, Historical   morphine IR (MS IR) 15 mg tablet Take 15 mg by mouth two (2) times daily as needed for Pain. Yes Provider, Historical   lisinopriL (PRINIVIL, ZESTRIL) 20 mg tablet Take 20 mg by mouth in the morning. Yes Provider, Historical   predniSONE (DELTASONE) 20 mg tablet Take 15 mg by mouth daily (with breakfast). Yes Provider, Historical   esomeprazole (NEXIUM) 20 mg capsule Take 40 mg by mouth two (2) times a day. Yes Other, MD Tal   aspirin 81 mg chewable tablet Take 81 mg by mouth in the morning. Provider, Historical   fluconazole (DIFLUCAN) 200 mg tablet Take 200 mg by mouth every seven (7) days. FDA advises cautious prescribing of oral fluconazole in pregnancy. Provider, Historical   verapamiL (CALAN) 40 mg tablet Take 40 mg by mouth two (2) times a day. As needed for palpitations    Provider, Historical   apixaban (ELIQUIS) 5 mg tablet Take 5 mg by mouth two (2) times a day. Provider, Historical   loperamide (IMODIUM) 2 mg capsule as needed. Provider, Historical   insulin lispro (HUMALOG) 100 unit/mL injection by SubCUTAneous route. Sliding Scale  4 times a day    Provider, Historical   CALCIUM PO Take 1 Tablet by mouth daily. With vitamin D. Unsure of dose    Provider, Historical   Acetylcarnitine 500 mg cap Take  by mouth daily. Provider, Historical   insulin glargine (LANTUS) 100 unit/mL injection Take 40 Units by SubCUTAneous route in the morning. Provider, Historical   albuterol (PROVENTIL, VENTOLIN) 90 mcg/Actuation inhaler Take 1-2 Puffs by inhalation every four (4) hours as needed for Wheezing. 4/28/11   Susi Canales MD   folic acid (FOLVITE) 1 mg tablet Take 1 mg by mouth in the morning.     Other, MD Tal       Recent Results (from the past 24 hour(s))   GLUCOSE, POC    Collection Time: 08/27/22  3:26 PM   Result Value Ref Range    Glucose (POC) 140 (H) 65 - 117 mg/dL    Performed by Donna Lopez    GLUCOSE, POC    Collection Time: 08/27/22 10:48 PM   Result Value Ref Range    Glucose (POC) 153 (H) 65 - 117 mg/dL    Performed by Reinaldo Mix    EKG, 12 LEAD, INITIAL    Collection Time: 08/28/22 12:10 AM   Result Value Ref Range    Ventricular Rate 135 BPM    Atrial Rate 135 BPM    P-R Interval 122 ms    QRS Duration 76 ms    Q-T Interval 312 ms    QTC Calculation (Bezet) 468 ms    Calculated P Axis 45 degrees    Calculated R Axis -10 degrees    Calculated T Axis 128 degrees    Diagnosis       Sinus tachycardia  Moderate voltage criteria for LVH, may be normal variant  Inferior infarct , age undetermined  T wave abnormality, consider lateral ischemia  Abnormal ECG  No previous ECGs available  Confirmed by [facility], [address] [facility] ([id number]) on 8/28/2022 10:26:20 AM     GLUCOSE, POC    Collection Time: 08/28/22  5:23 AM   Result Value Ref Range    Glucose (POC) 154 (H) 65 - 117 mg/dL    Performed by [address] [place] [address], POC    Collection Time: 08/28/22  7:31 AM   Result Value Ref Range    Glucose (POC) 149 (H) 65 - 117 mg/dL    Performed by [facility]    GLUCOSE, POC    Collection Time: 08/28/22 10:52 AM   Result Value Ref Range    Glucose (POC) 119 (H) 65 - 117 mg/dL    Performed by [facility]    CBC WITH AUTOMATED DIFF    Collection Time: 08/28/22 12:18 PM   Result Value Ref Range    WBC 14.3 (H) 4.1 - 11.1 K/uL    RBC 3.18 (L) 4.10 - 5.70 M/uL    HGB 7.2 (L) 12.1 - 17.0 g/dL    HCT 23.8 (L) 36.6 - 50.3 %    MCV 74.8 (L) 80.0 - 99.0 FL    MCH 22.6 (L) 26.0 - 34.0 PG    MCHC 30.3 30.0 - 36.5 g/dL    RDW 20.2 (H) 11.5 - 14.5 %    PLATELET 379 968 - 953 K/uL    NRBC 0.0 0.0  WBC    ABSOLUTE NRBC 0.00 0.00 - 0.01 K/uL    NEUTROPHILS 77 (H) 32 - 75 %    LYMPHOCYTES 10 (L) 12 - 49 %    MONOCYTES 12 5 - 13 %    EOSINOPHILS 0 0 - 7 %    BASOPHILS 0 0 - 1 %    IMMATURE GRANULOCYTES 1 (H) 0 - 0.5 %    ABS. NEUTROPHILS 11.1 (H) 1.8 - 8.0 K/UL    ABS. LYMPHOCYTES 1.4 0.8 - 3.5 K/UL    ABS. MONOCYTES 1.8 (H) 0.0 - 1.0 K/UL    ABS. EOSINOPHILS 0.0 0.0 - 0.4 K/UL    ABS. BASOPHILS 0.0 0.0 - 0.1 K/UL    ABS. IMM. GRANS. 0.1 (H) 0.00 - 0.04 K/UL    DF AUTOMATED     METABOLIC PANEL, COMPREHENSIVE    Collection Time: 08/28/22 12:18 PM   Result Value Ref Range    Sodium 142 136 - 145 mmol/L    Potassium 4.0 3.5 - 5.1 mmol/L    Chloride 112 (H) 97 - 108 mmol/L    CO2 22 21 - 32 mmol/L    Anion gap 8 5 - 15 mmol/L    Glucose 120 (H) 65 - 100 mg/dL    BUN 16 6 - 20 mg/dL    Creatinine 0.59 (L) 0.70 - 1.30 mg/dL    BUN/Creatinine ratio 27 (H) 12 - 20      GFR est AA >60 >60 ml/min/1.73m2    GFR est non-AA >60 >60 ml/min/1.73m2    Calcium 7.6 (L) 8.5 - 10.1 mg/dL    Bilirubin, total 0.4 0.2 - 1.0 mg/dL    AST (SGOT) 18 15 - 37 U/L    ALT (SGPT) 20 12 - 78 U/L    Alk.  phosphatase 76 45 - 117 U/L    Protein, total 4.8 (L) 6.4 - 8.2 g/dL    Albumin 2.2 (L) 3.5 - 5.0 g/dL    Globulin 2.6 2.0 - 4.0 g/dL    A-G Ratio 0.8 (L) 1.1 - 2.2          Janeth Dong MD

## 2022-08-28 NOTE — PROGRESS NOTES
Dr. Kevon Perez notified of change in MEWS and elevated temp. Per Dr. Kevon Perez no sepsis alert at this time, PRN Tylenol for fever. See orders.

## 2022-08-28 NOTE — PROGRESS NOTES
Progress Note    Patient: Link Hatchet MRN: [id number]  SSN: [id number]    YOB: 1970  Age: 47 y.o. Sex: male      Admit Date: 8/26/2022    LOS: 2 days     Subjective:   Postoperative day 2  Patient had tachycardia last night, twelve-lead EKG consistent with sinus tachycardia cardia  This a.m. patient complains of surgical site pain  Denies flatus  Denies nausea    Objective:     Vitals:    08/28/22 0532 08/28/22 0725 08/28/22 0800 08/28/22 0958   BP: 133/86 108/75     Pulse: (!) 129 (!) 105 (!) 108    Resp:  18     Temp:  99.2 °F (37.3 °C)     SpO2:  98%  95%   Weight:       Height:            Intake and Output:  Current Shift: No intake/output data recorded. Last three shifts: 08/26 1901 - 08/28 0700  In: 1018.8 [P.O.:150;  I.V.:868.8]  Out: 5838 [Urine:3050; Drains:85]    Review of Systems:  ROS     Physical Exam:   Abdomen is soft, appropriately tender, nondistended, LES drain serosanguineous drainage    Lab/Data Review:  Recent Results (from the past 24 hour(s))   GLUCOSE, POC    Collection Time: 08/27/22  3:26 PM   Result Value Ref Range    Glucose (POC) 140 (H) 65 - 117 mg/dL    Performed by Alfred GR    GLUCOSE, POC    Collection Time: 08/27/22 10:48 PM   Result Value Ref Range    Glucose (POC) 153 (H) 65 - 117 mg/dL    Performed by Purnima Austin    EKG, 12 LEAD, INITIAL    Collection Time: 08/28/22 12:10 AM   Result Value Ref Range    Ventricular Rate 135 BPM    Atrial Rate 135 BPM    P-R Interval 122 ms    QRS Duration 76 ms    Q-T Interval 312 ms    QTC Calculation (Bezet) 468 ms    Calculated P Axis 45 degrees    Calculated R Axis -10 degrees    Calculated T Axis 128 degrees    Diagnosis       Sinus tachycardia  Moderate voltage criteria for LVH, may be normal variant  Inferior infarct , age undetermined  T wave abnormality, consider lateral ischemia  Abnormal ECG  No previous ECGs available  Confirmed by [address] ([id number]) on 8/28/2022 10:26:20 AM     GLUCOSE, POC Collection Time: 08/28/22  5:23 AM   Result Value Ref Range    Glucose (POC) 154 (H) 65 - 117 mg/dL    Performed by Prieto Philip, POC    Collection Time: 08/28/22  7:31 AM   Result Value Ref Range    Glucose (POC) 149 (H) 65 - 117 mg/dL    Performed by Mario Schneider    GLUCOSE, POC    Collection Time: 08/28/22 10:52 AM   Result Value Ref Range    Glucose (POC) 119 (H) 65 - 117 mg/dL    Performed by Mario Schneider           Assessment:     Active Problems:    S/P closure of ileostomy (8/26/2022)        Plan:   Continue n.p.o., awaiting return of bowel function  Follow-up a.m. labs  Will start Lovenox for DVT prophylaxis  Out of bed to chair as tolerated    Addendum:  Patient with history of poor dry products allergy, Lovenox contraindicated per pharmacy we will continue with sequential compression device for DVT prophylaxis  Signed By: Lakeisha Lewis MD     August 28, 2022

## 2022-08-28 NOTE — PROGRESS NOTES
Tele-monitoring contacted this unit regarding patient HR was running in the 140's, sustaining between 135-140's. Reassessed patients vital signs and paged Dr. Irina Reardon regarding this matter. Dr. Irina Reardon wanted cardiologist consulted. 0051-Patient HR still sustaining above 140's, charge nurse called RRT, magnesium sulfate 1g IV, lopressor 2.5 mg IV and normal saline bolus has been ordered once. Patients HR is now sustaining between 114/119.  Continuing to monitor patient HR and BP,

## 2022-08-29 NOTE — ROUTINE PROCESS
Bedside and Verbal shift change report given to [address] [place] [address] Ghanshyam (oncoming nurse) by Alissa Burnette (offgoing nurse). Report included the following information SBAR.

## 2022-08-29 NOTE — PROGRESS NOTES
Progress Note    Patient: Bethel Bardales MRN: [id number]  SSN: [id number]    YOB: 1970  Age: 47 y.o. Sex: male      Admit Date: 8/26/2022    LOS: 3 days     Subjective:   Postoperative day 3  Patient seen in bed  Denies flatus or bowel movement  Continues to have tachycardia with heart rate in the 120s  T-max 101.8  Objective:     Vitals:    08/28/22 2351 08/28/22 2357 08/29/22 0400 08/29/22 0442   BP: 118/75   122/74   Pulse: (!) 122 (!) 127 (!) 122 (!) 122   Resp: 22   20   Temp: 99.8 °F (37.7 °C)   100.2 °F (37.9 °C)   SpO2: 97%   98%   Weight:       Height:            Intake and Output:  Current Shift: No intake/output data recorded. Last three shifts: 08/27 1901 - 08/29 0700  In: 150 [P.O.:150]  Out: 2635 [Urine:2600; Drains:35]    Review of Systems:  ROS     Physical Exam:    Abdomen soft, appropriately tender, mildly distended, patient is clean, LES drain serosanguineous drainage  Lab/Data Review:  Recent Results (from the past 24 hour(s))   GLUCOSE, POC    Collection Time: 08/28/22  7:31 AM   Result Value Ref Range    Glucose (POC) 149 (H) 65 - 117 mg/dL    Performed by Janel Torres    GLUCOSE, POC    Collection Time: 08/28/22 10:52 AM   Result Value Ref Range    Glucose (POC) 119 (H) 65 - 117 mg/dL    Performed by Janel Torres    CBC WITH AUTOMATED DIFF    Collection Time: 08/28/22 12:18 PM   Result Value Ref Range    WBC 14.3 (H) 4.1 - 11.1 K/uL    RBC 3.18 (L) 4.10 - 5.70 M/uL    HGB 7.2 (L) 12.1 - 17.0 g/dL    HCT 23.8 (L) 36.6 - 50.3 %    MCV 74.8 (L) 80.0 - 99.0 FL    MCH 22.6 (L) 26.0 - 34.0 PG    MCHC 30.3 30.0 - 36.5 g/dL    RDW 20.2 (H) 11.5 - 14.5 %    PLATELET 000 472 - 233 K/uL    NRBC 0.0 0.0  WBC    ABSOLUTE NRBC 0.00 0.00 - 0.01 K/uL    NEUTROPHILS 77 (H) 32 - 75 %    LYMPHOCYTES 10 (L) 12 - 49 %    MONOCYTES 12 5 - 13 %    EOSINOPHILS 0 0 - 7 %    BASOPHILS 0 0 - 1 %    IMMATURE GRANULOCYTES 1 (H) 0 - 0.5 %    ABS. NEUTROPHILS 11.1 (H) 1.8 - 8.0 K/UL    ABS. LYMPHOCYTES 1.4 0.8 - 3.5 K/UL    ABS. MONOCYTES 1.8 (H) 0.0 - 1.0 K/UL    ABS. EOSINOPHILS 0.0 0.0 - 0.4 K/UL    ABS. BASOPHILS 0.0 0.0 - 0.1 K/UL    ABS. IMM. GRANS. 0.1 (H) 0.00 - 0.04 K/UL    DF AUTOMATED     METABOLIC PANEL, COMPREHENSIVE    Collection Time: 08/28/22 12:18 PM   Result Value Ref Range    Sodium 142 136 - 145 mmol/L    Potassium 4.0 3.5 - 5.1 mmol/L    Chloride 112 (H) 97 - 108 mmol/L    CO2 22 21 - 32 mmol/L    Anion gap 8 5 - 15 mmol/L    Glucose 120 (H) 65 - 100 mg/dL    BUN 16 6 - 20 mg/dL    Creatinine 0.59 (L) 0.70 - 1.30 mg/dL    BUN/Creatinine ratio 27 (H) 12 - 20      GFR est AA >60 >60 ml/min/1.73m2    GFR est non-AA >60 >60 ml/min/1.73m2    Calcium 7.6 (L) 8.5 - 10.1 mg/dL    Bilirubin, total 0.4 0.2 - 1.0 mg/dL    AST (SGOT) 18 15 - 37 U/L    ALT (SGPT) 20 12 - 78 U/L    Alk. phosphatase 76 45 - 117 U/L    Protein, total 4.8 (L) 6.4 - 8.2 g/dL    Albumin 2.2 (L) 3.5 - 5.0 g/dL    Globulin 2.6 2.0 - 4.0 g/dL    A-G Ratio 0.8 (L) 1.1 - 2.2     TSH 3RD GENERATION    Collection Time: 08/28/22 12:54 PM   Result Value Ref Range    TSH 0.45 0.36 - 3.74 uIU/mL   GLUCOSE, POC    Collection Time: 08/28/22  4:16 PM   Result Value Ref Range    Glucose (POC) 112 65 - 117 mg/dL    Performed by Lam Keys    GLUCOSE, POC    Collection Time: 08/28/22  8:08 PM   Result Value Ref Range    Glucose (POC) 89 65 - 117 mg/dL    Performed by Raul Ross    GLUCOSE, POC    Collection Time: 08/29/22  7:10 AM   Result Value Ref Range    Glucose (POC) 78 65 - 117 mg/dL    Performed by Jesica Brown           Assessment:     Active Problems:    S/P closure of ileostomy (8/26/2022)        Plan: To chair  Incentive spirometry  Leukocytosis yesterday was resolving repeat labs this a.m.   Continue n.p.o. awaiting return of bowel function  Hemoglobin drop, continue sequential compression device for DVT prophylaxis continue to hold Eliquis  Signed By: Phoebe Fuentes MD     August 29, 2022

## 2022-08-29 NOTE — PROGRESS NOTES
Problem: Diabetes Self-Management  Goal: *Disease process and treatment process  Description: Define diabetes and identify own type of diabetes; list 3 options for treating diabetes. Outcome: Progressing Towards Goal  Goal: *Incorporating nutritional management into lifestyle  Description: Describe effect of type, amount and timing of food on blood glucose; list 3 methods for planning meals. Outcome: Progressing Towards Goal  Goal: *Incorporating physical activity into lifestyle  Description: State effect of exercise on blood glucose levels. Outcome: Progressing Towards Goal  Goal: *Developing strategies to promote health/change behavior  Description: Define the ABC's of diabetes; identify appropriate screenings, schedule and personal plan for screenings. Outcome: Progressing Towards Goal  Goal: *Using medications safely  Description: State effect of diabetes medications on diabetes; name diabetes medication taking, action and side effects. Outcome: Progressing Towards Goal  Goal: *Monitoring blood glucose, interpreting and using results  Description: Identify recommended blood glucose targets  and personal targets. Outcome: Progressing Towards Goal  Goal: *Prevention, detection, treatment of acute complications  Description: List symptoms of hyper- and hypoglycemia; describe how to treat low blood sugar and actions for lowering  high blood glucose level. Outcome: Progressing Towards Goal  Goal: *Prevention, detection and treatment of chronic complications  Description: Define the natural course of diabetes and describe the relationship of blood glucose levels to long term complications of diabetes.   Outcome: Progressing Towards Goal  Goal: *Developing strategies to address psychosocial issues  Description: Describe feelings about living with diabetes; identify support needed and support network  Outcome: Progressing Towards Goal  Goal: *Insulin pump training  Outcome: Progressing Towards Goal  Goal: *Sick day guidelines  Outcome: Progressing Towards Goal  Goal: *Patient Specific Goal (EDIT GOAL, INSERT TEXT)  Outcome: Progressing Towards Goal     Problem: Patient Education: Go to Patient Education Activity  Goal: Patient/Family Education  Outcome: Progressing Towards Goal     Problem: Falls - Risk of  Goal: *Absence of Falls  Description: Document Danney Mess Fall Risk and appropriate interventions in the flowsheet. Outcome: Progressing Towards Goal  Note: Fall Risk Interventions:  Mobility Interventions: Bed/chair exit alarm         Medication Interventions: Bed/chair exit alarm    Elimination Interventions: Call light in reach    History of Falls Interventions: Bed/chair exit alarm         Problem: Patient Education: Go to Patient Education Activity  Goal: Patient/Family Education  Outcome: Progressing Towards Goal     Problem: Pressure Injury - Risk of  Goal: *Prevention of pressure injury  Description: Document Yuri Scale and appropriate interventions in the flowsheet.   Outcome: Progressing Towards Goal  Note: Pressure Injury Interventions:  Sensory Interventions: Minimize linen layers    Moisture Interventions: Absorbent underpads    Activity Interventions: Increase time out of bed    Mobility Interventions: HOB 30 degrees or less    Nutrition Interventions: Document food/fluid/supplement intake    Friction and Shear Interventions: HOB 30 degrees or less                Problem: Patient Education: Go to Patient Education Activity  Goal: Patient/Family Education  Outcome: Progressing Towards Goal

## 2022-08-29 NOTE — PROGRESS NOTES
8/29/2022, 11:18 AM        [facility] at [facility]  Phone: [phone]      Daily Progress Note:      Patient identification:     Earnestine Katz  32 y.o.male  1970      Primary Cardiologist:  Marly Davenport    Chief Complaint:  Tachycardia    Assessment:     Tachycardia  HTN  History of atrial flutter/ SVT  Myasthenia gravis  Type 2 diabetes  Polymyositis  GERD    Recommendation:     Continue beta-blocker therapy for heart rate control. Supportive care per primary team.  Eliquis on hold. DVT prophylaxis via SCDs. Interval History:  No new cardiac developments    Subjective:  Pt. States no cardiac complaints    Review of Systems:   Chest pain. No worsening shortness of breath. No worsening edema.   He.    Medications:     Current Facility-Administered Medications   Medication Dose Route Frequency    metoprolol tartrate (LOPRESSOR) tablet 25 mg  25 mg Oral Q12H    acetaminophen (TYLENOL) tablet 650 mg  650 mg Oral Q6H PRN    HYDROmorphone (DILAUDID) injection 2 mg  2 mg IntraVENous Q4H PRN    HYDROmorphone (DILAUDID) injection 1 mg  1 mg IntraVENous Q4H PRN    glucose chewable tablet 16 g  4 Tablet Oral PRN    glucagon (GLUCAGEN) injection 1 mg  1 mg IntraMUSCular PRN    ondansetron (ZOFRAN) injection 4 mg  4 mg IntraVENous Q6H PRN    lactated Ringers infusion  20 mL/hr IntraVENous CONTINUOUS    dextrose 10% infusion 0-250 mL  0-250 mL IntraVENous PRN    oxyCODONE IR (ROXICODONE) tablet 5 mg  5 mg Oral Q4H PRN    ceFAZolin (ANCEF) 2 g in sterile water (preservative free) 20 mL IV syringe  2 g IntraVENous Q8H    metroNIDAZOLE (FLAGYL) IVPB premix 500 mg  500 mg IntraVENous Q8H    0.9% sodium chloride infusion  125 mL/hr IntraVENous CONTINUOUS    albuterol (PROVENTIL HFA, VENTOLIN HFA, PROAIR HFA) inhaler 1-2 Puff  1-2 Puff Inhalation Q4H PRN    pantoprazole (PROTONIX) tablet 40 mg  40 mg Oral ACB&D    predniSONE (DELTASONE) tablet 15 mg  15 mg Oral DAILY WITH BREAKFAST    promethazine (PHENERGAN) tablet 25 mg  25 mg Oral Q6H PRN    insulin lispro (HUMALOG) injection   SubCUTAneous AC&HS       Allergies: Allergies   Allergen Reactions    Beef Containing Products Anaphylaxis and Other (comments)    Diltiazem Other (comments)     Reaction Type: Allergy; Reaction(s): Pressure in chest    Iodinated Contrast Media Other (comments)     Reaction Type: Allergy; Severity: Severe; Reaction(s): hives,throat swelling    Pork Derived (Porcine) Hives    Shellfish Containing Products Swelling     Other reaction(s): Other  Reaction Type: Allergy; Reaction(s): Hives,throat swelling    Sulfa (Sulfonamide Antibiotics) Unknown (comments)     Spinal meningitis    Sulfur Unknown (comments)     Reaction Type: Allergy  Spinal meningitis    Lactose Diarrhea     Lactose intolerant per Pt.        Physical Exam:   Visit Vitals  /83 (BP 1 Location: Right upper arm, BP Patient Position: At rest)   Pulse (!) 119   Temp 99.9 °F (37.7 °C)   Resp 18   Ht 5' 8\" (1.727 m)   Wt 74.8 kg (165 lb)   SpO2 97%   BMI 25.09 kg/m²       General:  NAD, calm, cooperative  CVS:  Regular rate and rhythm, tachycardic, normal S1S2, no new murmurs, rubs or gallops  Pulm:  Normal effort, clear to auscultation bilaterally  Abd:  Soft, non-tender, non-distended  Ext:  Warm and well perfused without edema  Neuro:  Alert and oriented, answering questions appropriately    Telemetry reviewed:      Labs:    CBC  Lab Results   Component Value Date/Time    WBC 13.8 (H) 08/29/2022 08:32 AM    RBC 2.81 (L) 08/29/2022 08:32 AM    HCT 21.4 (L) 08/29/2022 08:32 AM    MCV 76.2 (L) 08/29/2022 08:32 AM    MCH 22.4 (L) 08/29/2022 08:32 AM    RDW 20.0 (H) 08/29/2022 08:32 AM    MONOS 11 08/29/2022 08:32 AM    EOS 0 08/29/2022 08:32 AM    BASOS 0 08/29/2022 08:32 AM       Basic Metabolic Profile  Lab Results   Component Value Date     08/28/2022    CO2 22 08/28/2022    BUN 16 08/28/2022       Diagnostic Studies:    Echo Results (Last 48 hours)      None          No orders to display       Dr. Lisa Senior MD, Nitish Sung

## 2022-08-29 NOTE — PROGRESS NOTES
Bedside shift change report given to Isiah Tanner RN (oncoming nurse) by [facility] RN (offgoing nurse). Report included the following information SBAR.

## 2022-08-30 NOTE — PROGRESS NOTES
Progress Note      8/30/2022 1:17 PM  NAME: Nakia Choi   MRN:  [id number]   Admit Diagnosis: [facility] [Z93.2]  S/P closure of ileostomy [Z98.890]      Problem List:   Tachycardia  HTN  History of atrial flutter/ SVT  Myasthenia gravis  Type 2 diabetes  Polymyositis  Anemia with Hg 9.2     Assessment/Plan:   Increase metoprolol  Follow EKG's  Resume Eliquis once okay from a surgical standpoint         []       High complexity decision making was performed in this patient at high risk for decompensation with multiple organ involvement. Subjective:     Nakia Choi denies chest pain, dyspnea. Discussed with RN events overnight. Review of Systems:   Negative except for as noted above. Objective:      Physical Exam:    Last 24hrs VS reviewed since prior progress note. Most recent are:    Visit Vitals  BP (!) 136/91 (BP 1 Location: Left upper arm, BP Patient Position: At rest)   Pulse (!) 102   Temp 98.5 °F (36.9 °C)   Resp 18   Ht 5' 8\" (1.727 m)   Wt 74.8 kg (165 lb)   SpO2 97%   BMI 25.09 kg/m²       Intake/Output Summary (Last 24 hours) at 8/30/2022 1317  Last data filed at 8/30/2022 0650  Gross per 24 hour   Intake 2845.33 ml   Output 1690 ml   Net 1155.33 ml        General Appearance: Alert; no acute distress. Ears/Nose/Mouth/Throat: moist mucous membranes  Neck: Supple. Chest: Lungs clear to auscultation bilaterally. Cardiovascular: Regular rate and rhythm, S1S2 normal  Abdomen: Soft, non-tender, bowel sounds are active. Extremities: No edema bilaterally. Skin: Warm and dry. []         Post-cath site without hematoma, bruit, tenderness, or thrill. Distal pulses intact. PMH/SH reviewed - no change compared to H&P    Telemetry: ST    EKG: ST at 102 bpm    04/15/22    ECHO AL W POSSIBLE CARDIOVERSION 06/02/2022 6/2/2022    Interpretation Summary  Images from the original result were not included.           [facility] at [address] [place] [address] [address], [place]  Phone: [phone]        NAME: Raj Dozier  :  1970  MRN:  [id number]  Date/Time:  2022 2:22 PM        Anesthesia: Dr. Ema Cooper and the team    Operators:   Dr. Darinel Lantigua    Procedure:  TRANSESOPHAGEAL ECHO    Indication:   CVA, assess cardiac LETICIA      Procedure description: After obtaining the informed consent, patient was brought to the transesophageal echocardiography area in PACU. Patient was prepped and draped in the usual standard fashion. Patient was sedated using IV propofol as per anesthesia team. After ascertaining the absence of gag reflex, an omni plane probe with the help of water-soluble lubricant jelly was gently passed over the bite guard into the patient's esophagus. Echocardiographic images in various tomographic planes were obtained and simultaneously digitalized on a hard drive of the echocardiography machine. Patient throughout  the procedure was kept comfortable using IV sedation as per anesthesia and was closely monitored for oxygen saturation, hemodynamics, cardiac rhythm and any signs of distress. Patient completed the test without any complications. Transesophageal echocardiographic findings:  1. This is technically adequate echocardiography study. 2.    Normal left LV size and systolic function with grossly preserved wall motion. Estimated LV ejection fraction is 60%  3. Normal RV size and systolic function. 4.    Normal left atrial size. 5.    Normal right atrial size. 6.    Mitral valve is normal in structure with mild to moderate mitral regurgitation. 7.    Tricuspid valve is normal in structure with mild tricuspid regurgitation. 8.    Aortic valve normal trileaflet without stenosis or regurgitation. Aortic root is normal.  9.    Pulmonic valve is normal functioning. 10.  No intracardiac masses, thrombi or any cardiac source of emboli noted. Patient's left atrial appendage was without any clot or thrombi.   11.  Normal-appearing ascending and descending aorta. 12. No patent foramen ovale or intracardiac shunt appreciated by color flow or by agitated saline injection. Impression:  1. No intracardiac masses, thrombi on any cardiac source of emboli noted. 2.  No patent foramen ovale or intracardiac shunt appreciated by injection of agitated saline and color flow. 3.  Normal left ventricle size and systolic function with an estimated LV ejection fraction of 60%. Signed by: Segun Denton MD on 6/2/2022  2:25 PM      []  No new EKG for review    Lab Data Personally Reviewed:    Recent Labs     08/30/22  1105 08/29/22  0832   WBC 14.8* 13.8*   HGB 9.2* 6.3*   HCT 29.3* 21.4*    171     Recent Labs     08/29/22  1219   INR 1.3*   PTP 16.4*   APTT 35.2*      Recent Labs     08/28/22  1218      K 4.0   *   CO2 22   BUN 16   CREA 0.59*   *   CA 7.6*     No results for input(s): CPK, CKNDX, TROIQ in the last 72 hours. No lab exists for component: CPKMB  Lab Results   Component Value Date/Time    Cholesterol, total 120 06/20/2022 07:50 AM    HDL Cholesterol 64 06/20/2022 07:50 AM    LDL, calculated 40 06/20/2022 07:50 AM    Triglyceride 80 06/20/2022 07:50 AM    CHOL/HDL Ratio 1.9 06/20/2022 07:50 AM       Recent Labs     08/28/22  1218   AP 76   TP 4.8*   ALB 2.2*   GLOB 2.6     No results for input(s): PH, PCO2, PO2 in the last 72 hours.     Medications Personally Reviewed:    Current Facility-Administered Medications   Medication Dose Route Frequency    HYDROmorphone (DILAUDID) injection 1 mg  1 mg IntraVENous Q4H PRN    HYDROmorphone (DILAUDID) injection 2 mg  2 mg IntraVENous Q4H PRN    0.9% sodium chloride infusion 250 mL  250 mL IntraVENous PRN    metoprolol tartrate (LOPRESSOR) tablet 25 mg  25 mg Oral Q12H    acetaminophen (TYLENOL) tablet 650 mg  650 mg Oral Q6H PRN    glucose chewable tablet 16 g  4 Tablet Oral PRN    glucagon (GLUCAGEN) injection 1 mg  1 mg IntraMUSCular PRN    ondansetron (ZOFRAN) injection 4 mg  4 mg IntraVENous Q6H PRN    lactated Ringers infusion  20 mL/hr IntraVENous CONTINUOUS    dextrose 10% infusion 0-250 mL  0-250 mL IntraVENous PRN    oxyCODONE IR (ROXICODONE) tablet 5 mg  5 mg Oral Q4H PRN    ceFAZolin (ANCEF) 2 g in sterile water (preservative free) 20 mL IV syringe  2 g IntraVENous Q8H    metroNIDAZOLE (FLAGYL) IVPB premix 500 mg  500 mg IntraVENous Q8H    0.9% sodium chloride infusion  125 mL/hr IntraVENous CONTINUOUS    albuterol (PROVENTIL HFA, VENTOLIN HFA, PROAIR HFA) inhaler 1-2 Puff  1-2 Puff Inhalation Q4H PRN    pantoprazole (PROTONIX) tablet 40 mg  40 mg Oral ACB&D    predniSONE (DELTASONE) tablet 15 mg  15 mg Oral DAILY WITH BREAKFAST    promethazine (PHENERGAN) tablet 25 mg  25 mg Oral Q6H PRN    insulin lispro (HUMALOG) injection   SubCUTAneous AC&HS         Nadir Moreno MD

## 2022-08-30 NOTE — PROGRESS NOTES
Problem: Diabetes Self-Management  Goal: *Disease process and treatment process  Description: Define diabetes and identify own type of diabetes; list 3 options for treating diabetes. Outcome: Progressing Towards Goal  Goal: *Incorporating nutritional management into lifestyle  Description: Describe effect of type, amount and timing of food on blood glucose; list 3 methods for planning meals. Outcome: Progressing Towards Goal  Goal: *Incorporating physical activity into lifestyle  Description: State effect of exercise on blood glucose levels. Outcome: Progressing Towards Goal  Goal: *Developing strategies to promote health/change behavior  Description: Define the ABC's of diabetes; identify appropriate screenings, schedule and personal plan for screenings. Outcome: Progressing Towards Goal  Goal: *Using medications safely  Description: State effect of diabetes medications on diabetes; name diabetes medication taking, action and side effects. Outcome: Progressing Towards Goal  Goal: *Monitoring blood glucose, interpreting and using results  Description: Identify recommended blood glucose targets  and personal targets. Outcome: Progressing Towards Goal  Goal: *Prevention, detection, treatment of acute complications  Description: List symptoms of hyper- and hypoglycemia; describe how to treat low blood sugar and actions for lowering  high blood glucose level. Outcome: Progressing Towards Goal  Goal: *Prevention, detection and treatment of chronic complications  Description: Define the natural course of diabetes and describe the relationship of blood glucose levels to long term complications of diabetes.   Outcome: Progressing Towards Goal  Goal: *Developing strategies to address psychosocial issues  Description: Describe feelings about living with diabetes; identify support needed and support network  Outcome: Progressing Towards Goal  Goal: *Insulin pump training  Outcome: Progressing Towards Goal  Goal: *Sick day guidelines  Outcome: Progressing Towards Goal  Goal: *Patient Specific Goal (EDIT GOAL, INSERT TEXT)  Outcome: Progressing Towards Goal     Problem: Patient Education: Go to Patient Education Activity  Goal: Patient/Family Education  Outcome: Progressing Towards Goal     Problem: Falls - Risk of  Goal: *Absence of Falls  Description: Document Ky Boland Fall Risk and appropriate interventions in the flowsheet. Outcome: Progressing Towards Goal  Note: Fall Risk Interventions:  Mobility Interventions: Patient to call before getting OOB         Medication Interventions: Patient to call before getting OOB    Elimination Interventions: Call light in reach    History of Falls Interventions: Bed/chair exit alarm         Problem: Patient Education: Go to Patient Education Activity  Goal: Patient/Family Education  Outcome: Progressing Towards Goal     Problem: Pressure Injury - Risk of  Goal: *Prevention of pressure injury  Description: Document Yuri Scale and appropriate interventions in the flowsheet.   Outcome: Progressing Towards Goal  Note: Pressure Injury Interventions:  Sensory Interventions: Keep linens dry and wrinkle-free, Minimize linen layers    Moisture Interventions: Absorbent underpads, Minimize layers    Activity Interventions: Increase time out of bed, PT/OT evaluation    Mobility Interventions: HOB 30 degrees or less, PT/OT evaluation    Nutrition Interventions: Document food/fluid/supplement intake, Offer support with meals,snacks and hydration    Friction and Shear Interventions: HOB 30 degrees or less, Minimize layers                Problem: Patient Education: Go to Patient Education Activity  Goal: Patient/Family Education  Outcome: Progressing Towards Goal

## 2022-08-30 NOTE — PROGRESS NOTES
Patient pending acceptance with [facility] pending review of therapy notes. PT/OT will be need to be ordered when medically appropriate as auth will be needed. Care team made aware in AM IDR meeting.    ------------------------    1400- CM spoke with Dr. Kerri Yu and orders received for PT/OT.

## 2022-08-30 NOTE — PROGRESS NOTES
PT order received and acknowledged, attempted at , however spoke w/ Dr Kevon Perez, who requested PT to hold until Thursday-Friday. PT to continue to follow for PT and eval at a later date as medically appropriate.

## 2022-08-30 NOTE — PROGRESS NOTES
OT eval order received and acknowledged, attempted at 1435 however spoke w/ Dr. Phan Neighbours who requested therapy be held till Thursday or Friday. Will continue to follow pt and attempt OT eval at a later time as medically appropriate. Thank you.

## 2022-08-31 NOTE — PROGRESS NOTES
Patient complaining of abdominal incisional pain, pain scale 10 out of 10. This nurse administered PRN dilaudid 2 mg, patient also states that pain is increasing instead of decreasing.

## 2022-08-31 NOTE — PROGRESS NOTES
Progress Note    Patient: Alanna Fraga MRN: [id number]  SSN: [id number]    YOB: 1970  Age: 47 y.o. Sex: male      Admit Date: 8/26/2022    LOS: 5 days     Subjective:   Postoperative day 5  Patient states his abdominal pain is increased today  Continues to pass a small amount of flatus but no further bowel movements  Afebrile  Objective:     Vitals:    08/30/22 1449 08/30/22 2023 08/30/22 2238 08/31/22 0255   BP: 133/88 (!) 147/92 (!) 144/89 (!) 137/92   Pulse: 94 (!) 102 (!) 106 (!) 105   Resp: 18 20 18 18   Temp: 98.7 °F (37.1 °C) 99 °F (37.2 °C)  99.2 °F (37.3 °C)   SpO2: 96% 97% 95% 98%   Weight:       Height:            Intake and Output:  Current Shift: No intake/output data recorded. Last three shifts: 08/29 1901 - 08/31 0700  In: 2989.5 [P.O.:150; I.V.:2529.5]  Out: 2600 [Urine:2550; Drains:50]    Review of Systems:  ROS     Physical Exam:   Abdomen is soft, mildly diffusely tender, incision clean, moderately distended    Lab/Data Review:  Recent Results (from the past 24 hour(s))   CBC WITH AUTOMATED DIFF    Collection Time: 08/30/22 11:05 AM   Result Value Ref Range    WBC 14.8 (H) 4.1 - 11.1 K/uL    RBC 3.81 (L) 4.10 - 5.70 M/uL    HGB 9.2 (L) 12.1 - 17.0 g/dL    HCT 29.3 (L) 36.6 - 50.3 %    MCV 76.9 (L) 80.0 - 99.0 FL    MCH 24.1 (L) 26.0 - 34.0 PG    MCHC 31.4 30.0 - 36.5 g/dL    RDW 18.6 (H) 11.5 - 14.5 %    PLATELET 658 511 - 431 K/uL    NRBC 0.1 (H) 0.0  WBC    ABSOLUTE NRBC 0.02 (H) 0.00 - 0.01 K/uL    NEUTROPHILS 80 (H) 32 - 75 %    LYMPHOCYTES 9 (L) 12 - 49 %    MONOCYTES 10 5 - 13 %    EOSINOPHILS 0 0 - 7 %    BASOPHILS 0 0 - 1 %    IMMATURE GRANULOCYTES 1 (H) 0 - 0.5 %    ABS. NEUTROPHILS 11.8 (H) 1.8 - 8.0 K/UL    ABS. LYMPHOCYTES 1.4 0.8 - 3.5 K/UL    ABS. MONOCYTES 1.4 (H) 0.0 - 1.0 K/UL    ABS. EOSINOPHILS 0.1 0.0 - 0.4 K/UL    ABS. BASOPHILS 0.0 0.0 - 0.1 K/UL    ABS. IMM.  GRANS. 0.1 (H) 0.00 - 0.04 K/UL    DF AUTOMATED     GLUCOSE, POC    Collection Time: 08/30/22 11:33 AM   Result Value Ref Range    Glucose (POC) 92 65 - 117 mg/dL    Performed by Amado 68, POC    Collection Time: 08/30/22  6:03 PM   Result Value Ref Range    Glucose (POC) 74 65 - 117 mg/dL    Performed by Shital Ahmadi RN    GLUCOSE, POC    Collection Time: 08/30/22 10:28 PM   Result Value Ref Range    Glucose (POC) 78 65 - 117 mg/dL    Performed by Agusto Bryson, POC    Collection Time: 08/31/22 12:16 AM   Result Value Ref Range    Glucose (POC) 90 65 - 100 mg/dL    Performed by Agusto Bryson, POC    Collection Time: 08/31/22  5:15 AM   Result Value Ref Range    Glucose (POC) 89 65 - 100 mg/dL    Performed by James Yañez           Assessment:     Active Problems:    S/P closure of ileostomy (8/26/2022)        Plan:   Continue n.p.o., IV fluids, ice chips  Check CBC and CMP this a.m.   Continue antibiotics  Continue metoprolol for tachyarrhythmia    Signed By: Kalen Oswald MD     August 31, 2022

## 2022-08-31 NOTE — PROGRESS NOTES
Comprehensive Nutrition Assessment    Type and Reason for Visit: Initial, NPO/clear liquid (x5 days)    Nutrition Recommendations/Plan:   Consider initiating Clear Liquid diet, advance as tolerated to goal of GI Swain, Low Sodium. Ensure Clear/HP x2/d. Please monitor and document OPs, BMs in I/Os. Malnutrition Assessment:  Malnutrition Status:  Mild malnutrition (08/31/22 1229)    Context:  Acute illness     Findings of the 6 clinical characteristics of malnutrition:   Energy Intake:  50% or less of est energy requirements for 5 or more days (NPO x5 days s/p surgery.)  Weight Loss:  No significant weight loss     Body Fat Loss:  No significant body fat loss,     Muscle Mass Loss:  No significant muscle mass loss,    Fluid Accumulation:  No significant fluid accumulation,     Strength:  Not performed     Nutrition Assessment:    Admitted for scheduled ileostomy closure. POD 5 w/ small amount of flatus noted per MD. Pt familiar to RD. Previously on Clear Liq 6 days ago in preparation for sx, currently NPO x5 days. Pt is clinically and metabolically stable w/ functional gut as evidenced by +BM, +flatus and active bowel sounds. As pt gut is functional, best practice is to initiate PO diet vs EN as medically able to promote best nutrition and continued gut function. Rec's above. No h/o wt loss since last nutrition assessment noted. Labs: H/H 9/2/29. 3. Meds: NS @125 mL/hr, PPI, zofran, prednisone, lopressor, flagyl, ancef, dilaudid. Nutrition Related Findings:    Nourished in appearance- per RN no obvious visible fat loss. ?+N- requested zofran w/o complaint of nausea; no V/D/C reported. NPO, no h/o dysphagia. Last BM 8/30. No edema.  Wound Type: Surgical incision (Abdomen)     Current Nutrition Intake & Therapies:  Average Meal Intake: NPO  Average Supplement Intake: NPO  DIET NPO Sips of Water with Meds    Anthropometric Measures:  Height: 5' 8\" (172.7 cm)  Ideal Body Weight (IBW): 154 lbs (70 kg)  Current Body Wt:  74.8 kg (164 lb 14.5 oz), 107.1 % IBW. Stated  Current BMI (kg/m2): 25.1  Usual Body Weight: 60.5 kg (133 lb 6.1 oz) (2 months ago per chart review [facility]  % Weight Change (Calculated): 23.6  Weight Adjustment: No adjustment  BMI Category: Overweight (BMI 25.0-29. 9)    Nutrition Diagnosis:   Inadequate oral intake related to altered GI structure as evidenced by NPO or clear liquid status due to medical condition, nausea, GI abnormality    Nutrition Interventions:   Food and/or Nutrient Delivery: Start oral diet, Start oral nutrition supplement  Nutrition Education/Counseling: No recommendations at this time  Coordination of Nutrition Care: Continue to monitor while inpatient  Plan of Care discussed with: RN    Goals:  Goals: PO intake 50% or greater, Meet at least 75% of estimated needs, within 2 days, Initiate PO diet, other (specify)  Specify Other Goals: Initiate means to meet >/= 50% of EENs within 2-5 days. Nutrition Monitoring and Evaluation:   Behavioral-Environmental Outcomes: None identified  Food/Nutrient Intake Outcomes: Diet advancement/tolerance, Food and nutrient intake, Supplement intake  Physical Signs/Symptoms Outcomes: Biochemical data, GI status, Meal time behavior, Weight, Nausea/vomiting    Discharge Planning: Too soon to determine    Richie Martin RD  Contact: [phone]. [phone] or Mark.

## 2022-08-31 NOTE — ADT AUTH CERT NOTES
Last 48-hrs Progress Notes  Notes from 08/29/22 through 08/31/22  Progress Notes by Elizabeth Reddy MD at 08/31/22 0810  Version 1 of 1  Author: Elizabeth Reddy MD Service: Colon and Rectal Surgery Author Type: Physician   Filed: 08/31/22 0812 Date of Service: 08/31/22 0810 Status: Signed   : Elizabeth Reddy MD (Physician)   Expand All Collapse All           Progress Note     Patient: Suly Brantley MRN: [id number]  SSN: [id number]    YOB: 1970  Age: 47 y.o. Sex: male       Admit Date: 8/26/2022    LOS: 5 days      Subjective:   Postoperative day 5  Patient states his abdominal pain is increased today  Continues to pass a small amount of flatus but no further bowel movements  Afebrile  Objective:             Vitals:     08/30/22 1449 08/30/22 2023 08/30/22 2238 08/31/22 0255   BP: 133/88 (!) 147/92 (!) 144/89 (!) 137/92   Pulse: 94 (!) 102 (!) 106 (!) 105   Resp: 18 20 18 18   Temp: 98.7 °F (37.1 °C) 99 °F (37.2 °C)   99.2 °F (37.3 °C)   SpO2: 96% 97% 95% 98%   Weight:           Height:                 Intake and Output:  Current Shift: No intake/output data recorded. Last three shifts: 08/29 1901 - 08/31 0700  In: 2989.5 [P.O.:150;  I.V.:2529.5]  Out: 2600 [Urine:2550; Drains:50]     Review of Systems:  ROS      Physical Exam:   Abdomen is soft, mildly diffusely tender, incision clean, moderately distended     Lab/Data Review:  Recent Results         Recent Results (from the past 24 hour(s))   CBC WITH AUTOMATED DIFF     Collection Time: 08/30/22 11:05 AM   Result Value Ref Range     WBC 14.8 (H) 4.1 - 11.1 K/uL     RBC 3.81 (L) 4.10 - 5.70 M/uL     HGB 9.2 (L) 12.1 - 17.0 g/dL     HCT 29.3 (L) 36.6 - 50.3 %     MCV 76.9 (L) 80.0 - 99.0 FL     MCH 24.1 (L) 26.0 - 34.0 PG     MCHC 31.4 30.0 - 36.5 g/dL     RDW 18.6 (H) 11.5 - 14.5 %     PLATELET 335 750 - 877 K/uL     NRBC 0.1 (H) 0.0  WBC     ABSOLUTE NRBC 0.02 (H) 0.00 - 0.01 K/uL     NEUTROPHILS 80 (H) 32 - 75 %     LYMPHOCYTES 9 (L) 12 - 49 %     MONOCYTES 10 5 - 13 %     EOSINOPHILS 0 0 - 7 %     BASOPHILS 0 0 - 1 %     IMMATURE GRANULOCYTES 1 (H) 0 - 0.5 %     ABS. NEUTROPHILS 11.8 (H) 1.8 - 8.0 K/UL     ABS. LYMPHOCYTES 1.4 0.8 - 3.5 K/UL     ABS. MONOCYTES 1.4 (H) 0.0 - 1.0 K/UL     ABS. EOSINOPHILS 0.1 0.0 - 0.4 K/UL     ABS. BASOPHILS 0.0 0.0 - 0.1 K/UL     ABS. IMM. GRANS. 0.1 (H) 0.00 - 0.04 K/UL     DF AUTOMATED     GLUCOSE, POC     Collection Time: 08/30/22 11:33 AM   Result Value Ref Range     Glucose (POC) 92 65 - 117 mg/dL     Performed by [address] POC     Collection Time: 08/30/22  6:03 PM   Result Value Ref Range     Glucose (POC) 74 65 - 117 mg/dL     Performed by Tiffanie Beverly RN     GLUCOSE, POC     Collection Time: 08/30/22 10:28 PM   Result Value Ref Range     Glucose (POC) 78 65 - 117 mg/dL     Performed by Marley Palacio, POC     Collection Time: 08/31/22 12:16 AM   Result Value Ref Range     Glucose (POC) 90 65 - 100 mg/dL     Performed by Marley Palacio, POC     Collection Time: 08/31/22  5:15 AM   Result Value Ref Range     Glucose (POC) 89 65 - 100 mg/dL     Performed by Bar Simmons                 Assessment:      Active Problems:    S/P closure of ileostomy (8/26/2022)           Plan:   Continue n.p.o., IV fluids, ice chips  Check CBC and CMP this a.m.   Continue antibiotics  Continue metoprolol for tachyarrhythmia     Signed By: Ena Kessler MD      August 31, 2022             Progress Notes by Hector Luna MD at 08/30/22 1317  Version 1 of 1  Author: Hector Luna MD Service: Cardiology Author Type: Physician   Filed: 08/30/22 1321 Date of Service: 08/30/22 1317 Status: Signed   : Hector Luna MD (Physician)           Progress Note        8/30/2022 1:17 PM  NAME:            Benjie Burnett   MRN:               [id number]   Admit Diagnosis: Ileostomy present ([facility] [Z93.2]  S/P closure of ileostomy [Z98.890]        Problem List: Tachycardia  HTN  History of atrial flutter/ SVT  Myasthenia gravis  Type 2 diabetes  Polymyositis  Anemia with Hg 9.2      Assessment/Plan:       Increase metoprolol  Follow EKG's  Resume Eliquis once okay from a surgical standpoint                     []       High complexity decision making was performed in this patient at high risk for decompensation with multiple organ involvement. Subjective:      Raj Dozier denies chest pain, dyspnea. Discussed with RN events overnight. Review of Systems:   Negative except for as noted above. Objective:      Physical Exam:     Last 24hrs VS reviewed since prior progress note. Most recent are:     Visit Vitals  BP (!) 136/91 (BP 1 Location: Left upper arm, BP Patient Position: At rest)   Pulse (!) 102   Temp 98.5 °F (36.9 °C)   Resp 18   Ht 5' 8\" (1.727 m)   Wt 74.8 kg (165 lb)   SpO2 97%   BMI 25.09 kg/m²         Intake/Output Summary (Last 24 hours) at 2022 1317  Last data filed at 2022 0650      Gross per 24 hour   Intake 2845.33 ml   Output 1690 ml   Net 1155.33 ml         General Appearance: Alert; no acute distress. Ears/Nose/Mouth/Throat: moist mucous membranes  Neck: Supple. Chest: Lungs clear to auscultation bilaterally. Cardiovascular: Regular rate and rhythm, S1S2 normal  Abdomen: Soft, non-tender, bowel sounds are active. Extremities: No edema bilaterally. Skin: Warm and dry. []         Post-cath site without hematoma, bruit, tenderness, or thrill. Distal pulses intact. PMH/SH reviewed - no change compared to H&P     Telemetry: ST     EKG: ST at 102 bpm     04/15/22     ECHO AL W POSSIBLE CARDIOVERSION 2022     Interpretation Summary  Images from the original result were not included.               [place] [address] [facility] Rafi Plummer [place]  Phone: [phone]           NAME: Raj Dozier  :  1970  MRN:  [id number]  Date/Time:  2022 2:22 PM           Anesthesia: Dr. Sujit Aldridge and the team     Operators:   Dr. Troy White     Procedure:  TRANSESOPHAGEAL ECHO     Indication:   CVA, assess cardiac LETICIA        Procedure description: After obtaining the informed consent, patient was brought to the transesophageal echocardiography area in PACU. Patient was prepped and draped in the usual standard fashion. Patient was sedated using IV propofol as per anesthesia team. After ascertaining the absence of gag reflex, an omni plane probe with the help of water-soluble lubricant jelly was gently passed over the bite guard into the patient's esophagus. Echocardiographic images in various tomographic planes were obtained and simultaneously digitalized on a hard drive of the echocardiography machine. Patient throughout  the procedure was kept comfortable using IV sedation as per anesthesia and was closely monitored for oxygen saturation, hemodynamics, cardiac rhythm and any signs of distress. Patient completed the test without any complications. Transesophageal echocardiographic findings:  1. This is technically adequate echocardiography study. 2.    Normal left LV size and systolic function with grossly preserved wall motion. Estimated LV ejection fraction is 60%  3. Normal RV size and systolic function. 4.    Normal left atrial size. 5.    Normal right atrial size. 6.    Mitral valve is normal in structure with mild to moderate mitral regurgitation. 7.    Tricuspid valve is normal in structure with mild tricuspid regurgitation. 8.    Aortic valve normal trileaflet without stenosis or regurgitation. Aortic root is normal.  9.    Pulmonic valve is normal functioning. 10.  No intracardiac masses, thrombi or any cardiac source of emboli noted. Patient's left atrial appendage was without any clot or thrombi. 11.  Normal-appearing ascending and descending aorta. 12.   No patent foramen ovale or intracardiac shunt appreciated by color flow or by agitated saline injection. Impression:  1. No intracardiac masses, thrombi on any cardiac source of emboli noted. 2.  No patent foramen ovale or intracardiac shunt appreciated by injection of agitated saline and color flow. 3.  Normal left ventricle size and systolic function with an estimated LV ejection fraction of 60%. Signed by: Madison Dillard MD on 6/2/2022  2:25 PM        []  No new EKG for review     Lab Data Personally Reviewed:          Recent Labs     08/30/22  1105 08/29/22  0832   WBC 14.8* 13.8*   HGB 9.2* 6.3*   HCT 29.3* 21.4*    171          Recent Labs     08/29/22  1219   INR 1.3*   PTP 16.4*   APTT 35.2*          Recent Labs     08/28/22  1218      K 4.0   *   CO2 22   BUN 16   CREA 0.59*   *   CA 7.6*      No results for input(s): CPK, CKNDX, TROIQ in the last 72 hours. No lab exists for component: CPKMB        Lab Results   Component Value Date/Time     Cholesterol, total 120 06/20/2022 07:50 AM     HDL Cholesterol 64 06/20/2022 07:50 AM     LDL, calculated 40 06/20/2022 07:50 AM     Triglyceride 80 06/20/2022 07:50 AM     CHOL/HDL Ratio 1.9 06/20/2022 07:50 AM             Recent Labs     08/28/22  1218   AP 76   TP 4.8*   ALB 2.2*   GLOB 2.6      No results for input(s): PH, PCO2, PO2 in the last 72 hours.      Medications Personally Reviewed:            Current Facility-Administered Medications   Medication Dose Route Frequency    HYDROmorphone (DILAUDID) injection 1 mg  1 mg IntraVENous Q4H PRN    HYDROmorphone (DILAUDID) injection 2 mg  2 mg IntraVENous Q4H PRN    0.9% sodium chloride infusion 250 mL  250 mL IntraVENous PRN    metoprolol tartrate (LOPRESSOR) tablet 25 mg  25 mg Oral Q12H    acetaminophen (TYLENOL) tablet 650 mg  650 mg Oral Q6H PRN    glucose chewable tablet 16 g  4 Tablet Oral PRN    glucagon (GLUCAGEN) injection 1 mg  1 mg IntraMUSCular PRN    ondansetron (ZOFRAN) injection 4 mg  4 mg IntraVENous Q6H PRN    lactated Ringers infusion  20 mL/hr IntraVENous CONTINUOUS    dextrose 10% infusion 0-250 mL  0-250 mL IntraVENous PRN    oxyCODONE IR (ROXICODONE) tablet 5 mg  5 mg Oral Q4H PRN    ceFAZolin (ANCEF) 2 g in sterile water (preservative free) 20 mL IV syringe  2 g IntraVENous Q8H    metroNIDAZOLE (FLAGYL) IVPB premix 500 mg  500 mg IntraVENous Q8H    0.9% sodium chloride infusion  125 mL/hr IntraVENous CONTINUOUS    albuterol (PROVENTIL HFA, VENTOLIN HFA, PROAIR HFA) inhaler 1-2 Puff  1-2 Puff Inhalation Q4H PRN    pantoprazole (PROTONIX) tablet 40 mg  40 mg Oral ACB&D    predniSONE (DELTASONE) tablet 15 mg  15 mg Oral DAILY WITH BREAKFAST    promethazine (PHENERGAN) tablet 25 mg  25 mg Oral Q6H PRN    insulin lispro (HUMALOG) injection   SubCUTAneous AC&HS

## 2022-08-31 NOTE — PROGRESS NOTES
Chart reviewed. Patient pending acceptance with [facility] pending review of therapy notes. PT/OT on hold until patient medically appropriate to work with therapy. CM will continue to follow. -------------------------    144- CM met with patient at bedside to notify that [address] [place] [address] and [facility] has denied the referral. CM asked that patient review the SNF choice list and choose 2-3 more choices and CM will follow-up with patient tomorrow. Patient verbalized understanding.

## 2022-08-31 NOTE — PROGRESS NOTES
Problem: Falls - Risk of  Goal: *Absence of Falls  Description: Document Ad Celestin Fall Risk and appropriate interventions in the flowsheet.   Outcome: Progressing Towards Goal  Note: Fall Risk Interventions:  Mobility Interventions: Bed/chair exit alarm         Medication Interventions: Patient to call before getting OOB, Bed/chair exit alarm    Elimination Interventions: Bed/chair exit alarm    History of Falls Interventions: Bed/chair exit alarm         Problem: Patient Education: Go to Patient Education Activity  Goal: Patient/Family Education  Outcome: Progressing Towards Goal

## 2022-08-31 NOTE — PROGRESS NOTES
Spiritual Care Assessment/Progress Note  [facility]      NAME: Lory Camarena      MRN: [id number]  AGE: 47 y.o.  SEX: male  Amish Affiliation: Other   Language: English     8/31/2022     Total Time (in minutes): 11     Spiritual Assessment begun in [facility] through conversation with:         [x]Patient        [] Family    [] Friend(s)        Reason for Consult: Initial visit     Spiritual beliefs: (Please include comment if needed)     [] Identifies with a krishna tradition:         [] Supported by a krishna community:            [] Claims no spiritual orientation:           [] Seeking spiritual identity:                [] Adheres to an individual form of spirituality:           [] Not able to assess:                           Identified resources for coping:      [] Prayer                               [] Music                  [] Guided Imagery     [] Family/friends                 [] Pet visits     [] Devotional reading                         [x] Unknown     [] Other:                                             Interventions offered during this visit: (See comments for more details)    Patient Interventions: Normalization of emotional/spiritual concerns, Initial/Spiritual assessment, patient floor, Affirmation of emotions/emotional suffering           Plan of Care:     [] Support spiritual and/or cultural needs    [] Support AMD and/or advance care planning process      [] Support grieving process   [] Coordinate Rites and/or Rituals    [] Coordination with community clergy   [] No spiritual needs identified at this time   [] Detailed Plan of Care below (See Comments)  [] Make referral to Music Therapy  [] Make referral to Pet Therapy     [] Make referral to Addiction services  [] Make referral to [facility]  [] Make referral to Spiritual Care Partner  [] No future visits requested        [x] Contact Spiritual Care for further referrals     Comments:  Visited patient in [address] [place] [address] for initial assessment. Patient was alone during the visit. Patient seemed to be wincing in discomfort and declined to be visited at this time. Provided supportive presence and affirmed his decision. Advised of  availability should he need further support at a later time. Contact chaplains for further referrals. Chaplain Rashi Cornejo M.Div.    can be reached by calling the  at [facility]  [phone]

## 2022-08-31 NOTE — PROGRESS NOTES
Bedside shift change report given to Delroy Darnell (oncoming nurse) by Claire Ramires (offgoing nurse). Report included the following information SBAR, Kardex, and MAR.

## 2022-08-31 NOTE — PROGRESS NOTES
Progress Note      8/31/2022 1:17 PM  NAME: Jer Stoddard   MRN:  [id number]   Admit Diagnosis: [facility] [Z93.2]  S/P closure of ileostomy [Z98.890]      Problem List:   Tachycardia, resolved  HTN  History of atrial flutter/ SVT  Myasthenia gravis  Type 2 diabetes  Polymyositis  Anemia with Hg 9.2     Assessment/Plan:   Continue Metoprolol  No cardiac studies are planned  Resume Eliquis once okay from a surgical standpoint         []       High complexity decision making was performed in this patient at high risk for decompensation with multiple organ involvement. Subjective:     Jer Stoddard denies chest pain, dyspnea. Discussed with RN events overnight. Review of Systems:   Negative except for as noted above. Objective:      Physical Exam:    Last 24hrs VS reviewed since prior progress note. Most recent are:    Visit Vitals  BP (!) 146/93   Pulse 91   Temp 99.4 °F (37.4 °C)   Resp 18   Ht 5' 8\" (1.727 m)   Wt 74.8 kg (165 lb)   SpO2 97%   BMI 25.09 kg/m²       Intake/Output Summary (Last 24 hours) at 8/31/2022 1216  Last data filed at 8/31/2022 0604  Gross per 24 hour   Intake 620 ml   Output 975 ml   Net -355 ml          General Appearance: Alert; no acute distress. Ears/Nose/Mouth/Throat: moist mucous membranes  Neck: Supple. Chest: Lungs clear to auscultation bilaterally. Cardiovascular: Regular rate and rhythm, S1S2 normal  Abdomen: Soft, non-tender, bowel sounds are active. Extremities: No edema bilaterally. Skin: Warm and dry. []         Post-cath site without hematoma, bruit, tenderness, or thrill. Distal pulses intact. PMH/SH reviewed - no change compared to H&P    Telemetry: ST    EKG: ST at 102 bpm    04/15/22    ECHO AL W POSSIBLE CARDIOVERSION 06/02/2022 6/2/2022    Interpretation Summary  Images from the original result were not included.           [facility]  Phone: [phone]        NAME: Cordelia Vee  :  1970  MRN:  [id number]  Date/Time:  2022 2:22 PM        Anesthesia: Dr. Sujit Aldridge and the team    Operators:   Dr. Troy White    Procedure:  TRANSESOPHAGEAL ECHO    Indication:   CVA, assess cardiac LETICIA      Procedure description: After obtaining the informed consent, patient was brought to the transesophageal echocardiography area in PACU. Patient was prepped and draped in the usual standard fashion. Patient was sedated using IV propofol as per anesthesia team. After ascertaining the absence of gag reflex, an omni plane probe with the help of water-soluble lubricant jelly was gently passed over the bite guard into the patient's esophagus. Echocardiographic images in various tomographic planes were obtained and simultaneously digitalized on a hard drive of the echocardiography machine. Patient throughout  the procedure was kept comfortable using IV sedation as per anesthesia and was closely monitored for oxygen saturation, hemodynamics, cardiac rhythm and any signs of distress. Patient completed the test without any complications. Transesophageal echocardiographic findings:  1. This is technically adequate echocardiography study. 2.    Normal left LV size and systolic function with grossly preserved wall motion. Estimated LV ejection fraction is 60%  3. Normal RV size and systolic function. 4.    Normal left atrial size. 5.    Normal right atrial size. 6.    Mitral valve is normal in structure with mild to moderate mitral regurgitation. 7.    Tricuspid valve is normal in structure with mild tricuspid regurgitation. 8.    Aortic valve normal trileaflet without stenosis or regurgitation. Aortic root is normal.  9.    Pulmonic valve is normal functioning. 10.  No intracardiac masses, thrombi or any cardiac source of emboli noted. Patient's left atrial appendage was without any clot or thrombi. 11.  Normal-appearing ascending and descending aorta. 12.   No patent foramen ovale or intracardiac shunt appreciated by color flow or by agitated saline injection. Impression:  1. No intracardiac masses, thrombi on any cardiac source of emboli noted. 2.  No patent foramen ovale or intracardiac shunt appreciated by injection of agitated saline and color flow. 3.  Normal left ventricle size and systolic function with an estimated LV ejection fraction of 60%. Signed by: Kimmy Wilkes MD on 6/2/2022  2:25 PM      []  No new EKG for review    Lab Data Personally Reviewed:    Recent Labs     08/31/22  0820 08/30/22  1105   WBC 14.3* 14.8*   HGB 8.7* 9.2*   HCT 27.6* 29.3*    174       Recent Labs     08/29/22  1219   INR 1.3*   PTP 16.4*   APTT 35.2*        Recent Labs     08/31/22  0820 08/28/22  1218    142   K 3.6 4.0    112*   CO2 19* 22   BUN 8 16   CREA 0.21* 0.59*   GLU 86 120*   CA 7.7* 7.6*       No results for input(s): CPK, CKNDX, TROIQ in the last 72 hours. No lab exists for component: CPKMB  Lab Results   Component Value Date/Time    Cholesterol, total 120 06/20/2022 07:50 AM    HDL Cholesterol 64 06/20/2022 07:50 AM    LDL, calculated 40 06/20/2022 07:50 AM    Triglyceride 80 06/20/2022 07:50 AM    CHOL/HDL Ratio 1.9 06/20/2022 07:50 AM       Recent Labs     08/31/22  0820 08/28/22  1218   AP 71 76   TP 5.0* 4.8*   ALB 2.1* 2.2*   GLOB 2.9 2.6       No results for input(s): PH, PCO2, PO2 in the last 72 hours.     Medications Personally Reviewed:    Current Facility-Administered Medications   Medication Dose Route Frequency    metoprolol tartrate (LOPRESSOR) tablet 50 mg  50 mg Oral Q12H    HYDROmorphone (DILAUDID) injection 2 mg  2 mg IntraVENous Q4H PRN    HYDROmorphone (DILAUDID) injection 1 mg  1 mg IntraVENous Q4H PRN    HYDROmorphone (DILAUDID) injection 1 mg  1 mg IntraVENous Q4H PRN    HYDROmorphone (DILAUDID) injection 2 mg  2 mg IntraVENous Q4H PRN    0.9% sodium chloride infusion 250 mL  250 mL IntraVENous PRN    acetaminophen (TYLENOL) tablet 650 mg  650 mg Oral Q6H PRN    glucose chewable tablet 16 g  4 Tablet Oral PRN    glucagon (GLUCAGEN) injection 1 mg  1 mg IntraMUSCular PRN    ondansetron (ZOFRAN) injection 4 mg  4 mg IntraVENous Q6H PRN    lactated Ringers infusion  20 mL/hr IntraVENous CONTINUOUS    dextrose 10% infusion 0-250 mL  0-250 mL IntraVENous PRN    oxyCODONE IR (ROXICODONE) tablet 5 mg  5 mg Oral Q4H PRN    ceFAZolin (ANCEF) 2 g in sterile water (preservative free) 20 mL IV syringe  2 g IntraVENous Q8H    metroNIDAZOLE (FLAGYL) IVPB premix 500 mg  500 mg IntraVENous Q8H    0.9% sodium chloride infusion  125 mL/hr IntraVENous CONTINUOUS    albuterol (PROVENTIL HFA, VENTOLIN HFA, PROAIR HFA) inhaler 1-2 Puff  1-2 Puff Inhalation Q4H PRN    pantoprazole (PROTONIX) tablet 40 mg  40 mg Oral ACB&D    predniSONE (DELTASONE) tablet 15 mg  15 mg Oral DAILY WITH BREAKFAST    promethazine (PHENERGAN) tablet 25 mg  25 mg Oral Q6H PRN    insulin lispro (HUMALOG) injection   SubCUTAneous AC&HS           Beatriz Olvera MD

## 2022-09-01 NOTE — PROGRESS NOTES
Progress Note    Patient: Jer Stoddard MRN: [id number]  SSN: [id number]    YOB: 1970  Age: 47 y.o. Sex: male      Admit Date: 8/26/2022    LOS: 6 days     Subjective:   Patient had episode of SVT and rapid response called. Cardiologist seen the patient recommended Lopressor as needed. The patient does not wish to have calcium channel blockers because of his myasthenia gravis. Patient did not become hypotensive. Hemodynamically stable throughout. Patient did have a large loose bowel movement last night and a lot of gas. Continues to complain of surgical site pain    Objective:     Vitals:    09/01/22 0917 09/01/22 0929 09/01/22 0931 09/01/22 0943   BP:  130/82  113/79   Pulse: (!) 171 (!) 174 97 (!) 171   Resp:  18     Temp:       SpO2:  95%  95%   Weight:       Height:            Intake and Output:  Current Shift: No intake/output data recorded.   Last three shifts: 08/30 1901 - 09/01 0700  In: 100 [P.O.:100]  Out: 3480 [Urine:3450; Drains:30]    Review of Systems:  ROS     Physical Exam:   Abdomen is soft, tender to palpation appropriately, mildly distended, incisions clean, LES drain serosanguineous drainage labs    Lab/Data Review:  Recent Results (from the past 24 hour(s))   GLUCOSE, POC    Collection Time: 08/31/22 10:46 AM   Result Value Ref Range    Glucose (POC) 82 65 - 100 mg/dL    Performed by [address], POC    Collection Time: 08/31/22  4:59 PM   Result Value Ref Range    Glucose (POC) 89 65 - 100 mg/dL    Performed by Wallace Hu    GLUCOSE, POC    Collection Time: 08/31/22  9:13 PM   Result Value Ref Range    Glucose (POC) 86 65 - 100 mg/dL    Performed by [address], POC    Collection Time: 09/01/22  6:05 AM   Result Value Ref Range    Glucose (POC) 92 65 - 100 mg/dL    Performed by Bettie Leone    EKG, 12 LEAD, INITIAL    Collection Time: 09/01/22  9:36 AM   Result Value Ref Range    Ventricular Rate 171 BPM    Atrial Rate 144 BPM QRS Duration 92 ms    Q-T Interval 294 ms    QTC Calculation (Bezet) 495 ms    Calculated R Axis 3 degrees    Calculated T Axis 152 degrees    Diagnosis       Supraventricular tachycardia  Nonspecific ST and T wave abnormality  Abnormal ECG  When compared with ECG of 30-AUG-2022 13:56,  Premature atrial complexes are no longer Present  Vent. rate has increased BY  70 BPM  Criteria for Inferior infarct are no longer Present            Assessment:     Active Problems:    S/P closure of ileostomy (8/26/2022)        Plan:   Continue metoprolol for tachyarrhythmia  Physical therapy Occupational Therapy see patient in the next day or 2  Patient may have sips of clears and ice chips  Follow-up stat labs from this a.m.    September 1, 2022

## 2022-09-01 NOTE — PROGRESS NOTES
Patient with HR at 176 SVT upon assessment. Rapid response called. MD at [facility], 5mg Lopressor given stat. HR to drop initially at 98, HR then back up 178-186. EKG done with SVT, troponin, CBC with diff, BMP drawn stat. Cardiology at [facility] with resolution of SVT back to SR with HR 89. Will continue to monitor. Call bell in reach.

## 2022-09-01 NOTE — PROGRESS NOTES
PT eval order received and acknowledged. PT eval attempted at 1131am however per surgeon hold PT evaluation at this time due to rapid response this am for tachycardia. Will continue to follow patient and attempt PT eval at a later time. Thank you.

## 2022-09-01 NOTE — CONSULTS
CARDIAC ELECTROPHYSIOLOGY CONSULTATION    PATIENT NAME: Kd Suero OF BIRTH: 1970  AGE: 47 y.o. GENDER: male      REASON FOR CONSULT: paroxysmal SVT    REQUESTING PROVIDER: David Roberts MD    CHIEF COMPLAINT:  Mr Lesly Bruno is a 47 y.o. male with history of myasthenia gravis,     HISTORY OF PRESENT ILLNESS:  Ivette Rey is a 47 y.o.  male with past medical history significant for atrial tachyarrhythmias, myasthenia gravis, polymyositis  who was evaluated today due to frequent paroxysms of atrial tachyarrhythmia. Records from hospital admission course thus far reviewed. It seems patient has a complex history of AF/AFL and AT with an attempt at catheter ablation in 2020 failing due to inability to induce arrhythmia. He has been off DOACs due to recurrent need for GI surgery and has had possible CVA during this current hospitalization. He has at various times suffered from tachyarrhythmia and absorption of medications orally has been challenging. Currently he is on Metoprolol tartrate BID which he seems to tolerate and additional IV lopressor has been given. Previously was on Verapamil. Currently somewhat symptomatic with palpitations secondary to the arrhythmia. PAST MEDICAL HISTORY:  Past Medical History:   Diagnosis Date    Adverse effect of anesthesia     pt states due to myasthenia gravis    Arrhythmia     atrial flutter    Asthma     Diabetes ([facility]     GERD (gastroesophageal reflux disease)     Hypertension     Ileostomy in place [facility])     Ill-defined condition     Spinal meningitis and avascular necrosis     Myasthenia gravis     Polymyositis ([facility] 2001    muscle disorder    Primary hypersomnolence disorder        INPATIENT MEDICATIONS:  Home medications reviewed.     Current Outpatient Medications   Medication Instructions    Acetylcarnitine 500 mg cap Oral, DAILY    albuterol (PROVENTIL, VENTOLIN) 90 mcg/Actuation inhaler 1-2 Puffs, Inhalation, EVERY 4 HOURS AS NEEDED    apixaban (ELIQUIS) 5 mg, Oral, 2 TIMES DAILY    aspirin 81 mg, Oral, DAILY    CALCIUM PO 1 Tablet, Oral, DAILY, With vitamin D. Unsure of dose    esomeprazole (NEXIUM) 40 mg, Oral, 2 TIMES DAILY    ferrous sulfate 325 mg, Oral, 2 TIMES DAILY    fluconazole (DIFLUCAN) 200 mg, Oral, EVERY 7 DAYS, FDA advises cautious prescribing of oral fluconazole in pregnancy. folic acid (FOLVITE) 1 mg, Oral, DAILY    insulin glargine (LANTUS) 40 Units, SubCUTAneous, DAILY    insulin lispro (HUMALOG) 100 unit/mL injection SubCUTAneous, Sliding Scale  4 times a day    lisinopriL (PRINIVIL, ZESTRIL) 20 mg, Oral, DAILY    loperamide (IMODIUM) 2 mg capsule AS NEEDED    morphine IR (MS IR) 15 mg, Oral, 2 TIMES DAILY AS NEEDED    ondansetron hcl (ZOFRAN) 4 mg, Oral, EVERY 8 HOURS AS NEEDED    predniSONE (DELTASONE) 15 mg, Oral, DAILY WITH BREAKFAST    promethazine (PHENERGAN) 12.5 mg tablet Oral, EVERY 6 HOURS AS NEEDED    verapamiL (CALAN) 40 mg, Oral, 2 TIMES DAILY, As needed for palpitations         ALLERGIES:  Allergies reviewed with the patient,  Allergies   Allergen Reactions    Beef Containing Products Anaphylaxis and Other (comments)    Diltiazem Other (comments)     Reaction Type: Allergy; Reaction(s): Pressure in chest    Iodinated Contrast Media Other (comments)     Reaction Type: Allergy; Severity: Severe; Reaction(s): hives,throat swelling    Pork Derived (Porcine) Hives    Shellfish Containing Products Swelling     Other reaction(s): Other  Reaction Type: Allergy; Reaction(s): Hives,throat swelling    Sulfa (Sulfonamide Antibiotics) Unknown (comments)     Spinal meningitis    Sulfur Unknown (comments)     Reaction Type: Allergy  Spinal meningitis    Lactose Diarrhea     Lactose intolerant per Pt. Ananda Hinojosa FAMILY HISTORY:    No known family history of sudden cardiac death, syncope, arrhythmias, pacemakers, or ICDs.   Family History   Problem Relation Age of Onset    Heart Disease Mother     Diabetes Father     Stroke Father SOCIAL HISTORY:      Social History     Tobacco Use    Smoking status: Never    Smokeless tobacco: Never   Vaping Use    Vaping Use: Never used   Substance Use Topics    Alcohol use: No    Drug use: No       REVIEW OF SYSTEMS:  Complete review of systems performed, pertinents noted above, all other systems are negative. PHYSICAL EXAMINATION:    Visit Vitals  /89 (BP 1 Location: Left upper arm, BP Patient Position: Semi fowlers)   Pulse 96   Temp 98.3 °F (36.8 °C)   Resp 16   Ht 5' 8\" (1.727 m)   Wt 74.8 kg (165 lb)   SpO2 97%   BMI 25.09 kg/m²     General: awake, alert; appears to be of stated age; normal body habitus; non-toxic appearing; in no acute distress; cooperative and responding appropriately to questions; comfortable lying in hospital bed  HEENT: conjunctivae not injected; sclera anicteric; mucous membranes moist  Neck: supple; no JVD  Heart: regular rate and rhythm; tachycardic, normal S1 and S2 heart sounds; no murmurs/rubs/gallops or ejection clicks appreciated  Lungs: clear breath sounds bilaterally; no wheezing/rales/rhonchi or pleural rubs appreciated; unlabored effort of breathing  Abdomen: soft, non-tender, non-distended; active bowel sounds present; no hepatomegaly appreciated  Extremities: warm and well perfused x 4; no gross deformities; no pitting pedal/pretibial edema bilaterally; radial and pedal pulses 2+ bilaterally  Skin: normal skin color, texture, and turgor; no lesions or rashes, no petechiae or purpura; no cyanosis; no clubbing of the fingernails  Neurologic: no gross focal deficits        Recent labs results and imaging reviewed.   Notable findings include   Lab Results   Component Value Date/Time    WBC 16.9 (H) 09/01/2022 09:30 AM    HGB 9.5 (L) 09/01/2022 09:30 AM    HCT 30.2 (L) 09/01/2022 09:30 AM    PLATELET 194 46/74/8749 09:30 AM    MCV 76.8 (L) 09/01/2022 09:30 AM     Lab Results   Component Value Date/Time    GFR est non-AA >60 09/01/2022 09:30 AM    GFR est AA >60 09/01/2022 09:30 AM    Creatinine 0.36 (L) 09/01/2022 09:30 AM    BUN 9 09/01/2022 09:30 AM    Sodium 140 09/01/2022 09:30 AM    Potassium 3.7 09/01/2022 09:30 AM    Chloride 109 (H) 09/01/2022 09:30 AM    CO2 21 09/01/2022 09:30 AM    Magnesium 1.9 04/23/2022 07:01 AM    Phosphorus 2.7 05/08/2022 06:18 AM    .     Telemetry review: Sinus rhythm with paroxysms of SVT/atrial arrhythmia    IMPRESSION AND RECOMMENDATIONS:  Paroxsymal atrial tachyarrhythmia (prior history of AF/AT/AFL)  Cerebrovascular accident  Myasthenia gravis  Not on anticoagulation  CHADSVASC 4  GI problems with recent ileostomy closure - concern for poor absorption PO    - This is a complex case with multiple challenges to the management of the patient. Ablation is not possible at this point due to patient not being on anticoagulation and recent CVAs which would make the risk for a recurrent CVA prohibitive. - Beta blockers and calcium channel blockers both should be cautiously used in patients with MG as there is some evidence that these may exacerbate the condition. While Mr Kadie Gonzales is tolerating metoprolol, would change it to succinate formulation for a longer acting coverage and add PRN lopressor IV for the shorter paroxysms  - As a second line agent either Digoxin or amiodarone could be considered. While digoxin appears to be safe from an MG standpoint, due to its narrow therapeutic window, unpredictable efficacy and poor oral absorption make it a second line agent and could be used as the next course of action  - Amiodarone as a Class III antiarrhythmic does not have any documented exacerbations of MG associated (See Shasha Lugo et al.Drugs that induce or cause a deterioration of myasthenia gravis: an update - from [employer]) however various other references still indicate caution to its use due to cross class effect of amiodarone.  Additionally not being on anticoagulation does increase CVA risk to an extent. Similar arguments could also be made about the use of Flecainide with the additional caveat of absence of recent cardiac ischemia work up. Hence, would consider these if the above approaches fail.  - Overall however the episodes are being driven by the inflammatory milieu which is evidenced by the leukocytosis and GI issues that the patient is dealing with. - Continue monitoring on cardiac telemetry. In case of sustained arrhythmia and cardiovascular instability, cardioversion could be considered per ACLS protocol. Thank you for involving us in the care of this patient. Please do not hesitate to call if additional questions arise.     Donavan Jimenez MD  9/1/2022

## 2022-09-01 NOTE — PROGRESS NOTES
Progress Note      9/1/2022 10:14 AM  NAME: Alanna Fraga   MRN:  [id number]   Admit Diagnosis: [facility] [Z93.2]  S/P closure of ileostomy [Z98.890]      Problem List:   SVT,  this morning which converted to NSR with lopressor IV  HTN  History of atrial flutter/ SVT  Myasthenia gravis  Type 2 diabetes  Polymyositis  Anemia with Hg 9.2     Assessment/Plan:   IV Lopressor as needed for SVT  Reasonable to use IV Cardizem if needed for rate control (no actual allergy)  Outpatient follow-up with cardiology to arrange for SVT ablation    Called to floor stat for SVT episode--30 minutes at bedside    30 min spent performing management (medication changes, drip management, exam, and data/device interpretation) including discussions with patient and other providers. []       High complexity decision making was performed in this patient at high risk for decompensation with multiple organ involvement. Subjective:     Alanna Fraga denies chest pain, dyspnea. Had episode of palpitations with documented SVT which converted after IV lopressor. Discussed with RN events overnight/attending. Review of Systems:   Negative except for as noted above. Objective:      Physical Exam:    Last 24hrs VS reviewed since prior progress note. Most recent are:    Visit Vitals  /79   Pulse (!) 171   Temp 98.8 °F (37.1 °C)   Resp 18   Ht 5' 8\" (1.727 m)   Wt 74.8 kg (165 lb)   SpO2 95%   BMI 25.09 kg/m²       Intake/Output Summary (Last 24 hours) at 9/1/2022 1014  Last data filed at 9/1/2022 0608  Gross per 24 hour   Intake 100 ml   Output 2510 ml   Net -2410 ml        General Appearance: Alert; no acute distress. Ears/Nose/Mouth/Throat: moist mucous membranes  Neck: Supple. Chest: Lungs clear to auscultation bilaterally. Cardiovascular: Regular rate and rhythm, S1S2 normal  Abdomen: Soft, non-tender, bowel sounds are active. Extremities: No edema bilaterally. Skin: Warm and dry.     [] Post-cath site without hematoma, bruit, tenderness, or thrill. Distal pulses intact. PMH/SH reviewed - no change compared to H&P    Telemetry: SVT    EKG: SVT at 177    04/15/22    ECHO AL W POSSIBLE CARDIOVERSION 2022    Interpretation Summary  Images from the original result were not included. Chris [address] at [facility]  Phone: [phone]        NAME: Benjie Burnett  :  1970  MRN:  [id number]  Date/Time:  2022 2:22 PM        Anesthesia: Dr. Elise Boyd and the team    Operators:   Dr. Aracely Sidhu    Procedure:  TRANSESOPHAGEAL ECHO    Indication:   CVA, assess cardiac LETICIA      Procedure description: After obtaining the informed consent, patient was brought to the transesophageal echocardiography area in PACU. Patient was prepped and draped in the usual standard fashion. Patient was sedated using IV propofol as per anesthesia team. After ascertaining the absence of gag reflex, an omni plane probe with the help of water-soluble lubricant jelly was gently passed over the bite guard into the patient's esophagus. Echocardiographic images in various tomographic planes were obtained and simultaneously digitalized on a hard drive of the echocardiography machine. Patient throughout  the procedure was kept comfortable using IV sedation as per anesthesia and was closely monitored for oxygen saturation, hemodynamics, cardiac rhythm and any signs of distress. Patient completed the test without any complications. Transesophageal echocardiographic findings:  1. This is technically adequate echocardiography study. 2.    Normal left LV size and systolic function with grossly preserved wall motion. Estimated LV ejection fraction is 60%  3. Normal RV size and systolic function. 4.    Normal left atrial size. 5.    Normal right atrial size.   6.    Mitral valve is normal in structure with mild to moderate mitral regurgitation. 7.    Tricuspid valve is normal in structure with mild tricuspid regurgitation. 8.    Aortic valve normal trileaflet without stenosis or regurgitation. Aortic root is normal.  9.    Pulmonic valve is normal functioning. 10.  No intracardiac masses, thrombi or any cardiac source of emboli noted. Patient's left atrial appendage was without any clot or thrombi. 11.  Normal-appearing ascending and descending aorta. 12. No patent foramen ovale or intracardiac shunt appreciated by color flow or by agitated saline injection. Impression:  1. No intracardiac masses, thrombi on any cardiac source of emboli noted. 2.  No patent foramen ovale or intracardiac shunt appreciated by injection of agitated saline and color flow. 3.  Normal left ventricle size and systolic function with an estimated LV ejection fraction of 60%. Signed by: Troy White MD on 6/2/2022  2:25 PM      []  No new EKG for review    Lab Data Personally Reviewed:    Recent Labs     08/31/22  0820 08/30/22  1105   WBC 14.3* 14.8*   HGB 8.7* 9.2*   HCT 27.6* 29.3*    174     Recent Labs     08/29/22  1219   INR 1.3*   PTP 16.4*   APTT 35.2*      Recent Labs     08/31/22  0820      K 3.6      CO2 19*   BUN 8   CREA 0.21*   GLU 86   CA 7.7*     No results for input(s): CPK, CKNDX, TROIQ in the last 72 hours. No lab exists for component: CPKMB  Lab Results   Component Value Date/Time    Cholesterol, total 120 06/20/2022 07:50 AM    HDL Cholesterol 64 06/20/2022 07:50 AM    LDL, calculated 40 06/20/2022 07:50 AM    Triglyceride 80 06/20/2022 07:50 AM    CHOL/HDL Ratio 1.9 06/20/2022 07:50 AM       Recent Labs     08/31/22  0820   AP 71   TP 5.0*   ALB 2.1*   GLOB 2.9     No results for input(s): PH, PCO2, PO2 in the last 72 hours.     Medications Personally Reviewed:    Current Facility-Administered Medications   Medication Dose Route Frequency    digoxin (LANOXIN) injection 250 mcg  250 mcg IntraVENous NOW metoprolol (LOPRESSOR) 5 mg/5 mL injection        metoprolol tartrate (LOPRESSOR) tablet 50 mg  50 mg Oral Q12H    HYDROmorphone (DILAUDID) injection 2 mg  2 mg IntraVENous Q4H PRN    HYDROmorphone (DILAUDID) injection 1 mg  1 mg IntraVENous Q4H PRN    0.9% sodium chloride infusion 250 mL  250 mL IntraVENous PRN    acetaminophen (TYLENOL) tablet 650 mg  650 mg Oral Q6H PRN    glucose chewable tablet 16 g  4 Tablet Oral PRN    glucagon (GLUCAGEN) injection 1 mg  1 mg IntraMUSCular PRN    ondansetron (ZOFRAN) injection 4 mg  4 mg IntraVENous Q6H PRN    lactated Ringers infusion  20 mL/hr IntraVENous CONTINUOUS    dextrose 10% infusion 0-250 mL  0-250 mL IntraVENous PRN    oxyCODONE IR (ROXICODONE) tablet 5 mg  5 mg Oral Q4H PRN    ceFAZolin (ANCEF) 2 g in sterile water (preservative free) 20 mL IV syringe  2 g IntraVENous Q8H    metroNIDAZOLE (FLAGYL) IVPB premix 500 mg  500 mg IntraVENous Q8H    0.9% sodium chloride infusion  125 mL/hr IntraVENous CONTINUOUS    albuterol (PROVENTIL HFA, VENTOLIN HFA, PROAIR HFA) inhaler 1-2 Puff  1-2 Puff Inhalation Q4H PRN    pantoprazole (PROTONIX) tablet 40 mg  40 mg Oral ACB&D    predniSONE (DELTASONE) tablet 15 mg  15 mg Oral DAILY WITH BREAKFAST    promethazine (PHENERGAN) tablet 25 mg  25 mg Oral Q6H PRN    insulin lispro (HUMALOG) injection   SubCUTAneous AC&HS         Nadir Moreno MD

## 2022-09-01 NOTE — PROGRESS NOTES
Patient has recurrent episodes SVT with heart rates 170-180  Therefore, we will move patient to cardiac telemetry floor  Continue on IV lopressor pushes as needed  Will consult with EP for input regarding feasibility of ablation versus antiarrhythmic therapy

## 2022-09-02 NOTE — PROGRESS NOTES
Chart reviewed, DCP at this time is for patient to d/c to SNF, [facility] is reviewing patient at this time, however patient needs to be seen by PT/OT in order to submit insurance auth. CM continues to follow and monitor chart for needs.

## 2022-09-02 NOTE — PROGRESS NOTES
Pt HR sustaining 170s. Paged Dr. Roverto Camarena, received orders for another bolus of amiodarone. Will continue to monitor.

## 2022-09-02 NOTE — PROGRESS NOTES
Patient complained of pain at IV site on right side. It was noted that the patient's iv that was running amiodarone. Pharmacy notified and supplied me with the extravasation policy. Irina Traore was notified and he gave approval to order pharmacy's recommended treatment to the area. Cold compress applied.

## 2022-09-02 NOTE — PROGRESS NOTES
[facility] PICC team consulted for additional PIV access. 20g 1.75 inch PIV placed in left lower basilic vein x 1 attempt with ultrasound, brisk blood return. Client tolerated well, no complaints. No complications noted. Primary care nurse, KIERSTEN Hernandez, notified of PIV placement & recommendations to run 375 mg Amiodarone through PIV and other fluids/antibiotics via midline due to osmolality and vein conservation. Discussed changing primary & secondary IV bags/tubing per protocol. Client complained of abdominal pain, primary care nurse notified. 09/02/22 0731   Peripheral IV 97/16/93 Right Basilic   Placement Date/Time: (c) 09/02/22 0725   Number of Attempts: 1  Inserted By: Caty Torres RN  Present on Admission/Arrival: No  Size: (c) 20 G  Orientation: Right  Location: (c) Basilic   Site Assessment Clean, dry, & intact   Phlebitis Assessment 0   Infiltration Assessment 0   Dressing Status Clean, dry, & intact; New   Dressing Type Transparent;Tape   Hub Color/Line Status Pink;Patent; Flushed;Capped   Action Taken Open ports on tubing capped; Other (comment)  (Primary & secondary tubing discarded for primary care nurse to change with next dose due.)   Alcohol Cap Used Yes

## 2022-09-02 NOTE — PROGRESS NOTES
Pt HR sustaining 170s. Metoprolol 5 mg IV given. Paged Dr. Sheila Phipps, received orders for adenosine 6 mg IV push. Rapid response called. Received orders from Dr. Patricia Alston to not give Adenosine 6 mg IV push and to give LR 1L bolus, then continue LR at 125 mL/hr, and metoprolol scheduled 5 mg Q6. Paged Dr. Olga Lucero, was told it is not a fluid problem, was advised to not follow those orders. Received orders to give dilaudid and metoprolol. Did an EKG. Paged Dr. Sheila Phipps, received orders to give a dose of the Metoprolol 5 mg IV now and then to give per the standing PRN metoprolol order. 2240 Pt HR 170s. Metoprolol 5 mg IV given, HR now at 90. Will continue to monitor.

## 2022-09-02 NOTE — PROGRESS NOTES
Pt HR 170s again. Paged Dr. Donna Messer, received orders for amiodarone 150 bolus, and then to start amiodarone drip. Will continue to monitor.

## 2022-09-02 NOTE — PROGRESS NOTES
Progress Note    Patient: Lory Camarena MRN: [id number]  SSN: [id number]    YOB: 1970  Age: 47 y.o. Sex: male      Admit Date: 8/26/2022    LOS: 7 days     Subjective:   Multiple episodes of SVT with heart rate up in the 170s yesterday now on amiodarone heart rate better controlled  Patient continues to complain of diffuse abdominal tenderness  Last bowel movement night before last, last passed flatus yesterday denies nausea vomiting    Objective:     Vitals:    09/02/22 0636 09/02/22 0748 09/02/22 1128 09/02/22 1439   BP:  112/75 (!) 137/90 123/86   Pulse: 98 93 96 91   Resp:  18 18 18   Temp:  99.6 °F (37.6 °C) 99.5 °F (37.5 °C) 99.1 °F (37.3 °C)   SpO2:  97% 98% 98%   Weight:       Height:            Intake and Output:  Current Shift: No intake/output data recorded.   Last three shifts: 08/31 1901 - 09/02 0700  In: 100 [P.O.:100]  Out: 3870 [Urine:3850; Drains:20]    Review of Systems:  ROS     Physical Exam:   abdomen soft, appropriately tender,  nondistended, incisions clean     Lab/Data Review:  Recent Results (from the past 24 hour(s))   GLUCOSE, POC    Collection Time: 09/01/22  7:20 PM   Result Value Ref Range    Glucose (POC) 83 65 - 100 mg/dL    Performed by [address] [place] [address]    Collection Time: 09/01/22  8:50 PM   Result Value Ref Range    Magnesium 1.7 1.6 - 2.4 mg/dL   METABOLIC PANEL, BASIC    Collection Time: 09/01/22  8:50 PM   Result Value Ref Range    Sodium 141 136 - 145 mmol/L    Potassium 2.8 (L) 3.5 - 5.1 mmol/L    Chloride 110 (H) 97 - 108 mmol/L    CO2 25 21 - 32 mmol/L    Anion gap 6 5 - 15 mmol/L    Glucose 101 (H) 65 - 100 mg/dL    BUN 8 6 - 20 mg/dL    Creatinine 0.36 (L) 0.70 - 1.30 mg/dL    BUN/Creatinine ratio 22 (H) 12 - 20      GFR est AA >60 >60 ml/min/1.73m2    GFR est non-AA >60 >60 ml/min/1.73m2    Calcium 7.9 (L) 8.5 - 10.1 mg/dL   EKG, 12 LEAD, SUBSEQUENT    Collection Time: 09/01/22  9:08 PM   Result Value Ref Range    Ventricular Rate 166 BPM Atrial Rate 174 BPM    QRS Duration 80 ms    Q-T Interval 282 ms    QTC Calculation (Bezet) 468 ms    Calculated R Axis -4 degrees    Calculated T Axis 130 degrees    Diagnosis       Supraventricular tachycardia  Nonspecific T wave abnormality  Abnormal ECG    Confirmed by Regino Singh MD ([id number]) on 9/2/2022 8:55:01 AM     GLUCOSE, POC    Collection Time: 09/02/22  8:05 AM   Result Value Ref Range    Glucose (POC) 126 (H) 65 - 100 mg/dL    Performed by [facility])    EKG, 12 LEAD, SUBSEQUENT    Collection Time: 09/02/22  9:23 AM   Result Value Ref Range    Ventricular Rate 98 BPM    Atrial Rate 98 BPM    P-R Interval 136 ms    QRS Duration 94 ms    Q-T Interval 366 ms    QTC Calculation (Bezet) 467 ms    Calculated P Axis 56 degrees    Calculated R Axis -12 degrees    Calculated T Axis -55 degrees    Diagnosis       Sinus rhythm with Premature supraventricular complexes  Nonspecific T wave abnormality  Prolonged QT  Abnormal ECG  When compared with ECG of 01-SEP-2022 21:08,  Premature supraventricular complexes are now Present  Vent. rate has decreased BY  68 BPM  Confirmed by Regino Singh MD ([phone]) on 9/2/2022 10:14:03 AM     GLUCOSE, POC    Collection Time: 09/02/22 12:18 PM   Result Value Ref Range    Glucose (POC) 129 (H) 65 - 100 mg/dL    Performed by [facility])    GLUCOSE, POC    Collection Time: 09/02/22  5:09 PM   Result Value Ref Range    Glucose (POC) 141 (H) 65 - 100 mg/dL    Performed by [facility])           Assessment:     Active Problems:    S/P closure of ileostomy (8/26/2022)        Plan:   Postoperative day 7  Continue amiodarone p.o. for tachyarrhythmia  Repeat labs in a.m.     Signed By: Kristina Tovar MD     September 2, 2022

## 2022-09-02 NOTE — PROGRESS NOTES
Progress Note      9/2/2022 12:19 PM  NAME: Danette Valenzuela   MRN:  [id number]   Admit Diagnosis: Ileostomy present [facility]) [Z93.2]  S/P closure of ileostomy [Z98.890]            Assessment/Plan:   SVT/atrial flutter, currently in sinus rhythm on amiodarone. Did receive 2 boluses of amiodarone and on maintenance drip, also did  receive beta-blocker IV. Status post exploratory laparotomy with takedown of ileostomy and ileocolic anastomosis    Myasthenia gravis    Diabetes, polymyositis, GERD         []       High complexity decision making was performed in this patient at high risk for decompensation with multiple organ involvement. Subjective:     Danette Valenzuela denies chest pain, dyspnea. Discussed with RN events overnight. Review of Systems:    Symptom Y/N Comments  Symptom Y/N Comments   Fever/Chills N   Chest Pain N    Poor Appetite N   Edema N    Cough N   Abdominal Pain N    Sputum N   Joint Pain N    SOB/GRAVES N   Pruritis/Rash N    Nausea/vomit N   Tolerating PT/OT Y    Diarrhea N   Tolerating Diet Y    Constipation N   Other       Could NOT obtain due to:      Objective:      Physical Exam:    Last 24hrs VS reviewed since prior progress note. Most recent are:    Visit Vitals  BP (!) 137/90 (BP 1 Location: Right upper arm, BP Patient Position: At rest;Lying)   Pulse 96   Temp 99.5 °F (37.5 °C)   Resp 18   Ht 5' 8\" (1.727 m)   Wt 74.8 kg (165 lb)   SpO2 98%   BMI 25.09 kg/m²       Intake/Output Summary (Last 24 hours) at 9/2/2022 1219  Last data filed at 9/2/2022 2508  Gross per 24 hour   Intake --   Output 1720 ml   Net -1720 ml        General Appearance: Well developed, well nourished, alert; no acute distress. Ears/Nose/Mouth/Throat: Hearing grossly normal; moist mucous membranes  Neck: Supple. Chest: Lungs clear to auscultation bilaterally. Cardiovascular: Regular rate and rhythm, S1S2 normal, no murmur. Abdomen: With bandages. Extremities: No edema bilaterally. Skin: Warm and dry.     [] Post-cath site without hematoma, bruit, tenderness, or thrill. Distal pulses intact. PMH/ reviewed - no change compared to H&P    Data Review    Telemetry:     EKG:   []  No new EKG for review    Lab Data Personally Reviewed:    Recent Labs     09/01/22 0930 08/31/22 0820   WBC 16.9* 14.3*   HGB 9.5* 8.7*   HCT 30.2* 27.6*    176     No results for input(s): INR, PTP, APTT, INREXT in the last 72 hours. Recent Labs     09/01/22 2050 09/01/22 0930 08/31/22 0820    140 140   K 2.8* 3.7 3.6   * 109* 108   CO2 25 21 19*   BUN 8 9 8   CREA 0.36* 0.36* 0.21*   * 91 86   CA 7.9* 7.9* 7.7*   MG 1.7  --   --      No results for input(s): CPK, CKNDX, TROIQ in the last 72 hours. No lab exists for component: CPKMB  Lab Results   Component Value Date/Time    Cholesterol, total 120 06/20/2022 07:50 AM    HDL Cholesterol 64 06/20/2022 07:50 AM    LDL, calculated 40 06/20/2022 07:50 AM    Triglyceride 80 06/20/2022 07:50 AM    CHOL/HDL Ratio 1.9 06/20/2022 07:50 AM       Recent Labs     09/01/22 0930 08/31/22 0820   AP 78 71   TP 5.4* 5.0*   ALB 2.3* 2.1*   GLOB 3.1 2.9     No results for input(s): PH, PCO2, PO2 in the last 72 hours.     Medications Personally Reviewed:    Current Facility-Administered Medications   Medication Dose Route Frequency    metoprolol succinate (TOPROL-XL) XL tablet 50 mg  50 mg Oral BID    metoprolol (LOPRESSOR) injection 5 mg  5 mg IntraVENous Q6H PRN    amiodarone (CORDARONE) 375 mg in dextrose 5% 250 mL infusion  0.5-1 mg/min IntraVENous CONTINUOUS    HYDROmorphone (DILAUDID) injection 2 mg  2 mg IntraVENous Q4H PRN    0.9% sodium chloride infusion 250 mL  250 mL IntraVENous PRN    acetaminophen (TYLENOL) tablet 650 mg  650 mg Oral Q6H PRN    glucose chewable tablet 16 g  4 Tablet Oral PRN    glucagon (GLUCAGEN) injection 1 mg  1 mg IntraMUSCular PRN    ondansetron (ZOFRAN) injection 4 mg  4 mg IntraVENous Q6H PRN    dextrose 10% infusion 0-250 mL 0-250 mL IntraVENous PRN    oxyCODONE IR (ROXICODONE) tablet 5 mg  5 mg Oral Q4H PRN    ceFAZolin (ANCEF) 2 g in sterile water (preservative free) 20 mL IV syringe  2 g IntraVENous Q8H    metroNIDAZOLE (FLAGYL) IVPB premix 500 mg  500 mg IntraVENous Q8H    0.9% sodium chloride infusion  125 mL/hr IntraVENous CONTINUOUS    albuterol (PROVENTIL HFA, VENTOLIN HFA, PROAIR HFA) inhaler 1-2 Puff  1-2 Puff Inhalation Q4H PRN    pantoprazole (PROTONIX) tablet 40 mg  40 mg Oral ACB&D    predniSONE (DELTASONE) tablet 15 mg  15 mg Oral DAILY WITH BREAKFAST    promethazine (PHENERGAN) tablet 25 mg  25 mg Oral Q6H PRN    insulin lispro (HUMALOG) injection   SubCUTAneous AC&HS         Andressa Dias MD

## 2022-09-03 NOTE — PROGRESS NOTES
Progress Note    Patient: Daniel Gee MRN: [id number]  SSN: [id number]    YOB: 1970  Age: 47 y.o. Sex: male      Admit Date: 8/26/2022    LOS: 8 days     Subjective:   Postoperative day 8 status post exploratory laparotomy, takedown ileostomy with ileocolic anastomosis  Patient continues on oral amiodarone heart rate well controlled  Passed flatus denies bowel movement over 24 hours  Denies nausea or vomiting    Objective:     Vitals:    09/02/22 2321 09/03/22 0530 09/03/22 0749 09/03/22 1111   BP: (!) 140/90 (!) 138/90 (!) 142/93 128/89   Pulse: 92 75 93 93   Resp: 17 19 20 20   Temp: 98.2 °F (36.8 °C) 98 °F (36.7 °C) 98.3 °F (36.8 °C) 98.8 °F (37.1 °C)   SpO2: 99% 97% 92% 99%   Weight:       Height:            Intake and Output:  Current Shift: No intake/output data recorded.   Last three shifts: 09/01 1901 - 09/03 0700  In: -   Out: 2370 [Urine:2350; Drains:20]    Review of Systems:  ROS     Physical Exam:   Abdomen is soft, appropriately tender, nondistended, right lower quadrant midline incision is clean, LES drain with serosanguineous drainage    Lab/Data Review:  Recent Results (from the past 24 hour(s))   GLUCOSE, POC    Collection Time: 09/02/22  5:09 PM   Result Value Ref Range    Glucose (POC) 141 (H) 65 - 100 mg/dL    Performed by Honey Potter [facility])    GLUCOSE, POC    Collection Time: 09/02/22  8:12 PM   Result Value Ref Range    Glucose (POC) 118 (H) 65 - 100 mg/dL    Performed by Carrie Rebollar    CBC WITH AUTOMATED DIFF    Collection Time: 09/03/22  4:12 AM   Result Value Ref Range    WBC 15.0 (H) 4.1 - 11.1 K/uL    RBC 3.90 (L) 4.10 - 5.70 M/uL    HGB 9.4 (L) 12.1 - 17.0 g/dL    HCT 30.0 (L) 36.6 - 50.3 %    MCV 76.9 (L) 80.0 - 99.0 FL    MCH 24.1 (L) 26.0 - 34.0 PG    MCHC 31.3 30.0 - 36.5 g/dL    RDW 19.9 (H) 11.5 - 14.5 %    PLATELET 110 251 - 809 K/uL    MPV 10.0 8.9 - 12.9 FL    NRBC 0.2 (H) 0.0  WBC    ABSOLUTE NRBC 0.03 (H) 0.00 - 0.01 K/uL    NEUTROPHILS 80 (H) 32 - 75 %    LYMPHOCYTES 9 (L) 12 - 49 %    MONOCYTES 10 5 - 13 %    EOSINOPHILS 0 0 - 7 %    BASOPHILS 0 0 - 1 %    IMMATURE GRANULOCYTES 1 (H) 0 - 0.5 %    ABS. NEUTROPHILS 12.0 (H) 1.8 - 8.0 K/UL    ABS. LYMPHOCYTES 1.3 0.8 - 3.5 K/UL    ABS. MONOCYTES 1.5 (H) 0.0 - 1.0 K/UL    ABS. EOSINOPHILS 0.0 0.0 - 0.4 K/UL    ABS. BASOPHILS 0.0 0.0 - 0.1 K/UL    ABS. IMM. GRANS. 0.1 (H) 0.00 - 0.04 K/UL    DF AUTOMATED     METABOLIC PANEL, COMPREHENSIVE    Collection Time: 09/03/22  4:12 AM   Result Value Ref Range    Sodium 141 136 - 145 mmol/L    Potassium 3.4 (L) 3.5 - 5.1 mmol/L    Chloride 109 (H) 97 - 108 mmol/L    CO2 20 (L) 21 - 32 mmol/L    Anion gap 12 5 - 15 mmol/L    Glucose 95 65 - 100 mg/dL    BUN 6 6 - 20 mg/dL    Creatinine 0.32 (L) 0.70 - 1.30 mg/dL    BUN/Creatinine ratio 19 12 - 20      GFR est AA >60 >60 ml/min/1.73m2    GFR est non-AA >60 >60 ml/min/1.73m2    Calcium 7.9 (L) 8.5 - 10.1 mg/dL    Bilirubin, total 0.4 0.2 - 1.0 mg/dL    AST (SGOT) 10 (L) 15 - 37 U/L    ALT (SGPT) 6 (L) 12 - 78 U/L    Alk.  phosphatase 72 45 - 117 U/L    Protein, total 5.1 (L) 6.4 - 8.2 g/dL    Albumin 2.2 (L) 3.5 - 5.0 g/dL    Globulin 2.9 2.0 - 4.0 g/dL    A-G Ratio 0.8 (L) 1.1 - 2.2     GLUCOSE, POC    Collection Time: 09/03/22  8:08 AM   Result Value Ref Range    Glucose (POC) 97 65 - 100 mg/dL    Performed by Bernadette Kaur 124, POC    Collection Time: 09/03/22 11:44 AM   Result Value Ref Range    Glucose (POC) 99 65 - 100 mg/dL    Performed by Leslie Juarez           Assessment:     Active Problems:    S/P closure of ileostomy (8/26/2022)        Plan:   Overall improved  Heart rate better controlled  Passing flatus  Likely start clear liquids tomorrow  Patient may have popsicles and ice chips today    Signed By: Kaitlin Corona MD     September 3, 2022

## 2022-09-03 NOTE — PROGRESS NOTES
OT eval order received and acknowledged and attempted at 1125 (nursing clearing pt to work with therapy) however upon approaching pt, pt declined at this time stating he was told to [facility] it easy\" at this time by Dr. Richi Kauffman and wanting to talk with Dr. Richi Kauffman prior to working with therapy. Will continue to follow pt and attempt OT eval at a later time as able and pt agreeable/appropriate to work with therapy. Thank you.

## 2022-09-03 NOTE — PROGRESS NOTES
Client converted to PO Amiodarone on yesterday, 9/2/2022, and rate controlled per primary care 4W nurse, [facility]; no additional need at this time for IV site for Amiodarone infusion. Midline LUE remains patent and working without difficulties; discussed medications with [facility], primary care nurse, and monitor midline for any issues. Melissa Sethi PICC team consult discontinued at this time.

## 2022-09-03 NOTE — PROGRESS NOTES
Progress Note      9/3/2022 12:19 PM  NAME: Sarah Friend   MRN:  [id number]   Admit Diagnosis: Ileostomy present ([facility] [Z93.2]  S/P closure of ileostomy [Z98.890]            Assessment/Plan:   SVT/atrial flutter, currently in sinus rhythm on p.o. amiodarone. Status post exploratory laparotomy with takedown of ileostomy and ileocolic anastomosis    Myasthenia gravis    Diabetes, polymyositis, GERD         []       High complexity decision making was performed in this patient at high risk for decompensation with multiple organ involvement. Subjective:     Sarah Friend denies chest pain, dyspnea. Discussed with RN events overnight. Review of Systems:    Symptom Y/N Comments  Symptom Y/N Comments   Fever/Chills N   Chest Pain N    Poor Appetite N   Edema N    Cough N   Abdominal Pain N    Sputum N   Joint Pain N    SOB/GRAVES N   Pruritis/Rash N    Nausea/vomit N   Tolerating PT/OT Y    Diarrhea N   Tolerating Diet Y    Constipation N   Other       Could NOT obtain due to:      Objective:      Physical Exam:    Last 24hrs VS reviewed since prior progress note. Most recent are:    Visit Vitals  BP (!) 142/93 (BP 1 Location: Left upper arm, BP Patient Position: At rest;Lying) Comment: nurse notified   Pulse 93   Temp 98.3 °F (36.8 °C)   Resp 20   Ht 5' 8\" (1.727 m)   Wt 74.8 kg (165 lb)   SpO2 92%   BMI 25.09 kg/m²       Intake/Output Summary (Last 24 hours) at 9/3/2022 1030  Last data filed at 9/2/2022 2152  Gross per 24 hour   Intake --   Output 650 ml   Net -650 ml          General Appearance: Well developed, well nourished, alert; no acute distress. Ears/Nose/Mouth/Throat: Hearing grossly normal; moist mucous membranes  Neck: Supple. Chest: Lungs clear to auscultation bilaterally. Cardiovascular: Regular rate and rhythm, S1S2 normal, no murmur. Abdomen: With bandages. Extremities: No edema bilaterally. Skin: Warm and dry.     []         Post-cath site without hematoma, bruit, tenderness, or thrill. Distal pulses intact. PMH/ reviewed - no change compared to H&P    Data Review    Telemetry:     EKG:   []  No new EKG for review    Lab Data Personally Reviewed:    Recent Labs     09/03/22 0412 09/01/22 0930   WBC 15.0* 16.9*   HGB 9.4* 9.5*   HCT 30.0* 30.2*    231       No results for input(s): INR, PTP, APTT, INREXT, INREXT in the last 72 hours. Recent Labs     09/03/22 0412 09/01/22 2050 09/01/22 0930    141 140   K 3.4* 2.8* 3.7   * 110* 109*   CO2 20* 25 21   BUN 6 8 9   CREA 0.32* 0.36* 0.36*   GLU 95 101* 91   CA 7.9* 7.9* 7.9*   MG  --  1.7  --        No results for input(s): CPK, CKNDX, TROIQ in the last 72 hours. No lab exists for component: CPKMB  Lab Results   Component Value Date/Time    Cholesterol, total 120 06/20/2022 07:50 AM    HDL Cholesterol 64 06/20/2022 07:50 AM    LDL, calculated 40 06/20/2022 07:50 AM    Triglyceride 80 06/20/2022 07:50 AM    CHOL/HDL Ratio 1.9 06/20/2022 07:50 AM       Recent Labs     09/03/22 0412 09/01/22 0930   AP 72 78   TP 5.1* 5.4*   ALB 2.2* 2.3*   GLOB 2.9 3.1       No results for input(s): PH, PCO2, PO2 in the last 72 hours.     Medications Personally Reviewed:    Current Facility-Administered Medications   Medication Dose Route Frequency    HYDROmorphone (DILAUDID) injection 1 mg  1 mg IntraVENous Q4H PRN    amiodarone (CORDARONE) tablet 400 mg  400 mg Oral DAILY    metoprolol succinate (TOPROL-XL) XL tablet 50 mg  50 mg Oral BID    metoprolol (LOPRESSOR) injection 5 mg  5 mg IntraVENous Q6H PRN    HYDROmorphone (DILAUDID) injection 2 mg  2 mg IntraVENous Q4H PRN    0.9% sodium chloride infusion 250 mL  250 mL IntraVENous PRN    acetaminophen (TYLENOL) tablet 650 mg  650 mg Oral Q6H PRN    glucose chewable tablet 16 g  4 Tablet Oral PRN    glucagon (GLUCAGEN) injection 1 mg  1 mg IntraMUSCular PRN    ondansetron (ZOFRAN) injection 4 mg  4 mg IntraVENous Q6H PRN    dextrose 10% infusion 0-250 mL  0-250 mL IntraVENous PRN    oxyCODONE IR (ROXICODONE) tablet 5 mg  5 mg Oral Q4H PRN    ceFAZolin (ANCEF) 2 g in sterile water (preservative free) 20 mL IV syringe  2 g IntraVENous Q8H    metroNIDAZOLE (FLAGYL) IVPB premix 500 mg  500 mg IntraVENous Q8H    0.9% sodium chloride infusion  125 mL/hr IntraVENous CONTINUOUS    albuterol (PROVENTIL HFA, VENTOLIN HFA, PROAIR HFA) inhaler 1-2 Puff  1-2 Puff Inhalation Q4H PRN    pantoprazole (PROTONIX) tablet 40 mg  40 mg Oral ACB&D    predniSONE (DELTASONE) tablet 15 mg  15 mg Oral DAILY WITH BREAKFAST    promethazine (PHENERGAN) tablet 25 mg  25 mg Oral Q6H PRN    insulin lispro (HUMALOG) injection   SubCUTAneous AC&HS           Andressa Dias MD

## 2022-09-03 NOTE — PROGRESS NOTES
Problem: Falls - Risk of  Goal: *Absence of Falls  Description: Document Patt Tom Fall Risk and appropriate interventions in the flowsheet. Outcome: Progressing Towards Goal  Note: Fall Risk Interventions:  Mobility Interventions: Bed/chair exit alarm, Patient to call before getting OOB, PT Consult for mobility concerns, Utilize walker, cane, or other assistive device, OT consult for ADLs         Medication Interventions: Bed/chair exit alarm, Patient to call before getting OOB, Teach patient to arise slowly    Elimination Interventions: Bed/chair exit alarm, Call light in reach, Patient to call for help with toileting needs    History of Falls Interventions: Bed/chair exit alarm, Door open when patient unattended, Room close to nurse's station                  Problem: Pressure Injury - Risk of  Goal: *Prevention of pressure injury  Description: Document Yuri Scale and appropriate interventions in the flowsheet.   Outcome: Progressing Towards Goal  Note: Pressure Injury Interventions:  Sensory Interventions: Assess changes in LOC, Check visual cues for pain, Keep linens dry and wrinkle-free, Minimize linen layers    Moisture Interventions: Absorbent underpads, Minimize layers    Activity Interventions: PT/OT evaluation, Increase time out of bed    Mobility Interventions: HOB 30 degrees or less, PT/OT evaluation    Nutrition Interventions: Discuss nutritional consult with provider, Document food/fluid/supplement intake    Friction and Shear Interventions: HOB 30 degrees or less, Minimize layers

## 2022-09-04 NOTE — PROGRESS NOTES
OT orders received. OT eval attempted, but per nursing, pt is verbally aggressive and declining therapy at this time. Will continue to follow patient.

## 2022-09-04 NOTE — PROGRESS NOTES
Problem: Falls - Risk of  Goal: *Absence of Falls  Description: Document Leafy Navas Fall Risk and appropriate interventions in the flowsheet. Outcome: Progressing Towards Goal  Note: Fall Risk Interventions:  Mobility Interventions: Bed/chair exit alarm, OT consult for ADLs, Patient to call before getting OOB, PT Consult for mobility concerns, Utilize walker, cane, or other assistive device         Medication Interventions: Bed/chair exit alarm, Patient to call before getting OOB, Teach patient to arise slowly    Elimination Interventions: Bed/chair exit alarm, Call light in reach, Patient to call for help with toileting needs    History of Falls Interventions: Bed/chair exit alarm, Door open when patient unattended, Room close to nurse's station         Problem: Pressure Injury - Risk of  Goal: *Prevention of pressure injury  Description: Document Yuri Scale and appropriate interventions in the flowsheet.   Outcome: Progressing Towards Goal  Note: Pressure Injury Interventions:  Sensory Interventions: Assess changes in LOC, Check visual cues for pain, Minimize linen layers    Moisture Interventions: Absorbent underpads, Internal/External urinary devices, Minimize layers    Activity Interventions: Increase time out of bed, PT/OT evaluation    Mobility Interventions: HOB 30 degrees or less, PT/OT evaluation    Nutrition Interventions: Document food/fluid/supplement intake, Discuss nutritional consult with provider    Friction and Shear Interventions: HOB 30 degrees or less, Minimize layers

## 2022-09-04 NOTE — PROGRESS NOTES
Progress Note    Patient: Dewey Drake MRN: [id number]  SSN: [id number]    YOB: 1970  Age: 47 y.o. Sex: male      Admit Date: 8/26/2022    LOS: 9 days     Subjective:   Postoperative day 8  Patient seen in bed  Has been tolerating ice chips and popsicles, continues to pass flatus  States that when he is up with physical therapy earlier he thought he felt something pop in the right lower quadrant experience pain at the site.     Objective:     Vitals:    09/04/22 0126 09/04/22 0519 09/04/22 0856 09/04/22 1146   BP: (!) 147/94 (!) 143/94 (!) 161/99 (!) 152/89   Pulse: 89 92 92 88   Resp: 16 18 20 20   Temp: 98.5 °F (36.9 °C) 98.4 °F (36.9 °C) 98.6 °F (37 °C) 98.6 °F (37 °C)   SpO2: 97% 96% 100% 99%   Weight:       Height:            Intake and Output:  Current Shift: 09/04 0701 - 09/04 1900  In: -   Out: 600 [Urine:600]  Last three shifts: 09/02 1901 - 09/04 0700  In: 100 [P.O.:100]  Out: 1975 [[facility]:9772; Drains:50]    Review of Systems:  ROS     Physical Exam:   Abdomen is soft, nondistended, tender palpation right lower abdomen just lateral to his ileostomy incision, ileostomy incision intact, remainder of abdomen mildly tender to palpation, midline incision clean, LES drain serosanguineous drainage    Lab/Data Review:  Recent Results (from the past 24 hour(s))   GLUCOSE, POC    Collection Time: 09/03/22  4:21 PM   Result Value Ref Range    Glucose (POC) 119 (H) 65 - 100 mg/dL    Performed by Bernadette Kaur 124, POC    Collection Time: 09/03/22  7:59 PM   Result Value Ref Range    Glucose (POC) 124 (H) 65 - 100 mg/dL    Performed by Freedom Benavidez    GLUCOSE, POC    Collection Time: 09/04/22  8:22 AM   Result Value Ref Range    Glucose (POC) 101 (H) 65 - 100 mg/dL    Performed by Bernadette Kaur 124, POC    Collection Time: 09/04/22 11:45 AM   Result Value Ref Range    Glucose (POC) 104 (H) 65 - 100 mg/dL    Performed by Rehan Moy           Assessment:     Active Problems:    S/P closure of ileostomy (8/26/2022)        Plan:   Overall slowly improving  Continue ice chips, sips of water and popsicles  Heart rate well controlled with amiodarone and metoprolol  Continue current analgesic regimen  Likely advance diet tomorrow    Signed By: Nellie Jacob MD     September 4, 2022

## 2022-09-04 NOTE — PROGRESS NOTES
Progress Note      9/4/2022 12:19 PM  NAME: Sumaya Carmona   MRN:  [id number]   Admit Diagnosis: [facility] [Z93.2]  S/P closure of ileostomy [Z98.890]            Assessment/Plan:   SVT/atrial flutter, currently in sinus rhythm on p.o. amiodarone. Had about 6 minute bout of A. fib 5:00 this morning. Status post exploratory laparotomy with takedown of ileostomy and ileocolic anastomosis    Myasthenia gravis    Diabetes, polymyositis, GERD         []       High complexity decision making was performed in this patient at high risk for decompensation with multiple organ involvement. Subjective:     Sumaya Carmona denies chest pain, dyspnea. Discussed with RN events overnight. Review of Systems:    Symptom Y/N Comments  Symptom Y/N Comments   Fever/Chills N   Chest Pain N    Poor Appetite N   Edema N    Cough N   Abdominal Pain N    Sputum N   Joint Pain N    SOB/GRAVES N   Pruritis/Rash N    Nausea/vomit N   Tolerating PT/OT Y    Diarrhea N   Tolerating Diet Y    Constipation N   Other       Could NOT obtain due to:      Objective:      Physical Exam:    Last 24hrs VS reviewed since prior progress note. Most recent are:    Visit Vitals  BP (!) 161/99 (BP Patient Position: At rest;Sitting)   Pulse 92   Temp 98.6 °F (37 °C)   Resp 20   Ht 5' 8\" (1.727 m)   Wt 74.8 kg (165 lb)   SpO2 100%   BMI 25.09 kg/m²       Intake/Output Summary (Last 24 hours) at 9/4/2022 1125  Last data filed at 9/4/2022 0846  Gross per 24 hour   Intake 100 ml   Output 1925 ml   Net -1825 ml          General Appearance: Well developed, well nourished, alert; no acute distress. Ears/Nose/Mouth/Throat: Hearing grossly normal; moist mucous membranes  Neck: Supple. Chest: Lungs clear to auscultation bilaterally. Cardiovascular: Regular rate and rhythm, S1S2 normal, no murmur. Abdomen: With bandages. Extremities: No edema bilaterally. Skin: Warm and dry.     []         Post-cath site without hematoma, bruit, tenderness, or thrill. Distal pulses intact. PMH/ reviewed - no change compared to H&P    Data Review    Telemetry:     EKG:   []  No new EKG for review    Lab Data Personally Reviewed:    Recent Labs     09/03/22 0412   WBC 15.0*   HGB 9.4*   HCT 30.0*          No results for input(s): INR, PTP, APTT, INREXT, INREXT in the last 72 hours. Recent Labs     09/03/22 0412 09/01/22 2050    141   K 3.4* 2.8*   * 110*   CO2 20* 25   BUN 6 8   CREA 0.32* 0.36*   GLU 95 101*   CA 7.9* 7.9*   MG  --  1.7       No results for input(s): CPK, CKNDX, TROIQ in the last 72 hours. No lab exists for component: CPKMB  Lab Results   Component Value Date/Time    Cholesterol, total 120 06/20/2022 07:50 AM    HDL Cholesterol 64 06/20/2022 07:50 AM    LDL, calculated 40 06/20/2022 07:50 AM    Triglyceride 80 06/20/2022 07:50 AM    CHOL/HDL Ratio 1.9 06/20/2022 07:50 AM       Recent Labs     09/03/22 0412   AP 72   TP 5.1*   ALB 2.2*   GLOB 2.9       No results for input(s): PH, PCO2, PO2 in the last 72 hours.     Medications Personally Reviewed:    Current Facility-Administered Medications   Medication Dose Route Frequency    HYDROmorphone (DILAUDID) injection 1 mg  1 mg IntraVENous Q4H PRN    amiodarone (CORDARONE) tablet 400 mg  400 mg Oral DAILY    metoprolol succinate (TOPROL-XL) XL tablet 50 mg  50 mg Oral BID    metoprolol (LOPRESSOR) injection 5 mg  5 mg IntraVENous Q6H PRN    HYDROmorphone (DILAUDID) injection 2 mg  2 mg IntraVENous Q4H PRN    0.9% sodium chloride infusion 250 mL  250 mL IntraVENous PRN    acetaminophen (TYLENOL) tablet 650 mg  650 mg Oral Q6H PRN    glucose chewable tablet 16 g  4 Tablet Oral PRN    glucagon (GLUCAGEN) injection 1 mg  1 mg IntraMUSCular PRN    ondansetron (ZOFRAN) injection 4 mg  4 mg IntraVENous Q6H PRN    dextrose 10% infusion 0-250 mL  0-250 mL IntraVENous PRN    oxyCODONE IR (ROXICODONE) tablet 5 mg  5 mg Oral Q4H PRN    ceFAZolin (ANCEF) 2 g in sterile water (preservative free) 20 mL IV syringe  2 g IntraVENous Q8H    metroNIDAZOLE (FLAGYL) IVPB premix 500 mg  500 mg IntraVENous Q8H    0.9% sodium chloride infusion  125 mL/hr IntraVENous CONTINUOUS    albuterol (PROVENTIL HFA, VENTOLIN HFA, PROAIR HFA) inhaler 1-2 Puff  1-2 Puff Inhalation Q4H PRN    pantoprazole (PROTONIX) tablet 40 mg  40 mg Oral ACB&D    predniSONE (DELTASONE) tablet 15 mg  15 mg Oral DAILY WITH BREAKFAST    promethazine (PHENERGAN) tablet 25 mg  25 mg Oral Q6H PRN    insulin lispro (HUMALOG) injection   SubCUTAneous AC&HS           Amanda Lindo MD

## 2022-09-04 NOTE — PROGRESS NOTES
PHYSICAL THERAPY EVALUATION  Patient: Bee Robins (64 y.o. male)  Date: 9/4/2022  Primary Diagnosis: [facility] present ([facility] [Z93.2]  S/P closure of ileostomy [Z98.890]  Procedure(s) (LRB):  EXPLORATORY LAPAROTOMY, CLOSURE ILEOSTOMY; ileocolic anastomosis (N/A) 9 Days Post-Op   Precautions: Fall       ASSESSMENT  Pt is a 46 yo M admitted on 8/26/2021 for closure of ostomy ; patient is s/p day 8. Patient has been known to the therapy dept from last adm. Patient went to facility after DC and was able to amb as per patient. PMH: As below. Pt A&O x 4. Based on the objective data described below, the patient presents with generalized weakness, impaired functional mobility, impaired amb, impaired balance, and decreased endurance to activities. Therapist spoke with RN prior to eval. Pt semi supine  upon PT arrival, not too  agreeable to evaluation. Not too happy about therapy. Patient at first declined therapy and got verbally agitated reported I can do it when I feel better. No need for therapy. Patient was educated the imp of therapy. Patient reported he had done all this last time. Patient argumentative. Therapist respected patient's wishes and informed patient to let the nursing know when he is ready for therapy we will be glad to return and work with him when he becomes ready for therapy. At [facility] point patient got aggressive and started getting OOB. He was able to sit at eob with sup . Therapist than asked patient he would like to get up and that point patient got more aggreassive and reported\"I know what you are trying to do. If you want this bed I can sign out . You just want this bed\"Patient was educated that therapist was encouraging him and motivating. Patient kept on verbally be agitated and than started crying. RN Madison Cedillo was called at this time. Patient was left with RN. Patient is not too motivated to work with therapy. He might have some pain but as on last adm notes too patient was not too motivated to work with therapy. we will keep patient on trial base 1 x week if he works with therapy. In the event patient gets more motivated, we will increase the sessions x week. Pt required sup for bed mobility,  supine <> sit, will not stand up transfers. Pt did fair with session today with PT. Pt will benefit from continued skilled PT to address above deficits and return to PLOF. Current PT DC recommendation snf due to above deficits. PLAN :  Recommendations and Planned Interventions: bed mobility training, transfer training, gait training, therapeutic exercises, patient and family training/education, and therapeutic activities          Frequency/Duration: Patient will be followed by physical therapy:  1-2x/week to address goals. Recommendation for discharge: (in order for the patient to meet his/her long term goals)  Ricky Yates    This discharge recommendation:  Has been made in collaboration with the attending provider and/or case management    IF patient discharges home will need the following DME: hospital bed and wheelchair         SUBJECTIVE:   Patient stated I am hurting,Dr told me not to do anything .     OBJECTIVE DATA SUMMARY:   HISTORY:    Past Medical History:   Diagnosis Date    Adverse effect of anesthesia     pt states due to myasthenia gravis    Arrhythmia     atrial flutter    Asthma     [facility] ([facility]     GERD (gastroesophageal reflux disease)     Hypertension     [facility])     Ill-defined condition     Spinal meningitis and avascular necrosis     Myasthenia gravis     Polymyositis ([facility] 2001    muscle disorder    Primary hypersomnolence disorder      Past Surgical History:   Procedure Laterality Date    HX COLONOSCOPY  2021    HX ENDOSCOPY      HX ILEOSTOMY      pt states approx may or june 2022    HX OTHER SURGICAL      lung abscess removal    HX PARTIAL COLECTOMY      pt states april 2022    [place] SHOULDER SURG PROC UNLISTED      Left Shoulder       Home Situation  Home Environment: Rehabilitation facility  One/Two Story Residence: One story  Living Alone: No  Support Systems: Other Family Member(s)  Patient Expects to be Discharged to[de-identified] Skilled nursing facility  Current DME Used/Available at Home: None    EXAMINATION/PRESENTATION/DECISION MAKING:   Critical Behavior:  Neurologic State: Alert, Irritable  Orientation Level: Oriented X4  Cognition: Follows commands     Hearing: Auditory  Auditory Impairment: None  Skin:    Edema:   Range Of Motion:  AROM: Generally decreased, functional                       Strength:    Strength: Generally decreased, functional                    Functional Mobility:  Bed Mobility:  Rolling: Modified independent  Supine to Sit: Modified independent  Sit to Supine: Modified independent  Scooting :sup                            Balance:   Sitting: Intact; With support                                                       Functional Measure:  [address] [facility] [address] [address] AM-PAC [address] [facility]         Basic Mobility Inpatient Short Form  How much difficulty does the patient currently have. .. Unable A Lot A Little None   1. Turning over in bed (including adjusting bedclothes, sheets and blankets)? [] 1   [] 2   [] 3   [x] 4   2. Sitting down on and standing up from a chair with arms ( e.g., wheelchair, bedside commode, etc.)   [x] 1   [] 2   [] 3   [] 4   3. Moving from lying on back to sitting on the side of the bed? [] 1   [] 2   [] 3   [x] 4          How much help from another person does the patient currently need. .. Total A Lot A Little None   4. Moving to and from a bed to a chair (including a wheelchair)? [x] 1   [] 2   [] 3   [] 4   5. Need to walk in hospital room? [x] 1   [] 2   [] 3   [] 4   6. Climbing 3-5 steps with a railing? [x] 1   [] 2   [] 3   [] 4   © 2007, Trustees of [address] [facility] [address] [address], under license to [employer].  All rights reserved     Score:  Initial: 12/24 Most Recent: X (Date: 09/04/2022 )   Interpretation of Tool:  Represents activities that are increasingly more difficult (i.e. Bed mobility, Transfers, Gait). Score 24 23 22-20 19-15 14-10 9-7 6   Modifier CH CI CJ CK CL CM CN         Physical Therapy Evaluation Charge Determination   History Examination Presentation Decision-Making   LOW Complexity : Zero comorbidities / personal factors that will impact the outcome / POC LOW Complexity : 1-2 Standardized tests and measures addressing body structure, function, activity limitation and / or participation in recreation  LOW Complexity : Stable, uncomplicated  Other outcome measures [facility] 6  12/24      Based on the above components, the patient evaluation is determined to be of the following complexity level: LOW     Pain Rating:  10/10 rt side     Activity Tolerance:   Poor    After treatment patient left in no apparent distress:   Bed returned to lowest position,  sitting at eob  . GOALS:    Problem: Mobility Impaired (Adult and Pediatric)  Goal: *Acute Goals and Plan of Care (Insert Text)  Description: Patient stated goal: \"Leave me alone. I know I can do it when I need to\"  Patient will move from supine to sit and sit to supine , scoot up and down, and roll side to side in bed with supervision/set-up within 7 day(s). Patient will transfer from bed to chair and chair to bed with minimal assistance/contact guard assist using the least restrictive device within 7 day(s). Patient will improve static standing balance to supervision within 1 week(s). Patient will ambulate 10 feet with minimal assistance with least restrictive device within 1 weeks. Outcome: Progressing Towards Goal       COMMUNICATION/EDUCATION:   The patients plan of care was discussed with: Registered nurse. Fall prevention education was provided and the patient/caregiver indicated understanding. and Patient understands intent and goals of therapy, but is neutral about his/her participation.          Thank you for this referral.  Tracie MORA Parmjit Gutiérrez, PT.    Time Calculation: 20 mins

## 2022-09-05 NOTE — PROGRESS NOTES
Comprehensive Nutrition Assessment    Type and Reason for Visit: Reassess, NPO/clear liquid    Nutrition Recommendations/Plan:   Consider initiating Clear Liquid diet, advance as tolerated to goal of GI Walsh, Low Sodium  NPO x11 days - if unable to medically advance diet/not tolerated, consider initiating PPN D5/AA4. 25% at 83mL/hr - provides 678kcal (35%), 85g protein (91%)  Monitor and record PO intakes and Bms in I/Os     Malnutrition Assessment:  Malnutrition Status:  Mild malnutrition (09/05/22 1053)    Context:  Acute illness     Findings of the 6 clinical characteristics of malnutrition:   Energy Intake:  50% or less of est energy requirements for 5 or more days  Weight Loss:  No significant weight loss     Body Fat Loss:  No significant body fat loss,     Muscle Mass Loss:  No significant muscle mass loss,    Fluid Accumulation:  No significant fluid accumulation,     Strength:  Not performed     Nutrition Assessment:    Admitted for scheduled ileostomy closure. POD 5 w/ small amount of flatus noted per MD. Pt familiar to RD. Previously on Clear Liq 6 days ago in preparation for sx, currently NPO x5 days. Pt is clinically and metabolically stable w/ functional gut as evidenced by +BM, +flatus and active bowel sounds. As pt gut is functional, best practice is to initiate PO diet vs EN as medically able to promote best nutrition and continued gut function. Rec's above. No h/o wt loss since last nutrition assessment noted. (9/5) Day 9 s/p ex lap with takedown ileostomy, ileocolic anastamosis. Tolerating ice chips and popsicles, passing flatus. +nausea. Diet not yet advanced. Labs: Na 141, K 3.4, BUN 6, Creat 0.32, Gluc 95, Alb 2.2. Meds: 0.9% NaCl, insulin lispro, ondansetron, pantoprazole, prednisone, promethazine. Nutrition Related Findings:    +N, No v/d/c. NPO. BM 9/1.  Wound Type: Surgical incision    Current Nutrition Intake & Therapies:  Average Meal Intake: NPO  Average Supplement Intake: NPO  DIET NPO Sips of Water with Meds    Anthropometric Measures:  Height: 5' 7.99\" (172.7 cm)  Ideal Body Weight (IBW): 154 lbs (70 kg)  Current Body Wt:  77.6 kg (171 lb 1.2 oz), 111.1 % IBW. Bed scale  Current BMI (kg/m2): 26  Usual Body Weight: 60.5 kg (133 lb 6.1 oz) (2 months ago per chart review from [facility].)  % Weight Change (Calculated): 23.6  Weight Adjustment: No adjustment  BMI Category: Overweight (BMI 25.0-29. 9)    Estimated Daily Nutrient Needs:  Energy Requirements Based On: Kcal/kg  Weight Used for Energy Requirements: Current  Energy (kcal/day): 1940kcal (25kcal/kg)  Weight Used for Protein Requirements: Current  Protein (g/day): 93g (1.2g/kg)  Method Used for Fluid Requirements: 1 ml/kcal  Fluid (ml/day): 1940mL (1mL/kcal)    Nutrition Diagnosis:   Inadequate oral intake related to altered GI structure as evidenced by NPO or clear liquid status due to medical condition, GI abnormality    Nutrition Interventions:   Food and/or Nutrient Delivery: Start oral diet, Start oral nutrition supplement  Nutrition Education/Counseling: No recommendations at this time  Coordination of Nutrition Care: Continue to monitor while inpatient  Plan of Care discussed with: RN    Goals:  Previous Goal Met: No progress toward goal(s)  Goals: Initiate PO diet, PO intake 50% or greater, PO intake 75% or greater, within 2 days  Specify Other Goals: Initiate means to meet >/= 50% of EENs within 2-5 days. Nutrition Monitoring and Evaluation:   Behavioral-Environmental Outcomes: None identified  Food/Nutrient Intake Outcomes: Diet advancement/tolerance, Food and nutrient intake, Supplement intake  Physical Signs/Symptoms Outcomes: Biochemical data, GI status, Meal time behavior, Nausea/vomiting, Weight    Discharge Planning:     Too soon to determine    Walter Galindo RD  Contact: [phone]

## 2022-09-05 NOTE — PROGRESS NOTES
Problem: Falls - Risk of  Goal: *Absence of Falls  Description: Document Antione Joseph Fall Risk and appropriate interventions in the flowsheet. Outcome: Progressing Towards Goal  Note: Fall Risk Interventions:  Mobility Interventions: Bed/chair exit alarm, OT consult for ADLs, Patient to call before getting OOB, PT Consult for mobility concerns, Utilize walker, cane, or other assistive device         Medication Interventions: Bed/chair exit alarm, Patient to call before getting OOB, Teach patient to arise slowly    Elimination Interventions: Bed/chair exit alarm, Call light in reach, Patient to call for help with toileting needs    History of Falls Interventions: Bed/chair exit alarm, Door open when patient unattended, Room close to nurse's station         Problem: Pressure Injury - Risk of  Goal: *Prevention of pressure injury  Description: Document Yuri Scale and appropriate interventions in the flowsheet.   Outcome: Progressing Towards Goal  Note: Pressure Injury Interventions:  Sensory Interventions: Assess changes in LOC, Check visual cues for pain, Minimize linen layers    Moisture Interventions: Absorbent underpads, Minimize layers    Activity Interventions: Increase time out of bed, PT/OT evaluation    Mobility Interventions: HOB 30 degrees or less, PT/OT evaluation    Nutrition Interventions: Document food/fluid/supplement intake, Discuss nutritional consult with provider    Friction and Shear Interventions: HOB 30 degrees or less, Minimize layers

## 2022-09-05 NOTE — PROGRESS NOTES
Progress Note    Patient: Jojo Calvillo MRN: [id number]  SSN: [id number]    YOB: 1970  Age: 47 y.o. Sex: male      Admit Date: 8/26/2022    LOS: 10 days     Subjective:   Postoperative day 9 s/p ex lap with takedown ileostomy and ileocolic anastomosis. Patient seen in bed  Has been tolerating ice chips and popsicles, continues to pass flatus but has constant nausea. No bowel movements. HR remained controlled overnight. Complaining of oral thrush    Objective:     Vitals:    09/04/22 2336 09/05/22 0016 09/05/22 0322 09/05/22 0722   BP: (!) 141/88  (!) 142/89 (!) 144/91   Pulse: 84  85 81   Resp: 16  16 18   Temp: 98.5 °F (36.9 °C)  98.8 °F (37.1 °C) 98.4 °F (36.9 °C)   SpO2: 100%  100% 98%   Weight:  171 lb 1.2 oz (77.6 kg)     Height:            Intake and Output:  Current Shift: No intake/output data recorded.   Last three shifts: 09/03 1901 - 09/05 0700  In: 120 [P.O.:120]  Out: 3200 [Urine:3150; Drains:50]      Physical Exam:   Mild TTP RLQ, ileostomy incision intact, remainder of abdomen mildly tender to palpation, midline incision clean, LES drain serosanguineous drainage    Lab/Data Review:  Recent Results (from the past 24 hour(s))   GLUCOSE, POC    Collection Time: 09/04/22  8:22 AM   Result Value Ref Range    Glucose (POC) 101 (H) 65 - 100 mg/dL    Performed by Bernadette Pugh, POC    Collection Time: 09/04/22 11:45 AM   Result Value Ref Range    Glucose (POC) 104 (H) 65 - 100 mg/dL    Performed by Bernadette Kaur 124, POC    Collection Time: 09/04/22  4:50 PM   Result Value Ref Range    Glucose (POC) 144 (H) 65 - 100 mg/dL    Performed by Bernadette Pugh, POC    Collection Time: 09/04/22 10:17 PM   Result Value Ref Range    Glucose (POC) 100 65 - 100 mg/dL    Performed by Ewa Sanchez           Assessment:     Active Problems:    S/P closure of ileostomy (8/26/2022)      Plan:     Continue ice chips, sips of water and popsicles  Heart rate well controlled with amiodarone and metoprolol  Continue current analgesic regimen  Patient requesting to continue with current diet. Nystatin swish and spit  Patient requesting home fluconazole, will hold for now due to interactions with amiodarone.     Signed By: Conchita Bloom DO     September 5, 2022

## 2022-09-06 NOTE — PROGRESS NOTES
Chart reviewed, patient remains unstable for d/c. At this time plan is to go to SNF, [address] [place] [address],[address] of [place], however they will need PT/OT notes to review prior to official acceptance, and patient will require insurance auth prior to d/c.    CM continues to follow and monitor for needs.

## 2022-09-06 NOTE — PROGRESS NOTES
Problem: Diabetes Self-Management  Goal: *Disease process and treatment process  Description: Define diabetes and identify own type of diabetes; list 3 options for treating diabetes. Outcome: Progressing Towards Goal     Problem: Falls - Risk of  Goal: *Absence of Falls  Description: Document Armando Benites Fall Risk and appropriate interventions in the flowsheet. Outcome: Progressing Towards Goal  Note: Fall Risk Interventions:  Mobility Interventions: Bed/chair exit alarm         Medication Interventions: Bed/chair exit alarm    Elimination Interventions: Call light in reach, Bed/chair exit alarm    History of Falls Interventions: Bed/chair exit alarm         Problem: Pressure Injury - Risk of  Goal: *Prevention of pressure injury  Description: Document Yuri Scale and appropriate interventions in the flowsheet.   Outcome: Progressing Towards Goal  Note: Pressure Injury Interventions:  Sensory Interventions: Assess changes in LOC    Moisture Interventions: Absorbent underpads    Activity Interventions: Increase time out of bed    Mobility Interventions: HOB 30 degrees or less    Nutrition Interventions: Document food/fluid/supplement intake    Friction and Shear Interventions: HOB 30 degrees or less                Problem: Patient Education: Go to Patient Education Activity  Goal: Patient/Family Education  Outcome: Progressing Towards Goal

## 2022-09-06 NOTE — PROGRESS NOTES
Progress Note      9/6/2022 2:57 PM  NAME: Caty Freeman   MRN:  [id number]   Admit Diagnosis: [facility] present ([facility] [Z93.2]  S/P closure of ileostomy [Z98.890]      Problem List:   Paroxysmal SVT/atrial flutter  Post exploratory laparotomy with takedown of ileostomy  Myasthenia gravis with polymyositis  Type II diabetes     Assessment/Plan:   No recurrent SVT with amiodarone and beta-blocker therapy  Not appropriate at this time for ablation  Outpatient follow-up with EP  Patient is stable to move from a stepdown unit if med tele available         []       High complexity decision making was performed in this patient at high risk for decompensation with multiple organ involvement. Subjective:     Caty Freeman denies chest pain, dyspnea. Discussed with RN events overnight. Review of Systems:   Negative except for as noted above. Objective:      Physical Exam:    Last 24hrs VS reviewed since prior progress note. Most recent are:    Visit Vitals  BP (!) 148/88 (BP 1 Location: Left lower arm, BP Patient Position: Semi fowlers; At rest)   Pulse 81   Temp 98.7 °F (37.1 °C)   Resp 20   Ht 5' 7.99\" (1.727 m)   Wt 77.6 kg (171 lb 1.2 oz)   SpO2 100%   BMI 26.02 kg/m²       Intake/Output Summary (Last 24 hours) at 9/6/2022 1457  Last data filed at 9/6/2022 0918  Gross per 24 hour   Intake --   Output 2116 ml   Net -2116 ml        General Appearance: Alert; no acute distress. Ears/Nose/Mouth/Throat: moist mucous membranes  Neck: Supple. Chest: Lungs clear to auscultation bilaterally. Cardiovascular: Regular rate and rhythm, S1S2 normal  Abdomen: Soft, non-tender, bowel sounds are active. Extremities: No edema bilaterally. Skin: Warm and dry. []         Post-cath site without hematoma, bruit, tenderness, or thrill. Distal pulses intact.     PMH/SH reviewed - no change compared to H&P    Telemetry: NSR    EKG: NSR    04/15/22    ECHO AL W POSSIBLE CARDIOVERSION 06/02/2022 6/2/2022    Interpretation Summary  Images from the original result were not included. [facility]  Phone: [phone]        NAME: Trina Bonilla  :  1970  MRN:  [id number]  Date/Time:  2022 2:22 PM        Anesthesia: Dr. Bob Villagomez and the team    Operators:   Dr. Isamar Hendrickson    Procedure:  TRANSESOPHAGEAL ECHO    Indication:   CVA, assess cardiac LETICIA      Procedure description: After obtaining the informed consent, patient was brought to the transesophageal echocardiography area in PACU. Patient was prepped and draped in the usual standard fashion. Patient was sedated using IV propofol as per anesthesia team. After ascertaining the absence of gag reflex, an omni plane probe with the help of water-soluble lubricant jelly was gently passed over the bite guard into the patient's esophagus. Echocardiographic images in various tomographic planes were obtained and simultaneously digitalized on a hard drive of the echocardiography machine. Patient throughout  the procedure was kept comfortable using IV sedation as per anesthesia and was closely monitored for oxygen saturation, hemodynamics, cardiac rhythm and any signs of distress. Patient completed the test without any complications. Transesophageal echocardiographic findings:  1. This is technically adequate echocardiography study. 2.    Normal left LV size and systolic function with grossly preserved wall motion. Estimated LV ejection fraction is 60%  3. Normal RV size and systolic function. 4.    Normal left atrial size. 5.    Normal right atrial size. 6.    Mitral valve is normal in structure with mild to moderate mitral regurgitation. 7.    Tricuspid valve is normal in structure with mild tricuspid regurgitation. 8.    Aortic valve normal trileaflet without stenosis or regurgitation. Aortic root is normal.  9.    Pulmonic valve is normal functioning.   10.  No intracardiac masses, thrombi or any cardiac source of emboli noted. Patient's left atrial appendage was without any clot or thrombi. 11.  Normal-appearing ascending and descending aorta. 12. No patent foramen ovale or intracardiac shunt appreciated by color flow or by agitated saline injection. Impression:  1. No intracardiac masses, thrombi on any cardiac source of emboli noted. 2.  No patent foramen ovale or intracardiac shunt appreciated by injection of agitated saline and color flow. 3.  Normal left ventricle size and systolic function with an estimated LV ejection fraction of 60%. Signed by: Bc Sexton MD on 6/2/2022  2:25 PM      []  No new EKG for review    Lab Data Personally Reviewed:    Recent Labs     09/06/22  0830   WBC 13.2*   HGB 9.6*   HCT 31.4*        No results for input(s): INR, PTP, APTT, INREXT in the last 72 hours. Recent Labs     09/06/22  0830      K 3.3*      CO2 23   BUN 3*   CREA 0.30*   GLU 91   CA 7.6*     No results for input(s): CPK, CKNDX, TROIQ in the last 72 hours. No lab exists for component: CPKMB  Lab Results   Component Value Date/Time    Cholesterol, total 120 06/20/2022 07:50 AM    HDL Cholesterol 64 06/20/2022 07:50 AM    LDL, calculated 40 06/20/2022 07:50 AM    Triglyceride 80 06/20/2022 07:50 AM    CHOL/HDL Ratio 1.9 06/20/2022 07:50 AM       No results for input(s): AP, TBIL, TP, ALB, GLOB, GGT, AML, LPSE in the last 72 hours. No lab exists for component: SGOT, GPT, AMYP, HLPSE  No results for input(s): PH, PCO2, PO2 in the last 72 hours.     Medications Personally Reviewed:    Current Facility-Administered Medications   Medication Dose Route Frequency    nystatin (MYCOSTATIN) 100,000 unit/mL oral suspension 500,000 Units  500,000 Units Oral QID    HYDROmorphone (DILAUDID) injection 1 mg  1 mg IntraVENous Q4H PRN    amiodarone (CORDARONE) tablet 400 mg  400 mg Oral DAILY    metoprolol succinate (TOPROL-XL) XL tablet 50 mg  50 mg Oral BID metoprolol (LOPRESSOR) injection 5 mg  5 mg IntraVENous Q6H PRN    HYDROmorphone (DILAUDID) injection 2 mg  2 mg IntraVENous Q4H PRN    0.9% sodium chloride infusion 250 mL  250 mL IntraVENous PRN    acetaminophen (TYLENOL) tablet 650 mg  650 mg Oral Q6H PRN    glucose chewable tablet 16 g  4 Tablet Oral PRN    glucagon (GLUCAGEN) injection 1 mg  1 mg IntraMUSCular PRN    ondansetron (ZOFRAN) injection 4 mg  4 mg IntraVENous Q6H PRN    dextrose 10% infusion 0-250 mL  0-250 mL IntraVENous PRN    oxyCODONE IR (ROXICODONE) tablet 5 mg  5 mg Oral Q4H PRN    ceFAZolin (ANCEF) 2 g in sterile water (preservative free) 20 mL IV syringe  2 g IntraVENous Q8H    metroNIDAZOLE (FLAGYL) IVPB premix 500 mg  500 mg IntraVENous Q8H    0.9% sodium chloride infusion  125 mL/hr IntraVENous CONTINUOUS    albuterol (PROVENTIL HFA, VENTOLIN HFA, PROAIR HFA) inhaler 1-2 Puff  1-2 Puff Inhalation Q4H PRN    pantoprazole (PROTONIX) tablet 40 mg  40 mg Oral ACB&D    predniSONE (DELTASONE) tablet 15 mg  15 mg Oral DAILY WITH BREAKFAST    promethazine (PHENERGAN) tablet 25 mg  25 mg Oral Q6H PRN    insulin lispro (HUMALOG) injection   SubCUTAneous AC&HS         Rahul Palmer MD

## 2022-09-06 NOTE — PROGRESS NOTES
Patient assisted up to bedside commode, attempted to have bowel movement, no results. Patient assisted back to bed, resting quietly.

## 2022-09-06 NOTE — PROGRESS NOTES
Progress Note    Patient: Sarah Friend MRN: [id number]  SSN: [id number]    YOB: 1970  Age: 47 y.o.   Sex: male      Admit Date: 8/26/2022    LOS: 11 days     Subjective:   Postoperative day 11  Patient seen in bed  Had bowel movement last night  Continues to pass flatus    Objective:     Vitals:    09/06/22 0743 09/06/22 0920 09/06/22 1040 09/06/22 1441   BP: (!) 153/94  129/84 (!) 148/88   Pulse: 83  84 81   Resp: 20  20 20   Temp: 99.1 °F (37.3 °C)  98.9 °F (37.2 °C) 98.7 °F (37.1 °C)   SpO2: 100% 97% 99% 100%   Weight:       Height:            Intake and Output:  Current Shift: 09/06 0701 - 09/06 1900  In: -   Out: 615 [Urine:600; Drains:15]  Last three shifts: 09/04 1901 - 09/06 0700  In: 120 [P.O.:120]  Out: 2801 [Urine:2775; Drains:25]    Review of Systems:  ROS     Physical Exam:   Abdomen is soft, diffusely tender more so on the right side, nondistended, midline of right lower quadrant incisions clean, LES drain with serosanguineous drainage    Lab/Data Review:  Recent Results (from the past 24 hour(s))   GLUCOSE, POC    Collection Time: 09/05/22  9:10 PM   Result Value Ref Range    Glucose (POC) 83 65 - 100 mg/dL    Performed by Radha Hernandez    GLUCOSE, POC    Collection Time: 09/06/22  7:46 AM   Result Value Ref Range    Glucose (POC) 92 65 - 100 mg/dL    Performed by [address], BASIC    Collection Time: 09/06/22  8:30 AM   Result Value Ref Range    Sodium 140 136 - 145 mmol/L    Potassium 3.3 (L) 3.5 - 5.1 mmol/L    Chloride 106 97 - 108 mmol/L    CO2 23 21 - 32 mmol/L    Anion gap 11 5 - 15 mmol/L    Glucose 91 65 - 100 mg/dL    BUN 3 (L) 6 - 20 mg/dL    Creatinine 0.30 (L) 0.70 - 1.30 mg/dL    BUN/Creatinine ratio 10 (L) 12 - 20      GFR est AA >60 >60 ml/min/1.73m2    GFR est non-AA >60 >60 ml/min/1.73m2    Calcium 7.6 (L) 8.5 - 10.1 mg/dL   CBC W/O DIFF    Collection Time: 09/06/22  8:30 AM   Result Value Ref Range    WBC 13.2 (H) 4.1 - 11.1 K/uL    RBC 4. 11 4.10 - 5.70 M/uL    HGB 9.6 (L) 12.1 - 17.0 g/dL    HCT 31.4 (L) 36.6 - 50.3 %    MCV 76.4 (L) 80.0 - 99.0 FL    MCH 23.4 (L) 26.0 - 34.0 PG    MCHC 30.6 30.0 - 36.5 g/dL    RDW 19.9 (H) 11.5 - 14.5 %    PLATELET 042 381 - 289 K/uL    MPV 10.4 8.9 - 12.9 FL    NRBC 0.2 (H) 0.0  WBC    ABSOLUTE NRBC 0.02 (H) 0.00 - 0.01 K/uL   GLUCOSE, POC    Collection Time: 09/06/22 11:21 AM   Result Value Ref Range    Glucose (POC) 98 65 - 100 mg/dL    Performed by Pernell Garcia             Assessment:     Active Problems:    S/P closure of ileostomy (8/26/2022)        Plan: Will advance to clear liquid diet, I told the patient if his abdominal pain increases with resumption of diet that I will plan on obtaining a CT scan tomorrow.     Signed By: Kristina Tovar MD     September 6, 2022

## 2022-09-07 NOTE — PROGRESS NOTES
PHYSICAL THERAPY TREATMENT  Patient: Bethel Bardales (79 y.o. male)  Date: 9/7/2022  Diagnosis: Ileostomy present ([facility] [Z93.2]  S/P closure of ileostomy [Z98.890] <principal problem not specified>  Procedure(s) (LRB):  EXPLORATORY LAPAROTOMY, CLOSURE ILEOSTOMY; ileocolic anastomosis (N/A) 12 Days Post-Op  Precautions:    Chart, physical therapy assessment, plan of care and goals were reviewed. ASSESSMENT  Patient continues with skilled PT services and is progressing towards goals. Patient supine in bed upon approach and agreed to therapy session. Patient was SBA for bed mobility, mod A for supine>sit, min A for sit>supine and SBA for scooting to EOB where patient demonstrated good unsupported sitting balance. Patient tolerated sitting for 8-10 minutes while OT performed evaluation ( see OT note for more details). Patient then agreed to perform STS but declined ambulation at this time. Patient was min A for sts where patient stood for 40 seconds before sitting down at EOB 2/2 to pain in stomach rated at 8/10. Patient then stood again at min A to RW for switching pads out then sat again at EOB at min A. Patient then returned to supine ine bed at min A ( for raising legs up into bed). Patient then left supine in bed with call bell within reach and all needs meet. Patient advocate was present in room for entire session at patients request.      Current Level of Function Impacting Discharge (mobility/balance): impaired balance, general weakness, poor activity tolerance, pain with OOB mobility. Other factors to consider for discharge: PLOF, assistance at home, level of deficits, acute medical state         PLAN :  Patient continues to benefit from skilled intervention to address the above impairments. Continue treatment per established plan of care. to address goals.     Recommendation for discharge: (in order for the patient to meet his/her long term goals)  Ricky Reese discharge recommendation:  Has been made in collaboration with the attending provider and/or case management    IF patient discharges home will need the following DME: rolling walker       SUBJECTIVE:   Patient stated I will stand but I cant take steps today.     OBJECTIVE DATA SUMMARY:   Critical Behavior:  Neurologic State: Alert  Orientation Level: Oriented X4  Cognition: Follows commands     Functional Mobility Training:  Bed Mobility:  Rolling: Stand-by assistance  Supine to Sit: Moderate assistance  Sit to Supine: Minimum assistance  Scooting: Stand-by assistance    Transfers:  Sit to Stand: Minimum assistance  Stand to Sit: Minimum assistance  Balance:  Sitting: Intact; Without support  Standing: Impaired; With support  Standing - Static: Good;Constant support  Standing - Dynamic : Fair;Constant support  Pain Ratin/10 in stomach     Activity Tolerance:   Bed locked and in lowest position Fair and requires rest breaks  Please refer to the flowsheet for vital signs taken during this treatment. After treatment patient left in no apparent distress:   Bed returned to lowest position, Supine in bed, Call bell within reach, and Side rails x 3    COMMUNICATION/COLLABORATION:   The patients plan of care was discussed with: Occupational therapist and Registered nurse. Treatment occurred with OT due to patient requiring Ax2 for mobility and safety. Problem: Mobility Impaired (Adult and Pediatric)  Goal: *Acute Goals and Plan of Care (Insert Text)  Description: Patient stated goal: \"Leave me alone. I know I can do it when I need to\"  Patient will move from supine to sit and sit to supine , scoot up and down, and roll side to side in bed with supervision/set-up within 7 day(s). Patient will transfer from bed to chair and chair to bed with minimal assistance/contact guard assist using the least restrictive device within 7 day(s).     Patient will improve static standing balance to supervision within 1 week(s). Patient will ambulate 10 feet with minimal assistance with least restrictive device within 1 weeks.      Outcome: Progressing Towards Goal       Morales Mcclendon, JAMIE   Time Calculation: 22 mins

## 2022-09-07 NOTE — PROGRESS NOTES
Progress Note    Patient: Nelly García MRN: [id number]  SSN: [id number]    YOB: 1970  Age: 47 y.o. Sex: male      Admit Date: 8/26/2022    LOS: 12 days     Subjective:   Postoperative day 12  Patient seen in bed  Bowel movement last p.m. Objective:     Vitals:    09/06/22 2336 09/07/22 0300 09/07/22 0806 09/07/22 1130   BP: (!) 153/92 (!) 152/94 136/88 (!) 140/89   Pulse: 82 84 84 83   Resp: 20 20 18 16   Temp: 98.6 °F (37 °C) 98.8 °F (37.1 °C) 98.7 °F (37.1 °C) 99.2 °F (37.3 °C)   SpO2: 99% 100% 100% 100%   Weight:       Height:            Intake and Output:  Current Shift: No intake/output data recorded.   Last three shifts: 09/05 1901 - 09/07 0700  In: -   Out: 2566 [Urine:2550; Drains:15]    Review of Systems:  ROS     Physical Exam:   Abdomen is soft, mildly distended, mildly tender palpation right greater than left side, incisions clean and intact LES drain serous sanguinous drainage    Lab/Data Review:  Recent Results (from the past 24 hour(s))   GLUCOSE, POC    Collection Time: 09/06/22  9:23 PM   Result Value Ref Range    Glucose (POC) 126 (H) 65 - 100 mg/dL    Performed by Lorena Wilkins    GLUCOSE, POC    Collection Time: 09/07/22  8:09 AM   Result Value Ref Range    Glucose (POC) 87 65 - 100 mg/dL    Performed by Jacki Morales    EKG, 12 LEAD, SUBSEQUENT    Collection Time: 09/07/22  8:25 AM   Result Value Ref Range    Ventricular Rate 84 BPM    Atrial Rate 84 BPM    P-R Interval 152 ms    QRS Duration 96 ms    Q-T Interval 422 ms    QTC Calculation (Bezet) 498 ms    Calculated P Axis 59 degrees    Calculated R Axis -3 degrees    Calculated T Axis 35 degrees    Diagnosis       Sinus rhythm with marked sinus arrhythmia  Possible Inferior infarct , age undetermined  Abnormal ECG  When compared with ECG of 02-SEP-2022 09:23,  Premature supraventricular complexes are no longer Present  Confirmed by Craig Bone MD, HILDA ([id number]) on 9/7/2022 9:19:06 AM     GLUCOSE, POC    Collection Time: 09/07/22 11:40 AM   Result Value Ref Range    Glucose (POC) 94 65 - 100 mg/dL    Performed by Saida Beltran           Assessment:     Active Problems:    S/P closure of ileostomy (8/26/2022)        Plan:   Advance to full liquid diet  Continue nystatin for thrush  DC Ancef Flagyl  Continue sequential compression devices for DVT prophylaxis  Continue to hold Eliquis given the fact the patient had bleeding and required transfusion.     Signed By: Britt Goetz MD     September 7, 2022

## 2022-09-07 NOTE — PROGRESS NOTES
Progress Note      9/7/2022 2:55 PM  NAME: Benjie Burnett   MRN:  [id number]   Admit Diagnosis: Ileostomy present ([facility] [address] [Z93.2]  S/P closure of ileostomy [Z98.890]      Problem List:   Paroxysmal SVT/atrial flutter  Post exploratory laparotomy with takedown of ileostomy  Myasthenia gravis with polymyositis  Type II diabetes     Assessment/Plan:   No recurrent SVT/flutter on telemetry monitoring  Continue on current regimen of beta-blocker and amnio  No active cardiac issues at this point, will follow as needed         []       High complexity decision making was performed in this patient at high risk for decompensation with multiple organ involvement. Subjective:     Benjie Burnett denies chest pain, dyspnea. Discussed with RN events overnight. Review of Systems:   Negative except for as noted above. Objective:      Physical Exam:    Last 24hrs VS reviewed since prior progress note. Most recent are:    Visit Vitals  BP (!) 140/89 (BP 1 Location: Left upper arm, BP Patient Position: At rest)   Pulse 83   Temp 99.2 °F (37.3 °C)   Resp 16   Ht 5' 7.99\" (1.727 m)   Wt 77.6 kg (171 lb 1.2 oz)   SpO2 100%   BMI 26.02 kg/m²       Intake/Output Summary (Last 24 hours) at 9/7/2022 1455  Last data filed at 9/6/2022 2103  Gross per 24 hour   Intake --   Output 1150 ml   Net -1150 ml        General Appearance: Alert; no acute distress. Ears/Nose/Mouth/Throat: moist mucous membranes  Neck: Supple. Chest: Lungs clear to auscultation bilaterally. Cardiovascular: Regular rate and rhythm, S1S2 normal  Abdomen: Soft, non-tender, bowel sounds are active. Extremities: No edema bilaterally. Skin: Warm and dry. []         Post-cath site without hematoma, bruit, tenderness, or thrill. Distal pulses intact.     PMH/SH reviewed - no change compared to H&P    Telemetry: NSR    EKG: NSR    04/15/22    ECHO AL W POSSIBLE CARDIOVERSION 06/02/2022 6/2/2022    Interpretation Summary  Images from the original result were not included. [facility]  Phone: [phone]        NAME: Jojo Calvillo  :  1970  MRN:  [id number]  Date/Time:  2022 2:22 PM        Anesthesia: Dr. Asher Casarez and the team    Operators:   Dr. Teo Richard    Procedure:  TRANSESOPHAGEAL ECHO    Indication:   CVA, assess cardiac LETICIA      Procedure description: After obtaining the informed consent, patient was brought to the transesophageal echocardiography area in PACU. Patient was prepped and draped in the usual standard fashion. Patient was sedated using IV propofol as per anesthesia team. After ascertaining the absence of gag reflex, an omni plane probe with the help of water-soluble lubricant jelly was gently passed over the bite guard into the patient's esophagus. Echocardiographic images in various tomographic planes were obtained and simultaneously digitalized on a hard drive of the echocardiography machine. Patient throughout  the procedure was kept comfortable using IV sedation as per anesthesia and was closely monitored for oxygen saturation, hemodynamics, cardiac rhythm and any signs of distress. Patient completed the test without any complications. Transesophageal echocardiographic findings:  1. This is technically adequate echocardiography study. 2.    Normal left LV size and systolic function with grossly preserved wall motion. Estimated LV ejection fraction is 60%  3. Normal RV size and systolic function. 4.    Normal left atrial size. 5.    Normal right atrial size. 6.    Mitral valve is normal in structure with mild to moderate mitral regurgitation. 7.    Tricuspid valve is normal in structure with mild tricuspid regurgitation. 8.    Aortic valve normal trileaflet without stenosis or regurgitation. Aortic root is normal.  9.    Pulmonic valve is normal functioning. 10.  No intracardiac masses, thrombi or any cardiac source of emboli noted. Patient's left atrial appendage was without any clot or thrombi. 11.  Normal-appearing ascending and descending aorta. 12. No patent foramen ovale or intracardiac shunt appreciated by color flow or by agitated saline injection. Impression:  1. No intracardiac masses, thrombi on any cardiac source of emboli noted. 2.  No patent foramen ovale or intracardiac shunt appreciated by injection of agitated saline and color flow. 3.  Normal left ventricle size and systolic function with an estimated LV ejection fraction of 60%. Signed by: Ken Jacob MD on 6/2/2022  2:25 PM      []  No new EKG for review    Lab Data Personally Reviewed:    Recent Labs     09/06/22  0830   WBC 13.2*   HGB 9.6*   HCT 31.4*        No results for input(s): INR, PTP, APTT, INREXT in the last 72 hours. Recent Labs     09/06/22  0830      K 3.3*      CO2 23   BUN 3*   CREA 0.30*   GLU 91   CA 7.6*     No results for input(s): CPK, CKNDX, TROIQ in the last 72 hours. No lab exists for component: CPKMB  Lab Results   Component Value Date/Time    Cholesterol, total 120 06/20/2022 07:50 AM    HDL Cholesterol 64 06/20/2022 07:50 AM    LDL, calculated 40 06/20/2022 07:50 AM    Triglyceride 80 06/20/2022 07:50 AM    CHOL/HDL Ratio 1.9 06/20/2022 07:50 AM       No results for input(s): AP, TBIL, TP, ALB, GLOB, GGT, AML, LPSE in the last 72 hours. No lab exists for component: SGOT, GPT, AMYP, HLPSE  No results for input(s): PH, PCO2, PO2 in the last 72 hours.     Medications Personally Reviewed:    Current Facility-Administered Medications   Medication Dose Route Frequency    HYDROmorphone (DILAUDID) injection 1 mg  1 mg IntraVENous Q4H PRN    HYDROmorphone (DILAUDID) injection 2 mg  2 mg IntraVENous Q4H PRN    nystatin (MYCOSTATIN) 100,000 unit/mL oral suspension 500,000 Units  500,000 Units Oral QID    amiodarone (CORDARONE) tablet 400 mg  400 mg Oral DAILY    metoprolol succinate (TOPROL-XL) XL tablet 50 mg  50 mg Oral BID    metoprolol (LOPRESSOR) injection 5 mg  5 mg IntraVENous Q6H PRN    0.9% sodium chloride infusion 250 mL  250 mL IntraVENous PRN    acetaminophen (TYLENOL) tablet 650 mg  650 mg Oral Q6H PRN    glucose chewable tablet 16 g  4 Tablet Oral PRN    glucagon (GLUCAGEN) injection 1 mg  1 mg IntraMUSCular PRN    ondansetron (ZOFRAN) injection 4 mg  4 mg IntraVENous Q6H PRN    dextrose 10% infusion 0-250 mL  0-250 mL IntraVENous PRN    oxyCODONE IR (ROXICODONE) tablet 5 mg  5 mg Oral Q4H PRN    0.9% sodium chloride infusion  125 mL/hr IntraVENous CONTINUOUS    albuterol (PROVENTIL HFA, VENTOLIN HFA, PROAIR HFA) inhaler 1-2 Puff  1-2 Puff Inhalation Q4H PRN    pantoprazole (PROTONIX) tablet 40 mg  40 mg Oral ACB&D    predniSONE (DELTASONE) tablet 15 mg  15 mg Oral DAILY WITH BREAKFAST    promethazine (PHENERGAN) tablet 25 mg  25 mg Oral Q6H PRN    insulin lispro (HUMALOG) injection   SubCUTAneous AC&HS         Jorgito Kumar MD

## 2022-09-07 NOTE — PROGRESS NOTES
OCCUPATIONAL THERAPY EVALUATION  Patient: Zion Purvis (89 y.o. male)  Date: 9/7/2022  Primary Diagnosis: [facility] [Z93.2]  S/P closure of ileostomy [Z98.890]  Procedure(s) (LRB):  EXPLORATORY LAPAROTOMY, CLOSURE ILEOSTOMY; ileocolic anastomosis (N/A) 12 Days Post-Op   Precautions: fall risk       ASSESSMENT  Pt is a 46 y/o M with PMH of DM, HTN, myasthenia gravis, primary hypersomnolence disorder, polymyositis, asthma, GERD, and ileostomy. Pt currently s/p laparotomy with takedown of ileostomy and ileocolic anastomosis completed by Dr. Glynda Schaumann, on 08/26/22. Pt received semi-supine in bed with patient advocate at bedside upon OT and PTA arrival and pt advocate present throughout session with pt's approval, AXO x4, and agreeable to OT evaluation at this time. Per pt report, pt lives with his sister in a one-story home with 10 HERBIE and L HR, was IND with ADLs and IND using no AD for mobility at [facility]. Based on current observations, pt presents with deficits in generalized strength/AROM, balance, functional activity tolerance, BUE sensation, and increased pain impacting overall performance of ADLs and functional transfers/mobility. Pt currently requires SBA for rolling. Pt completes sup>sit EOB with mod A and scoots toward EOB with SBA. Pt doffs/dons socks with SBA and additional time required while seated EOB. Pt completes sit EOB>stand with RW with min A. Pt encouraged to take side steps at EOB with pt declining d/t increased pain. Stand with RW>sit EOB completed with min A. Pt then completes sit EOB>stand with RW and stand with RW>sit EOB a second attempt with pt's chux pad changed. Pt then completes sit EOB>sup with min A. Pt declined grooming tasks stating he completed them prior to OT entry. Overall, pt tolerates session fair. Pt would benefit from continued skilled OT services to address current impairments and improve IND and safety with self cares and functional transfers/mobility. Recommend discharge to SNF once medically appropriate. Other factors to consider for discharge: home support, PLOF, severity of deficits     Patient will benefit from skilled therapy intervention to address the above noted impairments. PLAN :  Recommendations and Planned Interventions: self care training, functional mobility training, therapeutic exercise, balance training, therapeutic activities, endurance activities, and patient education    Frequency/Duration: Patient will be followed by occupational therapy:  2-3x/week to address goals. Recommendation for discharge: (in order for the patient to meet his/her long term goals)  Ricky Yates    This discharge recommendation:  Has been made in collaboration with the attending provider and/or case management    IF patient discharges home will need the following DME: TBD       SUBJECTIVE:   Patient stated It hurts.     OBJECTIVE DATA SUMMARY:   HISTORY:   Past Medical History:   Diagnosis Date    Adverse effect of anesthesia     pt states due to myasthenia gravis    Arrhythmia     atrial flutter    Asthma     Diabetes ([facility]     GERD (gastroesophageal reflux disease)     Hypertension     Ile[facility] in place [facility])     Ill-defined condition     Spinal meningitis and avascular necrosis     Myasthenia gravis     Polymyositis ([facility] [address] [facility] 2001    muscle disorder    Primary hypersomnolence disorder      Past Surgical History:   Procedure Laterality Date    HX COLONOSCOPY  2021    HX ENDOSCOPY      HX ILEOSTOMY      pt states approx may or june 2022    HX OTHER SURGICAL      lung abscess removal    HX PARTIAL COLECTOMY      pt states april 2022    [place] SHOULDER SURG PROC UNLISTED      Left Shoulder       Expanded or extensive additional review of patient history:     Home Situation  Home Environment: Private residence  # Steps to Enter: 10  Rails to Enter: Yes  Hand Rails : Bilateral  One/Two Story Residence: One story  Living Alone: No  Support Systems: Other Family Member(s) (sister)  Patient Expects to be Discharged to[de-identified] Skilled nursing facility  Current DME Used/Available at Home: None    Hand dominance: Right    EXAMINATION OF PERFORMANCE DEFICITS:  Cognitive/Behavioral Status:  Neurologic State: Alert  Orientation Level: Oriented X4  Cognition: Follows commands    Hearing: Auditory  Auditory Impairment: None    Range of Motion:  AROM: Generally decreased, functional (Shoulder flexion signficantly decreased, however, pt reports he remains functional)    Strength:  Strength: Generally decreased, functional    Coordination:  Coordination: Within functional limits  Fine Motor Skills-Upper: Left Intact; Right Intact    Gross Motor Skills-Upper: Left Intact; Right Intact    Tone & Sensation:  Sensation: Impaired (Occasional numbness/tingling reported)    Balance:  Sitting: Intact; Without support  Standing: Impaired; With support  Standing - Static: Good;Constant support  Standing - Dynamic : Fair;Constant support    Functional Mobility and Transfers for ADLs:  Bed Mobility:  Rolling: Stand-by assistance  Supine to Sit: Moderate assistance  Sit to Supine: Minimum assistance  Scooting: Stand-by assistance    Transfers:  Sit to Stand: Minimum assistance  Stand to Sit: Minimum assistance    ADL Intervention and task modifications:  Lower Body Dressing Assistance  Socks: Stand-by assistance (With additional time)  Position Performed: Seated edge of bed    Therapeutic Exercise:  Pt would benefit from UE HEP initiated at next session. Functional Measure:    Milli BLANTON \"6 Clicks\"                                                       Daily Activity Inpatient Short Form  How much help from another person does the patient currently need. .. Total; A Lot A Little None   1. Putting on and taking off regular lower body clothing? []  1 []  2 [x]  3 []  4   2. Bathing (including washing, rinsing, drying)? []  1 []  2 [x]  3 []  4   3.   Toileting, which includes using toilet, bedpan or urinal? [] 1 []  2 [x]  3 []  4   4. Putting on and taking off regular upper body clothing? []  1 []  2 [x]  3 []  4   5. Taking care of personal grooming such as brushing teeth? []  1 []  2 [x]  3 []  4   6. Eating meals? []  1 []  2 []  3 [x]  4   © , Trustees of [address] [place] [address] [address] [address], under license to [employer]. All rights reserved     Score:      Interpretation of Tool:  Represents clinically-significant functional categories (i.e. Activities of daily living). Percentage of Impairment CH    0%   CI    1-19% CJ    20-39% CK    40-59% CL    60-79% CM    80-99% CN     100%   [facility]  Score 6-24 24 23 20-22 15-19 10-14 7-9 6         Occupational Therapy Evaluation Charge Determination   History Examination Decision-Making   LOW Complexity : Brief history review  LOW Complexity : 1-3 performance deficits relating to physical, cognitive , or psychosocial skils that result in activity limitations and / or participation restrictions  LOW Complexity : No comorbidities that affect functional and no verbal or physical assistance needed to complete eval tasks       Based on the above components, the patient evaluation is determined to be of the following complexity level: LOW   Pain Ratin-10/10 stomach    Activity Tolerance:   Fair  Please refer to the flowsheet for vital signs taken during this treatment. After treatment patient left in no apparent distress:    Supine in bed, Call bell within reach, Bed / chair alarm activated, and Side rails x 3    COMMUNICATION/EDUCATION:   The patients plan of care was discussed with: Physical therapy assistant and Registered nurse. Patient/family agree to work toward stated goals and plan of care. This patients plan of care is appropriate for delegation to [facility].     Thank you for this referral.  Jorge A Knox OT  Time Calculation: 24 mins    Problem: Self Care Deficits Care Plan (Adult)  Goal: *Acute Goals and Plan of Care (Insert Text)  Description: Patient goal: Complete self care tasks with more IND and less pain. 1. Pt will be IND sup <> sit in prep for EOB ADLs  2. Pt will be IND grooming sitting EOB  3. Pt will be IND LE dressing sitting EOB/long sit  4. Pt will be IND sit <>  prep for toileting LRAD  5. Pt will be IND toileting/toilet transfer/cloth mgmt LRAD  6.  Pt will be IND following UE HEP in prep for self care tasks      Outcome: Not Met

## 2022-09-07 NOTE — PROGRESS NOTES
Problem: Diabetes Self-Management  Goal: *Disease process and treatment process  Description: Define diabetes and identify own type of diabetes; list 3 options for treating diabetes. Outcome: Progressing Towards Goal     Problem: Falls - Risk of  Goal: *Absence of Falls  Description: Document [place] Fall Risk and appropriate interventions in the flowsheet. Outcome: Progressing Towards Goal  Note: Fall Risk Interventions:  Mobility Interventions: Bed/chair exit alarm         Medication Interventions: Bed/chair exit alarm    Elimination Interventions: Call light in reach, Bed/chair exit alarm    History of Falls Interventions: Bed/chair exit alarm         Problem: Patient Education: Go to Patient Education Activity  Goal: Patient/Family Education  Outcome: Progressing Towards Goal     Problem: Pressure Injury - Risk of  Goal: *Prevention of pressure injury  Description: Document Yuri Scale and appropriate interventions in the flowsheet.   Outcome: Progressing Towards Goal  Note: Pressure Injury Interventions:  Sensory Interventions: Assess changes in LOC    Moisture Interventions: Absorbent underpads    Activity Interventions: Increase time out of bed    Mobility Interventions: PT/OT evaluation    Nutrition Interventions: Document food/fluid/supplement intake    Friction and Shear Interventions: Apply protective barrier, creams and emollients                Problem: Patient Education: Go to Patient Education Activity  Goal: Patient/Family Education  Outcome: Progressing Towards Goal     Problem: Patient Education: Go to Patient Education Activity  Goal: Patient/Family Education  Outcome: Progressing Towards Goal     Problem: Patient Education: Go to Patient Education Activity  Goal: Patient/Family Education  Outcome: Progressing Towards Goal

## 2022-09-07 NOTE — PROGRESS NOTES
Pt to transfer to [address] [place] [address] room 527. Report given to nurse Marlen Leiva [id number].

## 2022-09-07 NOTE — PROGRESS NOTES
Problem: Diabetes Self-Management  Goal: *Disease process and treatment process  Description: Define diabetes and identify own type of diabetes; list 3 options for treating diabetes. Outcome: Progressing Towards Goal  Goal: *Incorporating nutritional management into lifestyle  Description: Describe effect of type, amount and timing of food on blood glucose; list 3 methods for planning meals. Outcome: Progressing Towards Goal  Goal: *Incorporating physical activity into lifestyle  Description: State effect of exercise on blood glucose levels. Outcome: Progressing Towards Goal  Goal: *Developing strategies to promote health/change behavior  Description: Define the ABC's of diabetes; identify appropriate screenings, schedule and personal plan for screenings. Outcome: Progressing Towards Goal  Goal: *Using medications safely  Description: State effect of diabetes medications on diabetes; name diabetes medication taking, action and side effects. Outcome: Progressing Towards Goal  Goal: *Monitoring blood glucose, interpreting and using results  Description: Identify recommended blood glucose targets  and personal targets. Outcome: Progressing Towards Goal  Goal: *Prevention, detection, treatment of acute complications  Description: List symptoms of hyper- and hypoglycemia; describe how to treat low blood sugar and actions for lowering  high blood glucose level. Outcome: Progressing Towards Goal  Goal: *Prevention, detection and treatment of chronic complications  Description: Define the natural course of diabetes and describe the relationship of blood glucose levels to long term complications of diabetes.   Outcome: Progressing Towards Goal  Goal: *Developing strategies to address psychosocial issues  Description: Describe feelings about living with diabetes; identify support needed and support network  Outcome: Progressing Towards Goal  Goal: *Insulin pump training  Outcome: Progressing Towards Goal  Goal: *Sick day guidelines  Outcome: Progressing Towards Goal  Goal: *Patient Specific Goal (EDIT GOAL, INSERT TEXT)  Outcome: Progressing Towards Goal     Problem: Patient Education: Go to Patient Education Activity  Goal: Patient/Family Education  Outcome: Progressing Towards Goal     Problem: Falls - Risk of  Goal: *Absence of Falls  Description: Document [place] Flow Fall Risk and appropriate interventions in the flowsheet. Outcome: Progressing Towards Goal  Note: Fall Risk Interventions:  Mobility Interventions: Bed/chair exit alarm         Medication Interventions: Bed/chair exit alarm    Elimination Interventions: Call light in reach, Bed/chair exit alarm    History of Falls Interventions: Bed/chair exit alarm         Problem: Patient Education: Go to Patient Education Activity  Goal: Patient/Family Education  Outcome: Progressing Towards Goal     Problem: Pressure Injury - Risk of  Goal: *Prevention of pressure injury  Description: Document Yuri Scale and appropriate interventions in the flowsheet.   Outcome: Progressing Towards Goal  Note: Pressure Injury Interventions:  Sensory Interventions: Minimize linen layers    Moisture Interventions: Minimize layers    Activity Interventions: Increase time out of bed    Mobility Interventions: HOB 30 degrees or less, PT/OT evaluation    Nutrition Interventions: Document food/fluid/supplement intake    Friction and Shear Interventions: Minimize layers, HOB 30 degrees or less                Problem: Patient Education: Go to Patient Education Activity  Goal: Patient/Family Education  Outcome: Progressing Towards Goal     Problem: Patient Education: Go to Patient Education Activity  Goal: Patient/Family Education  Outcome: Progressing Towards Goal     Problem: Patient Education: Go to Patient Education Activity  Goal: Patient/Family Education  Outcome: Progressing Towards Goal

## 2022-09-08 NOTE — PROGRESS NOTES
Problem: Diabetes Self-Management  Goal: *Disease process and treatment process  Description: Define diabetes and identify own type of diabetes; list 3 options for treating diabetes. Outcome: Progressing Towards Goal  Goal: *Incorporating nutritional management into lifestyle  Description: Describe effect of type, amount and timing of food on blood glucose; list 3 methods for planning meals. Outcome: Progressing Towards Goal  Goal: *Incorporating physical activity into lifestyle  Description: State effect of exercise on blood glucose levels. Outcome: Progressing Towards Goal  Goal: *Developing strategies to promote health/change behavior  Description: Define the ABC's of diabetes; identify appropriate screenings, schedule and personal plan for screenings. Outcome: Progressing Towards Goal  Goal: *Using medications safely  Description: State effect of diabetes medications on diabetes; name diabetes medication taking, action and side effects. Outcome: Progressing Towards Goal  Goal: *Monitoring blood glucose, interpreting and using results  Description: Identify recommended blood glucose targets  and personal targets. Outcome: Progressing Towards Goal  Goal: *Prevention, detection, treatment of acute complications  Description: List symptoms of hyper- and hypoglycemia; describe how to treat low blood sugar and actions for lowering  high blood glucose level. Outcome: Progressing Towards Goal  Goal: *Prevention, detection and treatment of chronic complications  Description: Define the natural course of diabetes and describe the relationship of blood glucose levels to long term complications of diabetes.   Outcome: Progressing Towards Goal  Goal: *Developing strategies to address psychosocial issues  Description: Describe feelings about living with diabetes; identify support needed and support network  Outcome: Progressing Towards Goal  Goal: *Insulin pump training  Outcome: Progressing Towards Goal  Goal: *Sick day guidelines  Outcome: Progressing Towards Goal  Goal: *Patient Specific Goal (EDIT GOAL, INSERT TEXT)  Outcome: Progressing Towards Goal     Problem: Patient Education: Go to Patient Education Activity  Goal: Patient/Family Education  Outcome: Progressing Towards Goal     Problem: Falls - Risk of  Goal: *Absence of Falls  Description: Document Dann Led Fall Risk and appropriate interventions in the flowsheet. Outcome: Progressing Towards Goal  Note: Fall Risk Interventions:  Mobility Interventions: Bed/chair exit alarm         Medication Interventions: Bed/chair exit alarm    Elimination Interventions: Bed/chair exit alarm    History of Falls Interventions: Bed/chair exit alarm         Problem: Patient Education: Go to Patient Education Activity  Goal: Patient/Family Education  Outcome: Progressing Towards Goal     Problem: Pressure Injury - Risk of  Goal: *Prevention of pressure injury  Description: Document Yuri Scale and appropriate interventions in the flowsheet. Outcome: Progressing Towards Goal  Note: Pressure Injury Interventions:  Sensory Interventions: Float heels    Moisture Interventions: Contain wound drainage    Activity Interventions: Pressure redistribution bed/mattress(bed type)    Mobility Interventions: Turn and reposition approx.  every two hours(pillow and wedges)    Nutrition Interventions: Offer support with meals,snacks and hydration    Friction and Shear Interventions: Feet elevated on foot rest, HOB 30 degrees or less                Problem: Patient Education: Go to Patient Education Activity  Goal: Patient/Family Education  Outcome: Progressing Towards Goal     Problem: Patient Education: Go to Patient Education Activity  Goal: Patient/Family Education  Outcome: Progressing Towards Goal     Problem: Patient Education: Go to Patient Education Activity  Goal: Patient/Family Education  Outcome: Progressing Towards Goal     Problem: Patient Education: Go to Patient Education Activity  Goal: Patient/Family Education  Outcome: Progressing Towards Goal

## 2022-09-08 NOTE — PROGRESS NOTES
Progress Note    Patient: Marciano Paul MRN: [id number]  SSN: [id number]    YOB: 1970  Age: 47 y.o. Sex: male      Admit Date: 8/26/2022    LOS: 13 days     Subjective:   Postoperative day 13  Patient denies nausea vomiting  Passing flatus intermittent bowel movements    Objective:     Vitals:    09/07/22 2035 09/08/22 0109 09/08/22 0434 09/08/22 0800   BP: (!) 157/102 128/88  135/89   Pulse: 83 84 90 84   Resp: 16 15  15   Temp: 98.1 °F (36.7 °C) 97.8 °F (36.6 °C)  99.3 °F (37.4 °C)   SpO2: 98% 98% 99% 99%   Weight:       Height:            Intake and Output:  Current Shift: No intake/output data recorded. Last three shifts: 09/06 1901 - 09/08 0700  In: 8913 [P.O.:240; I.V.:1500]  Out: 1940 [Urine:1900; Drains:40]    Review of Systems:  ROS     Physical Exam:   Abdomen is soft, nondistended, appropriately tender at surgical sites, incisions right abdomen and midline clean, LES drain serosanguineous drainage    Lab/Data Review:  Recent Results (from the past 24 hour(s))   GLUCOSE, POC    Collection Time: 09/07/22  4:38 PM   Result Value Ref Range    Glucose (POC) 99 65 - 100 mg/dL    Performed by Andrea Mcdaniels, POC    Collection Time: 09/07/22  8:41 PM   Result Value Ref Range    Glucose (POC) 147 (H) 65 - 100 mg/dL    Performed by Maria Del Carmen Humphrey    GLUCOSE, POC    Collection Time: 09/08/22  8:16 AM   Result Value Ref Range    Glucose (POC) 105 (H) 65 - 100 mg/dL    Performed by Sohail Joseph    GLUCOSE, POC    Collection Time: 09/08/22 11:43 AM   Result Value Ref Range    Glucose (POC) 138 (H) 65 - 100 mg/dL    Performed by Sohail Joseph         Assessment:     Active Problems:    S/P closure of 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   If hemoglobin stable tomorrow we will restart Eliquis  Continue amiodarone for rate control    Signed By: Hortencia Arroyo MD     September 8, 2022

## 2022-09-08 NOTE — PROGRESS NOTES
CM reviewed chart. Discharge disposition is [address] [facility],[address] Floor of [place] ([facility]). SNF will need to complete their review of PT/OT notes before officially accepting patient. Additionally, the SNF will need to get auth from insurance prior to discharge. CM will continue to follow.

## 2022-09-09 NOTE — PROGRESS NOTES
CM reviewed chart. Patient's discharge disposition is [address] [facility],[address] of [address] [place] [address] ([facility]). His discharge is pending medical clearance and insurance authorization. CM will continue to follow.

## 2022-09-09 NOTE — ROUTINE PROCESS
Obtained modification order to patient's diet order. Patient may have oatmeal on trays per Dr. Brain Ricks.

## 2022-09-09 NOTE — PROGRESS NOTES
Problem: Falls - Risk of  Goal: *Absence of Falls  Description: Document Zaira Henning Fall Risk and appropriate interventions in the flowsheet.   Outcome: Progressing Towards Goal  Note: Fall Risk Interventions:  Mobility Interventions: Bed/chair exit alarm         Medication Interventions: Evaluate medications/consider consulting pharmacy    Elimination Interventions: Call light in reach    History of Falls Interventions: Door open when patient unattended         Problem: Patient Education: Go to Patient Education Activity  Goal: Patient/Family Education  Outcome: Progressing Towards Goal

## 2022-09-09 NOTE — PROGRESS NOTES
Progress Note    Patient: Zee Spring MRN: [id number]  SSN: [id number]    YOB: 1970  Age: 47 y.o. Sex: male      Admit Date: 8/26/2022    LOS: 14 days     Subjective:   Postoperative day 14  Patient has had some intermittent nausea denies vomiting  Continues to pass flatus no bowel movement  Objective:     Vitals:    09/09/22 0034 09/09/22 0359 09/09/22 0808 09/09/22 1541   BP: 137/89 (!) 136/90 124/83 126/86   Pulse: 82 84 82 82   Resp:  18 18 18   Temp:  99.4 °F (37.4 °C) 99.8 °F (37.7 °C) 99.1 °F (37.3 °C)   SpO2: 99% 97% 97% 97%   Weight:       Height:            Intake and Output:  Current Shift: 09/09 0701 - 09/09 1900  In: 1200 [I.V.:1200]  Out: 770 [Urine:600; Drains:170]  Last three shifts: 09/07 1901 - 09/09 0700  In: -   Out: 2365 [Urine:2020; Drains:345]    Review of Systems:  ROS     Physical Exam:   Abdomen is soft, mildly distended, appropriately tender, midline and right lower quadrant incisions clean, LES drain output consistent with old hematoma    Lab/Data Review:  Recent Results (from the past 24 hour(s))   GLUCOSE, POC    Collection Time: 09/08/22  7:52 PM   Result Value Ref Range    Glucose (POC) 263 (H) 65 - 100 mg/dL    Performed by CHRISTIANO VAUGHN    CBC WITH AUTOMATED DIFF    Collection Time: 09/09/22  4:29 AM   Result Value Ref Range    WBC 13.5 (H) 4.1 - 11.1 K/uL    RBC 3.85 (L) 4.10 - 5.70 M/uL    HGB 8.9 (L) 12.1 - 17.0 g/dL    HCT 28.9 (L) 36.6 - 50.3 %    MCV 75.1 (L) 80.0 - 99.0 FL    MCH 23.1 (L) 26.0 - 34.0 PG    MCHC 30.8 30.0 - 36.5 g/dL    RDW 20.1 (H) 11.5 - 14.5 %    PLATELET 580 292 - 450 K/uL    MPV 10.0 8.9 - 12.9 FL    NRBC 0.3 (H) 0.0  WBC    ABSOLUTE NRBC 0.04 (H) 0.00 - 0.01 K/uL    NEUTROPHILS 76 (H) 32 - 75 %    LYMPHOCYTES 11 (L) 12 - 49 %    MONOCYTES 12 5 - 13 %    EOSINOPHILS 0 0 - 7 %    BASOPHILS 0 0 - 1 %    IMMATURE GRANULOCYTES 1 (H) 0 - 0.5 %    ABS. NEUTROPHILS 10.2 (H) 1.8 - 8.0 K/UL    ABS.  LYMPHOCYTES 1.5 0.8 - 3.5 K/UL    ABS. MONOCYTES 1.7 (H) 0.0 - 1.0 K/UL    ABS. EOSINOPHILS 0.0 0.0 - 0.4 K/UL    ABS. BASOPHILS 0.0 0.0 - 0.1 K/UL    ABS. IMM. GRANS. 0.1 (H) 0.00 - 0.04 K/UL    DF AUTOMATED     METABOLIC PANEL, COMPREHENSIVE    Collection Time: 09/09/22  4:29 AM   Result Value Ref Range    Sodium 138 136 - 145 mmol/L    Potassium 3.0 (L) 3.5 - 5.1 mmol/L    Chloride 105 97 - 108 mmol/L    CO2 27 21 - 32 mmol/L    Anion gap 6 5 - 15 mmol/L    Glucose 81 65 - 100 mg/dL    BUN 3 (L) 6 - 20 mg/dL    Creatinine 0.29 (L) 0.70 - 1.30 mg/dL    BUN/Creatinine ratio 10 (L) 12 - 20      GFR est AA >60 >60 ml/min/1.73m2    GFR est non-AA >60 >60 ml/min/1.73m2    Calcium 7.7 (L) 8.5 - 10.1 mg/dL    Bilirubin, total 0.5 0.2 - 1.0 mg/dL    AST (SGOT) 10 (L) 15 - 37 U/L    ALT (SGPT) 7 (L) 12 - 78 U/L    Alk.  phosphatase 72 45 - 117 U/L    Protein, total 5.2 (L) 6.4 - 8.2 g/dL    Albumin 2.2 (L) 3.5 - 5.0 g/dL    Globulin 3.0 2.0 - 4.0 g/dL    A-G Ratio 0.7 (L) 1.1 - 2.2     GLUCOSE, POC    Collection Time: 09/09/22  6:24 AM   Result Value Ref Range    Glucose (POC) 76 65 - 100 mg/dL    Performed by Justice Garcia, POC    Collection Time: 09/09/22  7:39 AM   Result Value Ref Range    Glucose (POC) 96 65 - 100 mg/dL    Performed by Ru Flores    GLUCOSE, POC    Collection Time: 09/09/22 11:27 AM   Result Value Ref Range    Glucose (POC) 122 (H) 65 - 100 mg/dL    Performed by Ru Flores    GLUCOSE, POC    Collection Time: 09/09/22  4:37 PM   Result Value Ref Range    Glucose (POC) 169 (H) 65 - 100 mg/dL    Performed by Ru Flores           Assessment:     Active Problems:    S/P closure of ileostomy (8/26/2022)        Plan:   Plan CT scan abdomen pelvis with oral contrast to evaluate anastomosis  Patient is tolerating diet we will continue  Continue current analgesics  Continue physical therapy/Occupational Therapy      Signed By: Trinity Llanes MD     September 9, 2022

## 2022-09-10 NOTE — PROGRESS NOTES
Problem: Falls - Risk of  Goal: *Absence of Falls  Description: Document [place] Fall Risk and appropriate interventions in the flowsheet. Outcome: Progressing Towards Goal  Note: Fall Risk Interventions:  Mobility Interventions: Patient to call before getting OOB         Medication Interventions: Patient to call before getting OOB    Elimination Interventions: Call light in reach    History of Falls Interventions: Door open when patient unattended         Problem: Pressure Injury - Risk of  Goal: *Prevention of pressure injury  Description: Document Yuri Scale and appropriate interventions in the flowsheet.   Outcome: Progressing Towards Goal  Note: Pressure Injury Interventions:  Sensory Interventions: Assess changes in LOC    Moisture Interventions: Absorbent underpads    Activity Interventions: Increase time out of bed    Mobility Interventions: Pressure redistribution bed/mattress (bed type)    Nutrition Interventions: Document food/fluid/supplement intake    Friction and Shear Interventions: Minimize layers                Problem: Patient Education: Go to Patient Education Activity  Goal: Patient/Family Education  Outcome: Progressing Towards Goal     Problem: Patient Education: Go to Patient Education Activity  Goal: Patient/Family Education  Outcome: Progressing Towards Goal

## 2022-09-10 NOTE — PROGRESS NOTES
Progress Note    Patient: Cora Gruber MRN: [id number]  SSN: [id number]    YOB: 1970  Age: 47 y.o. Sex: male      Admit Date: 8/26/2022    LOS: 15 days     Subjective:   Postoperative day 15  No nausea or vomiting, tolerating diet. Continues to pass flatus no bowel movement in past 24 hours. Objective:     Vitals:    09/09/22 1911 09/10/22 0229 09/10/22 0842 09/10/22 0914   BP: 128/89 124/87 127/83 121/83   Pulse: 83 85 93 93   Resp: 18 18  18   Temp: 99 °F (37.2 °C) 99.1 °F (37.3 °C)  99.1 °F (37.3 °C)   SpO2: 96% 91%  93%   Weight:       Height:            Intake and Output:  Current Shift: No intake/output data recorded.   Last three shifts: 09/08 1901 - 09/10 0700  In: 1200 [I.V.:1200]  Out: 1455 [Urine:1000; Drains:455]      Physical Exam:   Abdomen is soft, mildly distended, appropriately tender, midline and right lower quadrant incisions clean, LES drain output consistent with old hematoma    Lab/Data Review:  Recent Results (from the past 24 hour(s))   GLUCOSE, POC    Collection Time: 09/09/22  4:37 PM   Result Value Ref Range    Glucose (POC) 169 (H) 65 - 100 mg/dL    Performed by Hilda Samara    GLUCOSE, POC    Collection Time: 09/09/22  7:43 PM   Result Value Ref Range    Glucose (POC) 173 (H) 65 - 100 mg/dL    Performed by Kahlil Sanches, POC    Collection Time: 09/09/22  9:28 PM   Result Value Ref Range    Glucose (POC) 127 (H) 65 - 100 mg/dL    Performed by Devan Mcdonald, POC    Collection Time: 09/10/22  5:36 AM   Result Value Ref Range    Glucose (POC) 96 65 - 100 mg/dL    Performed by Devan Mcdonald, POC    Collection Time: 09/10/22  8:40 AM   Result Value Ref Range    Glucose (POC) 100 65 - 100 mg/dL    Performed by Hildasantos WhitfieldSamara    GLUCOSE, POC    Collection Time: 09/10/22 11:39 AM   Result Value Ref Range    Glucose (POC) 150 (H) 65 - 100 mg/dL    Performed by Hilda Pascual           Assessment:     Active Problems:    S/P closure of ileostomy (8/26/2022)      Plan:   CT abdomen pelvis reviewed, will monitor  Continue diet per patient request  Continue current analgesics  Continue physical therapy/Occupational Therapy      Signed By: Laurence Bloom DO     September 10, 2022

## 2022-09-11 NOTE — PROGRESS NOTES
Problem: Diabetes Self-Management  Goal: *Disease process and treatment process  Description: Define diabetes and identify own type of diabetes; list 3 options for treating diabetes. Outcome: Progressing Towards Goal     Problem: Falls - Risk of  Goal: *Absence of Falls  Description: Document Kiana Arielle Fall Risk and appropriate interventions in the flowsheet. Outcome: Progressing Towards Goal  Note: Fall Risk Interventions:  Mobility Interventions: Patient to call before getting OOB, PT Consult for mobility concerns, PT Consult for assist device competence, Strengthening exercises (ROM-active/passive)         Medication Interventions: Teach patient to arise slowly, Patient to call before getting OOB    Elimination Interventions: Call light in reach, Urinal in reach    History of Falls Interventions: Door open when patient unattended         Problem: Patient Education: Go to Patient Education Activity  Goal: Patient/Family Education  Outcome: Progressing Towards Goal     Problem: Pressure Injury - Risk of  Goal: *Prevention of pressure injury  Description: Document Yuri Scale and appropriate interventions in the flowsheet.   Outcome: Progressing Towards Goal  Note: Pressure Injury Interventions:  Sensory Interventions: Discuss PT/OT consult with provider, Float heels, Keep linens dry and wrinkle-free, Maintain/enhance activity level, Minimize linen layers    Moisture Interventions: Absorbent underpads, Maintain skin hydration (lotion/cream), Minimize layers    Activity Interventions: Increase time out of bed, PT/OT evaluation    Mobility Interventions: HOB 30 degrees or less, Float heels, PT/OT evaluation    Nutrition Interventions: Document food/fluid/supplement intake, Offer support with meals,snacks and hydration    Friction and Shear Interventions: HOB 30 degrees or less, Minimize layers                Problem: Patient Education: Go to Patient Education Activity  Goal: Patient/Family Education  Outcome: Progressing Towards Goal     Problem: Pain  Goal: *Control of Pain  Outcome: Progressing Towards Goal     Problem: Patient Education: Go to Patient Education Activity  Goal: Patient/Family Education  Outcome: Progressing Towards Goal

## 2022-09-11 NOTE — PROGRESS NOTES
Progress Note    Patient: Cordelia Vee MRN: [id number]  SSN: [id number]    YOB: 1970  Age: 47 y.o. Sex: male      Admit Date: 8/26/2022    LOS: 16 days     Subjective:   Postoperative day 16. No nausea or vomiting, tolerating diet. Continues to pass flatus, last BM 2 days ago. Purulent drainage from old ileostomy site. Objective:     Vitals:    09/11/22 0227 09/11/22 0757 09/11/22 0810 09/11/22 1029   BP: 136/87 131/84     Pulse: 91 86     Resp: 20 18     Temp: 98.8 °F (37.1 °C) 98.3 °F (36.8 °C)     SpO2: 95% 91% 94% 93%   Weight:       Height:            Intake and Output:  Current Shift: No intake/output data recorded.   Last three shifts: 09/09 1901 - 09/11 0700  In: -   Out: 1507 [Urine:1400; Drains:107]      Physical Exam:   Abdomen is soft, mildly distended, appropriately tender, midline incision c/d/i, RLQ incision with purulent drainage (opened at bedside and packed with iodoform packing), LES drain output consistent with old hematoma    Lab/Data Review:  Recent Results (from the past 24 hour(s))   GLUCOSE, POC    Collection Time: 09/10/22  4:47 PM   Result Value Ref Range    Glucose (POC) 138 (H) 65 - 100 mg/dL    Performed by Michael Bagley    GLUCOSE, POC    Collection Time: 09/10/22  9:02 PM   Result Value Ref Range    Glucose (POC) 184 (H) 65 - 100 mg/dL    Performed by Johnna Espinoza    GLUCOSE, POC    Collection Time: 09/11/22  7:46 AM   Result Value Ref Range    Glucose (POC) 139 (H) 65 - 100 mg/dL    Performed by Kirsten Gomez    GLUCOSE, POC    Collection Time: 09/11/22 11:41 AM   Result Value Ref Range    Glucose (POC) 168 (H) 65 - 100 mg/dL    Performed by Kirsten Gomez           Assessment:     Active Problems:    S/P closure of ileostomy (8/26/2022)      Plan:   Daily packing changes to old ileostomy wound  Advance to soft diet  Continue current analgesics  Continue physical therapy/Occupational Therapy      Signed By: Loraine Bloom DO     September 11, 2022

## 2022-09-12 NOTE — PROGRESS NOTES
Progress Note    Patient: Alber Nur MRN: [id number]  SSN: [id number]    YOB: 1970  Age: 47 y.o. Sex: male      Admit Date: 8/26/2022    LOS: 17 days     Subjective:   Postoperative day 17  Patient tolerating diet continues to have bowel movements    Objective:     Vitals:    09/11/22 1757 09/11/22 2104 09/12/22 0159 09/12/22 1017   BP: 128/82 124/68 114/81 109/81   Pulse: 88 82 89 96   Resp:  20 18 18   Temp: 98 °F (36.7 °C) 98.3 °F (36.8 °C) 98.9 °F (37.2 °C) 99.3 °F (37.4 °C)   SpO2: 94% 97% 96% 93%   Weight:       Height:            Intake and Output:  Current Shift: No intake/output data recorded.   Last three shifts: 09/10 1901 - 09/12 0700  In: 325 [P.O.:325]  Out: 2072 [Urine:1950; Drains:122]    Review of Systems:  ROS     Physical Exam:   Abdomen is soft, nondistended, appropriately tender at surgical sites, right lower quadrant incision partially opened and packed, midline incision clean, LES drain serosanguineous drainage    Lab/Data Review:  Recent Results (from the past 24 hour(s))   GLUCOSE, POC    Collection Time: 09/11/22  5:14 PM   Result Value Ref Range    Glucose (POC) 214 (H) 65 - 100 mg/dL    Performed by Darinel Geller    GLUCOSE, POC    Collection Time: 09/11/22  8:16 PM   Result Value Ref Range    Glucose (POC) 225 (H) 65 - 100 mg/dL    Performed by Phani Boland    GLUCOSE, POC    Collection Time: 09/11/22 11:03 PM   Result Value Ref Range    Glucose (POC) 188 (H) 65 - 100 mg/dL    Performed by Denice Del Rosario [place], BASIC    Collection Time: 09/12/22  6:20 AM   Result Value Ref Range    Sodium 142 136 - 145 mmol/L    Potassium 4.1 3.5 - 5.1 mmol/L    Chloride 113 (H) 97 - 108 mmol/L    CO2 22 21 - 32 mmol/L    Anion gap 7 5 - 15 mmol/L    Glucose 131 (H) 65 - 100 mg/dL    BUN 6 6 - 20 mg/dL    Creatinine 0.36 (L) 0.70 - 1.30 mg/dL    BUN/Creatinine ratio 17 12 - 20      GFR est AA >60 >60 ml/min/1.73m2    GFR est non-AA >60 >60 ml/min/1.73m2 Calcium 7.9 (L) 8.5 - 10.1 mg/dL   CBC W/O DIFF    Collection Time: 09/12/22  6:20 AM   Result Value Ref Range    WBC 14.9 (H) 4.1 - 11.1 K/uL    RBC 4.03 (L) 4.10 - 5.70 M/uL    HGB 9.3 (L) 12.1 - 17.0 g/dL    HCT 30.4 (L) 36.6 - 50.3 %    MCV 75.4 (L) 80.0 - 99.0 FL    MCH 23.1 (L) 26.0 - 34.0 PG    MCHC 30.6 30.0 - 36.5 g/dL    RDW 20.2 (H) 11.5 - 14.5 %    PLATELET 772 664 - 619 K/uL    MPV 11.0 8.9 - 12.9 FL    NRBC 0.5 (H) 0.0  WBC    ABSOLUTE NRBC 0.07 (H) 0.00 - 0.01 K/uL   MAGNESIUM    Collection Time: 09/12/22  6:20 AM   Result Value Ref Range    Magnesium 1.7 1.6 - 2.4 mg/dL   GLUCOSE, POC    Collection Time: 09/12/22  6:24 AM   Result Value Ref Range    Glucose (POC) 122 (H) 65 - 100 mg/dL    Performed by Hans Adhikari, POC    Collection Time: 09/12/22  8:54 AM   Result Value Ref Range    Glucose (POC) 113 (H) 65 - 100 mg/dL    Performed by Amado Yuen, POC    Collection Time: 09/12/22 11:36 AM   Result Value Ref Range    Glucose (POC) 166 (H) 65 - 100 mg/dL    Performed by Crystal Eller           Assessment:     Active Problems:    S/P closure of ileostomy (8/26/2022)        Plan:   Continue diet as ordered  Physical therapy  Continue amiodarone for rate control    Signed By: Jorge Alvarenga MD     September 12, 2022

## 2022-09-12 NOTE — PROGRESS NOTES
Comprehensive Nutrition Assessment    Type and Reason for Visit: Reassess (goals)    Nutrition Recommendations/Plan:   Diet as ordered/ tolerate, provide food preferences per pt with meals  Supplements as ordered  Monitor weights & record, po intake and supplements, BM's in I/O's      Malnutrition Assessment:  Malnutrition Status:  Mild malnutrition (09/05/22 1053)    Context:  Acute illness         Nutrition Assessment:    Admitted for scheduled ileostomy closure. POD 5 w/ small amount of flatus noted per MD. Pt familiar to RD. Previously on Clear Liq 6 days ago in preparation for sx, currently NPO x5 days. Pt is clinically and metabolically stable w/ functional gut as evidenced by +BM, +flatus and active bowel sounds. As pt gut is functional, best practice is to initiate PO diet vs EN as medically able to promote best nutrition and continued gut function. Rec's above. No h/o wt loss since last nutrition assessment noted. (9/5) Day 9 s/p ex lap with takedown ileostomy, ileocolic anastamosis. Tolerating ice chips and popsicles, passing flatus. +nausea. Diet not yet advanced. BM 9/5 noted, passing flatus and diet adv to clear liquid 9/6. Diet has advanced and Currently on [facility] with ONS. She is tolerating diet and +BM's Labs: WBC 14.9, H/H 9.3/30.4, Na 142, K 4.1, BUN 6, Creat 0.36, Gluc 131, Mag 1.7. Meds: amiodarone, Toprol XL, Protonix, KCL, Dilaudid, prednisone,  0.9% NaCl, insulin lispro, Nystatin. Nutrition Related Findings:    No N/V. BM 9/11,loose. incisions clean and intact LUQ LES drain serous sanguinous drainage OP 60 ml Wound Type: Surgical incision      Current Nutrition Intake & Therapies:  Average Meal Intake: 26-50%  Average Supplement Intake: 26-50%  ADULT ORAL NUTRITION SUPPLEMENT Breakfast, Lunch, Dinner; Other Supplement; Oatmeal or cream of wheat on tray.   ADULT ORAL NUTRITION SUPPLEMENT Breakfast, Lunch, Dinner, HS Snack; Clear Liquid  ADULT DIET Easy to Chew; Cream of chicken soup    Anthropometric Measures:  Height: 5' 7.99\" (172.7 cm)  Ideal Body Weight (IBW): 154 lbs (70 kg)  Current Body Wt:  77.6 kg (171 lb 1.2 oz), 111.1 % IBW. Bed scale  Current BMI (kg/m2): 26  Usual Body Weight: 60.5 kg (133 lb 6.1 oz) (2 months ago per chart review from [facility].)  % Weight Change (Calculated): 23.6  Weight Adjustment: No adjustment  BMI Category: Overweight (BMI 25.0-29. 9)      Estimated Daily Nutrient Needs:  Energy Requirements Based On: Kcal/kg  Weight Used for Energy Requirements: Current  Energy (kcal/day): 1940 kcal  Weight Used for Protein Requirements: Current  Protein (g/day): 93 kg  Method Used for Fluid Requirements: 1 ml/kcal  Fluid (ml/day): 1940 ml      Nutrition Diagnosis:   Inadequate oral intake related to altered GI structure as evidenced by GI abnormality      Nutrition Interventions:   Food and/or Nutrient Delivery: Continue current diet, Continue oral nutrition supplement  Nutrition Education/Counseling: No recommendations at this time  Coordination of Nutrition Care: Continue to monitor while inpatient  Plan of Care discussed with: RN      Goals:  Previous Goal Met: Progressing toward goal(s)  Goals: PO intake 50% or greater, within 2 days  Specify Other Goals: wt maintence  within +/-0.5kg in 5 d      Nutrition Monitoring and Evaluation:   Behavioral-Environmental Outcomes: None identified  Food/Nutrient Intake Outcomes: Food and nutrient intake, Supplement intake  Physical Signs/Symptoms Outcomes: GI status, Biochemical data, Meal time behavior    Discharge Planning:    No discharge needs at this time    Afsaneh Livingston RD  Contact: [phone]

## 2022-09-12 NOTE — PROGRESS NOTES
OCCUPATIONAL THERAPY TREATMENT  Patient: Sarah Friend (64 y.o. male)  Date: 9/12/2022  Diagnosis: [facility] [Z93.2]  S/P closure of ileostomy [Z98.890] <principal problem not specified>  Procedure(s) (LRB):  EXPLORATORY LAPAROTOMY, CLOSURE ILEOSTOMY; ileocolic anastomosis (N/A) 17 Days Post-Op  Precautions: fall risk   Chart, occupational therapy assessment, plan of care, and goals were reviewed. ASSESSMENT  Patient continues with skilled OT services and is slowly progressing towards goals. Pt received semi-supine in bed upon OT arrival, AXO x4, and agreeable to OT treatment session at this time. Pt req's min A with additional time for rolling. Pt req's mod A with additional time for sup>sit EOB using log rolling technique. Pt completes sit EOB>stand with RW with CGA and takes a side step at EOB in prep for toilet transfer with CGA. Pt completes stand with RW>sit EOB with CGA. Pt is progressing as pt able to complete sit<>stand transfer and take a side step with CGA. Further activity deferred d/t pt reporting significant abdominal pain. Pt completes sit EOB>sup with min A and additional time to guide BLE. Pt then scoots toward [facility] with mod A and additional time. Pt tolerated session fair today. Will continue to progress as tolerated. Recommending d/c to SNF once pt is medically appropriate. Current Level of Function Impacting Discharge (ADLs): mod A sup>sit EOB and scooting toward [facility] sit><stand, min A rolling and sit EOB>sup    Other factors to consider for discharge: home support, PLOF, severity of deficits         PLAN :  Patient continues to benefit from skilled intervention to address the above impairments. Continue treatment per established plan of care. to address goals.     Recommend next OT session: Mobility OOB    Recommendation for discharge: (in order for the patient to meet his/her long term goals)  Ricky Yates    This discharge recommendation:  Has been made in collaboration with the attending provider and/or case management    IF patient discharges home will need the following DME: TBD       SUBJECTIVE:   Patient stated Of course we can try.     OBJECTIVE DATA SUMMARY:   Cognitive/Behavioral Status:  Neurologic State: Alert  Orientation Level: Oriented X4  Cognition: Follows commands    Functional Mobility and Transfers for ADLs:  Bed Mobility:  Rolling: Minimum assistance; Additional time  Supine to Sit: Moderate assistance; Additional time  Sit to Supine: Minimum assistance; Additional time (Guiding [facility])  Scooting: Moderate assistance; Additional time (Toward [facility])    Transfers:  Sit to Stand: Additional time;Contact guard assistance      Balance:  Sitting: Intact; Without support  Standing: Impaired; With support  Standing - Static: Fair;Constant support  Standing - Dynamic : Fair;Constant support    Pain:  9/10 abdomen    Activity Tolerance:   Fair and requires rest breaks  Please refer to the flowsheet for vital signs taken during this treatment. After treatment patient left in no apparent distress:   Bed locked and in lowest position, Supine in bed, Call bell within reach, and nsg updated. COMMUNICATION/COLLABORATION:   The patients plan of care was discussed with: Physical therapy assistant and Registered nurse. Donna German OT  Time Calculation: 16 mins    Problem: Self Care Deficits Care Plan (Adult)  Goal: *Acute Goals and Plan of Care (Insert Text)  Description: Patient goal: Complete self care tasks with more IND and less pain. 1. Pt will be IND sup <> sit in prep for EOB ADLs  2. Pt will be IND grooming sitting EOB  3. Pt will be IND LE dressing sitting EOB/long sit  4. Pt will be IND sit <>  prep for toileting LRAD  5. Pt will be IND toileting/toilet transfer/cloth mgmt LRAD  6.  Pt will be IND following UE HEP in prep for self care tasks      Outcome: Progressing Towards Goal

## 2022-09-12 NOTE — PROGRESS NOTES
CM reviewed clinical chart. Patient has now been denied [facility]. CM provided patient with updated SNF list. He states he is in too much pain right now and will call his daughter later to decided on SNF choices.

## 2022-09-13 NOTE — PROGRESS NOTES
CM reviewed referrals sent via [employer]. Currently,  of [address] [place] [address] and [facility] have not given their determination on acceptance or denial.   CM messaged the SNFs to see if they can consider patient for admission. Awaiting response from SNF.

## 2022-09-13 NOTE — PROGRESS NOTES
Problem: Diabetes Self-Management  Goal: *Disease process and treatment process  Description: Define diabetes and identify own type of diabetes; list 3 options for treating diabetes. Outcome: Progressing Towards Goal  Goal: *Incorporating nutritional management into lifestyle  Description: Describe effect of type, amount and timing of food on blood glucose; list 3 methods for planning meals. Outcome: Progressing Towards Goal  Goal: *Incorporating physical activity into lifestyle  Description: State effect of exercise on blood glucose levels. Outcome: Progressing Towards Goal  Goal: *Developing strategies to promote health/change behavior  Description: Define the ABC's of diabetes; identify appropriate screenings, schedule and personal plan for screenings. Outcome: Progressing Towards Goal  Goal: *Using medications safely  Description: State effect of diabetes medications on diabetes; name diabetes medication taking, action and side effects. Outcome: Progressing Towards Goal  Goal: *Monitoring blood glucose, interpreting and using results  Description: Identify recommended blood glucose targets  and personal targets. Outcome: Progressing Towards Goal  Goal: *Prevention, detection, treatment of acute complications  Description: List symptoms of hyper- and hypoglycemia; describe how to treat low blood sugar and actions for lowering  high blood glucose level. Outcome: Progressing Towards Goal  Goal: *Prevention, detection and treatment of chronic complications  Description: Define the natural course of diabetes and describe the relationship of blood glucose levels to long term complications of diabetes.   Outcome: Progressing Towards Goal  Goal: *Developing strategies to address psychosocial issues  Description: Describe feelings about living with diabetes; identify support needed and support network  Outcome: Progressing Towards Goal  Goal: *Insulin pump training  Outcome: Progressing Towards Goal  Goal: *Sick day guidelines  Outcome: Progressing Towards Goal  Goal: *Patient Specific Goal (EDIT GOAL, INSERT TEXT)  Outcome: Progressing Towards Goal     Problem: Patient Education: Go to Patient Education Activity  Goal: Patient/Family Education  Outcome: Progressing Towards Goal     Problem: Falls - Risk of  Goal: *Absence of Falls  Description: Document Josefina Jackthal Fall Risk and appropriate interventions in the flowsheet. Outcome: Progressing Towards Goal  Note: Fall Risk Interventions:  Mobility Interventions: Bed/chair exit alarm, OT consult for ADLs, Patient to call before getting OOB, PT Consult for mobility concerns, PT Consult for assist device competence, Strengthening exercises (ROM-active/passive), Utilize walker, cane, or other assistive device         Medication Interventions: Teach patient to arise slowly, Patient to call before getting OOB, Bed/chair exit alarm    Elimination Interventions: Bed/chair exit alarm, Call light in reach, Patient to call for help with toileting needs, Urinal in reach    History of Falls Interventions: Bed/chair exit alarm         Problem: Patient Education: Go to Patient Education Activity  Goal: Patient/Family Education  Outcome: Progressing Towards Goal     Problem: Pressure Injury - Risk of  Goal: *Prevention of pressure injury  Description: Document Yuri Scale and appropriate interventions in the flowsheet.   Outcome: Progressing Towards Goal  Note: Pressure Injury Interventions:  Sensory Interventions: Keep linens dry and wrinkle-free, Minimize linen layers    Moisture Interventions: Absorbent underpads    Activity Interventions: Increase time out of bed    Mobility Interventions: HOB 30 degrees or less    Nutrition Interventions: Document food/fluid/supplement intake    Friction and Shear Interventions: HOB 30 degrees or less                Problem: Patient Education: Go to Patient Education Activity  Goal: Patient/Family Education  Outcome: Progressing Towards Goal     Problem: Patient Education: Go to Patient Education Activity  Goal: Patient/Family Education  Outcome: Progressing Towards Goal     Problem: Patient Education: Go to Patient Education Activity  Goal: Patient/Family Education  Outcome: Progressing Towards Goal     Problem: Patient Education: Go to Patient Education Activity  Goal: Patient/Family Education  Outcome: Progressing Towards Goal     Problem: Pain  Goal: *Control of Pain  Outcome: Progressing Towards Goal  Goal: *PALLIATIVE CARE:  Alleviation of Pain  Outcome: Progressing Towards Goal     Problem: Patient Education: Go to Patient Education Activity  Goal: Patient/Family Education  Outcome: Progressing Towards Goal

## 2022-09-13 NOTE — PROGRESS NOTES
Progress Note    Patient: Feroz Tatum MRN: [id number]  SSN: [id number]    YOB: 1970  Age: 47 y.o. Sex: male      Admit Date: 8/26/2022    LOS: 18 days     Subjective:   Postoperative day 18  Patient seen in bed  Continues to complain of right-sided abdominal pain  Tolerating diet  Denies bowel movement over the past 24 hours but continues to pass gas    Objective:     Vitals:    09/13/22 0350 09/13/22 0847 09/13/22 0909 09/13/22 1520   BP:  (!) 123/94  110/83   Pulse:  89  89   Resp:  18  16   Temp:  98.6 °F (37 °C)  98.5 °F (36.9 °C)   SpO2: 96% 96% 94% 94%   Weight:       Height:            Intake and Output:  Current Shift: No intake/output data recorded. Last three shifts: 09/11 1901 - 09/13 0700  In: 325 [P.O.:325]  Out: 520 [Urine:400; Drains:120]    Review of Systems:  ROS     Physical Exam:   Abdomen is soft, mildly tender palpation on the right side, nondistended, right lower quadrant incision partially open and packed, midline incision clean, LES drain with with appears to be old hematoma draining    Lab/Data Review:  Recent Results (from the past 24 hour(s))   GLUCOSE, POC    Collection Time: 09/12/22  4:16 PM   Result Value Ref Range    Glucose (POC) 163 (H) 65 - 100 mg/dL    Performed by Papi Jacobo, POC    Collection Time: 09/12/22  8:35 PM   Result Value Ref Range    Glucose (POC) 180 (H) 65 - 100 mg/dL    Performed by Davina Villanueva    GLUCOSE, POC    Collection Time: 09/13/22  8:45 AM   Result Value Ref Range    Glucose (POC) 129 (H) 65 - 100 mg/dL    Performed by Vu Yepez    GLUCOSE, POC    Collection Time: 09/13/22 11:05 AM   Result Value Ref Range    Glucose (POC) 155 (H) 65 - 100 mg/dL    Performed by Amalia King           Assessment:     Active Problems:    S/P closure of ileostomy (8/26/2022)        Plan: Will start meropenem empirically, patient's leukocytosis not resolved.   Repeat labs in AM.  Continue diet  Continue physical therapy    Signed By: Thiago Boyd MD     September 13, 2022

## 2022-09-13 NOTE — PROGRESS NOTES
Problem: Diabetes Self-Management  Goal: *Disease process and treatment process  Description: Define diabetes and identify own type of diabetes; list 3 options for treating diabetes. Outcome: Progressing Towards Goal  Goal: *Incorporating nutritional management into lifestyle  Description: Describe effect of type, amount and timing of food on blood glucose; list 3 methods for planning meals. Outcome: Progressing Towards Goal  Goal: *Incorporating physical activity into lifestyle  Description: State effect of exercise on blood glucose levels. Outcome: Progressing Towards Goal  Goal: *Developing strategies to promote health/change behavior  Description: Define the ABC's of diabetes; identify appropriate screenings, schedule and personal plan for screenings. Outcome: Progressing Towards Goal  Goal: *Using medications safely  Description: State effect of diabetes medications on diabetes; name diabetes medication taking, action and side effects. Outcome: Progressing Towards Goal  Goal: *Monitoring blood glucose, interpreting and using results  Description: Identify recommended blood glucose targets  and personal targets. Outcome: Progressing Towards Goal  Goal: *Prevention, detection, treatment of acute complications  Description: List symptoms of hyper- and hypoglycemia; describe how to treat low blood sugar and actions for lowering  high blood glucose level. Outcome: Progressing Towards Goal  Goal: *Prevention, detection and treatment of chronic complications  Description: Define the natural course of diabetes and describe the relationship of blood glucose levels to long term complications of diabetes.   Outcome: Progressing Towards Goal  Goal: *Developing strategies to address psychosocial issues  Description: Describe feelings about living with diabetes; identify support needed and support network  Outcome: Progressing Towards Goal  Goal: *Insulin pump training  Outcome: Progressing Towards Goal  Goal: *Sick day guidelines  Outcome: Progressing Towards Goal  Goal: *Patient Specific Goal (EDIT GOAL, INSERT TEXT)  Outcome: Progressing Towards Goal     Problem: Patient Education: Go to Patient Education Activity  Goal: Patient/Family Education  Outcome: Progressing Towards Goal     Problem: Falls - Risk of  Goal: *Absence of Falls  Description: Document Roxana Yang Fall Risk and appropriate interventions in the flowsheet. Outcome: Progressing Towards Goal  Note: Fall Risk Interventions:  Mobility Interventions: Patient to call before getting OOB         Medication Interventions: Patient to call before getting OOB    Elimination Interventions: Call light in reach    History of Falls Interventions: Door open when patient unattended         Problem: Patient Education: Go to Patient Education Activity  Goal: Patient/Family Education  Outcome: Progressing Towards Goal     Problem: Pressure Injury - Risk of  Goal: *Prevention of pressure injury  Description: Document Yuri Scale and appropriate interventions in the flowsheet.   Outcome: Progressing Towards Goal  Note: Pressure Injury Interventions:  Sensory Interventions: Keep linens dry and wrinkle-free, Minimize linen layers    Moisture Interventions: Absorbent underpads, Minimize layers    Activity Interventions: Increase time out of bed, PT/OT evaluation    Mobility Interventions: HOB 30 degrees or less, PT/OT evaluation    Nutrition Interventions: Document food/fluid/supplement intake, Offer support with meals,snacks and hydration    Friction and Shear Interventions: HOB 30 degrees or less, Minimize layers                Problem: Patient Education: Go to Patient Education Activity  Goal: Patient/Family Education  Outcome: Progressing Towards Goal     Problem: Patient Education: Go to Patient Education Activity  Goal: Patient/Family Education  Outcome: Progressing Towards Goal     Problem: Patient Education: Go to Patient Education Activity  Goal: Patient/Family Education  Outcome: Progressing Towards Goal     Problem: Patient Education: Go to Patient Education Activity  Goal: Patient/Family Education  Outcome: Progressing Towards Goal     Problem: Pain  Goal: *Control of Pain  Outcome: Progressing Towards Goal  Goal: *PALLIATIVE CARE:  Alleviation of Pain  Outcome: Progressing Towards Goal     Problem: Patient Education: Go to Patient Education Activity  Goal: Patient/Family Education  Outcome: Progressing Towards Goal

## 2022-09-13 NOTE — PROGRESS NOTES
CM met with patient at bedside to discuss SNF choices. Patient states he talked to his daughter yesterday but she did not have time to discuss it with him. He said he will discuss it with his daughter this evening if it is not too late. CM informed patient that was too late, and inquired if CM could call daughter. Patient stated that I could not call daughter because she is at work. Patient asked where the SNF list was located,  CM noticed it on his bedside table. CM gave it to the patient and informed him I would follow up around 1PM with his choices for placement.

## 2022-09-13 NOTE — PROGRESS NOTES
Problem: Mobility Impaired (Adult and Pediatric)  Goal: *Acute Goals and Plan of Care (Insert Text)  Description: Patient stated goal: \"Leave me alone. I know I can do it when I need to\"  Patient will move from supine to sit and sit to supine , scoot up and down, and roll side to side in bed with supervision/set-up within 7 day(s). Patient will transfer from bed to chair and chair to bed with minimal assistance/contact guard assist using the least restrictive device within 7 day(s). Patient will improve static standing balance to supervision within 1 week(s). Patient will ambulate 10 feet with minimal assistance with least restrictive device within 1 weeks. Outcome: Progressing Towards Goal    PHYSICAL THERAPY WEEKLY REASSESSMENT  Patient: Danette Valenzuela (3 y.o. male)  Date: 9/13/2022  Diagnosis: [facility] [Z93.2]  S/P closure of ileostomy [Z98.890] <principal problem not specified>  Procedure(s) (LRB):  EXPLORATORY LAPAROTOMY, CLOSURE ILEOSTOMY; ileocolic anastomosis (N/A) 18 Days Post-Op  Precautions:    Chart, physical therapy assessment, plan of care and goals were reviewed. ASSESSMENT  Patient continues with skilled PT services and has made some progress towards previously stated goals. Pt was admitted to hospital now s/p closure ileostomy POD18 was initially evaluated by PT on 9/4/22. Pt supine in bed upon PT arrival, agreeable to work with PT, continues to c/o inc pain in abdomen. Pt able to participate with LE exercises to include AP, heel slides x10 reps each B LE. Pt then able to perform supine to sit EOB with min Ax1, sit to stand at EOB with min Ax1 and return to sitting EOB with CGAx1. Pt not able to take steps at EOB during this session due to inc pain, but states he has been getting up to and from [facility] with nsg assist.  Pt min A to return to supine in bed with assistance req for LE management to clear EOB.   Pt continues to demo decreased bed mob, transfers, LE strength, gt, and overall functional mobility. Pt will continue to benefit from skilled PT to address his functional deficits. Previous goals remain appropriate for pt. Recommend SNF upon d/c at this time. PLAN :  Patient continues to benefit from skilled intervention to address the above impairments. Continue treatment per established plan of care. to address goals. Recommendation for discharge: (in order for the patient to meet his/her long term goals)  Ricky Yates    This discharge recommendation:  Has been made in collaboration with the attending provider and/or case management           SUBJECTIVE:   Patient supine in bed upon PT arrival, agreeable to work with PT    OBJECTIVE DATA SUMMARY:   Critical Behavior:  Neurologic State: Alert  Orientation Level: Oriented X4  Cognition: Follows commands     Functional Mobility Training:  Bed Mobility:  Rolling: Minimum assistance; Additional time  Supine to Sit: Minimum assistance;Assist x1  Sit to Supine: Minimum assistance;Assist x1  Scooting: Minimum assistance;Assist x1        Transfers:  Sit to Stand: Minimum assistance  Stand to Sit: Contact guard assistance                   Ambulation/Gait Training:        Pt not able to take steps during this session due to inc pain    Therapeutic Exercises:   See assessment above    [facility] Form  How much difficulty does the patient currently have. .. Unable A Lot A Little None   1. Turning over in bed (including adjusting bedclothes, sheets and blankets)? [] 1   [] 2   [x] 3   [] 4   2. Sitting down on and standing up from a chair with arms ( e.g., wheelchair, bedside commode, etc.)   [] 1   [] 2   [x] 3   [] 4   3. Moving from lying on back to sitting on the side of the bed? [] 1   [] 2   [x] 3   [] 4          How much help from another person does the patient currently need. .. Total A Lot A Little None   4.   Moving to and from a bed to a chair (including a wheelchair)? [] 1   [x] 2   [] 3   [] 4   5. Need to walk in hospital room? [] 1   [x] 2   [] 3   [] 4   6. Climbing 3-5 steps with a railing? [] 1   [x] 2   [] 3   [] 4   © , Trustees of [facility] [employer], under license to [email or web address]. All rights reserved     Score:  Initial:  Most Recent: 15 (Date: 22)   Interpretation of Tool:  Represents activities that are increasingly more difficult (i.e. Bed mobility, Transfers, Gait). Score 24 23 22-20 19-15 14-10 9-7 6   Modifier CH CI CJ CK CL CM CN        Pain Ratin/10    Activity Tolerance:   Bed locked and in lowest position Fair  Please refer to the flowsheet for vital signs taken during this treatment. After treatment patient left in no apparent distress:   Bed returned to lowest position, Supine in bed, Call bell within reach, Bed / chair alarm activated, Side rails x 3, and bed locked and in lowest level    COMMUNICATION/COLLABORATION:   The patients plan of care was discussed with: Case management.      Emy Nicolas   Time Calculation: 15 mins

## 2022-09-13 NOTE — PROGRESS NOTES
CM notified by unit manager that patient now has choices for CM. CM entered patients room, patient states, \"I don't know why I am being rushed. \" CM informed patient that CM was notified that patient has choices for SNF at this time. Patient gave 2 choices, for [facility] [address], [facility]. Patient asked CM if CM has seen him out there yet. CM inquired about who? Patient stated Dr. Sarina Gil. CM told patient No.  Patient states he wants to talk to Dr. Sarina Gil about this because he does not want a repeat of last time. CM thanked patient for his time and informed him that the SNF referrals will be sent. CM sent SNF referrals via [facility]. Patient will need updated PT note for insurance auth to SNF. Last OT note was in the last 48 hours.

## 2022-09-14 NOTE — PROGRESS NOTES
Progress Note    Patient: Jer Stoddard MRN: [id number]  SSN: [id number]    YOB: 1970  Age: 47 y.o. Sex: male      Admit Date: 8/26/2022    LOS: 19 days     Subjective:   Postoperative day 19  Patient seen in bed  Tolerating diet but no bowel movement  Complains of pain in the right side abdomen    Objective:     Vitals:    09/13/22 1520 09/13/22 2001 09/13/22 2307 09/14/22 0820   BP: 110/83 (!) 124/91 120/83 101/78   Pulse: 89 89 63 95   Resp: 16 18 18 16   Temp: 98.5 °F (36.9 °C) 98.6 °F (37 °C) 98.6 °F (37 °C) 98.6 °F (37 °C)   SpO2: 94% 96% 98% 96%   Weight:       Height:            Intake and Output:  Current Shift: 09/14 0701 - 09/14 1900  In: -   Out: 140 [Drains:40]  Last three shifts: 09/12 1901 - 09/14 0700  In: 5020 [P.O.:700;  I.V.:1815]  Out: 40 [Drains:40]    Review of Systems:  ROS     Physical Exam:   Abdomen is soft, nondistended, tender to palpation more so on the right side, LES drain consistent with old hematoma, incisions right lower quadrant incision partially open clean, midline incision clean    Lab/Data Review:  Recent Results (from the past 24 hour(s))   GLUCOSE, POC    Collection Time: 09/13/22  4:39 PM   Result Value Ref Range    Glucose (POC) 180 (H) 65 - 100 mg/dL    Performed by Wallace Hu    GLUCOSE, POC    Collection Time: 09/13/22  8:35 PM   Result Value Ref Range    Glucose (POC) 153 (H) 65 - 100 mg/dL    Performed by JANINA LEE    CBC WITH AUTOMATED DIFF    Collection Time: 09/14/22  2:52 AM   Result Value Ref Range    WBC 17.3 (H) 4.1 - 11.1 K/uL    RBC 4.15 4.10 - 5.70 M/uL    HGB 9.6 (L) 12.1 - 17.0 g/dL    HCT 31.8 (L) 36.6 - 50.3 %    MCV 76.6 (L) 80.0 - 99.0 FL    MCH 23.1 (L) 26.0 - 34.0 PG    MCHC 30.2 30.0 - 36.5 g/dL    RDW 20.8 (H) 11.5 - 14.5 %    PLATELET 475 183 - 922 K/uL    NRBC 0.9 (H) 0.0  WBC    ABSOLUTE NRBC 0.15 (H) 0.00 - 0.01 K/uL    NEUTROPHILS 79 (H) 32 - 75 %    LYMPHOCYTES 10 (L) 12 - 49 %    MONOCYTES 10 5 - 13 % EOSINOPHILS 0 0 - 7 %    BASOPHILS 0 0 - 1 %    IMMATURE GRANULOCYTES 1 (H) 0 - 0.5 %    ABS. NEUTROPHILS 13.6 (H) 1.8 - 8.0 K/UL    ABS. LYMPHOCYTES 1.8 0.8 - 3.5 K/UL    ABS. MONOCYTES 1.8 (H) 0.0 - 1.0 K/UL    ABS. EOSINOPHILS 0.0 0.0 - 0.4 K/UL    ABS. BASOPHILS 0.0 0.0 - 0.1 K/UL    ABS. IMM. GRANS. 0.1 (H) 0.00 - 0.04 K/UL    DF AUTOMATED     METABOLIC PANEL, COMPREHENSIVE    Collection Time: 09/14/22  2:52 AM   Result Value Ref Range    Sodium 142 136 - 145 mmol/L    Potassium 5.1 3.5 - 5.1 mmol/L    Chloride 113 (H) 97 - 108 mmol/L    CO2 22 21 - 32 mmol/L    Anion gap 7 5 - 15 mmol/L    Glucose 134 (H) 65 - 100 mg/dL    BUN 7 6 - 20 mg/dL    Creatinine 0.57 (L) 0.70 - 1.30 mg/dL    BUN/Creatinine ratio 12 12 - 20      GFR est AA >60 >60 ml/min/1.73m2    GFR est non-AA >60 >60 ml/min/1.73m2    Calcium 8.0 (L) 8.5 - 10.1 mg/dL    Bilirubin, total 0.7 0.2 - 1.0 mg/dL    AST (SGOT) 36 15 - 37 U/L    ALT (SGPT) 22 12 - 78 U/L    Alk. phosphatase 155 (H) 45 - 117 U/L    Protein, total 5.7 (L) 6.4 - 8.2 g/dL    Albumin 2.3 (L) 3.5 - 5.0 g/dL    Globulin 3.4 2.0 - 4.0 g/dL    A-G Ratio 0.7 (L) 1.1 - 2.2     GLUCOSE, POC    Collection Time: 09/14/22  7:55 AM   Result Value Ref Range    Glucose (POC) 134 (H) 65 - 100 mg/dL    Performed by Norman Marcum RN (Traveler)    GLUCOSE, POC    Collection Time: 09/14/22 11:38 AM   Result Value Ref Range    Glucose (POC) 91 65 - 100 mg/dL    Performed by Norman Marcum RN (Traveler)           Assessment:     Active Problems:    S/P closure of ileostomy (8/26/2022)        Plan:   Worsening leukocytosis, white blood cell count to 17,000 today we will plan on CT abdomen pelvis to evaluate for intra-abdominal abscess.   Continue meropenem  Continue diet as ordered  Continue physical therapy    Signed By: Kalen Oswald MD     September 14, 2022

## 2022-09-14 NOTE — PROGRESS NOTES
responded to a code blue on [address] room 527. At this time no family was present.  offered ministry of presence during code and following offered a moment of silence. Advised nurse to contact [facility] for any further referrals.     [address], [facility]    Please [facility]  in order to get in touch with  for any Spiritual Care Needs   [phone]   OR   Reach out to us on Perfect Serve at [facility].

## 2022-09-14 NOTE — PROGRESS NOTES
Problem: Diabetes Self-Management  Goal: *Disease process and treatment process  Description: Define diabetes and identify own type of diabetes; list 3 options for treating diabetes. Outcome: Progressing Towards Goal  Goal: *Incorporating nutritional management into lifestyle  Description: Describe effect of type, amount and timing of food on blood glucose; list 3 methods for planning meals. Outcome: Progressing Towards Goal  Goal: *Incorporating physical activity into lifestyle  Description: State effect of exercise on blood glucose levels. Outcome: Progressing Towards Goal  Goal: *Developing strategies to promote health/change behavior  Description: Define the ABC's of diabetes; identify appropriate screenings, schedule and personal plan for screenings. Outcome: Progressing Towards Goal  Goal: *Using medications safely  Description: State effect of diabetes medications on diabetes; name diabetes medication taking, action and side effects. Outcome: Progressing Towards Goal  Goal: *Monitoring blood glucose, interpreting and using results  Description: Identify recommended blood glucose targets  and personal targets. Outcome: Progressing Towards Goal  Goal: *Prevention, detection, treatment of acute complications  Description: List symptoms of hyper- and hypoglycemia; describe how to treat low blood sugar and actions for lowering  high blood glucose level. Outcome: Progressing Towards Goal  Goal: *Prevention, detection and treatment of chronic complications  Description: Define the natural course of diabetes and describe the relationship of blood glucose levels to long term complications of diabetes.   Outcome: Progressing Towards Goal  Goal: *Developing strategies to address psychosocial issues  Description: Describe feelings about living with diabetes; identify support needed and support network  Outcome: Progressing Towards Goal  Goal: *Insulin pump training  Outcome: Progressing Towards Goal  Goal: *Sick day guidelines  Outcome: Progressing Towards Goal  Goal: *Patient Specific Goal (EDIT GOAL, INSERT TEXT)  Outcome: Progressing Towards Goal     Problem: Patient Education: Go to Patient Education Activity  Goal: Patient/Family Education  Outcome: Progressing Towards Goal     Problem: Falls - Risk of  Goal: *Absence of Falls  Description: Document Clydene Bone Fall Risk and appropriate interventions in the flowsheet. Outcome: Progressing Towards Goal  Note: Fall Risk Interventions:  Mobility Interventions: Bed/chair exit alarm, OT consult for ADLs, PT Consult for mobility concerns, PT Consult for assist device competence, Strengthening exercises (ROM-active/passive), Utilize walker, cane, or other assistive device         Medication Interventions: Bed/chair exit alarm, Teach patient to arise slowly, Patient to call before getting OOB    Elimination Interventions: Bed/chair exit alarm, Call light in reach, Urinal in reach, Patient to call for help with toileting needs    History of Falls Interventions: Bed/chair exit alarm         Problem: Patient Education: Go to Patient Education Activity  Goal: Patient/Family Education  Outcome: Progressing Towards Goal     Problem: Pressure Injury - Risk of  Goal: *Prevention of pressure injury  Description: Document Yuri Scale and appropriate interventions in the flowsheet.   Outcome: Progressing Towards Goal  Note: Pressure Injury Interventions:  Sensory Interventions: Keep linens dry and wrinkle-free, Minimize linen layers    Moisture Interventions: Absorbent underpads, Minimize layers    Activity Interventions: PT/OT evaluation, Increase time out of bed    Mobility Interventions: HOB 30 degrees or less, PT/OT evaluation    Nutrition Interventions: Document food/fluid/supplement intake, Offer support with meals,snacks and hydration, Discuss nutritional consult with provider    Friction and Shear Interventions: HOB 30 degrees or less, Minimize layers                Problem: Patient Education: Go to Patient Education Activity  Goal: Patient/Family Education  Outcome: Progressing Towards Goal     Problem: Patient Education: Go to Patient Education Activity  Goal: Patient/Family Education  Outcome: Progressing Towards Goal     Problem: Patient Education: Go to Patient Education Activity  Goal: Patient/Family Education  Outcome: Progressing Towards Goal     Problem: Patient Education: Go to Patient Education Activity  Goal: Patient/Family Education  Outcome: Progressing Towards Goal     Problem: Pain  Goal: *Control of Pain  Outcome: Progressing Towards Goal  Goal: *PALLIATIVE CARE:  Alleviation of Pain  Outcome: Progressing Towards Goal     Problem: Patient Education: Go to Patient Education Activity  Goal: Patient/Family Education  Outcome: Progressing Towards Goal

## 2022-09-14 NOTE — PROGRESS NOTES
As of this time  of [facility] is decision pending, and [facility] of [facility] have not responded. CM sent PT notes, will send OT notes when they become available. If one of the SNF can accept Pt they will need to get auth.

## 2022-09-14 NOTE — PROGRESS NOTES
Pt called nursing station and stated he needed a nurse. This nurse and another nurse went to check on the pt. Pt was SOB, tachycardic, tachipnic, low 02 saturation and showing signs of agitation. Second nurse was attempting to take vitals, while this nurse went to obtain a nonrebreather. At this time pt became unresponsive and the second nurse initiated the code blue and stated compressions immediately. 1336 - epi  1338 - pulse check , no pulse palpated, compressions continued   1341 - vex, epi, compressions ongoing  1343 - bicarb  1344 - epi  1346 - calcium chloride  1346 - pulse check, no pulse palpated, compressions continued   1347 - epi  1349 - epi  1350 - pulse check, pulse palpated  1351 - atropine   1351 - ET tube inserted positive for color change, marked at 24  1354 - lost pulse, compression initiated, epi  1356 - pulse check, no pulse palpated, compressions continued   1357 - bicarb  1358 - bounding pulse palpated  1401 - pulse lost, compression initiated, epi  1403 - pulse check , pulse palpable  1405 - bicarb   1409 - pulse lost, compressions initiated, epi  1411 - pulse check, no pulse palpated   1412 - levo  1413 - pulse lost, compression initiated  1414 - bicarb  1417 - bicarb  1417 - pulse palpated  1420 - atropine   1421 - levo increased to 40  1422 - pulse lost, compressions initiated  1423 - epi   1425 - epi  1427- pulse check, no pulse palpated   1428 - time of death called by Dr. Ana M Carmichael supervisor, Attending physician and Félix Tellez present at this time. Dr. Cesilia Singh called and notified pt daughter. [employer] called and notified. Family members stated they do not wish to donate any organs from the pt.

## 2022-09-14 NOTE — PROGRESS NOTES
Code Blue Note      I responded to a Code Blue called on this patient    He had a witnessed cardiac arrest, was complaining of shortness of breath then became unresponsive and pulseless      Rhythm was initially PEA. High-quality CPR was underway upon my arrival shortly after the code was called    ACLS protocol was continued with high quality CPR. Anesthesia was present and intubated the patient. There was some initial suspicion that the first intubation was gastric so the tube was removed and he was reintubated with good color change and bilateral breath sounds    Patient received multiple doses of norepinephrine as well as a total of 5 A of sodium bicarb and an amp of calcium during the code    We obtained ROSC on multiple occasions following epinephrine injection but within a minute or so he would become pulseless again    ABG was obtained during the code and came back showing a pH of 6.8    After total of about 1 hour of resuscitative attempts, it was deemed that continued resuscitative efforts were futile and we therefore terminated the code.     Patient was pronounced at [id number]    In speaking with the primary physician, it seems that pulmonary embolism is quite likely as the cause of death

## 2022-09-15 LAB
ATRIAL RATE: 69 BPM
CALCULATED P AXIS, ECG09: 0 DEGREES
CALCULATED R AXIS, ECG10: -39 DEGREES
CALCULATED T AXIS, ECG11: 96 DEGREES
DIAGNOSIS, 93000: NORMAL
P-R INTERVAL, ECG05: 96 MS
Q-T INTERVAL, ECG07: 412 MS
QRS DURATION, ECG06: 184 MS
QTC CALCULATION (BEZET), ECG08: 441 MS
VENTRICULAR RATE, ECG03: 69 BPM

## 2022-09-23 NOTE — OP NOTES
Operative Note    Patient: Yrn Pearce  YOB: 1970  MRN: [id number]    Date of Procedure: 8/26/2022     Pre-Op Diagnosis: Ileostomy present ([facility] [Z93.2]    Post-Op Diagnosis: Same as preoperative diagnosis. Procedure(s):  EXPLORATORY LAPAROTOMY, CLOSURE ILEOSTOMY; ileocolic anastomosis    Surgeon(s):  Dennise Haynes MD    Surgical Assistant: Silvio    Anesthesia: General     Estimated Blood Loss (mL):  885SM    Complications: None    Specimens:   ID Type Source Tests Collected by Time Destination   1 : ileostomy end/ distal colon Preservative Colon  Dennise Haynes MD 8/26/2022 1815 Pathology   1 : urine Urine Byrd Specimen CULTURE, URINE Dennise Haynes MD 8/26/2022 1727 Microbiology        Implants: * No implants in log *    Drains:   [REMOVED] Brandon-Villaseñor Drain 08/26/22 Left;Upper Abdomen (Removed)   Site Assessment Clean, dry, & intact 09/14/22 0630   Dressing Status Clean, dry, & intact 09/14/22 0630   Status Charged 09/14/22 0630   Drainage Color Serosanguinous 09/14/22 0630   Output (ml) 40 ml 09/14/22 1308       Findings: Fairly extensive adhesions within the abdomen    Detailed Description of Procedure: The patient was brought to the operating room and positioned on the OR table in the supine position. Following the induction of general endotracheal anesthesia the abdomen was prepped and draped out in standard sterile fashion. A surgical timeout was performed outside the patient's identity and surgical procedure were once again confirmed. A midline incision was made following the patient's prior midline incision. It was taken down through subtenons tissues electrocautery and the fascia was incised with a scalpel and the abdominal cavity entered. The fascial incision was then gradually lengthened taking down any adhesions to the anterior abdominal wall with Metzenbaum scissors.   Once the incisions have an open wound central vein intra-abdominal adhesions were carefully taken down using Metzenbaum scissors with care being taken to avoid any injury to the bowel. We are able to locate the colonic staple line. Were able to mobilize the colon to allow for a anastomosis. We then turned our attention to ileostomy. Elliptical incision was made around the ileostomy taken down through subtenons tissues. The ileostomy was circumferentially freed from subcutaneous attachments using electrocautery and Metzenbaum scissors down to level the fascial ring. Attachments to the fascia were carefully taken down with Metzenbaum scissors and the ileostomy to be passed easily into the abdominal cavity. Adhesions from the ileum down to the pelvis was elevated using Metzenbaum scissors to have the terminal ileum    To the distal transverse colon to create an anastomosis. A 75 NICHELLE stapler was used to create a functional end-to-end side-to-side anastomosis between the terminal ileum and the colon. The same stapler was used to close the antral colotomy and transect the bowel. The mesentery of the transected portion was taken with the Enseal device and specimen consisting of ileostomy and proximal colon was passed the specimen. Abdomen is then irrigated out after ensuring meticulous hemostasis the ileostomy site fascia was closed with #1 Vicryl sutures. The midline incision fascia was closed with running #1 PDS suture. Skin at both sites was closed with interrupted vertical mattress nylon sutures. Dressings applied the procedure terminated. The patient was awakened, extubated, taken to recovery room in stable condition. All counts were correct at the close the case.   Electronically Signed by Ena Kessler MD on 9/23/2022 at 3:25 PM

## 2022-09-28 NOTE — H&P
Gerry Dave is a 47 y.o. male who presents to the office today for:          Chief Complaint   Patient presents with    Follow-up       2 weeks f/u-LAPAROSCOPIC ILEOCECECTOMY         Mr. Lorena Wilkins comes in today for follow-up and to discuss closure of his ileostomy. He underwent a laparoscopic assisted right colectomy for large cecal polyp that was not amenable to endoscopic resection. Postoperatively he did develop a stricture at the anastomosis secondary to omental adhesions at the site. He had a revision of his anastomosis with and subsequently leaked requiring ileostomy. He had a fairly prolonged hospital course however was ultimately discharged to skilled nursing facility tolerating a diet. Today he states that although he is able to pass his ileostomy he does have leakage and that his skin is is excoriated at the site. He is tolerating a diet and has actually gained 5 pounds since his last visit 3 weeks ago. His health history remains unchanged. He has a history of hypertension, polymyositis, myasthenia gravis, asthma, generalized muscle weakness.              Past Medical History:   Diagnosis Date    Adverse effect of anesthesia      Arrhythmia       atrial flutter    Asthma      Diabetes ([address]      Hypertension      Ill-defined condition       Spinal meningitis and avascular necrosis     Myasthenia gravis      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     muscle disorder    Primary hypersomnolence disorder                 Past Surgical History:   Procedure Laterality Date    HX COLONOSCOPY   2021    HX ENDOSCOPY        HX OTHER SURGICAL         lung abscess removal    OH SHOULDER SURG PROC UNLISTED         Left Shoulder               Family History   Problem Relation Age of Onset    Heart Disease Mother      Diabetes Father      Stroke Father           Social History            Socioeconomic History    Marital status:        Spouse name: Not on file    Number of children: Not on file    Years of education: Not on file    Highest education level: Not on file   Occupational History    Not on file   Tobacco Use    Smoking status: Never    Smokeless tobacco: Never   Vaping Use    Vaping Use: Never used   Substance and Sexual Activity    Alcohol use: No    Drug use: No    Sexual activity: Yes       Birth control/protection: Condom   Other Topics Concern    Not on file   Social History Narrative    Not on file      Social Determinants of Health      Financial Resource Strain: Not on file   Food Insecurity: Not on file   Transportation Needs: Not on file   Physical Activity: Not on file   Stress: Not on file   Social Connections: Not on file   Intimate Partner Violence: Not on file   Housing Stability: Not on file               Allergies   Allergen Reactions    Beef Containing Products Anaphylaxis and Other (comments)    Diltiazem Other (comments)       Reaction Type: Allergy; Reaction(s): Pressure in chest    Iodinated Contrast Media Other (comments)       Reaction Type: Allergy; Severity: Severe; Reaction(s): hives,throat swelling    Pork Derived (Porcine) Hives    Shellfish Containing Products Swelling       Other reaction(s): Other  Reaction Type: Allergy; Reaction(s): Hives,throat swelling    Sulfa (Sulfonamide Antibiotics) Unknown (comments)       Spinal meningitis    Sulfur Unknown (comments)       Reaction Type: Allergy  Spinal meningitis    Lactose Diarrhea       Lactose intolerant per Pt. Current Outpatient Medications   Medication Sig    aspirin 81 mg chewable tablet Take 81 mg by mouth in the morning. cholecalciferol (VITAMIN D3) (50,000 UNITS /1250 MCG) capsule Take 50,000 Units by mouth every seven (7) days. ferrous sulfate 325 mg (65 mg iron) tablet Take 325 mg by mouth two (2) times a day. Saccharomyces boulardii (Florastor) 250 mg capsule Take 250 mg by mouth two (2) times a day. fluconazole (DIFLUCAN) 200 mg tablet Take 200 mg by mouth in the morning.  FDA advises cautious prescribing of oral fluconazole in pregnancy. magnesium hydroxide (MILK OF MAGNESIA PO) Take  by mouth. ondansetron hcl (ZOFRAN) 4 mg tablet Take 4 mg by mouth every eight (8) hours as needed for Nausea or Vomiting. promethazine (PHENERGAN) 12.5 mg tablet Take  by mouth every six (6) hours as needed for Nausea. verapamiL (CALAN) 40 mg tablet Take 40 mg by mouth two (2) times a day. oxyCODONE IR (ROXICODONE) 10 mg tab immediate release tablet oxycodone 10 mg tablet    lisinopriL (PRINIVIL, ZESTRIL) 20 mg tablet Take 20 mg by mouth in the morning. apixaban (ELIQUIS) 5 mg tablet Take 5 mg by mouth two (2) times a day. loperamide (IMODIUM) 2 mg capsule as needed. insulin lispro (HUMALOG) 100 unit/mL injection by SubCUTAneous route. Sliding Scale  4 times a day    CALCIUM PO Take  by mouth daily. With vitamin D. Unsure of dose    Acetylcarnitine 500 mg cap Take  by mouth daily. insulin glargine (LANTUS) 100 unit/mL injection Take 40 Units by SubCUTAneous route in the morning. predniSONE (DELTASONE) 20 mg tablet Take 20 mg by mouth daily (with breakfast). albuterol (PROVENTIL, VENTOLIN) 90 mcg/Actuation inhaler Take 1-2 Puffs by inhalation every four (4) hours as needed for Wheezing.    esomeprazole (NEXIUM) 20 mg capsule Take 20 mg by mouth in the morning. folic acid (FOLVITE) 1 mg tablet Take 1 mg by mouth in the morning. omeprazole (PRILOSEC) 40 mg capsule Take 40 mg by mouth daily. (Patient not taking: No sig reported)    rituximab (RITUXAN IV) by IntraVENous route every 6 months. (Patient not taking: No sig reported)    OTHER,NON-FORMULARY, DMAE 351mg Daily (Patient not taking: No sig reported)      No current facility-administered medications for this visit. Review of Systems   All other systems reviewed and are negative.      /88 (BP 1 Location: Left upper arm, BP Patient Position: Sitting, BP Cuff Size: Adult)   Pulse (!) 101   Temp 98.4 °F (36.9 °C) (Temporal) Ht 5' 8\" (1.727 m)   Wt 69.4 kg (153 lb)   SpO2 98%   BMI 23.26 kg/m²      Physical Exam  Constitutional:       General: He is not in acute distress. Appearance: Normal appearance. He is normal weight. He is not ill-appearing. HENT:      Head: Normocephalic and atraumatic. Cardiovascular:      Rate and Rhythm: Normal rate and regular rhythm. Pulmonary:      Effort: Pulmonary effort is normal.      Breath sounds: Normal breath sounds. Abdominal:      General: There is no distension. Palpations: Abdomen is soft. Tenderness: There is no abdominal tenderness. Comments: Ileostomy right lower quadrant with parastomal skin excoriation   Musculoskeletal:         General: Normal range of motion. Cervical back: Normal range of motion and neck supple. Skin:     General: Skin is warm and dry. Neurological:      General: No focal deficit present. Mental Status: He is alert and oriented to person, place, and time. Psychiatric:         Mood and Affect: Mood normal.         Thought Content: Thought content normal.         Judgment: Judgment normal.         Problem List Items Addressed This Visit                  Digestive     Adenomatous polyp of ascending colon - Primary     Relevant Medications     Saccharomyces boulardii (Florastor) 250 mg capsule     magnesium hydroxide (MILK OF MAGNESIA PO)     ondansetron hcl (ZOFRAN) 4 mg tablet          Other     Ileostomy present ([facility] [address]         Assessment and Plan:  I once again reviewed exploratory laparotomy and takedown of ileostomy with the patient. I reviewed the risk and benefits. I reviewed the risk infection, bleeding, wound complications, incisional hernia, anastomotic leak, injury to other intra-abdominal organs and structures. He wishes to proceed with surgery and surgery scheduled for August 19, 2022. I gave him instructions on clear liquid diet for 1-1/2 days prior to surgery and to stop his Eliquis after August 16, 2022. Jorge Alvarenga MD

## 2022-10-07 NOTE — DISCHARGE SUMMARY
Admit date: 8/26/2022   Admitting Provider: Hugo Tariq MD    Discharge date: 09/14/2022  Discharging Provider: Hugo Tariq MD      * Admission Diagnoses: Ileostomy present [facility]) [Z93.2];S/P closure of ileostomy [Z98.890]    * Discharge Diagnoses: Cardiopulmonary arrest, death   Hospital Problems as of 9/14/2022 Date Reviewed: 8/10/2022            Codes Class Noted - Resolved POA    S/P closure of ileostomy ICD-10-CM: Z98.890  ICD-9-CM: V45.89  8/26/2022 - Present Unknown           * Hospital Course: The patient was electively admitted on August 26, 2022 for exploratory laparotomy and closure of ileostomy. Surgery was uneventful please refer to operative note for full details. Postop. He did well. He does have several episodes of tachycardia. On postop day 4 he was passing flatus. Postop day 4/5 had episode of SVT and rapid response was called. Cardiology saw the patient and recommended blood pressure as needed. On postop day 6 he again had episode of SVT and was started on amiodarone. He did have a drop in his hemoglobin postoperatively and required transfusion. Hemoglobin dropped to 6. He was continued on mechanical prophylaxis for DVT prophylaxis. He continues to have intermittent abdominal pain however continues to pass flatus and had intermittent bowel movements. He did not have any nausea or vomiting. On postop day 18 he had worsening leukocytosis and he was started empirically on meropenem. Postoperatively 19 he has other signals for the elevated decision was made to proceed with a CT scan of the abdomen pelvis to rule out abscess. On the gurney with the nurse present in room reported that he felt short of breath and then immediately thereafter went to cardiopulmonary arrest.  Hospital service initiated ACLS protocol.   Anesthesia was present and the patient was intubated however was realized the patient had an esophageal intubation and therefore was removed and intubated with good gas flow and color change. Cardiopulmonary resuscitation continued with no ROSC. Patient was ultimately declared dead.       * Procedures:   Procedure(s):  EXPLORATORY LAPAROTOMY, CLOSURE ILEOSTOMY; ileocolic anastomosis      Consults: Cardiology    * Discharge Condition:       Discharge Medications:  Discharge Medication List as of 2022  7:09 PM          Follow-up Information    None         Signed:  Hortencia Arroyo MD  10/7/2022  1:24 PM .

## 2023-01-01 NOTE — PROGRESS NOTES
Chart reviewed, DCP remains for patient to d/c to home with [facility], accepted with [facility], updates sent via [facility] to [facility]. CM continues to follow and monitor chart for needs. Statement Selected
